# Patient Record
Sex: MALE | Race: WHITE | NOT HISPANIC OR LATINO | Employment: OTHER | URBAN - METROPOLITAN AREA
[De-identification: names, ages, dates, MRNs, and addresses within clinical notes are randomized per-mention and may not be internally consistent; named-entity substitution may affect disease eponyms.]

---

## 2020-07-13 DIAGNOSIS — M25.559 PAIN IN UNSPECIFIED HIP: Primary | ICD-10-CM

## 2021-01-06 DIAGNOSIS — G47.09 INITIAL INSOMNIA: Primary | ICD-10-CM

## 2021-10-09 ENCOUNTER — HOSPITAL ENCOUNTER (INPATIENT)
Facility: HOSPITAL | Age: 61
LOS: 12 days | Discharge: HOSPICE | DRG: 853 | End: 2021-10-21
Attending: EMERGENCY MEDICINE | Admitting: FAMILY MEDICINE
Payer: MEDICAID

## 2021-10-09 DIAGNOSIS — L03.032 CELLULITIS OF GREAT TOE OF LEFT FOOT: ICD-10-CM

## 2021-10-09 DIAGNOSIS — I48.91 ATRIAL FIBRILLATION WITH RVR: ICD-10-CM

## 2021-10-09 DIAGNOSIS — B99.9 INFECTION: Primary | ICD-10-CM

## 2021-10-09 DIAGNOSIS — A41.01 SEPSIS DUE TO STAPHYLOCOCCUS AUREUS: ICD-10-CM

## 2021-10-09 DIAGNOSIS — R07.9 CHEST PAIN: ICD-10-CM

## 2021-10-09 DIAGNOSIS — R78.81 GRAM-POSITIVE BACTEREMIA: ICD-10-CM

## 2021-10-09 DIAGNOSIS — E08.65 DIABETES MELLITUS DUE TO UNDERLYING CONDITION WITH HYPERGLYCEMIA, UNSPECIFIED WHETHER LONG TERM INSULIN USE: ICD-10-CM

## 2021-10-09 DIAGNOSIS — A41.9 SEPSIS: ICD-10-CM

## 2021-10-09 DIAGNOSIS — A41.9 SEPSIS, DUE TO UNSPECIFIED ORGANISM, UNSPECIFIED WHETHER ACUTE ORGAN DYSFUNCTION PRESENT: ICD-10-CM

## 2021-10-09 DIAGNOSIS — L97.509 ULCER OF TOE, UNSPECIFIED LATERALITY, UNSPECIFIED ULCER STAGE: ICD-10-CM

## 2021-10-09 DIAGNOSIS — D72.829 LEUKOCYTOSIS, UNSPECIFIED TYPE: ICD-10-CM

## 2021-10-09 PROBLEM — E66.9 CLASS 2 OBESITY IN ADULT: Status: ACTIVE | Noted: 2021-10-09

## 2021-10-09 PROBLEM — B37.2 CANDIDAL INTERTRIGO: Status: ACTIVE | Noted: 2021-10-09

## 2021-10-09 PROBLEM — E11.10 DIABETIC KETOACIDOSIS WITHOUT COMA ASSOCIATED WITH TYPE 2 DIABETES MELLITUS: Status: ACTIVE | Noted: 2021-10-09

## 2021-10-09 PROBLEM — Z91.199 NONCOMPLIANCE: Status: ACTIVE | Noted: 2021-10-09

## 2021-10-09 PROBLEM — L03.115 CELLULITIS OF RIGHT LOWER EXTREMITY: Status: ACTIVE | Noted: 2021-10-09

## 2021-10-09 LAB
ALBUMIN SERPL BCP-MCNC: 3.6 G/DL (ref 3.5–5.2)
ALLENS TEST: ABNORMAL
ALP SERPL-CCNC: 97 U/L (ref 55–135)
ALT SERPL W/O P-5'-P-CCNC: 15 U/L (ref 10–44)
ANION GAP SERPL CALC-SCNC: 14 MMOL/L (ref 8–16)
ANION GAP SERPL CALC-SCNC: 17 MMOL/L (ref 8–16)
ANION GAP SERPL CALC-SCNC: 19 MMOL/L (ref 8–16)
AST SERPL-CCNC: 16 U/L (ref 10–40)
B-OH-BUTYR BLD STRIP-SCNC: >5.1 MMOL/L (ref 0–0.5)
BACTERIA #/AREA URNS HPF: NEGATIVE /HPF
BASOPHILS # BLD AUTO: 0.06 K/UL (ref 0–0.2)
BASOPHILS NFR BLD: 0.3 % (ref 0–1.9)
BILIRUB SERPL-MCNC: 1.7 MG/DL (ref 0.1–1)
BILIRUB UR QL STRIP: NEGATIVE
BUN SERPL-MCNC: 20 MG/DL (ref 8–23)
BUN SERPL-MCNC: 20 MG/DL (ref 8–23)
BUN SERPL-MCNC: 23 MG/DL (ref 8–23)
CALCIUM SERPL-MCNC: 8.7 MG/DL (ref 8.7–10.5)
CALCIUM SERPL-MCNC: 9 MG/DL (ref 8.7–10.5)
CALCIUM SERPL-MCNC: 9.4 MG/DL (ref 8.7–10.5)
CHLORIDE SERPL-SCNC: 101 MMOL/L (ref 95–110)
CHLORIDE SERPL-SCNC: 93 MMOL/L (ref 95–110)
CHLORIDE SERPL-SCNC: 98 MMOL/L (ref 95–110)
CLARITY UR: CLEAR
CO2 SERPL-SCNC: 16 MMOL/L (ref 23–29)
CO2 SERPL-SCNC: 17 MMOL/L (ref 23–29)
CO2 SERPL-SCNC: 18 MMOL/L (ref 23–29)
COLOR UR: YELLOW
CREAT SERPL-MCNC: 1.1 MG/DL (ref 0.5–1.4)
CREAT SERPL-MCNC: 1.1 MG/DL (ref 0.5–1.4)
CREAT SERPL-MCNC: 1.3 MG/DL (ref 0.5–1.4)
DELSYS: ABNORMAL
DIFFERENTIAL METHOD: ABNORMAL
EOSINOPHIL # BLD AUTO: 0 K/UL (ref 0–0.5)
EOSINOPHIL NFR BLD: 0.1 % (ref 0–8)
ERYTHROCYTE [DISTWIDTH] IN BLOOD BY AUTOMATED COUNT: 12.9 % (ref 11.5–14.5)
EST. GFR  (AFRICAN AMERICAN): >60 ML/MIN/1.73 M^2
EST. GFR  (NON AFRICAN AMERICAN): 58.1 ML/MIN/1.73 M^2
EST. GFR  (NON AFRICAN AMERICAN): >60 ML/MIN/1.73 M^2
EST. GFR  (NON AFRICAN AMERICAN): >60 ML/MIN/1.73 M^2
FIO2: 21
GLUCOSE SERPL-MCNC: 230 MG/DL (ref 70–110)
GLUCOSE SERPL-MCNC: 249 MG/DL (ref 70–110)
GLUCOSE SERPL-MCNC: 257 MG/DL (ref 70–110)
GLUCOSE SERPL-MCNC: 262 MG/DL (ref 70–110)
GLUCOSE SERPL-MCNC: 268 MG/DL (ref 70–110)
GLUCOSE SERPL-MCNC: 270 MG/DL (ref 70–110)
GLUCOSE SERPL-MCNC: 288 MG/DL (ref 70–110)
GLUCOSE SERPL-MCNC: 288 MG/DL (ref 70–110)
GLUCOSE SERPL-MCNC: 324 MG/DL (ref 70–110)
GLUCOSE SERPL-MCNC: 381 MG/DL (ref 70–110)
GLUCOSE UR QL STRIP: ABNORMAL
HCO3 UR-SCNC: 19.2 MMOL/L (ref 24–28)
HCT VFR BLD AUTO: 45.2 % (ref 40–54)
HCT VFR BLD CALC: 45 %PCV (ref 36–54)
HGB BLD-MCNC: 14.6 G/DL (ref 14–18)
HGB UR QL STRIP: NEGATIVE
HYALINE CASTS #/AREA URNS LPF: 9 /LPF
IMM GRANULOCYTES # BLD AUTO: 0.21 K/UL (ref 0–0.04)
IMM GRANULOCYTES NFR BLD AUTO: 1.1 % (ref 0–0.5)
KETONES UR QL STRIP: ABNORMAL
LACTATE SERPL-SCNC: 1.8 MMOL/L (ref 0.5–1.9)
LACTATE SERPL-SCNC: 2 MMOL/L (ref 0.5–1.9)
LEUKOCYTE ESTERASE UR QL STRIP: NEGATIVE
LYMPHOCYTES # BLD AUTO: 0.9 K/UL (ref 1–4.8)
LYMPHOCYTES NFR BLD: 4.8 % (ref 18–48)
MAGNESIUM SERPL-MCNC: 1.9 MG/DL (ref 1.6–2.6)
MCH RBC QN AUTO: 28.7 PG (ref 27–31)
MCHC RBC AUTO-ENTMCNC: 32.3 G/DL (ref 32–36)
MCV RBC AUTO: 89 FL (ref 82–98)
MICROSCOPIC COMMENT: ABNORMAL
MODE: ABNORMAL
MONOCYTES # BLD AUTO: 1.4 K/UL (ref 0.3–1)
MONOCYTES NFR BLD: 7.6 % (ref 4–15)
NEUTROPHILS # BLD AUTO: 15.9 K/UL (ref 1.8–7.7)
NEUTROPHILS NFR BLD: 86.1 % (ref 38–73)
NITRITE UR QL STRIP: NEGATIVE
NRBC BLD-RTO: 0 /100 WBC
PCO2 BLDA: 37.6 MMHG (ref 35–45)
PH SMN: 7.32 [PH] (ref 7.35–7.45)
PH UR STRIP: 6 [PH] (ref 5–8)
PHOSPHATE SERPL-MCNC: 4.2 MG/DL (ref 2.7–4.5)
PLATELET # BLD AUTO: 384 K/UL (ref 150–450)
PMV BLD AUTO: 10.6 FL (ref 9.2–12.9)
PO2 BLDA: 24 MMHG (ref 40–60)
POC BE: -7 MMOL/L
POC IONIZED CALCIUM: 1.24 MMOL/L (ref 1.06–1.42)
POC SATURATED O2: 37 % (ref 95–100)
POC TCO2: 20 MMOL/L (ref 24–29)
POTASSIUM BLD-SCNC: 4.3 MMOL/L (ref 3.5–5.1)
POTASSIUM SERPL-SCNC: 4 MMOL/L (ref 3.5–5.1)
POTASSIUM SERPL-SCNC: 4.2 MMOL/L (ref 3.5–5.1)
POTASSIUM SERPL-SCNC: 4.3 MMOL/L (ref 3.5–5.1)
PROT SERPL-MCNC: 8.4 G/DL (ref 6–8.4)
PROT UR QL STRIP: ABNORMAL
RBC # BLD AUTO: 5.09 M/UL (ref 4.6–6.2)
RBC #/AREA URNS HPF: 1 /HPF (ref 0–4)
SAMPLE: ABNORMAL
SARS-COV-2 RDRP RESP QL NAA+PROBE: NEGATIVE
SITE: ABNORMAL
SODIUM BLD-SCNC: 135 MMOL/L (ref 136–145)
SODIUM SERPL-SCNC: 128 MMOL/L (ref 136–145)
SODIUM SERPL-SCNC: 132 MMOL/L (ref 136–145)
SODIUM SERPL-SCNC: 133 MMOL/L (ref 136–145)
SP GR UR STRIP: 1.03 (ref 1–1.03)
SQUAMOUS #/AREA URNS HPF: 1 /HPF
URN SPEC COLLECT METH UR: ABNORMAL
UROBILINOGEN UR STRIP-ACNC: NEGATIVE EU/DL
WBC # BLD AUTO: 18.48 K/UL (ref 3.9–12.7)
WBC #/AREA URNS HPF: 4 /HPF (ref 0–5)
YEAST URNS QL MICRO: ABNORMAL

## 2021-10-09 PROCEDURE — 87186 SC STD MICRODIL/AGAR DIL: CPT | Performed by: EMERGENCY MEDICINE

## 2021-10-09 PROCEDURE — 96375 TX/PRO/DX INJ NEW DRUG ADDON: CPT

## 2021-10-09 PROCEDURE — 83605 ASSAY OF LACTIC ACID: CPT | Performed by: EMERGENCY MEDICINE

## 2021-10-09 PROCEDURE — 36415 COLL VENOUS BLD VENIPUNCTURE: CPT | Performed by: EMERGENCY MEDICINE

## 2021-10-09 PROCEDURE — 85014 HEMATOCRIT: CPT

## 2021-10-09 PROCEDURE — 25000003 PHARM REV CODE 250: Performed by: EMERGENCY MEDICINE

## 2021-10-09 PROCEDURE — 87077 CULTURE AEROBIC IDENTIFY: CPT | Performed by: EMERGENCY MEDICINE

## 2021-10-09 PROCEDURE — S5010 5% DEXTROSE AND 0.45% SALINE: HCPCS | Performed by: NURSE PRACTITIONER

## 2021-10-09 PROCEDURE — 82330 ASSAY OF CALCIUM: CPT

## 2021-10-09 PROCEDURE — 80053 COMPREHEN METABOLIC PANEL: CPT | Performed by: EMERGENCY MEDICINE

## 2021-10-09 PROCEDURE — 82962 GLUCOSE BLOOD TEST: CPT

## 2021-10-09 PROCEDURE — 84132 ASSAY OF SERUM POTASSIUM: CPT

## 2021-10-09 PROCEDURE — 83036 HEMOGLOBIN GLYCOSYLATED A1C: CPT | Performed by: NURSE PRACTITIONER

## 2021-10-09 PROCEDURE — 83735 ASSAY OF MAGNESIUM: CPT | Performed by: EMERGENCY MEDICINE

## 2021-10-09 PROCEDURE — 90714 TD VACC NO PRESV 7 YRS+ IM: CPT | Performed by: EMERGENCY MEDICINE

## 2021-10-09 PROCEDURE — 84100 ASSAY OF PHOSPHORUS: CPT | Performed by: EMERGENCY MEDICINE

## 2021-10-09 PROCEDURE — 83605 ASSAY OF LACTIC ACID: CPT | Mod: 91 | Performed by: EMERGENCY MEDICINE

## 2021-10-09 PROCEDURE — 87147 CULTURE TYPE IMMUNOLOGIC: CPT | Performed by: EMERGENCY MEDICINE

## 2021-10-09 PROCEDURE — 63600175 PHARM REV CODE 636 W HCPCS: Performed by: NURSE PRACTITIONER

## 2021-10-09 PROCEDURE — 99285 EMERGENCY DEPT VISIT HI MDM: CPT | Mod: 25

## 2021-10-09 PROCEDURE — 84295 ASSAY OF SERUM SODIUM: CPT

## 2021-10-09 PROCEDURE — 94761 N-INVAS EAR/PLS OXIMETRY MLT: CPT

## 2021-10-09 PROCEDURE — 99900035 HC TECH TIME PER 15 MIN (STAT)

## 2021-10-09 PROCEDURE — 90471 IMMUNIZATION ADMIN: CPT | Performed by: EMERGENCY MEDICINE

## 2021-10-09 PROCEDURE — 36415 COLL VENOUS BLD VENIPUNCTURE: CPT | Performed by: NURSE PRACTITIONER

## 2021-10-09 PROCEDURE — 80048 BASIC METABOLIC PNL TOTAL CA: CPT | Performed by: NURSE PRACTITIONER

## 2021-10-09 PROCEDURE — 96374 THER/PROPH/DIAG INJ IV PUSH: CPT

## 2021-10-09 PROCEDURE — 82010 KETONE BODYS QUAN: CPT | Performed by: NURSE PRACTITIONER

## 2021-10-09 PROCEDURE — 63600175 PHARM REV CODE 636 W HCPCS: Performed by: EMERGENCY MEDICINE

## 2021-10-09 PROCEDURE — 85025 COMPLETE CBC W/AUTO DIFF WBC: CPT | Performed by: EMERGENCY MEDICINE

## 2021-10-09 PROCEDURE — 25000003 PHARM REV CODE 250: Performed by: NURSE PRACTITIONER

## 2021-10-09 PROCEDURE — 20000000 HC ICU ROOM

## 2021-10-09 PROCEDURE — 81001 URINALYSIS AUTO W/SCOPE: CPT | Performed by: NURSE PRACTITIONER

## 2021-10-09 PROCEDURE — 96361 HYDRATE IV INFUSION ADD-ON: CPT

## 2021-10-09 PROCEDURE — 82803 BLOOD GASES ANY COMBINATION: CPT

## 2021-10-09 PROCEDURE — U0002 COVID-19 LAB TEST NON-CDC: HCPCS | Performed by: EMERGENCY MEDICINE

## 2021-10-09 PROCEDURE — 87040 BLOOD CULTURE FOR BACTERIA: CPT | Performed by: EMERGENCY MEDICINE

## 2021-10-09 RX ORDER — MAGNESIUM SULFATE 1 G/100ML
1 INJECTION INTRAVENOUS
Status: DISCONTINUED | OUTPATIENT
Start: 2021-10-09 | End: 2021-10-21 | Stop reason: HOSPADM

## 2021-10-09 RX ORDER — IBUPROFEN 200 MG
16 TABLET ORAL
Status: DISCONTINUED | OUTPATIENT
Start: 2021-10-09 | End: 2021-10-21 | Stop reason: HOSPADM

## 2021-10-09 RX ORDER — ACETAMINOPHEN 325 MG/1
650 TABLET ORAL EVERY 4 HOURS PRN
Status: DISCONTINUED | OUTPATIENT
Start: 2021-10-09 | End: 2021-10-21 | Stop reason: HOSPADM

## 2021-10-09 RX ORDER — AMLODIPINE BESYLATE 10 MG/1
10 TABLET ORAL DAILY
Status: ON HOLD | COMMUNITY
End: 2021-12-07 | Stop reason: HOSPADM

## 2021-10-09 RX ORDER — NALOXONE HCL 0.4 MG/ML
0.02 VIAL (ML) INJECTION
Status: DISCONTINUED | OUTPATIENT
Start: 2021-10-09 | End: 2021-10-21 | Stop reason: HOSPADM

## 2021-10-09 RX ORDER — AMLODIPINE BESYLATE 5 MG/1
10 TABLET ORAL DAILY
Status: DISCONTINUED | OUTPATIENT
Start: 2021-10-09 | End: 2021-10-10

## 2021-10-09 RX ORDER — LANOLIN ALCOHOL/MO/W.PET/CERES
800 CREAM (GRAM) TOPICAL
Status: DISCONTINUED | OUTPATIENT
Start: 2021-10-09 | End: 2021-10-21 | Stop reason: HOSPADM

## 2021-10-09 RX ORDER — VANCOMYCIN HCL IN 5 % DEXTROSE 1G/250ML
1000 PLASTIC BAG, INJECTION (ML) INTRAVENOUS ONCE
Status: COMPLETED | OUTPATIENT
Start: 2021-10-09 | End: 2021-10-09

## 2021-10-09 RX ORDER — MORPHINE SULFATE 4 MG/ML
4 INJECTION, SOLUTION INTRAMUSCULAR; INTRAVENOUS
Status: COMPLETED | OUTPATIENT
Start: 2021-10-09 | End: 2021-10-09

## 2021-10-09 RX ORDER — FLUTICASONE PROPIONATE 50 MCG
SPRAY, SUSPENSION (ML) NASAL
COMMUNITY
End: 2021-10-09

## 2021-10-09 RX ORDER — POTASSIUM CHLORIDE 20 MEQ/1
40 TABLET, EXTENDED RELEASE ORAL
Status: DISCONTINUED | OUTPATIENT
Start: 2021-10-09 | End: 2021-10-21 | Stop reason: HOSPADM

## 2021-10-09 RX ORDER — KETOCONAZOLE 20 MG/ML
1 SHAMPOO, SUSPENSION TOPICAL EVERY OTHER DAY
Status: ON HOLD | COMMUNITY
End: 2021-12-07 | Stop reason: HOSPADM

## 2021-10-09 RX ORDER — INSULIN ASPART 100 [IU]/ML
0-5 INJECTION, SOLUTION INTRAVENOUS; SUBCUTANEOUS
Status: DISCONTINUED | OUTPATIENT
Start: 2021-10-09 | End: 2021-10-11

## 2021-10-09 RX ORDER — POTASSIUM CHLORIDE 20 MEQ/1
20 TABLET, EXTENDED RELEASE ORAL
Status: DISCONTINUED | OUTPATIENT
Start: 2021-10-09 | End: 2021-10-21 | Stop reason: HOSPADM

## 2021-10-09 RX ORDER — TRAZODONE HYDROCHLORIDE 150 MG/1
150 TABLET ORAL NIGHTLY
Status: ON HOLD | COMMUNITY
End: 2022-07-29 | Stop reason: CLARIF

## 2021-10-09 RX ORDER — TAMSULOSIN HYDROCHLORIDE 0.4 MG/1
0.4 CAPSULE ORAL DAILY
Status: DISCONTINUED | OUTPATIENT
Start: 2021-10-10 | End: 2021-10-21 | Stop reason: HOSPADM

## 2021-10-09 RX ORDER — ONDANSETRON 2 MG/ML
4 INJECTION INTRAMUSCULAR; INTRAVENOUS EVERY 8 HOURS PRN
Status: DISCONTINUED | OUTPATIENT
Start: 2021-10-09 | End: 2021-10-21 | Stop reason: HOSPADM

## 2021-10-09 RX ORDER — METFORMIN HYDROCHLORIDE 500 MG/1
500 TABLET ORAL 2 TIMES DAILY
COMMUNITY

## 2021-10-09 RX ORDER — MAGNESIUM SULFATE HEPTAHYDRATE 40 MG/ML
4 INJECTION, SOLUTION INTRAVENOUS
Status: DISCONTINUED | OUTPATIENT
Start: 2021-10-09 | End: 2021-10-21 | Stop reason: HOSPADM

## 2021-10-09 RX ORDER — IPRATROPIUM BROMIDE AND ALBUTEROL SULFATE 2.5; .5 MG/3ML; MG/3ML
3 SOLUTION RESPIRATORY (INHALATION) EVERY 6 HOURS PRN
Status: DISCONTINUED | OUTPATIENT
Start: 2021-10-09 | End: 2021-10-21 | Stop reason: HOSPADM

## 2021-10-09 RX ORDER — GLUCAGON 1 MG
1 KIT INJECTION
Status: DISCONTINUED | OUTPATIENT
Start: 2021-10-09 | End: 2021-10-21 | Stop reason: HOSPADM

## 2021-10-09 RX ORDER — TALC
6 POWDER (GRAM) TOPICAL NIGHTLY PRN
Status: DISCONTINUED | OUTPATIENT
Start: 2021-10-09 | End: 2021-10-19

## 2021-10-09 RX ORDER — ENOXAPARIN SODIUM 100 MG/ML
40 INJECTION SUBCUTANEOUS EVERY 24 HOURS
Status: DISCONTINUED | OUTPATIENT
Start: 2021-10-09 | End: 2021-10-11

## 2021-10-09 RX ORDER — POLYETHYLENE GLYCOL 3350 17 G/17G
17 POWDER, FOR SOLUTION ORAL 2 TIMES DAILY PRN
Status: DISCONTINUED | OUTPATIENT
Start: 2021-10-09 | End: 2021-10-21 | Stop reason: HOSPADM

## 2021-10-09 RX ORDER — SODIUM CHLORIDE 9 MG/ML
INJECTION, SOLUTION INTRAVENOUS
Status: COMPLETED | OUTPATIENT
Start: 2021-10-09 | End: 2021-10-09

## 2021-10-09 RX ORDER — DICLOFENAC SODIUM 10 MG/G
GEL TOPICAL
COMMUNITY
End: 2021-10-09

## 2021-10-09 RX ORDER — AMOXICILLIN 250 MG
1 CAPSULE ORAL 2 TIMES DAILY
Status: DISCONTINUED | OUTPATIENT
Start: 2021-10-09 | End: 2021-10-20

## 2021-10-09 RX ORDER — SODIUM CHLORIDE 9 MG/ML
INJECTION, SOLUTION INTRAVENOUS CONTINUOUS
Status: DISCONTINUED | OUTPATIENT
Start: 2021-10-09 | End: 2021-10-10

## 2021-10-09 RX ORDER — LISINOPRIL 10 MG/1
10 TABLET ORAL DAILY
Status: ON HOLD | COMMUNITY
End: 2021-12-07 | Stop reason: HOSPADM

## 2021-10-09 RX ORDER — ZOLPIDEM TARTRATE 10 MG/1
TABLET ORAL
COMMUNITY
End: 2021-10-09

## 2021-10-09 RX ORDER — MAGNESIUM SULFATE HEPTAHYDRATE 40 MG/ML
2 INJECTION, SOLUTION INTRAVENOUS
Status: DISCONTINUED | OUTPATIENT
Start: 2021-10-09 | End: 2021-10-21 | Stop reason: HOSPADM

## 2021-10-09 RX ORDER — MORPHINE SULFATE 4 MG/ML
4 INJECTION, SOLUTION INTRAMUSCULAR; INTRAVENOUS EVERY 4 HOURS PRN
Status: DISCONTINUED | OUTPATIENT
Start: 2021-10-09 | End: 2021-10-19

## 2021-10-09 RX ORDER — MICONAZOLE NITRATE 2 %
POWDER (GRAM) TOPICAL 2 TIMES DAILY
Status: DISCONTINUED | OUTPATIENT
Start: 2021-10-09 | End: 2021-10-21 | Stop reason: HOSPADM

## 2021-10-09 RX ORDER — GABAPENTIN 300 MG/1
300 CAPSULE ORAL 2 TIMES DAILY
Status: DISCONTINUED | OUTPATIENT
Start: 2021-10-09 | End: 2021-10-19

## 2021-10-09 RX ORDER — ATORVASTATIN CALCIUM 20 MG/1
20 TABLET, FILM COATED ORAL NIGHTLY
Status: DISCONTINUED | OUTPATIENT
Start: 2021-10-09 | End: 2021-10-21 | Stop reason: HOSPADM

## 2021-10-09 RX ORDER — POTASSIUM CHLORIDE 7.45 MG/ML
20 INJECTION INTRAVENOUS
Status: DISCONTINUED | OUTPATIENT
Start: 2021-10-09 | End: 2021-10-21 | Stop reason: HOSPADM

## 2021-10-09 RX ORDER — LISINOPRIL 10 MG/1
10 TABLET ORAL DAILY
Status: DISCONTINUED | OUTPATIENT
Start: 2021-10-10 | End: 2021-10-10

## 2021-10-09 RX ORDER — SODIUM CHLORIDE 0.9 % (FLUSH) 0.9 %
10 SYRINGE (ML) INJECTION EVERY 12 HOURS PRN
Status: DISCONTINUED | OUTPATIENT
Start: 2021-10-09 | End: 2021-10-10

## 2021-10-09 RX ORDER — MICONAZOLE NITRATE 2 %
POWDER (GRAM) TOPICAL 2 TIMES DAILY
Status: DISCONTINUED | OUTPATIENT
Start: 2021-10-09 | End: 2021-10-09

## 2021-10-09 RX ORDER — TRAZODONE HYDROCHLORIDE 50 MG/1
150 TABLET ORAL NIGHTLY
Status: DISCONTINUED | OUTPATIENT
Start: 2021-10-09 | End: 2021-10-19

## 2021-10-09 RX ORDER — ALBUTEROL SULFATE 90 UG/1
2 AEROSOL, METERED RESPIRATORY (INHALATION) EVERY 6 HOURS PRN
COMMUNITY

## 2021-10-09 RX ORDER — POTASSIUM CHLORIDE 7.45 MG/ML
40 INJECTION INTRAVENOUS
Status: DISCONTINUED | OUTPATIENT
Start: 2021-10-09 | End: 2021-10-21 | Stop reason: HOSPADM

## 2021-10-09 RX ORDER — TAMSULOSIN HYDROCHLORIDE 0.4 MG/1
0.4 CAPSULE ORAL DAILY
COMMUNITY
End: 2022-05-25

## 2021-10-09 RX ORDER — SODIUM CHLORIDE 9 MG/ML
INJECTION, SOLUTION INTRAVENOUS CONTINUOUS
Status: DISCONTINUED | OUTPATIENT
Start: 2021-10-09 | End: 2021-10-09

## 2021-10-09 RX ORDER — DEXTROSE MONOHYDRATE AND SODIUM CHLORIDE 5; .45 G/100ML; G/100ML
INJECTION, SOLUTION INTRAVENOUS CONTINUOUS
Status: DISCONTINUED | OUTPATIENT
Start: 2021-10-09 | End: 2021-10-10

## 2021-10-09 RX ORDER — DEXTROSE MONOHYDRATE 100 MG/ML
INJECTION, SOLUTION INTRAVENOUS
Status: DISCONTINUED | OUTPATIENT
Start: 2021-10-09 | End: 2021-10-21 | Stop reason: HOSPADM

## 2021-10-09 RX ORDER — SIMVASTATIN 40 MG/1
40 TABLET, FILM COATED ORAL DAILY
COMMUNITY

## 2021-10-09 RX ORDER — IPRATROPIUM BROMIDE 17 UG/1
1 AEROSOL, METERED RESPIRATORY (INHALATION) DAILY
COMMUNITY
End: 2022-07-21 | Stop reason: ALTCHOICE

## 2021-10-09 RX ORDER — GABAPENTIN 300 MG/1
300 CAPSULE ORAL 2 TIMES DAILY
Status: ON HOLD | COMMUNITY
End: 2021-12-07 | Stop reason: HOSPADM

## 2021-10-09 RX ORDER — ONDANSETRON 2 MG/ML
4 INJECTION INTRAMUSCULAR; INTRAVENOUS
Status: COMPLETED | OUTPATIENT
Start: 2021-10-09 | End: 2021-10-09

## 2021-10-09 RX ORDER — INSULIN GLARGINE 100 [IU]/ML
20 INJECTION, SOLUTION SUBCUTANEOUS NIGHTLY
Status: ON HOLD | COMMUNITY
End: 2021-12-07 | Stop reason: SDUPTHER

## 2021-10-09 RX ORDER — METHYLPHENIDATE HYDROCHLORIDE 20 MG/1
20 TABLET ORAL 3 TIMES DAILY
Status: ON HOLD | COMMUNITY
End: 2021-12-07 | Stop reason: HOSPADM

## 2021-10-09 RX ORDER — HYDROCODONE BITARTRATE AND ACETAMINOPHEN 5; 325 MG/1; MG/1
1 TABLET ORAL EVERY 6 HOURS PRN
Status: DISCONTINUED | OUTPATIENT
Start: 2021-10-09 | End: 2021-10-13

## 2021-10-09 RX ORDER — GLIPIZIDE 5 MG/1
5 TABLET ORAL
COMMUNITY

## 2021-10-09 RX ORDER — IBUPROFEN 200 MG
24 TABLET ORAL
Status: DISCONTINUED | OUTPATIENT
Start: 2021-10-09 | End: 2021-10-21 | Stop reason: HOSPADM

## 2021-10-09 RX ADMIN — ONDANSETRON 4 MG: 2 INJECTION INTRAMUSCULAR; INTRAVENOUS at 02:10

## 2021-10-09 RX ADMIN — PIPERACILLIN AND TAZOBACTAM 4.5 G: 4; .5 INJECTION, POWDER, LYOPHILIZED, FOR SOLUTION INTRAVENOUS; PARENTERAL at 02:10

## 2021-10-09 RX ADMIN — HYDROCODONE BITARTRATE AND ACETAMINOPHEN 1 TABLET: 5; 325 TABLET ORAL at 08:10

## 2021-10-09 RX ADMIN — VANCOMYCIN HYDROCHLORIDE 1000 MG: 1 INJECTION, POWDER, LYOPHILIZED, FOR SOLUTION INTRAVENOUS at 06:10

## 2021-10-09 RX ADMIN — TRAZODONE HYDROCHLORIDE 150 MG: 50 TABLET ORAL at 08:10

## 2021-10-09 RX ADMIN — AMLODIPINE BESYLATE 10 MG: 5 TABLET ORAL at 07:10

## 2021-10-09 RX ADMIN — GABAPENTIN 300 MG: 300 CAPSULE ORAL at 08:10

## 2021-10-09 RX ADMIN — SODIUM CHLORIDE: 0.9 INJECTION, SOLUTION INTRAVENOUS at 02:10

## 2021-10-09 RX ADMIN — PIPERACILLIN SODIUM AND TAZOBACTAM SODIUM 3.38 G: 3; .375 INJECTION, POWDER, LYOPHILIZED, FOR SOLUTION INTRAVENOUS at 11:10

## 2021-10-09 RX ADMIN — DEXTROSE AND SODIUM CHLORIDE: 5; .45 INJECTION, SOLUTION INTRAVENOUS at 11:10

## 2021-10-09 RX ADMIN — HUMAN INSULIN 6 UNITS: 100 INJECTION, SOLUTION SUBCUTANEOUS at 03:10

## 2021-10-09 RX ADMIN — SODIUM CHLORIDE: 0.9 INJECTION, SOLUTION INTRAVENOUS at 03:10

## 2021-10-09 RX ADMIN — DICYCLOMINE HYDROCHLORIDE 50 ML: 10 SOLUTION ORAL at 08:10

## 2021-10-09 RX ADMIN — ATORVASTATIN CALCIUM 20 MG: 20 TABLET, FILM COATED ORAL at 08:10

## 2021-10-09 RX ADMIN — MORPHINE SULFATE 4 MG: 4 INJECTION, SOLUTION INTRAMUSCULAR; INTRAVENOUS at 02:10

## 2021-10-09 RX ADMIN — TETANUS AND DIPHTHERIA TOXOIDS ADSORBED 0.5 ML: 2; 2 INJECTION INTRAMUSCULAR at 03:10

## 2021-10-09 RX ADMIN — ENOXAPARIN SODIUM 40 MG: 40 INJECTION SUBCUTANEOUS at 08:10

## 2021-10-09 RX ADMIN — SODIUM CHLORIDE 1000 ML: 0.9 INJECTION, SOLUTION INTRAVENOUS at 04:10

## 2021-10-09 RX ADMIN — INSULIN HUMAN 2 UNITS/HR: 1 INJECTION, SOLUTION INTRAVENOUS at 08:10

## 2021-10-10 PROBLEM — B99.9 INFECTION: Status: ACTIVE | Noted: 2021-10-10

## 2021-10-10 LAB
ANION GAP SERPL CALC-SCNC: 10 MMOL/L (ref 8–16)
ANION GAP SERPL CALC-SCNC: 11 MMOL/L (ref 8–16)
BASOPHILS # BLD AUTO: 0.06 K/UL (ref 0–0.2)
BASOPHILS NFR BLD: 0.4 % (ref 0–1.9)
BUN SERPL-MCNC: 15 MG/DL (ref 8–23)
BUN SERPL-MCNC: 16 MG/DL (ref 8–23)
BUN SERPL-MCNC: 21 MG/DL (ref 8–23)
CALCIUM SERPL-MCNC: 8.5 MG/DL (ref 8.7–10.5)
CALCIUM SERPL-MCNC: 8.6 MG/DL (ref 8.7–10.5)
CALCIUM SERPL-MCNC: 8.6 MG/DL (ref 8.7–10.5)
CALCIUM SERPL-MCNC: 8.7 MG/DL (ref 8.7–10.5)
CALCIUM SERPL-MCNC: 8.7 MG/DL (ref 8.7–10.5)
CHLORIDE SERPL-SCNC: 101 MMOL/L (ref 95–110)
CHLORIDE SERPL-SCNC: 101 MMOL/L (ref 95–110)
CHLORIDE SERPL-SCNC: 103 MMOL/L (ref 95–110)
CHLORIDE SERPL-SCNC: 103 MMOL/L (ref 95–110)
CHLORIDE SERPL-SCNC: 99 MMOL/L (ref 95–110)
CO2 SERPL-SCNC: 21 MMOL/L (ref 23–29)
CO2 SERPL-SCNC: 22 MMOL/L (ref 23–29)
CREAT SERPL-MCNC: 0.7 MG/DL (ref 0.5–1.4)
CREAT SERPL-MCNC: 0.8 MG/DL (ref 0.5–1.4)
CREAT SERPL-MCNC: 0.9 MG/DL (ref 0.5–1.4)
CREAT SERPL-MCNC: 1 MG/DL (ref 0.5–1.4)
CREAT SERPL-MCNC: 1 MG/DL (ref 0.5–1.4)
DIFFERENTIAL METHOD: ABNORMAL
EOSINOPHIL # BLD AUTO: 0 K/UL (ref 0–0.5)
EOSINOPHIL NFR BLD: 0.1 % (ref 0–8)
ERYTHROCYTE [DISTWIDTH] IN BLOOD BY AUTOMATED COUNT: 12.9 % (ref 11.5–14.5)
EST. GFR  (AFRICAN AMERICAN): >60 ML/MIN/1.73 M^2
EST. GFR  (NON AFRICAN AMERICAN): >60 ML/MIN/1.73 M^2
ESTIMATED AVG GLUCOSE: 341 MG/DL (ref 68–131)
GLUCOSE SERPL-MCNC: 204 MG/DL (ref 70–110)
GLUCOSE SERPL-MCNC: 205 MG/DL (ref 70–110)
GLUCOSE SERPL-MCNC: 208 MG/DL (ref 70–110)
GLUCOSE SERPL-MCNC: 208 MG/DL (ref 70–110)
GLUCOSE SERPL-MCNC: 211 MG/DL (ref 70–110)
GLUCOSE SERPL-MCNC: 224 MG/DL (ref 70–110)
GLUCOSE SERPL-MCNC: 231 MG/DL (ref 70–110)
GLUCOSE SERPL-MCNC: 231 MG/DL (ref 70–110)
GLUCOSE SERPL-MCNC: 275 MG/DL (ref 70–110)
GLUCOSE SERPL-MCNC: 279 MG/DL (ref 70–110)
GLUCOSE SERPL-MCNC: 280 MG/DL (ref 70–110)
GLUCOSE SERPL-MCNC: 303 MG/DL (ref 70–110)
HBA1C MFR BLD: 13.5 % (ref 4.5–6.2)
HCT VFR BLD AUTO: 36.6 % (ref 40–54)
HGB BLD-MCNC: 11.7 G/DL (ref 14–18)
IMM GRANULOCYTES # BLD AUTO: 0.12 K/UL (ref 0–0.04)
IMM GRANULOCYTES NFR BLD AUTO: 0.8 % (ref 0–0.5)
LYMPHOCYTES # BLD AUTO: 1.4 K/UL (ref 1–4.8)
LYMPHOCYTES NFR BLD: 9.4 % (ref 18–48)
MAGNESIUM SERPL-MCNC: 1.9 MG/DL (ref 1.6–2.6)
MCH RBC QN AUTO: 28.7 PG (ref 27–31)
MCHC RBC AUTO-ENTMCNC: 32 G/DL (ref 32–36)
MCV RBC AUTO: 90 FL (ref 82–98)
MONOCYTES # BLD AUTO: 1.6 K/UL (ref 0.3–1)
MONOCYTES NFR BLD: 10.7 % (ref 4–15)
NEUTROPHILS # BLD AUTO: 11.5 K/UL (ref 1.8–7.7)
NEUTROPHILS NFR BLD: 78.6 % (ref 38–73)
NRBC BLD-RTO: 0 /100 WBC
PLATELET # BLD AUTO: 317 K/UL (ref 150–450)
PMV BLD AUTO: 10 FL (ref 9.2–12.9)
POTASSIUM SERPL-SCNC: 3.7 MMOL/L (ref 3.5–5.1)
POTASSIUM SERPL-SCNC: 4 MMOL/L (ref 3.5–5.1)
POTASSIUM SERPL-SCNC: 4.1 MMOL/L (ref 3.5–5.1)
RBC # BLD AUTO: 4.08 M/UL (ref 4.6–6.2)
SODIUM SERPL-SCNC: 131 MMOL/L (ref 136–145)
SODIUM SERPL-SCNC: 132 MMOL/L (ref 136–145)
SODIUM SERPL-SCNC: 134 MMOL/L (ref 136–145)
SODIUM SERPL-SCNC: 135 MMOL/L (ref 136–145)
SODIUM SERPL-SCNC: 135 MMOL/L (ref 136–145)
WBC # BLD AUTO: 14.63 K/UL (ref 3.9–12.7)

## 2021-10-10 PROCEDURE — 85025 COMPLETE CBC W/AUTO DIFF WBC: CPT | Performed by: NURSE PRACTITIONER

## 2021-10-10 PROCEDURE — 12000002 HC ACUTE/MED SURGE SEMI-PRIVATE ROOM

## 2021-10-10 PROCEDURE — C9399 UNCLASSIFIED DRUGS OR BIOLOG: HCPCS | Performed by: INTERNAL MEDICINE

## 2021-10-10 PROCEDURE — 80048 BASIC METABOLIC PNL TOTAL CA: CPT | Mod: 91 | Performed by: NURSE PRACTITIONER

## 2021-10-10 PROCEDURE — 25000003 PHARM REV CODE 250: Performed by: NURSE PRACTITIONER

## 2021-10-10 PROCEDURE — 99222 PR INITIAL HOSPITAL CARE,LEVL II: ICD-10-PCS | Mod: ,,, | Performed by: PODIATRIST

## 2021-10-10 PROCEDURE — 63600175 PHARM REV CODE 636 W HCPCS: Performed by: FAMILY MEDICINE

## 2021-10-10 PROCEDURE — 25000003 PHARM REV CODE 250: Performed by: INTERNAL MEDICINE

## 2021-10-10 PROCEDURE — 82962 GLUCOSE BLOOD TEST: CPT

## 2021-10-10 PROCEDURE — 63600175 PHARM REV CODE 636 W HCPCS: Performed by: NURSE PRACTITIONER

## 2021-10-10 PROCEDURE — 25000003 PHARM REV CODE 250: Performed by: FAMILY MEDICINE

## 2021-10-10 PROCEDURE — 99222 1ST HOSP IP/OBS MODERATE 55: CPT | Mod: ,,, | Performed by: PODIATRIST

## 2021-10-10 PROCEDURE — 36415 COLL VENOUS BLD VENIPUNCTURE: CPT | Performed by: NURSE PRACTITIONER

## 2021-10-10 PROCEDURE — 83735 ASSAY OF MAGNESIUM: CPT | Performed by: NURSE PRACTITIONER

## 2021-10-10 RX ORDER — SODIUM CHLORIDE 9 MG/ML
INJECTION, SOLUTION INTRAVENOUS CONTINUOUS
Status: DISCONTINUED | OUTPATIENT
Start: 2021-10-10 | End: 2021-10-11

## 2021-10-10 RX ORDER — SODIUM CHLORIDE 0.9 % (FLUSH) 0.9 %
10 SYRINGE (ML) INJECTION
Status: DISCONTINUED | OUTPATIENT
Start: 2021-10-10 | End: 2021-10-21 | Stop reason: HOSPADM

## 2021-10-10 RX ADMIN — TAMSULOSIN HYDROCHLORIDE 0.4 MG: 0.4 CAPSULE ORAL at 08:10

## 2021-10-10 RX ADMIN — VANCOMYCIN HYDROCHLORIDE 1750 MG: 500 INJECTION, POWDER, LYOPHILIZED, FOR SOLUTION INTRAVENOUS at 05:10

## 2021-10-10 RX ADMIN — SENNOSIDES AND DOCUSATE SODIUM 1 TABLET: 8.6; 5 TABLET ORAL at 08:10

## 2021-10-10 RX ADMIN — PIPERACILLIN SODIUM AND TAZOBACTAM SODIUM 3.38 G: 3; .375 INJECTION, POWDER, LYOPHILIZED, FOR SOLUTION INTRAVENOUS at 05:10

## 2021-10-10 RX ADMIN — INSULIN DETEMIR 15 UNITS: 100 INJECTION, SOLUTION SUBCUTANEOUS at 03:10

## 2021-10-10 RX ADMIN — VANCOMYCIN HYDROCHLORIDE 1750 MG: 500 INJECTION, POWDER, LYOPHILIZED, FOR SOLUTION INTRAVENOUS at 06:10

## 2021-10-10 RX ADMIN — PIPERACILLIN SODIUM AND TAZOBACTAM SODIUM 3.38 G: 3; .375 INJECTION, POWDER, LYOPHILIZED, FOR SOLUTION INTRAVENOUS at 02:10

## 2021-10-10 RX ADMIN — ENOXAPARIN SODIUM 40 MG: 40 INJECTION SUBCUTANEOUS at 06:10

## 2021-10-10 RX ADMIN — TRAZODONE HYDROCHLORIDE 150 MG: 50 TABLET ORAL at 08:10

## 2021-10-10 RX ADMIN — ACETAMINOPHEN 650 MG: 325 TABLET, FILM COATED ORAL at 04:10

## 2021-10-10 RX ADMIN — GABAPENTIN 300 MG: 300 CAPSULE ORAL at 08:10

## 2021-10-10 RX ADMIN — HYDROCODONE BITARTRATE AND ACETAMINOPHEN 1 TABLET: 5; 325 TABLET ORAL at 02:10

## 2021-10-10 RX ADMIN — ACETAMINOPHEN 650 MG: 325 TABLET, FILM COATED ORAL at 09:10

## 2021-10-10 RX ADMIN — PIPERACILLIN SODIUM AND TAZOBACTAM SODIUM 3.38 G: 3; .375 INJECTION, POWDER, LYOPHILIZED, FOR SOLUTION INTRAVENOUS at 10:10

## 2021-10-10 RX ADMIN — ATORVASTATIN CALCIUM 20 MG: 20 TABLET, FILM COATED ORAL at 08:10

## 2021-10-10 RX ADMIN — HYDROCODONE BITARTRATE AND ACETAMINOPHEN 1 TABLET: 5; 325 TABLET ORAL at 08:10

## 2021-10-10 RX ADMIN — HYDROCODONE BITARTRATE AND ACETAMINOPHEN 1 TABLET: 5; 325 TABLET ORAL at 07:10

## 2021-10-10 RX ADMIN — MICONAZOLE NITRATE: 2 POWDER TOPICAL at 09:10

## 2021-10-11 ENCOUNTER — CLINICAL SUPPORT (OUTPATIENT)
Dept: CARDIOLOGY | Facility: HOSPITAL | Age: 61
DRG: 853 | End: 2021-10-11
Attending: INTERNAL MEDICINE
Payer: MEDICAID

## 2021-10-11 VITALS — WEIGHT: 233 LBS | BODY MASS INDEX: 35.31 KG/M2 | HEIGHT: 68 IN

## 2021-10-11 PROBLEM — E11.52 DIABETIC WET GANGRENE OF THE FOOT: Status: ACTIVE | Noted: 2021-10-11

## 2021-10-11 LAB
ALBUMIN SERPL BCP-MCNC: 2.7 G/DL (ref 3.5–5.2)
ALP SERPL-CCNC: 72 U/L (ref 55–135)
ALT SERPL W/O P-5'-P-CCNC: 17 U/L (ref 10–44)
ANION GAP SERPL CALC-SCNC: 12 MMOL/L (ref 8–16)
ANION GAP SERPL CALC-SCNC: 12 MMOL/L (ref 8–16)
AORTIC ROOT ANNULUS: 3.68 CM
AORTIC VALVE CUSP SEPERATION: 2 CM
APTT PPP: 37.1 SEC (ref 25.6–35.8)
AST SERPL-CCNC: 26 U/L (ref 10–40)
AV INDEX (PROSTH): 0.55
AV MEAN GRADIENT: 8 MMHG
AV PEAK GRADIENT: 12 MMHG
AV VALVE AREA: 2.55 CM2
AV VELOCITY RATIO: 73.33
BASOPHILS # BLD AUTO: 0.08 K/UL (ref 0–0.2)
BASOPHILS # BLD AUTO: 0.08 K/UL (ref 0–0.2)
BASOPHILS NFR BLD: 0.8 % (ref 0–1.9)
BASOPHILS NFR BLD: 0.8 % (ref 0–1.9)
BILIRUB SERPL-MCNC: 1 MG/DL (ref 0.1–1)
BSA FOR ECHO PROCEDURE: 2.25 M2
BUN SERPL-MCNC: 13 MG/DL (ref 8–23)
BUN SERPL-MCNC: 13 MG/DL (ref 8–23)
CALCIUM SERPL-MCNC: 8.5 MG/DL (ref 8.7–10.5)
CALCIUM SERPL-MCNC: 8.5 MG/DL (ref 8.7–10.5)
CHLORIDE SERPL-SCNC: 99 MMOL/L (ref 95–110)
CHLORIDE SERPL-SCNC: 99 MMOL/L (ref 95–110)
CO2 SERPL-SCNC: 23 MMOL/L (ref 23–29)
CO2 SERPL-SCNC: 23 MMOL/L (ref 23–29)
CREAT SERPL-MCNC: 0.8 MG/DL (ref 0.5–1.4)
CREAT SERPL-MCNC: 0.8 MG/DL (ref 0.5–1.4)
CV ECHO LV RWT: 0.54 CM
DIFFERENTIAL METHOD: ABNORMAL
DIFFERENTIAL METHOD: ABNORMAL
DOP CALC AO PEAK VEL: 1.74 M/S
DOP CALC AO VTI: 23.88 CM
DOP CALC LVOT AREA: 4.6 CM2
DOP CALC LVOT DIAMETER: 2.43 CM
DOP CALC LVOT PEAK VEL: 127.6 M/S
DOP CALC LVOT STROKE VOLUME: 60.95 CM3
DOP CALCLVOT PEAK VEL VTI: 13.15 CM
E WAVE DECELERATION TIME: 118.32 MSEC
E/E' RATIO: 6.19 M/S
ECHO LV POSTERIOR WALL: 1.32 CM (ref 0.6–1.1)
EJECTION FRACTION: 55 %
EOSINOPHIL # BLD AUTO: 0.2 K/UL (ref 0–0.5)
EOSINOPHIL # BLD AUTO: 0.2 K/UL (ref 0–0.5)
EOSINOPHIL NFR BLD: 1.8 % (ref 0–8)
EOSINOPHIL NFR BLD: 1.8 % (ref 0–8)
ERYTHROCYTE [DISTWIDTH] IN BLOOD BY AUTOMATED COUNT: 12.7 % (ref 11.5–14.5)
ERYTHROCYTE [DISTWIDTH] IN BLOOD BY AUTOMATED COUNT: 12.7 % (ref 11.5–14.5)
EST. GFR  (AFRICAN AMERICAN): >60 ML/MIN/1.73 M^2
EST. GFR  (AFRICAN AMERICAN): >60 ML/MIN/1.73 M^2
EST. GFR  (NON AFRICAN AMERICAN): >60 ML/MIN/1.73 M^2
EST. GFR  (NON AFRICAN AMERICAN): >60 ML/MIN/1.73 M^2
FRACTIONAL SHORTENING: 23 % (ref 28–44)
GLUCOSE SERPL-MCNC: 239 MG/DL (ref 70–110)
GLUCOSE SERPL-MCNC: 268 MG/DL (ref 70–110)
GLUCOSE SERPL-MCNC: 275 MG/DL (ref 70–110)
GLUCOSE SERPL-MCNC: 290 MG/DL (ref 70–110)
HCT VFR BLD AUTO: 38.3 % (ref 40–54)
HCT VFR BLD AUTO: 38.3 % (ref 40–54)
HGB BLD-MCNC: 12.5 G/DL (ref 14–18)
HGB BLD-MCNC: 12.5 G/DL (ref 14–18)
IMM GRANULOCYTES # BLD AUTO: 0.08 K/UL (ref 0–0.04)
IMM GRANULOCYTES # BLD AUTO: 0.08 K/UL (ref 0–0.04)
IMM GRANULOCYTES NFR BLD AUTO: 0.8 % (ref 0–0.5)
IMM GRANULOCYTES NFR BLD AUTO: 0.8 % (ref 0–0.5)
INR PPP: 1.2
INTERVENTRICULAR SEPTUM: 1.32 CM (ref 0.6–1.1)
IVRT: 55.25 MSEC
LEFT INTERNAL DIMENSION IN SYSTOLE: 3.78 CM (ref 2.1–4)
LEFT VENTRICLE MASS INDEX: 120 G/M2
LEFT VENTRICULAR INTERNAL DIMENSION IN DIASTOLE: 4.93 CM (ref 3.5–6)
LEFT VENTRICULAR MASS: 261.83 G
LV LATERAL E/E' RATIO: 6.19 M/S
LV SEPTAL E/E' RATIO: 6.19 M/S
LYMPHOCYTES # BLD AUTO: 1.3 K/UL (ref 1–4.8)
LYMPHOCYTES # BLD AUTO: 1.3 K/UL (ref 1–4.8)
LYMPHOCYTES NFR BLD: 12.5 % (ref 18–48)
LYMPHOCYTES NFR BLD: 12.5 % (ref 18–48)
MAGNESIUM SERPL-MCNC: 2 MG/DL (ref 1.6–2.6)
MCH RBC QN AUTO: 28.9 PG (ref 27–31)
MCH RBC QN AUTO: 28.9 PG (ref 27–31)
MCHC RBC AUTO-ENTMCNC: 32.6 G/DL (ref 32–36)
MCHC RBC AUTO-ENTMCNC: 32.6 G/DL (ref 32–36)
MCV RBC AUTO: 89 FL (ref 82–98)
MCV RBC AUTO: 89 FL (ref 82–98)
MONOCYTES # BLD AUTO: 1.4 K/UL (ref 0.3–1)
MONOCYTES # BLD AUTO: 1.4 K/UL (ref 0.3–1)
MONOCYTES NFR BLD: 13.2 % (ref 4–15)
MONOCYTES NFR BLD: 13.2 % (ref 4–15)
MV PEAK E VEL: 0.99 M/S
NEUTROPHILS # BLD AUTO: 7.2 K/UL (ref 1.8–7.7)
NEUTROPHILS # BLD AUTO: 7.2 K/UL (ref 1.8–7.7)
NEUTROPHILS NFR BLD: 70.9 % (ref 38–73)
NEUTROPHILS NFR BLD: 70.9 % (ref 38–73)
NRBC BLD-RTO: 0 /100 WBC
NRBC BLD-RTO: 0 /100 WBC
PISA TR MAX VEL: 2.65 M/S
PLATELET # BLD AUTO: 323 K/UL (ref 150–450)
PLATELET # BLD AUTO: 323 K/UL (ref 150–450)
PMV BLD AUTO: 10 FL (ref 9.2–12.9)
PMV BLD AUTO: 10 FL (ref 9.2–12.9)
POTASSIUM SERPL-SCNC: 3.9 MMOL/L (ref 3.5–5.1)
POTASSIUM SERPL-SCNC: 3.9 MMOL/L (ref 3.5–5.1)
PROT SERPL-MCNC: 6.9 G/DL (ref 6–8.4)
PROTHROMBIN TIME: 14.1 SEC (ref 11.8–14.3)
PV PEAK VELOCITY: 90.44 CM/S
RBC # BLD AUTO: 4.32 M/UL (ref 4.6–6.2)
RBC # BLD AUTO: 4.32 M/UL (ref 4.6–6.2)
RIGHT VENTRICULAR END-DIASTOLIC DIMENSION: 300 CM
SODIUM SERPL-SCNC: 134 MMOL/L (ref 136–145)
SODIUM SERPL-SCNC: 134 MMOL/L (ref 136–145)
TDI LATERAL: 0.16 M/S
TDI SEPTAL: 0.16 M/S
TDI: 0.16 M/S
TR MAX PG: 28 MMHG
VANCOMYCIN TROUGH SERPL-MCNC: 13.1 UG/ML
WBC # BLD AUTO: 10.19 K/UL (ref 3.9–12.7)
WBC # BLD AUTO: 10.19 K/UL (ref 3.9–12.7)

## 2021-10-11 PROCEDURE — 63600175 PHARM REV CODE 636 W HCPCS: Performed by: INTERNAL MEDICINE

## 2021-10-11 PROCEDURE — 36415 COLL VENOUS BLD VENIPUNCTURE: CPT | Performed by: INTERNAL MEDICINE

## 2021-10-11 PROCEDURE — 80053 COMPREHEN METABOLIC PANEL: CPT | Performed by: NURSE PRACTITIONER

## 2021-10-11 PROCEDURE — 36415 COLL VENOUS BLD VENIPUNCTURE: CPT | Performed by: FAMILY MEDICINE

## 2021-10-11 PROCEDURE — 25000003 PHARM REV CODE 250: Performed by: FAMILY MEDICINE

## 2021-10-11 PROCEDURE — 85025 COMPLETE CBC W/AUTO DIFF WBC: CPT | Performed by: NURSE PRACTITIONER

## 2021-10-11 PROCEDURE — 80202 ASSAY OF VANCOMYCIN: CPT | Performed by: FAMILY MEDICINE

## 2021-10-11 PROCEDURE — 63600175 PHARM REV CODE 636 W HCPCS

## 2021-10-11 PROCEDURE — 63600175 PHARM REV CODE 636 W HCPCS: Performed by: FAMILY MEDICINE

## 2021-10-11 PROCEDURE — 71000033 HC RECOVERY, INTIAL HOUR

## 2021-10-11 PROCEDURE — 25000003 PHARM REV CODE 250: Performed by: INTERNAL MEDICINE

## 2021-10-11 PROCEDURE — 93005 ELECTROCARDIOGRAM TRACING: CPT | Performed by: INTERNAL MEDICINE

## 2021-10-11 PROCEDURE — 25000003 PHARM REV CODE 250: Performed by: ANESTHESIOLOGY

## 2021-10-11 PROCEDURE — 63600175 PHARM REV CODE 636 W HCPCS: Performed by: NURSE PRACTITIONER

## 2021-10-11 PROCEDURE — 93010 EKG 12-LEAD: ICD-10-PCS | Mod: ,,, | Performed by: INTERNAL MEDICINE

## 2021-10-11 PROCEDURE — 85610 PROTHROMBIN TIME: CPT | Performed by: NURSE PRACTITIONER

## 2021-10-11 PROCEDURE — 99223 PR INITIAL HOSPITAL CARE,LEVL III: ICD-10-PCS | Mod: ,,, | Performed by: INTERNAL MEDICINE

## 2021-10-11 PROCEDURE — 87040 BLOOD CULTURE FOR BACTERIA: CPT | Performed by: INTERNAL MEDICINE

## 2021-10-11 PROCEDURE — 25000003 PHARM REV CODE 250: Performed by: NURSE PRACTITIONER

## 2021-10-11 PROCEDURE — 93306 ECHO (CUPID ONLY): ICD-10-PCS | Mod: 26,,, | Performed by: INTERNAL MEDICINE

## 2021-10-11 PROCEDURE — 93306 TTE W/DOPPLER COMPLETE: CPT | Mod: 26,,, | Performed by: INTERNAL MEDICINE

## 2021-10-11 PROCEDURE — 83735 ASSAY OF MAGNESIUM: CPT | Performed by: NURSE PRACTITIONER

## 2021-10-11 PROCEDURE — 85730 THROMBOPLASTIN TIME PARTIAL: CPT | Performed by: NURSE PRACTITIONER

## 2021-10-11 PROCEDURE — 99223 PR INITIAL HOSPITAL CARE,LEVL III: ICD-10-PCS | Mod: 25,,, | Performed by: INTERNAL MEDICINE

## 2021-10-11 PROCEDURE — 99223 1ST HOSP IP/OBS HIGH 75: CPT | Mod: ,,, | Performed by: INTERNAL MEDICINE

## 2021-10-11 PROCEDURE — 93306 TTE W/DOPPLER COMPLETE: CPT

## 2021-10-11 PROCEDURE — 21000000 HC CCU ICU ROOM CHARGE

## 2021-10-11 PROCEDURE — 99232 PR SUBSEQUENT HOSPITAL CARE,LEVL II: ICD-10-PCS | Mod: ,,, | Performed by: PODIATRIST

## 2021-10-11 PROCEDURE — 71000039 HC RECOVERY, EACH ADD'L HOUR

## 2021-10-11 PROCEDURE — 93010 ELECTROCARDIOGRAM REPORT: CPT | Mod: ,,, | Performed by: INTERNAL MEDICINE

## 2021-10-11 PROCEDURE — 99223 1ST HOSP IP/OBS HIGH 75: CPT | Mod: 25,,, | Performed by: INTERNAL MEDICINE

## 2021-10-11 PROCEDURE — 99232 SBSQ HOSP IP/OBS MODERATE 35: CPT | Mod: ,,, | Performed by: PODIATRIST

## 2021-10-11 RX ORDER — ENOXAPARIN SODIUM 100 MG/ML
1 INJECTION SUBCUTANEOUS
Status: DISCONTINUED | OUTPATIENT
Start: 2021-10-11 | End: 2021-10-21 | Stop reason: HOSPADM

## 2021-10-11 RX ORDER — FUROSEMIDE 10 MG/ML
20 INJECTION INTRAMUSCULAR; INTRAVENOUS ONCE
Status: COMPLETED | OUTPATIENT
Start: 2021-10-11 | End: 2021-10-11

## 2021-10-11 RX ORDER — FUROSEMIDE 10 MG/ML
INJECTION INTRAMUSCULAR; INTRAVENOUS
Status: COMPLETED
Start: 2021-10-11 | End: 2021-10-11

## 2021-10-11 RX ORDER — DIGOXIN 0.25 MG/ML
50 INJECTION INTRAMUSCULAR; INTRAVENOUS ONCE
Status: COMPLETED | OUTPATIENT
Start: 2021-10-11 | End: 2021-10-11

## 2021-10-11 RX ORDER — MIDAZOLAM HYDROCHLORIDE 1 MG/ML
INJECTION INTRAMUSCULAR; INTRAVENOUS
Status: COMPLETED
Start: 2021-10-11 | End: 2021-10-11

## 2021-10-11 RX ORDER — DIGOXIN 250 MCG
0.5 TABLET ORAL ONCE
Status: DISCONTINUED | OUTPATIENT
Start: 2021-10-11 | End: 2021-10-11

## 2021-10-11 RX ORDER — ALPRAZOLAM 0.5 MG/1
0.5 TABLET ORAL 2 TIMES DAILY PRN
Status: DISCONTINUED | OUTPATIENT
Start: 2021-10-11 | End: 2021-10-21 | Stop reason: HOSPADM

## 2021-10-11 RX ORDER — METOPROLOL TARTRATE 25 MG/1
25 TABLET, FILM COATED ORAL ONCE
Status: COMPLETED | OUTPATIENT
Start: 2021-10-11 | End: 2021-10-11

## 2021-10-11 RX ADMIN — ENOXAPARIN SODIUM 110 MG: 60 INJECTION SUBCUTANEOUS at 05:10

## 2021-10-11 RX ADMIN — DIGOXIN 50 MCG: 0.25 INJECTION INTRAMUSCULAR; INTRAVENOUS at 03:10

## 2021-10-11 RX ADMIN — FUROSEMIDE 20 MG: 10 INJECTION, SOLUTION INTRAVENOUS at 02:10

## 2021-10-11 RX ADMIN — FUROSEMIDE 20 MG: 10 INJECTION INTRAMUSCULAR; INTRAVENOUS at 02:10

## 2021-10-11 RX ADMIN — METOPROLOL TARTRATE 25 MG: 25 TABLET, FILM COATED ORAL at 05:10

## 2021-10-11 RX ADMIN — GABAPENTIN 300 MG: 300 CAPSULE ORAL at 08:10

## 2021-10-11 RX ADMIN — HYDROCODONE BITARTRATE AND ACETAMINOPHEN 1 TABLET: 5; 325 TABLET ORAL at 04:10

## 2021-10-11 RX ADMIN — AMIODARONE HYDROCHLORIDE 1 MG/MIN: 1.8 INJECTION, SOLUTION INTRAVENOUS at 02:10

## 2021-10-11 RX ADMIN — PIPERACILLIN SODIUM AND TAZOBACTAM SODIUM 3.38 G: 3; .375 INJECTION, POWDER, LYOPHILIZED, FOR SOLUTION INTRAVENOUS at 02:10

## 2021-10-11 RX ADMIN — MORPHINE SULFATE 4 MG: 4 INJECTION, SOLUTION INTRAMUSCULAR; INTRAVENOUS at 08:10

## 2021-10-11 RX ADMIN — HUMAN INSULIN 6 UNITS: 100 INJECTION, SOLUTION SUBCUTANEOUS at 05:10

## 2021-10-11 RX ADMIN — TRAZODONE HYDROCHLORIDE 150 MG: 50 TABLET ORAL at 08:10

## 2021-10-11 RX ADMIN — ATORVASTATIN CALCIUM 20 MG: 20 TABLET, FILM COATED ORAL at 08:10

## 2021-10-11 RX ADMIN — AMIODARONE HYDROCHLORIDE 150 MG: 1.5 INJECTION, SOLUTION INTRAVENOUS at 02:10

## 2021-10-11 RX ADMIN — VANCOMYCIN HYDROCHLORIDE 1750 MG: 500 INJECTION, POWDER, LYOPHILIZED, FOR SOLUTION INTRAVENOUS at 05:10

## 2021-10-11 RX ADMIN — MIDAZOLAM HYDROCHLORIDE: 1 INJECTION, SOLUTION INTRAMUSCULAR; INTRAVENOUS at 01:10

## 2021-10-11 RX ADMIN — HUMAN INSULIN 6 UNITS: 100 INJECTION, SOLUTION SUBCUTANEOUS at 08:10

## 2021-10-11 RX ADMIN — HYDROCODONE BITARTRATE AND ACETAMINOPHEN 1 TABLET: 5; 325 TABLET ORAL at 02:10

## 2021-10-11 RX ADMIN — DEXTROSE MONOHYDRATE 5 MG/HR: 50 INJECTION, SOLUTION INTRAVENOUS at 12:10

## 2021-10-11 RX ADMIN — AMIODARONE HYDROCHLORIDE 1 MG/MIN: 1.8 INJECTION, SOLUTION INTRAVENOUS at 09:10

## 2021-10-11 RX ADMIN — PIPERACILLIN SODIUM AND TAZOBACTAM SODIUM 3.38 G: 3; .375 INJECTION, POWDER, LYOPHILIZED, FOR SOLUTION INTRAVENOUS at 05:10

## 2021-10-11 RX ADMIN — SODIUM CHLORIDE 200 ML: 0.9 INJECTION, SOLUTION INTRAVENOUS at 03:10

## 2021-10-11 RX ADMIN — PIPERACILLIN SODIUM AND TAZOBACTAM SODIUM 3.38 G: 3; .375 INJECTION, POWDER, LYOPHILIZED, FOR SOLUTION INTRAVENOUS at 10:10

## 2021-10-11 RX ADMIN — AMIODARONE HYDROCHLORIDE 150 MG: 1.5 INJECTION, SOLUTION INTRAVENOUS at 04:10

## 2021-10-12 ENCOUNTER — ANESTHESIA EVENT (OUTPATIENT)
Dept: SURGERY | Facility: HOSPITAL | Age: 61
DRG: 853 | End: 2021-10-12
Payer: MEDICAID

## 2021-10-12 ENCOUNTER — ANESTHESIA (OUTPATIENT)
Dept: SURGERY | Facility: HOSPITAL | Age: 61
DRG: 853 | End: 2021-10-12
Payer: MEDICAID

## 2021-10-12 DIAGNOSIS — L03.115 CELLULITIS OF FOOT, RIGHT: ICD-10-CM

## 2021-10-12 DIAGNOSIS — L03.115 CELLULITIS OF RIGHT LOWER EXTREMITY: Primary | ICD-10-CM

## 2021-10-12 LAB
ANION GAP SERPL CALC-SCNC: 15 MMOL/L (ref 8–16)
BACTERIA BLD CULT: ABNORMAL
BASOPHILS # BLD AUTO: 0.08 K/UL (ref 0–0.2)
BASOPHILS NFR BLD: 0.7 % (ref 0–1.9)
BUN SERPL-MCNC: 12 MG/DL (ref 8–23)
CALCIUM SERPL-MCNC: 8.5 MG/DL (ref 8.7–10.5)
CHLORIDE SERPL-SCNC: 97 MMOL/L (ref 95–110)
CO2 SERPL-SCNC: 24 MMOL/L (ref 23–29)
CREAT SERPL-MCNC: 0.8 MG/DL (ref 0.5–1.4)
DIFFERENTIAL METHOD: ABNORMAL
EOSINOPHIL # BLD AUTO: 0.1 K/UL (ref 0–0.5)
EOSINOPHIL NFR BLD: 1.2 % (ref 0–8)
ERYTHROCYTE [DISTWIDTH] IN BLOOD BY AUTOMATED COUNT: 12.9 % (ref 11.5–14.5)
EST. GFR  (AFRICAN AMERICAN): >60 ML/MIN/1.73 M^2
EST. GFR  (NON AFRICAN AMERICAN): >60 ML/MIN/1.73 M^2
GLUCOSE SERPL-MCNC: 204 MG/DL (ref 70–110)
GLUCOSE SERPL-MCNC: 204 MG/DL (ref 70–110)
GLUCOSE SERPL-MCNC: 214 MG/DL (ref 70–110)
GLUCOSE SERPL-MCNC: 217 MG/DL (ref 70–110)
GLUCOSE SERPL-MCNC: 232 MG/DL (ref 70–110)
GLUCOSE SERPL-MCNC: 234 MG/DL (ref 70–110)
HCT VFR BLD AUTO: 35.8 % (ref 40–54)
HGB BLD-MCNC: 11.8 G/DL (ref 14–18)
IMM GRANULOCYTES # BLD AUTO: 0.14 K/UL (ref 0–0.04)
IMM GRANULOCYTES NFR BLD AUTO: 1.3 % (ref 0–0.5)
LYMPHOCYTES # BLD AUTO: 1.8 K/UL (ref 1–4.8)
LYMPHOCYTES NFR BLD: 15.8 % (ref 18–48)
MAGNESIUM SERPL-MCNC: 1.7 MG/DL (ref 1.6–2.6)
MCH RBC QN AUTO: 29.3 PG (ref 27–31)
MCHC RBC AUTO-ENTMCNC: 33 G/DL (ref 32–36)
MCV RBC AUTO: 89 FL (ref 82–98)
MONOCYTES # BLD AUTO: 1.1 K/UL (ref 0.3–1)
MONOCYTES NFR BLD: 9.6 % (ref 4–15)
NEUTROPHILS # BLD AUTO: 8 K/UL (ref 1.8–7.7)
NEUTROPHILS NFR BLD: 71.4 % (ref 38–73)
NRBC BLD-RTO: 0 /100 WBC
PLATELET # BLD AUTO: 333 K/UL (ref 150–450)
PMV BLD AUTO: 9.9 FL (ref 9.2–12.9)
POTASSIUM SERPL-SCNC: 3.5 MMOL/L (ref 3.5–5.1)
RBC # BLD AUTO: 4.03 M/UL (ref 4.6–6.2)
SODIUM SERPL-SCNC: 136 MMOL/L (ref 136–145)
TROPONIN I SERPL DL<=0.01 NG/ML-MCNC: <0.03 NG/ML
TROPONIN I SERPL DL<=0.01 NG/ML-MCNC: <0.03 NG/ML
WBC # BLD AUTO: 11.2 K/UL (ref 3.9–12.7)

## 2021-10-12 PROCEDURE — 25000003 PHARM REV CODE 250: Performed by: NURSE PRACTITIONER

## 2021-10-12 PROCEDURE — 99232 PR SUBSEQUENT HOSPITAL CARE,LEVL II: ICD-10-PCS | Mod: ,,, | Performed by: INTERNAL MEDICINE

## 2021-10-12 PROCEDURE — 27000673 HC TUBING BLOOD Y: Performed by: ANESTHESIOLOGY

## 2021-10-12 PROCEDURE — C9399 UNCLASSIFIED DRUGS OR BIOLOG: HCPCS | Performed by: INTERNAL MEDICINE

## 2021-10-12 PROCEDURE — 93005 ELECTROCARDIOGRAM TRACING: CPT | Performed by: INTERNAL MEDICINE

## 2021-10-12 PROCEDURE — 99900031 HC PATIENT EDUCATION (STAT)

## 2021-10-12 PROCEDURE — 87070 CULTURE OTHR SPECIMN AEROBIC: CPT | Performed by: INTERNAL MEDICINE

## 2021-10-12 PROCEDURE — 87147 CULTURE TYPE IMMUNOLOGIC: CPT | Performed by: INTERNAL MEDICINE

## 2021-10-12 PROCEDURE — 27000671 HC TUBING MICROBORE EXT: Performed by: ANESTHESIOLOGY

## 2021-10-12 PROCEDURE — 93010 EKG 12-LEAD: ICD-10-PCS | Mod: ,,, | Performed by: INTERNAL MEDICINE

## 2021-10-12 PROCEDURE — 99232 SBSQ HOSP IP/OBS MODERATE 35: CPT | Mod: 25,,, | Performed by: INTERNAL MEDICINE

## 2021-10-12 PROCEDURE — 36000707: Performed by: PODIATRIST

## 2021-10-12 PROCEDURE — 25000003 PHARM REV CODE 250: Performed by: INTERNAL MEDICINE

## 2021-10-12 PROCEDURE — 63600175 PHARM REV CODE 636 W HCPCS: Performed by: INTERNAL MEDICINE

## 2021-10-12 PROCEDURE — 25000003 PHARM REV CODE 250: Performed by: PODIATRIST

## 2021-10-12 PROCEDURE — 63600175 PHARM REV CODE 636 W HCPCS: Performed by: NURSE ANESTHETIST, CERTIFIED REGISTERED

## 2021-10-12 PROCEDURE — 28810 AMPUTATION TOE & METATARSAL: CPT | Mod: T5,,, | Performed by: PODIATRIST

## 2021-10-12 PROCEDURE — 37000008 HC ANESTHESIA 1ST 15 MINUTES: Performed by: PODIATRIST

## 2021-10-12 PROCEDURE — 36415 COLL VENOUS BLD VENIPUNCTURE: CPT | Performed by: INTERNAL MEDICINE

## 2021-10-12 PROCEDURE — 21000000 HC CCU ICU ROOM CHARGE

## 2021-10-12 PROCEDURE — 28810 PR AMPUTATION METATARSAL+TOE,SINGLE: ICD-10-PCS | Mod: T5,,, | Performed by: PODIATRIST

## 2021-10-12 PROCEDURE — 99232 SBSQ HOSP IP/OBS MODERATE 35: CPT | Mod: ,,, | Performed by: INTERNAL MEDICINE

## 2021-10-12 PROCEDURE — 94761 N-INVAS EAR/PLS OXIMETRY MLT: CPT

## 2021-10-12 PROCEDURE — 36415 COLL VENOUS BLD VENIPUNCTURE: CPT | Performed by: NURSE PRACTITIONER

## 2021-10-12 PROCEDURE — 27201423 OPTIME MED/SURG SUP & DEVICES STERILE SUPPLY: Performed by: PODIATRIST

## 2021-10-12 PROCEDURE — 87075 CULTR BACTERIA EXCEPT BLOOD: CPT | Performed by: INTERNAL MEDICINE

## 2021-10-12 PROCEDURE — 37000009 HC ANESTHESIA EA ADD 15 MINS: Performed by: PODIATRIST

## 2021-10-12 PROCEDURE — 27000284 HC CANNULA NASAL: Performed by: ANESTHESIOLOGY

## 2021-10-12 PROCEDURE — 80048 BASIC METABOLIC PNL TOTAL CA: CPT | Performed by: NURSE PRACTITIONER

## 2021-10-12 PROCEDURE — 87077 CULTURE AEROBIC IDENTIFY: CPT | Performed by: INTERNAL MEDICINE

## 2021-10-12 PROCEDURE — 36000706: Performed by: PODIATRIST

## 2021-10-12 PROCEDURE — 99232 PR SUBSEQUENT HOSPITAL CARE,LEVL II: ICD-10-PCS | Mod: 25,,, | Performed by: INTERNAL MEDICINE

## 2021-10-12 PROCEDURE — 27000221 HC OXYGEN, UP TO 24 HOURS

## 2021-10-12 PROCEDURE — 94799 UNLISTED PULMONARY SVC/PX: CPT

## 2021-10-12 PROCEDURE — 84484 ASSAY OF TROPONIN QUANT: CPT | Performed by: INTERNAL MEDICINE

## 2021-10-12 PROCEDURE — 99900035 HC TECH TIME PER 15 MIN (STAT)

## 2021-10-12 PROCEDURE — 93010 ELECTROCARDIOGRAM REPORT: CPT | Mod: ,,, | Performed by: INTERNAL MEDICINE

## 2021-10-12 PROCEDURE — 83735 ASSAY OF MAGNESIUM: CPT | Performed by: NURSE PRACTITIONER

## 2021-10-12 PROCEDURE — 87186 SC STD MICRODIL/AGAR DIL: CPT | Performed by: INTERNAL MEDICINE

## 2021-10-12 PROCEDURE — 63600175 PHARM REV CODE 636 W HCPCS: Performed by: NURSE PRACTITIONER

## 2021-10-12 PROCEDURE — 94760 N-INVAS EAR/PLS OXIMETRY 1: CPT

## 2021-10-12 PROCEDURE — 85025 COMPLETE CBC W/AUTO DIFF WBC: CPT | Performed by: NURSE PRACTITIONER

## 2021-10-12 RX ORDER — BUPIVACAINE HYDROCHLORIDE 5 MG/ML
INJECTION, SOLUTION EPIDURAL; INTRACAUDAL
Status: DISCONTINUED | OUTPATIENT
Start: 2021-10-12 | End: 2021-10-12 | Stop reason: HOSPADM

## 2021-10-12 RX ORDER — PROMETHAZINE HYDROCHLORIDE 25 MG/1
25 TABLET ORAL EVERY 6 HOURS PRN
Status: DISCONTINUED | OUTPATIENT
Start: 2021-10-12 | End: 2021-10-19

## 2021-10-12 RX ORDER — TRAMADOL HYDROCHLORIDE 50 MG/1
50 TABLET ORAL EVERY 4 HOURS PRN
Status: DISCONTINUED | OUTPATIENT
Start: 2021-10-12 | End: 2021-10-19

## 2021-10-12 RX ORDER — ONDANSETRON 4 MG/1
8 TABLET, ORALLY DISINTEGRATING ORAL EVERY 8 HOURS PRN
Status: DISCONTINUED | OUTPATIENT
Start: 2021-10-12 | End: 2021-10-21 | Stop reason: HOSPADM

## 2021-10-12 RX ORDER — HYDROCODONE BITARTRATE AND ACETAMINOPHEN 10; 325 MG/1; MG/1
1 TABLET ORAL EVERY 4 HOURS PRN
Status: DISCONTINUED | OUTPATIENT
Start: 2021-10-12 | End: 2021-10-19

## 2021-10-12 RX ORDER — SODIUM CHLORIDE 0.9 G/100ML
IRRIGANT IRRIGATION
Status: DISCONTINUED | OUTPATIENT
Start: 2021-10-12 | End: 2021-10-12 | Stop reason: HOSPADM

## 2021-10-12 RX ORDER — HYDROCODONE BITARTRATE AND ACETAMINOPHEN 5; 325 MG/1; MG/1
1 TABLET ORAL EVERY 4 HOURS PRN
Status: DISCONTINUED | OUTPATIENT
Start: 2021-10-12 | End: 2021-10-21 | Stop reason: HOSPADM

## 2021-10-12 RX ORDER — SODIUM CHLORIDE, SODIUM LACTATE, POTASSIUM CHLORIDE, CALCIUM CHLORIDE 600; 310; 30; 20 MG/100ML; MG/100ML; MG/100ML; MG/100ML
INJECTION, SOLUTION INTRAVENOUS CONTINUOUS PRN
Status: DISCONTINUED | OUTPATIENT
Start: 2021-10-12 | End: 2021-10-12

## 2021-10-12 RX ORDER — PROPOFOL 10 MG/ML
VIAL (ML) INTRAVENOUS
Status: DISCONTINUED | OUTPATIENT
Start: 2021-10-12 | End: 2021-10-12

## 2021-10-12 RX ADMIN — HUMAN INSULIN 4 UNITS: 100 INJECTION, SOLUTION SUBCUTANEOUS at 08:10

## 2021-10-12 RX ADMIN — TRAZODONE HYDROCHLORIDE 150 MG: 50 TABLET ORAL at 08:10

## 2021-10-12 RX ADMIN — ATORVASTATIN CALCIUM 20 MG: 20 TABLET, FILM COATED ORAL at 08:10

## 2021-10-12 RX ADMIN — SODIUM CHLORIDE, SODIUM LACTATE, POTASSIUM CHLORIDE, AND CALCIUM CHLORIDE: .6; .31; .03; .02 INJECTION, SOLUTION INTRAVENOUS at 06:10

## 2021-10-12 RX ADMIN — HYDROCODONE BITARTRATE AND ACETAMINOPHEN 1 TABLET: 5; 325 TABLET ORAL at 08:10

## 2021-10-12 RX ADMIN — GABAPENTIN 300 MG: 300 CAPSULE ORAL at 08:10

## 2021-10-12 RX ADMIN — MORPHINE SULFATE 4 MG: 4 INJECTION, SOLUTION INTRAMUSCULAR; INTRAVENOUS at 02:10

## 2021-10-12 RX ADMIN — HUMAN INSULIN 4 UNITS: 100 INJECTION, SOLUTION SUBCUTANEOUS at 10:10

## 2021-10-12 RX ADMIN — AMIODARONE HYDROCHLORIDE 0.5 MG/MIN: 1.8 INJECTION, SOLUTION INTRAVENOUS at 09:10

## 2021-10-12 RX ADMIN — PROPOFOL 10 MG: 10 INJECTION, EMULSION INTRAVENOUS at 06:10

## 2021-10-12 RX ADMIN — POTASSIUM BICARBONATE 50 MEQ: 977.5 TABLET, EFFERVESCENT ORAL at 08:10

## 2021-10-12 RX ADMIN — AMIODARONE HYDROCHLORIDE 0.5 MG/MIN: 1.8 INJECTION, SOLUTION INTRAVENOUS at 08:10

## 2021-10-12 RX ADMIN — MAGNESIUM OXIDE 800 MG: 400 TABLET ORAL at 08:10

## 2021-10-12 RX ADMIN — PIPERACILLIN SODIUM AND TAZOBACTAM SODIUM 3.38 G: 3; .375 INJECTION, POWDER, LYOPHILIZED, FOR SOLUTION INTRAVENOUS at 10:10

## 2021-10-12 RX ADMIN — INSULIN DETEMIR 20 UNITS: 100 INJECTION, SOLUTION SUBCUTANEOUS at 08:10

## 2021-10-12 RX ADMIN — PROPOFOL 20 MG: 10 INJECTION, EMULSION INTRAVENOUS at 06:10

## 2021-10-12 RX ADMIN — TAMSULOSIN HYDROCHLORIDE 0.4 MG: 0.4 CAPSULE ORAL at 08:10

## 2021-10-12 RX ADMIN — PIPERACILLIN SODIUM AND TAZOBACTAM SODIUM 3.38 G: 3; .375 INJECTION, POWDER, LYOPHILIZED, FOR SOLUTION INTRAVENOUS at 02:10

## 2021-10-12 RX ADMIN — MICONAZOLE NITRATE: 2 POWDER TOPICAL at 08:10

## 2021-10-12 RX ADMIN — ALPRAZOLAM 0.5 MG: 0.5 TABLET ORAL at 08:10

## 2021-10-12 RX ADMIN — PIPERACILLIN SODIUM AND TAZOBACTAM SODIUM 3.38 G: 3; .375 INJECTION, POWDER, LYOPHILIZED, FOR SOLUTION INTRAVENOUS at 05:10

## 2021-10-13 LAB
GLUCOSE SERPL-MCNC: 210 MG/DL (ref 70–110)
GLUCOSE SERPL-MCNC: 234 MG/DL (ref 70–110)
GLUCOSE SERPL-MCNC: 271 MG/DL (ref 70–110)
TROPONIN I SERPL DL<=0.01 NG/ML-MCNC: 0.04 NG/ML

## 2021-10-13 PROCEDURE — 94761 N-INVAS EAR/PLS OXIMETRY MLT: CPT

## 2021-10-13 PROCEDURE — 63600175 PHARM REV CODE 636 W HCPCS: Performed by: INTERNAL MEDICINE

## 2021-10-13 PROCEDURE — 25000003 PHARM REV CODE 250: Performed by: PODIATRIST

## 2021-10-13 PROCEDURE — 99232 SBSQ HOSP IP/OBS MODERATE 35: CPT | Mod: ,,, | Performed by: INTERNAL MEDICINE

## 2021-10-13 PROCEDURE — 36415 COLL VENOUS BLD VENIPUNCTURE: CPT | Performed by: INTERNAL MEDICINE

## 2021-10-13 PROCEDURE — 25000003 PHARM REV CODE 250: Performed by: INTERNAL MEDICINE

## 2021-10-13 PROCEDURE — 99232 PR SUBSEQUENT HOSPITAL CARE,LEVL II: ICD-10-PCS | Mod: ,,, | Performed by: INTERNAL MEDICINE

## 2021-10-13 PROCEDURE — 99231 PR SUBSEQUENT HOSPITAL CARE,LEVL I: ICD-10-PCS | Mod: ,,, | Performed by: INTERNAL MEDICINE

## 2021-10-13 PROCEDURE — 99900035 HC TECH TIME PER 15 MIN (STAT)

## 2021-10-13 PROCEDURE — 27000221 HC OXYGEN, UP TO 24 HOURS

## 2021-10-13 PROCEDURE — 84484 ASSAY OF TROPONIN QUANT: CPT | Performed by: INTERNAL MEDICINE

## 2021-10-13 PROCEDURE — 99231 SBSQ HOSP IP/OBS SF/LOW 25: CPT | Mod: ,,, | Performed by: INTERNAL MEDICINE

## 2021-10-13 PROCEDURE — 99900031 HC PATIENT EDUCATION (STAT)

## 2021-10-13 PROCEDURE — 80048 BASIC METABOLIC PNL TOTAL CA: CPT | Performed by: NURSE PRACTITIONER

## 2021-10-13 PROCEDURE — 25000003 PHARM REV CODE 250: Performed by: NURSE PRACTITIONER

## 2021-10-13 PROCEDURE — 21400001 HC TELEMETRY ROOM

## 2021-10-13 PROCEDURE — 63600175 PHARM REV CODE 636 W HCPCS: Performed by: NURSE PRACTITIONER

## 2021-10-13 PROCEDURE — 83735 ASSAY OF MAGNESIUM: CPT | Performed by: NURSE PRACTITIONER

## 2021-10-13 RX ORDER — CEFAZOLIN SODIUM 2 G/50ML
2 SOLUTION INTRAVENOUS
Status: DISCONTINUED | OUTPATIENT
Start: 2021-10-13 | End: 2021-10-20

## 2021-10-13 RX ORDER — AMIODARONE HYDROCHLORIDE 200 MG/1
200 TABLET ORAL 2 TIMES DAILY
Status: DISCONTINUED | OUTPATIENT
Start: 2021-10-13 | End: 2021-10-21 | Stop reason: HOSPADM

## 2021-10-13 RX ADMIN — HYDROCODONE BITARTRATE AND ACETAMINOPHEN 1 TABLET: 10; 325 TABLET ORAL at 05:10

## 2021-10-13 RX ADMIN — HUMAN INSULIN 4 UNITS: 100 INJECTION, SOLUTION SUBCUTANEOUS at 08:10

## 2021-10-13 RX ADMIN — HYDROCODONE BITARTRATE AND ACETAMINOPHEN 1 TABLET: 5; 325 TABLET ORAL at 01:10

## 2021-10-13 RX ADMIN — ATORVASTATIN CALCIUM 20 MG: 20 TABLET, FILM COATED ORAL at 08:10

## 2021-10-13 RX ADMIN — HUMAN INSULIN 6 UNITS: 100 INJECTION, SOLUTION SUBCUTANEOUS at 10:10

## 2021-10-13 RX ADMIN — AMIODARONE HYDROCHLORIDE 200 MG: 200 TABLET ORAL at 08:10

## 2021-10-13 RX ADMIN — PIPERACILLIN SODIUM AND TAZOBACTAM SODIUM 3.38 G: 3; .375 INJECTION, POWDER, LYOPHILIZED, FOR SOLUTION INTRAVENOUS at 05:10

## 2021-10-13 RX ADMIN — ENOXAPARIN SODIUM 110 MG: 60 INJECTION SUBCUTANEOUS at 06:10

## 2021-10-13 RX ADMIN — TAMSULOSIN HYDROCHLORIDE 0.4 MG: 0.4 CAPSULE ORAL at 08:10

## 2021-10-13 RX ADMIN — HUMAN INSULIN 4 UNITS: 100 INJECTION, SOLUTION SUBCUTANEOUS at 03:10

## 2021-10-13 RX ADMIN — MICONAZOLE NITRATE: 2 POWDER TOPICAL at 08:10

## 2021-10-13 RX ADMIN — TRAZODONE HYDROCHLORIDE 150 MG: 50 TABLET ORAL at 08:10

## 2021-10-13 RX ADMIN — GABAPENTIN 300 MG: 300 CAPSULE ORAL at 08:10

## 2021-10-13 RX ADMIN — CEFAZOLIN SODIUM 2 G: 2 SOLUTION INTRAVENOUS at 11:10

## 2021-10-13 RX ADMIN — CEFAZOLIN SODIUM 2 G: 2 SOLUTION INTRAVENOUS at 03:10

## 2021-10-13 RX ADMIN — HYDROCODONE BITARTRATE AND ACETAMINOPHEN 1 TABLET: 10; 325 TABLET ORAL at 08:10

## 2021-10-13 RX ADMIN — INSULIN DETEMIR 20 UNITS: 100 INJECTION, SOLUTION SUBCUTANEOUS at 08:10

## 2021-10-13 RX ADMIN — ENOXAPARIN SODIUM 110 MG: 60 INJECTION SUBCUTANEOUS at 05:10

## 2021-10-14 LAB
ANION GAP SERPL CALC-SCNC: 11 MMOL/L (ref 8–16)
ANION GAP SERPL CALC-SCNC: 9 MMOL/L (ref 8–16)
ANION GAP SERPL CALC-SCNC: 9 MMOL/L (ref 8–16)
BASOPHILS # BLD AUTO: 0.1 K/UL (ref 0–0.2)
BASOPHILS # BLD AUTO: 0.1 K/UL (ref 0–0.2)
BASOPHILS NFR BLD: 0.8 % (ref 0–1.9)
BASOPHILS NFR BLD: 0.8 % (ref 0–1.9)
BUN SERPL-MCNC: 13 MG/DL (ref 8–23)
BUN SERPL-MCNC: 13 MG/DL (ref 8–23)
BUN SERPL-MCNC: 14 MG/DL (ref 8–23)
CALCIUM SERPL-MCNC: 8.3 MG/DL (ref 8.7–10.5)
CALCIUM SERPL-MCNC: 8.3 MG/DL (ref 8.7–10.5)
CALCIUM SERPL-MCNC: 8.8 MG/DL (ref 8.7–10.5)
CHLORIDE SERPL-SCNC: 96 MMOL/L (ref 95–110)
CHLORIDE SERPL-SCNC: 96 MMOL/L (ref 95–110)
CHLORIDE SERPL-SCNC: 98 MMOL/L (ref 95–110)
CO2 SERPL-SCNC: 30 MMOL/L (ref 23–29)
CO2 SERPL-SCNC: 30 MMOL/L (ref 23–29)
CO2 SERPL-SCNC: 33 MMOL/L (ref 23–29)
CREAT SERPL-MCNC: 0.6 MG/DL (ref 0.5–1.4)
CREAT SERPL-MCNC: 0.8 MG/DL (ref 0.5–1.4)
CREAT SERPL-MCNC: 0.8 MG/DL (ref 0.5–1.4)
DIFFERENTIAL METHOD: ABNORMAL
DIFFERENTIAL METHOD: ABNORMAL
EOSINOPHIL # BLD AUTO: 0.2 K/UL (ref 0–0.5)
EOSINOPHIL # BLD AUTO: 0.2 K/UL (ref 0–0.5)
EOSINOPHIL NFR BLD: 1.4 % (ref 0–8)
EOSINOPHIL NFR BLD: 1.4 % (ref 0–8)
ERYTHROCYTE [DISTWIDTH] IN BLOOD BY AUTOMATED COUNT: 12.8 % (ref 11.5–14.5)
ERYTHROCYTE [DISTWIDTH] IN BLOOD BY AUTOMATED COUNT: 12.8 % (ref 11.5–14.5)
EST. GFR  (AFRICAN AMERICAN): >60 ML/MIN/1.73 M^2
EST. GFR  (NON AFRICAN AMERICAN): >60 ML/MIN/1.73 M^2
GLUCOSE SERPL-MCNC: 233 MG/DL (ref 70–110)
GLUCOSE SERPL-MCNC: 235 MG/DL (ref 70–110)
GLUCOSE SERPL-MCNC: 242 MG/DL (ref 70–110)
GLUCOSE SERPL-MCNC: 246 MG/DL (ref 70–110)
GLUCOSE SERPL-MCNC: 246 MG/DL (ref 70–110)
GLUCOSE SERPL-MCNC: 256 MG/DL (ref 70–110)
GLUCOSE SERPL-MCNC: 269 MG/DL (ref 70–110)
GLUCOSE SERPL-MCNC: 269 MG/DL (ref 70–110)
HCT VFR BLD AUTO: 35.5 % (ref 40–54)
HCT VFR BLD AUTO: 35.5 % (ref 40–54)
HGB BLD-MCNC: 11.4 G/DL (ref 14–18)
HGB BLD-MCNC: 11.4 G/DL (ref 14–18)
IMM GRANULOCYTES # BLD AUTO: 0.24 K/UL (ref 0–0.04)
IMM GRANULOCYTES # BLD AUTO: 0.24 K/UL (ref 0–0.04)
IMM GRANULOCYTES NFR BLD AUTO: 1.9 % (ref 0–0.5)
IMM GRANULOCYTES NFR BLD AUTO: 1.9 % (ref 0–0.5)
LYMPHOCYTES # BLD AUTO: 2.2 K/UL (ref 1–4.8)
LYMPHOCYTES # BLD AUTO: 2.2 K/UL (ref 1–4.8)
LYMPHOCYTES NFR BLD: 17.8 % (ref 18–48)
LYMPHOCYTES NFR BLD: 17.8 % (ref 18–48)
MAGNESIUM SERPL-MCNC: 1.9 MG/DL (ref 1.6–2.6)
MCH RBC QN AUTO: 29 PG (ref 27–31)
MCH RBC QN AUTO: 29 PG (ref 27–31)
MCHC RBC AUTO-ENTMCNC: 32.1 G/DL (ref 32–36)
MCHC RBC AUTO-ENTMCNC: 32.1 G/DL (ref 32–36)
MCV RBC AUTO: 90 FL (ref 82–98)
MCV RBC AUTO: 90 FL (ref 82–98)
MONOCYTES # BLD AUTO: 1.4 K/UL (ref 0.3–1)
MONOCYTES # BLD AUTO: 1.4 K/UL (ref 0.3–1)
MONOCYTES NFR BLD: 11 % (ref 4–15)
MONOCYTES NFR BLD: 11 % (ref 4–15)
NEUTROPHILS # BLD AUTO: 8.3 K/UL (ref 1.8–7.7)
NEUTROPHILS # BLD AUTO: 8.3 K/UL (ref 1.8–7.7)
NEUTROPHILS NFR BLD: 67.1 % (ref 38–73)
NEUTROPHILS NFR BLD: 67.1 % (ref 38–73)
NRBC BLD-RTO: 0 /100 WBC
NRBC BLD-RTO: 0 /100 WBC
PLATELET # BLD AUTO: 402 K/UL (ref 150–450)
PLATELET # BLD AUTO: 402 K/UL (ref 150–450)
PMV BLD AUTO: 9.8 FL (ref 9.2–12.9)
PMV BLD AUTO: 9.8 FL (ref 9.2–12.9)
POTASSIUM SERPL-SCNC: 3.2 MMOL/L (ref 3.5–5.1)
POTASSIUM SERPL-SCNC: 3.2 MMOL/L (ref 3.5–5.1)
POTASSIUM SERPL-SCNC: 4.1 MMOL/L (ref 3.5–5.1)
RBC # BLD AUTO: 3.93 M/UL (ref 4.6–6.2)
RBC # BLD AUTO: 3.93 M/UL (ref 4.6–6.2)
SODIUM SERPL-SCNC: 135 MMOL/L (ref 136–145)
SODIUM SERPL-SCNC: 135 MMOL/L (ref 136–145)
SODIUM SERPL-SCNC: 142 MMOL/L (ref 136–145)
WBC # BLD AUTO: 12.4 K/UL (ref 3.9–12.7)
WBC # BLD AUTO: 12.4 K/UL (ref 3.9–12.7)

## 2021-10-14 PROCEDURE — 99231 PR SUBSEQUENT HOSPITAL CARE,LEVL I: ICD-10-PCS | Mod: ,,, | Performed by: INTERNAL MEDICINE

## 2021-10-14 PROCEDURE — 99231 SBSQ HOSP IP/OBS SF/LOW 25: CPT | Mod: ,,, | Performed by: INTERNAL MEDICINE

## 2021-10-14 PROCEDURE — 94799 UNLISTED PULMONARY SVC/PX: CPT

## 2021-10-14 PROCEDURE — 21400001 HC TELEMETRY ROOM

## 2021-10-14 PROCEDURE — 99900035 HC TECH TIME PER 15 MIN (STAT)

## 2021-10-14 PROCEDURE — 63600175 PHARM REV CODE 636 W HCPCS: Performed by: INTERNAL MEDICINE

## 2021-10-14 PROCEDURE — 99232 SBSQ HOSP IP/OBS MODERATE 35: CPT | Mod: ,,, | Performed by: INTERNAL MEDICINE

## 2021-10-14 PROCEDURE — 25000003 PHARM REV CODE 250: Performed by: INTERNAL MEDICINE

## 2021-10-14 PROCEDURE — 85025 COMPLETE CBC W/AUTO DIFF WBC: CPT | Performed by: NURSE PRACTITIONER

## 2021-10-14 PROCEDURE — 27000221 HC OXYGEN, UP TO 24 HOURS

## 2021-10-14 PROCEDURE — 80048 BASIC METABOLIC PNL TOTAL CA: CPT | Mod: 91 | Performed by: INTERNAL MEDICINE

## 2021-10-14 PROCEDURE — 36415 COLL VENOUS BLD VENIPUNCTURE: CPT | Performed by: INTERNAL MEDICINE

## 2021-10-14 PROCEDURE — 36415 COLL VENOUS BLD VENIPUNCTURE: CPT | Performed by: NURSE PRACTITIONER

## 2021-10-14 PROCEDURE — 25000003 PHARM REV CODE 250: Performed by: PODIATRIST

## 2021-10-14 PROCEDURE — 83735 ASSAY OF MAGNESIUM: CPT | Performed by: NURSE PRACTITIONER

## 2021-10-14 PROCEDURE — 80048 BASIC METABOLIC PNL TOTAL CA: CPT | Performed by: NURSE PRACTITIONER

## 2021-10-14 PROCEDURE — 25000003 PHARM REV CODE 250: Performed by: NURSE PRACTITIONER

## 2021-10-14 PROCEDURE — 99232 PR SUBSEQUENT HOSPITAL CARE,LEVL II: ICD-10-PCS | Mod: ,,, | Performed by: INTERNAL MEDICINE

## 2021-10-14 RX ADMIN — HUMAN INSULIN 4 UNITS: 100 INJECTION, SOLUTION SUBCUTANEOUS at 07:10

## 2021-10-14 RX ADMIN — HUMAN INSULIN 4 UNITS: 100 INJECTION, SOLUTION SUBCUTANEOUS at 09:10

## 2021-10-14 RX ADMIN — HUMAN INSULIN 4 UNITS: 100 INJECTION, SOLUTION SUBCUTANEOUS at 05:10

## 2021-10-14 RX ADMIN — AMIODARONE HYDROCHLORIDE 200 MG: 200 TABLET ORAL at 09:10

## 2021-10-14 RX ADMIN — ENOXAPARIN SODIUM 110 MG: 60 INJECTION SUBCUTANEOUS at 04:10

## 2021-10-14 RX ADMIN — HUMAN INSULIN 4 UNITS: 100 INJECTION, SOLUTION SUBCUTANEOUS at 12:10

## 2021-10-14 RX ADMIN — SENNOSIDES AND DOCUSATE SODIUM 1 TABLET: 8.6; 5 TABLET ORAL at 09:10

## 2021-10-14 RX ADMIN — INSULIN DETEMIR 20 UNITS: 100 INJECTION, SOLUTION SUBCUTANEOUS at 09:10

## 2021-10-14 RX ADMIN — CEFAZOLIN SODIUM 2 G: 2 SOLUTION INTRAVENOUS at 11:10

## 2021-10-14 RX ADMIN — ALPRAZOLAM 0.5 MG: 0.5 TABLET ORAL at 09:10

## 2021-10-14 RX ADMIN — GABAPENTIN 300 MG: 300 CAPSULE ORAL at 09:10

## 2021-10-14 RX ADMIN — HYDROCODONE BITARTRATE AND ACETAMINOPHEN 1 TABLET: 10; 325 TABLET ORAL at 04:10

## 2021-10-14 RX ADMIN — HUMAN INSULIN 4 UNITS: 100 INJECTION, SOLUTION SUBCUTANEOUS at 06:10

## 2021-10-14 RX ADMIN — TRAZODONE HYDROCHLORIDE 150 MG: 50 TABLET ORAL at 09:10

## 2021-10-14 RX ADMIN — CEFAZOLIN SODIUM 2 G: 2 SOLUTION INTRAVENOUS at 07:10

## 2021-10-14 RX ADMIN — HYDROCODONE BITARTRATE AND ACETAMINOPHEN 1 TABLET: 10; 325 TABLET ORAL at 07:10

## 2021-10-14 RX ADMIN — MICONAZOLE NITRATE: 2 POWDER TOPICAL at 09:10

## 2021-10-14 RX ADMIN — CEFAZOLIN SODIUM 2 G: 2 SOLUTION INTRAVENOUS at 03:10

## 2021-10-14 RX ADMIN — ATORVASTATIN CALCIUM 20 MG: 20 TABLET, FILM COATED ORAL at 09:10

## 2021-10-14 RX ADMIN — ENOXAPARIN SODIUM 110 MG: 60 INJECTION SUBCUTANEOUS at 06:10

## 2021-10-14 RX ADMIN — TAMSULOSIN HYDROCHLORIDE 0.4 MG: 0.4 CAPSULE ORAL at 09:10

## 2021-10-14 RX ADMIN — HYDROCODONE BITARTRATE AND ACETAMINOPHEN 1 TABLET: 10; 325 TABLET ORAL at 09:10

## 2021-10-14 RX ADMIN — POTASSIUM BICARBONATE 35 MEQ: 391 TABLET, EFFERVESCENT ORAL at 07:10

## 2021-10-14 RX ADMIN — POTASSIUM BICARBONATE 35 MEQ: 391 TABLET, EFFERVESCENT ORAL at 11:10

## 2021-10-15 LAB
ANION GAP SERPL CALC-SCNC: 11 MMOL/L (ref 8–16)
ANION GAP SERPL CALC-SCNC: 11 MMOL/L (ref 8–16)
ANION GAP SERPL CALC-SCNC: 12 MMOL/L (ref 8–16)
BACTERIA SPEC AEROBE CULT: ABNORMAL
BACTERIA SPEC AEROBE CULT: ABNORMAL
BACTERIA SPEC ANAEROBE CULT: NORMAL
BASOPHILS # BLD AUTO: 0.1 K/UL (ref 0–0.2)
BASOPHILS # BLD AUTO: 0.1 K/UL (ref 0–0.2)
BASOPHILS NFR BLD: 0.8 % (ref 0–1.9)
BASOPHILS NFR BLD: 0.8 % (ref 0–1.9)
BUN SERPL-MCNC: 14 MG/DL (ref 8–23)
BUN SERPL-MCNC: 14 MG/DL (ref 8–23)
BUN SERPL-MCNC: 19 MG/DL (ref 8–23)
CALCIUM SERPL-MCNC: 8.8 MG/DL (ref 8.7–10.5)
CALCIUM SERPL-MCNC: 8.8 MG/DL (ref 8.7–10.5)
CALCIUM SERPL-MCNC: 9.3 MG/DL (ref 8.7–10.5)
CHLORIDE SERPL-SCNC: 96 MMOL/L (ref 95–110)
CHLORIDE SERPL-SCNC: 98 MMOL/L (ref 95–110)
CHLORIDE SERPL-SCNC: 98 MMOL/L (ref 95–110)
CO2 SERPL-SCNC: 31 MMOL/L (ref 23–29)
CO2 SERPL-SCNC: 31 MMOL/L (ref 23–29)
CO2 SERPL-SCNC: 32 MMOL/L (ref 23–29)
CREAT SERPL-MCNC: 0.6 MG/DL (ref 0.5–1.4)
CREAT SERPL-MCNC: 0.6 MG/DL (ref 0.5–1.4)
CREAT SERPL-MCNC: 0.7 MG/DL (ref 0.5–1.4)
DIFFERENTIAL METHOD: ABNORMAL
DIFFERENTIAL METHOD: ABNORMAL
EOSINOPHIL # BLD AUTO: 0.3 K/UL (ref 0–0.5)
EOSINOPHIL # BLD AUTO: 0.3 K/UL (ref 0–0.5)
EOSINOPHIL NFR BLD: 2.3 % (ref 0–8)
EOSINOPHIL NFR BLD: 2.3 % (ref 0–8)
ERYTHROCYTE [DISTWIDTH] IN BLOOD BY AUTOMATED COUNT: 13.1 % (ref 11.5–14.5)
ERYTHROCYTE [DISTWIDTH] IN BLOOD BY AUTOMATED COUNT: 13.1 % (ref 11.5–14.5)
EST. GFR  (AFRICAN AMERICAN): >60 ML/MIN/1.73 M^2
EST. GFR  (NON AFRICAN AMERICAN): >60 ML/MIN/1.73 M^2
GLUCOSE SERPL-MCNC: 257 MG/DL (ref 70–110)
GLUCOSE SERPL-MCNC: 257 MG/DL (ref 70–110)
GLUCOSE SERPL-MCNC: 303 MG/DL (ref 70–110)
HCT VFR BLD AUTO: 35.7 % (ref 40–54)
HCT VFR BLD AUTO: 35.7 % (ref 40–54)
HGB BLD-MCNC: 11.3 G/DL (ref 14–18)
HGB BLD-MCNC: 11.3 G/DL (ref 14–18)
IMM GRANULOCYTES # BLD AUTO: 0.28 K/UL (ref 0–0.04)
IMM GRANULOCYTES # BLD AUTO: 0.28 K/UL (ref 0–0.04)
IMM GRANULOCYTES NFR BLD AUTO: 2.3 % (ref 0–0.5)
IMM GRANULOCYTES NFR BLD AUTO: 2.3 % (ref 0–0.5)
LYMPHOCYTES # BLD AUTO: 2.2 K/UL (ref 1–4.8)
LYMPHOCYTES # BLD AUTO: 2.2 K/UL (ref 1–4.8)
LYMPHOCYTES NFR BLD: 18 % (ref 18–48)
LYMPHOCYTES NFR BLD: 18 % (ref 18–48)
MAGNESIUM SERPL-MCNC: 1.9 MG/DL (ref 1.6–2.6)
MCH RBC QN AUTO: 28.8 PG (ref 27–31)
MCH RBC QN AUTO: 28.8 PG (ref 27–31)
MCHC RBC AUTO-ENTMCNC: 31.7 G/DL (ref 32–36)
MCHC RBC AUTO-ENTMCNC: 31.7 G/DL (ref 32–36)
MCV RBC AUTO: 91 FL (ref 82–98)
MCV RBC AUTO: 91 FL (ref 82–98)
MONOCYTES # BLD AUTO: 1.1 K/UL (ref 0.3–1)
MONOCYTES # BLD AUTO: 1.1 K/UL (ref 0.3–1)
MONOCYTES NFR BLD: 9.4 % (ref 4–15)
MONOCYTES NFR BLD: 9.4 % (ref 4–15)
NEUTROPHILS # BLD AUTO: 8.2 K/UL (ref 1.8–7.7)
NEUTROPHILS # BLD AUTO: 8.2 K/UL (ref 1.8–7.7)
NEUTROPHILS NFR BLD: 67.2 % (ref 38–73)
NEUTROPHILS NFR BLD: 67.2 % (ref 38–73)
NRBC BLD-RTO: 0 /100 WBC
NRBC BLD-RTO: 0 /100 WBC
PLATELET # BLD AUTO: 457 K/UL (ref 150–450)
PLATELET # BLD AUTO: 457 K/UL (ref 150–450)
PMV BLD AUTO: 9.8 FL (ref 9.2–12.9)
PMV BLD AUTO: 9.8 FL (ref 9.2–12.9)
POTASSIUM SERPL-SCNC: 3.3 MMOL/L (ref 3.5–5.1)
POTASSIUM SERPL-SCNC: 3.3 MMOL/L (ref 3.5–5.1)
POTASSIUM SERPL-SCNC: 4.1 MMOL/L (ref 3.5–5.1)
RBC # BLD AUTO: 3.92 M/UL (ref 4.6–6.2)
RBC # BLD AUTO: 3.92 M/UL (ref 4.6–6.2)
SODIUM SERPL-SCNC: 140 MMOL/L (ref 136–145)
WBC # BLD AUTO: 12.19 K/UL (ref 3.9–12.7)
WBC # BLD AUTO: 12.19 K/UL (ref 3.9–12.7)

## 2021-10-15 PROCEDURE — 82962 GLUCOSE BLOOD TEST: CPT

## 2021-10-15 PROCEDURE — 99232 PR SUBSEQUENT HOSPITAL CARE,LEVL II: ICD-10-PCS | Mod: ,,, | Performed by: INTERNAL MEDICINE

## 2021-10-15 PROCEDURE — 99232 SBSQ HOSP IP/OBS MODERATE 35: CPT | Mod: ,,, | Performed by: INTERNAL MEDICINE

## 2021-10-15 PROCEDURE — 63600175 PHARM REV CODE 636 W HCPCS: Performed by: INTERNAL MEDICINE

## 2021-10-15 PROCEDURE — 94799 UNLISTED PULMONARY SVC/PX: CPT

## 2021-10-15 PROCEDURE — 99900035 HC TECH TIME PER 15 MIN (STAT)

## 2021-10-15 PROCEDURE — 36415 COLL VENOUS BLD VENIPUNCTURE: CPT | Performed by: INTERNAL MEDICINE

## 2021-10-15 PROCEDURE — 27000221 HC OXYGEN, UP TO 24 HOURS

## 2021-10-15 PROCEDURE — 80048 BASIC METABOLIC PNL TOTAL CA: CPT | Mod: 91 | Performed by: INTERNAL MEDICINE

## 2021-10-15 PROCEDURE — 25000003 PHARM REV CODE 250: Performed by: INTERNAL MEDICINE

## 2021-10-15 PROCEDURE — 94760 N-INVAS EAR/PLS OXIMETRY 1: CPT

## 2021-10-15 PROCEDURE — 25000003 PHARM REV CODE 250: Performed by: NURSE PRACTITIONER

## 2021-10-15 PROCEDURE — 36415 COLL VENOUS BLD VENIPUNCTURE: CPT | Performed by: NURSE PRACTITIONER

## 2021-10-15 PROCEDURE — 21400001 HC TELEMETRY ROOM

## 2021-10-15 PROCEDURE — 25000003 PHARM REV CODE 250: Performed by: PODIATRIST

## 2021-10-15 PROCEDURE — 83735 ASSAY OF MAGNESIUM: CPT | Performed by: NURSE PRACTITIONER

## 2021-10-15 PROCEDURE — 85025 COMPLETE CBC W/AUTO DIFF WBC: CPT | Performed by: NURSE PRACTITIONER

## 2021-10-15 PROCEDURE — C9399 UNCLASSIFIED DRUGS OR BIOLOG: HCPCS | Performed by: INTERNAL MEDICINE

## 2021-10-15 PROCEDURE — 80048 BASIC METABOLIC PNL TOTAL CA: CPT | Performed by: NURSE PRACTITIONER

## 2021-10-15 RX ADMIN — GABAPENTIN 300 MG: 300 CAPSULE ORAL at 09:10

## 2021-10-15 RX ADMIN — HUMAN INSULIN 8 UNITS: 100 INJECTION, SOLUTION SUBCUTANEOUS at 05:10

## 2021-10-15 RX ADMIN — GABAPENTIN 300 MG: 300 CAPSULE ORAL at 08:10

## 2021-10-15 RX ADMIN — HYDROCODONE BITARTRATE AND ACETAMINOPHEN 1 TABLET: 10; 325 TABLET ORAL at 07:10

## 2021-10-15 RX ADMIN — SENNOSIDES AND DOCUSATE SODIUM 1 TABLET: 8.6; 5 TABLET ORAL at 08:10

## 2021-10-15 RX ADMIN — ATORVASTATIN CALCIUM 20 MG: 20 TABLET, FILM COATED ORAL at 09:10

## 2021-10-15 RX ADMIN — CEFAZOLIN SODIUM 2 G: 2 SOLUTION INTRAVENOUS at 11:10

## 2021-10-15 RX ADMIN — HYDROCODONE BITARTRATE AND ACETAMINOPHEN 1 TABLET: 10; 325 TABLET ORAL at 01:10

## 2021-10-15 RX ADMIN — CEFAZOLIN SODIUM 2 G: 2 SOLUTION INTRAVENOUS at 03:10

## 2021-10-15 RX ADMIN — ALPRAZOLAM 0.5 MG: 0.5 TABLET ORAL at 09:10

## 2021-10-15 RX ADMIN — TRAZODONE HYDROCHLORIDE 150 MG: 50 TABLET ORAL at 09:10

## 2021-10-15 RX ADMIN — AMIODARONE HYDROCHLORIDE 200 MG: 200 TABLET ORAL at 08:10

## 2021-10-15 RX ADMIN — ALPRAZOLAM 0.5 MG: 0.5 TABLET ORAL at 05:10

## 2021-10-15 RX ADMIN — INSULIN DETEMIR 10 UNITS: 100 INJECTION, SOLUTION SUBCUTANEOUS at 11:10

## 2021-10-15 RX ADMIN — INSULIN DETEMIR 20 UNITS: 100 INJECTION, SOLUTION SUBCUTANEOUS at 08:10

## 2021-10-15 RX ADMIN — ENOXAPARIN SODIUM 110 MG: 60 INJECTION SUBCUTANEOUS at 05:10

## 2021-10-15 RX ADMIN — HUMAN INSULIN 6 UNITS: 100 INJECTION, SOLUTION SUBCUTANEOUS at 07:10

## 2021-10-15 RX ADMIN — TAMSULOSIN HYDROCHLORIDE 0.4 MG: 0.4 CAPSULE ORAL at 08:10

## 2021-10-15 RX ADMIN — MICONAZOLE NITRATE: 2 POWDER TOPICAL at 08:10

## 2021-10-15 RX ADMIN — ACETAMINOPHEN 650 MG: 325 TABLET, FILM COATED ORAL at 07:10

## 2021-10-15 RX ADMIN — HUMAN INSULIN 4 UNITS: 100 INJECTION, SOLUTION SUBCUTANEOUS at 12:10

## 2021-10-15 RX ADMIN — HUMAN INSULIN 4 UNITS: 100 INJECTION, SOLUTION SUBCUTANEOUS at 11:10

## 2021-10-15 RX ADMIN — AMIODARONE HYDROCHLORIDE 200 MG: 200 TABLET ORAL at 09:10

## 2021-10-15 RX ADMIN — POTASSIUM BICARBONATE 35 MEQ: 391 TABLET, EFFERVESCENT ORAL at 12:10

## 2021-10-15 RX ADMIN — POTASSIUM BICARBONATE 35 MEQ: 391 TABLET, EFFERVESCENT ORAL at 03:10

## 2021-10-15 RX ADMIN — CEFAZOLIN SODIUM 2 G: 2 SOLUTION INTRAVENOUS at 07:10

## 2021-10-15 RX ADMIN — MICONAZOLE NITRATE: 2 POWDER TOPICAL at 11:10

## 2021-10-16 LAB
ANION GAP SERPL CALC-SCNC: 12 MMOL/L (ref 8–16)
ANION GAP SERPL CALC-SCNC: 12 MMOL/L (ref 8–16)
BACTERIA BLD CULT: NORMAL
BACTERIA SPEC ANAEROBE CULT: NORMAL
BASOPHILS # BLD AUTO: 0.15 K/UL (ref 0–0.2)
BASOPHILS # BLD AUTO: 0.15 K/UL (ref 0–0.2)
BASOPHILS NFR BLD: 1.3 % (ref 0–1.9)
BASOPHILS NFR BLD: 1.3 % (ref 0–1.9)
BUN SERPL-MCNC: 15 MG/DL (ref 8–23)
BUN SERPL-MCNC: 15 MG/DL (ref 8–23)
CALCIUM SERPL-MCNC: 8.8 MG/DL (ref 8.7–10.5)
CALCIUM SERPL-MCNC: 8.8 MG/DL (ref 8.7–10.5)
CHLORIDE SERPL-SCNC: 97 MMOL/L (ref 95–110)
CHLORIDE SERPL-SCNC: 97 MMOL/L (ref 95–110)
CO2 SERPL-SCNC: 31 MMOL/L (ref 23–29)
CO2 SERPL-SCNC: 31 MMOL/L (ref 23–29)
CREAT SERPL-MCNC: 0.8 MG/DL (ref 0.5–1.4)
CREAT SERPL-MCNC: 0.8 MG/DL (ref 0.5–1.4)
DIFFERENTIAL METHOD: ABNORMAL
DIFFERENTIAL METHOD: ABNORMAL
EOSINOPHIL # BLD AUTO: 0.3 K/UL (ref 0–0.5)
EOSINOPHIL # BLD AUTO: 0.3 K/UL (ref 0–0.5)
EOSINOPHIL NFR BLD: 2.9 % (ref 0–8)
EOSINOPHIL NFR BLD: 2.9 % (ref 0–8)
ERYTHROCYTE [DISTWIDTH] IN BLOOD BY AUTOMATED COUNT: 13 % (ref 11.5–14.5)
ERYTHROCYTE [DISTWIDTH] IN BLOOD BY AUTOMATED COUNT: 13 % (ref 11.5–14.5)
EST. GFR  (AFRICAN AMERICAN): >60 ML/MIN/1.73 M^2
EST. GFR  (AFRICAN AMERICAN): >60 ML/MIN/1.73 M^2
EST. GFR  (NON AFRICAN AMERICAN): >60 ML/MIN/1.73 M^2
EST. GFR  (NON AFRICAN AMERICAN): >60 ML/MIN/1.73 M^2
GLUCOSE SERPL-MCNC: 292 MG/DL (ref 70–110)
GLUCOSE SERPL-MCNC: 292 MG/DL (ref 70–110)
HCT VFR BLD AUTO: 39.9 % (ref 40–54)
HCT VFR BLD AUTO: 39.9 % (ref 40–54)
HGB BLD-MCNC: 12.6 G/DL (ref 14–18)
HGB BLD-MCNC: 12.6 G/DL (ref 14–18)
IMM GRANULOCYTES # BLD AUTO: 0.35 K/UL (ref 0–0.04)
IMM GRANULOCYTES # BLD AUTO: 0.35 K/UL (ref 0–0.04)
IMM GRANULOCYTES NFR BLD AUTO: 3 % (ref 0–0.5)
IMM GRANULOCYTES NFR BLD AUTO: 3 % (ref 0–0.5)
LYMPHOCYTES # BLD AUTO: 2.1 K/UL (ref 1–4.8)
LYMPHOCYTES # BLD AUTO: 2.1 K/UL (ref 1–4.8)
LYMPHOCYTES NFR BLD: 17.8 % (ref 18–48)
LYMPHOCYTES NFR BLD: 17.8 % (ref 18–48)
MAGNESIUM SERPL-MCNC: 1.9 MG/DL (ref 1.6–2.6)
MCH RBC QN AUTO: 29.3 PG (ref 27–31)
MCH RBC QN AUTO: 29.3 PG (ref 27–31)
MCHC RBC AUTO-ENTMCNC: 31.6 G/DL (ref 32–36)
MCHC RBC AUTO-ENTMCNC: 31.6 G/DL (ref 32–36)
MCV RBC AUTO: 93 FL (ref 82–98)
MCV RBC AUTO: 93 FL (ref 82–98)
MONOCYTES # BLD AUTO: 1.1 K/UL (ref 0.3–1)
MONOCYTES # BLD AUTO: 1.1 K/UL (ref 0.3–1)
MONOCYTES NFR BLD: 9.8 % (ref 4–15)
MONOCYTES NFR BLD: 9.8 % (ref 4–15)
NEUTROPHILS # BLD AUTO: 7.5 K/UL (ref 1.8–7.7)
NEUTROPHILS # BLD AUTO: 7.5 K/UL (ref 1.8–7.7)
NEUTROPHILS NFR BLD: 65.2 % (ref 38–73)
NEUTROPHILS NFR BLD: 65.2 % (ref 38–73)
NRBC BLD-RTO: 0 /100 WBC
NRBC BLD-RTO: 0 /100 WBC
PLATELET # BLD AUTO: 497 K/UL (ref 150–450)
PLATELET # BLD AUTO: 497 K/UL (ref 150–450)
PMV BLD AUTO: 9.6 FL (ref 9.2–12.9)
PMV BLD AUTO: 9.6 FL (ref 9.2–12.9)
POTASSIUM SERPL-SCNC: 3.7 MMOL/L (ref 3.5–5.1)
POTASSIUM SERPL-SCNC: 3.7 MMOL/L (ref 3.5–5.1)
RBC # BLD AUTO: 4.3 M/UL (ref 4.6–6.2)
RBC # BLD AUTO: 4.3 M/UL (ref 4.6–6.2)
SODIUM SERPL-SCNC: 140 MMOL/L (ref 136–145)
SODIUM SERPL-SCNC: 140 MMOL/L (ref 136–145)
WBC # BLD AUTO: 11.56 K/UL (ref 3.9–12.7)
WBC # BLD AUTO: 11.56 K/UL (ref 3.9–12.7)

## 2021-10-16 PROCEDURE — 99900035 HC TECH TIME PER 15 MIN (STAT)

## 2021-10-16 PROCEDURE — 25000003 PHARM REV CODE 250: Performed by: PODIATRIST

## 2021-10-16 PROCEDURE — 25000003 PHARM REV CODE 250: Performed by: INTERNAL MEDICINE

## 2021-10-16 PROCEDURE — 94761 N-INVAS EAR/PLS OXIMETRY MLT: CPT

## 2021-10-16 PROCEDURE — 94799 UNLISTED PULMONARY SVC/PX: CPT

## 2021-10-16 PROCEDURE — 80048 BASIC METABOLIC PNL TOTAL CA: CPT | Performed by: NURSE PRACTITIONER

## 2021-10-16 PROCEDURE — 63600175 PHARM REV CODE 636 W HCPCS: Performed by: INTERNAL MEDICINE

## 2021-10-16 PROCEDURE — 27000221 HC OXYGEN, UP TO 24 HOURS

## 2021-10-16 PROCEDURE — 25000242 PHARM REV CODE 250 ALT 637 W/ HCPCS: Performed by: NURSE PRACTITIONER

## 2021-10-16 PROCEDURE — 83735 ASSAY OF MAGNESIUM: CPT | Performed by: NURSE PRACTITIONER

## 2021-10-16 PROCEDURE — 36415 COLL VENOUS BLD VENIPUNCTURE: CPT | Performed by: NURSE PRACTITIONER

## 2021-10-16 PROCEDURE — 25000003 PHARM REV CODE 250: Performed by: NURSE PRACTITIONER

## 2021-10-16 PROCEDURE — 99900031 HC PATIENT EDUCATION (STAT)

## 2021-10-16 PROCEDURE — 94640 AIRWAY INHALATION TREATMENT: CPT

## 2021-10-16 PROCEDURE — 21400001 HC TELEMETRY ROOM

## 2021-10-16 PROCEDURE — 85025 COMPLETE CBC W/AUTO DIFF WBC: CPT | Performed by: NURSE PRACTITIONER

## 2021-10-16 RX ADMIN — HYDROCODONE BITARTRATE AND ACETAMINOPHEN 1 TABLET: 10; 325 TABLET ORAL at 06:10

## 2021-10-16 RX ADMIN — HUMAN INSULIN 8 UNITS: 100 INJECTION, SOLUTION SUBCUTANEOUS at 05:10

## 2021-10-16 RX ADMIN — CEFAZOLIN SODIUM 2 G: 2 SOLUTION INTRAVENOUS at 04:10

## 2021-10-16 RX ADMIN — HYDROCODONE BITARTRATE AND ACETAMINOPHEN 1 TABLET: 10; 325 TABLET ORAL at 08:10

## 2021-10-16 RX ADMIN — TRAZODONE HYDROCHLORIDE 150 MG: 50 TABLET ORAL at 08:10

## 2021-10-16 RX ADMIN — ENOXAPARIN SODIUM 110 MG: 60 INJECTION SUBCUTANEOUS at 06:10

## 2021-10-16 RX ADMIN — POTASSIUM BICARBONATE 50 MEQ: 977.5 TABLET, EFFERVESCENT ORAL at 06:10

## 2021-10-16 RX ADMIN — IPRATROPIUM BROMIDE AND ALBUTEROL SULFATE 3 ML: .5; 3 SOLUTION RESPIRATORY (INHALATION) at 08:10

## 2021-10-16 RX ADMIN — MICONAZOLE NITRATE: 2 POWDER TOPICAL at 08:10

## 2021-10-16 RX ADMIN — INSULIN DETEMIR 20 UNITS: 100 INJECTION, SOLUTION SUBCUTANEOUS at 08:10

## 2021-10-16 RX ADMIN — AMIODARONE HYDROCHLORIDE 200 MG: 200 TABLET ORAL at 08:10

## 2021-10-16 RX ADMIN — HUMAN INSULIN 6 UNITS: 100 INJECTION, SOLUTION SUBCUTANEOUS at 12:10

## 2021-10-16 RX ADMIN — SENNOSIDES AND DOCUSATE SODIUM 1 TABLET: 8.6; 5 TABLET ORAL at 08:10

## 2021-10-16 RX ADMIN — GABAPENTIN 300 MG: 300 CAPSULE ORAL at 08:10

## 2021-10-16 RX ADMIN — ENOXAPARIN SODIUM 110 MG: 60 INJECTION SUBCUTANEOUS at 04:10

## 2021-10-16 RX ADMIN — HYDROCODONE BITARTRATE AND ACETAMINOPHEN 1 TABLET: 10; 325 TABLET ORAL at 12:10

## 2021-10-16 RX ADMIN — CEFAZOLIN SODIUM 2 G: 2 SOLUTION INTRAVENOUS at 08:10

## 2021-10-16 RX ADMIN — ALPRAZOLAM 0.5 MG: 0.5 TABLET ORAL at 08:10

## 2021-10-16 RX ADMIN — MICONAZOLE NITRATE: 2 POWDER TOPICAL at 09:10

## 2021-10-16 RX ADMIN — TAMSULOSIN HYDROCHLORIDE 0.4 MG: 0.4 CAPSULE ORAL at 08:10

## 2021-10-16 RX ADMIN — ATORVASTATIN CALCIUM 20 MG: 20 TABLET, FILM COATED ORAL at 08:10

## 2021-10-16 RX ADMIN — CEFAZOLIN SODIUM 2 G: 2 SOLUTION INTRAVENOUS at 10:10

## 2021-10-17 LAB
ANION GAP SERPL CALC-SCNC: 10 MMOL/L (ref 8–16)
ANION GAP SERPL CALC-SCNC: 10 MMOL/L (ref 8–16)
BASOPHILS # BLD AUTO: 0.09 K/UL (ref 0–0.2)
BASOPHILS # BLD AUTO: 0.09 K/UL (ref 0–0.2)
BASOPHILS NFR BLD: 0.7 % (ref 0–1.9)
BASOPHILS NFR BLD: 0.7 % (ref 0–1.9)
BUN SERPL-MCNC: 14 MG/DL (ref 8–23)
BUN SERPL-MCNC: 14 MG/DL (ref 8–23)
CALCIUM SERPL-MCNC: 8.6 MG/DL (ref 8.7–10.5)
CALCIUM SERPL-MCNC: 8.6 MG/DL (ref 8.7–10.5)
CHLORIDE SERPL-SCNC: 98 MMOL/L (ref 95–110)
CHLORIDE SERPL-SCNC: 98 MMOL/L (ref 95–110)
CO2 SERPL-SCNC: 32 MMOL/L (ref 23–29)
CO2 SERPL-SCNC: 32 MMOL/L (ref 23–29)
CREAT SERPL-MCNC: 0.7 MG/DL (ref 0.5–1.4)
CREAT SERPL-MCNC: 0.7 MG/DL (ref 0.5–1.4)
DIFFERENTIAL METHOD: ABNORMAL
DIFFERENTIAL METHOD: ABNORMAL
EOSINOPHIL # BLD AUTO: 0.4 K/UL (ref 0–0.5)
EOSINOPHIL # BLD AUTO: 0.4 K/UL (ref 0–0.5)
EOSINOPHIL NFR BLD: 2.8 % (ref 0–8)
EOSINOPHIL NFR BLD: 2.8 % (ref 0–8)
ERYTHROCYTE [DISTWIDTH] IN BLOOD BY AUTOMATED COUNT: 13.2 % (ref 11.5–14.5)
ERYTHROCYTE [DISTWIDTH] IN BLOOD BY AUTOMATED COUNT: 13.2 % (ref 11.5–14.5)
EST. GFR  (AFRICAN AMERICAN): >60 ML/MIN/1.73 M^2
EST. GFR  (AFRICAN AMERICAN): >60 ML/MIN/1.73 M^2
EST. GFR  (NON AFRICAN AMERICAN): >60 ML/MIN/1.73 M^2
EST. GFR  (NON AFRICAN AMERICAN): >60 ML/MIN/1.73 M^2
GLUCOSE SERPL-MCNC: 256 MG/DL (ref 70–110)
GLUCOSE SERPL-MCNC: 256 MG/DL (ref 70–110)
HCT VFR BLD AUTO: 34.1 % (ref 40–54)
HCT VFR BLD AUTO: 34.1 % (ref 40–54)
HGB BLD-MCNC: 10.8 G/DL (ref 14–18)
HGB BLD-MCNC: 10.8 G/DL (ref 14–18)
IMM GRANULOCYTES # BLD AUTO: 0.18 K/UL (ref 0–0.04)
IMM GRANULOCYTES # BLD AUTO: 0.18 K/UL (ref 0–0.04)
IMM GRANULOCYTES NFR BLD AUTO: 1.4 % (ref 0–0.5)
IMM GRANULOCYTES NFR BLD AUTO: 1.4 % (ref 0–0.5)
LYMPHOCYTES # BLD AUTO: 2.5 K/UL (ref 1–4.8)
LYMPHOCYTES # BLD AUTO: 2.5 K/UL (ref 1–4.8)
LYMPHOCYTES NFR BLD: 19.7 % (ref 18–48)
LYMPHOCYTES NFR BLD: 19.7 % (ref 18–48)
MAGNESIUM SERPL-MCNC: 1.8 MG/DL (ref 1.6–2.6)
MCH RBC QN AUTO: 29.1 PG (ref 27–31)
MCH RBC QN AUTO: 29.1 PG (ref 27–31)
MCHC RBC AUTO-ENTMCNC: 31.7 G/DL (ref 32–36)
MCHC RBC AUTO-ENTMCNC: 31.7 G/DL (ref 32–36)
MCV RBC AUTO: 92 FL (ref 82–98)
MCV RBC AUTO: 92 FL (ref 82–98)
MONOCYTES # BLD AUTO: 1.1 K/UL (ref 0.3–1)
MONOCYTES # BLD AUTO: 1.1 K/UL (ref 0.3–1)
MONOCYTES NFR BLD: 9.1 % (ref 4–15)
MONOCYTES NFR BLD: 9.1 % (ref 4–15)
NEUTROPHILS # BLD AUTO: 8.3 K/UL (ref 1.8–7.7)
NEUTROPHILS # BLD AUTO: 8.3 K/UL (ref 1.8–7.7)
NEUTROPHILS NFR BLD: 66.3 % (ref 38–73)
NEUTROPHILS NFR BLD: 66.3 % (ref 38–73)
NRBC BLD-RTO: 0 /100 WBC
NRBC BLD-RTO: 0 /100 WBC
PLATELET # BLD AUTO: 448 K/UL (ref 150–450)
PLATELET # BLD AUTO: 448 K/UL (ref 150–450)
PMV BLD AUTO: 9.1 FL (ref 9.2–12.9)
PMV BLD AUTO: 9.1 FL (ref 9.2–12.9)
POTASSIUM SERPL-SCNC: 3.8 MMOL/L (ref 3.5–5.1)
POTASSIUM SERPL-SCNC: 3.8 MMOL/L (ref 3.5–5.1)
RBC # BLD AUTO: 3.71 M/UL (ref 4.6–6.2)
RBC # BLD AUTO: 3.71 M/UL (ref 4.6–6.2)
SODIUM SERPL-SCNC: 140 MMOL/L (ref 136–145)
SODIUM SERPL-SCNC: 140 MMOL/L (ref 136–145)
WBC # BLD AUTO: 12.46 K/UL (ref 3.9–12.7)
WBC # BLD AUTO: 12.46 K/UL (ref 3.9–12.7)

## 2021-10-17 PROCEDURE — 94761 N-INVAS EAR/PLS OXIMETRY MLT: CPT

## 2021-10-17 PROCEDURE — 80048 BASIC METABOLIC PNL TOTAL CA: CPT | Performed by: NURSE PRACTITIONER

## 2021-10-17 PROCEDURE — 36415 COLL VENOUS BLD VENIPUNCTURE: CPT | Performed by: NURSE PRACTITIONER

## 2021-10-17 PROCEDURE — 25000003 PHARM REV CODE 250: Performed by: NURSE PRACTITIONER

## 2021-10-17 PROCEDURE — 85025 COMPLETE CBC W/AUTO DIFF WBC: CPT | Performed by: NURSE PRACTITIONER

## 2021-10-17 PROCEDURE — 99900031 HC PATIENT EDUCATION (STAT)

## 2021-10-17 PROCEDURE — 21400001 HC TELEMETRY ROOM

## 2021-10-17 PROCEDURE — 94799 UNLISTED PULMONARY SVC/PX: CPT

## 2021-10-17 PROCEDURE — 63600175 PHARM REV CODE 636 W HCPCS: Performed by: INTERNAL MEDICINE

## 2021-10-17 PROCEDURE — 25000003 PHARM REV CODE 250: Performed by: PODIATRIST

## 2021-10-17 PROCEDURE — 25000003 PHARM REV CODE 250: Performed by: INTERNAL MEDICINE

## 2021-10-17 PROCEDURE — 99900035 HC TECH TIME PER 15 MIN (STAT)

## 2021-10-17 PROCEDURE — 83735 ASSAY OF MAGNESIUM: CPT | Performed by: NURSE PRACTITIONER

## 2021-10-17 PROCEDURE — 27000221 HC OXYGEN, UP TO 24 HOURS

## 2021-10-17 RX ADMIN — HUMAN INSULIN 6 UNITS: 100 INJECTION, SOLUTION SUBCUTANEOUS at 01:10

## 2021-10-17 RX ADMIN — POTASSIUM BICARBONATE 35 MEQ: 391 TABLET, EFFERVESCENT ORAL at 08:10

## 2021-10-17 RX ADMIN — ALPRAZOLAM 0.5 MG: 0.5 TABLET ORAL at 10:10

## 2021-10-17 RX ADMIN — ATORVASTATIN CALCIUM 20 MG: 20 TABLET, FILM COATED ORAL at 08:10

## 2021-10-17 RX ADMIN — POTASSIUM CHLORIDE 20 MEQ: 1500 TABLET, EXTENDED RELEASE ORAL at 08:10

## 2021-10-17 RX ADMIN — GABAPENTIN 300 MG: 300 CAPSULE ORAL at 08:10

## 2021-10-17 RX ADMIN — HUMAN INSULIN 4 UNITS: 100 INJECTION, SOLUTION SUBCUTANEOUS at 05:10

## 2021-10-17 RX ADMIN — HUMAN INSULIN 4 UNITS: 100 INJECTION, SOLUTION SUBCUTANEOUS at 07:10

## 2021-10-17 RX ADMIN — AMIODARONE HYDROCHLORIDE 200 MG: 200 TABLET ORAL at 08:10

## 2021-10-17 RX ADMIN — ENOXAPARIN SODIUM 110 MG: 60 INJECTION SUBCUTANEOUS at 05:10

## 2021-10-17 RX ADMIN — MICONAZOLE NITRATE: 2 POWDER TOPICAL at 08:10

## 2021-10-17 RX ADMIN — TAMSULOSIN HYDROCHLORIDE 0.4 MG: 0.4 CAPSULE ORAL at 08:10

## 2021-10-17 RX ADMIN — ALPRAZOLAM 0.5 MG: 0.5 TABLET ORAL at 05:10

## 2021-10-17 RX ADMIN — HYDROCODONE BITARTRATE AND ACETAMINOPHEN 1 TABLET: 5; 325 TABLET ORAL at 07:10

## 2021-10-17 RX ADMIN — TRAZODONE HYDROCHLORIDE 150 MG: 50 TABLET ORAL at 08:10

## 2021-10-17 RX ADMIN — HYDROCODONE BITARTRATE AND ACETAMINOPHEN 1 TABLET: 10; 325 TABLET ORAL at 05:10

## 2021-10-17 RX ADMIN — ACETAMINOPHEN 650 MG: 325 TABLET, FILM COATED ORAL at 08:10

## 2021-10-17 RX ADMIN — HYDROCODONE BITARTRATE AND ACETAMINOPHEN 1 TABLET: 10; 325 TABLET ORAL at 01:10

## 2021-10-17 RX ADMIN — ALPRAZOLAM 0.5 MG: 0.5 TABLET ORAL at 08:10

## 2021-10-17 RX ADMIN — CEFAZOLIN SODIUM 2 G: 2 SOLUTION INTRAVENOUS at 05:10

## 2021-10-17 RX ADMIN — GABAPENTIN 300 MG: 300 CAPSULE ORAL at 09:10

## 2021-10-17 RX ADMIN — SENNOSIDES AND DOCUSATE SODIUM 1 TABLET: 8.6; 5 TABLET ORAL at 08:10

## 2021-10-17 RX ADMIN — INSULIN DETEMIR 30 UNITS: 100 INJECTION, SOLUTION SUBCUTANEOUS at 08:10

## 2021-10-17 RX ADMIN — CEFAZOLIN SODIUM 2 G: 2 SOLUTION INTRAVENOUS at 11:10

## 2021-10-17 RX ADMIN — HUMAN INSULIN 6 UNITS: 100 INJECTION, SOLUTION SUBCUTANEOUS at 08:10

## 2021-10-17 RX ADMIN — MAGNESIUM OXIDE 800 MG: 400 TABLET ORAL at 08:10

## 2021-10-17 RX ADMIN — CEFAZOLIN SODIUM 2 G: 2 SOLUTION INTRAVENOUS at 07:10

## 2021-10-18 LAB
ANION GAP SERPL CALC-SCNC: 11 MMOL/L (ref 8–16)
ANION GAP SERPL CALC-SCNC: 11 MMOL/L (ref 8–16)
BASOPHILS # BLD AUTO: 0.08 K/UL (ref 0–0.2)
BASOPHILS # BLD AUTO: 0.08 K/UL (ref 0–0.2)
BASOPHILS NFR BLD: 0.6 % (ref 0–1.9)
BASOPHILS NFR BLD: 0.6 % (ref 0–1.9)
BUN SERPL-MCNC: 11 MG/DL (ref 8–23)
BUN SERPL-MCNC: 11 MG/DL (ref 8–23)
CALCIUM SERPL-MCNC: 9.1 MG/DL (ref 8.7–10.5)
CALCIUM SERPL-MCNC: 9.1 MG/DL (ref 8.7–10.5)
CHLORIDE SERPL-SCNC: 99 MMOL/L (ref 95–110)
CHLORIDE SERPL-SCNC: 99 MMOL/L (ref 95–110)
CO2 SERPL-SCNC: 32 MMOL/L (ref 23–29)
CO2 SERPL-SCNC: 32 MMOL/L (ref 23–29)
CREAT SERPL-MCNC: 0.4 MG/DL (ref 0.5–1.4)
CREAT SERPL-MCNC: 0.4 MG/DL (ref 0.5–1.4)
DIFFERENTIAL METHOD: ABNORMAL
DIFFERENTIAL METHOD: ABNORMAL
EOSINOPHIL # BLD AUTO: 0.3 K/UL (ref 0–0.5)
EOSINOPHIL # BLD AUTO: 0.3 K/UL (ref 0–0.5)
EOSINOPHIL NFR BLD: 1.8 % (ref 0–8)
EOSINOPHIL NFR BLD: 1.8 % (ref 0–8)
ERYTHROCYTE [DISTWIDTH] IN BLOOD BY AUTOMATED COUNT: 13.3 % (ref 11.5–14.5)
ERYTHROCYTE [DISTWIDTH] IN BLOOD BY AUTOMATED COUNT: 13.3 % (ref 11.5–14.5)
EST. GFR  (AFRICAN AMERICAN): >60 ML/MIN/1.73 M^2
EST. GFR  (AFRICAN AMERICAN): >60 ML/MIN/1.73 M^2
EST. GFR  (NON AFRICAN AMERICAN): >60 ML/MIN/1.73 M^2
EST. GFR  (NON AFRICAN AMERICAN): >60 ML/MIN/1.73 M^2
GLUCOSE SERPL-MCNC: 180 MG/DL (ref 70–110)
GLUCOSE SERPL-MCNC: 180 MG/DL (ref 70–110)
GLUCOSE SERPL-MCNC: 209 MG/DL (ref 70–110)
GLUCOSE SERPL-MCNC: 223 MG/DL (ref 70–110)
GLUCOSE SERPL-MCNC: 231 MG/DL (ref 70–110)
GLUCOSE SERPL-MCNC: 234 MG/DL (ref 70–110)
GLUCOSE SERPL-MCNC: 246 MG/DL (ref 70–110)
GLUCOSE SERPL-MCNC: 248 MG/DL (ref 70–110)
GLUCOSE SERPL-MCNC: 250 MG/DL (ref 70–110)
GLUCOSE SERPL-MCNC: 261 MG/DL (ref 70–110)
GLUCOSE SERPL-MCNC: 262 MG/DL (ref 70–110)
GLUCOSE SERPL-MCNC: 273 MG/DL (ref 70–110)
GLUCOSE SERPL-MCNC: 276 MG/DL (ref 70–110)
GLUCOSE SERPL-MCNC: 293 MG/DL (ref 70–110)
GLUCOSE SERPL-MCNC: 297 MG/DL (ref 70–110)
GLUCOSE SERPL-MCNC: 307 MG/DL (ref 70–110)
GLUCOSE SERPL-MCNC: 316 MG/DL (ref 70–110)
GLUCOSE SERPL-MCNC: 330 MG/DL (ref 70–110)
GLUCOSE SERPL-MCNC: 331 MG/DL (ref 70–110)
HCT VFR BLD AUTO: 39.6 % (ref 40–54)
HCT VFR BLD AUTO: 39.6 % (ref 40–54)
HGB BLD-MCNC: 12 G/DL (ref 14–18)
HGB BLD-MCNC: 12 G/DL (ref 14–18)
IMM GRANULOCYTES # BLD AUTO: 0.22 K/UL (ref 0–0.04)
IMM GRANULOCYTES # BLD AUTO: 0.22 K/UL (ref 0–0.04)
IMM GRANULOCYTES NFR BLD AUTO: 1.6 % (ref 0–0.5)
IMM GRANULOCYTES NFR BLD AUTO: 1.6 % (ref 0–0.5)
LYMPHOCYTES # BLD AUTO: 2.4 K/UL (ref 1–4.8)
LYMPHOCYTES # BLD AUTO: 2.4 K/UL (ref 1–4.8)
LYMPHOCYTES NFR BLD: 17.2 % (ref 18–48)
LYMPHOCYTES NFR BLD: 17.2 % (ref 18–48)
MAGNESIUM SERPL-MCNC: 1.9 MG/DL (ref 1.6–2.6)
MCH RBC QN AUTO: 28 PG (ref 27–31)
MCH RBC QN AUTO: 28 PG (ref 27–31)
MCHC RBC AUTO-ENTMCNC: 30.3 G/DL (ref 32–36)
MCHC RBC AUTO-ENTMCNC: 30.3 G/DL (ref 32–36)
MCV RBC AUTO: 93 FL (ref 82–98)
MCV RBC AUTO: 93 FL (ref 82–98)
MONOCYTES # BLD AUTO: 1.3 K/UL (ref 0.3–1)
MONOCYTES # BLD AUTO: 1.3 K/UL (ref 0.3–1)
MONOCYTES NFR BLD: 9.3 % (ref 4–15)
MONOCYTES NFR BLD: 9.3 % (ref 4–15)
NEUTROPHILS # BLD AUTO: 9.6 K/UL (ref 1.8–7.7)
NEUTROPHILS # BLD AUTO: 9.6 K/UL (ref 1.8–7.7)
NEUTROPHILS NFR BLD: 69.5 % (ref 38–73)
NEUTROPHILS NFR BLD: 69.5 % (ref 38–73)
NRBC BLD-RTO: 0 /100 WBC
NRBC BLD-RTO: 0 /100 WBC
PLATELET # BLD AUTO: 464 K/UL (ref 150–450)
PLATELET # BLD AUTO: 464 K/UL (ref 150–450)
PMV BLD AUTO: 10 FL (ref 9.2–12.9)
PMV BLD AUTO: 10 FL (ref 9.2–12.9)
POTASSIUM SERPL-SCNC: 4.2 MMOL/L (ref 3.5–5.1)
POTASSIUM SERPL-SCNC: 4.2 MMOL/L (ref 3.5–5.1)
RBC # BLD AUTO: 4.28 M/UL (ref 4.6–6.2)
RBC # BLD AUTO: 4.28 M/UL (ref 4.6–6.2)
SODIUM SERPL-SCNC: 142 MMOL/L (ref 136–145)
SODIUM SERPL-SCNC: 142 MMOL/L (ref 136–145)
WBC # BLD AUTO: 13.8 K/UL (ref 3.9–12.7)
WBC # BLD AUTO: 13.8 K/UL (ref 3.9–12.7)

## 2021-10-18 PROCEDURE — 21400001 HC TELEMETRY ROOM

## 2021-10-18 PROCEDURE — 63600175 PHARM REV CODE 636 W HCPCS: Performed by: INTERNAL MEDICINE

## 2021-10-18 PROCEDURE — 25000003 PHARM REV CODE 250: Performed by: INTERNAL MEDICINE

## 2021-10-18 PROCEDURE — 36415 COLL VENOUS BLD VENIPUNCTURE: CPT | Performed by: NURSE PRACTITIONER

## 2021-10-18 PROCEDURE — 27000221 HC OXYGEN, UP TO 24 HOURS

## 2021-10-18 PROCEDURE — 94760 N-INVAS EAR/PLS OXIMETRY 1: CPT

## 2021-10-18 PROCEDURE — 94761 N-INVAS EAR/PLS OXIMETRY MLT: CPT

## 2021-10-18 PROCEDURE — 85025 COMPLETE CBC W/AUTO DIFF WBC: CPT | Performed by: NURSE PRACTITIONER

## 2021-10-18 PROCEDURE — 80048 BASIC METABOLIC PNL TOTAL CA: CPT | Performed by: NURSE PRACTITIONER

## 2021-10-18 PROCEDURE — 82962 GLUCOSE BLOOD TEST: CPT

## 2021-10-18 PROCEDURE — 83735 ASSAY OF MAGNESIUM: CPT | Performed by: NURSE PRACTITIONER

## 2021-10-18 PROCEDURE — 99900035 HC TECH TIME PER 15 MIN (STAT)

## 2021-10-18 PROCEDURE — 25000003 PHARM REV CODE 250: Performed by: PODIATRIST

## 2021-10-18 PROCEDURE — 94799 UNLISTED PULMONARY SVC/PX: CPT

## 2021-10-18 PROCEDURE — 25000003 PHARM REV CODE 250: Performed by: NURSE PRACTITIONER

## 2021-10-18 RX ADMIN — SENNOSIDES AND DOCUSATE SODIUM 1 TABLET: 8.6; 5 TABLET ORAL at 08:10

## 2021-10-18 RX ADMIN — HUMAN INSULIN 4 UNITS: 100 INJECTION, SOLUTION SUBCUTANEOUS at 08:10

## 2021-10-18 RX ADMIN — AMIODARONE HYDROCHLORIDE 200 MG: 200 TABLET ORAL at 09:10

## 2021-10-18 RX ADMIN — ALPRAZOLAM 0.5 MG: 0.5 TABLET ORAL at 11:10

## 2021-10-18 RX ADMIN — TAMSULOSIN HYDROCHLORIDE 0.4 MG: 0.4 CAPSULE ORAL at 08:10

## 2021-10-18 RX ADMIN — INSULIN DETEMIR 30 UNITS: 100 INJECTION, SOLUTION SUBCUTANEOUS at 08:10

## 2021-10-18 RX ADMIN — ENOXAPARIN SODIUM 110 MG: 60 INJECTION SUBCUTANEOUS at 04:10

## 2021-10-18 RX ADMIN — HYDROCODONE BITARTRATE AND ACETAMINOPHEN 1 TABLET: 5; 325 TABLET ORAL at 08:10

## 2021-10-18 RX ADMIN — MICONAZOLE NITRATE: 2 POWDER TOPICAL at 08:10

## 2021-10-18 RX ADMIN — HUMAN INSULIN 2 UNITS: 100 INJECTION, SOLUTION SUBCUTANEOUS at 07:10

## 2021-10-18 RX ADMIN — CEFAZOLIN SODIUM 2 G: 2 SOLUTION INTRAVENOUS at 03:10

## 2021-10-18 RX ADMIN — HUMAN INSULIN 4 UNITS: 100 INJECTION, SOLUTION SUBCUTANEOUS at 01:10

## 2021-10-18 RX ADMIN — MICONAZOLE NITRATE: 2 POWDER TOPICAL at 09:10

## 2021-10-18 RX ADMIN — CEFAZOLIN SODIUM 2 G: 2 SOLUTION INTRAVENOUS at 11:10

## 2021-10-18 RX ADMIN — GABAPENTIN 300 MG: 300 CAPSULE ORAL at 08:10

## 2021-10-18 RX ADMIN — ATORVASTATIN CALCIUM 20 MG: 20 TABLET, FILM COATED ORAL at 08:10

## 2021-10-18 RX ADMIN — CEFAZOLIN SODIUM 2 G: 2 SOLUTION INTRAVENOUS at 07:10

## 2021-10-18 RX ADMIN — HYDROCODONE BITARTRATE AND ACETAMINOPHEN 1 TABLET: 5; 325 TABLET ORAL at 04:10

## 2021-10-18 RX ADMIN — HYDROCODONE BITARTRATE AND ACETAMINOPHEN 1 TABLET: 10; 325 TABLET ORAL at 01:10

## 2021-10-18 RX ADMIN — TRAZODONE HYDROCHLORIDE 150 MG: 50 TABLET ORAL at 08:10

## 2021-10-18 RX ADMIN — AMIODARONE HYDROCHLORIDE 200 MG: 200 TABLET ORAL at 08:10

## 2021-10-18 RX ADMIN — HYDROCODONE BITARTRATE AND ACETAMINOPHEN 1 TABLET: 10; 325 TABLET ORAL at 09:10

## 2021-10-18 RX ADMIN — HUMAN INSULIN 6 UNITS: 100 INJECTION, SOLUTION SUBCUTANEOUS at 04:10

## 2021-10-19 PROBLEM — B95.61 MSSA BACTEREMIA: Status: ACTIVE | Noted: 2021-10-19

## 2021-10-19 PROBLEM — L03.115 CELLULITIS OF RIGHT LOWER EXTREMITY: Status: RESOLVED | Noted: 2021-10-09 | Resolved: 2021-10-19

## 2021-10-19 PROBLEM — R78.81 MSSA BACTEREMIA: Status: ACTIVE | Noted: 2021-10-19

## 2021-10-19 LAB
ANION GAP SERPL CALC-SCNC: 10 MMOL/L (ref 8–16)
ANION GAP SERPL CALC-SCNC: 10 MMOL/L (ref 8–16)
BACTERIA #/AREA URNS HPF: NEGATIVE /HPF
BASOPHILS # BLD AUTO: 0.09 K/UL (ref 0–0.2)
BASOPHILS # BLD AUTO: 0.09 K/UL (ref 0–0.2)
BASOPHILS NFR BLD: 0.7 % (ref 0–1.9)
BASOPHILS NFR BLD: 0.7 % (ref 0–1.9)
BILIRUB UR QL STRIP: ABNORMAL
BUN SERPL-MCNC: 15 MG/DL (ref 8–23)
BUN SERPL-MCNC: 15 MG/DL (ref 8–23)
CALCIUM SERPL-MCNC: 9.3 MG/DL (ref 8.7–10.5)
CALCIUM SERPL-MCNC: 9.3 MG/DL (ref 8.7–10.5)
CHLORIDE SERPL-SCNC: 101 MMOL/L (ref 95–110)
CHLORIDE SERPL-SCNC: 101 MMOL/L (ref 95–110)
CLARITY UR: CLEAR
CO2 SERPL-SCNC: 30 MMOL/L (ref 23–29)
CO2 SERPL-SCNC: 30 MMOL/L (ref 23–29)
COLOR UR: YELLOW
CREAT SERPL-MCNC: 0.7 MG/DL (ref 0.5–1.4)
CREAT SERPL-MCNC: 0.7 MG/DL (ref 0.5–1.4)
CRP SERPL-MCNC: 7.98 MG/DL
DIFFERENTIAL METHOD: ABNORMAL
DIFFERENTIAL METHOD: ABNORMAL
EOSINOPHIL # BLD AUTO: 0.3 K/UL (ref 0–0.5)
EOSINOPHIL # BLD AUTO: 0.3 K/UL (ref 0–0.5)
EOSINOPHIL NFR BLD: 2.3 % (ref 0–8)
EOSINOPHIL NFR BLD: 2.3 % (ref 0–8)
ERYTHROCYTE [DISTWIDTH] IN BLOOD BY AUTOMATED COUNT: 13.2 % (ref 11.5–14.5)
ERYTHROCYTE [DISTWIDTH] IN BLOOD BY AUTOMATED COUNT: 13.2 % (ref 11.5–14.5)
EST. GFR  (AFRICAN AMERICAN): >60 ML/MIN/1.73 M^2
EST. GFR  (AFRICAN AMERICAN): >60 ML/MIN/1.73 M^2
EST. GFR  (NON AFRICAN AMERICAN): >60 ML/MIN/1.73 M^2
EST. GFR  (NON AFRICAN AMERICAN): >60 ML/MIN/1.73 M^2
GLUCOSE SERPL-MCNC: 214 MG/DL (ref 70–110)
GLUCOSE SERPL-MCNC: 234 MG/DL (ref 70–110)
GLUCOSE SERPL-MCNC: 234 MG/DL (ref 70–110)
GLUCOSE SERPL-MCNC: 242 MG/DL (ref 70–110)
GLUCOSE SERPL-MCNC: 247 MG/DL (ref 70–110)
GLUCOSE SERPL-MCNC: 269 MG/DL (ref 70–110)
GLUCOSE SERPL-MCNC: 277 MG/DL (ref 70–110)
GLUCOSE UR QL STRIP: ABNORMAL
HCT VFR BLD AUTO: 36.7 % (ref 40–54)
HCT VFR BLD AUTO: 36.7 % (ref 40–54)
HGB BLD-MCNC: 11.3 G/DL (ref 14–18)
HGB BLD-MCNC: 11.3 G/DL (ref 14–18)
HGB UR QL STRIP: ABNORMAL
HYALINE CASTS #/AREA URNS LPF: 29 /LPF
IMM GRANULOCYTES # BLD AUTO: 0.19 K/UL (ref 0–0.04)
IMM GRANULOCYTES # BLD AUTO: 0.19 K/UL (ref 0–0.04)
IMM GRANULOCYTES NFR BLD AUTO: 1.4 % (ref 0–0.5)
IMM GRANULOCYTES NFR BLD AUTO: 1.4 % (ref 0–0.5)
KETONES UR QL STRIP: ABNORMAL
LEUKOCYTE ESTERASE UR QL STRIP: NEGATIVE
LYMPHOCYTES # BLD AUTO: 2.4 K/UL (ref 1–4.8)
LYMPHOCYTES # BLD AUTO: 2.4 K/UL (ref 1–4.8)
LYMPHOCYTES NFR BLD: 17.2 % (ref 18–48)
LYMPHOCYTES NFR BLD: 17.2 % (ref 18–48)
MAGNESIUM SERPL-MCNC: 1.8 MG/DL (ref 1.6–2.6)
MCH RBC QN AUTO: 28.6 PG (ref 27–31)
MCH RBC QN AUTO: 28.6 PG (ref 27–31)
MCHC RBC AUTO-ENTMCNC: 30.8 G/DL (ref 32–36)
MCHC RBC AUTO-ENTMCNC: 30.8 G/DL (ref 32–36)
MCV RBC AUTO: 93 FL (ref 82–98)
MCV RBC AUTO: 93 FL (ref 82–98)
MICROSCOPIC COMMENT: ABNORMAL
MONOCYTES # BLD AUTO: 1.3 K/UL (ref 0.3–1)
MONOCYTES # BLD AUTO: 1.3 K/UL (ref 0.3–1)
MONOCYTES NFR BLD: 9.1 % (ref 4–15)
MONOCYTES NFR BLD: 9.1 % (ref 4–15)
NEUTROPHILS # BLD AUTO: 9.6 K/UL (ref 1.8–7.7)
NEUTROPHILS # BLD AUTO: 9.6 K/UL (ref 1.8–7.7)
NEUTROPHILS NFR BLD: 69.3 % (ref 38–73)
NEUTROPHILS NFR BLD: 69.3 % (ref 38–73)
NITRITE UR QL STRIP: NEGATIVE
NRBC BLD-RTO: 0 /100 WBC
NRBC BLD-RTO: 0 /100 WBC
PH UR STRIP: 6 [PH] (ref 5–8)
PLATELET # BLD AUTO: 476 K/UL (ref 150–450)
PLATELET # BLD AUTO: 476 K/UL (ref 150–450)
PMV BLD AUTO: 9.1 FL (ref 9.2–12.9)
PMV BLD AUTO: 9.1 FL (ref 9.2–12.9)
POTASSIUM SERPL-SCNC: 3.8 MMOL/L (ref 3.5–5.1)
POTASSIUM SERPL-SCNC: 3.8 MMOL/L (ref 3.5–5.1)
PROCALCITONIN SERPL IA-MCNC: 0.33 NG/ML (ref 0–0.5)
PROT UR QL STRIP: ABNORMAL
RBC # BLD AUTO: 3.95 M/UL (ref 4.6–6.2)
RBC # BLD AUTO: 3.95 M/UL (ref 4.6–6.2)
RBC #/AREA URNS HPF: >100 /HPF (ref 0–4)
SODIUM SERPL-SCNC: 141 MMOL/L (ref 136–145)
SODIUM SERPL-SCNC: 141 MMOL/L (ref 136–145)
SP GR UR STRIP: 1.02 (ref 1–1.03)
SQUAMOUS #/AREA URNS HPF: 3 /HPF
URN SPEC COLLECT METH UR: ABNORMAL
UROBILINOGEN UR STRIP-ACNC: NEGATIVE EU/DL
WBC # BLD AUTO: 13.8 K/UL (ref 3.9–12.7)
WBC # BLD AUTO: 13.8 K/UL (ref 3.9–12.7)
WBC #/AREA URNS HPF: 8 /HPF (ref 0–5)

## 2021-10-19 PROCEDURE — 63600175 PHARM REV CODE 636 W HCPCS: Performed by: INTERNAL MEDICINE

## 2021-10-19 PROCEDURE — 87086 URINE CULTURE/COLONY COUNT: CPT | Performed by: INTERNAL MEDICINE

## 2021-10-19 PROCEDURE — 30200315 PPD INTRADERMAL TEST REV CODE 302: Performed by: INTERNAL MEDICINE

## 2021-10-19 PROCEDURE — 99900035 HC TECH TIME PER 15 MIN (STAT)

## 2021-10-19 PROCEDURE — 86140 C-REACTIVE PROTEIN: CPT | Performed by: INTERNAL MEDICINE

## 2021-10-19 PROCEDURE — 81001 URINALYSIS AUTO W/SCOPE: CPT | Performed by: INTERNAL MEDICINE

## 2021-10-19 PROCEDURE — 94799 UNLISTED PULMONARY SVC/PX: CPT

## 2021-10-19 PROCEDURE — 25000003 PHARM REV CODE 250: Performed by: INTERNAL MEDICINE

## 2021-10-19 PROCEDURE — 80048 BASIC METABOLIC PNL TOTAL CA: CPT | Performed by: NURSE PRACTITIONER

## 2021-10-19 PROCEDURE — 25000003 PHARM REV CODE 250: Performed by: NURSE PRACTITIONER

## 2021-10-19 PROCEDURE — C9399 UNCLASSIFIED DRUGS OR BIOLOG: HCPCS | Performed by: INTERNAL MEDICINE

## 2021-10-19 PROCEDURE — 21400001 HC TELEMETRY ROOM

## 2021-10-19 PROCEDURE — 85025 COMPLETE CBC W/AUTO DIFF WBC: CPT | Performed by: NURSE PRACTITIONER

## 2021-10-19 PROCEDURE — 84145 PROCALCITONIN (PCT): CPT | Performed by: INTERNAL MEDICINE

## 2021-10-19 PROCEDURE — 86580 TB INTRADERMAL TEST: CPT | Performed by: INTERNAL MEDICINE

## 2021-10-19 PROCEDURE — 83735 ASSAY OF MAGNESIUM: CPT | Performed by: NURSE PRACTITIONER

## 2021-10-19 PROCEDURE — 36415 COLL VENOUS BLD VENIPUNCTURE: CPT | Performed by: INTERNAL MEDICINE

## 2021-10-19 PROCEDURE — 25000003 PHARM REV CODE 250: Performed by: PODIATRIST

## 2021-10-19 RX ORDER — TRAZODONE HYDROCHLORIDE 50 MG/1
50 TABLET ORAL NIGHTLY
Status: DISCONTINUED | OUTPATIENT
Start: 2021-10-19 | End: 2021-10-21 | Stop reason: HOSPADM

## 2021-10-19 RX ORDER — LORAZEPAM 2 MG/ML
0.25 INJECTION INTRAMUSCULAR ONCE
Status: COMPLETED | OUTPATIENT
Start: 2021-10-19 | End: 2021-10-19

## 2021-10-19 RX ORDER — FUROSEMIDE 10 MG/ML
20 INJECTION INTRAMUSCULAR; INTRAVENOUS ONCE
Status: COMPLETED | OUTPATIENT
Start: 2021-10-19 | End: 2021-10-19

## 2021-10-19 RX ORDER — GABAPENTIN 100 MG/1
100 CAPSULE ORAL 2 TIMES DAILY
Status: DISCONTINUED | OUTPATIENT
Start: 2021-10-19 | End: 2021-10-21 | Stop reason: HOSPADM

## 2021-10-19 RX ADMIN — MICONAZOLE NITRATE: 2 POWDER TOPICAL at 08:10

## 2021-10-19 RX ADMIN — GABAPENTIN 300 MG: 300 CAPSULE ORAL at 08:10

## 2021-10-19 RX ADMIN — POLYETHYLENE GLYCOL 3350 17 G: 17 POWDER, FOR SOLUTION ORAL at 05:10

## 2021-10-19 RX ADMIN — TUBERCULIN PURIFIED PROTEIN DERIVATIVE 5 UNITS: 5 INJECTION, SOLUTION INTRADERMAL at 06:10

## 2021-10-19 RX ADMIN — HYDROCODONE BITARTRATE AND ACETAMINOPHEN 1 TABLET: 5; 325 TABLET ORAL at 05:10

## 2021-10-19 RX ADMIN — FUROSEMIDE 20 MG: 10 INJECTION, SOLUTION INTRAMUSCULAR; INTRAVENOUS at 08:10

## 2021-10-19 RX ADMIN — CEFAZOLIN SODIUM 2 G: 2 SOLUTION INTRAVENOUS at 04:10

## 2021-10-19 RX ADMIN — HYDROCODONE BITARTRATE AND ACETAMINOPHEN 1 TABLET: 5; 325 TABLET ORAL at 01:10

## 2021-10-19 RX ADMIN — CEFAZOLIN SODIUM 2 G: 2 SOLUTION INTRAVENOUS at 08:10

## 2021-10-19 RX ADMIN — AMIODARONE HYDROCHLORIDE 200 MG: 200 TABLET ORAL at 08:10

## 2021-10-19 RX ADMIN — HUMAN INSULIN 6 UNITS: 100 INJECTION, SOLUTION SUBCUTANEOUS at 05:10

## 2021-10-19 RX ADMIN — ENOXAPARIN SODIUM 110 MG: 60 INJECTION SUBCUTANEOUS at 04:10

## 2021-10-19 RX ADMIN — HUMAN INSULIN 4 UNITS: 100 INJECTION, SOLUTION SUBCUTANEOUS at 01:10

## 2021-10-19 RX ADMIN — HYDROCODONE BITARTRATE AND ACETAMINOPHEN 1 TABLET: 5; 325 TABLET ORAL at 08:10

## 2021-10-19 RX ADMIN — POTASSIUM BICARBONATE 50 MEQ: 977.5 TABLET, EFFERVESCENT ORAL at 05:10

## 2021-10-19 RX ADMIN — ALPRAZOLAM 0.5 MG: 0.5 TABLET ORAL at 12:10

## 2021-10-19 RX ADMIN — ACETAMINOPHEN 650 MG: 325 TABLET, FILM COATED ORAL at 07:10

## 2021-10-19 RX ADMIN — HUMAN INSULIN 4 UNITS: 100 INJECTION, SOLUTION SUBCUTANEOUS at 07:10

## 2021-10-19 RX ADMIN — ALPRAZOLAM 0.5 MG: 0.5 TABLET ORAL at 05:10

## 2021-10-19 RX ADMIN — GABAPENTIN 100 MG: 100 CAPSULE ORAL at 08:10

## 2021-10-19 RX ADMIN — INSULIN DETEMIR 30 UNITS: 100 INJECTION, SOLUTION SUBCUTANEOUS at 08:10

## 2021-10-19 RX ADMIN — ENOXAPARIN SODIUM 110 MG: 60 INJECTION SUBCUTANEOUS at 05:10

## 2021-10-19 RX ADMIN — TAMSULOSIN HYDROCHLORIDE 0.4 MG: 0.4 CAPSULE ORAL at 08:10

## 2021-10-19 RX ADMIN — TRAZODONE HYDROCHLORIDE 50 MG: 50 TABLET ORAL at 08:10

## 2021-10-19 RX ADMIN — ATORVASTATIN CALCIUM 20 MG: 20 TABLET, FILM COATED ORAL at 08:10

## 2021-10-19 RX ADMIN — LORAZEPAM 0.25 MG: 2 INJECTION INTRAMUSCULAR; INTRAVENOUS at 07:10

## 2021-10-19 RX ADMIN — CEFAZOLIN SODIUM 2 G: 2 SOLUTION INTRAVENOUS at 09:10

## 2021-10-20 LAB
ANION GAP SERPL CALC-SCNC: 12 MMOL/L (ref 8–16)
ANION GAP SERPL CALC-SCNC: 12 MMOL/L (ref 8–16)
ANISOCYTOSIS BLD QL SMEAR: SLIGHT
ANISOCYTOSIS BLD QL SMEAR: SLIGHT
BASOPHILS # BLD AUTO: 0.09 K/UL (ref 0–0.2)
BASOPHILS # BLD AUTO: 0.09 K/UL (ref 0–0.2)
BASOPHILS NFR BLD: 0.3 % (ref 0–1.9)
BASOPHILS NFR BLD: 0.3 % (ref 0–1.9)
BUN SERPL-MCNC: 21 MG/DL (ref 8–23)
BUN SERPL-MCNC: 21 MG/DL (ref 8–23)
CALCIUM SERPL-MCNC: 9.5 MG/DL (ref 8.7–10.5)
CALCIUM SERPL-MCNC: 9.5 MG/DL (ref 8.7–10.5)
CHLORIDE SERPL-SCNC: 99 MMOL/L (ref 95–110)
CHLORIDE SERPL-SCNC: 99 MMOL/L (ref 95–110)
CO2 SERPL-SCNC: 29 MMOL/L (ref 23–29)
CO2 SERPL-SCNC: 29 MMOL/L (ref 23–29)
CREAT SERPL-MCNC: 1 MG/DL (ref 0.5–1.4)
CREAT SERPL-MCNC: 1 MG/DL (ref 0.5–1.4)
DIFFERENTIAL METHOD: ABNORMAL
DIFFERENTIAL METHOD: ABNORMAL
EOSINOPHIL # BLD AUTO: 0 K/UL (ref 0–0.5)
EOSINOPHIL # BLD AUTO: 0 K/UL (ref 0–0.5)
EOSINOPHIL NFR BLD: 0.1 % (ref 0–8)
EOSINOPHIL NFR BLD: 0.1 % (ref 0–8)
ERYTHROCYTE [DISTWIDTH] IN BLOOD BY AUTOMATED COUNT: 13.3 % (ref 11.5–14.5)
ERYTHROCYTE [DISTWIDTH] IN BLOOD BY AUTOMATED COUNT: 13.3 % (ref 11.5–14.5)
EST. GFR  (AFRICAN AMERICAN): >60 ML/MIN/1.73 M^2
EST. GFR  (AFRICAN AMERICAN): >60 ML/MIN/1.73 M^2
EST. GFR  (NON AFRICAN AMERICAN): >60 ML/MIN/1.73 M^2
EST. GFR  (NON AFRICAN AMERICAN): >60 ML/MIN/1.73 M^2
GLUCOSE SERPL-MCNC: 230 MG/DL (ref 70–110)
GLUCOSE SERPL-MCNC: 244 MG/DL (ref 70–110)
GLUCOSE SERPL-MCNC: 265 MG/DL (ref 70–110)
GLUCOSE SERPL-MCNC: 265 MG/DL (ref 70–110)
GLUCOSE SERPL-MCNC: 280 MG/DL (ref 70–110)
GLUCOSE SERPL-MCNC: 301 MG/DL (ref 70–110)
HCT VFR BLD AUTO: 36.2 % (ref 40–54)
HCT VFR BLD AUTO: 36.2 % (ref 40–54)
HGB BLD-MCNC: 11.4 G/DL (ref 14–18)
HGB BLD-MCNC: 11.4 G/DL (ref 14–18)
IMM GRANULOCYTES # BLD AUTO: 0.51 K/UL (ref 0–0.04)
IMM GRANULOCYTES # BLD AUTO: 0.51 K/UL (ref 0–0.04)
IMM GRANULOCYTES NFR BLD AUTO: 1.6 % (ref 0–0.5)
IMM GRANULOCYTES NFR BLD AUTO: 1.6 % (ref 0–0.5)
LACTATE SERPL-SCNC: 1.6 MMOL/L (ref 0.5–1.9)
LYMPHOCYTES # BLD AUTO: 1.2 K/UL (ref 1–4.8)
LYMPHOCYTES # BLD AUTO: 1.2 K/UL (ref 1–4.8)
LYMPHOCYTES NFR BLD: 3.7 % (ref 18–48)
LYMPHOCYTES NFR BLD: 3.7 % (ref 18–48)
MAGNESIUM SERPL-MCNC: 1.7 MG/DL (ref 1.6–2.6)
MCH RBC QN AUTO: 28.6 PG (ref 27–31)
MCH RBC QN AUTO: 28.6 PG (ref 27–31)
MCHC RBC AUTO-ENTMCNC: 31.5 G/DL (ref 32–36)
MCHC RBC AUTO-ENTMCNC: 31.5 G/DL (ref 32–36)
MCV RBC AUTO: 91 FL (ref 82–98)
MCV RBC AUTO: 91 FL (ref 82–98)
MONOCYTES # BLD AUTO: 0.8 K/UL (ref 0.3–1)
MONOCYTES # BLD AUTO: 0.8 K/UL (ref 0.3–1)
MONOCYTES NFR BLD: 2.5 % (ref 4–15)
MONOCYTES NFR BLD: 2.5 % (ref 4–15)
NEUTROPHILS # BLD AUTO: 28.9 K/UL (ref 1.8–7.7)
NEUTROPHILS # BLD AUTO: 28.9 K/UL (ref 1.8–7.7)
NEUTROPHILS NFR BLD: 91.8 % (ref 38–73)
NEUTROPHILS NFR BLD: 91.8 % (ref 38–73)
NRBC BLD-RTO: 0 /100 WBC
NRBC BLD-RTO: 0 /100 WBC
PLATELET # BLD AUTO: 462 K/UL (ref 150–450)
PLATELET # BLD AUTO: 462 K/UL (ref 150–450)
PMV BLD AUTO: 9.1 FL (ref 9.2–12.9)
PMV BLD AUTO: 9.1 FL (ref 9.2–12.9)
POLYCHROMASIA BLD QL SMEAR: ABNORMAL
POLYCHROMASIA BLD QL SMEAR: ABNORMAL
POTASSIUM SERPL-SCNC: 4.2 MMOL/L (ref 3.5–5.1)
POTASSIUM SERPL-SCNC: 4.2 MMOL/L (ref 3.5–5.1)
PROCALCITONIN SERPL IA-MCNC: 45.52 NG/ML (ref 0–0.5)
RBC # BLD AUTO: 3.99 M/UL (ref 4.6–6.2)
RBC # BLD AUTO: 3.99 M/UL (ref 4.6–6.2)
SODIUM SERPL-SCNC: 140 MMOL/L (ref 136–145)
SODIUM SERPL-SCNC: 140 MMOL/L (ref 136–145)
WBC # BLD AUTO: 31.46 K/UL (ref 3.9–12.7)
WBC # BLD AUTO: 31.46 K/UL (ref 3.9–12.7)

## 2021-10-20 PROCEDURE — 99900035 HC TECH TIME PER 15 MIN (STAT)

## 2021-10-20 PROCEDURE — 94760 N-INVAS EAR/PLS OXIMETRY 1: CPT

## 2021-10-20 PROCEDURE — 83735 ASSAY OF MAGNESIUM: CPT | Performed by: NURSE PRACTITIONER

## 2021-10-20 PROCEDURE — 21400001 HC TELEMETRY ROOM

## 2021-10-20 PROCEDURE — 25000003 PHARM REV CODE 250: Performed by: INTERNAL MEDICINE

## 2021-10-20 PROCEDURE — 63600175 PHARM REV CODE 636 W HCPCS: Performed by: INTERNAL MEDICINE

## 2021-10-20 PROCEDURE — 83605 ASSAY OF LACTIC ACID: CPT | Performed by: INTERNAL MEDICINE

## 2021-10-20 PROCEDURE — 25000003 PHARM REV CODE 250: Performed by: NURSE PRACTITIONER

## 2021-10-20 PROCEDURE — 99232 PR SUBSEQUENT HOSPITAL CARE,LEVL II: ICD-10-PCS | Mod: ,,, | Performed by: INTERNAL MEDICINE

## 2021-10-20 PROCEDURE — 85025 COMPLETE CBC W/AUTO DIFF WBC: CPT | Performed by: NURSE PRACTITIONER

## 2021-10-20 PROCEDURE — 25000003 PHARM REV CODE 250: Performed by: PODIATRIST

## 2021-10-20 PROCEDURE — 99232 SBSQ HOSP IP/OBS MODERATE 35: CPT | Mod: ,,, | Performed by: INTERNAL MEDICINE

## 2021-10-20 PROCEDURE — 84145 PROCALCITONIN (PCT): CPT | Performed by: INTERNAL MEDICINE

## 2021-10-20 PROCEDURE — 36415 COLL VENOUS BLD VENIPUNCTURE: CPT | Performed by: INTERNAL MEDICINE

## 2021-10-20 PROCEDURE — 87040 BLOOD CULTURE FOR BACTERIA: CPT | Mod: 59 | Performed by: INTERNAL MEDICINE

## 2021-10-20 PROCEDURE — 27000221 HC OXYGEN, UP TO 24 HOURS

## 2021-10-20 PROCEDURE — 94799 UNLISTED PULMONARY SVC/PX: CPT

## 2021-10-20 PROCEDURE — 80048 BASIC METABOLIC PNL TOTAL CA: CPT | Performed by: NURSE PRACTITIONER

## 2021-10-20 RX ORDER — AMOXICILLIN 250 MG
1 CAPSULE ORAL DAILY
Status: DISCONTINUED | OUTPATIENT
Start: 2021-10-21 | End: 2021-10-21 | Stop reason: HOSPADM

## 2021-10-20 RX ADMIN — TRAZODONE HYDROCHLORIDE 50 MG: 50 TABLET ORAL at 08:10

## 2021-10-20 RX ADMIN — ACETAMINOPHEN 650 MG: 325 TABLET, FILM COATED ORAL at 02:10

## 2021-10-20 RX ADMIN — HUMAN INSULIN 8 UNITS: 100 INJECTION, SOLUTION SUBCUTANEOUS at 08:10

## 2021-10-20 RX ADMIN — PIPERACILLIN AND TAZOBACTAM 3.38 G: 3; .375 INJECTION, POWDER, LYOPHILIZED, FOR SOLUTION INTRAVENOUS; PARENTERAL at 10:10

## 2021-10-20 RX ADMIN — GABAPENTIN 100 MG: 100 CAPSULE ORAL at 08:10

## 2021-10-20 RX ADMIN — ACETAMINOPHEN 650 MG: 325 TABLET, FILM COATED ORAL at 06:10

## 2021-10-20 RX ADMIN — ENOXAPARIN SODIUM 110 MG: 60 INJECTION SUBCUTANEOUS at 06:10

## 2021-10-20 RX ADMIN — PIPERACILLIN AND TAZOBACTAM 3.38 G: 3; .375 INJECTION, POWDER, LYOPHILIZED, FOR SOLUTION INTRAVENOUS; PARENTERAL at 06:10

## 2021-10-20 RX ADMIN — SENNOSIDES AND DOCUSATE SODIUM 1 TABLET: 8.6; 5 TABLET ORAL at 10:10

## 2021-10-20 RX ADMIN — VANCOMYCIN HYDROCHLORIDE 2000 MG: 500 INJECTION, POWDER, LYOPHILIZED, FOR SOLUTION INTRAVENOUS at 02:10

## 2021-10-20 RX ADMIN — AMIODARONE HYDROCHLORIDE 200 MG: 200 TABLET ORAL at 08:10

## 2021-10-20 RX ADMIN — INSULIN DETEMIR 30 UNITS: 100 INJECTION, SOLUTION SUBCUTANEOUS at 10:10

## 2021-10-20 RX ADMIN — GABAPENTIN 100 MG: 100 CAPSULE ORAL at 10:10

## 2021-10-20 RX ADMIN — HYDROCODONE BITARTRATE AND ACETAMINOPHEN 1 TABLET: 5; 325 TABLET ORAL at 11:10

## 2021-10-20 RX ADMIN — ALPRAZOLAM 0.5 MG: 0.5 TABLET ORAL at 10:10

## 2021-10-20 RX ADMIN — CEFAZOLIN SODIUM 2 G: 2 SOLUTION INTRAVENOUS at 07:10

## 2021-10-20 RX ADMIN — HUMAN INSULIN 4 UNITS: 100 INJECTION, SOLUTION SUBCUTANEOUS at 10:10

## 2021-10-20 RX ADMIN — ENOXAPARIN SODIUM 110 MG: 60 INJECTION SUBCUTANEOUS at 05:10

## 2021-10-20 RX ADMIN — ATORVASTATIN CALCIUM 20 MG: 20 TABLET, FILM COATED ORAL at 08:10

## 2021-10-20 RX ADMIN — TAMSULOSIN HYDROCHLORIDE 0.4 MG: 0.4 CAPSULE ORAL at 10:10

## 2021-10-20 RX ADMIN — MICONAZOLE NITRATE: 2 POWDER TOPICAL at 10:10

## 2021-10-20 RX ADMIN — ACETAMINOPHEN 650 MG: 325 TABLET, FILM COATED ORAL at 07:10

## 2021-10-20 RX ADMIN — AMIODARONE HYDROCHLORIDE 200 MG: 200 TABLET ORAL at 10:10

## 2021-10-20 RX ADMIN — ALPRAZOLAM 0.5 MG: 0.5 TABLET ORAL at 06:10

## 2021-10-21 VITALS
SYSTOLIC BLOOD PRESSURE: 130 MMHG | WEIGHT: 234.81 LBS | RESPIRATION RATE: 18 BRPM | HEIGHT: 68 IN | TEMPERATURE: 98 F | HEART RATE: 84 BPM | OXYGEN SATURATION: 92 % | BODY MASS INDEX: 35.59 KG/M2 | DIASTOLIC BLOOD PRESSURE: 75 MMHG

## 2021-10-21 LAB
ANION GAP SERPL CALC-SCNC: 14 MMOL/L (ref 8–16)
ANION GAP SERPL CALC-SCNC: 14 MMOL/L (ref 8–16)
BASOPHILS # BLD AUTO: 0.05 K/UL (ref 0–0.2)
BASOPHILS # BLD AUTO: 0.05 K/UL (ref 0–0.2)
BASOPHILS NFR BLD: 0.3 % (ref 0–1.9)
BASOPHILS NFR BLD: 0.3 % (ref 0–1.9)
BUN SERPL-MCNC: 13 MG/DL (ref 8–23)
BUN SERPL-MCNC: 13 MG/DL (ref 8–23)
CALCIUM SERPL-MCNC: 9.4 MG/DL (ref 8.7–10.5)
CALCIUM SERPL-MCNC: 9.4 MG/DL (ref 8.7–10.5)
CHLORIDE SERPL-SCNC: 99 MMOL/L (ref 95–110)
CHLORIDE SERPL-SCNC: 99 MMOL/L (ref 95–110)
CO2 SERPL-SCNC: 31 MMOL/L (ref 23–29)
CO2 SERPL-SCNC: 31 MMOL/L (ref 23–29)
CREAT SERPL-MCNC: 0.6 MG/DL (ref 0.5–1.4)
CREAT SERPL-MCNC: 0.6 MG/DL (ref 0.5–1.4)
DIFFERENTIAL METHOD: ABNORMAL
DIFFERENTIAL METHOD: ABNORMAL
EOSINOPHIL # BLD AUTO: 0.3 K/UL (ref 0–0.5)
EOSINOPHIL # BLD AUTO: 0.3 K/UL (ref 0–0.5)
EOSINOPHIL NFR BLD: 1.5 % (ref 0–8)
EOSINOPHIL NFR BLD: 1.5 % (ref 0–8)
ERYTHROCYTE [DISTWIDTH] IN BLOOD BY AUTOMATED COUNT: 13.3 % (ref 11.5–14.5)
ERYTHROCYTE [DISTWIDTH] IN BLOOD BY AUTOMATED COUNT: 13.3 % (ref 11.5–14.5)
EST. GFR  (AFRICAN AMERICAN): >60 ML/MIN/1.73 M^2
EST. GFR  (AFRICAN AMERICAN): >60 ML/MIN/1.73 M^2
EST. GFR  (NON AFRICAN AMERICAN): >60 ML/MIN/1.73 M^2
EST. GFR  (NON AFRICAN AMERICAN): >60 ML/MIN/1.73 M^2
GLUCOSE SERPL-MCNC: 202 MG/DL (ref 70–110)
GLUCOSE SERPL-MCNC: 202 MG/DL (ref 70–110)
GLUCOSE SERPL-MCNC: 210 MG/DL (ref 70–110)
GLUCOSE SERPL-MCNC: 227 MG/DL (ref 70–110)
GLUCOSE SERPL-MCNC: 231 MG/DL (ref 70–110)
HCT VFR BLD AUTO: 35.6 % (ref 40–54)
HCT VFR BLD AUTO: 35.6 % (ref 40–54)
HGB BLD-MCNC: 11.2 G/DL (ref 14–18)
HGB BLD-MCNC: 11.2 G/DL (ref 14–18)
IMM GRANULOCYTES # BLD AUTO: 0.13 K/UL (ref 0–0.04)
IMM GRANULOCYTES # BLD AUTO: 0.13 K/UL (ref 0–0.04)
IMM GRANULOCYTES NFR BLD AUTO: 0.8 % (ref 0–0.5)
IMM GRANULOCYTES NFR BLD AUTO: 0.8 % (ref 0–0.5)
LYMPHOCYTES # BLD AUTO: 2.3 K/UL (ref 1–4.8)
LYMPHOCYTES # BLD AUTO: 2.3 K/UL (ref 1–4.8)
LYMPHOCYTES NFR BLD: 13.9 % (ref 18–48)
LYMPHOCYTES NFR BLD: 13.9 % (ref 18–48)
MAGNESIUM SERPL-MCNC: 1.9 MG/DL (ref 1.6–2.6)
MCH RBC QN AUTO: 28.6 PG (ref 27–31)
MCH RBC QN AUTO: 28.6 PG (ref 27–31)
MCHC RBC AUTO-ENTMCNC: 31.5 G/DL (ref 32–36)
MCHC RBC AUTO-ENTMCNC: 31.5 G/DL (ref 32–36)
MCV RBC AUTO: 91 FL (ref 82–98)
MCV RBC AUTO: 91 FL (ref 82–98)
MONOCYTES # BLD AUTO: 1 K/UL (ref 0.3–1)
MONOCYTES # BLD AUTO: 1 K/UL (ref 0.3–1)
MONOCYTES NFR BLD: 5.8 % (ref 4–15)
MONOCYTES NFR BLD: 5.8 % (ref 4–15)
NEUTROPHILS # BLD AUTO: 12.9 K/UL (ref 1.8–7.7)
NEUTROPHILS # BLD AUTO: 12.9 K/UL (ref 1.8–7.7)
NEUTROPHILS NFR BLD: 77.7 % (ref 38–73)
NEUTROPHILS NFR BLD: 77.7 % (ref 38–73)
NRBC BLD-RTO: 0 /100 WBC
NRBC BLD-RTO: 0 /100 WBC
PLATELET # BLD AUTO: 441 K/UL (ref 150–450)
PLATELET # BLD AUTO: 441 K/UL (ref 150–450)
PMV BLD AUTO: 9.3 FL (ref 9.2–12.9)
PMV BLD AUTO: 9.3 FL (ref 9.2–12.9)
POTASSIUM SERPL-SCNC: 3.6 MMOL/L (ref 3.5–5.1)
POTASSIUM SERPL-SCNC: 3.6 MMOL/L (ref 3.5–5.1)
PROCALCITONIN SERPL IA-MCNC: 25.96 NG/ML (ref 0–0.5)
RBC # BLD AUTO: 3.91 M/UL (ref 4.6–6.2)
RBC # BLD AUTO: 3.91 M/UL (ref 4.6–6.2)
SODIUM SERPL-SCNC: 144 MMOL/L (ref 136–145)
SODIUM SERPL-SCNC: 144 MMOL/L (ref 136–145)
WBC # BLD AUTO: 16.63 K/UL (ref 3.9–12.7)
WBC # BLD AUTO: 16.63 K/UL (ref 3.9–12.7)

## 2021-10-21 PROCEDURE — 85025 COMPLETE CBC W/AUTO DIFF WBC: CPT | Performed by: NURSE PRACTITIONER

## 2021-10-21 PROCEDURE — 80048 BASIC METABOLIC PNL TOTAL CA: CPT | Performed by: NURSE PRACTITIONER

## 2021-10-21 PROCEDURE — 25000003 PHARM REV CODE 250: Performed by: INTERNAL MEDICINE

## 2021-10-21 PROCEDURE — 84145 PROCALCITONIN (PCT): CPT | Performed by: NURSE PRACTITIONER

## 2021-10-21 PROCEDURE — 25000003 PHARM REV CODE 250: Performed by: NURSE PRACTITIONER

## 2021-10-21 PROCEDURE — 25000003 PHARM REV CODE 250: Performed by: PODIATRIST

## 2021-10-21 PROCEDURE — 63600175 PHARM REV CODE 636 W HCPCS: Performed by: INTERNAL MEDICINE

## 2021-10-21 PROCEDURE — 83735 ASSAY OF MAGNESIUM: CPT | Performed by: NURSE PRACTITIONER

## 2021-10-21 PROCEDURE — 36415 COLL VENOUS BLD VENIPUNCTURE: CPT | Performed by: NURSE PRACTITIONER

## 2021-10-21 RX ORDER — AMIODARONE HYDROCHLORIDE 200 MG/1
TABLET ORAL
Qty: 88 TABLET | Refills: 0 | Status: ON HOLD | OUTPATIENT
Start: 2021-10-21 | End: 2021-12-07 | Stop reason: HOSPADM

## 2021-10-21 RX ORDER — CEFUROXIME AXETIL 500 MG/1
500 TABLET ORAL EVERY 12 HOURS
Qty: 28 TABLET | Refills: 0 | Status: ON HOLD | OUTPATIENT
Start: 2021-10-21 | End: 2021-11-19 | Stop reason: HOSPADM

## 2021-10-21 RX ADMIN — HYDROCODONE BITARTRATE AND ACETAMINOPHEN 1 TABLET: 5; 325 TABLET ORAL at 04:10

## 2021-10-21 RX ADMIN — ENOXAPARIN SODIUM 110 MG: 60 INJECTION SUBCUTANEOUS at 04:10

## 2021-10-21 RX ADMIN — MICONAZOLE NITRATE: 2 POWDER TOPICAL at 08:10

## 2021-10-21 RX ADMIN — VANCOMYCIN HYDROCHLORIDE 1750 MG: 500 INJECTION, POWDER, LYOPHILIZED, FOR SOLUTION INTRAVENOUS at 02:10

## 2021-10-21 RX ADMIN — TAMSULOSIN HYDROCHLORIDE 0.4 MG: 0.4 CAPSULE ORAL at 08:10

## 2021-10-21 RX ADMIN — AMIODARONE HYDROCHLORIDE 200 MG: 200 TABLET ORAL at 08:10

## 2021-10-21 RX ADMIN — PIPERACILLIN AND TAZOBACTAM 3.38 G: 3; .375 INJECTION, POWDER, LYOPHILIZED, FOR SOLUTION INTRAVENOUS; PARENTERAL at 03:10

## 2021-10-21 RX ADMIN — ALPRAZOLAM 0.5 MG: 0.5 TABLET ORAL at 07:10

## 2021-10-21 RX ADMIN — GABAPENTIN 100 MG: 100 CAPSULE ORAL at 08:10

## 2021-10-21 RX ADMIN — PIPERACILLIN AND TAZOBACTAM 3.38 G: 3; .375 INJECTION, POWDER, LYOPHILIZED, FOR SOLUTION INTRAVENOUS; PARENTERAL at 08:10

## 2021-10-22 LAB — BACTERIA UR CULT: NO GROWTH

## 2021-10-25 LAB
BACTERIA BLD CULT: NORMAL
BACTERIA BLD CULT: NORMAL

## 2021-11-05 ENCOUNTER — HOSPITAL ENCOUNTER (INPATIENT)
Facility: HOSPITAL | Age: 61
LOS: 14 days | Discharge: HOME HEALTH | DRG: 480 | End: 2021-11-19
Attending: EMERGENCY MEDICINE | Admitting: STUDENT IN AN ORGANIZED HEALTH CARE EDUCATION/TRAINING PROGRAM
Payer: MEDICAID

## 2021-11-05 DIAGNOSIS — R09.02 HYPOXIA: ICD-10-CM

## 2021-11-05 DIAGNOSIS — S72.001D CLOSED FRACTURE OF RIGHT HIP WITH ROUTINE HEALING: ICD-10-CM

## 2021-11-05 DIAGNOSIS — M79.606 LEG PAIN: ICD-10-CM

## 2021-11-05 DIAGNOSIS — L97.509 FOOT ULCER: ICD-10-CM

## 2021-11-05 DIAGNOSIS — J81.0 ACUTE PULMONARY EDEMA: Primary | ICD-10-CM

## 2021-11-05 DIAGNOSIS — M25.559 HIP PAIN: ICD-10-CM

## 2021-11-05 DIAGNOSIS — R57.9 SHOCK: ICD-10-CM

## 2021-11-05 LAB
ALBUMIN SERPL BCP-MCNC: 3.4 G/DL (ref 3.5–5.2)
ALLENS TEST: ABNORMAL
ALP SERPL-CCNC: 101 U/L (ref 55–135)
ALT SERPL W/O P-5'-P-CCNC: 13 U/L (ref 10–44)
ANION GAP SERPL CALC-SCNC: 15 MMOL/L (ref 8–16)
APTT PPP: 27.2 SEC (ref 23.3–35.1)
AST SERPL-CCNC: 16 U/L (ref 10–40)
BACTERIA #/AREA URNS HPF: NEGATIVE /HPF
BASOPHILS # BLD AUTO: 0.11 K/UL (ref 0–0.2)
BASOPHILS NFR BLD: 0.7 % (ref 0–1.9)
BILIRUB SERPL-MCNC: 0.8 MG/DL (ref 0.1–1)
BILIRUB UR QL STRIP: NEGATIVE
BNP SERPL-MCNC: 121 PG/ML (ref 0–99)
BUN SERPL-MCNC: 9 MG/DL (ref 8–23)
CALCIUM SERPL-MCNC: 9.2 MG/DL (ref 8.7–10.5)
CHLORIDE SERPL-SCNC: 99 MMOL/L (ref 95–110)
CK MB SERPL-MCNC: 0.8 NG/ML (ref 0.1–6.5)
CK SERPL-CCNC: 26 U/L (ref 20–200)
CLARITY UR: CLEAR
CO2 SERPL-SCNC: 24 MMOL/L (ref 23–29)
COLOR UR: YELLOW
CREAT SERPL-MCNC: 0.7 MG/DL (ref 0.5–1.4)
DELSYS: ABNORMAL
DIFFERENTIAL METHOD: ABNORMAL
EOSINOPHIL # BLD AUTO: 0.3 K/UL (ref 0–0.5)
EOSINOPHIL NFR BLD: 1.9 % (ref 0–8)
ERYTHROCYTE [DISTWIDTH] IN BLOOD BY AUTOMATED COUNT: 14.3 % (ref 11.5–14.5)
EST. GFR  (AFRICAN AMERICAN): >60 ML/MIN/1.73 M^2
EST. GFR  (NON AFRICAN AMERICAN): >60 ML/MIN/1.73 M^2
FIO2: 34
FLOW: 3.5
GLUCOSE SERPL-MCNC: 275 MG/DL (ref 70–110)
GLUCOSE SERPL-MCNC: 281 MG/DL (ref 70–110)
GLUCOSE UR QL STRIP: ABNORMAL
HCO3 UR-SCNC: 26.8 MMOL/L (ref 24–28)
HCT VFR BLD AUTO: 43.1 % (ref 40–54)
HGB BLD-MCNC: 13.2 G/DL (ref 14–18)
HGB UR QL STRIP: NEGATIVE
HYALINE CASTS #/AREA URNS LPF: 2 /LPF
IMM GRANULOCYTES # BLD AUTO: 0.13 K/UL (ref 0–0.04)
IMM GRANULOCYTES NFR BLD AUTO: 0.8 % (ref 0–0.5)
INR PPP: 1.2
KETONES UR QL STRIP: ABNORMAL
LACTATE SERPL-SCNC: 1.6 MMOL/L (ref 0.5–1.9)
LACTATE SERPL-SCNC: 2 MMOL/L (ref 0.5–1.9)
LEUKOCYTE ESTERASE UR QL STRIP: NEGATIVE
LYMPHOCYTES # BLD AUTO: 1.7 K/UL (ref 1–4.8)
LYMPHOCYTES NFR BLD: 10.6 % (ref 18–48)
MAGNESIUM SERPL-MCNC: 1.8 MG/DL (ref 1.6–2.6)
MCH RBC QN AUTO: 27.6 PG (ref 27–31)
MCHC RBC AUTO-ENTMCNC: 30.6 G/DL (ref 32–36)
MCV RBC AUTO: 90 FL (ref 82–98)
MICROSCOPIC COMMENT: ABNORMAL
MODE: ABNORMAL
MONOCYTES # BLD AUTO: 1.2 K/UL (ref 0.3–1)
MONOCYTES NFR BLD: 8 % (ref 4–15)
NEUTROPHILS # BLD AUTO: 12.2 K/UL (ref 1.8–7.7)
NEUTROPHILS NFR BLD: 78 % (ref 38–73)
NITRITE UR QL STRIP: NEGATIVE
NRBC BLD-RTO: 0 /100 WBC
PCO2 BLDA: 40.4 MMHG (ref 35–45)
PH SMN: 7.43 [PH] (ref 7.35–7.45)
PH UR STRIP: 7 [PH] (ref 5–8)
PLATELET # BLD AUTO: 487 K/UL (ref 150–450)
PMV BLD AUTO: 9.5 FL (ref 9.2–12.9)
PO2 BLDA: 55 MMHG (ref 80–100)
POC BE: 2 MMOL/L
POC SATURATED O2: 89 % (ref 95–100)
POC TCO2: 28 MMOL/L (ref 23–27)
POTASSIUM SERPL-SCNC: 3.9 MMOL/L (ref 3.5–5.1)
PROCALCITONIN SERPL IA-MCNC: <0.05 NG/ML (ref 0–0.5)
PROT SERPL-MCNC: 8.3 G/DL (ref 6–8.4)
PROT UR QL STRIP: ABNORMAL
PROTHROMBIN TIME: 14.1 SEC (ref 11.4–13.7)
RBC # BLD AUTO: 4.78 M/UL (ref 4.6–6.2)
RBC #/AREA URNS HPF: 0 /HPF (ref 0–4)
SAMPLE: ABNORMAL
SARS-COV-2 RDRP RESP QL NAA+PROBE: NEGATIVE
SITE: ABNORMAL
SODIUM SERPL-SCNC: 138 MMOL/L (ref 136–145)
SP GR UR STRIP: >1.03 (ref 1–1.03)
SP02: 91
SQUAMOUS #/AREA URNS HPF: 0 /HPF
TROPONIN I SERPL DL<=0.01 NG/ML-MCNC: <0.03 NG/ML
URN SPEC COLLECT METH UR: ABNORMAL
UROBILINOGEN UR STRIP-ACNC: NEGATIVE EU/DL
WBC # BLD AUTO: 15.58 K/UL (ref 3.9–12.7)
WBC #/AREA URNS HPF: 1 /HPF (ref 0–5)
YEAST URNS QL MICRO: ABNORMAL

## 2021-11-05 PROCEDURE — 81001 URINALYSIS AUTO W/SCOPE: CPT | Performed by: EMERGENCY MEDICINE

## 2021-11-05 PROCEDURE — 82962 GLUCOSE BLOOD TEST: CPT

## 2021-11-05 PROCEDURE — 63600175 PHARM REV CODE 636 W HCPCS: Performed by: STUDENT IN AN ORGANIZED HEALTH CARE EDUCATION/TRAINING PROGRAM

## 2021-11-05 PROCEDURE — 94761 N-INVAS EAR/PLS OXIMETRY MLT: CPT

## 2021-11-05 PROCEDURE — 93010 ELECTROCARDIOGRAM REPORT: CPT | Mod: ,,, | Performed by: INTERNAL MEDICINE

## 2021-11-05 PROCEDURE — 82553 CREATINE MB FRACTION: CPT | Performed by: EMERGENCY MEDICINE

## 2021-11-05 PROCEDURE — 87040 BLOOD CULTURE FOR BACTERIA: CPT | Mod: 59 | Performed by: EMERGENCY MEDICINE

## 2021-11-05 PROCEDURE — 94640 AIRWAY INHALATION TREATMENT: CPT

## 2021-11-05 PROCEDURE — 85730 THROMBOPLASTIN TIME PARTIAL: CPT | Performed by: EMERGENCY MEDICINE

## 2021-11-05 PROCEDURE — 63600175 PHARM REV CODE 636 W HCPCS: Performed by: EMERGENCY MEDICINE

## 2021-11-05 PROCEDURE — 99900035 HC TECH TIME PER 15 MIN (STAT)

## 2021-11-05 PROCEDURE — 36415 COLL VENOUS BLD VENIPUNCTURE: CPT | Performed by: STUDENT IN AN ORGANIZED HEALTH CARE EDUCATION/TRAINING PROGRAM

## 2021-11-05 PROCEDURE — 85025 COMPLETE CBC W/AUTO DIFF WBC: CPT | Performed by: EMERGENCY MEDICINE

## 2021-11-05 PROCEDURE — 84484 ASSAY OF TROPONIN QUANT: CPT | Performed by: EMERGENCY MEDICINE

## 2021-11-05 PROCEDURE — 83605 ASSAY OF LACTIC ACID: CPT | Performed by: EMERGENCY MEDICINE

## 2021-11-05 PROCEDURE — 80053 COMPREHEN METABOLIC PANEL: CPT | Performed by: EMERGENCY MEDICINE

## 2021-11-05 PROCEDURE — 82550 ASSAY OF CK (CPK): CPT | Performed by: EMERGENCY MEDICINE

## 2021-11-05 PROCEDURE — 25000003 PHARM REV CODE 250: Performed by: EMERGENCY MEDICINE

## 2021-11-05 PROCEDURE — 99291 CRITICAL CARE FIRST HOUR: CPT

## 2021-11-05 PROCEDURE — 96375 TX/PRO/DX INJ NEW DRUG ADDON: CPT

## 2021-11-05 PROCEDURE — 12000002 HC ACUTE/MED SURGE SEMI-PRIVATE ROOM

## 2021-11-05 PROCEDURE — 25000003 PHARM REV CODE 250: Performed by: STUDENT IN AN ORGANIZED HEALTH CARE EDUCATION/TRAINING PROGRAM

## 2021-11-05 PROCEDURE — 36415 COLL VENOUS BLD VENIPUNCTURE: CPT | Performed by: EMERGENCY MEDICINE

## 2021-11-05 PROCEDURE — 25000242 PHARM REV CODE 250 ALT 637 W/ HCPCS: Performed by: STUDENT IN AN ORGANIZED HEALTH CARE EDUCATION/TRAINING PROGRAM

## 2021-11-05 PROCEDURE — 85610 PROTHROMBIN TIME: CPT | Performed by: EMERGENCY MEDICINE

## 2021-11-05 PROCEDURE — 96365 THER/PROPH/DIAG IV INF INIT: CPT

## 2021-11-05 PROCEDURE — U0002 COVID-19 LAB TEST NON-CDC: HCPCS | Performed by: EMERGENCY MEDICINE

## 2021-11-05 PROCEDURE — 83735 ASSAY OF MAGNESIUM: CPT | Performed by: EMERGENCY MEDICINE

## 2021-11-05 PROCEDURE — 27000221 HC OXYGEN, UP TO 24 HOURS

## 2021-11-05 PROCEDURE — 84145 PROCALCITONIN (PCT): CPT | Performed by: EMERGENCY MEDICINE

## 2021-11-05 PROCEDURE — 83880 ASSAY OF NATRIURETIC PEPTIDE: CPT | Performed by: EMERGENCY MEDICINE

## 2021-11-05 PROCEDURE — 99900031 HC PATIENT EDUCATION (STAT)

## 2021-11-05 PROCEDURE — 94760 N-INVAS EAR/PLS OXIMETRY 1: CPT

## 2021-11-05 PROCEDURE — 93005 ELECTROCARDIOGRAM TRACING: CPT | Performed by: INTERNAL MEDICINE

## 2021-11-05 PROCEDURE — 93010 EKG 12-LEAD: ICD-10-PCS | Mod: ,,, | Performed by: INTERNAL MEDICINE

## 2021-11-05 PROCEDURE — 25000242 PHARM REV CODE 250 ALT 637 W/ HCPCS: Performed by: EMERGENCY MEDICINE

## 2021-11-05 PROCEDURE — 83605 ASSAY OF LACTIC ACID: CPT | Mod: 91 | Performed by: STUDENT IN AN ORGANIZED HEALTH CARE EDUCATION/TRAINING PROGRAM

## 2021-11-05 RX ORDER — FUROSEMIDE 10 MG/ML
40 INJECTION INTRAMUSCULAR; INTRAVENOUS ONCE
Status: DISCONTINUED | OUTPATIENT
Start: 2021-11-05 | End: 2021-11-05

## 2021-11-05 RX ORDER — AMLODIPINE BESYLATE 5 MG/1
10 TABLET ORAL DAILY
Status: DISCONTINUED | OUTPATIENT
Start: 2021-11-05 | End: 2021-11-06

## 2021-11-05 RX ORDER — ALBUTEROL SULFATE 90 UG/1
2 AEROSOL, METERED RESPIRATORY (INHALATION) EVERY 6 HOURS PRN
Status: DISCONTINUED | OUTPATIENT
Start: 2021-11-05 | End: 2021-11-05

## 2021-11-05 RX ORDER — HYDROMORPHONE HYDROCHLORIDE 1 MG/ML
0.5 INJECTION, SOLUTION INTRAMUSCULAR; INTRAVENOUS; SUBCUTANEOUS EVERY 4 HOURS PRN
Status: DISCONTINUED | OUTPATIENT
Start: 2021-11-05 | End: 2021-11-07

## 2021-11-05 RX ORDER — LEVOFLOXACIN 5 MG/ML
750 INJECTION, SOLUTION INTRAVENOUS
Status: COMPLETED | OUTPATIENT
Start: 2021-11-05 | End: 2021-11-05

## 2021-11-05 RX ORDER — SODIUM CHLORIDE 0.9 % (FLUSH) 0.9 %
10 SYRINGE (ML) INJECTION
Status: DISCONTINUED | OUTPATIENT
Start: 2021-11-05 | End: 2021-11-19 | Stop reason: HOSPADM

## 2021-11-05 RX ORDER — FUROSEMIDE 10 MG/ML
40 INJECTION INTRAMUSCULAR; INTRAVENOUS
Status: DISCONTINUED | OUTPATIENT
Start: 2021-11-05 | End: 2021-11-06

## 2021-11-05 RX ORDER — ACETAMINOPHEN 325 MG/1
650 TABLET ORAL EVERY 8 HOURS PRN
Status: DISCONTINUED | OUTPATIENT
Start: 2021-11-05 | End: 2021-11-19 | Stop reason: HOSPADM

## 2021-11-05 RX ORDER — GABAPENTIN 300 MG/1
300 CAPSULE ORAL 2 TIMES DAILY
Status: DISCONTINUED | OUTPATIENT
Start: 2021-11-05 | End: 2021-11-19 | Stop reason: HOSPADM

## 2021-11-05 RX ORDER — TRAZODONE HYDROCHLORIDE 50 MG/1
150 TABLET ORAL NIGHTLY
Status: DISCONTINUED | OUTPATIENT
Start: 2021-11-05 | End: 2021-11-14

## 2021-11-05 RX ORDER — HYDRALAZINE HYDROCHLORIDE 20 MG/ML
10 INJECTION INTRAMUSCULAR; INTRAVENOUS
Status: DISCONTINUED | OUTPATIENT
Start: 2021-11-05 | End: 2021-11-05

## 2021-11-05 RX ORDER — CEFUROXIME AXETIL 250 MG/1
500 TABLET ORAL EVERY 12 HOURS
Status: DISCONTINUED | OUTPATIENT
Start: 2021-11-05 | End: 2021-11-11

## 2021-11-05 RX ORDER — IPRATROPIUM BROMIDE AND ALBUTEROL SULFATE 2.5; .5 MG/3ML; MG/3ML
3 SOLUTION RESPIRATORY (INHALATION) EVERY 4 HOURS
Status: DISCONTINUED | OUTPATIENT
Start: 2021-11-05 | End: 2021-11-05

## 2021-11-05 RX ORDER — POLYETHYLENE GLYCOL 3350 17 G/17G
17 POWDER, FOR SOLUTION ORAL DAILY
Status: DISCONTINUED | OUTPATIENT
Start: 2021-11-05 | End: 2021-11-19 | Stop reason: HOSPADM

## 2021-11-05 RX ORDER — AMOXICILLIN 250 MG
1 CAPSULE ORAL 2 TIMES DAILY
Status: DISCONTINUED | OUTPATIENT
Start: 2021-11-05 | End: 2021-11-19 | Stop reason: HOSPADM

## 2021-11-05 RX ORDER — IPRATROPIUM BROMIDE AND ALBUTEROL SULFATE 2.5; .5 MG/3ML; MG/3ML
3 SOLUTION RESPIRATORY (INHALATION) EVERY 6 HOURS
Status: DISCONTINUED | OUTPATIENT
Start: 2021-11-06 | End: 2021-11-06

## 2021-11-05 RX ORDER — TAMSULOSIN HYDROCHLORIDE 0.4 MG/1
0.4 CAPSULE ORAL DAILY
Status: DISCONTINUED | OUTPATIENT
Start: 2021-11-05 | End: 2021-11-19 | Stop reason: HOSPADM

## 2021-11-05 RX ORDER — HYDROMORPHONE HYDROCHLORIDE 1 MG/ML
0.5 INJECTION, SOLUTION INTRAMUSCULAR; INTRAVENOUS; SUBCUTANEOUS
Status: COMPLETED | OUTPATIENT
Start: 2021-11-05 | End: 2021-11-05

## 2021-11-05 RX ORDER — ALPRAZOLAM 0.5 MG/1
0.5 TABLET ORAL 2 TIMES DAILY PRN
Status: DISCONTINUED | OUTPATIENT
Start: 2021-11-05 | End: 2021-11-19 | Stop reason: HOSPADM

## 2021-11-05 RX ORDER — ONDANSETRON 2 MG/ML
4 INJECTION INTRAMUSCULAR; INTRAVENOUS EVERY 8 HOURS PRN
Status: DISCONTINUED | OUTPATIENT
Start: 2021-11-05 | End: 2021-11-19 | Stop reason: HOSPADM

## 2021-11-05 RX ORDER — LISINOPRIL 10 MG/1
10 TABLET ORAL DAILY
Status: DISCONTINUED | OUTPATIENT
Start: 2021-11-05 | End: 2021-11-11

## 2021-11-05 RX ORDER — ENOXAPARIN SODIUM 100 MG/ML
40 INJECTION SUBCUTANEOUS EVERY 24 HOURS
Status: DISCONTINUED | OUTPATIENT
Start: 2021-11-05 | End: 2021-11-11

## 2021-11-05 RX ORDER — ALPRAZOLAM 0.5 MG/1
0.5 TABLET ORAL 2 TIMES DAILY PRN
Status: ON HOLD | COMMUNITY
Start: 2021-10-27 | End: 2022-07-29 | Stop reason: CLARIF

## 2021-11-05 RX ORDER — TALC
6 POWDER (GRAM) TOPICAL NIGHTLY PRN
Status: DISCONTINUED | OUTPATIENT
Start: 2021-11-05 | End: 2021-11-19 | Stop reason: HOSPADM

## 2021-11-05 RX ORDER — LEVALBUTEROL 1.25 MG/.5ML
3.75 SOLUTION, CONCENTRATE RESPIRATORY (INHALATION)
Status: COMPLETED | OUTPATIENT
Start: 2021-11-05 | End: 2021-11-05

## 2021-11-05 RX ORDER — AMIODARONE HYDROCHLORIDE 200 MG/1
200 TABLET ORAL 2 TIMES DAILY
Status: DISCONTINUED | OUTPATIENT
Start: 2021-11-05 | End: 2021-11-19 | Stop reason: HOSPADM

## 2021-11-05 RX ORDER — MORPHINE SULFATE 2 MG/ML
2 INJECTION, SOLUTION INTRAMUSCULAR; INTRAVENOUS EVERY 4 HOURS PRN
Status: DISCONTINUED | OUTPATIENT
Start: 2021-11-05 | End: 2021-11-11

## 2021-11-05 RX ORDER — FUROSEMIDE 10 MG/ML
60 INJECTION INTRAMUSCULAR; INTRAVENOUS
Status: COMPLETED | OUTPATIENT
Start: 2021-11-05 | End: 2021-11-05

## 2021-11-05 RX ORDER — IPRATROPIUM BROMIDE 0.5 MG/2.5ML
1 SOLUTION RESPIRATORY (INHALATION)
Status: COMPLETED | OUTPATIENT
Start: 2021-11-05 | End: 2021-11-05

## 2021-11-05 RX ADMIN — HYDROMORPHONE HYDROCHLORIDE 0.5 MG: 1 INJECTION, SOLUTION INTRAMUSCULAR; INTRAVENOUS; SUBCUTANEOUS at 01:11

## 2021-11-05 RX ADMIN — LEVALBUTEROL HYDROCHLORIDE 3.75 MG: 1.25 SOLUTION, CONCENTRATE RESPIRATORY (INHALATION) at 09:11

## 2021-11-05 RX ADMIN — FUROSEMIDE 40 MG: 10 INJECTION, SOLUTION INTRAMUSCULAR; INTRAVENOUS at 08:11

## 2021-11-05 RX ADMIN — MORPHINE SULFATE 2 MG: 2 INJECTION, SOLUTION INTRAMUSCULAR; INTRAVENOUS at 10:11

## 2021-11-05 RX ADMIN — IPRATROPIUM BROMIDE 1 MG: 0.5 SOLUTION RESPIRATORY (INHALATION) at 09:11

## 2021-11-05 RX ADMIN — ALPRAZOLAM 0.5 MG: 0.5 TABLET ORAL at 03:11

## 2021-11-05 RX ADMIN — ALPRAZOLAM 0.5 MG: 0.5 TABLET ORAL at 08:11

## 2021-11-05 RX ADMIN — AMIODARONE HYDROCHLORIDE 200 MG: 200 TABLET ORAL at 08:11

## 2021-11-05 RX ADMIN — TAMSULOSIN HYDROCHLORIDE 0.4 MG: 0.4 CAPSULE ORAL at 03:11

## 2021-11-05 RX ADMIN — TRAZODONE HYDROCHLORIDE 150 MG: 50 TABLET ORAL at 08:11

## 2021-11-05 RX ADMIN — LEVOFLOXACIN 750 MG: 750 INJECTION, SOLUTION INTRAVENOUS at 10:11

## 2021-11-05 RX ADMIN — MORPHINE SULFATE 2 MG: 2 INJECTION, SOLUTION INTRAMUSCULAR; INTRAVENOUS at 05:11

## 2021-11-05 RX ADMIN — ENOXAPARIN SODIUM 40 MG: 40 INJECTION SUBCUTANEOUS at 05:11

## 2021-11-05 RX ADMIN — IPRATROPIUM BROMIDE AND ALBUTEROL SULFATE 3 ML: .5; 3 SOLUTION RESPIRATORY (INHALATION) at 03:11

## 2021-11-05 RX ADMIN — HYDROMORPHONE HYDROCHLORIDE 0.5 MG: 1 INJECTION, SOLUTION INTRAMUSCULAR; INTRAVENOUS; SUBCUTANEOUS at 10:11

## 2021-11-05 RX ADMIN — CEFTRIAXONE 2 G: 2 INJECTION, SOLUTION INTRAVENOUS at 12:11

## 2021-11-05 RX ADMIN — IPRATROPIUM BROMIDE AND ALBUTEROL SULFATE 3 ML: .5; 3 SOLUTION RESPIRATORY (INHALATION) at 08:11

## 2021-11-05 RX ADMIN — GABAPENTIN 300 MG: 300 CAPSULE ORAL at 08:11

## 2021-11-05 RX ADMIN — CEFUROXIME AXETIL 500 MG: 250 TABLET, FILM COATED ORAL at 08:11

## 2021-11-05 RX ADMIN — NITROGLYCERIN 1 INCH: 20 OINTMENT TOPICAL at 09:11

## 2021-11-05 RX ADMIN — FUROSEMIDE 60 MG: 10 INJECTION, SOLUTION INTRAVENOUS at 09:11

## 2021-11-06 PROBLEM — J96.11 CHRONIC RESPIRATORY FAILURE WITH HYPOXIA, ON HOME O2 THERAPY: Status: ACTIVE | Noted: 2021-11-06

## 2021-11-06 PROBLEM — J44.9 COPD (CHRONIC OBSTRUCTIVE PULMONARY DISEASE): Status: ACTIVE | Noted: 2021-11-06

## 2021-11-06 PROBLEM — Z99.81 CHRONIC RESPIRATORY FAILURE WITH HYPOXIA, ON HOME O2 THERAPY: Status: ACTIVE | Noted: 2021-11-06

## 2021-11-06 LAB
ANION GAP SERPL CALC-SCNC: 13 MMOL/L (ref 8–16)
BASOPHILS # BLD AUTO: 0.08 K/UL (ref 0–0.2)
BASOPHILS NFR BLD: 0.6 % (ref 0–1.9)
BUN SERPL-MCNC: 12 MG/DL (ref 8–23)
CALCIUM SERPL-MCNC: 8.6 MG/DL (ref 8.7–10.5)
CHLORIDE SERPL-SCNC: 96 MMOL/L (ref 95–110)
CO2 SERPL-SCNC: 26 MMOL/L (ref 23–29)
CREAT SERPL-MCNC: 0.9 MG/DL (ref 0.5–1.4)
DIFFERENTIAL METHOD: ABNORMAL
EOSINOPHIL # BLD AUTO: 0 K/UL (ref 0–0.5)
EOSINOPHIL NFR BLD: 0.3 % (ref 0–8)
ERYTHROCYTE [DISTWIDTH] IN BLOOD BY AUTOMATED COUNT: 14.5 % (ref 11.5–14.5)
EST. GFR  (AFRICAN AMERICAN): >60 ML/MIN/1.73 M^2
EST. GFR  (NON AFRICAN AMERICAN): >60 ML/MIN/1.73 M^2
GLUCOSE SERPL-MCNC: 279 MG/DL (ref 70–110)
HCT VFR BLD AUTO: 37.5 % (ref 40–54)
HGB BLD-MCNC: 11.7 G/DL (ref 14–18)
IMM GRANULOCYTES # BLD AUTO: 0.09 K/UL (ref 0–0.04)
IMM GRANULOCYTES NFR BLD AUTO: 0.6 % (ref 0–0.5)
LYMPHOCYTES # BLD AUTO: 1 K/UL (ref 1–4.8)
LYMPHOCYTES NFR BLD: 6.8 % (ref 18–48)
MCH RBC QN AUTO: 28.5 PG (ref 27–31)
MCHC RBC AUTO-ENTMCNC: 31.2 G/DL (ref 32–36)
MCV RBC AUTO: 91 FL (ref 82–98)
MONOCYTES # BLD AUTO: 1.1 K/UL (ref 0.3–1)
MONOCYTES NFR BLD: 7.8 % (ref 4–15)
NEUTROPHILS # BLD AUTO: 11.7 K/UL (ref 1.8–7.7)
NEUTROPHILS NFR BLD: 83.9 % (ref 38–73)
NRBC BLD-RTO: 0 /100 WBC
PLATELET # BLD AUTO: 361 K/UL (ref 150–450)
PMV BLD AUTO: 10.7 FL (ref 9.2–12.9)
POTASSIUM SERPL-SCNC: 3.8 MMOL/L (ref 3.5–5.1)
RBC # BLD AUTO: 4.11 M/UL (ref 4.6–6.2)
SODIUM SERPL-SCNC: 135 MMOL/L (ref 136–145)
WBC # BLD AUTO: 13.99 K/UL (ref 3.9–12.7)

## 2021-11-06 PROCEDURE — 27000221 HC OXYGEN, UP TO 24 HOURS

## 2021-11-06 PROCEDURE — 80048 BASIC METABOLIC PNL TOTAL CA: CPT | Performed by: STUDENT IN AN ORGANIZED HEALTH CARE EDUCATION/TRAINING PROGRAM

## 2021-11-06 PROCEDURE — 25000003 PHARM REV CODE 250: Performed by: STUDENT IN AN ORGANIZED HEALTH CARE EDUCATION/TRAINING PROGRAM

## 2021-11-06 PROCEDURE — 25000242 PHARM REV CODE 250 ALT 637 W/ HCPCS: Performed by: STUDENT IN AN ORGANIZED HEALTH CARE EDUCATION/TRAINING PROGRAM

## 2021-11-06 PROCEDURE — 12000002 HC ACUTE/MED SURGE SEMI-PRIVATE ROOM

## 2021-11-06 PROCEDURE — 94640 AIRWAY INHALATION TREATMENT: CPT

## 2021-11-06 PROCEDURE — 85025 COMPLETE CBC W/AUTO DIFF WBC: CPT | Performed by: STUDENT IN AN ORGANIZED HEALTH CARE EDUCATION/TRAINING PROGRAM

## 2021-11-06 PROCEDURE — 94761 N-INVAS EAR/PLS OXIMETRY MLT: CPT

## 2021-11-06 PROCEDURE — 63600175 PHARM REV CODE 636 W HCPCS: Performed by: HOSPITALIST

## 2021-11-06 PROCEDURE — 27100171 HC OXYGEN HIGH FLOW UP TO 24 HOURS

## 2021-11-06 PROCEDURE — 36415 COLL VENOUS BLD VENIPUNCTURE: CPT | Performed by: STUDENT IN AN ORGANIZED HEALTH CARE EDUCATION/TRAINING PROGRAM

## 2021-11-06 PROCEDURE — 94799 UNLISTED PULMONARY SVC/PX: CPT

## 2021-11-06 PROCEDURE — 99900035 HC TECH TIME PER 15 MIN (STAT)

## 2021-11-06 PROCEDURE — 63600175 PHARM REV CODE 636 W HCPCS: Performed by: STUDENT IN AN ORGANIZED HEALTH CARE EDUCATION/TRAINING PROGRAM

## 2021-11-06 RX ORDER — FUROSEMIDE 10 MG/ML
40 INJECTION INTRAMUSCULAR; INTRAVENOUS DAILY
Status: DISCONTINUED | OUTPATIENT
Start: 2021-11-07 | End: 2021-11-09

## 2021-11-06 RX ORDER — FUROSEMIDE 10 MG/ML
40 INJECTION INTRAMUSCULAR; INTRAVENOUS ONCE
Status: COMPLETED | OUTPATIENT
Start: 2021-11-06 | End: 2021-11-06

## 2021-11-06 RX ORDER — IPRATROPIUM BROMIDE AND ALBUTEROL SULFATE 2.5; .5 MG/3ML; MG/3ML
3 SOLUTION RESPIRATORY (INHALATION) EVERY 4 HOURS PRN
Status: DISCONTINUED | OUTPATIENT
Start: 2021-11-06 | End: 2021-11-19 | Stop reason: HOSPADM

## 2021-11-06 RX ADMIN — MORPHINE SULFATE 2 MG: 2 INJECTION, SOLUTION INTRAMUSCULAR; INTRAVENOUS at 11:11

## 2021-11-06 RX ADMIN — IPRATROPIUM BROMIDE AND ALBUTEROL SULFATE 3 ML: .5; 3 SOLUTION RESPIRATORY (INHALATION) at 07:11

## 2021-11-06 RX ADMIN — TRAZODONE HYDROCHLORIDE 150 MG: 50 TABLET ORAL at 08:11

## 2021-11-06 RX ADMIN — GABAPENTIN 300 MG: 300 CAPSULE ORAL at 08:11

## 2021-11-06 RX ADMIN — ACETAMINOPHEN 650 MG: 325 TABLET, FILM COATED ORAL at 04:11

## 2021-11-06 RX ADMIN — AMIODARONE HYDROCHLORIDE 200 MG: 200 TABLET ORAL at 08:11

## 2021-11-06 RX ADMIN — CEFUROXIME AXETIL 500 MG: 250 TABLET, FILM COATED ORAL at 08:11

## 2021-11-06 RX ADMIN — FUROSEMIDE 40 MG: 10 INJECTION, SOLUTION INTRAMUSCULAR; INTRAVENOUS at 04:11

## 2021-11-06 RX ADMIN — MORPHINE SULFATE 2 MG: 2 INJECTION, SOLUTION INTRAMUSCULAR; INTRAVENOUS at 05:11

## 2021-11-06 RX ADMIN — SENNOSIDES AND DOCUSATE SODIUM 1 TABLET: 8.6; 5 TABLET ORAL at 08:11

## 2021-11-06 RX ADMIN — ALPRAZOLAM 0.5 MG: 0.5 TABLET ORAL at 03:11

## 2021-11-06 RX ADMIN — ALPRAZOLAM 0.5 MG: 0.5 TABLET ORAL at 08:11

## 2021-11-06 RX ADMIN — FUROSEMIDE 40 MG: 10 INJECTION, SOLUTION INTRAMUSCULAR; INTRAVENOUS at 08:11

## 2021-11-06 RX ADMIN — ENOXAPARIN SODIUM 40 MG: 40 INJECTION SUBCUTANEOUS at 05:11

## 2021-11-06 RX ADMIN — IPRATROPIUM BROMIDE AND ALBUTEROL SULFATE 3 ML: .5; 3 SOLUTION RESPIRATORY (INHALATION) at 02:11

## 2021-11-07 ENCOUNTER — ANESTHESIA (OUTPATIENT)
Dept: SURGERY | Facility: HOSPITAL | Age: 61
DRG: 480 | End: 2021-11-07
Payer: MEDICAID

## 2021-11-07 ENCOUNTER — ANESTHESIA EVENT (OUTPATIENT)
Dept: SURGERY | Facility: HOSPITAL | Age: 61
DRG: 480 | End: 2021-11-07
Payer: MEDICAID

## 2021-11-07 PROBLEM — J96.21 ACUTE ON CHRONIC RESPIRATORY FAILURE WITH HYPOXIA: Status: ACTIVE | Noted: 2021-11-06

## 2021-11-07 LAB
ANION GAP SERPL CALC-SCNC: 13 MMOL/L (ref 8–16)
BASOPHILS # BLD AUTO: 0.15 K/UL (ref 0–0.2)
BASOPHILS NFR BLD: 1.1 % (ref 0–1.9)
BUN SERPL-MCNC: 18 MG/DL (ref 8–23)
CALCIUM SERPL-MCNC: 9 MG/DL (ref 8.7–10.5)
CHLORIDE SERPL-SCNC: 95 MMOL/L (ref 95–110)
CO2 SERPL-SCNC: 29 MMOL/L (ref 23–29)
CREAT SERPL-MCNC: 0.8 MG/DL (ref 0.5–1.4)
DIFFERENTIAL METHOD: ABNORMAL
EOSINOPHIL # BLD AUTO: 0.7 K/UL (ref 0–0.5)
EOSINOPHIL NFR BLD: 5.2 % (ref 0–8)
ERYTHROCYTE [DISTWIDTH] IN BLOOD BY AUTOMATED COUNT: 14.2 % (ref 11.5–14.5)
EST. GFR  (AFRICAN AMERICAN): >60 ML/MIN/1.73 M^2
EST. GFR  (NON AFRICAN AMERICAN): >60 ML/MIN/1.73 M^2
GLUCOSE SERPL-MCNC: 228 MG/DL (ref 70–110)
GLUCOSE SERPL-MCNC: 269 MG/DL (ref 70–110)
GLUCOSE SERPL-MCNC: 289 MG/DL (ref 70–110)
HCT VFR BLD AUTO: 40.7 % (ref 40–54)
HGB BLD-MCNC: 12.5 G/DL (ref 14–18)
IMM GRANULOCYTES # BLD AUTO: 0.13 K/UL (ref 0–0.04)
IMM GRANULOCYTES NFR BLD AUTO: 0.9 % (ref 0–0.5)
LYMPHOCYTES # BLD AUTO: 1.5 K/UL (ref 1–4.8)
LYMPHOCYTES NFR BLD: 10.6 % (ref 18–48)
MCH RBC QN AUTO: 28.4 PG (ref 27–31)
MCHC RBC AUTO-ENTMCNC: 30.7 G/DL (ref 32–36)
MCV RBC AUTO: 93 FL (ref 82–98)
MONOCYTES # BLD AUTO: 1.4 K/UL (ref 0.3–1)
MONOCYTES NFR BLD: 10.2 % (ref 4–15)
NEUTROPHILS # BLD AUTO: 9.9 K/UL (ref 1.8–7.7)
NEUTROPHILS NFR BLD: 72 % (ref 38–73)
NRBC BLD-RTO: 0 /100 WBC
PLATELET # BLD AUTO: 423 K/UL (ref 150–450)
PMV BLD AUTO: 9.8 FL (ref 9.2–12.9)
POTASSIUM SERPL-SCNC: 3.9 MMOL/L (ref 3.5–5.1)
RBC # BLD AUTO: 4.4 M/UL (ref 4.6–6.2)
SODIUM SERPL-SCNC: 137 MMOL/L (ref 136–145)
WBC # BLD AUTO: 13.78 K/UL (ref 3.9–12.7)

## 2021-11-07 PROCEDURE — 36415 COLL VENOUS BLD VENIPUNCTURE: CPT | Performed by: STUDENT IN AN ORGANIZED HEALTH CARE EDUCATION/TRAINING PROGRAM

## 2021-11-07 PROCEDURE — 63600175 PHARM REV CODE 636 W HCPCS: Performed by: NURSE ANESTHETIST, CERTIFIED REGISTERED

## 2021-11-07 PROCEDURE — 63600175 PHARM REV CODE 636 W HCPCS: Performed by: STUDENT IN AN ORGANIZED HEALTH CARE EDUCATION/TRAINING PROGRAM

## 2021-11-07 PROCEDURE — 27000221 HC OXYGEN, UP TO 24 HOURS

## 2021-11-07 PROCEDURE — 27201423 OPTIME MED/SURG SUP & DEVICES STERILE SUPPLY: Performed by: ORTHOPAEDIC SURGERY

## 2021-11-07 PROCEDURE — 80048 BASIC METABOLIC PNL TOTAL CA: CPT | Performed by: STUDENT IN AN ORGANIZED HEALTH CARE EDUCATION/TRAINING PROGRAM

## 2021-11-07 PROCEDURE — C1769 GUIDE WIRE: HCPCS | Performed by: ORTHOPAEDIC SURGERY

## 2021-11-07 PROCEDURE — 20000000 HC ICU ROOM

## 2021-11-07 PROCEDURE — 37000008 HC ANESTHESIA 1ST 15 MINUTES: Performed by: ORTHOPAEDIC SURGERY

## 2021-11-07 PROCEDURE — 25000003 PHARM REV CODE 250: Performed by: STUDENT IN AN ORGANIZED HEALTH CARE EDUCATION/TRAINING PROGRAM

## 2021-11-07 PROCEDURE — 85025 COMPLETE CBC W/AUTO DIFF WBC: CPT | Performed by: STUDENT IN AN ORGANIZED HEALTH CARE EDUCATION/TRAINING PROGRAM

## 2021-11-07 PROCEDURE — C1713 ANCHOR/SCREW BN/BN,TIS/BN: HCPCS | Performed by: ORTHOPAEDIC SURGERY

## 2021-11-07 PROCEDURE — 37000009 HC ANESTHESIA EA ADD 15 MINS: Performed by: ORTHOPAEDIC SURGERY

## 2021-11-07 PROCEDURE — 82962 GLUCOSE BLOOD TEST: CPT

## 2021-11-07 PROCEDURE — 63600175 PHARM REV CODE 636 W HCPCS: Performed by: INTERNAL MEDICINE

## 2021-11-07 PROCEDURE — 63600175 PHARM REV CODE 636 W HCPCS: Performed by: ORTHOPAEDIC SURGERY

## 2021-11-07 PROCEDURE — 94761 N-INVAS EAR/PLS OXIMETRY MLT: CPT

## 2021-11-07 PROCEDURE — 25000003 PHARM REV CODE 250: Performed by: ORTHOPAEDIC SURGERY

## 2021-11-07 PROCEDURE — 36000711: Performed by: ORTHOPAEDIC SURGERY

## 2021-11-07 PROCEDURE — 99900035 HC TECH TIME PER 15 MIN (STAT)

## 2021-11-07 PROCEDURE — 36000710: Performed by: ORTHOPAEDIC SURGERY

## 2021-11-07 PROCEDURE — 25000003 PHARM REV CODE 250: Performed by: NURSE ANESTHETIST, CERTIFIED REGISTERED

## 2021-11-07 PROCEDURE — 25000003 PHARM REV CODE 250: Performed by: INTERNAL MEDICINE

## 2021-11-07 DEVICE — IMPLANTABLE DEVICE: Type: IMPLANTABLE DEVICE | Site: HIP | Status: FUNCTIONAL

## 2021-11-07 RX ORDER — ACETAMINOPHEN 10 MG/ML
INJECTION, SOLUTION INTRAVENOUS
Status: DISCONTINUED | OUTPATIENT
Start: 2021-11-07 | End: 2021-11-07

## 2021-11-07 RX ORDER — PROPOFOL 10 MG/ML
VIAL (ML) INTRAVENOUS
Status: DISCONTINUED | OUTPATIENT
Start: 2021-11-07 | End: 2021-11-07

## 2021-11-07 RX ORDER — LIDOCAINE HYDROCHLORIDE 20 MG/ML
JELLY TOPICAL
Status: DISCONTINUED | OUTPATIENT
Start: 2021-11-07 | End: 2021-11-07

## 2021-11-07 RX ORDER — LIDOCAINE HYDROCHLORIDE 20 MG/ML
INJECTION, SOLUTION EPIDURAL; INFILTRATION; INTRACAUDAL; PERINEURAL
Status: DISCONTINUED | OUTPATIENT
Start: 2021-11-07 | End: 2021-11-07

## 2021-11-07 RX ORDER — FAMOTIDINE 10 MG/ML
INJECTION INTRAVENOUS
Status: DISCONTINUED | OUTPATIENT
Start: 2021-11-07 | End: 2021-11-07

## 2021-11-07 RX ORDER — FENTANYL CITRATE 50 UG/ML
INJECTION, SOLUTION INTRAMUSCULAR; INTRAVENOUS
Status: DISCONTINUED | OUTPATIENT
Start: 2021-11-07 | End: 2021-11-07

## 2021-11-07 RX ORDER — POTASSIUM CHLORIDE 20 MEQ/1
20 TABLET, EXTENDED RELEASE ORAL ONCE
Status: COMPLETED | OUTPATIENT
Start: 2021-11-07 | End: 2021-11-07

## 2021-11-07 RX ORDER — ROCURONIUM BROMIDE 10 MG/ML
INJECTION, SOLUTION INTRAVENOUS
Status: DISCONTINUED | OUTPATIENT
Start: 2021-11-07 | End: 2021-11-07

## 2021-11-07 RX ORDER — CEFAZOLIN SODIUM 1 G/3ML
INJECTION, POWDER, FOR SOLUTION INTRAMUSCULAR; INTRAVENOUS
Status: DISCONTINUED | OUTPATIENT
Start: 2021-11-07 | End: 2021-11-07

## 2021-11-07 RX ORDER — HYDROMORPHONE HYDROCHLORIDE 1 MG/ML
0.5 INJECTION, SOLUTION INTRAMUSCULAR; INTRAVENOUS; SUBCUTANEOUS EVERY 4 HOURS PRN
Status: DISCONTINUED | OUTPATIENT
Start: 2021-11-08 | End: 2021-11-08

## 2021-11-07 RX ORDER — PHENYLEPHRINE HYDROCHLORIDE 10 MG/ML
INJECTION INTRAVENOUS
Status: DISCONTINUED | OUTPATIENT
Start: 2021-11-07 | End: 2021-11-07

## 2021-11-07 RX ORDER — ONDANSETRON 2 MG/ML
INJECTION INTRAMUSCULAR; INTRAVENOUS
Status: DISCONTINUED | OUTPATIENT
Start: 2021-11-07 | End: 2021-11-07

## 2021-11-07 RX ORDER — SUCCINYLCHOLINE CHLORIDE 20 MG/ML
INJECTION INTRAMUSCULAR; INTRAVENOUS
Status: DISCONTINUED | OUTPATIENT
Start: 2021-11-07 | End: 2021-11-07

## 2021-11-07 RX ORDER — SODIUM CHLORIDE, SODIUM LACTATE, POTASSIUM CHLORIDE, CALCIUM CHLORIDE 600; 310; 30; 20 MG/100ML; MG/100ML; MG/100ML; MG/100ML
INJECTION, SOLUTION INTRAVENOUS CONTINUOUS PRN
Status: DISCONTINUED | OUTPATIENT
Start: 2021-11-07 | End: 2021-11-07

## 2021-11-07 RX ADMIN — PHENYLEPHRINE HYDROCHLORIDE 200 MCG: 10 INJECTION INTRAVENOUS at 08:11

## 2021-11-07 RX ADMIN — MORPHINE SULFATE 2 MG: 2 INJECTION, SOLUTION INTRAMUSCULAR; INTRAVENOUS at 02:11

## 2021-11-07 RX ADMIN — ACETAMINOPHEN 1000 MG: 10 INJECTION, SOLUTION INTRAVENOUS at 08:11

## 2021-11-07 RX ADMIN — POTASSIUM CHLORIDE 20 MEQ: 20 TABLET, EXTENDED RELEASE ORAL at 02:11

## 2021-11-07 RX ADMIN — LIDOCAINE HYDROCHLORIDE 4 ML: 20 JELLY TOPICAL at 08:11

## 2021-11-07 RX ADMIN — SENNOSIDES AND DOCUSATE SODIUM 1 TABLET: 8.6; 5 TABLET ORAL at 02:11

## 2021-11-07 RX ADMIN — ROCURONIUM BROMIDE 10 MG: 10 INJECTION, SOLUTION INTRAVENOUS at 08:11

## 2021-11-07 RX ADMIN — ONDANSETRON 4 MG: 2 INJECTION INTRAMUSCULAR; INTRAVENOUS at 08:11

## 2021-11-07 RX ADMIN — FAMOTIDINE 20 MG: 10 INJECTION, SOLUTION INTRAVENOUS at 08:11

## 2021-11-07 RX ADMIN — GABAPENTIN 300 MG: 300 CAPSULE ORAL at 08:11

## 2021-11-07 RX ADMIN — ALPRAZOLAM 0.5 MG: 0.5 TABLET ORAL at 02:11

## 2021-11-07 RX ADMIN — AMIODARONE HYDROCHLORIDE 200 MG: 200 TABLET ORAL at 02:11

## 2021-11-07 RX ADMIN — AMIODARONE HYDROCHLORIDE 200 MG: 200 TABLET ORAL at 08:11

## 2021-11-07 RX ADMIN — HYDROMORPHONE HYDROCHLORIDE 0.5 MG: 1 INJECTION, SOLUTION INTRAMUSCULAR; INTRAVENOUS; SUBCUTANEOUS at 11:11

## 2021-11-07 RX ADMIN — TRAZODONE HYDROCHLORIDE 150 MG: 50 TABLET ORAL at 08:11

## 2021-11-07 RX ADMIN — MORPHINE SULFATE 2 MG: 2 INJECTION, SOLUTION INTRAMUSCULAR; INTRAVENOUS at 05:11

## 2021-11-07 RX ADMIN — GABAPENTIN 300 MG: 300 CAPSULE ORAL at 02:11

## 2021-11-07 RX ADMIN — FUROSEMIDE 40 MG: 10 INJECTION, SOLUTION INTRAMUSCULAR; INTRAVENOUS at 02:11

## 2021-11-07 RX ADMIN — ROCURONIUM BROMIDE 30 MG: 10 INJECTION, SOLUTION INTRAVENOUS at 08:11

## 2021-11-07 RX ADMIN — FENTANYL CITRATE 50 MCG: 50 INJECTION INTRAMUSCULAR; INTRAVENOUS at 08:11

## 2021-11-07 RX ADMIN — CEFAZOLIN 2 G: 330 INJECTION, POWDER, FOR SOLUTION INTRAMUSCULAR; INTRAVENOUS at 08:11

## 2021-11-07 RX ADMIN — CEFUROXIME AXETIL 500 MG: 250 TABLET, FILM COATED ORAL at 02:11

## 2021-11-07 RX ADMIN — SODIUM CHLORIDE, SODIUM LACTATE, POTASSIUM CHLORIDE, AND CALCIUM CHLORIDE: .6; .31; .03; .02 INJECTION, SOLUTION INTRAVENOUS at 08:11

## 2021-11-07 RX ADMIN — HUMAN INSULIN 6 UNITS: 100 INJECTION, SOLUTION SUBCUTANEOUS at 04:11

## 2021-11-07 RX ADMIN — ENOXAPARIN SODIUM 40 MG: 40 INJECTION SUBCUTANEOUS at 04:11

## 2021-11-07 RX ADMIN — ALPRAZOLAM 0.5 MG: 0.5 TABLET ORAL at 05:11

## 2021-11-07 RX ADMIN — HUMAN INSULIN 6 UNITS: 100 INJECTION, SOLUTION SUBCUTANEOUS at 09:11

## 2021-11-07 RX ADMIN — SUCCINYLCHOLINE CHLORIDE 120 MG: 20 INJECTION, SOLUTION INTRAMUSCULAR; INTRAVENOUS at 08:11

## 2021-11-07 RX ADMIN — MORPHINE SULFATE 2 MG: 2 INJECTION, SOLUTION INTRAMUSCULAR; INTRAVENOUS at 10:11

## 2021-11-07 RX ADMIN — PROPOFOL 80 MG: 10 INJECTION, EMULSION INTRAVENOUS at 08:11

## 2021-11-07 RX ADMIN — MORPHINE SULFATE 2 MG: 2 INJECTION, SOLUTION INTRAMUSCULAR; INTRAVENOUS at 07:11

## 2021-11-07 RX ADMIN — CEFUROXIME AXETIL 500 MG: 250 TABLET, FILM COATED ORAL at 08:11

## 2021-11-07 RX ADMIN — LIDOCAINE HYDROCHLORIDE 100 MG: 20 INJECTION, SOLUTION EPIDURAL; INFILTRATION; INTRACAUDAL; PERINEURAL at 08:11

## 2021-11-07 RX ADMIN — SENNOSIDES AND DOCUSATE SODIUM 1 TABLET: 8.6; 5 TABLET ORAL at 08:11

## 2021-11-08 PROBLEM — I48.0 PAROXYSMAL ATRIAL FIBRILLATION: Chronic | Status: ACTIVE | Noted: 2021-11-08

## 2021-11-08 LAB
ANION GAP SERPL CALC-SCNC: 8 MMOL/L (ref 8–16)
BASOPHILS # BLD AUTO: 0.11 K/UL (ref 0–0.2)
BASOPHILS NFR BLD: 0.8 % (ref 0–1.9)
BUN SERPL-MCNC: 23 MG/DL (ref 8–23)
CALCIUM SERPL-MCNC: 8.4 MG/DL (ref 8.7–10.5)
CHLORIDE SERPL-SCNC: 92 MMOL/L (ref 95–110)
CO2 SERPL-SCNC: 30 MMOL/L (ref 23–29)
CREAT SERPL-MCNC: 0.8 MG/DL (ref 0.5–1.4)
DIFFERENTIAL METHOD: ABNORMAL
EOSINOPHIL # BLD AUTO: 0.7 K/UL (ref 0–0.5)
EOSINOPHIL NFR BLD: 5.3 % (ref 0–8)
ERYTHROCYTE [DISTWIDTH] IN BLOOD BY AUTOMATED COUNT: 14 % (ref 11.5–14.5)
EST. GFR  (AFRICAN AMERICAN): >60 ML/MIN/1.73 M^2
EST. GFR  (NON AFRICAN AMERICAN): >60 ML/MIN/1.73 M^2
GLUCOSE SERPL-MCNC: 215 MG/DL (ref 70–110)
GLUCOSE SERPL-MCNC: 250 MG/DL (ref 70–110)
GLUCOSE SERPL-MCNC: 261 MG/DL (ref 70–110)
GLUCOSE SERPL-MCNC: 267 MG/DL (ref 70–110)
HCT VFR BLD AUTO: 39.2 % (ref 40–54)
HGB BLD-MCNC: 11.9 G/DL (ref 14–18)
IMM GRANULOCYTES # BLD AUTO: 0.09 K/UL (ref 0–0.04)
IMM GRANULOCYTES NFR BLD AUTO: 0.7 % (ref 0–0.5)
LYMPHOCYTES # BLD AUTO: 1.7 K/UL (ref 1–4.8)
LYMPHOCYTES NFR BLD: 12.9 % (ref 18–48)
MCH RBC QN AUTO: 27.3 PG (ref 27–31)
MCHC RBC AUTO-ENTMCNC: 30.4 G/DL (ref 32–36)
MCV RBC AUTO: 90 FL (ref 82–98)
MONOCYTES # BLD AUTO: 1.6 K/UL (ref 0.3–1)
MONOCYTES NFR BLD: 11.6 % (ref 4–15)
NEUTROPHILS # BLD AUTO: 9.2 K/UL (ref 1.8–7.7)
NEUTROPHILS NFR BLD: 68.7 % (ref 38–73)
NRBC BLD-RTO: 0 /100 WBC
PLATELET # BLD AUTO: 402 K/UL (ref 150–450)
PMV BLD AUTO: 9.4 FL (ref 9.2–12.9)
POTASSIUM SERPL-SCNC: 4.8 MMOL/L (ref 3.5–5.1)
RBC # BLD AUTO: 4.36 M/UL (ref 4.6–6.2)
SODIUM SERPL-SCNC: 130 MMOL/L (ref 136–145)
WBC # BLD AUTO: 13.41 K/UL (ref 3.9–12.7)

## 2021-11-08 PROCEDURE — 25000003 PHARM REV CODE 250

## 2021-11-08 PROCEDURE — 94761 N-INVAS EAR/PLS OXIMETRY MLT: CPT

## 2021-11-08 PROCEDURE — 25000003 PHARM REV CODE 250: Performed by: INTERNAL MEDICINE

## 2021-11-08 PROCEDURE — 94760 N-INVAS EAR/PLS OXIMETRY 1: CPT

## 2021-11-08 PROCEDURE — 63600175 PHARM REV CODE 636 W HCPCS: Performed by: INTERNAL MEDICINE

## 2021-11-08 PROCEDURE — 85025 COMPLETE CBC W/AUTO DIFF WBC: CPT | Performed by: ORTHOPAEDIC SURGERY

## 2021-11-08 PROCEDURE — 25000003 PHARM REV CODE 250: Performed by: ORTHOPAEDIC SURGERY

## 2021-11-08 PROCEDURE — 63600175 PHARM REV CODE 636 W HCPCS: Performed by: ORTHOPAEDIC SURGERY

## 2021-11-08 PROCEDURE — 99900035 HC TECH TIME PER 15 MIN (STAT)

## 2021-11-08 PROCEDURE — 36415 COLL VENOUS BLD VENIPUNCTURE: CPT | Performed by: ORTHOPAEDIC SURGERY

## 2021-11-08 PROCEDURE — C9399 UNCLASSIFIED DRUGS OR BIOLOG: HCPCS | Performed by: INTERNAL MEDICINE

## 2021-11-08 PROCEDURE — 27000221 HC OXYGEN, UP TO 24 HOURS

## 2021-11-08 PROCEDURE — 94799 UNLISTED PULMONARY SVC/PX: CPT

## 2021-11-08 PROCEDURE — 80048 BASIC METABOLIC PNL TOTAL CA: CPT | Performed by: ORTHOPAEDIC SURGERY

## 2021-11-08 PROCEDURE — 20000000 HC ICU ROOM

## 2021-11-08 PROCEDURE — 99900031 HC PATIENT EDUCATION (STAT)

## 2021-11-08 RX ORDER — MUPIROCIN 20 MG/G
OINTMENT TOPICAL 2 TIMES DAILY
Status: COMPLETED | OUTPATIENT
Start: 2021-11-08 | End: 2021-11-13

## 2021-11-08 RX ORDER — CHLORHEXIDINE GLUCONATE ORAL RINSE 1.2 MG/ML
15 SOLUTION DENTAL 2 TIMES DAILY
Status: COMPLETED | OUTPATIENT
Start: 2021-11-08 | End: 2021-11-13

## 2021-11-08 RX ORDER — HYDROCODONE BITARTRATE AND ACETAMINOPHEN 10; 325 MG/1; MG/1
1 TABLET ORAL EVERY 6 HOURS PRN
Status: DISCONTINUED | OUTPATIENT
Start: 2021-11-08 | End: 2021-11-14

## 2021-11-08 RX ADMIN — TRAZODONE HYDROCHLORIDE 150 MG: 50 TABLET ORAL at 08:11

## 2021-11-08 RX ADMIN — HUMAN INSULIN 6 UNITS: 100 INJECTION, SOLUTION SUBCUTANEOUS at 01:11

## 2021-11-08 RX ADMIN — AMIODARONE HYDROCHLORIDE 200 MG: 200 TABLET ORAL at 08:11

## 2021-11-08 RX ADMIN — HUMAN INSULIN 6 UNITS: 100 INJECTION, SOLUTION SUBCUTANEOUS at 08:11

## 2021-11-08 RX ADMIN — SENNOSIDES AND DOCUSATE SODIUM 1 TABLET: 8.6; 5 TABLET ORAL at 08:11

## 2021-11-08 RX ADMIN — HYDROCODONE BITARTRATE AND ACETAMINOPHEN 1 TABLET: 10; 325 TABLET ORAL at 09:11

## 2021-11-08 RX ADMIN — ENOXAPARIN SODIUM 40 MG: 40 INJECTION SUBCUTANEOUS at 04:11

## 2021-11-08 RX ADMIN — FUROSEMIDE 40 MG: 10 INJECTION, SOLUTION INTRAMUSCULAR; INTRAVENOUS at 08:11

## 2021-11-08 RX ADMIN — MORPHINE SULFATE 2 MG: 2 INJECTION, SOLUTION INTRAMUSCULAR; INTRAVENOUS at 04:11

## 2021-11-08 RX ADMIN — GABAPENTIN 300 MG: 300 CAPSULE ORAL at 08:11

## 2021-11-08 RX ADMIN — INSULIN DETEMIR 10 UNITS: 100 INJECTION, SOLUTION SUBCUTANEOUS at 08:11

## 2021-11-08 RX ADMIN — INSULIN DETEMIR 10 UNITS: 100 INJECTION, SOLUTION SUBCUTANEOUS at 09:11

## 2021-11-08 RX ADMIN — CEFUROXIME AXETIL 500 MG: 250 TABLET, FILM COATED ORAL at 08:11

## 2021-11-08 RX ADMIN — MUPIROCIN: 20 OINTMENT TOPICAL at 08:11

## 2021-11-08 RX ADMIN — ALPRAZOLAM 0.5 MG: 0.5 TABLET ORAL at 05:11

## 2021-11-08 RX ADMIN — CHLORHEXIDINE GLUCONATE 15 ML: 1.2 RINSE ORAL at 08:11

## 2021-11-08 RX ADMIN — ACETAMINOPHEN 650 MG: 325 TABLET, FILM COATED ORAL at 08:11

## 2021-11-08 RX ADMIN — HYDROCODONE BITARTRATE AND ACETAMINOPHEN 1 TABLET: 10; 325 TABLET ORAL at 08:11

## 2021-11-08 RX ADMIN — MORPHINE SULFATE 2 MG: 2 INJECTION, SOLUTION INTRAMUSCULAR; INTRAVENOUS at 11:11

## 2021-11-08 RX ADMIN — TAMSULOSIN HYDROCHLORIDE 0.4 MG: 0.4 CAPSULE ORAL at 08:11

## 2021-11-08 RX ADMIN — HYDROCODONE BITARTRATE AND ACETAMINOPHEN 1 TABLET: 10; 325 TABLET ORAL at 02:11

## 2021-11-08 RX ADMIN — HYDROMORPHONE HYDROCHLORIDE 0.5 MG: 1 INJECTION, SOLUTION INTRAMUSCULAR; INTRAVENOUS; SUBCUTANEOUS at 07:11

## 2021-11-09 LAB
ANION GAP SERPL CALC-SCNC: 9 MMOL/L (ref 8–16)
BASOPHILS # BLD AUTO: 0.13 K/UL (ref 0–0.2)
BASOPHILS NFR BLD: 1 % (ref 0–1.9)
BUN SERPL-MCNC: 24 MG/DL (ref 8–23)
CALCIUM SERPL-MCNC: 8.2 MG/DL (ref 8.7–10.5)
CHLORIDE SERPL-SCNC: 89 MMOL/L (ref 95–110)
CO2 SERPL-SCNC: 30 MMOL/L (ref 23–29)
CREAT SERPL-MCNC: 0.7 MG/DL (ref 0.5–1.4)
DIFFERENTIAL METHOD: ABNORMAL
EOSINOPHIL # BLD AUTO: 1.2 K/UL (ref 0–0.5)
EOSINOPHIL NFR BLD: 9.1 % (ref 0–8)
ERYTHROCYTE [DISTWIDTH] IN BLOOD BY AUTOMATED COUNT: 13.7 % (ref 11.5–14.5)
EST. GFR  (AFRICAN AMERICAN): >60 ML/MIN/1.73 M^2
EST. GFR  (NON AFRICAN AMERICAN): >60 ML/MIN/1.73 M^2
GLUCOSE SERPL-MCNC: 200 MG/DL (ref 70–110)
GLUCOSE SERPL-MCNC: 264 MG/DL (ref 70–110)
GLUCOSE SERPL-MCNC: 270 MG/DL (ref 70–110)
HCT VFR BLD AUTO: 32.9 % (ref 40–54)
HGB BLD-MCNC: 10 G/DL (ref 14–18)
IMM GRANULOCYTES # BLD AUTO: 0.11 K/UL (ref 0–0.04)
IMM GRANULOCYTES NFR BLD AUTO: 0.9 % (ref 0–0.5)
LYMPHOCYTES # BLD AUTO: 1.4 K/UL (ref 1–4.8)
LYMPHOCYTES NFR BLD: 11 % (ref 18–48)
MCH RBC QN AUTO: 27.5 PG (ref 27–31)
MCHC RBC AUTO-ENTMCNC: 30.4 G/DL (ref 32–36)
MCV RBC AUTO: 91 FL (ref 82–98)
MONOCYTES # BLD AUTO: 1.2 K/UL (ref 0.3–1)
MONOCYTES NFR BLD: 9.3 % (ref 4–15)
NEUTROPHILS # BLD AUTO: 8.8 K/UL (ref 1.8–7.7)
NEUTROPHILS NFR BLD: 68.7 % (ref 38–73)
NRBC BLD-RTO: 0 /100 WBC
PLATELET # BLD AUTO: 371 K/UL (ref 150–450)
PMV BLD AUTO: 9.5 FL (ref 9.2–12.9)
POTASSIUM SERPL-SCNC: 4 MMOL/L (ref 3.5–5.1)
RBC # BLD AUTO: 3.63 M/UL (ref 4.6–6.2)
SODIUM SERPL-SCNC: 128 MMOL/L (ref 136–145)
WBC # BLD AUTO: 12.74 K/UL (ref 3.9–12.7)

## 2021-11-09 PROCEDURE — 85025 COMPLETE CBC W/AUTO DIFF WBC: CPT | Performed by: ORTHOPAEDIC SURGERY

## 2021-11-09 PROCEDURE — 97162 PT EVAL MOD COMPLEX 30 MIN: CPT

## 2021-11-09 PROCEDURE — 25000003 PHARM REV CODE 250

## 2021-11-09 PROCEDURE — 99223 1ST HOSP IP/OBS HIGH 75: CPT | Mod: ,,, | Performed by: INTERNAL MEDICINE

## 2021-11-09 PROCEDURE — 30200315 PPD INTRADERMAL TEST REV CODE 302: Performed by: INTERNAL MEDICINE

## 2021-11-09 PROCEDURE — 80048 BASIC METABOLIC PNL TOTAL CA: CPT | Performed by: ORTHOPAEDIC SURGERY

## 2021-11-09 PROCEDURE — 25000003 PHARM REV CODE 250: Performed by: ORTHOPAEDIC SURGERY

## 2021-11-09 PROCEDURE — 97530 THERAPEUTIC ACTIVITIES: CPT

## 2021-11-09 PROCEDURE — 63600175 PHARM REV CODE 636 W HCPCS: Performed by: ORTHOPAEDIC SURGERY

## 2021-11-09 PROCEDURE — 25000003 PHARM REV CODE 250: Performed by: INTERNAL MEDICINE

## 2021-11-09 PROCEDURE — 63600175 PHARM REV CODE 636 W HCPCS: Performed by: INTERNAL MEDICINE

## 2021-11-09 PROCEDURE — 25000242 PHARM REV CODE 250 ALT 637 W/ HCPCS: Performed by: INTERNAL MEDICINE

## 2021-11-09 PROCEDURE — 99900031 HC PATIENT EDUCATION (STAT)

## 2021-11-09 PROCEDURE — 94799 UNLISTED PULMONARY SVC/PX: CPT

## 2021-11-09 PROCEDURE — 99900035 HC TECH TIME PER 15 MIN (STAT)

## 2021-11-09 PROCEDURE — 94640 AIRWAY INHALATION TREATMENT: CPT

## 2021-11-09 PROCEDURE — 27100171 HC OXYGEN HIGH FLOW UP TO 24 HOURS

## 2021-11-09 PROCEDURE — 94761 N-INVAS EAR/PLS OXIMETRY MLT: CPT

## 2021-11-09 PROCEDURE — C9399 UNCLASSIFIED DRUGS OR BIOLOG: HCPCS | Performed by: INTERNAL MEDICINE

## 2021-11-09 PROCEDURE — 97167 OT EVAL HIGH COMPLEX 60 MIN: CPT

## 2021-11-09 PROCEDURE — 86580 TB INTRADERMAL TEST: CPT | Performed by: INTERNAL MEDICINE

## 2021-11-09 PROCEDURE — 99223 PR INITIAL HOSPITAL CARE,LEVL III: ICD-10-PCS | Mod: ,,, | Performed by: INTERNAL MEDICINE

## 2021-11-09 PROCEDURE — 20000000 HC ICU ROOM

## 2021-11-09 PROCEDURE — 36415 COLL VENOUS BLD VENIPUNCTURE: CPT | Performed by: ORTHOPAEDIC SURGERY

## 2021-11-09 RX ORDER — FLUTICASONE FUROATE AND VILANTEROL 100; 25 UG/1; UG/1
1 POWDER RESPIRATORY (INHALATION) DAILY
Status: DISCONTINUED | OUTPATIENT
Start: 2021-11-09 | End: 2021-11-19 | Stop reason: HOSPADM

## 2021-11-09 RX ORDER — FUROSEMIDE 40 MG/1
40 TABLET ORAL DAILY
Status: DISCONTINUED | OUTPATIENT
Start: 2021-11-10 | End: 2021-11-11

## 2021-11-09 RX ADMIN — TUBERCULIN PURIFIED PROTEIN DERIVATIVE 5 UNITS: 5 INJECTION, SOLUTION INTRADERMAL at 03:11

## 2021-11-09 RX ADMIN — HYDROCODONE BITARTRATE AND ACETAMINOPHEN 1 TABLET: 10; 325 TABLET ORAL at 02:11

## 2021-11-09 RX ADMIN — FUROSEMIDE 40 MG: 10 INJECTION, SOLUTION INTRAMUSCULAR; INTRAVENOUS at 08:11

## 2021-11-09 RX ADMIN — CEFUROXIME AXETIL 500 MG: 250 TABLET, FILM COATED ORAL at 09:11

## 2021-11-09 RX ADMIN — SENNOSIDES AND DOCUSATE SODIUM 1 TABLET: 8.6; 5 TABLET ORAL at 09:11

## 2021-11-09 RX ADMIN — AMIODARONE HYDROCHLORIDE 200 MG: 200 TABLET ORAL at 09:11

## 2021-11-09 RX ADMIN — MUPIROCIN: 20 OINTMENT TOPICAL at 09:11

## 2021-11-09 RX ADMIN — MORPHINE SULFATE 2 MG: 2 INJECTION, SOLUTION INTRAMUSCULAR; INTRAVENOUS at 02:11

## 2021-11-09 RX ADMIN — ALPRAZOLAM 0.5 MG: 0.5 TABLET ORAL at 07:11

## 2021-11-09 RX ADMIN — HYDROCODONE BITARTRATE AND ACETAMINOPHEN 1 TABLET: 10; 325 TABLET ORAL at 09:11

## 2021-11-09 RX ADMIN — MORPHINE SULFATE 2 MG: 2 INJECTION, SOLUTION INTRAMUSCULAR; INTRAVENOUS at 09:11

## 2021-11-09 RX ADMIN — TIOTROPIUM BROMIDE INHALATION SPRAY 2 PUFF: 3.12 SPRAY, METERED RESPIRATORY (INHALATION) at 09:11

## 2021-11-09 RX ADMIN — ALPRAZOLAM 0.5 MG: 0.5 TABLET ORAL at 08:11

## 2021-11-09 RX ADMIN — INSULIN DETEMIR 10 UNITS: 100 INJECTION, SOLUTION SUBCUTANEOUS at 08:11

## 2021-11-09 RX ADMIN — MORPHINE SULFATE 2 MG: 2 INJECTION, SOLUTION INTRAMUSCULAR; INTRAVENOUS at 06:11

## 2021-11-09 RX ADMIN — AMIODARONE HYDROCHLORIDE 200 MG: 200 TABLET ORAL at 08:11

## 2021-11-09 RX ADMIN — HYDROCODONE BITARTRATE AND ACETAMINOPHEN 1 TABLET: 10; 325 TABLET ORAL at 06:11

## 2021-11-09 RX ADMIN — ENOXAPARIN SODIUM 40 MG: 40 INJECTION SUBCUTANEOUS at 06:11

## 2021-11-09 RX ADMIN — TAMSULOSIN HYDROCHLORIDE 0.4 MG: 0.4 CAPSULE ORAL at 08:11

## 2021-11-09 RX ADMIN — GABAPENTIN 300 MG: 300 CAPSULE ORAL at 09:11

## 2021-11-09 RX ADMIN — HUMAN INSULIN 6 UNITS: 100 INJECTION, SOLUTION SUBCUTANEOUS at 09:11

## 2021-11-09 RX ADMIN — GABAPENTIN 300 MG: 300 CAPSULE ORAL at 08:11

## 2021-11-09 RX ADMIN — TRAZODONE HYDROCHLORIDE 150 MG: 50 TABLET ORAL at 09:11

## 2021-11-09 RX ADMIN — SENNOSIDES AND DOCUSATE SODIUM 1 TABLET: 8.6; 5 TABLET ORAL at 08:11

## 2021-11-09 RX ADMIN — CHLORHEXIDINE GLUCONATE 15 ML: 1.2 RINSE ORAL at 09:11

## 2021-11-09 RX ADMIN — HUMAN INSULIN 4 UNITS: 100 INJECTION, SOLUTION SUBCUTANEOUS at 08:11

## 2021-11-09 RX ADMIN — CHLORHEXIDINE GLUCONATE 15 ML: 1.2 RINSE ORAL at 08:11

## 2021-11-09 RX ADMIN — MORPHINE SULFATE 2 MG: 2 INJECTION, SOLUTION INTRAMUSCULAR; INTRAVENOUS at 10:11

## 2021-11-09 RX ADMIN — HUMAN INSULIN 2 UNITS: 100 INJECTION, SOLUTION SUBCUTANEOUS at 12:11

## 2021-11-09 RX ADMIN — CEFUROXIME AXETIL 500 MG: 250 TABLET, FILM COATED ORAL at 08:11

## 2021-11-09 RX ADMIN — MUPIROCIN: 20 OINTMENT TOPICAL at 08:11

## 2021-11-09 RX ADMIN — INSULIN DETEMIR 15 UNITS: 100 INJECTION, SOLUTION SUBCUTANEOUS at 09:11

## 2021-11-09 RX ADMIN — ACETAMINOPHEN 650 MG: 325 TABLET, FILM COATED ORAL at 03:11

## 2021-11-09 RX ADMIN — FLUTICASONE FUROATE AND VILANTEROL TRIFENATATE 1 PUFF: 100; 25 POWDER RESPIRATORY (INHALATION) at 09:11

## 2021-11-10 LAB
ANION GAP SERPL CALC-SCNC: 9 MMOL/L (ref 8–16)
BACTERIA BLD CULT: NORMAL
BACTERIA BLD CULT: NORMAL
BASOPHILS # BLD AUTO: 0.12 K/UL (ref 0–0.2)
BASOPHILS NFR BLD: 1.1 % (ref 0–1.9)
BUN SERPL-MCNC: 23 MG/DL (ref 8–23)
CALCIUM SERPL-MCNC: 8.7 MG/DL (ref 8.7–10.5)
CHLORIDE SERPL-SCNC: 92 MMOL/L (ref 95–110)
CO2 SERPL-SCNC: 32 MMOL/L (ref 23–29)
CREAT SERPL-MCNC: 0.7 MG/DL (ref 0.5–1.4)
DIFFERENTIAL METHOD: ABNORMAL
EOSINOPHIL # BLD AUTO: 1.1 K/UL (ref 0–0.5)
EOSINOPHIL NFR BLD: 9.6 % (ref 0–8)
ERYTHROCYTE [DISTWIDTH] IN BLOOD BY AUTOMATED COUNT: 13.9 % (ref 11.5–14.5)
EST. GFR  (AFRICAN AMERICAN): >60 ML/MIN/1.73 M^2
EST. GFR  (NON AFRICAN AMERICAN): >60 ML/MIN/1.73 M^2
GLUCOSE SERPL-MCNC: 215 MG/DL (ref 70–110)
GLUCOSE SERPL-MCNC: 226 MG/DL (ref 70–110)
GLUCOSE SERPL-MCNC: 245 MG/DL (ref 70–110)
GLUCOSE SERPL-MCNC: 273 MG/DL (ref 70–110)
HCT VFR BLD AUTO: 35.1 % (ref 40–54)
HGB BLD-MCNC: 10.9 G/DL (ref 14–18)
IMM GRANULOCYTES # BLD AUTO: 0.08 K/UL (ref 0–0.04)
IMM GRANULOCYTES NFR BLD AUTO: 0.7 % (ref 0–0.5)
LYMPHOCYTES # BLD AUTO: 1.6 K/UL (ref 1–4.8)
LYMPHOCYTES NFR BLD: 14.4 % (ref 18–48)
MCH RBC QN AUTO: 27.8 PG (ref 27–31)
MCHC RBC AUTO-ENTMCNC: 31.1 G/DL (ref 32–36)
MCV RBC AUTO: 90 FL (ref 82–98)
MONOCYTES # BLD AUTO: 1.1 K/UL (ref 0.3–1)
MONOCYTES NFR BLD: 9.7 % (ref 4–15)
NEUTROPHILS # BLD AUTO: 7.1 K/UL (ref 1.8–7.7)
NEUTROPHILS NFR BLD: 64.5 % (ref 38–73)
NRBC BLD-RTO: 0 /100 WBC
PLATELET # BLD AUTO: 392 K/UL (ref 150–450)
PMV BLD AUTO: 9.6 FL (ref 9.2–12.9)
POTASSIUM SERPL-SCNC: 3.8 MMOL/L (ref 3.5–5.1)
RBC # BLD AUTO: 3.92 M/UL (ref 4.6–6.2)
SODIUM SERPL-SCNC: 133 MMOL/L (ref 136–145)
WBC # BLD AUTO: 10.98 K/UL (ref 3.9–12.7)

## 2021-11-10 PROCEDURE — 99900035 HC TECH TIME PER 15 MIN (STAT)

## 2021-11-10 PROCEDURE — 94640 AIRWAY INHALATION TREATMENT: CPT

## 2021-11-10 PROCEDURE — 25000003 PHARM REV CODE 250: Performed by: ORTHOPAEDIC SURGERY

## 2021-11-10 PROCEDURE — 99233 PR SUBSEQUENT HOSPITAL CARE,LEVL III: ICD-10-PCS | Mod: ,,, | Performed by: INTERNAL MEDICINE

## 2021-11-10 PROCEDURE — 99233 SBSQ HOSP IP/OBS HIGH 50: CPT | Mod: ,,, | Performed by: INTERNAL MEDICINE

## 2021-11-10 PROCEDURE — 20000000 HC ICU ROOM

## 2021-11-10 PROCEDURE — 25000003 PHARM REV CODE 250: Performed by: INTERNAL MEDICINE

## 2021-11-10 PROCEDURE — 85025 COMPLETE CBC W/AUTO DIFF WBC: CPT | Performed by: ORTHOPAEDIC SURGERY

## 2021-11-10 PROCEDURE — 80048 BASIC METABOLIC PNL TOTAL CA: CPT | Performed by: ORTHOPAEDIC SURGERY

## 2021-11-10 PROCEDURE — 94799 UNLISTED PULMONARY SVC/PX: CPT

## 2021-11-10 PROCEDURE — 36415 COLL VENOUS BLD VENIPUNCTURE: CPT | Performed by: ORTHOPAEDIC SURGERY

## 2021-11-10 PROCEDURE — 94761 N-INVAS EAR/PLS OXIMETRY MLT: CPT

## 2021-11-10 PROCEDURE — 27000221 HC OXYGEN, UP TO 24 HOURS

## 2021-11-10 PROCEDURE — 27100171 HC OXYGEN HIGH FLOW UP TO 24 HOURS

## 2021-11-10 PROCEDURE — 63600175 PHARM REV CODE 636 W HCPCS: Performed by: ORTHOPAEDIC SURGERY

## 2021-11-10 PROCEDURE — 25000003 PHARM REV CODE 250

## 2021-11-10 PROCEDURE — 63600175 PHARM REV CODE 636 W HCPCS: Performed by: INTERNAL MEDICINE

## 2021-11-10 RX ADMIN — MORPHINE SULFATE 2 MG: 2 INJECTION, SOLUTION INTRAMUSCULAR; INTRAVENOUS at 07:11

## 2021-11-10 RX ADMIN — SENNOSIDES AND DOCUSATE SODIUM 1 TABLET: 8.6; 5 TABLET ORAL at 09:11

## 2021-11-10 RX ADMIN — INSULIN DETEMIR 15 UNITS: 100 INJECTION, SOLUTION SUBCUTANEOUS at 09:11

## 2021-11-10 RX ADMIN — CEFUROXIME AXETIL 500 MG: 250 TABLET, FILM COATED ORAL at 08:11

## 2021-11-10 RX ADMIN — FLUTICASONE FUROATE AND VILANTEROL TRIFENATATE 1 PUFF: 100; 25 POWDER RESPIRATORY (INHALATION) at 07:11

## 2021-11-10 RX ADMIN — ALPRAZOLAM 0.5 MG: 0.5 TABLET ORAL at 05:11

## 2021-11-10 RX ADMIN — HYDROCODONE BITARTRATE AND ACETAMINOPHEN 1 TABLET: 10; 325 TABLET ORAL at 05:11

## 2021-11-10 RX ADMIN — TRAZODONE HYDROCHLORIDE 150 MG: 50 TABLET ORAL at 08:11

## 2021-11-10 RX ADMIN — CHLORHEXIDINE GLUCONATE 15 ML: 1.2 RINSE ORAL at 09:11

## 2021-11-10 RX ADMIN — FUROSEMIDE 40 MG: 40 TABLET ORAL at 09:11

## 2021-11-10 RX ADMIN — GABAPENTIN 300 MG: 300 CAPSULE ORAL at 08:11

## 2021-11-10 RX ADMIN — SENNOSIDES AND DOCUSATE SODIUM 1 TABLET: 8.6; 5 TABLET ORAL at 08:11

## 2021-11-10 RX ADMIN — CEFUROXIME AXETIL 500 MG: 250 TABLET, FILM COATED ORAL at 09:11

## 2021-11-10 RX ADMIN — AMIODARONE HYDROCHLORIDE 200 MG: 200 TABLET ORAL at 08:11

## 2021-11-10 RX ADMIN — ENOXAPARIN SODIUM 40 MG: 40 INJECTION SUBCUTANEOUS at 05:11

## 2021-11-10 RX ADMIN — HUMAN INSULIN 4 UNITS: 100 INJECTION, SOLUTION SUBCUTANEOUS at 07:11

## 2021-11-10 RX ADMIN — MORPHINE SULFATE 2 MG: 2 INJECTION, SOLUTION INTRAMUSCULAR; INTRAVENOUS at 03:11

## 2021-11-10 RX ADMIN — CHLORHEXIDINE GLUCONATE 15 ML: 1.2 RINSE ORAL at 08:11

## 2021-11-10 RX ADMIN — HYDROCODONE BITARTRATE AND ACETAMINOPHEN 1 TABLET: 10; 325 TABLET ORAL at 03:11

## 2021-11-10 RX ADMIN — MUPIROCIN: 20 OINTMENT TOPICAL at 08:11

## 2021-11-10 RX ADMIN — GABAPENTIN 300 MG: 300 CAPSULE ORAL at 09:11

## 2021-11-10 RX ADMIN — HUMAN INSULIN 4 UNITS: 100 INJECTION, SOLUTION SUBCUTANEOUS at 11:11

## 2021-11-10 RX ADMIN — HUMAN INSULIN 6 UNITS: 100 INJECTION, SOLUTION SUBCUTANEOUS at 05:11

## 2021-11-10 RX ADMIN — TIOTROPIUM BROMIDE INHALATION SPRAY 2 PUFF: 3.12 SPRAY, METERED RESPIRATORY (INHALATION) at 07:11

## 2021-11-10 RX ADMIN — AMIODARONE HYDROCHLORIDE 200 MG: 200 TABLET ORAL at 09:11

## 2021-11-10 RX ADMIN — MUPIROCIN: 20 OINTMENT TOPICAL at 09:11

## 2021-11-10 RX ADMIN — TAMSULOSIN HYDROCHLORIDE 0.4 MG: 0.4 CAPSULE ORAL at 09:11

## 2021-11-10 RX ADMIN — HUMAN INSULIN 4 UNITS: 100 INJECTION, SOLUTION SUBCUTANEOUS at 09:11

## 2021-11-10 RX ADMIN — HYDROCODONE BITARTRATE AND ACETAMINOPHEN 1 TABLET: 10; 325 TABLET ORAL at 11:11

## 2021-11-11 LAB
ALLENS TEST: ABNORMAL
ANION GAP SERPL CALC-SCNC: 10 MMOL/L (ref 8–16)
BASOPHILS # BLD AUTO: 0.13 K/UL (ref 0–0.2)
BASOPHILS NFR BLD: 1 % (ref 0–1.9)
BUN SERPL-MCNC: 20 MG/DL (ref 8–23)
CALCIUM SERPL-MCNC: 8.7 MG/DL (ref 8.7–10.5)
CHLORIDE SERPL-SCNC: 93 MMOL/L (ref 95–110)
CO2 SERPL-SCNC: 32 MMOL/L (ref 23–29)
CREAT SERPL-MCNC: 0.6 MG/DL (ref 0.5–1.4)
DELSYS: ABNORMAL
DIFFERENTIAL METHOD: ABNORMAL
EOSINOPHIL # BLD AUTO: 1.1 K/UL (ref 0–0.5)
EOSINOPHIL NFR BLD: 8 % (ref 0–8)
ERYTHROCYTE [DISTWIDTH] IN BLOOD BY AUTOMATED COUNT: 13.9 % (ref 11.5–14.5)
ERYTHROCYTE [DISTWIDTH] IN BLOOD BY AUTOMATED COUNT: 13.9 % (ref 11.5–14.5)
EST. GFR  (AFRICAN AMERICAN): >60 ML/MIN/1.73 M^2
EST. GFR  (NON AFRICAN AMERICAN): >60 ML/MIN/1.73 M^2
FIO2: 85
FLOW: 25
GLUCOSE SERPL-MCNC: 177 MG/DL (ref 70–110)
GLUCOSE SERPL-MCNC: 198 MG/DL (ref 70–110)
GLUCOSE SERPL-MCNC: 247 MG/DL (ref 70–110)
GLUCOSE SERPL-MCNC: 267 MG/DL (ref 70–110)
HCO3 UR-SCNC: 33.1 MMOL/L (ref 24–28)
HCT VFR BLD AUTO: 32.7 % (ref 40–54)
HCT VFR BLD AUTO: 34.6 % (ref 40–54)
HCT VFR BLD CALC: 30 %PCV (ref 36–54)
HGB BLD-MCNC: 10 G/DL (ref 14–18)
HGB BLD-MCNC: 10.4 G/DL (ref 14–18)
IMM GRANULOCYTES # BLD AUTO: 0.15 K/UL (ref 0–0.04)
IMM GRANULOCYTES NFR BLD AUTO: 1.1 % (ref 0–0.5)
LYMPHOCYTES # BLD AUTO: 1.6 K/UL (ref 1–4.8)
LYMPHOCYTES NFR BLD: 11.8 % (ref 18–48)
MCH RBC QN AUTO: 27.4 PG (ref 27–31)
MCH RBC QN AUTO: 28 PG (ref 27–31)
MCHC RBC AUTO-ENTMCNC: 30.1 G/DL (ref 32–36)
MCHC RBC AUTO-ENTMCNC: 30.6 G/DL (ref 32–36)
MCV RBC AUTO: 90 FL (ref 82–98)
MCV RBC AUTO: 93 FL (ref 82–98)
MODE: ABNORMAL
MONOCYTES # BLD AUTO: 1.2 K/UL (ref 0.3–1)
MONOCYTES NFR BLD: 8.6 % (ref 4–15)
NEUTROPHILS # BLD AUTO: 9.4 K/UL (ref 1.8–7.7)
NEUTROPHILS NFR BLD: 69.5 % (ref 38–73)
NRBC BLD-RTO: 0 /100 WBC
PCO2 BLDA: 55.1 MMHG (ref 35–45)
PH SMN: 7.39 [PH] (ref 7.35–7.45)
PLATELET # BLD AUTO: 431 K/UL (ref 150–450)
PLATELET # BLD AUTO: 431 K/UL (ref 150–450)
PMV BLD AUTO: 9.6 FL (ref 9.2–12.9)
PMV BLD AUTO: 9.7 FL (ref 9.2–12.9)
PO2 BLDA: 50 MMHG (ref 80–100)
POC BE: 8 MMOL/L
POC IONIZED CALCIUM: 1.21 MMOL/L (ref 1.06–1.42)
POC SATURATED O2: 84 % (ref 95–100)
POC TCO2: 35 MMOL/L (ref 23–27)
POTASSIUM BLD-SCNC: 3.7 MMOL/L (ref 3.5–5.1)
POTASSIUM SERPL-SCNC: 3.7 MMOL/L (ref 3.5–5.1)
RBC # BLD AUTO: 3.65 M/UL (ref 4.6–6.2)
RBC # BLD AUTO: 3.72 M/UL (ref 4.6–6.2)
SAMPLE: ABNORMAL
SITE: ABNORMAL
SODIUM BLD-SCNC: 137 MMOL/L (ref 136–145)
SODIUM SERPL-SCNC: 135 MMOL/L (ref 136–145)
WBC # BLD AUTO: 13.57 K/UL (ref 3.9–12.7)
WBC # BLD AUTO: 15.05 K/UL (ref 3.9–12.7)

## 2021-11-11 PROCEDURE — 94799 UNLISTED PULMONARY SVC/PX: CPT

## 2021-11-11 PROCEDURE — 63600175 PHARM REV CODE 636 W HCPCS: Performed by: INTERNAL MEDICINE

## 2021-11-11 PROCEDURE — 85025 COMPLETE CBC W/AUTO DIFF WBC: CPT | Performed by: ORTHOPAEDIC SURGERY

## 2021-11-11 PROCEDURE — 84132 ASSAY OF SERUM POTASSIUM: CPT

## 2021-11-11 PROCEDURE — 94761 N-INVAS EAR/PLS OXIMETRY MLT: CPT

## 2021-11-11 PROCEDURE — 25000003 PHARM REV CODE 250

## 2021-11-11 PROCEDURE — 80048 BASIC METABOLIC PNL TOTAL CA: CPT | Performed by: ORTHOPAEDIC SURGERY

## 2021-11-11 PROCEDURE — 25000003 PHARM REV CODE 250: Performed by: ORTHOPAEDIC SURGERY

## 2021-11-11 PROCEDURE — 63600175 PHARM REV CODE 636 W HCPCS: Performed by: ORTHOPAEDIC SURGERY

## 2021-11-11 PROCEDURE — 63600175 PHARM REV CODE 636 W HCPCS

## 2021-11-11 PROCEDURE — 84295 ASSAY OF SERUM SODIUM: CPT

## 2021-11-11 PROCEDURE — 99900031 HC PATIENT EDUCATION (STAT)

## 2021-11-11 PROCEDURE — 99232 PR SUBSEQUENT HOSPITAL CARE,LEVL II: ICD-10-PCS | Mod: ,,, | Performed by: INTERNAL MEDICINE

## 2021-11-11 PROCEDURE — 25000003 PHARM REV CODE 250: Performed by: INTERNAL MEDICINE

## 2021-11-11 PROCEDURE — 36415 COLL VENOUS BLD VENIPUNCTURE: CPT | Performed by: ORTHOPAEDIC SURGERY

## 2021-11-11 PROCEDURE — 99900035 HC TECH TIME PER 15 MIN (STAT)

## 2021-11-11 PROCEDURE — 27100171 HC OXYGEN HIGH FLOW UP TO 24 HOURS

## 2021-11-11 PROCEDURE — 87040 BLOOD CULTURE FOR BACTERIA: CPT | Performed by: INTERNAL MEDICINE

## 2021-11-11 PROCEDURE — 27000221 HC OXYGEN, UP TO 24 HOURS

## 2021-11-11 PROCEDURE — 82803 BLOOD GASES ANY COMBINATION: CPT

## 2021-11-11 PROCEDURE — 82330 ASSAY OF CALCIUM: CPT

## 2021-11-11 PROCEDURE — 20000000 HC ICU ROOM

## 2021-11-11 PROCEDURE — 99232 SBSQ HOSP IP/OBS MODERATE 35: CPT | Mod: ,,, | Performed by: INTERNAL MEDICINE

## 2021-11-11 PROCEDURE — 36600 WITHDRAWAL OF ARTERIAL BLOOD: CPT

## 2021-11-11 PROCEDURE — 85014 HEMATOCRIT: CPT

## 2021-11-11 PROCEDURE — 94640 AIRWAY INHALATION TREATMENT: CPT

## 2021-11-11 PROCEDURE — 85027 COMPLETE CBC AUTOMATED: CPT | Performed by: INTERNAL MEDICINE

## 2021-11-11 PROCEDURE — 97110 THERAPEUTIC EXERCISES: CPT

## 2021-11-11 PROCEDURE — 36415 COLL VENOUS BLD VENIPUNCTURE: CPT | Performed by: INTERNAL MEDICINE

## 2021-11-11 RX ORDER — HYDROCODONE BITARTRATE AND ACETAMINOPHEN 5; 325 MG/1; MG/1
2 TABLET ORAL EVERY 6 HOURS PRN
Status: DISCONTINUED | OUTPATIENT
Start: 2021-11-11 | End: 2021-11-11

## 2021-11-11 RX ORDER — MORPHINE SULFATE 4 MG/ML
4 INJECTION, SOLUTION INTRAMUSCULAR; INTRAVENOUS EVERY 6 HOURS PRN
Status: DISCONTINUED | OUTPATIENT
Start: 2021-11-11 | End: 2021-11-12

## 2021-11-11 RX ORDER — SODIUM CHLORIDE 9 MG/ML
INJECTION, SOLUTION INTRAVENOUS CONTINUOUS
Status: DISCONTINUED | OUTPATIENT
Start: 2021-11-11 | End: 2021-11-15

## 2021-11-11 RX ORDER — HYDROCODONE BITARTRATE AND ACETAMINOPHEN 5; 325 MG/1; MG/1
1 TABLET ORAL ONCE
Status: DISCONTINUED | OUTPATIENT
Start: 2021-11-11 | End: 2021-11-11

## 2021-11-11 RX ORDER — ENOXAPARIN SODIUM 100 MG/ML
1 INJECTION SUBCUTANEOUS
Status: DISCONTINUED | OUTPATIENT
Start: 2021-11-11 | End: 2021-11-15

## 2021-11-11 RX ADMIN — POLYETHYLENE GLYCOL 3350 17 G: 17 POWDER, FOR SOLUTION ORAL at 08:11

## 2021-11-11 RX ADMIN — PIPERACILLIN AND TAZOBACTAM 3.38 G: 3; .375 INJECTION, POWDER, LYOPHILIZED, FOR SOLUTION INTRAVENOUS; PARENTERAL at 11:11

## 2021-11-11 RX ADMIN — MUPIROCIN: 20 OINTMENT TOPICAL at 08:11

## 2021-11-11 RX ADMIN — VANCOMYCIN HYDROCHLORIDE 2000 MG: 500 INJECTION, POWDER, LYOPHILIZED, FOR SOLUTION INTRAVENOUS at 03:11

## 2021-11-11 RX ADMIN — AMIODARONE HYDROCHLORIDE 200 MG: 200 TABLET ORAL at 08:11

## 2021-11-11 RX ADMIN — TRAZODONE HYDROCHLORIDE 150 MG: 50 TABLET ORAL at 08:11

## 2021-11-11 RX ADMIN — ACETAMINOPHEN 650 MG: 325 TABLET, FILM COATED ORAL at 05:11

## 2021-11-11 RX ADMIN — TIOTROPIUM BROMIDE INHALATION SPRAY 2 PUFF: 3.12 SPRAY, METERED RESPIRATORY (INHALATION) at 07:11

## 2021-11-11 RX ADMIN — PIPERACILLIN AND TAZOBACTAM 3.38 G: 3; .375 INJECTION, POWDER, LYOPHILIZED, FOR SOLUTION INTRAVENOUS; PARENTERAL at 03:11

## 2021-11-11 RX ADMIN — MORPHINE SULFATE 2 MG: 2 INJECTION, SOLUTION INTRAMUSCULAR; INTRAVENOUS at 03:11

## 2021-11-11 RX ADMIN — MORPHINE SULFATE 4 MG: 4 INJECTION, SOLUTION INTRAMUSCULAR; INTRAVENOUS at 08:11

## 2021-11-11 RX ADMIN — ALPRAZOLAM 0.5 MG: 0.5 TABLET ORAL at 07:11

## 2021-11-11 RX ADMIN — HUMAN INSULIN 4 UNITS: 100 INJECTION, SOLUTION SUBCUTANEOUS at 07:11

## 2021-11-11 RX ADMIN — GABAPENTIN 300 MG: 300 CAPSULE ORAL at 08:11

## 2021-11-11 RX ADMIN — SODIUM CHLORIDE 1000 ML: 0.9 INJECTION, SOLUTION INTRAVENOUS at 02:11

## 2021-11-11 RX ADMIN — MELATONIN 6 MG: at 10:11

## 2021-11-11 RX ADMIN — CEFUROXIME AXETIL 500 MG: 250 TABLET, FILM COATED ORAL at 08:11

## 2021-11-11 RX ADMIN — LISINOPRIL 10 MG: 10 TABLET ORAL at 08:11

## 2021-11-11 RX ADMIN — CHLORHEXIDINE GLUCONATE 15 ML: 1.2 RINSE ORAL at 08:11

## 2021-11-11 RX ADMIN — INSULIN DETEMIR 25 UNITS: 100 INJECTION, SOLUTION SUBCUTANEOUS at 08:11

## 2021-11-11 RX ADMIN — SODIUM CHLORIDE: 0.9 INJECTION, SOLUTION INTRAVENOUS at 05:11

## 2021-11-11 RX ADMIN — ENOXAPARIN SODIUM 110 MG: 60 INJECTION SUBCUTANEOUS at 02:11

## 2021-11-11 RX ADMIN — SENNOSIDES AND DOCUSATE SODIUM 1 TABLET: 8.6; 5 TABLET ORAL at 08:11

## 2021-11-11 RX ADMIN — MORPHINE SULFATE 4 MG: 4 INJECTION, SOLUTION INTRAMUSCULAR; INTRAVENOUS at 09:11

## 2021-11-11 RX ADMIN — ALPRAZOLAM 0.5 MG: 0.5 TABLET ORAL at 10:11

## 2021-11-11 RX ADMIN — FLUTICASONE FUROATE AND VILANTEROL TRIFENATATE 1 PUFF: 100; 25 POWDER RESPIRATORY (INHALATION) at 07:11

## 2021-11-11 RX ADMIN — HYDROCODONE BITARTRATE AND ACETAMINOPHEN 1 TABLET: 10; 325 TABLET ORAL at 07:11

## 2021-11-11 RX ADMIN — TAMSULOSIN HYDROCHLORIDE 0.4 MG: 0.4 CAPSULE ORAL at 08:11

## 2021-11-11 RX ADMIN — HYDROCODONE BITARTRATE AND ACETAMINOPHEN 1 TABLET: 10; 325 TABLET ORAL at 12:11

## 2021-11-11 RX ADMIN — FUROSEMIDE 40 MG: 40 TABLET ORAL at 08:11

## 2021-11-11 RX ADMIN — HYDROCODONE BITARTRATE AND ACETAMINOPHEN 1 TABLET: 10; 325 TABLET ORAL at 08:11

## 2021-11-11 RX ADMIN — HUMAN INSULIN 2 UNITS: 100 INJECTION, SOLUTION SUBCUTANEOUS at 11:11

## 2021-11-11 RX ADMIN — MORPHINE SULFATE 2 MG: 2 INJECTION, SOLUTION INTRAMUSCULAR; INTRAVENOUS at 07:11

## 2021-11-12 ENCOUNTER — CLINICAL SUPPORT (OUTPATIENT)
Dept: CARDIOLOGY | Facility: HOSPITAL | Age: 61
DRG: 480 | End: 2021-11-12
Attending: INTERNAL MEDICINE
Payer: MEDICAID

## 2021-11-12 PROBLEM — R57.9 SHOCK: Status: ACTIVE | Noted: 2021-11-12

## 2021-11-12 PROBLEM — I26.99 MULTIPLE PULMONARY EMBOLI: Status: ACTIVE | Noted: 2021-11-12

## 2021-11-12 LAB
ANION GAP SERPL CALC-SCNC: 8 MMOL/L (ref 8–16)
AORTIC ROOT ANNULUS: 3.91 CM
AORTIC VALVE CUSP SEPERATION: 1.73 CM
AV INDEX (PROSTH): 0.81
AV MEAN GRADIENT: 10 MMHG
AV PEAK GRADIENT: 17 MMHG
AV VALVE AREA: 2.54 CM2
AV VELOCITY RATIO: 80.06
BASOPHILS # BLD AUTO: 0.13 K/UL (ref 0–0.2)
BASOPHILS NFR BLD: 1 % (ref 0–1.9)
BSA FOR ECHO PROCEDURE: 2.3 M2
BUN SERPL-MCNC: 18 MG/DL (ref 8–23)
CALCIUM SERPL-MCNC: 8.3 MG/DL (ref 8.7–10.5)
CHLORIDE SERPL-SCNC: 97 MMOL/L (ref 95–110)
CO2 SERPL-SCNC: 30 MMOL/L (ref 23–29)
CREAT SERPL-MCNC: 0.8 MG/DL (ref 0.5–1.4)
CV ECHO LV RWT: 0.45 CM
DIFFERENTIAL METHOD: ABNORMAL
DOP CALC AO PEAK VEL: 2.07 M/S
DOP CALC AO VTI: 36.02 CM
DOP CALC LVOT AREA: 3.1 CM2
DOP CALC LVOT DIAMETER: 2 CM
DOP CALC LVOT PEAK VEL: 165.73 M/S
DOP CALC LVOT STROKE VOLUME: 91.41 CM3
DOP CALCLVOT PEAK VEL VTI: 29.11 CM
E WAVE DECELERATION TIME: 259.73 MSEC
E/A RATIO: 1.05
E/E' RATIO: 8.57 M/S
ECHO LV POSTERIOR WALL: 1.05 CM (ref 0.6–1.1)
EJECTION FRACTION: 80 %
EOSINOPHIL # BLD AUTO: 1.1 K/UL (ref 0–0.5)
EOSINOPHIL NFR BLD: 8.2 % (ref 0–8)
ERYTHROCYTE [DISTWIDTH] IN BLOOD BY AUTOMATED COUNT: 14.1 % (ref 11.5–14.5)
EST. GFR  (AFRICAN AMERICAN): >60 ML/MIN/1.73 M^2
EST. GFR  (NON AFRICAN AMERICAN): >60 ML/MIN/1.73 M^2
FRACTIONAL SHORTENING: 45 % (ref 28–44)
GLUCOSE SERPL-MCNC: 208 MG/DL (ref 70–110)
GLUCOSE SERPL-MCNC: 223 MG/DL (ref 70–110)
GLUCOSE SERPL-MCNC: 231 MG/DL (ref 70–110)
GLUCOSE SERPL-MCNC: 271 MG/DL (ref 70–110)
HCT VFR BLD AUTO: 31 % (ref 40–54)
HGB BLD-MCNC: 9.5 G/DL (ref 14–18)
IMM GRANULOCYTES # BLD AUTO: 0.27 K/UL (ref 0–0.04)
IMM GRANULOCYTES NFR BLD AUTO: 2 % (ref 0–0.5)
INTERVENTRICULAR SEPTUM: 1.33 CM (ref 0.6–1.1)
LEFT ATRIUM SIZE: 2.71 CM
LEFT INTERNAL DIMENSION IN SYSTOLE: 2.53 CM (ref 2.1–4)
LEFT VENTRICLE MASS INDEX: 91 G/M2
LEFT VENTRICULAR INTERNAL DIMENSION IN DIASTOLE: 4.62 CM (ref 3.5–6)
LEFT VENTRICULAR MASS: 203.93 G
LV LATERAL E/E' RATIO: 8.18 M/S
LV SEPTAL E/E' RATIO: 9 M/S
LYMPHOCYTES # BLD AUTO: 2 K/UL (ref 1–4.8)
LYMPHOCYTES NFR BLD: 14.7 % (ref 18–48)
MCH RBC QN AUTO: 28.2 PG (ref 27–31)
MCHC RBC AUTO-ENTMCNC: 30.6 G/DL (ref 32–36)
MCV RBC AUTO: 92 FL (ref 82–98)
MONOCYTES # BLD AUTO: 1.2 K/UL (ref 0.3–1)
MONOCYTES NFR BLD: 8.7 % (ref 4–15)
MV PEAK A VEL: 0.86 M/S
MV PEAK E VEL: 0.9 M/S
NEUTROPHILS # BLD AUTO: 8.8 K/UL (ref 1.8–7.7)
NEUTROPHILS NFR BLD: 65.4 % (ref 38–73)
NRBC BLD-RTO: 0 /100 WBC
PISA TR MAX VEL: 3.29 M/S
PLATELET # BLD AUTO: 402 K/UL (ref 150–450)
PMV BLD AUTO: 9.8 FL (ref 9.2–12.9)
POTASSIUM SERPL-SCNC: 3.9 MMOL/L (ref 3.5–5.1)
PROCALCITONIN SERPL IA-MCNC: <0.05 NG/ML (ref 0–0.5)
PV PEAK VELOCITY: 127.11 CM/S
RA PRESSURE: 3 MMHG
RBC # BLD AUTO: 3.37 M/UL (ref 4.6–6.2)
RIGHT VENTRICULAR END-DIASTOLIC DIMENSION: 359 CM
SODIUM SERPL-SCNC: 135 MMOL/L (ref 136–145)
TB INDURATION 48 - 72 HR READ: 0 MM
TDI LATERAL: 0.11 M/S
TDI SEPTAL: 0.1 M/S
TDI: 0.11 M/S
TR MAX PG: 43 MMHG
TV REST PULMONARY ARTERY PRESSURE: 46 MMHG
WBC # BLD AUTO: 13.37 K/UL (ref 3.9–12.7)

## 2021-11-12 PROCEDURE — 25000003 PHARM REV CODE 250: Performed by: ORTHOPAEDIC SURGERY

## 2021-11-12 PROCEDURE — 93306 TTE W/DOPPLER COMPLETE: CPT

## 2021-11-12 PROCEDURE — 99291 CRITICAL CARE FIRST HOUR: CPT | Mod: ,,, | Performed by: INTERNAL MEDICINE

## 2021-11-12 PROCEDURE — 93306 TTE W/DOPPLER COMPLETE: CPT | Mod: 26,,, | Performed by: GENERAL PRACTICE

## 2021-11-12 PROCEDURE — 94799 UNLISTED PULMONARY SVC/PX: CPT

## 2021-11-12 PROCEDURE — 20000000 HC ICU ROOM

## 2021-11-12 PROCEDURE — 63600175 PHARM REV CODE 636 W HCPCS: Performed by: INTERNAL MEDICINE

## 2021-11-12 PROCEDURE — 84145 PROCALCITONIN (PCT): CPT | Performed by: INTERNAL MEDICINE

## 2021-11-12 PROCEDURE — 25000003 PHARM REV CODE 250: Performed by: INTERNAL MEDICINE

## 2021-11-12 PROCEDURE — 93306 ECHO (CUPID ONLY): ICD-10-PCS | Mod: 26,,, | Performed by: GENERAL PRACTICE

## 2021-11-12 PROCEDURE — 99900035 HC TECH TIME PER 15 MIN (STAT)

## 2021-11-12 PROCEDURE — 99291 PR CRITICAL CARE, E/M 30-74 MINUTES: ICD-10-PCS | Mod: ,,, | Performed by: INTERNAL MEDICINE

## 2021-11-12 PROCEDURE — 27000221 HC OXYGEN, UP TO 24 HOURS

## 2021-11-12 PROCEDURE — 99900031 HC PATIENT EDUCATION (STAT)

## 2021-11-12 PROCEDURE — 94640 AIRWAY INHALATION TREATMENT: CPT

## 2021-11-12 PROCEDURE — 36415 COLL VENOUS BLD VENIPUNCTURE: CPT | Performed by: ORTHOPAEDIC SURGERY

## 2021-11-12 PROCEDURE — 25000003 PHARM REV CODE 250

## 2021-11-12 PROCEDURE — 25500020 PHARM REV CODE 255: Performed by: INTERNAL MEDICINE

## 2021-11-12 PROCEDURE — 94761 N-INVAS EAR/PLS OXIMETRY MLT: CPT

## 2021-11-12 PROCEDURE — 36569 INSJ PICC 5 YR+ W/O IMAGING: CPT

## 2021-11-12 PROCEDURE — 80048 BASIC METABOLIC PNL TOTAL CA: CPT | Performed by: ORTHOPAEDIC SURGERY

## 2021-11-12 PROCEDURE — 85025 COMPLETE CBC W/AUTO DIFF WBC: CPT | Performed by: ORTHOPAEDIC SURGERY

## 2021-11-12 PROCEDURE — 63600175 PHARM REV CODE 636 W HCPCS

## 2021-11-12 PROCEDURE — 36415 COLL VENOUS BLD VENIPUNCTURE: CPT | Performed by: INTERNAL MEDICINE

## 2021-11-12 RX ORDER — MORPHINE SULFATE 4 MG/ML
4 INJECTION, SOLUTION INTRAMUSCULAR; INTRAVENOUS EVERY 4 HOURS PRN
Status: DISCONTINUED | OUTPATIENT
Start: 2021-11-12 | End: 2021-11-14

## 2021-11-12 RX ORDER — NOREPINEPHRINE BITARTRATE/D5W 4MG/250ML
0-3 PLASTIC BAG, INJECTION (ML) INTRAVENOUS CONTINUOUS
Status: DISCONTINUED | OUTPATIENT
Start: 2021-11-12 | End: 2021-11-16

## 2021-11-12 RX ORDER — LORAZEPAM 2 MG/ML
2 INJECTION INTRAMUSCULAR EVERY 4 HOURS PRN
Status: DISCONTINUED | OUTPATIENT
Start: 2021-11-12 | End: 2021-11-14

## 2021-11-12 RX ADMIN — ENOXAPARIN SODIUM 110 MG: 60 INJECTION SUBCUTANEOUS at 09:11

## 2021-11-12 RX ADMIN — LORAZEPAM 2 MG: 2 INJECTION INTRAMUSCULAR; INTRAVENOUS at 12:11

## 2021-11-12 RX ADMIN — SENNOSIDES AND DOCUSATE SODIUM 1 TABLET: 8.6; 5 TABLET ORAL at 09:11

## 2021-11-12 RX ADMIN — MORPHINE SULFATE 4 MG: 4 INJECTION, SOLUTION INTRAMUSCULAR; INTRAVENOUS at 11:11

## 2021-11-12 RX ADMIN — INSULIN DETEMIR 25 UNITS: 100 INJECTION, SOLUTION SUBCUTANEOUS at 09:11

## 2021-11-12 RX ADMIN — VANCOMYCIN HYDROCHLORIDE 1750 MG: 500 INJECTION, POWDER, LYOPHILIZED, FOR SOLUTION INTRAVENOUS at 03:11

## 2021-11-12 RX ADMIN — ALPRAZOLAM 0.5 MG: 0.5 TABLET ORAL at 10:11

## 2021-11-12 RX ADMIN — TAMSULOSIN HYDROCHLORIDE 0.4 MG: 0.4 CAPSULE ORAL at 09:11

## 2021-11-12 RX ADMIN — AMIODARONE HYDROCHLORIDE 200 MG: 200 TABLET ORAL at 09:11

## 2021-11-12 RX ADMIN — HUMAN INSULIN 4 UNITS: 100 INJECTION, SOLUTION SUBCUTANEOUS at 03:11

## 2021-11-12 RX ADMIN — IOHEXOL 100 ML: 350 INJECTION, SOLUTION INTRAVENOUS at 08:11

## 2021-11-12 RX ADMIN — HUMAN INSULIN 4 UNITS: 100 INJECTION, SOLUTION SUBCUTANEOUS at 09:11

## 2021-11-12 RX ADMIN — TRAZODONE HYDROCHLORIDE 150 MG: 50 TABLET ORAL at 09:11

## 2021-11-12 RX ADMIN — MUPIROCIN: 20 OINTMENT TOPICAL at 09:11

## 2021-11-12 RX ADMIN — PIPERACILLIN AND TAZOBACTAM 3.38 G: 3; .375 INJECTION, POWDER, LYOPHILIZED, FOR SOLUTION INTRAVENOUS; PARENTERAL at 03:11

## 2021-11-12 RX ADMIN — MELATONIN 6 MG: at 09:11

## 2021-11-12 RX ADMIN — MORPHINE SULFATE 4 MG: 4 INJECTION, SOLUTION INTRAMUSCULAR; INTRAVENOUS at 07:11

## 2021-11-12 RX ADMIN — Medication 0.03 MCG/KG/MIN: at 11:11

## 2021-11-12 RX ADMIN — GABAPENTIN 300 MG: 300 CAPSULE ORAL at 09:11

## 2021-11-12 RX ADMIN — CHLORHEXIDINE GLUCONATE 15 ML: 1.2 RINSE ORAL at 09:11

## 2021-11-12 RX ADMIN — HUMAN INSULIN 4 UNITS: 100 INJECTION, SOLUTION SUBCUTANEOUS at 11:11

## 2021-11-12 RX ADMIN — PIPERACILLIN AND TAZOBACTAM 3.38 G: 3; .375 INJECTION, POWDER, LYOPHILIZED, FOR SOLUTION INTRAVENOUS; PARENTERAL at 09:11

## 2021-11-12 RX ADMIN — PIPERACILLIN AND TAZOBACTAM 3.38 G: 3; .375 INJECTION, POWDER, LYOPHILIZED, FOR SOLUTION INTRAVENOUS; PARENTERAL at 11:11

## 2021-11-12 RX ADMIN — MORPHINE SULFATE 4 MG: 4 INJECTION, SOLUTION INTRAMUSCULAR; INTRAVENOUS at 03:11

## 2021-11-12 RX ADMIN — ACETAMINOPHEN 650 MG: 325 TABLET, FILM COATED ORAL at 03:11

## 2021-11-12 RX ADMIN — FLUTICASONE FUROATE AND VILANTEROL TRIFENATATE 1 PUFF: 100; 25 POWDER RESPIRATORY (INHALATION) at 08:11

## 2021-11-12 RX ADMIN — TIOTROPIUM BROMIDE INHALATION SPRAY 2 PUFF: 3.12 SPRAY, METERED RESPIRATORY (INHALATION) at 08:11

## 2021-11-12 RX ADMIN — ACETAMINOPHEN 650 MG: 325 TABLET, FILM COATED ORAL at 10:11

## 2021-11-12 RX ADMIN — POLYETHYLENE GLYCOL 3350 17 G: 17 POWDER, FOR SOLUTION ORAL at 09:11

## 2021-11-13 LAB
ANION GAP SERPL CALC-SCNC: 5 MMOL/L (ref 8–16)
BASOPHILS # BLD AUTO: 0.13 K/UL (ref 0–0.2)
BASOPHILS NFR BLD: 1 % (ref 0–1.9)
BUN SERPL-MCNC: 7 MG/DL (ref 8–23)
CALCIUM SERPL-MCNC: 8 MG/DL (ref 8.7–10.5)
CHLORIDE SERPL-SCNC: 97 MMOL/L (ref 95–110)
CO2 SERPL-SCNC: 33 MMOL/L (ref 23–29)
CREAT SERPL-MCNC: 0.6 MG/DL (ref 0.5–1.4)
DIFFERENTIAL METHOD: ABNORMAL
EOSINOPHIL # BLD AUTO: 1.1 K/UL (ref 0–0.5)
EOSINOPHIL NFR BLD: 8.1 % (ref 0–8)
ERYTHROCYTE [DISTWIDTH] IN BLOOD BY AUTOMATED COUNT: 14.1 % (ref 11.5–14.5)
EST. GFR  (AFRICAN AMERICAN): >60 ML/MIN/1.73 M^2
EST. GFR  (NON AFRICAN AMERICAN): >60 ML/MIN/1.73 M^2
GLUCOSE SERPL-MCNC: 193 MG/DL (ref 70–110)
GLUCOSE SERPL-MCNC: 222 MG/DL (ref 70–110)
GLUCOSE SERPL-MCNC: 238 MG/DL (ref 70–110)
HCT VFR BLD AUTO: 31.4 % (ref 40–54)
HGB BLD-MCNC: 9.7 G/DL (ref 14–18)
IMM GRANULOCYTES # BLD AUTO: 0.25 K/UL (ref 0–0.04)
IMM GRANULOCYTES NFR BLD AUTO: 1.9 % (ref 0–0.5)
LYMPHOCYTES # BLD AUTO: 1.9 K/UL (ref 1–4.8)
LYMPHOCYTES NFR BLD: 14.5 % (ref 18–48)
MCH RBC QN AUTO: 27.9 PG (ref 27–31)
MCHC RBC AUTO-ENTMCNC: 30.9 G/DL (ref 32–36)
MCV RBC AUTO: 90 FL (ref 82–98)
MONOCYTES # BLD AUTO: 1 K/UL (ref 0.3–1)
MONOCYTES NFR BLD: 7.5 % (ref 4–15)
NEUTROPHILS # BLD AUTO: 9 K/UL (ref 1.8–7.7)
NEUTROPHILS NFR BLD: 67 % (ref 38–73)
NRBC BLD-RTO: 0 /100 WBC
PLATELET # BLD AUTO: 436 K/UL (ref 150–450)
PMV BLD AUTO: 9.5 FL (ref 9.2–12.9)
POTASSIUM SERPL-SCNC: 3.5 MMOL/L (ref 3.5–5.1)
RBC # BLD AUTO: 3.48 M/UL (ref 4.6–6.2)
SODIUM SERPL-SCNC: 135 MMOL/L (ref 136–145)
VANCOMYCIN TROUGH SERPL-MCNC: 17.9 UG/ML
WBC # BLD AUTO: 13.38 K/UL (ref 3.9–12.7)

## 2021-11-13 PROCEDURE — 27000221 HC OXYGEN, UP TO 24 HOURS

## 2021-11-13 PROCEDURE — 94799 UNLISTED PULMONARY SVC/PX: CPT

## 2021-11-13 PROCEDURE — 25000003 PHARM REV CODE 250: Performed by: ORTHOPAEDIC SURGERY

## 2021-11-13 PROCEDURE — 94761 N-INVAS EAR/PLS OXIMETRY MLT: CPT

## 2021-11-13 PROCEDURE — 80048 BASIC METABOLIC PNL TOTAL CA: CPT | Performed by: ORTHOPAEDIC SURGERY

## 2021-11-13 PROCEDURE — 20000000 HC ICU ROOM

## 2021-11-13 PROCEDURE — 25000003 PHARM REV CODE 250

## 2021-11-13 PROCEDURE — 85025 COMPLETE CBC W/AUTO DIFF WBC: CPT | Performed by: ORTHOPAEDIC SURGERY

## 2021-11-13 PROCEDURE — 36415 COLL VENOUS BLD VENIPUNCTURE: CPT | Performed by: INTERNAL MEDICINE

## 2021-11-13 PROCEDURE — 99233 SBSQ HOSP IP/OBS HIGH 50: CPT | Mod: ,,, | Performed by: INTERNAL MEDICINE

## 2021-11-13 PROCEDURE — 99233 PR SUBSEQUENT HOSPITAL CARE,LEVL III: ICD-10-PCS | Mod: ,,, | Performed by: INTERNAL MEDICINE

## 2021-11-13 PROCEDURE — 63600175 PHARM REV CODE 636 W HCPCS: Performed by: INTERNAL MEDICINE

## 2021-11-13 PROCEDURE — 63600175 PHARM REV CODE 636 W HCPCS

## 2021-11-13 PROCEDURE — 94640 AIRWAY INHALATION TREATMENT: CPT

## 2021-11-13 PROCEDURE — 25000003 PHARM REV CODE 250: Performed by: INTERNAL MEDICINE

## 2021-11-13 PROCEDURE — 99900031 HC PATIENT EDUCATION (STAT)

## 2021-11-13 PROCEDURE — 80202 ASSAY OF VANCOMYCIN: CPT | Performed by: INTERNAL MEDICINE

## 2021-11-13 PROCEDURE — 99900035 HC TECH TIME PER 15 MIN (STAT)

## 2021-11-13 RX ORDER — NOREPINEPHRINE BITARTRATE 1 MG/ML
INJECTION, SOLUTION INTRAVENOUS
Status: DISCONTINUED
Start: 2021-11-13 | End: 2021-11-13 | Stop reason: WASHOUT

## 2021-11-13 RX ADMIN — TAMSULOSIN HYDROCHLORIDE 0.4 MG: 0.4 CAPSULE ORAL at 09:11

## 2021-11-13 RX ADMIN — PIPERACILLIN AND TAZOBACTAM 3.38 G: 3; .375 INJECTION, POWDER, LYOPHILIZED, FOR SOLUTION INTRAVENOUS; PARENTERAL at 08:11

## 2021-11-13 RX ADMIN — ENOXAPARIN SODIUM 110 MG: 60 INJECTION SUBCUTANEOUS at 10:11

## 2021-11-13 RX ADMIN — MORPHINE SULFATE 4 MG: 4 INJECTION, SOLUTION INTRAMUSCULAR; INTRAVENOUS at 07:11

## 2021-11-13 RX ADMIN — SENNOSIDES AND DOCUSATE SODIUM 1 TABLET: 8.6; 5 TABLET ORAL at 09:11

## 2021-11-13 RX ADMIN — GABAPENTIN 300 MG: 300 CAPSULE ORAL at 10:11

## 2021-11-13 RX ADMIN — MORPHINE SULFATE 4 MG: 4 INJECTION, SOLUTION INTRAMUSCULAR; INTRAVENOUS at 01:11

## 2021-11-13 RX ADMIN — PIPERACILLIN AND TAZOBACTAM 3.38 G: 3; .375 INJECTION, POWDER, LYOPHILIZED, FOR SOLUTION INTRAVENOUS; PARENTERAL at 11:11

## 2021-11-13 RX ADMIN — POLYETHYLENE GLYCOL 3350 17 G: 17 POWDER, FOR SOLUTION ORAL at 09:11

## 2021-11-13 RX ADMIN — AMIODARONE HYDROCHLORIDE 200 MG: 200 TABLET ORAL at 09:11

## 2021-11-13 RX ADMIN — INSULIN DETEMIR 25 UNITS: 100 INJECTION, SOLUTION SUBCUTANEOUS at 10:11

## 2021-11-13 RX ADMIN — VANCOMYCIN HYDROCHLORIDE 1750 MG: 500 INJECTION, POWDER, LYOPHILIZED, FOR SOLUTION INTRAVENOUS at 03:11

## 2021-11-13 RX ADMIN — MORPHINE SULFATE 4 MG: 4 INJECTION, SOLUTION INTRAMUSCULAR; INTRAVENOUS at 11:11

## 2021-11-13 RX ADMIN — PIPERACILLIN AND TAZOBACTAM 3.38 G: 3; .375 INJECTION, POWDER, LYOPHILIZED, FOR SOLUTION INTRAVENOUS; PARENTERAL at 03:11

## 2021-11-13 RX ADMIN — ENOXAPARIN SODIUM 110 MG: 60 INJECTION SUBCUTANEOUS at 09:11

## 2021-11-13 RX ADMIN — TRAZODONE HYDROCHLORIDE 150 MG: 50 TABLET ORAL at 10:11

## 2021-11-13 RX ADMIN — CHLORHEXIDINE GLUCONATE 15 ML: 1.2 RINSE ORAL at 09:11

## 2021-11-13 RX ADMIN — SODIUM CHLORIDE 100 ML/HR: 0.9 INJECTION, SOLUTION INTRAVENOUS at 02:11

## 2021-11-13 RX ADMIN — MORPHINE SULFATE 4 MG: 4 INJECTION, SOLUTION INTRAMUSCULAR; INTRAVENOUS at 04:11

## 2021-11-13 RX ADMIN — GABAPENTIN 300 MG: 300 CAPSULE ORAL at 09:11

## 2021-11-13 RX ADMIN — MORPHINE SULFATE 4 MG: 4 INJECTION, SOLUTION INTRAMUSCULAR; INTRAVENOUS at 09:11

## 2021-11-13 RX ADMIN — MELATONIN 6 MG: at 10:11

## 2021-11-13 RX ADMIN — MUPIROCIN: 20 OINTMENT TOPICAL at 09:11

## 2021-11-13 RX ADMIN — INSULIN DETEMIR 25 UNITS: 100 INJECTION, SOLUTION SUBCUTANEOUS at 09:11

## 2021-11-13 RX ADMIN — LORAZEPAM 2 MG: 2 INJECTION INTRAMUSCULAR; INTRAVENOUS at 03:11

## 2021-11-13 RX ADMIN — TIOTROPIUM BROMIDE INHALATION SPRAY 2 PUFF: 3.12 SPRAY, METERED RESPIRATORY (INHALATION) at 07:11

## 2021-11-13 RX ADMIN — FLUTICASONE FUROATE AND VILANTEROL TRIFENATATE 1 PUFF: 100; 25 POWDER RESPIRATORY (INHALATION) at 07:11

## 2021-11-13 RX ADMIN — VANCOMYCIN HYDROCHLORIDE 1750 MG: 500 INJECTION, POWDER, LYOPHILIZED, FOR SOLUTION INTRAVENOUS at 04:11

## 2021-11-13 RX ADMIN — AMIODARONE HYDROCHLORIDE 200 MG: 200 TABLET ORAL at 10:11

## 2021-11-13 RX ADMIN — SENNOSIDES AND DOCUSATE SODIUM 1 TABLET: 8.6; 5 TABLET ORAL at 10:11

## 2021-11-13 RX ADMIN — Medication 0.01 MCG/KG/MIN: at 04:11

## 2021-11-14 LAB
ANION GAP SERPL CALC-SCNC: 8 MMOL/L (ref 8–16)
BASOPHILS # BLD AUTO: 0.11 K/UL (ref 0–0.2)
BASOPHILS NFR BLD: 0.9 % (ref 0–1.9)
BUN SERPL-MCNC: 5 MG/DL (ref 8–23)
CALCIUM SERPL-MCNC: 8.4 MG/DL (ref 8.7–10.5)
CHLORIDE SERPL-SCNC: 99 MMOL/L (ref 95–110)
CO2 SERPL-SCNC: 33 MMOL/L (ref 23–29)
CREAT SERPL-MCNC: 0.6 MG/DL (ref 0.5–1.4)
DIFFERENTIAL METHOD: ABNORMAL
EOSINOPHIL # BLD AUTO: 0.8 K/UL (ref 0–0.5)
EOSINOPHIL NFR BLD: 6.8 % (ref 0–8)
ERYTHROCYTE [DISTWIDTH] IN BLOOD BY AUTOMATED COUNT: 14.2 % (ref 11.5–14.5)
EST. GFR  (AFRICAN AMERICAN): >60 ML/MIN/1.73 M^2
EST. GFR  (NON AFRICAN AMERICAN): >60 ML/MIN/1.73 M^2
GLUCOSE SERPL-MCNC: 143 MG/DL (ref 70–110)
GLUCOSE SERPL-MCNC: 168 MG/DL (ref 70–110)
GLUCOSE SERPL-MCNC: 183 MG/DL (ref 70–110)
GLUCOSE SERPL-MCNC: 232 MG/DL (ref 70–110)
HCT VFR BLD AUTO: 31.2 % (ref 40–54)
HGB BLD-MCNC: 9.5 G/DL (ref 14–18)
IMM GRANULOCYTES # BLD AUTO: 0.21 K/UL (ref 0–0.04)
IMM GRANULOCYTES NFR BLD AUTO: 1.7 % (ref 0–0.5)
LYMPHOCYTES # BLD AUTO: 2 K/UL (ref 1–4.8)
LYMPHOCYTES NFR BLD: 16.3 % (ref 18–48)
MCH RBC QN AUTO: 27.2 PG (ref 27–31)
MCHC RBC AUTO-ENTMCNC: 30.4 G/DL (ref 32–36)
MCV RBC AUTO: 89 FL (ref 82–98)
MONOCYTES # BLD AUTO: 1.2 K/UL (ref 0.3–1)
MONOCYTES NFR BLD: 9.3 % (ref 4–15)
NEUTROPHILS # BLD AUTO: 8 K/UL (ref 1.8–7.7)
NEUTROPHILS NFR BLD: 65 % (ref 38–73)
NRBC BLD-RTO: 0 /100 WBC
PLATELET # BLD AUTO: 436 K/UL (ref 150–450)
PMV BLD AUTO: 9.3 FL (ref 9.2–12.9)
POTASSIUM SERPL-SCNC: 3.5 MMOL/L (ref 3.5–5.1)
RBC # BLD AUTO: 3.49 M/UL (ref 4.6–6.2)
SODIUM SERPL-SCNC: 140 MMOL/L (ref 136–145)
WBC # BLD AUTO: 12.36 K/UL (ref 3.9–12.7)

## 2021-11-14 PROCEDURE — 94799 UNLISTED PULMONARY SVC/PX: CPT

## 2021-11-14 PROCEDURE — 25000003 PHARM REV CODE 250: Performed by: INTERNAL MEDICINE

## 2021-11-14 PROCEDURE — 94761 N-INVAS EAR/PLS OXIMETRY MLT: CPT

## 2021-11-14 PROCEDURE — 99233 SBSQ HOSP IP/OBS HIGH 50: CPT | Mod: ,,, | Performed by: INTERNAL MEDICINE

## 2021-11-14 PROCEDURE — 94640 AIRWAY INHALATION TREATMENT: CPT

## 2021-11-14 PROCEDURE — 63600175 PHARM REV CODE 636 W HCPCS: Performed by: INTERNAL MEDICINE

## 2021-11-14 PROCEDURE — 63600175 PHARM REV CODE 636 W HCPCS

## 2021-11-14 PROCEDURE — 82962 GLUCOSE BLOOD TEST: CPT

## 2021-11-14 PROCEDURE — 36415 COLL VENOUS BLD VENIPUNCTURE: CPT | Performed by: ORTHOPAEDIC SURGERY

## 2021-11-14 PROCEDURE — 25000003 PHARM REV CODE 250

## 2021-11-14 PROCEDURE — 99233 PR SUBSEQUENT HOSPITAL CARE,LEVL III: ICD-10-PCS | Mod: ,,, | Performed by: INTERNAL MEDICINE

## 2021-11-14 PROCEDURE — 27000221 HC OXYGEN, UP TO 24 HOURS

## 2021-11-14 PROCEDURE — 25000003 PHARM REV CODE 250: Performed by: ORTHOPAEDIC SURGERY

## 2021-11-14 PROCEDURE — 99900035 HC TECH TIME PER 15 MIN (STAT)

## 2021-11-14 PROCEDURE — 99900031 HC PATIENT EDUCATION (STAT)

## 2021-11-14 PROCEDURE — 80048 BASIC METABOLIC PNL TOTAL CA: CPT | Performed by: ORTHOPAEDIC SURGERY

## 2021-11-14 PROCEDURE — 20000000 HC ICU ROOM

## 2021-11-14 PROCEDURE — 85025 COMPLETE CBC W/AUTO DIFF WBC: CPT | Performed by: ORTHOPAEDIC SURGERY

## 2021-11-14 RX ORDER — HYDROCODONE BITARTRATE AND ACETAMINOPHEN 5; 325 MG/1; MG/1
1 TABLET ORAL EVERY 6 HOURS PRN
Status: DISCONTINUED | OUTPATIENT
Start: 2021-11-14 | End: 2021-11-19 | Stop reason: HOSPADM

## 2021-11-14 RX ORDER — TRAZODONE HYDROCHLORIDE 50 MG/1
50 TABLET ORAL NIGHTLY
Status: DISCONTINUED | OUTPATIENT
Start: 2021-11-14 | End: 2021-11-19 | Stop reason: HOSPADM

## 2021-11-14 RX ORDER — MORPHINE SULFATE 2 MG/ML
2 INJECTION, SOLUTION INTRAMUSCULAR; INTRAVENOUS EVERY 4 HOURS PRN
Status: DISCONTINUED | OUTPATIENT
Start: 2021-11-14 | End: 2021-11-19 | Stop reason: HOSPADM

## 2021-11-14 RX ORDER — HYDROCODONE BITARTRATE AND ACETAMINOPHEN 10; 325 MG/1; MG/1
1 TABLET ORAL EVERY 8 HOURS PRN
Status: DISCONTINUED | OUTPATIENT
Start: 2021-11-14 | End: 2021-11-14

## 2021-11-14 RX ORDER — LORAZEPAM 2 MG/ML
0.5 INJECTION INTRAMUSCULAR EVERY 4 HOURS PRN
Status: DISCONTINUED | OUTPATIENT
Start: 2021-11-14 | End: 2021-11-19 | Stop reason: HOSPADM

## 2021-11-14 RX ORDER — MIDODRINE HYDROCHLORIDE 2.5 MG/1
2.5 TABLET ORAL
Status: DISCONTINUED | OUTPATIENT
Start: 2021-11-14 | End: 2021-11-18

## 2021-11-14 RX ADMIN — HYDROCODONE BITARTRATE AND ACETAMINOPHEN 1 TABLET: 10; 325 TABLET ORAL at 11:11

## 2021-11-14 RX ADMIN — TRAZODONE HYDROCHLORIDE 50 MG: 50 TABLET ORAL at 09:11

## 2021-11-14 RX ADMIN — INSULIN DETEMIR 25 UNITS: 100 INJECTION, SOLUTION SUBCUTANEOUS at 09:11

## 2021-11-14 RX ADMIN — TAMSULOSIN HYDROCHLORIDE 0.4 MG: 0.4 CAPSULE ORAL at 09:11

## 2021-11-14 RX ADMIN — SENNOSIDES AND DOCUSATE SODIUM 1 TABLET: 8.6; 5 TABLET ORAL at 09:11

## 2021-11-14 RX ADMIN — GABAPENTIN 300 MG: 300 CAPSULE ORAL at 09:11

## 2021-11-14 RX ADMIN — ENOXAPARIN SODIUM 110 MG: 60 INJECTION SUBCUTANEOUS at 09:11

## 2021-11-14 RX ADMIN — AMIODARONE HYDROCHLORIDE 200 MG: 200 TABLET ORAL at 09:11

## 2021-11-14 RX ADMIN — SODIUM CHLORIDE 100 ML/HR: 0.9 INJECTION, SOLUTION INTRAVENOUS at 02:11

## 2021-11-14 RX ADMIN — MELATONIN 6 MG: at 09:11

## 2021-11-14 RX ADMIN — MORPHINE SULFATE 2 MG: 2 INJECTION, SOLUTION INTRAMUSCULAR; INTRAVENOUS at 09:11

## 2021-11-14 RX ADMIN — VANCOMYCIN HYDROCHLORIDE 1750 MG: 500 INJECTION, POWDER, LYOPHILIZED, FOR SOLUTION INTRAVENOUS at 03:11

## 2021-11-14 RX ADMIN — PIPERACILLIN AND TAZOBACTAM 3.38 G: 3; .375 INJECTION, POWDER, LYOPHILIZED, FOR SOLUTION INTRAVENOUS; PARENTERAL at 03:11

## 2021-11-14 RX ADMIN — FLUTICASONE FUROATE AND VILANTEROL TRIFENATATE 1 PUFF: 100; 25 POWDER RESPIRATORY (INHALATION) at 07:11

## 2021-11-14 RX ADMIN — ALPRAZOLAM 0.5 MG: 0.5 TABLET ORAL at 12:11

## 2021-11-14 RX ADMIN — MORPHINE SULFATE 4 MG: 4 INJECTION, SOLUTION INTRAMUSCULAR; INTRAVENOUS at 06:11

## 2021-11-14 RX ADMIN — PIPERACILLIN AND TAZOBACTAM 3.38 G: 3; .375 INJECTION, POWDER, LYOPHILIZED, FOR SOLUTION INTRAVENOUS; PARENTERAL at 08:11

## 2021-11-14 RX ADMIN — MIDODRINE HYDROCHLORIDE 2.5 MG: 2.5 TABLET ORAL at 03:11

## 2021-11-14 RX ADMIN — MIDODRINE HYDROCHLORIDE 2.5 MG: 2.5 TABLET ORAL at 02:11

## 2021-11-14 RX ADMIN — PIPERACILLIN AND TAZOBACTAM 3.38 G: 3; .375 INJECTION, POWDER, LYOPHILIZED, FOR SOLUTION INTRAVENOUS; PARENTERAL at 11:11

## 2021-11-14 RX ADMIN — TIOTROPIUM BROMIDE INHALATION SPRAY 2 PUFF: 3.12 SPRAY, METERED RESPIRATORY (INHALATION) at 07:11

## 2021-11-14 RX ADMIN — POLYETHYLENE GLYCOL 3350 17 G: 17 POWDER, FOR SOLUTION ORAL at 09:11

## 2021-11-14 RX ADMIN — HYDROCODONE BITARTRATE AND ACETAMINOPHEN 1 TABLET: 10; 325 TABLET ORAL at 01:11

## 2021-11-14 RX ADMIN — MORPHINE SULFATE 4 MG: 4 INJECTION, SOLUTION INTRAMUSCULAR; INTRAVENOUS at 10:11

## 2021-11-14 RX ADMIN — HUMAN INSULIN 4 UNITS: 100 INJECTION, SOLUTION SUBCUTANEOUS at 04:11

## 2021-11-14 RX ADMIN — ALPRAZOLAM 0.5 MG: 0.5 TABLET ORAL at 02:11

## 2021-11-14 RX ADMIN — LORAZEPAM 2 MG: 2 INJECTION INTRAMUSCULAR; INTRAVENOUS at 08:11

## 2021-11-15 LAB
ANION GAP SERPL CALC-SCNC: 7 MMOL/L (ref 8–16)
BASOPHILS # BLD AUTO: 0.11 K/UL (ref 0–0.2)
BASOPHILS NFR BLD: 0.8 % (ref 0–1.9)
BUN SERPL-MCNC: 6 MG/DL (ref 8–23)
CALCIUM SERPL-MCNC: 8.4 MG/DL (ref 8.7–10.5)
CHLORIDE SERPL-SCNC: 99 MMOL/L (ref 95–110)
CO2 SERPL-SCNC: 33 MMOL/L (ref 23–29)
CREAT SERPL-MCNC: 0.4 MG/DL (ref 0.5–1.4)
DIFFERENTIAL METHOD: ABNORMAL
EOSINOPHIL # BLD AUTO: 1 K/UL (ref 0–0.5)
EOSINOPHIL NFR BLD: 7.7 % (ref 0–8)
ERYTHROCYTE [DISTWIDTH] IN BLOOD BY AUTOMATED COUNT: 14.1 % (ref 11.5–14.5)
EST. GFR  (AFRICAN AMERICAN): >60 ML/MIN/1.73 M^2
EST. GFR  (NON AFRICAN AMERICAN): >60 ML/MIN/1.73 M^2
GLUCOSE SERPL-MCNC: 108 MG/DL (ref 70–110)
GLUCOSE SERPL-MCNC: 87 MG/DL (ref 70–110)
HCT VFR BLD AUTO: 30.6 % (ref 40–54)
HGB BLD-MCNC: 9.3 G/DL (ref 14–18)
IMM GRANULOCYTES # BLD AUTO: 0.21 K/UL (ref 0–0.04)
IMM GRANULOCYTES NFR BLD AUTO: 1.6 % (ref 0–0.5)
LYMPHOCYTES # BLD AUTO: 1.8 K/UL (ref 1–4.8)
LYMPHOCYTES NFR BLD: 13.5 % (ref 18–48)
MCH RBC QN AUTO: 27.5 PG (ref 27–31)
MCHC RBC AUTO-ENTMCNC: 30.4 G/DL (ref 32–36)
MCV RBC AUTO: 91 FL (ref 82–98)
MONOCYTES # BLD AUTO: 1.3 K/UL (ref 0.3–1)
MONOCYTES NFR BLD: 9.4 % (ref 4–15)
NEUTROPHILS # BLD AUTO: 8.9 K/UL (ref 1.8–7.7)
NEUTROPHILS NFR BLD: 67 % (ref 38–73)
NRBC BLD-RTO: 0 /100 WBC
PLATELET # BLD AUTO: 461 K/UL (ref 150–450)
PMV BLD AUTO: 9.4 FL (ref 9.2–12.9)
POTASSIUM SERPL-SCNC: 3.2 MMOL/L (ref 3.5–5.1)
RBC # BLD AUTO: 3.38 M/UL (ref 4.6–6.2)
SODIUM SERPL-SCNC: 139 MMOL/L (ref 136–145)
WBC # BLD AUTO: 13.29 K/UL (ref 3.9–12.7)

## 2021-11-15 PROCEDURE — 25000003 PHARM REV CODE 250: Performed by: ORTHOPAEDIC SURGERY

## 2021-11-15 PROCEDURE — 25000003 PHARM REV CODE 250: Performed by: INTERNAL MEDICINE

## 2021-11-15 PROCEDURE — 99291 CRITICAL CARE FIRST HOUR: CPT | Mod: ,,, | Performed by: INTERNAL MEDICINE

## 2021-11-15 PROCEDURE — C9399 UNCLASSIFIED DRUGS OR BIOLOG: HCPCS | Performed by: INTERNAL MEDICINE

## 2021-11-15 PROCEDURE — 94640 AIRWAY INHALATION TREATMENT: CPT

## 2021-11-15 PROCEDURE — 99291 PR CRITICAL CARE, E/M 30-74 MINUTES: ICD-10-PCS | Mod: ,,, | Performed by: INTERNAL MEDICINE

## 2021-11-15 PROCEDURE — 99900035 HC TECH TIME PER 15 MIN (STAT)

## 2021-11-15 PROCEDURE — 99900031 HC PATIENT EDUCATION (STAT)

## 2021-11-15 PROCEDURE — 25000003 PHARM REV CODE 250: Performed by: STUDENT IN AN ORGANIZED HEALTH CARE EDUCATION/TRAINING PROGRAM

## 2021-11-15 PROCEDURE — 63600175 PHARM REV CODE 636 W HCPCS: Performed by: INTERNAL MEDICINE

## 2021-11-15 PROCEDURE — 97530 THERAPEUTIC ACTIVITIES: CPT

## 2021-11-15 PROCEDURE — 36415 COLL VENOUS BLD VENIPUNCTURE: CPT | Performed by: ORTHOPAEDIC SURGERY

## 2021-11-15 PROCEDURE — 12000002 HC ACUTE/MED SURGE SEMI-PRIVATE ROOM

## 2021-11-15 PROCEDURE — 27000221 HC OXYGEN, UP TO 24 HOURS

## 2021-11-15 PROCEDURE — 85025 COMPLETE CBC W/AUTO DIFF WBC: CPT | Performed by: ORTHOPAEDIC SURGERY

## 2021-11-15 PROCEDURE — 97110 THERAPEUTIC EXERCISES: CPT

## 2021-11-15 PROCEDURE — 80048 BASIC METABOLIC PNL TOTAL CA: CPT | Performed by: ORTHOPAEDIC SURGERY

## 2021-11-15 PROCEDURE — 94761 N-INVAS EAR/PLS OXIMETRY MLT: CPT

## 2021-11-15 RX ORDER — POTASSIUM CHLORIDE 20 MEQ/1
40 TABLET, EXTENDED RELEASE ORAL
Status: DISCONTINUED | OUTPATIENT
Start: 2021-11-15 | End: 2021-11-19 | Stop reason: HOSPADM

## 2021-11-15 RX ORDER — LANOLIN ALCOHOL/MO/W.PET/CERES
800 CREAM (GRAM) TOPICAL
Status: DISCONTINUED | OUTPATIENT
Start: 2021-11-15 | End: 2021-11-19 | Stop reason: HOSPADM

## 2021-11-15 RX ORDER — POTASSIUM CHLORIDE 20 MEQ/1
20 TABLET, EXTENDED RELEASE ORAL
Status: DISCONTINUED | OUTPATIENT
Start: 2021-11-15 | End: 2021-11-19 | Stop reason: HOSPADM

## 2021-11-15 RX ORDER — POTASSIUM CHLORIDE 7.45 MG/ML
20 INJECTION INTRAVENOUS
Status: DISCONTINUED | OUTPATIENT
Start: 2021-11-15 | End: 2021-11-19 | Stop reason: HOSPADM

## 2021-11-15 RX ORDER — POTASSIUM CHLORIDE 7.45 MG/ML
40 INJECTION INTRAVENOUS
Status: DISCONTINUED | OUTPATIENT
Start: 2021-11-15 | End: 2021-11-19 | Stop reason: HOSPADM

## 2021-11-15 RX ORDER — MAGNESIUM SULFATE 1 G/100ML
1 INJECTION INTRAVENOUS
Status: DISCONTINUED | OUTPATIENT
Start: 2021-11-15 | End: 2021-11-19 | Stop reason: HOSPADM

## 2021-11-15 RX ORDER — MAGNESIUM SULFATE HEPTAHYDRATE 40 MG/ML
2 INJECTION, SOLUTION INTRAVENOUS
Status: DISCONTINUED | OUTPATIENT
Start: 2021-11-15 | End: 2021-11-19 | Stop reason: HOSPADM

## 2021-11-15 RX ORDER — MAGNESIUM SULFATE HEPTAHYDRATE 40 MG/ML
4 INJECTION, SOLUTION INTRAVENOUS
Status: DISCONTINUED | OUTPATIENT
Start: 2021-11-15 | End: 2021-11-19 | Stop reason: HOSPADM

## 2021-11-15 RX ORDER — LEVOFLOXACIN 750 MG/1
750 TABLET ORAL DAILY
Status: DISCONTINUED | OUTPATIENT
Start: 2021-11-15 | End: 2021-11-18

## 2021-11-15 RX ADMIN — SENNOSIDES AND DOCUSATE SODIUM 1 TABLET: 8.6; 5 TABLET ORAL at 07:11

## 2021-11-15 RX ADMIN — POLYETHYLENE GLYCOL 3350 17 G: 17 POWDER, FOR SOLUTION ORAL at 07:11

## 2021-11-15 RX ADMIN — INSULIN DETEMIR 25 UNITS: 100 INJECTION, SOLUTION SUBCUTANEOUS at 07:11

## 2021-11-15 RX ADMIN — ENOXAPARIN SODIUM 110 MG: 60 INJECTION SUBCUTANEOUS at 07:11

## 2021-11-15 RX ADMIN — AMIODARONE HYDROCHLORIDE 200 MG: 200 TABLET ORAL at 08:11

## 2021-11-15 RX ADMIN — HYDROCODONE BITARTRATE AND ACETAMINOPHEN 1 TABLET: 5; 325 TABLET ORAL at 06:11

## 2021-11-15 RX ADMIN — MORPHINE SULFATE 2 MG: 2 INJECTION, SOLUTION INTRAMUSCULAR; INTRAVENOUS at 02:11

## 2021-11-15 RX ADMIN — TRAZODONE HYDROCHLORIDE 50 MG: 50 TABLET ORAL at 08:11

## 2021-11-15 RX ADMIN — MORPHINE SULFATE 2 MG: 2 INJECTION, SOLUTION INTRAMUSCULAR; INTRAVENOUS at 11:11

## 2021-11-15 RX ADMIN — SENNOSIDES AND DOCUSATE SODIUM 1 TABLET: 8.6; 5 TABLET ORAL at 08:11

## 2021-11-15 RX ADMIN — PIPERACILLIN AND TAZOBACTAM 3.38 G: 3; .375 INJECTION, POWDER, LYOPHILIZED, FOR SOLUTION INTRAVENOUS; PARENTERAL at 07:11

## 2021-11-15 RX ADMIN — POTASSIUM CHLORIDE 40 MEQ: 1500 TABLET, EXTENDED RELEASE ORAL at 07:11

## 2021-11-15 RX ADMIN — MELATONIN 6 MG: at 11:11

## 2021-11-15 RX ADMIN — MORPHINE SULFATE 2 MG: 2 INJECTION, SOLUTION INTRAMUSCULAR; INTRAVENOUS at 08:11

## 2021-11-15 RX ADMIN — GABAPENTIN 300 MG: 300 CAPSULE ORAL at 07:11

## 2021-11-15 RX ADMIN — FLUTICASONE FUROATE AND VILANTEROL TRIFENATATE 1 PUFF: 100; 25 POWDER RESPIRATORY (INHALATION) at 07:11

## 2021-11-15 RX ADMIN — INSULIN DETEMIR 25 UNITS: 100 INJECTION, SOLUTION SUBCUTANEOUS at 09:11

## 2021-11-15 RX ADMIN — LORAZEPAM 0.5 MG: 2 INJECTION INTRAMUSCULAR; INTRAVENOUS at 10:11

## 2021-11-15 RX ADMIN — HYDROCODONE BITARTRATE AND ACETAMINOPHEN 1 TABLET: 5; 325 TABLET ORAL at 07:11

## 2021-11-15 RX ADMIN — LORAZEPAM 0.5 MG: 2 INJECTION INTRAMUSCULAR; INTRAVENOUS at 04:11

## 2021-11-15 RX ADMIN — GABAPENTIN 300 MG: 300 CAPSULE ORAL at 08:11

## 2021-11-15 RX ADMIN — AMIODARONE HYDROCHLORIDE 200 MG: 200 TABLET ORAL at 07:11

## 2021-11-15 RX ADMIN — TIOTROPIUM BROMIDE INHALATION SPRAY 2 PUFF: 3.12 SPRAY, METERED RESPIRATORY (INHALATION) at 07:11

## 2021-11-15 RX ADMIN — MIDODRINE HYDROCHLORIDE 2.5 MG: 2.5 TABLET ORAL at 04:11

## 2021-11-15 RX ADMIN — MIDODRINE HYDROCHLORIDE 2.5 MG: 2.5 TABLET ORAL at 07:11

## 2021-11-15 RX ADMIN — TAMSULOSIN HYDROCHLORIDE 0.4 MG: 0.4 CAPSULE ORAL at 07:11

## 2021-11-15 RX ADMIN — MIDODRINE HYDROCHLORIDE 2.5 MG: 2.5 TABLET ORAL at 12:11

## 2021-11-15 RX ADMIN — ACETAMINOPHEN 650 MG: 325 TABLET, FILM COATED ORAL at 11:11

## 2021-11-15 RX ADMIN — ALPRAZOLAM 0.5 MG: 0.5 TABLET ORAL at 11:11

## 2021-11-16 LAB
ANION GAP SERPL CALC-SCNC: 12 MMOL/L (ref 8–16)
BACTERIA BLD CULT: NORMAL
BASOPHILS # BLD AUTO: 0.1 K/UL (ref 0–0.2)
BASOPHILS NFR BLD: 0.7 % (ref 0–1.9)
BUN SERPL-MCNC: 10 MG/DL (ref 8–23)
CALCIUM SERPL-MCNC: 8.7 MG/DL (ref 8.7–10.5)
CHLORIDE SERPL-SCNC: 97 MMOL/L (ref 95–110)
CO2 SERPL-SCNC: 31 MMOL/L (ref 23–29)
CREAT SERPL-MCNC: 0.5 MG/DL (ref 0.5–1.4)
DIFFERENTIAL METHOD: ABNORMAL
EOSINOPHIL # BLD AUTO: 0.9 K/UL (ref 0–0.5)
EOSINOPHIL NFR BLD: 6.9 % (ref 0–8)
ERYTHROCYTE [DISTWIDTH] IN BLOOD BY AUTOMATED COUNT: 14.3 % (ref 11.5–14.5)
EST. GFR  (AFRICAN AMERICAN): >60 ML/MIN/1.73 M^2
EST. GFR  (NON AFRICAN AMERICAN): >60 ML/MIN/1.73 M^2
GLUCOSE SERPL-MCNC: 120 MG/DL (ref 70–110)
GLUCOSE SERPL-MCNC: 122 MG/DL (ref 70–110)
GLUCOSE SERPL-MCNC: 133 MG/DL (ref 70–110)
GLUCOSE SERPL-MCNC: 144 MG/DL (ref 70–110)
GLUCOSE SERPL-MCNC: 168 MG/DL (ref 70–110)
HCT VFR BLD AUTO: 32.4 % (ref 40–54)
HGB BLD-MCNC: 9.8 G/DL (ref 14–18)
IMM GRANULOCYTES # BLD AUTO: 0.19 K/UL (ref 0–0.04)
IMM GRANULOCYTES NFR BLD AUTO: 1.4 % (ref 0–0.5)
LYMPHOCYTES # BLD AUTO: 2.2 K/UL (ref 1–4.8)
LYMPHOCYTES NFR BLD: 16.2 % (ref 18–48)
MCH RBC QN AUTO: 27.5 PG (ref 27–31)
MCHC RBC AUTO-ENTMCNC: 30.2 G/DL (ref 32–36)
MCV RBC AUTO: 91 FL (ref 82–98)
MONOCYTES # BLD AUTO: 1.2 K/UL (ref 0.3–1)
MONOCYTES NFR BLD: 9.2 % (ref 4–15)
NEUTROPHILS # BLD AUTO: 8.9 K/UL (ref 1.8–7.7)
NEUTROPHILS NFR BLD: 65.6 % (ref 38–73)
NRBC BLD-RTO: 0 /100 WBC
PLATELET # BLD AUTO: 471 K/UL (ref 150–450)
PMV BLD AUTO: 9.2 FL (ref 9.2–12.9)
POTASSIUM SERPL-SCNC: 3.6 MMOL/L (ref 3.5–5.1)
RBC # BLD AUTO: 3.57 M/UL (ref 4.6–6.2)
SODIUM SERPL-SCNC: 140 MMOL/L (ref 136–145)
WBC # BLD AUTO: 13.53 K/UL (ref 3.9–12.7)

## 2021-11-16 PROCEDURE — 94799 UNLISTED PULMONARY SVC/PX: CPT

## 2021-11-16 PROCEDURE — 25000003 PHARM REV CODE 250: Performed by: ORTHOPAEDIC SURGERY

## 2021-11-16 PROCEDURE — 80048 BASIC METABOLIC PNL TOTAL CA: CPT | Performed by: ORTHOPAEDIC SURGERY

## 2021-11-16 PROCEDURE — 63600175 PHARM REV CODE 636 W HCPCS: Performed by: INTERNAL MEDICINE

## 2021-11-16 PROCEDURE — 94640 AIRWAY INHALATION TREATMENT: CPT

## 2021-11-16 PROCEDURE — 25000003 PHARM REV CODE 250: Performed by: INTERNAL MEDICINE

## 2021-11-16 PROCEDURE — 94761 N-INVAS EAR/PLS OXIMETRY MLT: CPT

## 2021-11-16 PROCEDURE — 82962 GLUCOSE BLOOD TEST: CPT

## 2021-11-16 PROCEDURE — 12000002 HC ACUTE/MED SURGE SEMI-PRIVATE ROOM

## 2021-11-16 PROCEDURE — 97530 THERAPEUTIC ACTIVITIES: CPT | Mod: CQ

## 2021-11-16 PROCEDURE — 99900035 HC TECH TIME PER 15 MIN (STAT)

## 2021-11-16 PROCEDURE — 27000221 HC OXYGEN, UP TO 24 HOURS

## 2021-11-16 PROCEDURE — 85025 COMPLETE CBC W/AUTO DIFF WBC: CPT | Performed by: ORTHOPAEDIC SURGERY

## 2021-11-16 RX ADMIN — INSULIN DETEMIR 25 UNITS: 100 INJECTION, SOLUTION SUBCUTANEOUS at 09:11

## 2021-11-16 RX ADMIN — FLUTICASONE FUROATE AND VILANTEROL TRIFENATATE 1 PUFF: 100; 25 POWDER RESPIRATORY (INHALATION) at 08:11

## 2021-11-16 RX ADMIN — TAMSULOSIN HYDROCHLORIDE 0.4 MG: 0.4 CAPSULE ORAL at 08:11

## 2021-11-16 RX ADMIN — TRAZODONE HYDROCHLORIDE 50 MG: 50 TABLET ORAL at 09:11

## 2021-11-16 RX ADMIN — HYDROCODONE BITARTRATE AND ACETAMINOPHEN 1 TABLET: 5; 325 TABLET ORAL at 03:11

## 2021-11-16 RX ADMIN — SENNOSIDES AND DOCUSATE SODIUM 1 TABLET: 8.6; 5 TABLET ORAL at 08:11

## 2021-11-16 RX ADMIN — TIOTROPIUM BROMIDE INHALATION SPRAY 2 PUFF: 3.12 SPRAY, METERED RESPIRATORY (INHALATION) at 08:11

## 2021-11-16 RX ADMIN — MORPHINE SULFATE 2 MG: 2 INJECTION, SOLUTION INTRAMUSCULAR; INTRAVENOUS at 11:11

## 2021-11-16 RX ADMIN — GABAPENTIN 300 MG: 300 CAPSULE ORAL at 09:11

## 2021-11-16 RX ADMIN — MIDODRINE HYDROCHLORIDE 2.5 MG: 2.5 TABLET ORAL at 11:11

## 2021-11-16 RX ADMIN — RIVAROXABAN 15 MG: 15 TABLET, FILM COATED ORAL at 08:11

## 2021-11-16 RX ADMIN — POLYETHYLENE GLYCOL 3350 17 G: 17 POWDER, FOR SOLUTION ORAL at 08:11

## 2021-11-16 RX ADMIN — LORAZEPAM 0.5 MG: 2 INJECTION INTRAMUSCULAR; INTRAVENOUS at 09:11

## 2021-11-16 RX ADMIN — MELATONIN 6 MG: at 09:11

## 2021-11-16 RX ADMIN — LORAZEPAM 0.5 MG: 2 INJECTION INTRAMUSCULAR; INTRAVENOUS at 05:11

## 2021-11-16 RX ADMIN — MORPHINE SULFATE 2 MG: 2 INJECTION, SOLUTION INTRAMUSCULAR; INTRAVENOUS at 07:11

## 2021-11-16 RX ADMIN — ALPRAZOLAM 0.5 MG: 0.5 TABLET ORAL at 02:11

## 2021-11-16 RX ADMIN — HYDROCODONE BITARTRATE AND ACETAMINOPHEN 1 TABLET: 5; 325 TABLET ORAL at 08:11

## 2021-11-16 RX ADMIN — MIDODRINE HYDROCHLORIDE 2.5 MG: 2.5 TABLET ORAL at 04:11

## 2021-11-16 RX ADMIN — SENNOSIDES AND DOCUSATE SODIUM 1 TABLET: 8.6; 5 TABLET ORAL at 09:11

## 2021-11-16 RX ADMIN — HYDROCODONE BITARTRATE AND ACETAMINOPHEN 1 TABLET: 5; 325 TABLET ORAL at 12:11

## 2021-11-16 RX ADMIN — MIDODRINE HYDROCHLORIDE 2.5 MG: 2.5 TABLET ORAL at 08:11

## 2021-11-16 RX ADMIN — AMIODARONE HYDROCHLORIDE 200 MG: 200 TABLET ORAL at 09:11

## 2021-11-16 RX ADMIN — AMIODARONE HYDROCHLORIDE 200 MG: 200 TABLET ORAL at 08:11

## 2021-11-16 RX ADMIN — LEVOFLOXACIN 750 MG: 750 TABLET, FILM COATED ORAL at 08:11

## 2021-11-16 RX ADMIN — GABAPENTIN 300 MG: 300 CAPSULE ORAL at 08:11

## 2021-11-16 RX ADMIN — RIVAROXABAN 15 MG: 15 TABLET, FILM COATED ORAL at 04:11

## 2021-11-17 LAB
GLUCOSE SERPL-MCNC: 100 MG/DL (ref 70–110)
GLUCOSE SERPL-MCNC: 107 MG/DL (ref 70–110)
GLUCOSE SERPL-MCNC: 150 MG/DL (ref 70–110)
GLUCOSE SERPL-MCNC: 178 MG/DL (ref 70–110)

## 2021-11-17 PROCEDURE — 27000221 HC OXYGEN, UP TO 24 HOURS

## 2021-11-17 PROCEDURE — 97530 THERAPEUTIC ACTIVITIES: CPT

## 2021-11-17 PROCEDURE — 25000003 PHARM REV CODE 250: Performed by: INTERNAL MEDICINE

## 2021-11-17 PROCEDURE — 94761 N-INVAS EAR/PLS OXIMETRY MLT: CPT

## 2021-11-17 PROCEDURE — 94640 AIRWAY INHALATION TREATMENT: CPT

## 2021-11-17 PROCEDURE — 63600175 PHARM REV CODE 636 W HCPCS: Performed by: INTERNAL MEDICINE

## 2021-11-17 PROCEDURE — 12000002 HC ACUTE/MED SURGE SEMI-PRIVATE ROOM

## 2021-11-17 PROCEDURE — 25000003 PHARM REV CODE 250: Performed by: ORTHOPAEDIC SURGERY

## 2021-11-17 PROCEDURE — 99900035 HC TECH TIME PER 15 MIN (STAT)

## 2021-11-17 PROCEDURE — 99231 PR SUBSEQUENT HOSPITAL CARE,LEVL I: ICD-10-PCS | Mod: ,,, | Performed by: INTERNAL MEDICINE

## 2021-11-17 PROCEDURE — 99900031 HC PATIENT EDUCATION (STAT)

## 2021-11-17 PROCEDURE — 99231 SBSQ HOSP IP/OBS SF/LOW 25: CPT | Mod: ,,, | Performed by: INTERNAL MEDICINE

## 2021-11-17 RX ORDER — FUROSEMIDE 10 MG/ML
20 INJECTION INTRAMUSCULAR; INTRAVENOUS ONCE
Status: COMPLETED | OUTPATIENT
Start: 2021-11-17 | End: 2021-11-17

## 2021-11-17 RX ADMIN — SENNOSIDES AND DOCUSATE SODIUM 1 TABLET: 8.6; 5 TABLET ORAL at 09:11

## 2021-11-17 RX ADMIN — HYDROCODONE BITARTRATE AND ACETAMINOPHEN 1 TABLET: 5; 325 TABLET ORAL at 08:11

## 2021-11-17 RX ADMIN — MIDODRINE HYDROCHLORIDE 2.5 MG: 2.5 TABLET ORAL at 01:11

## 2021-11-17 RX ADMIN — INSULIN DETEMIR 25 UNITS: 100 INJECTION, SOLUTION SUBCUTANEOUS at 09:11

## 2021-11-17 RX ADMIN — ALPRAZOLAM 0.5 MG: 0.5 TABLET ORAL at 03:11

## 2021-11-17 RX ADMIN — MELATONIN 6 MG: at 09:11

## 2021-11-17 RX ADMIN — MORPHINE SULFATE 2 MG: 2 INJECTION, SOLUTION INTRAMUSCULAR; INTRAVENOUS at 09:11

## 2021-11-17 RX ADMIN — AMIODARONE HYDROCHLORIDE 200 MG: 200 TABLET ORAL at 08:11

## 2021-11-17 RX ADMIN — AMIODARONE HYDROCHLORIDE 200 MG: 200 TABLET ORAL at 09:11

## 2021-11-17 RX ADMIN — GABAPENTIN 300 MG: 300 CAPSULE ORAL at 09:11

## 2021-11-17 RX ADMIN — HYDROCODONE BITARTRATE AND ACETAMINOPHEN 1 TABLET: 5; 325 TABLET ORAL at 05:11

## 2021-11-17 RX ADMIN — MORPHINE SULFATE 2 MG: 2 INJECTION, SOLUTION INTRAMUSCULAR; INTRAVENOUS at 11:11

## 2021-11-17 RX ADMIN — RIVAROXABAN 15 MG: 15 TABLET, FILM COATED ORAL at 08:11

## 2021-11-17 RX ADMIN — MIDODRINE HYDROCHLORIDE 2.5 MG: 2.5 TABLET ORAL at 05:11

## 2021-11-17 RX ADMIN — FUROSEMIDE 20 MG: 10 INJECTION, SOLUTION INTRAVENOUS at 05:11

## 2021-11-17 RX ADMIN — RIVAROXABAN 15 MG: 15 TABLET, FILM COATED ORAL at 05:11

## 2021-11-17 RX ADMIN — TIOTROPIUM BROMIDE INHALATION SPRAY 2 PUFF: 3.12 SPRAY, METERED RESPIRATORY (INHALATION) at 08:11

## 2021-11-17 RX ADMIN — FLUTICASONE FUROATE AND VILANTEROL TRIFENATATE 1 PUFF: 100; 25 POWDER RESPIRATORY (INHALATION) at 08:11

## 2021-11-17 RX ADMIN — ALPRAZOLAM 0.5 MG: 0.5 TABLET ORAL at 01:11

## 2021-11-17 RX ADMIN — HYDROCODONE BITARTRATE AND ACETAMINOPHEN 1 TABLET: 5; 325 TABLET ORAL at 03:11

## 2021-11-17 RX ADMIN — SENNOSIDES AND DOCUSATE SODIUM 1 TABLET: 8.6; 5 TABLET ORAL at 08:11

## 2021-11-17 RX ADMIN — TAMSULOSIN HYDROCHLORIDE 0.4 MG: 0.4 CAPSULE ORAL at 08:11

## 2021-11-17 RX ADMIN — POLYETHYLENE GLYCOL 3350 17 G: 17 POWDER, FOR SOLUTION ORAL at 08:11

## 2021-11-17 RX ADMIN — LEVOFLOXACIN 750 MG: 750 TABLET, FILM COATED ORAL at 08:11

## 2021-11-17 RX ADMIN — INSULIN DETEMIR 25 UNITS: 100 INJECTION, SOLUTION SUBCUTANEOUS at 08:11

## 2021-11-17 RX ADMIN — GABAPENTIN 300 MG: 300 CAPSULE ORAL at 08:11

## 2021-11-17 RX ADMIN — MIDODRINE HYDROCHLORIDE 2.5 MG: 2.5 TABLET ORAL at 08:11

## 2021-11-17 RX ADMIN — TRAZODONE HYDROCHLORIDE 50 MG: 50 TABLET ORAL at 09:11

## 2021-11-18 LAB
ERYTHROCYTE [DISTWIDTH] IN BLOOD BY AUTOMATED COUNT: 14.4 % (ref 11.5–14.5)
GLUCOSE SERPL-MCNC: 150 MG/DL (ref 70–110)
GLUCOSE SERPL-MCNC: 167 MG/DL (ref 70–110)
GLUCOSE SERPL-MCNC: 172 MG/DL (ref 70–110)
GLUCOSE SERPL-MCNC: 86 MG/DL (ref 70–110)
HCT VFR BLD AUTO: 32.1 % (ref 40–54)
HGB BLD-MCNC: 9.8 G/DL (ref 14–18)
MCH RBC QN AUTO: 27.1 PG (ref 27–31)
MCHC RBC AUTO-ENTMCNC: 30.5 G/DL (ref 32–36)
MCV RBC AUTO: 89 FL (ref 82–98)
PLATELET # BLD AUTO: 456 K/UL (ref 150–450)
PMV BLD AUTO: 9.1 FL (ref 9.2–12.9)
RBC # BLD AUTO: 3.62 M/UL (ref 4.6–6.2)
WBC # BLD AUTO: 13.9 K/UL (ref 3.9–12.7)

## 2021-11-18 PROCEDURE — 27000221 HC OXYGEN, UP TO 24 HOURS

## 2021-11-18 PROCEDURE — 25000003 PHARM REV CODE 250: Performed by: INTERNAL MEDICINE

## 2021-11-18 PROCEDURE — 85027 COMPLETE CBC AUTOMATED: CPT | Performed by: INTERNAL MEDICINE

## 2021-11-18 PROCEDURE — 12000002 HC ACUTE/MED SURGE SEMI-PRIVATE ROOM

## 2021-11-18 PROCEDURE — 25000003 PHARM REV CODE 250: Performed by: ORTHOPAEDIC SURGERY

## 2021-11-18 PROCEDURE — 25000242 PHARM REV CODE 250 ALT 637 W/ HCPCS: Performed by: ORTHOPAEDIC SURGERY

## 2021-11-18 PROCEDURE — 94761 N-INVAS EAR/PLS OXIMETRY MLT: CPT

## 2021-11-18 PROCEDURE — 99232 SBSQ HOSP IP/OBS MODERATE 35: CPT | Mod: ,,, | Performed by: INTERNAL MEDICINE

## 2021-11-18 PROCEDURE — 94640 AIRWAY INHALATION TREATMENT: CPT

## 2021-11-18 PROCEDURE — 82962 GLUCOSE BLOOD TEST: CPT

## 2021-11-18 PROCEDURE — 94618 PULMONARY STRESS TESTING: CPT

## 2021-11-18 PROCEDURE — 97530 THERAPEUTIC ACTIVITIES: CPT | Mod: CQ

## 2021-11-18 PROCEDURE — 63600175 PHARM REV CODE 636 W HCPCS: Performed by: INTERNAL MEDICINE

## 2021-11-18 PROCEDURE — 99900031 HC PATIENT EDUCATION (STAT)

## 2021-11-18 PROCEDURE — 99900035 HC TECH TIME PER 15 MIN (STAT)

## 2021-11-18 PROCEDURE — 99232 PR SUBSEQUENT HOSPITAL CARE,LEVL II: ICD-10-PCS | Mod: ,,, | Performed by: INTERNAL MEDICINE

## 2021-11-18 RX ORDER — MIDODRINE HYDROCHLORIDE 2.5 MG/1
2.5 TABLET ORAL 2 TIMES DAILY WITH MEALS
Status: DISCONTINUED | OUTPATIENT
Start: 2021-11-18 | End: 2021-11-19 | Stop reason: HOSPADM

## 2021-11-18 RX ADMIN — RIVAROXABAN 15 MG: 15 TABLET, FILM COATED ORAL at 08:11

## 2021-11-18 RX ADMIN — IPRATROPIUM BROMIDE AND ALBUTEROL SULFATE 3 ML: .5; 3 SOLUTION RESPIRATORY (INHALATION) at 09:11

## 2021-11-18 RX ADMIN — FLUTICASONE FUROATE AND VILANTEROL TRIFENATATE 1 PUFF: 100; 25 POWDER RESPIRATORY (INHALATION) at 09:11

## 2021-11-18 RX ADMIN — INSULIN DETEMIR 25 UNITS: 100 INJECTION, SOLUTION SUBCUTANEOUS at 09:11

## 2021-11-18 RX ADMIN — MORPHINE SULFATE 2 MG: 2 INJECTION, SOLUTION INTRAMUSCULAR; INTRAVENOUS at 04:11

## 2021-11-18 RX ADMIN — POLYETHYLENE GLYCOL 3350 17 G: 17 POWDER, FOR SOLUTION ORAL at 08:11

## 2021-11-18 RX ADMIN — RIVAROXABAN 15 MG: 15 TABLET, FILM COATED ORAL at 04:11

## 2021-11-18 RX ADMIN — TAMSULOSIN HYDROCHLORIDE 0.4 MG: 0.4 CAPSULE ORAL at 08:11

## 2021-11-18 RX ADMIN — HYDROCODONE BITARTRATE AND ACETAMINOPHEN 1 TABLET: 5; 325 TABLET ORAL at 02:11

## 2021-11-18 RX ADMIN — ALPRAZOLAM 0.5 MG: 0.5 TABLET ORAL at 08:11

## 2021-11-18 RX ADMIN — GABAPENTIN 300 MG: 300 CAPSULE ORAL at 08:11

## 2021-11-18 RX ADMIN — HYDROCODONE BITARTRATE AND ACETAMINOPHEN 1 TABLET: 5; 325 TABLET ORAL at 09:11

## 2021-11-18 RX ADMIN — SENNOSIDES AND DOCUSATE SODIUM 1 TABLET: 8.6; 5 TABLET ORAL at 08:11

## 2021-11-18 RX ADMIN — MIDODRINE HYDROCHLORIDE 2.5 MG: 2.5 TABLET ORAL at 04:11

## 2021-11-18 RX ADMIN — ALPRAZOLAM 0.5 MG: 0.5 TABLET ORAL at 12:11

## 2021-11-18 RX ADMIN — TIOTROPIUM BROMIDE INHALATION SPRAY 2 PUFF: 3.12 SPRAY, METERED RESPIRATORY (INHALATION) at 09:11

## 2021-11-18 RX ADMIN — AMIODARONE HYDROCHLORIDE 200 MG: 200 TABLET ORAL at 08:11

## 2021-11-18 RX ADMIN — HYDROCODONE BITARTRATE AND ACETAMINOPHEN 1 TABLET: 5; 325 TABLET ORAL at 12:11

## 2021-11-18 RX ADMIN — TRAZODONE HYDROCHLORIDE 50 MG: 50 TABLET ORAL at 08:11

## 2021-11-18 RX ADMIN — MORPHINE SULFATE 2 MG: 2 INJECTION, SOLUTION INTRAMUSCULAR; INTRAVENOUS at 09:11

## 2021-11-19 VITALS
WEIGHT: 240.06 LBS | RESPIRATION RATE: 18 BRPM | TEMPERATURE: 98 F | OXYGEN SATURATION: 94 % | BODY MASS INDEX: 35.56 KG/M2 | HEART RATE: 84 BPM | DIASTOLIC BLOOD PRESSURE: 88 MMHG | SYSTOLIC BLOOD PRESSURE: 133 MMHG | HEIGHT: 69 IN

## 2021-11-19 LAB
GLUCOSE SERPL-MCNC: 164 MG/DL (ref 70–110)
GLUCOSE SERPL-MCNC: 171 MG/DL (ref 70–110)
GLUCOSE SERPL-MCNC: 179 MG/DL (ref 70–110)

## 2021-11-19 PROCEDURE — 25000003 PHARM REV CODE 250: Performed by: INTERNAL MEDICINE

## 2021-11-19 PROCEDURE — 25000003 PHARM REV CODE 250: Performed by: ORTHOPAEDIC SURGERY

## 2021-11-19 PROCEDURE — 99231 PR SUBSEQUENT HOSPITAL CARE,LEVL I: ICD-10-PCS | Mod: ,,, | Performed by: INTERNAL MEDICINE

## 2021-11-19 PROCEDURE — 27000221 HC OXYGEN, UP TO 24 HOURS

## 2021-11-19 PROCEDURE — 99231 SBSQ HOSP IP/OBS SF/LOW 25: CPT | Mod: ,,, | Performed by: INTERNAL MEDICINE

## 2021-11-19 PROCEDURE — 94761 N-INVAS EAR/PLS OXIMETRY MLT: CPT

## 2021-11-19 PROCEDURE — 94640 AIRWAY INHALATION TREATMENT: CPT

## 2021-11-19 PROCEDURE — 63600175 PHARM REV CODE 636 W HCPCS: Performed by: INTERNAL MEDICINE

## 2021-11-19 RX ORDER — HYDROCODONE BITARTRATE AND ACETAMINOPHEN 5; 325 MG/1; MG/1
1 TABLET ORAL EVERY 6 HOURS PRN
Qty: 14 TABLET | Refills: 0 | Status: ON HOLD | OUTPATIENT
Start: 2021-11-19 | End: 2021-12-07 | Stop reason: HOSPADM

## 2021-11-19 RX ADMIN — HUMAN INSULIN 2 UNITS: 100 INJECTION, SOLUTION SUBCUTANEOUS at 05:11

## 2021-11-19 RX ADMIN — AMIODARONE HYDROCHLORIDE 200 MG: 200 TABLET ORAL at 09:11

## 2021-11-19 RX ADMIN — RIVAROXABAN 15 MG: 15 TABLET, FILM COATED ORAL at 09:11

## 2021-11-19 RX ADMIN — TAMSULOSIN HYDROCHLORIDE 0.4 MG: 0.4 CAPSULE ORAL at 09:11

## 2021-11-19 RX ADMIN — ALPRAZOLAM 0.5 MG: 0.5 TABLET ORAL at 07:11

## 2021-11-19 RX ADMIN — RIVAROXABAN 15 MG: 15 TABLET, FILM COATED ORAL at 05:11

## 2021-11-19 RX ADMIN — HYDROCODONE BITARTRATE AND ACETAMINOPHEN 1 TABLET: 5; 325 TABLET ORAL at 09:11

## 2021-11-19 RX ADMIN — HUMAN INSULIN 2 UNITS: 100 INJECTION, SOLUTION SUBCUTANEOUS at 09:11

## 2021-11-19 RX ADMIN — MIDODRINE HYDROCHLORIDE 2.5 MG: 2.5 TABLET ORAL at 05:11

## 2021-11-19 RX ADMIN — HYDROCODONE BITARTRATE AND ACETAMINOPHEN 1 TABLET: 5; 325 TABLET ORAL at 05:11

## 2021-11-19 RX ADMIN — INSULIN DETEMIR 25 UNITS: 100 INJECTION, SOLUTION SUBCUTANEOUS at 09:11

## 2021-11-19 RX ADMIN — GABAPENTIN 300 MG: 300 CAPSULE ORAL at 09:11

## 2021-11-19 RX ADMIN — FLUTICASONE FUROATE AND VILANTEROL TRIFENATATE 1 PUFF: 100; 25 POWDER RESPIRATORY (INHALATION) at 08:11

## 2021-11-19 RX ADMIN — MIDODRINE HYDROCHLORIDE 2.5 MG: 2.5 TABLET ORAL at 09:11

## 2021-11-19 RX ADMIN — MORPHINE SULFATE 2 MG: 2 INJECTION, SOLUTION INTRAMUSCULAR; INTRAVENOUS at 01:11

## 2021-11-19 RX ADMIN — HYDROCODONE BITARTRATE AND ACETAMINOPHEN 1 TABLET: 5; 325 TABLET ORAL at 03:11

## 2021-11-25 ENCOUNTER — HOSPITAL ENCOUNTER (INPATIENT)
Facility: HOSPITAL | Age: 61
LOS: 12 days | Discharge: HOME HEALTH | DRG: 564 | End: 2021-12-07
Attending: EMERGENCY MEDICINE
Payer: MEDICAID

## 2021-11-25 DIAGNOSIS — J96.21 ACUTE AND CHRONIC RESPIRATORY FAILURE WITH HYPOXIA: ICD-10-CM

## 2021-11-25 DIAGNOSIS — I48.0 PAROXYSMAL ATRIAL FIBRILLATION: Chronic | ICD-10-CM

## 2021-11-25 DIAGNOSIS — E11.52 DIABETIC WET GANGRENE OF THE FOOT: ICD-10-CM

## 2021-11-25 DIAGNOSIS — R07.9 CHEST PAIN: ICD-10-CM

## 2021-11-25 DIAGNOSIS — S20.219A CHEST WALL CONTUSION: ICD-10-CM

## 2021-11-25 DIAGNOSIS — J96.21 ACUTE ON CHRONIC RESPIRATORY FAILURE WITH HYPOXIA: ICD-10-CM

## 2021-11-25 DIAGNOSIS — J96.11 CHRONIC RESPIRATORY FAILURE WITH HYPOXIA: ICD-10-CM

## 2021-11-25 DIAGNOSIS — R53.81 PHYSICAL DEBILITY: ICD-10-CM
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
## 2021-11-25 LAB
ALBUMIN SERPL BCP-MCNC: 2.8 G/DL (ref 3.5–5.2)
ALLENS TEST: ABNORMAL
ALP SERPL-CCNC: 117 U/L (ref 55–135)
ALT SERPL W/O P-5'-P-CCNC: 15 U/L (ref 10–44)
AMYLASE SERPL-CCNC: 18 U/L (ref 20–110)
ANION GAP SERPL CALC-SCNC: 15 MMOL/L (ref 8–16)
APTT PPP: 33.5 SEC (ref 23.3–35.1)
AST SERPL-CCNC: 26 U/L (ref 10–40)
BACTERIA #/AREA URNS HPF: NEGATIVE /HPF
BASOPHILS # BLD AUTO: 0.14 K/UL (ref 0–0.2)
BASOPHILS NFR BLD: 0.8 % (ref 0–1.9)
BILIRUB SERPL-MCNC: 1.3 MG/DL (ref 0.1–1)
BILIRUB UR QL STRIP: ABNORMAL
BUN SERPL-MCNC: 10 MG/DL (ref 8–23)
CALCIUM SERPL-MCNC: 9.1 MG/DL (ref 8.7–10.5)
CHLORIDE SERPL-SCNC: 97 MMOL/L (ref 95–110)
CK MB SERPL-MCNC: 2.8 NG/ML (ref 0.1–6.5)
CK SERPL-CCNC: 653 U/L (ref 20–200)
CLARITY UR: CLEAR
CO2 SERPL-SCNC: 26 MMOL/L (ref 23–29)
COLOR UR: YELLOW
CREAT SERPL-MCNC: 0.7 MG/DL (ref 0.5–1.4)
CREAT SERPL-MCNC: 0.8 MG/DL (ref 0.5–1.4)
DELSYS: ABNORMAL
DIFFERENTIAL METHOD: ABNORMAL
EOSINOPHIL # BLD AUTO: 0.5 K/UL (ref 0–0.5)
EOSINOPHIL NFR BLD: 2.9 % (ref 0–8)
ERYTHROCYTE [DISTWIDTH] IN BLOOD BY AUTOMATED COUNT: 15.7 % (ref 11.5–14.5)
EST. GFR  (AFRICAN AMERICAN): >60 ML/MIN/1.73 M^2
EST. GFR  (NON AFRICAN AMERICAN): >60 ML/MIN/1.73 M^2
FLOW: 10
GLUCOSE SERPL-MCNC: 141 MG/DL (ref 70–110)
GLUCOSE SERPL-MCNC: 152 MG/DL (ref 70–110)
GLUCOSE SERPL-MCNC: 156 MG/DL (ref 70–110)
GLUCOSE SERPL-MCNC: 157 MG/DL (ref 70–110)
GLUCOSE SERPL-MCNC: 162 MG/DL (ref 70–110)
GLUCOSE SERPL-MCNC: 173 MG/DL (ref 70–110)
GLUCOSE SERPL-MCNC: 197 MG/DL (ref 70–110)
GLUCOSE UR QL STRIP: NEGATIVE
HCO3 UR-SCNC: 28.4 MMOL/L (ref 24–28)
HCT VFR BLD AUTO: 36.6 % (ref 40–54)
HCT VFR BLD CALC: 36 %PCV (ref 36–54)
HGB BLD-MCNC: 11 G/DL (ref 14–18)
HGB UR QL STRIP: ABNORMAL
HYALINE CASTS #/AREA URNS LPF: 21 /LPF
IMM GRANULOCYTES # BLD AUTO: 0.13 K/UL (ref 0–0.04)
IMM GRANULOCYTES NFR BLD AUTO: 0.8 % (ref 0–0.5)
INR PPP: 1.3
KETONES UR QL STRIP: ABNORMAL
LACTATE SERPL-SCNC: 1.3 MMOL/L (ref 0.5–1.9)
LEUKOCYTE ESTERASE UR QL STRIP: ABNORMAL
LIPASE SERPL-CCNC: 14 U/L (ref 4–60)
LYMPHOCYTES # BLD AUTO: 1.5 K/UL (ref 1–4.8)
LYMPHOCYTES NFR BLD: 9.1 % (ref 18–48)
MAGNESIUM SERPL-MCNC: 1.8 MG/DL (ref 1.6–2.6)
MCH RBC QN AUTO: 27.4 PG (ref 27–31)
MCHC RBC AUTO-ENTMCNC: 30.1 G/DL (ref 32–36)
MCV RBC AUTO: 91 FL (ref 82–98)
MICROSCOPIC COMMENT: ABNORMAL
MODE: ABNORMAL
MONOCYTES # BLD AUTO: 1.2 K/UL (ref 0.3–1)
MONOCYTES NFR BLD: 7.1 % (ref 4–15)
NEUTROPHILS # BLD AUTO: 13.2 K/UL (ref 1.8–7.7)
NEUTROPHILS NFR BLD: 79.3 % (ref 38–73)
NITRITE UR QL STRIP: NEGATIVE
NRBC BLD-RTO: 0 /100 WBC
PCO2 BLDA: 44.4 MMHG (ref 35–45)
PH SMN: 7.42 [PH] (ref 7.35–7.45)
PH UR STRIP: 7 [PH] (ref 5–8)
PLATELET # BLD AUTO: 570 K/UL (ref 150–450)
PMV BLD AUTO: 9.5 FL (ref 9.2–12.9)
PO2 BLDA: 67 MMHG (ref 80–100)
POC BE: 4 MMOL/L
POC IONIZED CALCIUM: 1.23 MMOL/L (ref 1.06–1.42)
POC SATURATED O2: 93 % (ref 95–100)
POC TCO2: 30 MMOL/L (ref 23–27)
POTASSIUM BLD-SCNC: 3.9 MMOL/L (ref 3.5–5.1)
POTASSIUM SERPL-SCNC: 4 MMOL/L (ref 3.5–5.1)
PROCALCITONIN SERPL IA-MCNC: 0.08 NG/ML (ref 0–0.5)
PROT SERPL-MCNC: 7.9 G/DL (ref 6–8.4)
PROT UR QL STRIP: ABNORMAL
PROTHROMBIN TIME: 14.9 SEC (ref 11.4–13.7)
RBC # BLD AUTO: 4.01 M/UL (ref 4.6–6.2)
RBC #/AREA URNS HPF: 42 /HPF (ref 0–4)
SAMPLE: ABNORMAL
SAMPLE: NORMAL
SARS-COV-2 RDRP RESP QL NAA+PROBE: NEGATIVE
SITE: ABNORMAL
SODIUM BLD-SCNC: 139 MMOL/L (ref 136–145)
SODIUM SERPL-SCNC: 138 MMOL/L (ref 136–145)
SP GR UR STRIP: 1.03 (ref 1–1.03)
SQUAMOUS #/AREA URNS HPF: 4 /HPF
TROPONIN I SERPL DL<=0.01 NG/ML-MCNC: 0.04 NG/ML
TROPONIN I SERPL DL<=0.01 NG/ML-MCNC: 0.05 NG/ML
TROPONIN I SERPL DL<=0.01 NG/ML-MCNC: <0.03 NG/ML
URN SPEC COLLECT METH UR: ABNORMAL
UROBILINOGEN UR STRIP-ACNC: ABNORMAL EU/DL
WBC # BLD AUTO: 16.62 K/UL (ref 3.9–12.7)
WBC #/AREA URNS HPF: 9 /HPF (ref 0–5)

## 2021-11-25 PROCEDURE — 84484 ASSAY OF TROPONIN QUANT: CPT | Performed by: EMERGENCY MEDICINE

## 2021-11-25 PROCEDURE — 82550 ASSAY OF CK (CPK): CPT | Performed by: EMERGENCY MEDICINE

## 2021-11-25 PROCEDURE — 82150 ASSAY OF AMYLASE: CPT | Performed by: EMERGENCY MEDICINE

## 2021-11-25 PROCEDURE — 83735 ASSAY OF MAGNESIUM: CPT | Performed by: EMERGENCY MEDICINE

## 2021-11-25 PROCEDURE — 96375 TX/PRO/DX INJ NEW DRUG ADDON: CPT

## 2021-11-25 PROCEDURE — 25000003 PHARM REV CODE 250: Performed by: INTERNAL MEDICINE

## 2021-11-25 PROCEDURE — G0378 HOSPITAL OBSERVATION PER HR: HCPCS

## 2021-11-25 PROCEDURE — 82330 ASSAY OF CALCIUM: CPT

## 2021-11-25 PROCEDURE — 63600175 PHARM REV CODE 636 W HCPCS: Performed by: EMERGENCY MEDICINE

## 2021-11-25 PROCEDURE — 99900035 HC TECH TIME PER 15 MIN (STAT)

## 2021-11-25 PROCEDURE — 87147 CULTURE TYPE IMMUNOLOGIC: CPT | Performed by: EMERGENCY MEDICINE

## 2021-11-25 PROCEDURE — 85610 PROTHROMBIN TIME: CPT | Performed by: EMERGENCY MEDICINE

## 2021-11-25 PROCEDURE — U0002 COVID-19 LAB TEST NON-CDC: HCPCS | Performed by: EMERGENCY MEDICINE

## 2021-11-25 PROCEDURE — 87040 BLOOD CULTURE FOR BACTERIA: CPT | Performed by: EMERGENCY MEDICINE

## 2021-11-25 PROCEDURE — 82553 CREATINE MB FRACTION: CPT | Performed by: EMERGENCY MEDICINE

## 2021-11-25 PROCEDURE — 99285 EMERGENCY DEPT VISIT HI MDM: CPT | Mod: 25

## 2021-11-25 PROCEDURE — 94761 N-INVAS EAR/PLS OXIMETRY MLT: CPT

## 2021-11-25 PROCEDURE — 25500020 PHARM REV CODE 255: Performed by: EMERGENCY MEDICINE

## 2021-11-25 PROCEDURE — 21400001 HC TELEMETRY ROOM

## 2021-11-25 PROCEDURE — 84145 PROCALCITONIN (PCT): CPT | Performed by: EMERGENCY MEDICINE

## 2021-11-25 PROCEDURE — 25000003 PHARM REV CODE 250: Performed by: EMERGENCY MEDICINE

## 2021-11-25 PROCEDURE — 84295 ASSAY OF SERUM SODIUM: CPT

## 2021-11-25 PROCEDURE — 63600175 PHARM REV CODE 636 W HCPCS: Performed by: INTERNAL MEDICINE

## 2021-11-25 PROCEDURE — 81001 URINALYSIS AUTO W/SCOPE: CPT | Performed by: EMERGENCY MEDICINE

## 2021-11-25 PROCEDURE — 83605 ASSAY OF LACTIC ACID: CPT | Performed by: EMERGENCY MEDICINE

## 2021-11-25 PROCEDURE — 36415 COLL VENOUS BLD VENIPUNCTURE: CPT | Performed by: INTERNAL MEDICINE

## 2021-11-25 PROCEDURE — 27000221 HC OXYGEN, UP TO 24 HOURS

## 2021-11-25 PROCEDURE — 36600 WITHDRAWAL OF ARTERIAL BLOOD: CPT

## 2021-11-25 PROCEDURE — 93010 ELECTROCARDIOGRAM REPORT: CPT | Mod: ,,, | Performed by: SPECIALIST

## 2021-11-25 PROCEDURE — 96365 THER/PROPH/DIAG IV INF INIT: CPT

## 2021-11-25 PROCEDURE — 85014 HEMATOCRIT: CPT

## 2021-11-25 PROCEDURE — 82962 GLUCOSE BLOOD TEST: CPT

## 2021-11-25 PROCEDURE — 85025 COMPLETE CBC W/AUTO DIFF WBC: CPT | Performed by: EMERGENCY MEDICINE

## 2021-11-25 PROCEDURE — 83690 ASSAY OF LIPASE: CPT | Performed by: EMERGENCY MEDICINE

## 2021-11-25 PROCEDURE — 80053 COMPREHEN METABOLIC PANEL: CPT | Performed by: EMERGENCY MEDICINE

## 2021-11-25 PROCEDURE — 99900031 HC PATIENT EDUCATION (STAT)

## 2021-11-25 PROCEDURE — 82803 BLOOD GASES ANY COMBINATION: CPT

## 2021-11-25 PROCEDURE — 93005 ELECTROCARDIOGRAM TRACING: CPT | Performed by: SPECIALIST

## 2021-11-25 PROCEDURE — 93010 EKG 12-LEAD: ICD-10-PCS | Mod: ,,, | Performed by: SPECIALIST

## 2021-11-25 PROCEDURE — 84132 ASSAY OF SERUM POTASSIUM: CPT

## 2021-11-25 PROCEDURE — 85730 THROMBOPLASTIN TIME PARTIAL: CPT | Performed by: EMERGENCY MEDICINE

## 2021-11-25 PROCEDURE — 84484 ASSAY OF TROPONIN QUANT: CPT | Mod: 91 | Performed by: INTERNAL MEDICINE

## 2021-11-25 RX ORDER — IBUPROFEN 200 MG
24 TABLET ORAL
Status: DISCONTINUED | OUTPATIENT
Start: 2021-11-25 | End: 2021-11-27

## 2021-11-25 RX ORDER — IBUPROFEN 200 MG
16 TABLET ORAL
Status: DISCONTINUED | OUTPATIENT
Start: 2021-11-25 | End: 2021-11-27

## 2021-11-25 RX ORDER — MORPHINE SULFATE 4 MG/ML
4 INJECTION, SOLUTION INTRAMUSCULAR; INTRAVENOUS
Status: COMPLETED | OUTPATIENT
Start: 2021-11-25 | End: 2021-11-25

## 2021-11-25 RX ORDER — SODIUM CHLORIDE 0.9 % (FLUSH) 0.9 %
10 SYRINGE (ML) INJECTION EVERY 12 HOURS PRN
Status: DISCONTINUED | OUTPATIENT
Start: 2021-11-25 | End: 2021-12-07 | Stop reason: HOSPADM

## 2021-11-25 RX ORDER — SODIUM CHLORIDE 9 MG/ML
INJECTION, SOLUTION INTRAVENOUS
Status: ACTIVE | OUTPATIENT
Start: 2021-11-25 | End: 2021-11-25

## 2021-11-25 RX ORDER — AMIODARONE HYDROCHLORIDE 200 MG/1
200 TABLET ORAL DAILY
Status: DISCONTINUED | OUTPATIENT
Start: 2021-11-25 | End: 2021-12-06

## 2021-11-25 RX ORDER — AMLODIPINE BESYLATE 5 MG/1
10 TABLET ORAL DAILY
Status: DISCONTINUED | OUTPATIENT
Start: 2021-11-25 | End: 2021-12-02

## 2021-11-25 RX ORDER — GLUCAGON 1 MG
1 KIT INJECTION
Status: DISCONTINUED | OUTPATIENT
Start: 2021-11-25 | End: 2021-12-07 | Stop reason: HOSPADM

## 2021-11-25 RX ORDER — TAMSULOSIN HYDROCHLORIDE 0.4 MG/1
0.4 CAPSULE ORAL DAILY
Status: DISCONTINUED | OUTPATIENT
Start: 2021-11-25 | End: 2021-12-07 | Stop reason: HOSPADM

## 2021-11-25 RX ORDER — ONDANSETRON 2 MG/ML
4 INJECTION INTRAMUSCULAR; INTRAVENOUS
Status: COMPLETED | OUTPATIENT
Start: 2021-11-25 | End: 2021-11-25

## 2021-11-25 RX ORDER — PROCHLORPERAZINE EDISYLATE 5 MG/ML
5 INJECTION INTRAMUSCULAR; INTRAVENOUS EVERY 6 HOURS PRN
Status: DISCONTINUED | OUTPATIENT
Start: 2021-11-25 | End: 2021-11-30

## 2021-11-25 RX ORDER — ONDANSETRON 4 MG/1
8 TABLET, ORALLY DISINTEGRATING ORAL EVERY 8 HOURS PRN
Status: DISCONTINUED | OUTPATIENT
Start: 2021-11-25 | End: 2021-12-07 | Stop reason: HOSPADM

## 2021-11-25 RX ORDER — NALOXONE HCL 0.4 MG/ML
0.02 VIAL (ML) INJECTION
Status: DISCONTINUED | OUTPATIENT
Start: 2021-11-25 | End: 2021-12-07 | Stop reason: HOSPADM

## 2021-11-25 RX ORDER — LISINOPRIL 10 MG/1
10 TABLET ORAL DAILY
Status: DISCONTINUED | OUTPATIENT
Start: 2021-11-25 | End: 2021-12-02

## 2021-11-25 RX ORDER — MIDODRINE HYDROCHLORIDE 2.5 MG/1
2.5 TABLET ORAL
Status: DISCONTINUED | OUTPATIENT
Start: 2021-11-25 | End: 2021-12-06

## 2021-11-25 RX ORDER — ACETAMINOPHEN 325 MG/1
650 TABLET ORAL EVERY 4 HOURS PRN
Status: DISCONTINUED | OUTPATIENT
Start: 2021-11-25 | End: 2021-12-07 | Stop reason: HOSPADM

## 2021-11-25 RX ORDER — GABAPENTIN 300 MG/1
300 CAPSULE ORAL 2 TIMES DAILY
Status: DISCONTINUED | OUTPATIENT
Start: 2021-11-25 | End: 2021-11-30

## 2021-11-25 RX ORDER — TALC
6 POWDER (GRAM) TOPICAL NIGHTLY PRN
Status: DISCONTINUED | OUTPATIENT
Start: 2021-11-25 | End: 2021-12-07 | Stop reason: HOSPADM

## 2021-11-25 RX ORDER — ALBUTEROL SULFATE 90 UG/1
2 AEROSOL, METERED RESPIRATORY (INHALATION) EVERY 6 HOURS PRN
Status: DISCONTINUED | OUTPATIENT
Start: 2021-11-25 | End: 2021-11-26

## 2021-11-25 RX ADMIN — MIDODRINE HYDROCHLORIDE 2.5 MG: 2.5 TABLET ORAL at 12:11

## 2021-11-25 RX ADMIN — TAMSULOSIN HYDROCHLORIDE 0.4 MG: 0.4 CAPSULE ORAL at 12:11

## 2021-11-25 RX ADMIN — AMLODIPINE BESYLATE 10 MG: 5 TABLET ORAL at 12:11

## 2021-11-25 RX ADMIN — MIDODRINE HYDROCHLORIDE 2.5 MG: 2.5 TABLET ORAL at 06:11

## 2021-11-25 RX ADMIN — ACETAMINOPHEN 650 MG: 325 TABLET, FILM COATED ORAL at 12:11

## 2021-11-25 RX ADMIN — MORPHINE SULFATE 4 MG: 4 INJECTION, SOLUTION INTRAMUSCULAR; INTRAVENOUS at 02:11

## 2021-11-25 RX ADMIN — RIVAROXABAN 20 MG: 20 TABLET, FILM COATED ORAL at 06:11

## 2021-11-25 RX ADMIN — IOHEXOL 100 ML: 350 INJECTION, SOLUTION INTRAVENOUS at 03:11

## 2021-11-25 RX ADMIN — CEFTRIAXONE SODIUM 1 G: 1 INJECTION, POWDER, FOR SOLUTION INTRAMUSCULAR; INTRAVENOUS at 07:11

## 2021-11-25 RX ADMIN — MELATONIN 6 MG: at 08:11

## 2021-11-25 RX ADMIN — GABAPENTIN 300 MG: 300 CAPSULE ORAL at 08:11

## 2021-11-25 RX ADMIN — GABAPENTIN 300 MG: 300 CAPSULE ORAL at 12:11

## 2021-11-25 RX ADMIN — AMIODARONE HYDROCHLORIDE 200 MG: 200 TABLET ORAL at 12:11

## 2021-11-25 RX ADMIN — ONDANSETRON 4 MG: 2 INJECTION INTRAMUSCULAR; INTRAVENOUS at 02:11

## 2021-11-25 RX ADMIN — ACETAMINOPHEN 650 MG: 325 TABLET, FILM COATED ORAL at 06:11

## 2021-11-25 RX ADMIN — SODIUM CHLORIDE 500 ML: 0.9 INJECTION, SOLUTION INTRAVENOUS at 03:11

## 2021-11-26 PROBLEM — R53.81 PHYSICAL DEBILITY: Status: ACTIVE | Noted: 2021-11-26

## 2021-11-26 PROBLEM — T79.6XXA TRAUMATIC RHABDOMYOLYSIS: Status: ACTIVE | Noted: 2021-11-26

## 2021-11-26 LAB
ANION GAP SERPL CALC-SCNC: 11 MMOL/L (ref 8–16)
BASOPHILS # BLD AUTO: 0.12 K/UL (ref 0–0.2)
BASOPHILS NFR BLD: 1 % (ref 0–1.9)
BUN SERPL-MCNC: 9 MG/DL (ref 8–23)
CALCIUM SERPL-MCNC: 8.6 MG/DL (ref 8.7–10.5)
CHLORIDE SERPL-SCNC: 97 MMOL/L (ref 95–110)
CK SERPL-CCNC: 221 U/L (ref 20–200)
CO2 SERPL-SCNC: 29 MMOL/L (ref 23–29)
CREAT SERPL-MCNC: 0.6 MG/DL (ref 0.5–1.4)
DIFFERENTIAL METHOD: ABNORMAL
EOSINOPHIL # BLD AUTO: 0.8 K/UL (ref 0–0.5)
EOSINOPHIL NFR BLD: 7.1 % (ref 0–8)
ERYTHROCYTE [DISTWIDTH] IN BLOOD BY AUTOMATED COUNT: 15.1 % (ref 11.5–14.5)
EST. GFR  (AFRICAN AMERICAN): >60 ML/MIN/1.73 M^2
EST. GFR  (NON AFRICAN AMERICAN): >60 ML/MIN/1.73 M^2
GLUCOSE SERPL-MCNC: 125 MG/DL (ref 70–110)
GLUCOSE SERPL-MCNC: 146 MG/DL (ref 70–110)
GLUCOSE SERPL-MCNC: 225 MG/DL (ref 70–110)
GLUCOSE SERPL-MCNC: 253 MG/DL (ref 70–110)
HCT VFR BLD AUTO: 32.9 % (ref 40–54)
HGB BLD-MCNC: 9.7 G/DL (ref 14–18)
IMM GRANULOCYTES # BLD AUTO: 0.1 K/UL (ref 0–0.04)
IMM GRANULOCYTES NFR BLD AUTO: 0.9 % (ref 0–0.5)
LYMPHOCYTES # BLD AUTO: 1.9 K/UL (ref 1–4.8)
LYMPHOCYTES NFR BLD: 16 % (ref 18–48)
MCH RBC QN AUTO: 26.8 PG (ref 27–31)
MCHC RBC AUTO-ENTMCNC: 29.5 G/DL (ref 32–36)
MCV RBC AUTO: 91 FL (ref 82–98)
MONOCYTES # BLD AUTO: 1.1 K/UL (ref 0.3–1)
MONOCYTES NFR BLD: 8.9 % (ref 4–15)
NEUTROPHILS # BLD AUTO: 7.8 K/UL (ref 1.8–7.7)
NEUTROPHILS NFR BLD: 66.1 % (ref 38–73)
NRBC BLD-RTO: 0 /100 WBC
PLATELET # BLD AUTO: 445 K/UL (ref 150–450)
PMV BLD AUTO: 9.5 FL (ref 9.2–12.9)
POTASSIUM SERPL-SCNC: 3.9 MMOL/L (ref 3.5–5.1)
RBC # BLD AUTO: 3.62 M/UL (ref 4.6–6.2)
SODIUM SERPL-SCNC: 137 MMOL/L (ref 136–145)
WBC # BLD AUTO: 11.75 K/UL (ref 3.9–12.7)

## 2021-11-26 PROCEDURE — 97162 PT EVAL MOD COMPLEX 30 MIN: CPT

## 2021-11-26 PROCEDURE — 94761 N-INVAS EAR/PLS OXIMETRY MLT: CPT

## 2021-11-26 PROCEDURE — 25000003 PHARM REV CODE 250: Performed by: INTERNAL MEDICINE

## 2021-11-26 PROCEDURE — 96374 THER/PROPH/DIAG INJ IV PUSH: CPT | Mod: 59

## 2021-11-26 PROCEDURE — 82962 GLUCOSE BLOOD TEST: CPT

## 2021-11-26 PROCEDURE — 99900035 HC TECH TIME PER 15 MIN (STAT)

## 2021-11-26 PROCEDURE — 85025 COMPLETE CBC W/AUTO DIFF WBC: CPT | Performed by: INTERNAL MEDICINE

## 2021-11-26 PROCEDURE — 27000221 HC OXYGEN, UP TO 24 HOURS

## 2021-11-26 PROCEDURE — 25000242 PHARM REV CODE 250 ALT 637 W/ HCPCS: Performed by: INTERNAL MEDICINE

## 2021-11-26 PROCEDURE — 82550 ASSAY OF CK (CPK): CPT | Performed by: INTERNAL MEDICINE

## 2021-11-26 PROCEDURE — 21400001 HC TELEMETRY ROOM

## 2021-11-26 PROCEDURE — 97110 THERAPEUTIC EXERCISES: CPT

## 2021-11-26 PROCEDURE — 94799 UNLISTED PULMONARY SVC/PX: CPT

## 2021-11-26 PROCEDURE — 94640 AIRWAY INHALATION TREATMENT: CPT

## 2021-11-26 PROCEDURE — 63600175 PHARM REV CODE 636 W HCPCS: Performed by: INTERNAL MEDICINE

## 2021-11-26 PROCEDURE — 80048 BASIC METABOLIC PNL TOTAL CA: CPT | Performed by: INTERNAL MEDICINE

## 2021-11-26 PROCEDURE — 36415 COLL VENOUS BLD VENIPUNCTURE: CPT | Performed by: INTERNAL MEDICINE

## 2021-11-26 RX ORDER — IPRATROPIUM BROMIDE AND ALBUTEROL SULFATE 2.5; .5 MG/3ML; MG/3ML
3 SOLUTION RESPIRATORY (INHALATION) EVERY 6 HOURS PRN
Status: DISCONTINUED | OUTPATIENT
Start: 2021-11-26 | End: 2021-12-07 | Stop reason: HOSPADM

## 2021-11-26 RX ORDER — HYDROCODONE BITARTRATE AND ACETAMINOPHEN 7.5; 325 MG/1; MG/1
1 TABLET ORAL EVERY 4 HOURS PRN
Status: DISCONTINUED | OUTPATIENT
Start: 2021-11-26 | End: 2021-12-07 | Stop reason: HOSPADM

## 2021-11-26 RX ADMIN — PIPERACILLIN AND TAZOBACTAM 3.38 G: 3; .375 INJECTION, POWDER, LYOPHILIZED, FOR SOLUTION INTRAVENOUS; PARENTERAL at 10:11

## 2021-11-26 RX ADMIN — PIPERACILLIN AND TAZOBACTAM 3.38 G: 3; .375 INJECTION, POWDER, LYOPHILIZED, FOR SOLUTION INTRAVENOUS; PARENTERAL at 05:11

## 2021-11-26 RX ADMIN — MIDODRINE HYDROCHLORIDE 2.5 MG: 2.5 TABLET ORAL at 02:11

## 2021-11-26 RX ADMIN — RIVAROXABAN 20 MG: 20 TABLET, FILM COATED ORAL at 05:11

## 2021-11-26 RX ADMIN — MIDODRINE HYDROCHLORIDE 2.5 MG: 2.5 TABLET ORAL at 09:11

## 2021-11-26 RX ADMIN — CEFTRIAXONE SODIUM 1 G: 1 INJECTION, POWDER, FOR SOLUTION INTRAMUSCULAR; INTRAVENOUS at 05:11

## 2021-11-26 RX ADMIN — GABAPENTIN 300 MG: 300 CAPSULE ORAL at 10:11

## 2021-11-26 RX ADMIN — HUMAN INSULIN 3 UNITS: 100 INJECTION, SOLUTION SUBCUTANEOUS at 08:11

## 2021-11-26 RX ADMIN — VANCOMYCIN HYDROCHLORIDE 1500 MG: 1.5 INJECTION, POWDER, LYOPHILIZED, FOR SOLUTION INTRAVENOUS at 03:11

## 2021-11-26 RX ADMIN — HYDROCODONE BITARTRATE AND ACETAMINOPHEN 1 TABLET: 7.5; 325 TABLET ORAL at 10:11

## 2021-11-26 RX ADMIN — HYDROCODONE BITARTRATE AND ACETAMINOPHEN 1 TABLET: 7.5; 325 TABLET ORAL at 02:11

## 2021-11-26 RX ADMIN — IPRATROPIUM BROMIDE 1 PUFF: 17 AEROSOL, METERED RESPIRATORY (INHALATION) at 07:11

## 2021-11-26 RX ADMIN — GABAPENTIN 300 MG: 300 CAPSULE ORAL at 08:11

## 2021-11-26 RX ADMIN — ACETAMINOPHEN 650 MG: 325 TABLET, FILM COATED ORAL at 05:11

## 2021-11-26 RX ADMIN — MELATONIN 6 MG: at 08:11

## 2021-11-26 RX ADMIN — METHYLPREDNISOLONE SODIUM SUCCINATE 40 MG: 40 INJECTION, POWDER, FOR SOLUTION INTRAMUSCULAR; INTRAVENOUS at 05:11

## 2021-11-26 RX ADMIN — HUMAN INSULIN 2 UNITS: 100 INJECTION, SOLUTION SUBCUTANEOUS at 05:11

## 2021-11-26 RX ADMIN — METHYLPREDNISOLONE SODIUM SUCCINATE 40 MG: 40 INJECTION, POWDER, FOR SOLUTION INTRAMUSCULAR; INTRAVENOUS at 02:11

## 2021-11-26 RX ADMIN — TAMSULOSIN HYDROCHLORIDE 0.4 MG: 0.4 CAPSULE ORAL at 09:11

## 2021-11-26 RX ADMIN — HYDROCODONE BITARTRATE AND ACETAMINOPHEN 1 TABLET: 7.5; 325 TABLET ORAL at 08:11

## 2021-11-26 RX ADMIN — MIDODRINE HYDROCHLORIDE 2.5 MG: 2.5 TABLET ORAL at 05:11

## 2021-11-27 LAB
ANION GAP SERPL CALC-SCNC: 11 MMOL/L (ref 8–16)
BACTERIA BLD CULT: ABNORMAL
BUN SERPL-MCNC: 12 MG/DL (ref 8–23)
CALCIUM SERPL-MCNC: 8.6 MG/DL (ref 8.7–10.5)
CHLORIDE SERPL-SCNC: 92 MMOL/L (ref 95–110)
CK SERPL-CCNC: 83 U/L (ref 20–200)
CO2 SERPL-SCNC: 30 MMOL/L (ref 23–29)
CREAT SERPL-MCNC: 0.6 MG/DL (ref 0.5–1.4)
ERYTHROCYTE [DISTWIDTH] IN BLOOD BY AUTOMATED COUNT: 14.9 % (ref 11.5–14.5)
EST. GFR  (AFRICAN AMERICAN): >60 ML/MIN/1.73 M^2
EST. GFR  (NON AFRICAN AMERICAN): >60 ML/MIN/1.73 M^2
GLUCOSE SERPL-MCNC: 288 MG/DL (ref 70–110)
GLUCOSE SERPL-MCNC: 309 MG/DL (ref 70–110)
GLUCOSE SERPL-MCNC: 314 MG/DL (ref 70–110)
GLUCOSE SERPL-MCNC: 328 MG/DL (ref 70–110)
GLUCOSE SERPL-MCNC: 345 MG/DL (ref 70–110)
HCT VFR BLD AUTO: 32.7 % (ref 40–54)
HGB BLD-MCNC: 10 G/DL (ref 14–18)
MCH RBC QN AUTO: 27.5 PG (ref 27–31)
MCHC RBC AUTO-ENTMCNC: 30.6 G/DL (ref 32–36)
MCV RBC AUTO: 90 FL (ref 82–98)
PLATELET # BLD AUTO: 451 K/UL (ref 150–450)
PMV BLD AUTO: 9.8 FL (ref 9.2–12.9)
POTASSIUM SERPL-SCNC: 4.4 MMOL/L (ref 3.5–5.1)
RBC # BLD AUTO: 3.63 M/UL (ref 4.6–6.2)
SODIUM SERPL-SCNC: 133 MMOL/L (ref 136–145)
WBC # BLD AUTO: 9.7 K/UL (ref 3.9–12.7)

## 2021-11-27 PROCEDURE — 94799 UNLISTED PULMONARY SVC/PX: CPT

## 2021-11-27 PROCEDURE — 25000003 PHARM REV CODE 250: Performed by: INTERNAL MEDICINE

## 2021-11-27 PROCEDURE — 80048 BASIC METABOLIC PNL TOTAL CA: CPT | Performed by: INTERNAL MEDICINE

## 2021-11-27 PROCEDURE — 36415 COLL VENOUS BLD VENIPUNCTURE: CPT | Performed by: INTERNAL MEDICINE

## 2021-11-27 PROCEDURE — 63600175 PHARM REV CODE 636 W HCPCS: Performed by: INTERNAL MEDICINE

## 2021-11-27 PROCEDURE — 97110 THERAPEUTIC EXERCISES: CPT | Mod: CQ

## 2021-11-27 PROCEDURE — 27000221 HC OXYGEN, UP TO 24 HOURS

## 2021-11-27 PROCEDURE — 21400001 HC TELEMETRY ROOM

## 2021-11-27 PROCEDURE — 82550 ASSAY OF CK (CPK): CPT | Performed by: INTERNAL MEDICINE

## 2021-11-27 PROCEDURE — 85027 COMPLETE CBC AUTOMATED: CPT | Performed by: INTERNAL MEDICINE

## 2021-11-27 PROCEDURE — 94761 N-INVAS EAR/PLS OXIMETRY MLT: CPT

## 2021-11-27 PROCEDURE — 99900035 HC TECH TIME PER 15 MIN (STAT)

## 2021-11-27 RX ORDER — TRAZODONE HYDROCHLORIDE 50 MG/1
150 TABLET ORAL NIGHTLY
Status: DISCONTINUED | OUTPATIENT
Start: 2021-11-27 | End: 2021-12-07 | Stop reason: HOSPADM

## 2021-11-27 RX ADMIN — MIDODRINE HYDROCHLORIDE 2.5 MG: 2.5 TABLET ORAL at 07:11

## 2021-11-27 RX ADMIN — HUMAN INSULIN 8 UNITS: 100 INJECTION, SOLUTION SUBCUTANEOUS at 05:11

## 2021-11-27 RX ADMIN — HUMAN INSULIN 4 UNITS: 100 INJECTION, SOLUTION SUBCUTANEOUS at 07:11

## 2021-11-27 RX ADMIN — TAMSULOSIN HYDROCHLORIDE 0.4 MG: 0.4 CAPSULE ORAL at 08:11

## 2021-11-27 RX ADMIN — HUMAN INSULIN 3 UNITS: 100 INJECTION, SOLUTION SUBCUTANEOUS at 12:11

## 2021-11-27 RX ADMIN — METHYLPREDNISOLONE SODIUM SUCCINATE 40 MG: 40 INJECTION, POWDER, FOR SOLUTION INTRAMUSCULAR; INTRAVENOUS at 12:11

## 2021-11-27 RX ADMIN — HYDROCODONE BITARTRATE AND ACETAMINOPHEN 1 TABLET: 7.5; 325 TABLET ORAL at 10:11

## 2021-11-27 RX ADMIN — HYDROCODONE BITARTRATE AND ACETAMINOPHEN 1 TABLET: 7.5; 325 TABLET ORAL at 08:11

## 2021-11-27 RX ADMIN — PIPERACILLIN AND TAZOBACTAM 3.38 G: 3; .375 INJECTION, POWDER, LYOPHILIZED, FOR SOLUTION INTRAVENOUS; PARENTERAL at 10:11

## 2021-11-27 RX ADMIN — HYDROCODONE BITARTRATE AND ACETAMINOPHEN 1 TABLET: 7.5; 325 TABLET ORAL at 02:11

## 2021-11-27 RX ADMIN — PIPERACILLIN AND TAZOBACTAM 3.38 G: 3; .375 INJECTION, POWDER, LYOPHILIZED, FOR SOLUTION INTRAVENOUS; PARENTERAL at 05:11

## 2021-11-27 RX ADMIN — HYDROCODONE BITARTRATE AND ACETAMINOPHEN 1 TABLET: 7.5; 325 TABLET ORAL at 12:11

## 2021-11-27 RX ADMIN — METHYLPREDNISOLONE SODIUM SUCCINATE 40 MG: 40 INJECTION, POWDER, FOR SOLUTION INTRAMUSCULAR; INTRAVENOUS at 05:11

## 2021-11-27 RX ADMIN — VANCOMYCIN HYDROCHLORIDE 1500 MG: 1.5 INJECTION, POWDER, LYOPHILIZED, FOR SOLUTION INTRAVENOUS at 01:11

## 2021-11-27 RX ADMIN — MIDODRINE HYDROCHLORIDE 2.5 MG: 2.5 TABLET ORAL at 05:11

## 2021-11-27 RX ADMIN — HUMAN INSULIN 8 UNITS: 100 INJECTION, SOLUTION SUBCUTANEOUS at 08:11

## 2021-11-27 RX ADMIN — VANCOMYCIN HYDROCHLORIDE 1500 MG: 1.5 INJECTION, POWDER, LYOPHILIZED, FOR SOLUTION INTRAVENOUS at 02:11

## 2021-11-27 RX ADMIN — RIVAROXABAN 20 MG: 20 TABLET, FILM COATED ORAL at 05:11

## 2021-11-27 RX ADMIN — TRAZODONE HYDROCHLORIDE 150 MG: 50 TABLET ORAL at 08:11

## 2021-11-27 RX ADMIN — GABAPENTIN 300 MG: 300 CAPSULE ORAL at 08:11

## 2021-11-27 RX ADMIN — MIDODRINE HYDROCHLORIDE 2.5 MG: 2.5 TABLET ORAL at 12:11

## 2021-11-27 RX ADMIN — PIPERACILLIN AND TAZOBACTAM 3.38 G: 3; .375 INJECTION, POWDER, LYOPHILIZED, FOR SOLUTION INTRAVENOUS; PARENTERAL at 03:11

## 2021-11-27 RX ADMIN — ACETAMINOPHEN 650 MG: 325 TABLET, FILM COATED ORAL at 08:11

## 2021-11-27 RX ADMIN — ACETAMINOPHEN 650 MG: 325 TABLET, FILM COATED ORAL at 05:11

## 2021-11-28 PROBLEM — T79.6XXA TRAUMATIC RHABDOMYOLYSIS: Status: RESOLVED | Noted: 2021-11-26 | Resolved: 2021-11-28

## 2021-11-28 PROBLEM — J96.21 ACUTE ON CHRONIC RESPIRATORY FAILURE WITH HYPOXIA: Status: RESOLVED | Noted: 2021-11-06 | Resolved: 2021-11-28

## 2021-11-28 LAB
ANION GAP SERPL CALC-SCNC: 10 MMOL/L (ref 8–16)
BUN SERPL-MCNC: 15 MG/DL (ref 8–23)
CALCIUM SERPL-MCNC: 9 MG/DL (ref 8.7–10.5)
CHLORIDE SERPL-SCNC: 94 MMOL/L (ref 95–110)
CO2 SERPL-SCNC: 31 MMOL/L (ref 23–29)
CREAT SERPL-MCNC: 0.6 MG/DL (ref 0.5–1.4)
ERYTHROCYTE [DISTWIDTH] IN BLOOD BY AUTOMATED COUNT: 14.8 % (ref 11.5–14.5)
EST. GFR  (AFRICAN AMERICAN): >60 ML/MIN/1.73 M^2
EST. GFR  (NON AFRICAN AMERICAN): >60 ML/MIN/1.73 M^2
GLUCOSE SERPL-MCNC: 292 MG/DL (ref 70–110)
GLUCOSE SERPL-MCNC: 329 MG/DL (ref 70–110)
GLUCOSE SERPL-MCNC: 353 MG/DL (ref 70–110)
GLUCOSE SERPL-MCNC: 423 MG/DL (ref 70–110)
HCT VFR BLD AUTO: 35.3 % (ref 40–54)
HGB BLD-MCNC: 10.7 G/DL (ref 14–18)
MCH RBC QN AUTO: 27 PG (ref 27–31)
MCHC RBC AUTO-ENTMCNC: 30.3 G/DL (ref 32–36)
MCV RBC AUTO: 89 FL (ref 82–98)
PLATELET # BLD AUTO: 497 K/UL (ref 150–450)
PMV BLD AUTO: 9.7 FL (ref 9.2–12.9)
POTASSIUM SERPL-SCNC: 4.8 MMOL/L (ref 3.5–5.1)
RBC # BLD AUTO: 3.97 M/UL (ref 4.6–6.2)
SODIUM SERPL-SCNC: 135 MMOL/L (ref 136–145)
VANCOMYCIN TROUGH SERPL-MCNC: 12.3 UG/ML
WBC # BLD AUTO: 15.91 K/UL (ref 3.9–12.7)

## 2021-11-28 PROCEDURE — 80048 BASIC METABOLIC PNL TOTAL CA: CPT | Performed by: INTERNAL MEDICINE

## 2021-11-28 PROCEDURE — 27000221 HC OXYGEN, UP TO 24 HOURS

## 2021-11-28 PROCEDURE — 94799 UNLISTED PULMONARY SVC/PX: CPT

## 2021-11-28 PROCEDURE — 36415 COLL VENOUS BLD VENIPUNCTURE: CPT | Performed by: INTERNAL MEDICINE

## 2021-11-28 PROCEDURE — 94761 N-INVAS EAR/PLS OXIMETRY MLT: CPT

## 2021-11-28 PROCEDURE — 85027 COMPLETE CBC AUTOMATED: CPT | Performed by: INTERNAL MEDICINE

## 2021-11-28 PROCEDURE — 97530 THERAPEUTIC ACTIVITIES: CPT

## 2021-11-28 PROCEDURE — 63600175 PHARM REV CODE 636 W HCPCS: Performed by: INTERNAL MEDICINE

## 2021-11-28 PROCEDURE — 25000003 PHARM REV CODE 250: Performed by: INTERNAL MEDICINE

## 2021-11-28 PROCEDURE — 99900035 HC TECH TIME PER 15 MIN (STAT)

## 2021-11-28 PROCEDURE — 80202 ASSAY OF VANCOMYCIN: CPT | Performed by: INTERNAL MEDICINE

## 2021-11-28 PROCEDURE — 12000002 HC ACUTE/MED SURGE SEMI-PRIVATE ROOM

## 2021-11-28 RX ORDER — POLYETHYLENE GLYCOL 3350 17 G/17G
17 POWDER, FOR SOLUTION ORAL DAILY
Status: DISCONTINUED | OUTPATIENT
Start: 2021-11-28 | End: 2021-12-04

## 2021-11-28 RX ADMIN — GABAPENTIN 300 MG: 300 CAPSULE ORAL at 09:11

## 2021-11-28 RX ADMIN — LISINOPRIL 10 MG: 10 TABLET ORAL at 10:11

## 2021-11-28 RX ADMIN — HYDROCODONE BITARTRATE AND ACETAMINOPHEN 1 TABLET: 7.5; 325 TABLET ORAL at 07:11

## 2021-11-28 RX ADMIN — POLYETHYLENE GLYCOL 3350 17 G: 17 POWDER, FOR SOLUTION ORAL at 10:11

## 2021-11-28 RX ADMIN — HUMAN INSULIN 8 UNITS: 100 INJECTION, SOLUTION SUBCUTANEOUS at 05:11

## 2021-11-28 RX ADMIN — TAMSULOSIN HYDROCHLORIDE 0.4 MG: 0.4 CAPSULE ORAL at 09:11

## 2021-11-28 RX ADMIN — PIPERACILLIN AND TAZOBACTAM 3.38 G: 3; .375 INJECTION, POWDER, LYOPHILIZED, FOR SOLUTION INTRAVENOUS; PARENTERAL at 05:11

## 2021-11-28 RX ADMIN — RIVAROXABAN 20 MG: 20 TABLET, FILM COATED ORAL at 05:11

## 2021-11-28 RX ADMIN — VANCOMYCIN HYDROCHLORIDE 1500 MG: 1.5 INJECTION, POWDER, LYOPHILIZED, FOR SOLUTION INTRAVENOUS at 04:11

## 2021-11-28 RX ADMIN — MIDODRINE HYDROCHLORIDE 2.5 MG: 2.5 TABLET ORAL at 05:11

## 2021-11-28 RX ADMIN — HYDROCODONE BITARTRATE AND ACETAMINOPHEN 1 TABLET: 7.5; 325 TABLET ORAL at 09:11

## 2021-11-28 RX ADMIN — METHYLPREDNISOLONE SODIUM SUCCINATE 40 MG: 40 INJECTION, POWDER, FOR SOLUTION INTRAMUSCULAR; INTRAVENOUS at 12:11

## 2021-11-28 RX ADMIN — TRAZODONE HYDROCHLORIDE 150 MG: 50 TABLET ORAL at 09:11

## 2021-11-28 RX ADMIN — AMIODARONE HYDROCHLORIDE 200 MG: 200 TABLET ORAL at 10:11

## 2021-11-28 RX ADMIN — HYDROCODONE BITARTRATE AND ACETAMINOPHEN 1 TABLET: 7.5; 325 TABLET ORAL at 02:11

## 2021-11-28 RX ADMIN — ACETAMINOPHEN 650 MG: 325 TABLET, FILM COATED ORAL at 10:11

## 2021-11-28 RX ADMIN — MIDODRINE HYDROCHLORIDE 2.5 MG: 2.5 TABLET ORAL at 12:11

## 2021-11-28 RX ADMIN — VANCOMYCIN HYDROCHLORIDE 1500 MG: 1.5 INJECTION, POWDER, LYOPHILIZED, FOR SOLUTION INTRAVENOUS at 03:11

## 2021-11-28 RX ADMIN — AMLODIPINE BESYLATE 10 MG: 5 TABLET ORAL at 10:11

## 2021-11-28 RX ADMIN — PIPERACILLIN AND TAZOBACTAM 3.38 G: 3; .375 INJECTION, POWDER, LYOPHILIZED, FOR SOLUTION INTRAVENOUS; PARENTERAL at 02:11

## 2021-11-28 RX ADMIN — METHYLPREDNISOLONE SODIUM SUCCINATE 40 MG: 40 INJECTION, POWDER, FOR SOLUTION INTRAMUSCULAR; INTRAVENOUS at 05:11

## 2021-11-28 RX ADMIN — MELATONIN 6 MG: at 09:11

## 2021-11-28 RX ADMIN — HUMAN INSULIN 4 UNITS: 100 INJECTION, SOLUTION SUBCUTANEOUS at 12:11

## 2021-11-28 RX ADMIN — MIDODRINE HYDROCHLORIDE 2.5 MG: 2.5 TABLET ORAL at 07:11

## 2021-11-28 RX ADMIN — PIPERACILLIN AND TAZOBACTAM 3.38 G: 3; .375 INJECTION, POWDER, LYOPHILIZED, FOR SOLUTION INTRAVENOUS; PARENTERAL at 09:11

## 2021-11-28 RX ADMIN — HUMAN INSULIN 12 UNITS: 100 INJECTION, SOLUTION SUBCUTANEOUS at 09:11

## 2021-11-28 RX ADMIN — METHYLPREDNISOLONE SODIUM SUCCINATE 40 MG: 40 INJECTION, POWDER, FOR SOLUTION INTRAMUSCULAR; INTRAVENOUS at 06:11

## 2021-11-28 RX ADMIN — HYDROCODONE BITARTRATE AND ACETAMINOPHEN 1 TABLET: 7.5; 325 TABLET ORAL at 12:11

## 2021-11-28 RX ADMIN — HYDROCODONE BITARTRATE AND ACETAMINOPHEN 1 TABLET: 7.5; 325 TABLET ORAL at 05:11

## 2021-11-29 LAB
ANION GAP SERPL CALC-SCNC: 10 MMOL/L (ref 8–16)
BUN SERPL-MCNC: 19 MG/DL (ref 8–23)
CALCIUM SERPL-MCNC: 8.6 MG/DL (ref 8.7–10.5)
CHLORIDE SERPL-SCNC: 95 MMOL/L (ref 95–110)
CO2 SERPL-SCNC: 28 MMOL/L (ref 23–29)
CREAT SERPL-MCNC: 0.7 MG/DL (ref 0.5–1.4)
ERYTHROCYTE [DISTWIDTH] IN BLOOD BY AUTOMATED COUNT: 15 % (ref 11.5–14.5)
EST. GFR  (AFRICAN AMERICAN): >60 ML/MIN/1.73 M^2
EST. GFR  (NON AFRICAN AMERICAN): >60 ML/MIN/1.73 M^2
GLUCOSE SERPL-MCNC: 267 MG/DL (ref 70–110)
GLUCOSE SERPL-MCNC: 285 MG/DL (ref 70–110)
GLUCOSE SERPL-MCNC: 372 MG/DL (ref 70–110)
GLUCOSE SERPL-MCNC: 418 MG/DL (ref 70–110)
GLUCOSE SERPL-MCNC: 422 MG/DL (ref 70–110)
HCT VFR BLD AUTO: 33.5 % (ref 40–54)
HGB BLD-MCNC: 10.3 G/DL (ref 14–18)
MCH RBC QN AUTO: 27.7 PG (ref 27–31)
MCHC RBC AUTO-ENTMCNC: 30.7 G/DL (ref 32–36)
MCV RBC AUTO: 90 FL (ref 82–98)
PLATELET # BLD AUTO: 487 K/UL (ref 150–450)
PMV BLD AUTO: 9.8 FL (ref 9.2–12.9)
POTASSIUM SERPL-SCNC: 4 MMOL/L (ref 3.5–5.1)
RBC # BLD AUTO: 3.72 M/UL (ref 4.6–6.2)
SODIUM SERPL-SCNC: 133 MMOL/L (ref 136–145)
VANCOMYCIN TROUGH SERPL-MCNC: 23.6 UG/ML
WBC # BLD AUTO: 13.09 K/UL (ref 3.9–12.7)

## 2021-11-29 PROCEDURE — 25000003 PHARM REV CODE 250: Performed by: INTERNAL MEDICINE

## 2021-11-29 PROCEDURE — 97530 THERAPEUTIC ACTIVITIES: CPT

## 2021-11-29 PROCEDURE — 63600175 PHARM REV CODE 636 W HCPCS: Performed by: INTERNAL MEDICINE

## 2021-11-29 PROCEDURE — 85027 COMPLETE CBC AUTOMATED: CPT | Performed by: INTERNAL MEDICINE

## 2021-11-29 PROCEDURE — 80048 BASIC METABOLIC PNL TOTAL CA: CPT | Performed by: INTERNAL MEDICINE

## 2021-11-29 PROCEDURE — 99900031 HC PATIENT EDUCATION (STAT)

## 2021-11-29 PROCEDURE — 36415 COLL VENOUS BLD VENIPUNCTURE: CPT | Performed by: INTERNAL MEDICINE

## 2021-11-29 PROCEDURE — 80202 ASSAY OF VANCOMYCIN: CPT | Performed by: INTERNAL MEDICINE

## 2021-11-29 PROCEDURE — 27000221 HC OXYGEN, UP TO 24 HOURS

## 2021-11-29 PROCEDURE — 94761 N-INVAS EAR/PLS OXIMETRY MLT: CPT

## 2021-11-29 PROCEDURE — 99900035 HC TECH TIME PER 15 MIN (STAT)

## 2021-11-29 PROCEDURE — 12000002 HC ACUTE/MED SURGE SEMI-PRIVATE ROOM

## 2021-11-29 RX ADMIN — PIPERACILLIN AND TAZOBACTAM 3.38 G: 3; .375 INJECTION, POWDER, LYOPHILIZED, FOR SOLUTION INTRAVENOUS; PARENTERAL at 05:11

## 2021-11-29 RX ADMIN — HYDROCODONE BITARTRATE AND ACETAMINOPHEN 1 TABLET: 7.5; 325 TABLET ORAL at 12:11

## 2021-11-29 RX ADMIN — TAMSULOSIN HYDROCHLORIDE 0.4 MG: 0.4 CAPSULE ORAL at 08:11

## 2021-11-29 RX ADMIN — GABAPENTIN 300 MG: 300 CAPSULE ORAL at 08:11

## 2021-11-29 RX ADMIN — MIDODRINE HYDROCHLORIDE 2.5 MG: 2.5 TABLET ORAL at 12:11

## 2021-11-29 RX ADMIN — AMIODARONE HYDROCHLORIDE 200 MG: 200 TABLET ORAL at 08:11

## 2021-11-29 RX ADMIN — MIDODRINE HYDROCHLORIDE 2.5 MG: 2.5 TABLET ORAL at 05:11

## 2021-11-29 RX ADMIN — HYDROCODONE BITARTRATE AND ACETAMINOPHEN 1 TABLET: 7.5; 325 TABLET ORAL at 08:11

## 2021-11-29 RX ADMIN — HYDROCODONE BITARTRATE AND ACETAMINOPHEN 1 TABLET: 7.5; 325 TABLET ORAL at 01:11

## 2021-11-29 RX ADMIN — HUMAN INSULIN 12 UNITS: 100 INJECTION, SOLUTION SUBCUTANEOUS at 05:11

## 2021-11-29 RX ADMIN — METHYLPREDNISOLONE SODIUM SUCCINATE 40 MG: 40 INJECTION, POWDER, FOR SOLUTION INTRAMUSCULAR; INTRAVENOUS at 11:11

## 2021-11-29 RX ADMIN — POLYETHYLENE GLYCOL 3350 17 G: 17 POWDER, FOR SOLUTION ORAL at 08:11

## 2021-11-29 RX ADMIN — PIPERACILLIN AND TAZOBACTAM 3.38 G: 3; .375 INJECTION, POWDER, LYOPHILIZED, FOR SOLUTION INTRAVENOUS; PARENTERAL at 01:11

## 2021-11-29 RX ADMIN — VANCOMYCIN HYDROCHLORIDE 1500 MG: 1.5 INJECTION, POWDER, LYOPHILIZED, FOR SOLUTION INTRAVENOUS at 02:11

## 2021-11-29 RX ADMIN — LISINOPRIL 10 MG: 10 TABLET ORAL at 08:11

## 2021-11-29 RX ADMIN — PIPERACILLIN AND TAZOBACTAM 3.38 G: 3; .375 INJECTION, POWDER, LYOPHILIZED, FOR SOLUTION INTRAVENOUS; PARENTERAL at 10:11

## 2021-11-29 RX ADMIN — METHYLPREDNISOLONE SODIUM SUCCINATE 40 MG: 40 INJECTION, POWDER, FOR SOLUTION INTRAMUSCULAR; INTRAVENOUS at 01:11

## 2021-11-29 RX ADMIN — MIDODRINE HYDROCHLORIDE 2.5 MG: 2.5 TABLET ORAL at 08:11

## 2021-11-29 RX ADMIN — AMLODIPINE BESYLATE 10 MG: 5 TABLET ORAL at 08:11

## 2021-11-29 RX ADMIN — HUMAN INSULIN 10 UNITS: 100 INJECTION, SOLUTION SUBCUTANEOUS at 08:11

## 2021-11-29 RX ADMIN — RIVAROXABAN 20 MG: 20 TABLET, FILM COATED ORAL at 05:11

## 2021-11-30 PROBLEM — J18.9 MULTIFOCAL PNEUMONIA: Status: ACTIVE | Noted: 2021-11-30

## 2021-11-30 PROBLEM — Z78.9 FREQUENT HOSPITAL ADMISSIONS: Status: ACTIVE | Noted: 2021-11-30

## 2021-11-30 LAB
ANION GAP SERPL CALC-SCNC: 12 MMOL/L (ref 8–16)
BACTERIA BLD CULT: NORMAL
BUN SERPL-MCNC: 18 MG/DL (ref 8–23)
CALCIUM SERPL-MCNC: 8.6 MG/DL (ref 8.7–10.5)
CHLORIDE SERPL-SCNC: 98 MMOL/L (ref 95–110)
CO2 SERPL-SCNC: 25 MMOL/L (ref 23–29)
CREAT SERPL-MCNC: 0.6 MG/DL (ref 0.5–1.4)
ERYTHROCYTE [DISTWIDTH] IN BLOOD BY AUTOMATED COUNT: 15.2 % (ref 11.5–14.5)
EST. GFR  (AFRICAN AMERICAN): >60 ML/MIN/1.73 M^2
EST. GFR  (NON AFRICAN AMERICAN): >60 ML/MIN/1.73 M^2
GLUCOSE SERPL-MCNC: 289 MG/DL (ref 70–110)
GLUCOSE SERPL-MCNC: 307 MG/DL (ref 70–110)
GLUCOSE SERPL-MCNC: 315 MG/DL (ref 70–110)
GLUCOSE SERPL-MCNC: 402 MG/DL (ref 70–110)
GLUCOSE SERPL-MCNC: 430 MG/DL (ref 70–110)
HCT VFR BLD AUTO: 34.3 % (ref 40–54)
HGB BLD-MCNC: 10.5 G/DL (ref 14–18)
MCH RBC QN AUTO: 27.3 PG (ref 27–31)
MCHC RBC AUTO-ENTMCNC: 30.6 G/DL (ref 32–36)
MCV RBC AUTO: 89 FL (ref 82–98)
PLATELET # BLD AUTO: 521 K/UL (ref 150–450)
PMV BLD AUTO: 9.7 FL (ref 9.2–12.9)
POTASSIUM SERPL-SCNC: 3.4 MMOL/L (ref 3.5–5.1)
RBC # BLD AUTO: 3.85 M/UL (ref 4.6–6.2)
SODIUM SERPL-SCNC: 135 MMOL/L (ref 136–145)
VANCOMYCIN SERPL-MCNC: 6.9 UG/ML
WBC # BLD AUTO: 13.03 K/UL (ref 3.9–12.7)

## 2021-11-30 PROCEDURE — 97110 THERAPEUTIC EXERCISES: CPT | Mod: CQ

## 2021-11-30 PROCEDURE — 99900031 HC PATIENT EDUCATION (STAT)

## 2021-11-30 PROCEDURE — 80048 BASIC METABOLIC PNL TOTAL CA: CPT | Performed by: INTERNAL MEDICINE

## 2021-11-30 PROCEDURE — 25000003 PHARM REV CODE 250: Performed by: INTERNAL MEDICINE

## 2021-11-30 PROCEDURE — 97535 SELF CARE MNGMENT TRAINING: CPT

## 2021-11-30 PROCEDURE — 99900035 HC TECH TIME PER 15 MIN (STAT)

## 2021-11-30 PROCEDURE — 97166 OT EVAL MOD COMPLEX 45 MIN: CPT

## 2021-11-30 PROCEDURE — 25000242 PHARM REV CODE 250 ALT 637 W/ HCPCS: Performed by: INTERNAL MEDICINE

## 2021-11-30 PROCEDURE — 80202 ASSAY OF VANCOMYCIN: CPT | Performed by: INTERNAL MEDICINE

## 2021-11-30 PROCEDURE — 63600175 PHARM REV CODE 636 W HCPCS: Performed by: INTERNAL MEDICINE

## 2021-11-30 PROCEDURE — 85027 COMPLETE CBC AUTOMATED: CPT | Performed by: INTERNAL MEDICINE

## 2021-11-30 PROCEDURE — 27000221 HC OXYGEN, UP TO 24 HOURS

## 2021-11-30 PROCEDURE — 12000002 HC ACUTE/MED SURGE SEMI-PRIVATE ROOM

## 2021-11-30 PROCEDURE — 94799 UNLISTED PULMONARY SVC/PX: CPT

## 2021-11-30 PROCEDURE — 94761 N-INVAS EAR/PLS OXIMETRY MLT: CPT

## 2021-11-30 PROCEDURE — 94640 AIRWAY INHALATION TREATMENT: CPT

## 2021-11-30 RX ORDER — LEVALBUTEROL 1.25 MG/.5ML
1.25 SOLUTION, CONCENTRATE RESPIRATORY (INHALATION)
Status: DISCONTINUED | OUTPATIENT
Start: 2021-11-30 | End: 2021-12-07 | Stop reason: HOSPADM

## 2021-11-30 RX ORDER — FUROSEMIDE 10 MG/ML
40 INJECTION INTRAMUSCULAR; INTRAVENOUS DAILY
Status: DISCONTINUED | OUTPATIENT
Start: 2021-11-30 | End: 2021-12-04

## 2021-11-30 RX ORDER — PREDNISONE 20 MG/1
60 TABLET ORAL DAILY
Status: DISCONTINUED | OUTPATIENT
Start: 2021-11-30 | End: 2021-12-02

## 2021-11-30 RX ORDER — LEVOFLOXACIN 500 MG/1
500 TABLET, FILM COATED ORAL DAILY
Status: DISCONTINUED | OUTPATIENT
Start: 2021-11-30 | End: 2021-12-02

## 2021-11-30 RX ORDER — ALPRAZOLAM 0.5 MG/1
0.5 TABLET ORAL 3 TIMES DAILY PRN
Status: DISCONTINUED | OUTPATIENT
Start: 2021-11-30 | End: 2021-12-07 | Stop reason: HOSPADM

## 2021-11-30 RX ADMIN — MIDODRINE HYDROCHLORIDE 2.5 MG: 2.5 TABLET ORAL at 05:11

## 2021-11-30 RX ADMIN — COLLAGENASE SANTYL: 250 OINTMENT TOPICAL at 01:11

## 2021-11-30 RX ADMIN — LEVOFLOXACIN 500 MG: 500 TABLET, FILM COATED ORAL at 09:11

## 2021-11-30 RX ADMIN — FUROSEMIDE 40 MG: 10 INJECTION INTRAMUSCULAR; INTRAVENOUS at 09:11

## 2021-11-30 RX ADMIN — TAMSULOSIN HYDROCHLORIDE 0.4 MG: 0.4 CAPSULE ORAL at 09:11

## 2021-11-30 RX ADMIN — TRAZODONE HYDROCHLORIDE 150 MG: 50 TABLET ORAL at 09:11

## 2021-11-30 RX ADMIN — ALPRAZOLAM 0.5 MG: 0.5 TABLET ORAL at 09:11

## 2021-11-30 RX ADMIN — POLYETHYLENE GLYCOL 3350 17 G: 17 POWDER, FOR SOLUTION ORAL at 09:11

## 2021-11-30 RX ADMIN — LEVALBUTEROL HYDROCHLORIDE 1.25 MG: 1.25 SOLUTION, CONCENTRATE RESPIRATORY (INHALATION) at 01:11

## 2021-11-30 RX ADMIN — PREDNISONE 60 MG: 20 TABLET ORAL at 09:11

## 2021-11-30 RX ADMIN — HYDROCODONE BITARTRATE AND ACETAMINOPHEN 1 TABLET: 7.5; 325 TABLET ORAL at 07:11

## 2021-11-30 RX ADMIN — RIVAROXABAN 20 MG: 20 TABLET, FILM COATED ORAL at 05:11

## 2021-11-30 RX ADMIN — HUMAN INSULIN 10 UNITS: 100 INJECTION, SOLUTION SUBCUTANEOUS at 06:11

## 2021-11-30 RX ADMIN — LEVALBUTEROL HYDROCHLORIDE 1.25 MG: 1.25 SOLUTION, CONCENTRATE RESPIRATORY (INHALATION) at 07:11

## 2021-11-30 RX ADMIN — HYDROCODONE BITARTRATE AND ACETAMINOPHEN 1 TABLET: 7.5; 325 TABLET ORAL at 03:11

## 2021-11-30 RX ADMIN — LISINOPRIL 10 MG: 10 TABLET ORAL at 09:11

## 2021-11-30 RX ADMIN — AMIODARONE HYDROCHLORIDE 200 MG: 200 TABLET ORAL at 09:11

## 2021-11-30 RX ADMIN — GABAPENTIN 300 MG: 300 CAPSULE ORAL at 09:11

## 2021-11-30 RX ADMIN — PIPERACILLIN AND TAZOBACTAM 3.38 G: 3; .375 INJECTION, POWDER, LYOPHILIZED, FOR SOLUTION INTRAVENOUS; PARENTERAL at 09:11

## 2021-11-30 RX ADMIN — AMLODIPINE BESYLATE 10 MG: 5 TABLET ORAL at 09:11

## 2021-11-30 RX ADMIN — HYDROCODONE BITARTRATE AND ACETAMINOPHEN 1 TABLET: 7.5; 325 TABLET ORAL at 12:11

## 2021-11-30 RX ADMIN — ALPRAZOLAM 0.5 MG: 0.5 TABLET ORAL at 05:11

## 2021-11-30 RX ADMIN — HYDROCODONE BITARTRATE AND ACETAMINOPHEN 1 TABLET: 7.5; 325 TABLET ORAL at 09:11

## 2021-11-30 RX ADMIN — MIDODRINE HYDROCHLORIDE 2.5 MG: 2.5 TABLET ORAL at 07:11

## 2021-11-30 RX ADMIN — MIDODRINE HYDROCHLORIDE 2.5 MG: 2.5 TABLET ORAL at 12:11

## 2021-12-01 PROBLEM — J96.10 CHRONIC RESPIRATORY FAILURE: Status: ACTIVE | Noted: 2021-11-06

## 2021-12-01 LAB
ANION GAP SERPL CALC-SCNC: 6 MMOL/L (ref 8–16)
BNP SERPL-MCNC: 171 PG/ML (ref 0–99)
BUN SERPL-MCNC: 22 MG/DL (ref 8–23)
CALCIUM SERPL-MCNC: 8.7 MG/DL (ref 8.7–10.5)
CHLORIDE SERPL-SCNC: 96 MMOL/L (ref 95–110)
CO2 SERPL-SCNC: 35 MMOL/L (ref 23–29)
CREAT SERPL-MCNC: 0.7 MG/DL (ref 0.5–1.4)
ERYTHROCYTE [DISTWIDTH] IN BLOOD BY AUTOMATED COUNT: 15.3 % (ref 11.5–14.5)
EST. GFR  (AFRICAN AMERICAN): >60 ML/MIN/1.73 M^2
EST. GFR  (NON AFRICAN AMERICAN): >60 ML/MIN/1.73 M^2
GLUCOSE SERPL-MCNC: 317 MG/DL (ref 70–110)
GLUCOSE SERPL-MCNC: 322 MG/DL (ref 70–110)
GLUCOSE SERPL-MCNC: 333 MG/DL (ref 70–110)
GLUCOSE SERPL-MCNC: 394 MG/DL (ref 70–110)
GLUCOSE SERPL-MCNC: 593 MG/DL (ref 70–110)
HCT VFR BLD AUTO: 33.9 % (ref 40–54)
HGB BLD-MCNC: 10.5 G/DL (ref 14–18)
MCH RBC QN AUTO: 27.5 PG (ref 27–31)
MCHC RBC AUTO-ENTMCNC: 31 G/DL (ref 32–36)
MCV RBC AUTO: 89 FL (ref 82–98)
PLATELET # BLD AUTO: 462 K/UL (ref 150–450)
PMV BLD AUTO: 9.9 FL (ref 9.2–12.9)
POTASSIUM SERPL-SCNC: 3.9 MMOL/L (ref 3.5–5.1)
RBC # BLD AUTO: 3.82 M/UL (ref 4.6–6.2)
SODIUM SERPL-SCNC: 137 MMOL/L (ref 136–145)
WBC # BLD AUTO: 10.84 K/UL (ref 3.9–12.7)

## 2021-12-01 PROCEDURE — 94799 UNLISTED PULMONARY SVC/PX: CPT

## 2021-12-01 PROCEDURE — 25000003 PHARM REV CODE 250: Performed by: INTERNAL MEDICINE

## 2021-12-01 PROCEDURE — 83880 ASSAY OF NATRIURETIC PEPTIDE: CPT | Performed by: INTERNAL MEDICINE

## 2021-12-01 PROCEDURE — 85027 COMPLETE CBC AUTOMATED: CPT | Performed by: INTERNAL MEDICINE

## 2021-12-01 PROCEDURE — 99222 PR INITIAL HOSPITAL CARE,LEVL II: ICD-10-PCS | Mod: 24,,, | Performed by: PODIATRIST

## 2021-12-01 PROCEDURE — 99222 1ST HOSP IP/OBS MODERATE 55: CPT | Mod: 24,,, | Performed by: PODIATRIST

## 2021-12-01 PROCEDURE — 80048 BASIC METABOLIC PNL TOTAL CA: CPT | Performed by: INTERNAL MEDICINE

## 2021-12-01 PROCEDURE — 27000221 HC OXYGEN, UP TO 24 HOURS

## 2021-12-01 PROCEDURE — 63600175 PHARM REV CODE 636 W HCPCS: Performed by: INTERNAL MEDICINE

## 2021-12-01 PROCEDURE — 99900035 HC TECH TIME PER 15 MIN (STAT)

## 2021-12-01 PROCEDURE — 97530 THERAPEUTIC ACTIVITIES: CPT | Mod: CQ

## 2021-12-01 PROCEDURE — 36415 COLL VENOUS BLD VENIPUNCTURE: CPT | Performed by: INTERNAL MEDICINE

## 2021-12-01 PROCEDURE — 82962 GLUCOSE BLOOD TEST: CPT

## 2021-12-01 PROCEDURE — 94640 AIRWAY INHALATION TREATMENT: CPT

## 2021-12-01 PROCEDURE — 12000002 HC ACUTE/MED SURGE SEMI-PRIVATE ROOM

## 2021-12-01 PROCEDURE — 97530 THERAPEUTIC ACTIVITIES: CPT

## 2021-12-01 PROCEDURE — 99900031 HC PATIENT EDUCATION (STAT)

## 2021-12-01 PROCEDURE — 25000242 PHARM REV CODE 250 ALT 637 W/ HCPCS: Performed by: INTERNAL MEDICINE

## 2021-12-01 PROCEDURE — 94761 N-INVAS EAR/PLS OXIMETRY MLT: CPT

## 2021-12-01 RX ADMIN — HUMAN INSULIN 8 UNITS: 100 INJECTION, SOLUTION SUBCUTANEOUS at 01:12

## 2021-12-01 RX ADMIN — TRAZODONE HYDROCHLORIDE 150 MG: 50 TABLET ORAL at 09:12

## 2021-12-01 RX ADMIN — ALPRAZOLAM 0.5 MG: 0.5 TABLET ORAL at 06:12

## 2021-12-01 RX ADMIN — LEVALBUTEROL HYDROCHLORIDE 1.25 MG: 1.25 SOLUTION, CONCENTRATE RESPIRATORY (INHALATION) at 01:12

## 2021-12-01 RX ADMIN — LEVOFLOXACIN 500 MG: 500 TABLET, FILM COATED ORAL at 08:12

## 2021-12-01 RX ADMIN — TAMSULOSIN HYDROCHLORIDE 0.4 MG: 0.4 CAPSULE ORAL at 08:12

## 2021-12-01 RX ADMIN — HYDROCODONE BITARTRATE AND ACETAMINOPHEN 1 TABLET: 7.5; 325 TABLET ORAL at 09:12

## 2021-12-01 RX ADMIN — POLYETHYLENE GLYCOL 3350 17 G: 17 POWDER, FOR SOLUTION ORAL at 08:12

## 2021-12-01 RX ADMIN — FUROSEMIDE 40 MG: 10 INJECTION INTRAMUSCULAR; INTRAVENOUS at 08:12

## 2021-12-01 RX ADMIN — LISINOPRIL 10 MG: 10 TABLET ORAL at 08:12

## 2021-12-01 RX ADMIN — HUMAN INSULIN 10 UNITS: 100 INJECTION, SOLUTION SUBCUTANEOUS at 11:12

## 2021-12-01 RX ADMIN — PREDNISONE 60 MG: 20 TABLET ORAL at 08:12

## 2021-12-01 RX ADMIN — MIDODRINE HYDROCHLORIDE 2.5 MG: 2.5 TABLET ORAL at 04:12

## 2021-12-01 RX ADMIN — HUMAN INSULIN 8 UNITS: 100 INJECTION, SOLUTION SUBCUTANEOUS at 08:12

## 2021-12-01 RX ADMIN — HUMAN INSULIN 12 UNITS: 100 INJECTION, SOLUTION SUBCUTANEOUS at 09:12

## 2021-12-01 RX ADMIN — LEVALBUTEROL HYDROCHLORIDE 1.25 MG: 1.25 SOLUTION, CONCENTRATE RESPIRATORY (INHALATION) at 08:12

## 2021-12-01 RX ADMIN — AMIODARONE HYDROCHLORIDE 200 MG: 200 TABLET ORAL at 08:12

## 2021-12-01 RX ADMIN — RIVAROXABAN 20 MG: 20 TABLET, FILM COATED ORAL at 04:12

## 2021-12-01 RX ADMIN — HUMAN INSULIN 10 UNITS: 100 INJECTION, SOLUTION SUBCUTANEOUS at 04:12

## 2021-12-01 RX ADMIN — AMLODIPINE BESYLATE 10 MG: 5 TABLET ORAL at 08:12

## 2021-12-01 RX ADMIN — HYDROCODONE BITARTRATE AND ACETAMINOPHEN 1 TABLET: 7.5; 325 TABLET ORAL at 12:12

## 2021-12-01 RX ADMIN — MIDODRINE HYDROCHLORIDE 2.5 MG: 2.5 TABLET ORAL at 11:12

## 2021-12-01 RX ADMIN — ALPRAZOLAM 0.5 MG: 0.5 TABLET ORAL at 11:12

## 2021-12-01 RX ADMIN — HYDROCODONE BITARTRATE AND ACETAMINOPHEN 1 TABLET: 7.5; 325 TABLET ORAL at 06:12

## 2021-12-01 RX ADMIN — COLLAGENASE SANTYL: 250 OINTMENT TOPICAL at 08:12

## 2021-12-01 RX ADMIN — MIDODRINE HYDROCHLORIDE 2.5 MG: 2.5 TABLET ORAL at 08:12

## 2021-12-02 LAB
GLUCOSE SERPL-MCNC: 199 MG/DL (ref 70–110)
GLUCOSE SERPL-MCNC: 279 MG/DL (ref 70–110)
GLUCOSE SERPL-MCNC: 425 MG/DL (ref 70–110)
GLUCOSE SERPL-MCNC: 443 MG/DL (ref 70–110)
GLUCOSE SERPL-MCNC: 498 MG/DL (ref 70–110)
GLUCOSE SERPL-MCNC: 508 MG/DL (ref 70–110)

## 2021-12-02 PROCEDURE — 25000003 PHARM REV CODE 250: Performed by: INTERNAL MEDICINE

## 2021-12-02 PROCEDURE — 82962 GLUCOSE BLOOD TEST: CPT

## 2021-12-02 PROCEDURE — 25000242 PHARM REV CODE 250 ALT 637 W/ HCPCS: Performed by: INTERNAL MEDICINE

## 2021-12-02 PROCEDURE — 99900031 HC PATIENT EDUCATION (STAT)

## 2021-12-02 PROCEDURE — 97530 THERAPEUTIC ACTIVITIES: CPT

## 2021-12-02 PROCEDURE — 94761 N-INVAS EAR/PLS OXIMETRY MLT: CPT

## 2021-12-02 PROCEDURE — 94640 AIRWAY INHALATION TREATMENT: CPT

## 2021-12-02 PROCEDURE — 63600175 PHARM REV CODE 636 W HCPCS: Performed by: INTERNAL MEDICINE

## 2021-12-02 PROCEDURE — 94799 UNLISTED PULMONARY SVC/PX: CPT

## 2021-12-02 PROCEDURE — 97530 THERAPEUTIC ACTIVITIES: CPT | Mod: CQ

## 2021-12-02 PROCEDURE — 99900035 HC TECH TIME PER 15 MIN (STAT)

## 2021-12-02 PROCEDURE — 12000002 HC ACUTE/MED SURGE SEMI-PRIVATE ROOM

## 2021-12-02 PROCEDURE — C9399 UNCLASSIFIED DRUGS OR BIOLOG: HCPCS | Performed by: INTERNAL MEDICINE

## 2021-12-02 PROCEDURE — 97535 SELF CARE MNGMENT TRAINING: CPT

## 2021-12-02 PROCEDURE — 27000221 HC OXYGEN, UP TO 24 HOURS

## 2021-12-02 RX ORDER — LEVOFLOXACIN 500 MG/1
500 TABLET, FILM COATED ORAL DAILY
Status: DISCONTINUED | OUTPATIENT
Start: 2021-12-03 | End: 2021-12-04

## 2021-12-02 RX ORDER — PREDNISONE 20 MG/1
40 TABLET ORAL DAILY
Status: DISCONTINUED | OUTPATIENT
Start: 2021-12-03 | End: 2021-12-04

## 2021-12-02 RX ORDER — INSULIN ASPART 100 [IU]/ML
5 INJECTION, SOLUTION INTRAVENOUS; SUBCUTANEOUS
Status: DISCONTINUED | OUTPATIENT
Start: 2021-12-03 | End: 2021-12-07 | Stop reason: HOSPADM

## 2021-12-02 RX ADMIN — AMIODARONE HYDROCHLORIDE 200 MG: 200 TABLET ORAL at 10:12

## 2021-12-02 RX ADMIN — POLYETHYLENE GLYCOL 3350 17 G: 17 POWDER, FOR SOLUTION ORAL at 10:12

## 2021-12-02 RX ADMIN — TAMSULOSIN HYDROCHLORIDE 0.4 MG: 0.4 CAPSULE ORAL at 10:12

## 2021-12-02 RX ADMIN — INSULIN DETEMIR 50 UNITS: 100 INJECTION, SOLUTION SUBCUTANEOUS at 08:12

## 2021-12-02 RX ADMIN — HYDROCODONE BITARTRATE AND ACETAMINOPHEN 1 TABLET: 7.5; 325 TABLET ORAL at 04:12

## 2021-12-02 RX ADMIN — LEVOFLOXACIN 500 MG: 500 TABLET, FILM COATED ORAL at 10:12

## 2021-12-02 RX ADMIN — HYDROCODONE BITARTRATE AND ACETAMINOPHEN 1 TABLET: 7.5; 325 TABLET ORAL at 08:12

## 2021-12-02 RX ADMIN — COLLAGENASE SANTYL: 250 OINTMENT TOPICAL at 10:12

## 2021-12-02 RX ADMIN — TRAZODONE HYDROCHLORIDE 150 MG: 50 TABLET ORAL at 08:12

## 2021-12-02 RX ADMIN — MIDODRINE HYDROCHLORIDE 2.5 MG: 2.5 TABLET ORAL at 04:12

## 2021-12-02 RX ADMIN — MIDODRINE HYDROCHLORIDE 2.5 MG: 2.5 TABLET ORAL at 11:12

## 2021-12-02 RX ADMIN — LEVALBUTEROL HYDROCHLORIDE 1.25 MG: 1.25 SOLUTION, CONCENTRATE RESPIRATORY (INHALATION) at 02:12

## 2021-12-02 RX ADMIN — LEVALBUTEROL HYDROCHLORIDE 1.25 MG: 1.25 SOLUTION, CONCENTRATE RESPIRATORY (INHALATION) at 10:12

## 2021-12-02 RX ADMIN — HYDROCODONE BITARTRATE AND ACETAMINOPHEN 1 TABLET: 7.5; 325 TABLET ORAL at 03:12

## 2021-12-02 RX ADMIN — HYDROCODONE BITARTRATE AND ACETAMINOPHEN 1 TABLET: 7.5; 325 TABLET ORAL at 10:12

## 2021-12-02 RX ADMIN — PREDNISONE 60 MG: 20 TABLET ORAL at 10:12

## 2021-12-02 RX ADMIN — HUMAN INSULIN 12 UNITS: 100 INJECTION, SOLUTION SUBCUTANEOUS at 04:12

## 2021-12-02 RX ADMIN — RIVAROXABAN 20 MG: 20 TABLET, FILM COATED ORAL at 04:12

## 2021-12-02 RX ADMIN — ALPRAZOLAM 0.5 MG: 0.5 TABLET ORAL at 12:12

## 2021-12-02 RX ADMIN — FUROSEMIDE 40 MG: 10 INJECTION INTRAMUSCULAR; INTRAVENOUS at 10:12

## 2021-12-02 RX ADMIN — LEVALBUTEROL HYDROCHLORIDE 1.25 MG: 1.25 SOLUTION, CONCENTRATE RESPIRATORY (INHALATION) at 07:12

## 2021-12-02 RX ADMIN — MIDODRINE HYDROCHLORIDE 2.5 MG: 2.5 TABLET ORAL at 09:12

## 2021-12-02 RX ADMIN — HUMAN INSULIN 12 UNITS: 100 INJECTION, SOLUTION SUBCUTANEOUS at 08:12

## 2021-12-03 LAB
ALBUMIN SERPL BCP-MCNC: 3 G/DL (ref 3.5–5.2)
ALP SERPL-CCNC: 98 U/L (ref 55–135)
ALT SERPL W/O P-5'-P-CCNC: 15 U/L (ref 10–44)
ANION GAP SERPL CALC-SCNC: 8 MMOL/L (ref 8–16)
AST SERPL-CCNC: 11 U/L (ref 10–40)
BILIRUB SERPL-MCNC: 0.6 MG/DL (ref 0.1–1)
BUN SERPL-MCNC: 25 MG/DL (ref 8–23)
CALCIUM SERPL-MCNC: 8.9 MG/DL (ref 8.7–10.5)
CHLORIDE SERPL-SCNC: 94 MMOL/L (ref 95–110)
CO2 SERPL-SCNC: 32 MMOL/L (ref 23–29)
CREAT SERPL-MCNC: 0.7 MG/DL (ref 0.5–1.4)
EST. GFR  (AFRICAN AMERICAN): >60 ML/MIN/1.73 M^2
EST. GFR  (NON AFRICAN AMERICAN): >60 ML/MIN/1.73 M^2
GLUCOSE SERPL-MCNC: 152 MG/DL (ref 70–110)
GLUCOSE SERPL-MCNC: 176 MG/DL (ref 70–110)
GLUCOSE SERPL-MCNC: 188 MG/DL (ref 70–110)
GLUCOSE SERPL-MCNC: 325 MG/DL (ref 70–110)
GLUCOSE SERPL-MCNC: 342 MG/DL (ref 70–110)
POTASSIUM SERPL-SCNC: 4.2 MMOL/L (ref 3.5–5.1)
PROT SERPL-MCNC: 6.8 G/DL (ref 6–8.4)
SODIUM SERPL-SCNC: 134 MMOL/L (ref 136–145)

## 2021-12-03 PROCEDURE — 12000002 HC ACUTE/MED SURGE SEMI-PRIVATE ROOM

## 2021-12-03 PROCEDURE — 25000003 PHARM REV CODE 250: Performed by: INTERNAL MEDICINE

## 2021-12-03 PROCEDURE — 36415 COLL VENOUS BLD VENIPUNCTURE: CPT | Performed by: INTERNAL MEDICINE

## 2021-12-03 PROCEDURE — 97530 THERAPEUTIC ACTIVITIES: CPT

## 2021-12-03 PROCEDURE — 63600175 PHARM REV CODE 636 W HCPCS: Performed by: INTERNAL MEDICINE

## 2021-12-03 PROCEDURE — 94640 AIRWAY INHALATION TREATMENT: CPT

## 2021-12-03 PROCEDURE — 80053 COMPREHEN METABOLIC PANEL: CPT | Performed by: INTERNAL MEDICINE

## 2021-12-03 PROCEDURE — 27000221 HC OXYGEN, UP TO 24 HOURS

## 2021-12-03 PROCEDURE — 99900035 HC TECH TIME PER 15 MIN (STAT)

## 2021-12-03 PROCEDURE — 94761 N-INVAS EAR/PLS OXIMETRY MLT: CPT

## 2021-12-03 PROCEDURE — 94799 UNLISTED PULMONARY SVC/PX: CPT

## 2021-12-03 PROCEDURE — 99900031 HC PATIENT EDUCATION (STAT)

## 2021-12-03 PROCEDURE — 25000242 PHARM REV CODE 250 ALT 637 W/ HCPCS: Performed by: INTERNAL MEDICINE

## 2021-12-03 RX ADMIN — TAMSULOSIN HYDROCHLORIDE 0.4 MG: 0.4 CAPSULE ORAL at 08:12

## 2021-12-03 RX ADMIN — INSULIN ASPART 5 UNITS: 100 INJECTION, SOLUTION INTRAVENOUS; SUBCUTANEOUS at 08:12

## 2021-12-03 RX ADMIN — HYDROCODONE BITARTRATE AND ACETAMINOPHEN 1 TABLET: 7.5; 325 TABLET ORAL at 03:12

## 2021-12-03 RX ADMIN — MIDODRINE HYDROCHLORIDE 2.5 MG: 2.5 TABLET ORAL at 12:12

## 2021-12-03 RX ADMIN — MIDODRINE HYDROCHLORIDE 2.5 MG: 2.5 TABLET ORAL at 08:12

## 2021-12-03 RX ADMIN — FUROSEMIDE 40 MG: 10 INJECTION INTRAMUSCULAR; INTRAVENOUS at 08:12

## 2021-12-03 RX ADMIN — POLYETHYLENE GLYCOL 3350 17 G: 17 POWDER, FOR SOLUTION ORAL at 08:12

## 2021-12-03 RX ADMIN — LEVALBUTEROL HYDROCHLORIDE 1.25 MG: 1.25 SOLUTION, CONCENTRATE RESPIRATORY (INHALATION) at 01:12

## 2021-12-03 RX ADMIN — ONDANSETRON 8 MG: 4 TABLET, ORALLY DISINTEGRATING ORAL at 09:12

## 2021-12-03 RX ADMIN — MIDODRINE HYDROCHLORIDE 2.5 MG: 2.5 TABLET ORAL at 04:12

## 2021-12-03 RX ADMIN — LEVALBUTEROL HYDROCHLORIDE 1.25 MG: 1.25 SOLUTION, CONCENTRATE RESPIRATORY (INHALATION) at 08:12

## 2021-12-03 RX ADMIN — HYDROCODONE BITARTRATE AND ACETAMINOPHEN 1 TABLET: 7.5; 325 TABLET ORAL at 08:12

## 2021-12-03 RX ADMIN — ALPRAZOLAM 0.5 MG: 0.5 TABLET ORAL at 03:12

## 2021-12-03 RX ADMIN — INSULIN DETEMIR 50 UNITS: 100 INJECTION, SOLUTION SUBCUTANEOUS at 08:12

## 2021-12-03 RX ADMIN — MELATONIN 6 MG: at 09:12

## 2021-12-03 RX ADMIN — INSULIN ASPART 5 UNITS: 100 INJECTION, SOLUTION INTRAVENOUS; SUBCUTANEOUS at 12:12

## 2021-12-03 RX ADMIN — PREDNISONE 40 MG: 20 TABLET ORAL at 08:12

## 2021-12-03 RX ADMIN — TRAZODONE HYDROCHLORIDE 150 MG: 50 TABLET ORAL at 08:12

## 2021-12-03 RX ADMIN — ALPRAZOLAM 0.5 MG: 0.5 TABLET ORAL at 11:12

## 2021-12-03 RX ADMIN — COLLAGENASE SANTYL: 250 OINTMENT TOPICAL at 08:12

## 2021-12-03 RX ADMIN — HUMAN INSULIN 8 UNITS: 100 INJECTION, SOLUTION SUBCUTANEOUS at 08:12

## 2021-12-03 RX ADMIN — RIVAROXABAN 20 MG: 20 TABLET, FILM COATED ORAL at 04:12

## 2021-12-03 RX ADMIN — INSULIN ASPART 5 UNITS: 100 INJECTION, SOLUTION INTRAVENOUS; SUBCUTANEOUS at 03:12

## 2021-12-03 RX ADMIN — HYDROCODONE BITARTRATE AND ACETAMINOPHEN 1 TABLET: 7.5; 325 TABLET ORAL at 01:12

## 2021-12-03 RX ADMIN — AMIODARONE HYDROCHLORIDE 200 MG: 200 TABLET ORAL at 08:12

## 2021-12-03 RX ADMIN — LEVALBUTEROL HYDROCHLORIDE 1.25 MG: 1.25 SOLUTION, CONCENTRATE RESPIRATORY (INHALATION) at 07:12

## 2021-12-03 RX ADMIN — HUMAN INSULIN 8 UNITS: 100 INJECTION, SOLUTION SUBCUTANEOUS at 06:12

## 2021-12-03 RX ADMIN — LEVOFLOXACIN 500 MG: 500 TABLET, FILM COATED ORAL at 08:12

## 2021-12-04 LAB
GLUCOSE SERPL-MCNC: 136 MG/DL (ref 70–110)
GLUCOSE SERPL-MCNC: 140 MG/DL (ref 70–110)
GLUCOSE SERPL-MCNC: 294 MG/DL (ref 70–110)
GLUCOSE SERPL-MCNC: 404 MG/DL (ref 70–110)
GLUCOSE SERPL-MCNC: 420 MG/DL (ref 70–110)

## 2021-12-04 PROCEDURE — 25000003 PHARM REV CODE 250: Performed by: INTERNAL MEDICINE

## 2021-12-04 PROCEDURE — 94640 AIRWAY INHALATION TREATMENT: CPT

## 2021-12-04 PROCEDURE — 12000002 HC ACUTE/MED SURGE SEMI-PRIVATE ROOM

## 2021-12-04 PROCEDURE — 99900035 HC TECH TIME PER 15 MIN (STAT)

## 2021-12-04 PROCEDURE — 27000221 HC OXYGEN, UP TO 24 HOURS

## 2021-12-04 PROCEDURE — 63600175 PHARM REV CODE 636 W HCPCS: Performed by: INTERNAL MEDICINE

## 2021-12-04 PROCEDURE — 94761 N-INVAS EAR/PLS OXIMETRY MLT: CPT

## 2021-12-04 PROCEDURE — 25000242 PHARM REV CODE 250 ALT 637 W/ HCPCS: Performed by: INTERNAL MEDICINE

## 2021-12-04 PROCEDURE — 97530 THERAPEUTIC ACTIVITIES: CPT | Mod: CQ

## 2021-12-04 PROCEDURE — 99900031 HC PATIENT EDUCATION (STAT)

## 2021-12-04 RX ORDER — PREDNISONE 20 MG/1
20 TABLET ORAL DAILY
Status: DISCONTINUED | OUTPATIENT
Start: 2021-12-05 | End: 2021-12-07

## 2021-12-04 RX ORDER — FUROSEMIDE 20 MG/1
20 TABLET ORAL DAILY
Status: DISCONTINUED | OUTPATIENT
Start: 2021-12-04 | End: 2021-12-06

## 2021-12-04 RX ADMIN — LEVALBUTEROL HYDROCHLORIDE 1.25 MG: 1.25 SOLUTION, CONCENTRATE RESPIRATORY (INHALATION) at 01:12

## 2021-12-04 RX ADMIN — INSULIN ASPART 5 UNITS: 100 INJECTION, SOLUTION INTRAVENOUS; SUBCUTANEOUS at 11:12

## 2021-12-04 RX ADMIN — HUMAN INSULIN 12 UNITS: 100 INJECTION, SOLUTION SUBCUTANEOUS at 04:12

## 2021-12-04 RX ADMIN — PREDNISONE 40 MG: 20 TABLET ORAL at 08:12

## 2021-12-04 RX ADMIN — RIVAROXABAN 20 MG: 20 TABLET, FILM COATED ORAL at 04:12

## 2021-12-04 RX ADMIN — MIDODRINE HYDROCHLORIDE 2.5 MG: 2.5 TABLET ORAL at 11:12

## 2021-12-04 RX ADMIN — LEVALBUTEROL HYDROCHLORIDE 1.25 MG: 1.25 SOLUTION, CONCENTRATE RESPIRATORY (INHALATION) at 08:12

## 2021-12-04 RX ADMIN — HYDROCODONE BITARTRATE AND ACETAMINOPHEN 1 TABLET: 7.5; 325 TABLET ORAL at 01:12

## 2021-12-04 RX ADMIN — HYDROCODONE BITARTRATE AND ACETAMINOPHEN 1 TABLET: 7.5; 325 TABLET ORAL at 08:12

## 2021-12-04 RX ADMIN — HYDROCODONE BITARTRATE AND ACETAMINOPHEN 1 TABLET: 7.5; 325 TABLET ORAL at 03:12

## 2021-12-04 RX ADMIN — INSULIN ASPART 5 UNITS: 100 INJECTION, SOLUTION INTRAVENOUS; SUBCUTANEOUS at 04:12

## 2021-12-04 RX ADMIN — TAMSULOSIN HYDROCHLORIDE 0.4 MG: 0.4 CAPSULE ORAL at 08:12

## 2021-12-04 RX ADMIN — ALPRAZOLAM 0.5 MG: 0.5 TABLET ORAL at 03:12

## 2021-12-04 RX ADMIN — MIDODRINE HYDROCHLORIDE 2.5 MG: 2.5 TABLET ORAL at 04:12

## 2021-12-04 RX ADMIN — TRAZODONE HYDROCHLORIDE 150 MG: 50 TABLET ORAL at 09:12

## 2021-12-04 RX ADMIN — INSULIN DETEMIR 50 UNITS: 100 INJECTION, SOLUTION SUBCUTANEOUS at 09:12

## 2021-12-04 RX ADMIN — HUMAN INSULIN 10 UNITS: 100 INJECTION, SOLUTION SUBCUTANEOUS at 09:12

## 2021-12-04 RX ADMIN — FUROSEMIDE 20 MG: 20 TABLET ORAL at 08:12

## 2021-12-04 RX ADMIN — INSULIN ASPART 5 UNITS: 100 INJECTION, SOLUTION INTRAVENOUS; SUBCUTANEOUS at 08:12

## 2021-12-04 RX ADMIN — LEVALBUTEROL HYDROCHLORIDE 1.25 MG: 1.25 SOLUTION, CONCENTRATE RESPIRATORY (INHALATION) at 07:12

## 2021-12-04 RX ADMIN — LEVOFLOXACIN 500 MG: 500 TABLET, FILM COATED ORAL at 08:12

## 2021-12-04 RX ADMIN — AMIODARONE HYDROCHLORIDE 200 MG: 200 TABLET ORAL at 08:12

## 2021-12-04 RX ADMIN — COLLAGENASE SANTYL: 250 OINTMENT TOPICAL at 09:12

## 2021-12-04 RX ADMIN — HYDROCODONE BITARTRATE AND ACETAMINOPHEN 1 TABLET: 7.5; 325 TABLET ORAL at 06:12

## 2021-12-04 RX ADMIN — MIDODRINE HYDROCHLORIDE 2.5 MG: 2.5 TABLET ORAL at 08:12

## 2021-12-05 LAB
GLUCOSE SERPL-MCNC: 160 MG/DL (ref 70–110)
GLUCOSE SERPL-MCNC: 337 MG/DL (ref 70–110)
GLUCOSE SERPL-MCNC: 350 MG/DL (ref 70–110)
GLUCOSE SERPL-MCNC: 564 MG/DL (ref 70–110)
GLUCOSE SERPL-MCNC: 91 MG/DL (ref 70–110)

## 2021-12-05 PROCEDURE — 25000003 PHARM REV CODE 250: Performed by: INTERNAL MEDICINE

## 2021-12-05 PROCEDURE — 25000242 PHARM REV CODE 250 ALT 637 W/ HCPCS: Performed by: INTERNAL MEDICINE

## 2021-12-05 PROCEDURE — 94640 AIRWAY INHALATION TREATMENT: CPT

## 2021-12-05 PROCEDURE — 99900031 HC PATIENT EDUCATION (STAT)

## 2021-12-05 PROCEDURE — 99900035 HC TECH TIME PER 15 MIN (STAT)

## 2021-12-05 PROCEDURE — 94799 UNLISTED PULMONARY SVC/PX: CPT

## 2021-12-05 PROCEDURE — 36415 COLL VENOUS BLD VENIPUNCTURE: CPT | Performed by: INTERNAL MEDICINE

## 2021-12-05 PROCEDURE — 82947 ASSAY GLUCOSE BLOOD QUANT: CPT | Performed by: INTERNAL MEDICINE

## 2021-12-05 PROCEDURE — 94761 N-INVAS EAR/PLS OXIMETRY MLT: CPT

## 2021-12-05 PROCEDURE — 97110 THERAPEUTIC EXERCISES: CPT

## 2021-12-05 PROCEDURE — 12000002 HC ACUTE/MED SURGE SEMI-PRIVATE ROOM

## 2021-12-05 PROCEDURE — 97530 THERAPEUTIC ACTIVITIES: CPT

## 2021-12-05 PROCEDURE — 27000221 HC OXYGEN, UP TO 24 HOURS

## 2021-12-05 PROCEDURE — 63600175 PHARM REV CODE 636 W HCPCS: Performed by: INTERNAL MEDICINE

## 2021-12-05 RX ADMIN — HUMAN INSULIN 8 UNITS: 100 INJECTION, SOLUTION SUBCUTANEOUS at 05:12

## 2021-12-05 RX ADMIN — PREDNISONE 20 MG: 20 TABLET ORAL at 09:12

## 2021-12-05 RX ADMIN — HYDROCODONE BITARTRATE AND ACETAMINOPHEN 1 TABLET: 7.5; 325 TABLET ORAL at 01:12

## 2021-12-05 RX ADMIN — HYDROCODONE BITARTRATE AND ACETAMINOPHEN 1 TABLET: 7.5; 325 TABLET ORAL at 10:12

## 2021-12-05 RX ADMIN — LEVALBUTEROL HYDROCHLORIDE 1.25 MG: 1.25 SOLUTION, CONCENTRATE RESPIRATORY (INHALATION) at 07:12

## 2021-12-05 RX ADMIN — MIDODRINE HYDROCHLORIDE 2.5 MG: 2.5 TABLET ORAL at 07:12

## 2021-12-05 RX ADMIN — RIVAROXABAN 20 MG: 20 TABLET, FILM COATED ORAL at 05:12

## 2021-12-05 RX ADMIN — MIDODRINE HYDROCHLORIDE 2.5 MG: 2.5 TABLET ORAL at 12:12

## 2021-12-05 RX ADMIN — INSULIN ASPART 5 UNITS: 100 INJECTION, SOLUTION INTRAVENOUS; SUBCUTANEOUS at 10:12

## 2021-12-05 RX ADMIN — COLLAGENASE SANTYL: 250 OINTMENT TOPICAL at 09:12

## 2021-12-05 RX ADMIN — INSULIN ASPART 5 UNITS: 100 INJECTION, SOLUTION INTRAVENOUS; SUBCUTANEOUS at 05:12

## 2021-12-05 RX ADMIN — HYDROCODONE BITARTRATE AND ACETAMINOPHEN 1 TABLET: 7.5; 325 TABLET ORAL at 09:12

## 2021-12-05 RX ADMIN — ALPRAZOLAM 0.5 MG: 0.5 TABLET ORAL at 01:12

## 2021-12-05 RX ADMIN — TRAZODONE HYDROCHLORIDE 150 MG: 50 TABLET ORAL at 09:12

## 2021-12-05 RX ADMIN — INSULIN DETEMIR 50 UNITS: 100 INJECTION, SOLUTION SUBCUTANEOUS at 09:12

## 2021-12-05 RX ADMIN — HUMAN INSULIN 10 UNITS: 100 INJECTION, SOLUTION SUBCUTANEOUS at 09:12

## 2021-12-05 RX ADMIN — AMIODARONE HYDROCHLORIDE 200 MG: 200 TABLET ORAL at 09:12

## 2021-12-05 RX ADMIN — HYDROCODONE BITARTRATE AND ACETAMINOPHEN 1 TABLET: 7.5; 325 TABLET ORAL at 05:12

## 2021-12-05 RX ADMIN — LEVALBUTEROL HYDROCHLORIDE 1.25 MG: 1.25 SOLUTION, CONCENTRATE RESPIRATORY (INHALATION) at 01:12

## 2021-12-05 RX ADMIN — ALPRAZOLAM 0.5 MG: 0.5 TABLET ORAL at 09:12

## 2021-12-05 RX ADMIN — ALPRAZOLAM 0.5 MG: 0.5 TABLET ORAL at 10:12

## 2021-12-05 RX ADMIN — TAMSULOSIN HYDROCHLORIDE 0.4 MG: 0.4 CAPSULE ORAL at 09:12

## 2021-12-05 RX ADMIN — MIDODRINE HYDROCHLORIDE 2.5 MG: 2.5 TABLET ORAL at 05:12

## 2021-12-05 RX ADMIN — MELATONIN 6 MG: at 09:12

## 2021-12-06 LAB
GLUCOSE SERPL-MCNC: 198 MG/DL (ref 70–110)
GLUCOSE SERPL-MCNC: 340 MG/DL (ref 70–110)
GLUCOSE SERPL-MCNC: 363 MG/DL (ref 70–110)
GLUCOSE SERPL-MCNC: 74 MG/DL (ref 70–110)

## 2021-12-06 PROCEDURE — 99900035 HC TECH TIME PER 15 MIN (STAT)

## 2021-12-06 PROCEDURE — 94640 AIRWAY INHALATION TREATMENT: CPT

## 2021-12-06 PROCEDURE — 97530 THERAPEUTIC ACTIVITIES: CPT

## 2021-12-06 PROCEDURE — 97110 THERAPEUTIC EXERCISES: CPT

## 2021-12-06 PROCEDURE — 63600175 PHARM REV CODE 636 W HCPCS: Performed by: INTERNAL MEDICINE

## 2021-12-06 PROCEDURE — 25000003 PHARM REV CODE 250: Performed by: INTERNAL MEDICINE

## 2021-12-06 PROCEDURE — 27000221 HC OXYGEN, UP TO 24 HOURS

## 2021-12-06 PROCEDURE — 12000002 HC ACUTE/MED SURGE SEMI-PRIVATE ROOM

## 2021-12-06 PROCEDURE — 94761 N-INVAS EAR/PLS OXIMETRY MLT: CPT

## 2021-12-06 PROCEDURE — 25000242 PHARM REV CODE 250 ALT 637 W/ HCPCS: Performed by: INTERNAL MEDICINE

## 2021-12-06 PROCEDURE — 99900031 HC PATIENT EDUCATION (STAT)

## 2021-12-06 PROCEDURE — 94799 UNLISTED PULMONARY SVC/PX: CPT

## 2021-12-06 RX ORDER — MIDODRINE HYDROCHLORIDE 2.5 MG/1
5 TABLET ORAL
Status: DISCONTINUED | OUTPATIENT
Start: 2021-12-06 | End: 2021-12-07 | Stop reason: HOSPADM

## 2021-12-06 RX ADMIN — INSULIN ASPART 5 UNITS: 100 INJECTION, SOLUTION INTRAVENOUS; SUBCUTANEOUS at 11:12

## 2021-12-06 RX ADMIN — ALPRAZOLAM 0.5 MG: 0.5 TABLET ORAL at 08:12

## 2021-12-06 RX ADMIN — AMIODARONE HYDROCHLORIDE 200 MG: 200 TABLET ORAL at 08:12

## 2021-12-06 RX ADMIN — MIDODRINE HYDROCHLORIDE 5 MG: 2.5 TABLET ORAL at 04:12

## 2021-12-06 RX ADMIN — HYDROCODONE BITARTRATE AND ACETAMINOPHEN 1 TABLET: 7.5; 325 TABLET ORAL at 04:12

## 2021-12-06 RX ADMIN — MIDODRINE HYDROCHLORIDE 2.5 MG: 2.5 TABLET ORAL at 08:12

## 2021-12-06 RX ADMIN — PREDNISONE 20 MG: 20 TABLET ORAL at 08:12

## 2021-12-06 RX ADMIN — FUROSEMIDE 20 MG: 20 TABLET ORAL at 08:12

## 2021-12-06 RX ADMIN — TRAZODONE HYDROCHLORIDE 150 MG: 50 TABLET ORAL at 08:12

## 2021-12-06 RX ADMIN — ALPRAZOLAM 0.5 MG: 0.5 TABLET ORAL at 03:12

## 2021-12-06 RX ADMIN — HYDROCODONE BITARTRATE AND ACETAMINOPHEN 1 TABLET: 7.5; 325 TABLET ORAL at 08:12

## 2021-12-06 RX ADMIN — LEVALBUTEROL HYDROCHLORIDE 1.25 MG: 1.25 SOLUTION, CONCENTRATE RESPIRATORY (INHALATION) at 01:12

## 2021-12-06 RX ADMIN — LEVALBUTEROL HYDROCHLORIDE 1.25 MG: 1.25 SOLUTION, CONCENTRATE RESPIRATORY (INHALATION) at 07:12

## 2021-12-06 RX ADMIN — INSULIN DETEMIR 50 UNITS: 100 INJECTION, SOLUTION SUBCUTANEOUS at 08:12

## 2021-12-06 RX ADMIN — HUMAN INSULIN 10 UNITS: 100 INJECTION, SOLUTION SUBCUTANEOUS at 08:12

## 2021-12-06 RX ADMIN — TAMSULOSIN HYDROCHLORIDE 0.4 MG: 0.4 CAPSULE ORAL at 08:12

## 2021-12-06 RX ADMIN — INSULIN ASPART 5 UNITS: 100 INJECTION, SOLUTION INTRAVENOUS; SUBCUTANEOUS at 04:12

## 2021-12-06 RX ADMIN — RIVAROXABAN 20 MG: 20 TABLET, FILM COATED ORAL at 04:12

## 2021-12-06 RX ADMIN — HYDROCODONE BITARTRATE AND ACETAMINOPHEN 1 TABLET: 7.5; 325 TABLET ORAL at 11:12

## 2021-12-06 RX ADMIN — COLLAGENASE SANTYL: 250 OINTMENT TOPICAL at 08:12

## 2021-12-06 RX ADMIN — INSULIN ASPART 5 UNITS: 100 INJECTION, SOLUTION INTRAVENOUS; SUBCUTANEOUS at 07:12

## 2021-12-06 RX ADMIN — MIDODRINE HYDROCHLORIDE 5 MG: 2.5 TABLET ORAL at 11:12

## 2021-12-07 VITALS
TEMPERATURE: 98 F | OXYGEN SATURATION: 99 % | RESPIRATION RATE: 18 BRPM | DIASTOLIC BLOOD PRESSURE: 78 MMHG | HEART RATE: 85 BPM | SYSTOLIC BLOOD PRESSURE: 104 MMHG | WEIGHT: 219.81 LBS | BODY MASS INDEX: 33.31 KG/M2 | HEIGHT: 68 IN

## 2021-12-07 PROBLEM — Z78.9 FREQUENT HOSPITAL ADMISSIONS: Status: RESOLVED | Noted: 2021-11-30 | Resolved: 2021-12-07

## 2021-12-07 PROBLEM — J18.9 MULTIFOCAL PNEUMONIA: Status: RESOLVED | Noted: 2021-11-30 | Resolved: 2021-12-07

## 2021-12-07 PROBLEM — Z91.199 NONCOMPLIANCE: Status: RESOLVED | Noted: 2021-10-09 | Resolved: 2021-12-07

## 2021-12-07 LAB
GLUCOSE SERPL-MCNC: 253 MG/DL (ref 70–110)
GLUCOSE SERPL-MCNC: 76 MG/DL (ref 70–110)

## 2021-12-07 PROCEDURE — 25000003 PHARM REV CODE 250: Performed by: INTERNAL MEDICINE

## 2021-12-07 PROCEDURE — 94761 N-INVAS EAR/PLS OXIMETRY MLT: CPT

## 2021-12-07 PROCEDURE — 63600175 PHARM REV CODE 636 W HCPCS: Performed by: INTERNAL MEDICINE

## 2021-12-07 PROCEDURE — 99900035 HC TECH TIME PER 15 MIN (STAT)

## 2021-12-07 PROCEDURE — 94640 AIRWAY INHALATION TREATMENT: CPT

## 2021-12-07 PROCEDURE — 25000242 PHARM REV CODE 250 ALT 637 W/ HCPCS: Performed by: INTERNAL MEDICINE

## 2021-12-07 PROCEDURE — 97116 GAIT TRAINING THERAPY: CPT

## 2021-12-07 PROCEDURE — 27000221 HC OXYGEN, UP TO 24 HOURS

## 2021-12-07 RX ORDER — MOMETASONE FUROATE AND FORMOTEROL FUMARATE DIHYDRATE 200; 5 UG/1; UG/1
2 AEROSOL RESPIRATORY (INHALATION) 2 TIMES DAILY
Qty: 13 G | Refills: 0 | Status: SHIPPED | OUTPATIENT
Start: 2021-12-07 | End: 2022-07-21 | Stop reason: ALTCHOICE

## 2021-12-07 RX ORDER — FUROSEMIDE 20 MG/1
20 TABLET ORAL DAILY
Qty: 14 TABLET | Refills: 0 | Status: ON HOLD | OUTPATIENT
Start: 2021-12-07 | End: 2022-07-29 | Stop reason: CLARIF

## 2021-12-07 RX ORDER — MIDODRINE HYDROCHLORIDE 2.5 MG/1
5 TABLET ORAL EVERY 8 HOURS
Qty: 90 TABLET | Refills: 0
Start: 2021-12-07 | End: 2022-06-21

## 2021-12-07 RX ORDER — INSULIN ASPART 100 [IU]/ML
5 INJECTION, SOLUTION INTRAVENOUS; SUBCUTANEOUS 3 TIMES DAILY
Qty: 15 ML | Refills: 1 | Status: SHIPPED | OUTPATIENT
Start: 2021-12-07 | End: 2022-03-07

## 2021-12-07 RX ORDER — HYDROCODONE BITARTRATE AND ACETAMINOPHEN 5; 325 MG/1; MG/1
1 TABLET ORAL EVERY 12 HOURS PRN
Qty: 10 TABLET | Refills: 0 | Status: ON HOLD | OUTPATIENT
Start: 2021-12-07 | End: 2021-12-11 | Stop reason: SDUPTHER

## 2021-12-07 RX ORDER — INSULIN GLARGINE 100 [IU]/ML
50 INJECTION, SOLUTION SUBCUTANEOUS NIGHTLY
Refills: 0 | Status: ON HOLD
Start: 2021-12-07 | End: 2022-07-29 | Stop reason: CLARIF

## 2021-12-07 RX ADMIN — TAMSULOSIN HYDROCHLORIDE 0.4 MG: 0.4 CAPSULE ORAL at 08:12

## 2021-12-07 RX ADMIN — ALPRAZOLAM 0.5 MG: 0.5 TABLET ORAL at 08:12

## 2021-12-07 RX ADMIN — MIDODRINE HYDROCHLORIDE 5 MG: 2.5 TABLET ORAL at 07:12

## 2021-12-07 RX ADMIN — HYDROCODONE BITARTRATE AND ACETAMINOPHEN 1 TABLET: 7.5; 325 TABLET ORAL at 02:12

## 2021-12-07 RX ADMIN — MIDODRINE HYDROCHLORIDE 5 MG: 2.5 TABLET ORAL at 12:12

## 2021-12-07 RX ADMIN — LEVALBUTEROL HYDROCHLORIDE 1.25 MG: 1.25 SOLUTION, CONCENTRATE RESPIRATORY (INHALATION) at 01:12

## 2021-12-07 RX ADMIN — PREDNISONE 20 MG: 20 TABLET ORAL at 08:12

## 2021-12-07 RX ADMIN — LEVALBUTEROL HYDROCHLORIDE 1.25 MG: 1.25 SOLUTION, CONCENTRATE RESPIRATORY (INHALATION) at 07:12

## 2021-12-07 RX ADMIN — INSULIN ASPART 5 UNITS: 100 INJECTION, SOLUTION INTRAVENOUS; SUBCUTANEOUS at 11:12

## 2021-12-07 RX ADMIN — HYDROCODONE BITARTRATE AND ACETAMINOPHEN 1 TABLET: 7.5; 325 TABLET ORAL at 10:12

## 2021-12-07 RX ADMIN — COLLAGENASE SANTYL: 250 OINTMENT TOPICAL at 08:12

## 2021-12-09 ENCOUNTER — HOSPITAL ENCOUNTER (OUTPATIENT)
Facility: HOSPITAL | Age: 61
Discharge: HOME HEALTH | End: 2021-12-11
Attending: EMERGENCY MEDICINE | Admitting: STUDENT IN AN ORGANIZED HEALTH CARE EDUCATION/TRAINING PROGRAM
Payer: MEDICAID

## 2021-12-09 DIAGNOSIS — I26.99 BILATERAL PULMONARY EMBOLISM: Primary | ICD-10-CM

## 2021-12-09 DIAGNOSIS — J18.9 COMMUNITY ACQUIRED PNEUMONIA OF LEFT LOWER LOBE OF LUNG: ICD-10-CM

## 2021-12-09 DIAGNOSIS — R52 PAIN: ICD-10-CM

## 2021-12-09 DIAGNOSIS — R07.9 CHEST PAIN: ICD-10-CM

## 2021-12-09 DIAGNOSIS — R07.81 RIB PAIN: ICD-10-CM

## 2021-12-09 LAB
ALBUMIN SERPL BCP-MCNC: 3 G/DL (ref 3.5–5.2)
ALP SERPL-CCNC: 139 U/L (ref 55–135)
ALT SERPL W/O P-5'-P-CCNC: 16 U/L (ref 10–44)
ANION GAP SERPL CALC-SCNC: 13 MMOL/L (ref 8–16)
AST SERPL-CCNC: 12 U/L (ref 10–40)
BASOPHILS # BLD AUTO: 0.02 K/UL (ref 0–0.2)
BASOPHILS NFR BLD: 0.1 % (ref 0–1.9)
BILIRUB SERPL-MCNC: 0.6 MG/DL (ref 0.1–1)
BUN SERPL-MCNC: 12 MG/DL (ref 8–23)
CALCIUM SERPL-MCNC: 9.4 MG/DL (ref 8.7–10.5)
CHLORIDE SERPL-SCNC: 98 MMOL/L (ref 95–110)
CO2 SERPL-SCNC: 26 MMOL/L (ref 23–29)
CREAT SERPL-MCNC: 0.7 MG/DL (ref 0.5–1.4)
DIFFERENTIAL METHOD: ABNORMAL
EOSINOPHIL # BLD AUTO: 0.1 K/UL (ref 0–0.5)
EOSINOPHIL NFR BLD: 0.6 % (ref 0–8)
ERYTHROCYTE [DISTWIDTH] IN BLOOD BY AUTOMATED COUNT: 15.7 % (ref 11.5–14.5)
EST. GFR  (AFRICAN AMERICAN): >60 ML/MIN/1.73 M^2
EST. GFR  (NON AFRICAN AMERICAN): >60 ML/MIN/1.73 M^2
GLUCOSE SERPL-MCNC: 274 MG/DL (ref 70–110)
HCT VFR BLD AUTO: 37.4 % (ref 40–54)
HGB BLD-MCNC: 11.4 G/DL (ref 14–18)
IMM GRANULOCYTES # BLD AUTO: 0.2 K/UL (ref 0–0.04)
IMM GRANULOCYTES NFR BLD AUTO: 1 % (ref 0–0.5)
LACTATE SERPL-SCNC: 0.9 MMOL/L (ref 0.5–2.2)
LYMPHOCYTES # BLD AUTO: 1.1 K/UL (ref 1–4.8)
LYMPHOCYTES NFR BLD: 5.3 % (ref 18–48)
MCH RBC QN AUTO: 27.5 PG (ref 27–31)
MCHC RBC AUTO-ENTMCNC: 30.5 G/DL (ref 32–36)
MCV RBC AUTO: 90 FL (ref 82–98)
MONOCYTES # BLD AUTO: 1.4 K/UL (ref 0.3–1)
MONOCYTES NFR BLD: 7 % (ref 4–15)
NEUTROPHILS # BLD AUTO: 16.9 K/UL (ref 1.8–7.7)
NEUTROPHILS NFR BLD: 86 % (ref 38–73)
NRBC BLD-RTO: 0 /100 WBC
PLATELET # BLD AUTO: 349 K/UL (ref 150–450)
PMV BLD AUTO: 10 FL (ref 9.2–12.9)
POTASSIUM SERPL-SCNC: 4.5 MMOL/L (ref 3.5–5.1)
PROT SERPL-MCNC: 7.1 G/DL (ref 6–8.4)
RBC # BLD AUTO: 4.14 M/UL (ref 4.6–6.2)
SODIUM SERPL-SCNC: 137 MMOL/L (ref 136–145)
WBC # BLD AUTO: 19.72 K/UL (ref 3.9–12.7)

## 2021-12-09 PROCEDURE — G0378 HOSPITAL OBSERVATION PER HR: HCPCS

## 2021-12-09 PROCEDURE — 25000003 PHARM REV CODE 250: Performed by: EMERGENCY MEDICINE

## 2021-12-09 PROCEDURE — 96367 TX/PROPH/DG ADDL SEQ IV INF: CPT

## 2021-12-09 PROCEDURE — 87040 BLOOD CULTURE FOR BACTERIA: CPT | Mod: 59 | Performed by: NURSE PRACTITIONER

## 2021-12-09 PROCEDURE — 85025 COMPLETE CBC W/AUTO DIFF WBC: CPT | Performed by: NURSE PRACTITIONER

## 2021-12-09 PROCEDURE — 25500020 PHARM REV CODE 255

## 2021-12-09 PROCEDURE — 25000003 PHARM REV CODE 250: Performed by: NURSE PRACTITIONER

## 2021-12-09 PROCEDURE — 80053 COMPREHEN METABOLIC PANEL: CPT | Performed by: NURSE PRACTITIONER

## 2021-12-09 PROCEDURE — 96365 THER/PROPH/DIAG IV INF INIT: CPT

## 2021-12-09 PROCEDURE — 83605 ASSAY OF LACTIC ACID: CPT | Performed by: NURSE PRACTITIONER

## 2021-12-09 PROCEDURE — 99285 EMERGENCY DEPT VISIT HI MDM: CPT | Mod: 25

## 2021-12-09 PROCEDURE — 63600175 PHARM REV CODE 636 W HCPCS: Performed by: EMERGENCY MEDICINE

## 2021-12-09 PROCEDURE — 36415 COLL VENOUS BLD VENIPUNCTURE: CPT | Performed by: NURSE PRACTITIONER

## 2021-12-09 RX ORDER — ACETAMINOPHEN 500 MG
1000 TABLET ORAL
Status: COMPLETED | OUTPATIENT
Start: 2021-12-09 | End: 2021-12-09

## 2021-12-09 RX ORDER — ALPRAZOLAM 0.25 MG/1
0.5 TABLET ORAL
Status: COMPLETED | OUTPATIENT
Start: 2021-12-09 | End: 2021-12-09

## 2021-12-09 RX ADMIN — ACETAMINOPHEN 1000 MG: 500 TABLET ORAL at 08:12

## 2021-12-09 RX ADMIN — ALPRAZOLAM 0.5 MG: 0.25 TABLET ORAL at 09:12

## 2021-12-09 RX ADMIN — AZITHROMYCIN MONOHYDRATE 500 MG: 500 INJECTION, POWDER, LYOPHILIZED, FOR SOLUTION INTRAVENOUS at 11:12

## 2021-12-09 RX ADMIN — IOHEXOL 100 ML: 350 INJECTION, SOLUTION INTRAVENOUS at 10:12

## 2021-12-09 RX ADMIN — CEFTRIAXONE 1 G: 1 INJECTION, SOLUTION INTRAVENOUS at 11:12

## 2021-12-10 PROBLEM — S98.111A AMPUTATION OF RIGHT GREAT TOE: Status: ACTIVE | Noted: 2021-12-10

## 2021-12-10 LAB
ALBUMIN SERPL BCP-MCNC: 2.6 G/DL (ref 3.5–5.2)
ALP SERPL-CCNC: 122 U/L (ref 55–135)
ALT SERPL W/O P-5'-P-CCNC: 13 U/L (ref 10–44)
ANION GAP SERPL CALC-SCNC: 13 MMOL/L (ref 8–16)
AST SERPL-CCNC: 10 U/L (ref 10–40)
BASOPHILS # BLD AUTO: 0.04 K/UL (ref 0–0.2)
BASOPHILS NFR BLD: 0.3 % (ref 0–1.9)
BILIRUB SERPL-MCNC: 0.5 MG/DL (ref 0.1–1)
BUN SERPL-MCNC: 10 MG/DL (ref 8–23)
CALCIUM SERPL-MCNC: 9 MG/DL (ref 8.7–10.5)
CHLORIDE SERPL-SCNC: 99 MMOL/L (ref 95–110)
CO2 SERPL-SCNC: 25 MMOL/L (ref 23–29)
CREAT SERPL-MCNC: 0.6 MG/DL (ref 0.5–1.4)
DIFFERENTIAL METHOD: ABNORMAL
EOSINOPHIL # BLD AUTO: 0.2 K/UL (ref 0–0.5)
EOSINOPHIL NFR BLD: 1.3 % (ref 0–8)
ERYTHROCYTE [DISTWIDTH] IN BLOOD BY AUTOMATED COUNT: 15.7 % (ref 11.5–14.5)
EST. GFR  (AFRICAN AMERICAN): >60 ML/MIN/1.73 M^2
EST. GFR  (NON AFRICAN AMERICAN): >60 ML/MIN/1.73 M^2
GLUCOSE SERPL-MCNC: 237 MG/DL (ref 70–110)
HCT VFR BLD AUTO: 33.5 % (ref 40–54)
HGB BLD-MCNC: 10.4 G/DL (ref 14–18)
IMM GRANULOCYTES # BLD AUTO: 0.21 K/UL (ref 0–0.04)
IMM GRANULOCYTES NFR BLD AUTO: 1.4 % (ref 0–0.5)
LACTATE SERPL-SCNC: 0.8 MMOL/L (ref 0.5–2.2)
LYMPHOCYTES # BLD AUTO: 2.5 K/UL (ref 1–4.8)
LYMPHOCYTES NFR BLD: 16 % (ref 18–48)
MCH RBC QN AUTO: 27.2 PG (ref 27–31)
MCHC RBC AUTO-ENTMCNC: 31 G/DL (ref 32–36)
MCV RBC AUTO: 88 FL (ref 82–98)
MONOCYTES # BLD AUTO: 1.3 K/UL (ref 0.3–1)
MONOCYTES NFR BLD: 8.7 % (ref 4–15)
NEUTROPHILS # BLD AUTO: 11.1 K/UL (ref 1.8–7.7)
NEUTROPHILS NFR BLD: 72.3 % (ref 38–73)
NRBC BLD-RTO: 0 /100 WBC
PLATELET # BLD AUTO: 269 K/UL (ref 150–450)
PMV BLD AUTO: 11 FL (ref 9.2–12.9)
POCT GLUCOSE: 267 MG/DL (ref 70–110)
POCT GLUCOSE: 270 MG/DL (ref 70–110)
POCT GLUCOSE: 273 MG/DL (ref 70–110)
POCT GLUCOSE: 333 MG/DL (ref 70–110)
POTASSIUM SERPL-SCNC: 3.8 MMOL/L (ref 3.5–5.1)
PROT SERPL-MCNC: 6.4 G/DL (ref 6–8.4)
RBC # BLD AUTO: 3.83 M/UL (ref 4.6–6.2)
SARS-COV-2 RDRP RESP QL NAA+PROBE: NEGATIVE
SODIUM SERPL-SCNC: 137 MMOL/L (ref 136–145)
WBC # BLD AUTO: 15.32 K/UL (ref 3.9–12.7)

## 2021-12-10 PROCEDURE — G0378 HOSPITAL OBSERVATION PER HR: HCPCS

## 2021-12-10 PROCEDURE — 25000003 PHARM REV CODE 250: Performed by: INTERNAL MEDICINE

## 2021-12-10 PROCEDURE — 94640 AIRWAY INHALATION TREATMENT: CPT

## 2021-12-10 PROCEDURE — 36415 COLL VENOUS BLD VENIPUNCTURE: CPT | Performed by: NURSE PRACTITIONER

## 2021-12-10 PROCEDURE — 25000003 PHARM REV CODE 250: Performed by: NURSE PRACTITIONER

## 2021-12-10 PROCEDURE — 83605 ASSAY OF LACTIC ACID: CPT | Performed by: NURSE PRACTITIONER

## 2021-12-10 PROCEDURE — 96366 THER/PROPH/DIAG IV INF ADDON: CPT

## 2021-12-10 PROCEDURE — 99900035 HC TECH TIME PER 15 MIN (STAT)

## 2021-12-10 PROCEDURE — 85025 COMPLETE CBC W/AUTO DIFF WBC: CPT | Performed by: NURSE PRACTITIONER

## 2021-12-10 PROCEDURE — 25000242 PHARM REV CODE 250 ALT 637 W/ HCPCS: Performed by: NURSE PRACTITIONER

## 2021-12-10 PROCEDURE — 94761 N-INVAS EAR/PLS OXIMETRY MLT: CPT

## 2021-12-10 PROCEDURE — U0002 COVID-19 LAB TEST NON-CDC: HCPCS | Performed by: EMERGENCY MEDICINE

## 2021-12-10 PROCEDURE — 80053 COMPREHEN METABOLIC PANEL: CPT | Performed by: NURSE PRACTITIONER

## 2021-12-10 PROCEDURE — C9399 UNCLASSIFIED DRUGS OR BIOLOG: HCPCS | Performed by: INTERNAL MEDICINE

## 2021-12-10 PROCEDURE — 27000221 HC OXYGEN, UP TO 24 HOURS

## 2021-12-10 PROCEDURE — 96368 THER/DIAG CONCURRENT INF: CPT

## 2021-12-10 PROCEDURE — 96372 THER/PROPH/DIAG INJ SC/IM: CPT | Mod: 59

## 2021-12-10 PROCEDURE — 63600175 PHARM REV CODE 636 W HCPCS: Performed by: NURSE PRACTITIONER

## 2021-12-10 RX ORDER — GLUCAGON 1 MG
1 KIT INJECTION
Status: DISCONTINUED | OUTPATIENT
Start: 2021-12-10 | End: 2021-12-11 | Stop reason: HOSPADM

## 2021-12-10 RX ORDER — SODIUM,POTASSIUM PHOSPHATES 280-250MG
2 POWDER IN PACKET (EA) ORAL
Status: DISCONTINUED | OUTPATIENT
Start: 2021-12-10 | End: 2021-12-11 | Stop reason: HOSPADM

## 2021-12-10 RX ORDER — ACETAMINOPHEN 325 MG/1
650 TABLET ORAL EVERY 8 HOURS PRN
Status: DISCONTINUED | OUTPATIENT
Start: 2021-12-10 | End: 2021-12-11 | Stop reason: HOSPADM

## 2021-12-10 RX ORDER — LANOLIN ALCOHOL/MO/W.PET/CERES
800 CREAM (GRAM) TOPICAL
Status: DISCONTINUED | OUTPATIENT
Start: 2021-12-10 | End: 2021-12-11 | Stop reason: HOSPADM

## 2021-12-10 RX ORDER — TRAZODONE HYDROCHLORIDE 50 MG/1
150 TABLET ORAL NIGHTLY
Status: DISCONTINUED | OUTPATIENT
Start: 2021-12-10 | End: 2021-12-11 | Stop reason: HOSPADM

## 2021-12-10 RX ORDER — ALPRAZOLAM 0.25 MG/1
0.5 TABLET ORAL 2 TIMES DAILY PRN
Status: DISCONTINUED | OUTPATIENT
Start: 2021-12-10 | End: 2021-12-11 | Stop reason: HOSPADM

## 2021-12-10 RX ORDER — TAMSULOSIN HYDROCHLORIDE 0.4 MG/1
0.4 CAPSULE ORAL DAILY
Status: DISCONTINUED | OUTPATIENT
Start: 2021-12-10 | End: 2021-12-11 | Stop reason: HOSPADM

## 2021-12-10 RX ORDER — SODIUM CHLORIDE 0.9 % (FLUSH) 0.9 %
10 SYRINGE (ML) INJECTION EVERY 8 HOURS PRN
Status: DISCONTINUED | OUTPATIENT
Start: 2021-12-10 | End: 2021-12-11 | Stop reason: HOSPADM

## 2021-12-10 RX ORDER — IPRATROPIUM BROMIDE AND ALBUTEROL SULFATE 2.5; .5 MG/3ML; MG/3ML
3 SOLUTION RESPIRATORY (INHALATION) EVERY 6 HOURS PRN
Status: DISCONTINUED | OUTPATIENT
Start: 2021-12-10 | End: 2021-12-11 | Stop reason: HOSPADM

## 2021-12-10 RX ORDER — MAG HYDROX/ALUMINUM HYD/SIMETH 200-200-20
30 SUSPENSION, ORAL (FINAL DOSE FORM) ORAL 4 TIMES DAILY PRN
Status: DISCONTINUED | OUTPATIENT
Start: 2021-12-10 | End: 2021-12-11 | Stop reason: HOSPADM

## 2021-12-10 RX ORDER — MIDODRINE HYDROCHLORIDE 5 MG/1
5 TABLET ORAL EVERY 8 HOURS
Status: DISCONTINUED | OUTPATIENT
Start: 2021-12-10 | End: 2021-12-11 | Stop reason: HOSPADM

## 2021-12-10 RX ORDER — SIMETHICONE 80 MG
1 TABLET,CHEWABLE ORAL 4 TIMES DAILY PRN
Status: DISCONTINUED | OUTPATIENT
Start: 2021-12-10 | End: 2021-12-11 | Stop reason: HOSPADM

## 2021-12-10 RX ORDER — NALOXONE HCL 0.4 MG/ML
0.02 VIAL (ML) INJECTION
Status: DISCONTINUED | OUTPATIENT
Start: 2021-12-10 | End: 2021-12-11 | Stop reason: HOSPADM

## 2021-12-10 RX ORDER — AMOXICILLIN 250 MG
1 CAPSULE ORAL 2 TIMES DAILY
Status: DISCONTINUED | OUTPATIENT
Start: 2021-12-10 | End: 2021-12-11 | Stop reason: HOSPADM

## 2021-12-10 RX ORDER — IBUPROFEN 200 MG
24 TABLET ORAL
Status: DISCONTINUED | OUTPATIENT
Start: 2021-12-10 | End: 2021-12-11 | Stop reason: HOSPADM

## 2021-12-10 RX ORDER — ATORVASTATIN CALCIUM 20 MG/1
20 TABLET, FILM COATED ORAL DAILY
Status: DISCONTINUED | OUTPATIENT
Start: 2021-12-10 | End: 2021-12-11 | Stop reason: HOSPADM

## 2021-12-10 RX ORDER — TALC
6 POWDER (GRAM) TOPICAL NIGHTLY PRN
Status: DISCONTINUED | OUTPATIENT
Start: 2021-12-10 | End: 2021-12-11 | Stop reason: HOSPADM

## 2021-12-10 RX ORDER — FUROSEMIDE 20 MG/1
20 TABLET ORAL DAILY
Status: DISCONTINUED | OUTPATIENT
Start: 2021-12-10 | End: 2021-12-11 | Stop reason: HOSPADM

## 2021-12-10 RX ORDER — PROCHLORPERAZINE EDISYLATE 5 MG/ML
5 INJECTION INTRAMUSCULAR; INTRAVENOUS EVERY 6 HOURS PRN
Status: DISCONTINUED | OUTPATIENT
Start: 2021-12-10 | End: 2021-12-11 | Stop reason: HOSPADM

## 2021-12-10 RX ORDER — ONDANSETRON 2 MG/ML
4 INJECTION INTRAMUSCULAR; INTRAVENOUS EVERY 6 HOURS PRN
Status: DISCONTINUED | OUTPATIENT
Start: 2021-12-10 | End: 2021-12-11 | Stop reason: HOSPADM

## 2021-12-10 RX ORDER — HYDROCODONE BITARTRATE AND ACETAMINOPHEN 5; 325 MG/1; MG/1
1 TABLET ORAL EVERY 6 HOURS PRN
Status: DISCONTINUED | OUTPATIENT
Start: 2021-12-10 | End: 2021-12-11 | Stop reason: HOSPADM

## 2021-12-10 RX ORDER — INSULIN ASPART 100 [IU]/ML
1-10 INJECTION, SOLUTION INTRAVENOUS; SUBCUTANEOUS
Status: DISCONTINUED | OUTPATIENT
Start: 2021-12-10 | End: 2021-12-11 | Stop reason: HOSPADM

## 2021-12-10 RX ORDER — IBUPROFEN 200 MG
16 TABLET ORAL
Status: DISCONTINUED | OUTPATIENT
Start: 2021-12-10 | End: 2021-12-11 | Stop reason: HOSPADM

## 2021-12-10 RX ADMIN — ALPRAZOLAM 0.5 MG: 0.25 TABLET ORAL at 03:12

## 2021-12-10 RX ADMIN — HYDROCODONE BITARTRATE AND ACETAMINOPHEN 1 TABLET: 5; 325 TABLET ORAL at 03:12

## 2021-12-10 RX ADMIN — FUROSEMIDE 20 MG: 20 TABLET ORAL at 08:12

## 2021-12-10 RX ADMIN — INSULIN ASPART 6 UNITS: 100 INJECTION, SOLUTION INTRAVENOUS; SUBCUTANEOUS at 11:12

## 2021-12-10 RX ADMIN — RIVAROXABAN 20 MG: 20 TABLET, FILM COATED ORAL at 06:12

## 2021-12-10 RX ADMIN — TRAZODONE HYDROCHLORIDE 150 MG: 50 TABLET ORAL at 01:12

## 2021-12-10 RX ADMIN — ALPRAZOLAM 0.5 MG: 0.25 TABLET ORAL at 05:12

## 2021-12-10 RX ADMIN — TRAZODONE HYDROCHLORIDE 150 MG: 50 TABLET ORAL at 09:12

## 2021-12-10 RX ADMIN — ATORVASTATIN CALCIUM 20 MG: 20 TABLET, FILM COATED ORAL at 08:12

## 2021-12-10 RX ADMIN — DOCUSATE SODIUM AND SENNOSIDES 1 TABLET: 8.6; 5 TABLET, FILM COATED ORAL at 08:12

## 2021-12-10 RX ADMIN — CEFTRIAXONE 1 G: 1 INJECTION, SOLUTION INTRAVENOUS at 10:12

## 2021-12-10 RX ADMIN — IPRATROPIUM BROMIDE AND ALBUTEROL SULFATE 3 ML: .5; 3 SOLUTION RESPIRATORY (INHALATION) at 07:12

## 2021-12-10 RX ADMIN — ACETAMINOPHEN 650 MG: 325 TABLET ORAL at 08:12

## 2021-12-10 RX ADMIN — HYDROCODONE BITARTRATE AND ACETAMINOPHEN 1 TABLET: 5; 325 TABLET ORAL at 08:12

## 2021-12-10 RX ADMIN — MIDODRINE HYDROCHLORIDE 5 MG: 5 TABLET ORAL at 09:12

## 2021-12-10 RX ADMIN — INSULIN ASPART 8 UNITS: 100 INJECTION, SOLUTION INTRAVENOUS; SUBCUTANEOUS at 07:12

## 2021-12-10 RX ADMIN — AZITHROMYCIN MONOHYDRATE 500 MG: 500 INJECTION, POWDER, LYOPHILIZED, FOR SOLUTION INTRAVENOUS at 10:12

## 2021-12-10 RX ADMIN — TAMSULOSIN HYDROCHLORIDE 0.4 MG: 0.4 CAPSULE ORAL at 08:12

## 2021-12-10 RX ADMIN — INSULIN DETEMIR 20 UNITS: 100 INJECTION, SOLUTION SUBCUTANEOUS at 08:12

## 2021-12-10 RX ADMIN — INSULIN ASPART 6 UNITS: 100 INJECTION, SOLUTION INTRAVENOUS; SUBCUTANEOUS at 08:12

## 2021-12-11 VITALS
HEIGHT: 68 IN | RESPIRATION RATE: 18 BRPM | DIASTOLIC BLOOD PRESSURE: 66 MMHG | SYSTOLIC BLOOD PRESSURE: 111 MMHG | HEART RATE: 97 BPM | BODY MASS INDEX: 31.04 KG/M2 | OXYGEN SATURATION: 93 % | WEIGHT: 204.81 LBS | TEMPERATURE: 99 F

## 2021-12-11 LAB
ALBUMIN SERPL BCP-MCNC: 2.7 G/DL (ref 3.5–5.2)
ALP SERPL-CCNC: 124 U/L (ref 55–135)
ALT SERPL W/O P-5'-P-CCNC: 16 U/L (ref 10–44)
ANION GAP SERPL CALC-SCNC: 12 MMOL/L (ref 8–16)
AST SERPL-CCNC: 16 U/L (ref 10–40)
BASOPHILS # BLD AUTO: 0.04 K/UL (ref 0–0.2)
BASOPHILS NFR BLD: 0.2 % (ref 0–1.9)
BILIRUB SERPL-MCNC: 0.5 MG/DL (ref 0.1–1)
BUN SERPL-MCNC: 9 MG/DL (ref 8–23)
CALCIUM SERPL-MCNC: 9 MG/DL (ref 8.7–10.5)
CHLORIDE SERPL-SCNC: 97 MMOL/L (ref 95–110)
CO2 SERPL-SCNC: 28 MMOL/L (ref 23–29)
CREAT SERPL-MCNC: 0.7 MG/DL (ref 0.5–1.4)
DIFFERENTIAL METHOD: ABNORMAL
EOSINOPHIL # BLD AUTO: 0.2 K/UL (ref 0–0.5)
EOSINOPHIL NFR BLD: 1.3 % (ref 0–8)
ERYTHROCYTE [DISTWIDTH] IN BLOOD BY AUTOMATED COUNT: 15.9 % (ref 11.5–14.5)
EST. GFR  (AFRICAN AMERICAN): >60 ML/MIN/1.73 M^2
EST. GFR  (NON AFRICAN AMERICAN): >60 ML/MIN/1.73 M^2
GLUCOSE SERPL-MCNC: 238 MG/DL (ref 70–110)
HCT VFR BLD AUTO: 33.9 % (ref 40–54)
HGB BLD-MCNC: 10.4 G/DL (ref 14–18)
IMM GRANULOCYTES # BLD AUTO: 0.18 K/UL (ref 0–0.04)
IMM GRANULOCYTES NFR BLD AUTO: 1.1 % (ref 0–0.5)
LYMPHOCYTES # BLD AUTO: 1.7 K/UL (ref 1–4.8)
LYMPHOCYTES NFR BLD: 10.1 % (ref 18–48)
MCH RBC QN AUTO: 27.3 PG (ref 27–31)
MCHC RBC AUTO-ENTMCNC: 30.7 G/DL (ref 32–36)
MCV RBC AUTO: 89 FL (ref 82–98)
MONOCYTES # BLD AUTO: 1.4 K/UL (ref 0.3–1)
MONOCYTES NFR BLD: 8.5 % (ref 4–15)
NEUTROPHILS # BLD AUTO: 13.3 K/UL (ref 1.8–7.7)
NEUTROPHILS NFR BLD: 78.8 % (ref 38–73)
NRBC BLD-RTO: 0 /100 WBC
PLATELET # BLD AUTO: 318 K/UL (ref 150–450)
PMV BLD AUTO: 10.2 FL (ref 9.2–12.9)
POCT GLUCOSE: 229 MG/DL (ref 70–110)
POCT GLUCOSE: 272 MG/DL (ref 70–110)
POTASSIUM SERPL-SCNC: 3.8 MMOL/L (ref 3.5–5.1)
PROT SERPL-MCNC: 6.7 G/DL (ref 6–8.4)
RBC # BLD AUTO: 3.81 M/UL (ref 4.6–6.2)
SODIUM SERPL-SCNC: 137 MMOL/L (ref 136–145)
WBC # BLD AUTO: 16.87 K/UL (ref 3.9–12.7)

## 2021-12-11 PROCEDURE — 80053 COMPREHEN METABOLIC PANEL: CPT | Performed by: NURSE PRACTITIONER

## 2021-12-11 PROCEDURE — 63600175 PHARM REV CODE 636 W HCPCS: Performed by: NURSE PRACTITIONER

## 2021-12-11 PROCEDURE — 99900035 HC TECH TIME PER 15 MIN (STAT)

## 2021-12-11 PROCEDURE — 85025 COMPLETE CBC W/AUTO DIFF WBC: CPT | Performed by: NURSE PRACTITIONER

## 2021-12-11 PROCEDURE — 96372 THER/PROPH/DIAG INJ SC/IM: CPT | Mod: 59

## 2021-12-11 PROCEDURE — 94761 N-INVAS EAR/PLS OXIMETRY MLT: CPT

## 2021-12-11 PROCEDURE — 25000003 PHARM REV CODE 250: Performed by: NURSE PRACTITIONER

## 2021-12-11 PROCEDURE — 27000221 HC OXYGEN, UP TO 24 HOURS

## 2021-12-11 PROCEDURE — 36415 COLL VENOUS BLD VENIPUNCTURE: CPT | Performed by: NURSE PRACTITIONER

## 2021-12-11 PROCEDURE — 96366 THER/PROPH/DIAG IV INF ADDON: CPT

## 2021-12-11 PROCEDURE — G0378 HOSPITAL OBSERVATION PER HR: HCPCS

## 2021-12-11 RX ORDER — LEVOFLOXACIN 750 MG/1
750 TABLET ORAL DAILY
Qty: 3 TABLET | Refills: 0 | Status: SHIPPED | OUTPATIENT
Start: 2021-12-11 | End: 2021-12-14

## 2021-12-11 RX ORDER — HYDROCODONE BITARTRATE AND ACETAMINOPHEN 5; 325 MG/1; MG/1
1 TABLET ORAL EVERY 8 HOURS PRN
Qty: 18 TABLET | Refills: 0 | Status: SHIPPED | OUTPATIENT
Start: 2021-12-11 | End: 2021-12-29

## 2021-12-11 RX ORDER — ALBUTEROL SULFATE 90 UG/1
2 AEROSOL, METERED RESPIRATORY (INHALATION) EVERY 6 HOURS PRN
Qty: 18 G | Refills: 0 | Status: SHIPPED | OUTPATIENT
Start: 2021-12-11 | End: 2022-12-11

## 2021-12-11 RX ORDER — BISACODYL 5 MG
5 TABLET, DELAYED RELEASE (ENTERIC COATED) ORAL ONCE
Status: COMPLETED | OUTPATIENT
Start: 2021-12-11 | End: 2021-12-11

## 2021-12-11 RX ADMIN — FUROSEMIDE 20 MG: 20 TABLET ORAL at 09:12

## 2021-12-11 RX ADMIN — CEFTRIAXONE 1 G: 1 INJECTION, SOLUTION INTRAVENOUS at 11:12

## 2021-12-11 RX ADMIN — ATORVASTATIN CALCIUM 20 MG: 20 TABLET, FILM COATED ORAL at 09:12

## 2021-12-11 RX ADMIN — INSULIN ASPART 4 UNITS: 100 INJECTION, SOLUTION INTRAVENOUS; SUBCUTANEOUS at 11:12

## 2021-12-11 RX ADMIN — ALPRAZOLAM 0.5 MG: 0.25 TABLET ORAL at 02:12

## 2021-12-11 RX ADMIN — HYDROCODONE BITARTRATE AND ACETAMINOPHEN 1 TABLET: 5; 325 TABLET ORAL at 03:12

## 2021-12-11 RX ADMIN — TAMSULOSIN HYDROCHLORIDE 0.4 MG: 0.4 CAPSULE ORAL at 09:12

## 2021-12-11 RX ADMIN — ALPRAZOLAM 0.5 MG: 0.25 TABLET ORAL at 03:12

## 2021-12-11 RX ADMIN — MIDODRINE HYDROCHLORIDE 5 MG: 5 TABLET ORAL at 03:12

## 2021-12-11 RX ADMIN — MIDODRINE HYDROCHLORIDE 5 MG: 5 TABLET ORAL at 05:12

## 2021-12-11 RX ADMIN — HYDROCODONE BITARTRATE AND ACETAMINOPHEN 1 TABLET: 5; 325 TABLET ORAL at 02:12

## 2021-12-11 RX ADMIN — BISACODYL 5 MG: 5 TABLET, COATED ORAL at 02:12

## 2021-12-11 RX ADMIN — DOCUSATE SODIUM AND SENNOSIDES 1 TABLET: 8.6; 5 TABLET, FILM COATED ORAL at 09:12

## 2021-12-11 RX ADMIN — HYDROCODONE BITARTRATE AND ACETAMINOPHEN 1 TABLET: 5; 325 TABLET ORAL at 09:12

## 2021-12-11 RX ADMIN — INSULIN ASPART 6 UNITS: 100 INJECTION, SOLUTION INTRAVENOUS; SUBCUTANEOUS at 05:12

## 2021-12-15 ENCOUNTER — TELEPHONE (OUTPATIENT)
Dept: MEDSURG UNIT | Facility: HOSPITAL | Age: 61
End: 2021-12-15
Payer: MEDICAID

## 2021-12-15 LAB
BACTERIA BLD CULT: NORMAL
BACTERIA BLD CULT: NORMAL

## 2022-01-05 ENCOUNTER — HOSPITAL ENCOUNTER (EMERGENCY)
Facility: HOSPITAL | Age: 62
Discharge: HOME | End: 2022-01-05
Attending: EMERGENCY MEDICINE
Payer: MEDICAID

## 2022-01-05 VITALS
BODY MASS INDEX: 30.92 KG/M2 | OXYGEN SATURATION: 97 % | SYSTOLIC BLOOD PRESSURE: 127 MMHG | WEIGHT: 204 LBS | HEART RATE: 97 BPM | RESPIRATION RATE: 16 BRPM | DIASTOLIC BLOOD PRESSURE: 71 MMHG | TEMPERATURE: 99 F | HEIGHT: 68 IN

## 2022-01-05 DIAGNOSIS — R73.9 HYPERGLYCEMIA: ICD-10-CM

## 2022-01-05 DIAGNOSIS — N39.0 URINARY TRACT INFECTION ASSOCIATED WITH INDWELLING URETHRAL CATHETER, INITIAL ENCOUNTER: Primary | ICD-10-CM

## 2022-01-05 DIAGNOSIS — T83.511A URINARY TRACT INFECTION ASSOCIATED WITH INDWELLING URETHRAL CATHETER, INITIAL ENCOUNTER: Primary | ICD-10-CM

## 2022-01-05 LAB
ALBUMIN SERPL BCP-MCNC: 3.4 G/DL (ref 3.5–5.2)
ALP SERPL-CCNC: 119 U/L (ref 55–135)
ALT SERPL W/O P-5'-P-CCNC: 11 U/L (ref 10–44)
ANION GAP SERPL CALC-SCNC: 15 MMOL/L (ref 8–16)
AST SERPL-CCNC: 12 U/L (ref 10–40)
BACTERIA #/AREA URNS HPF: ABNORMAL /HPF
BASOPHILS # BLD AUTO: 0.15 K/UL (ref 0–0.2)
BASOPHILS NFR BLD: 0.8 % (ref 0–1.9)
BILIRUB SERPL-MCNC: 0.7 MG/DL (ref 0.1–1)
BILIRUB UR QL STRIP: NEGATIVE
BUN SERPL-MCNC: 11 MG/DL (ref 8–23)
CALCIUM SERPL-MCNC: 9.6 MG/DL (ref 8.7–10.5)
CHLORIDE SERPL-SCNC: 94 MMOL/L (ref 95–110)
CLARITY UR: ABNORMAL
CO2 SERPL-SCNC: 26 MMOL/L (ref 23–29)
COLOR UR: YELLOW
CREAT SERPL-MCNC: 0.5 MG/DL (ref 0.5–1.4)
CREAT SERPL-MCNC: 0.6 MG/DL (ref 0.5–1.4)
DIFFERENTIAL METHOD: ABNORMAL
EOSINOPHIL # BLD AUTO: 0.2 K/UL (ref 0–0.5)
EOSINOPHIL NFR BLD: 1 % (ref 0–8)
ERYTHROCYTE [DISTWIDTH] IN BLOOD BY AUTOMATED COUNT: 15.3 % (ref 11.5–14.5)
EST. GFR  (AFRICAN AMERICAN): >60 ML/MIN/1.73 M^2
EST. GFR  (NON AFRICAN AMERICAN): >60 ML/MIN/1.73 M^2
GLUCOSE SERPL-MCNC: 321 MG/DL (ref 70–110)
GLUCOSE UR QL STRIP: ABNORMAL
HCT VFR BLD AUTO: 38.5 % (ref 40–54)
HGB BLD-MCNC: 11.7 G/DL (ref 14–18)
HGB UR QL STRIP: ABNORMAL
HYALINE CASTS #/AREA URNS LPF: 1 /LPF
IMM GRANULOCYTES # BLD AUTO: 0.11 K/UL (ref 0–0.04)
IMM GRANULOCYTES NFR BLD AUTO: 0.6 % (ref 0–0.5)
KETONES UR QL STRIP: ABNORMAL
LEUKOCYTE ESTERASE UR QL STRIP: ABNORMAL
LIPASE SERPL-CCNC: 18 U/L (ref 4–60)
LYMPHOCYTES # BLD AUTO: 2 K/UL (ref 1–4.8)
LYMPHOCYTES NFR BLD: 11.2 % (ref 18–48)
MCH RBC QN AUTO: 26.4 PG (ref 27–31)
MCHC RBC AUTO-ENTMCNC: 30.4 G/DL (ref 32–36)
MCV RBC AUTO: 87 FL (ref 82–98)
MICROSCOPIC COMMENT: ABNORMAL
MONOCYTES # BLD AUTO: 1.6 K/UL (ref 0.3–1)
MONOCYTES NFR BLD: 8.6 % (ref 4–15)
NEUTROPHILS # BLD AUTO: 14.1 K/UL (ref 1.8–7.7)
NEUTROPHILS NFR BLD: 77.8 % (ref 38–73)
NITRITE UR QL STRIP: NEGATIVE
NRBC BLD-RTO: 0 /100 WBC
PH UR STRIP: 7 [PH] (ref 5–8)
PLATELET # BLD AUTO: 499 K/UL (ref 150–450)
PMV BLD AUTO: 10.3 FL (ref 9.2–12.9)
POTASSIUM SERPL-SCNC: 3.7 MMOL/L (ref 3.5–5.1)
PROT SERPL-MCNC: 7.9 G/DL (ref 6–8.4)
PROT UR QL STRIP: ABNORMAL
RBC # BLD AUTO: 4.44 M/UL (ref 4.6–6.2)
RBC #/AREA URNS HPF: 10 /HPF (ref 0–4)
SAMPLE: NORMAL
SODIUM SERPL-SCNC: 135 MMOL/L (ref 136–145)
SP GR UR STRIP: >1.03 (ref 1–1.03)
SQUAMOUS #/AREA URNS HPF: 0 /HPF
URN SPEC COLLECT METH UR: ABNORMAL
UROBILINOGEN UR STRIP-ACNC: NEGATIVE EU/DL
WBC # BLD AUTO: 18.07 K/UL (ref 3.9–12.7)
WBC #/AREA URNS HPF: >100 /HPF (ref 0–5)
YEAST URNS QL MICRO: ABNORMAL

## 2022-01-05 PROCEDURE — 25500020 PHARM REV CODE 255: Performed by: EMERGENCY MEDICINE

## 2022-01-05 PROCEDURE — 83690 ASSAY OF LIPASE: CPT | Performed by: EMERGENCY MEDICINE

## 2022-01-05 PROCEDURE — 81001 URINALYSIS AUTO W/SCOPE: CPT | Performed by: EMERGENCY MEDICINE

## 2022-01-05 PROCEDURE — 96365 THER/PROPH/DIAG IV INF INIT: CPT

## 2022-01-05 PROCEDURE — 87086 URINE CULTURE/COLONY COUNT: CPT | Performed by: EMERGENCY MEDICINE

## 2022-01-05 PROCEDURE — 80053 COMPREHEN METABOLIC PANEL: CPT | Performed by: EMERGENCY MEDICINE

## 2022-01-05 PROCEDURE — 85025 COMPLETE CBC W/AUTO DIFF WBC: CPT | Performed by: EMERGENCY MEDICINE

## 2022-01-05 PROCEDURE — 87147 CULTURE TYPE IMMUNOLOGIC: CPT | Performed by: EMERGENCY MEDICINE

## 2022-01-05 PROCEDURE — 99285 EMERGENCY DEPT VISIT HI MDM: CPT | Mod: 25

## 2022-01-05 PROCEDURE — 96375 TX/PRO/DX INJ NEW DRUG ADDON: CPT

## 2022-01-05 PROCEDURE — 63600175 PHARM REV CODE 636 W HCPCS: Performed by: EMERGENCY MEDICINE

## 2022-01-05 RX ORDER — CEFUROXIME AXETIL 500 MG/1
500 TABLET ORAL EVERY 12 HOURS
Qty: 20 TABLET | Refills: 0 | Status: ON HOLD | OUTPATIENT
Start: 2022-01-05 | End: 2022-02-11 | Stop reason: HOSPADM

## 2022-01-05 RX ORDER — FLUCONAZOLE 200 MG/1
200 TABLET ORAL DAILY
Qty: 7 TABLET | Refills: 0 | Status: SHIPPED | OUTPATIENT
Start: 2022-01-05 | End: 2022-01-12

## 2022-01-05 RX ORDER — LORAZEPAM 2 MG/ML
0.5 INJECTION INTRAMUSCULAR
Status: COMPLETED | OUTPATIENT
Start: 2022-01-05 | End: 2022-01-05

## 2022-01-05 RX ADMIN — LORAZEPAM 0.5 MG: 2 INJECTION INTRAMUSCULAR; INTRAVENOUS at 05:01

## 2022-01-05 RX ADMIN — IOHEXOL 100 ML: 350 INJECTION, SOLUTION INTRAVENOUS at 06:01

## 2022-01-05 RX ADMIN — CEFTRIAXONE 1 G: 1 INJECTION, SOLUTION INTRAVENOUS at 06:01

## 2022-01-05 NOTE — ED PROVIDER NOTES
Encounter Date: 1/5/2022       History     Chief Complaint   Patient presents with    Urinary Tract Infection     Pt presents to room 9 with EMS for reported uti per home health.  Pt has had catheter since October with most recent one being placed 3 weeks ago due to broken hip.       60 yo man with PMH of DM type 2, COPD on home O2, HTN,  Afib, HLD, bilateral pulmonary embolism, and pneumonia chronic indwelling Donis secondary to bed-bound status brought in by EMS with abdominal pain.  The patient states that he had acute onset of severe lower abdominal pain.  He then had a large, very loose bowel movement.  He states that the pain has improved somewhat.  He also notes that he is urine has been cloudy.  His catheter was changed the week before [place] by his home health nurse.  He has had no fevers or chills.  No vomiting.        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    Hypertension      Past Surgical History:   Procedure Laterality Date    denies pertinent surgical history      INTRAMEDULLARY RODDING OF FEMUR Right 11/7/2021    Procedure: INSERTION, INTRAMEDULLARY HENNA, FEMUR;  Surgeon: Matt Milian MD;  Location: [facility] OR;  Service: Orthopedics;  Laterality: Right;    TOE AMPUTATION Right 10/12/2021    Procedure: AMPUTATION, TOE;  Surgeon: Milton Lauren DPM;  Location: [facility] OR;  Service: Podiatry;  Laterality: Right;     Family History   Problem Relation Age of Onset    Hypertension Father      Social History     Tobacco Use    Smoking status: Former Smoker    Smokeless tobacco: Never Used   Substance Use Topics    Alcohol use: Not Currently    Drug use: Not Currently     Review of Systems   Constitutional: Negative for fever.   HENT: Negative for sore throat.     Respiratory: Negative for shortness of breath.    Cardiovascular: Negative for chest pain.   Gastrointestinal: Positive for abdominal pain and diarrhea. Negative for nausea and vomiting.   Genitourinary: Negative for dysuria.   Musculoskeletal: Negative for back pain.   Skin: Negative for rash.   Neurological: Negative for weakness.   Hematological: Does not bruise/bleed easily.   Psychiatric/Behavioral: Negative for confusion.   All other systems reviewed and are negative.      Physical Exam     Initial Vitals [01/05/22 0400]   BP Pulse Resp Temp SpO2   (!) 146/93 93 20 98.2 °F (36.8 °C) (!) 93 %      MAP       --         Physical Exam    Nursing note and vitals reviewed.  Constitutional:   Chronically ill-appearing, disheveled, appears older than stated age   HENT:   Head: Normocephalic and atraumatic.   Eyes: EOM are normal.   Neck:   Normal range of motion.  Cardiovascular: Normal rate, regular rhythm, normal heart sounds and intact distal pulses.   No murmur heard.  Pulmonary/Chest: Breath sounds normal. No respiratory distress. He has no wheezes. He has no rales.   Nasal cannula in place   Abdominal: Abdomen is soft. He exhibits no distension. There is abdominal tenderness (Diffuse that is most pronounced in right upper and right lower quadrant). There is guarding. There is no rebound.   Musculoskeletal:         General: Normal range of motion.      Cervical back: Normal range of motion.      Comments: Right hip surgical incision clean, dry and intact, right great toe amputated     Neurological: He is alert and oriented to person, place, and time. GCS score is 15. GCS eye subscore is 4. GCS verbal subscore is 5. GCS motor subscore is 6.   Generalized weakness   Skin: Skin is warm and dry. Capillary refill takes less than 2 seconds.   Psychiatric: He has a normal mood and affect.         ED Course   Procedures  Labs Reviewed   CBC W/ AUTO DIFFERENTIAL - Abnormal; Notable for the following components:        Result Value    WBC 18.07 (*)     RBC 4.44 (*)     Hemoglobin 11.7 (*)     Hematocrit 38.5 (*)     MCH 26.4 (*)     MCHC 30.4 (*)     RDW 15.3 (*)     Platelets 499 (*)     Immature Granulocytes 0.6 (*)     Gran # (ANC) 14.1 (*)     Immature Grans (Abs) 0.11 (*)     Mono # 1.6 (*)     Gran % 77.8 (*)     Lymph % 11.2 (*)     All other components within normal limits   COMPREHENSIVE METABOLIC PANEL - Abnormal; Notable for the following components:    Sodium 135 (*)     Chloride 94 (*)     Glucose 321 (*)     Albumin 3.4 (*)     All other components within normal limits   URINALYSIS, REFLEX TO URINE CULTURE - Abnormal; Notable for the following components:    Appearance, UA Hazy (*)     Specific Gravity, UA >1.030 (*)     Protein, UA 1+ (*)     Glucose, UA 4+ (*)     Ketones, UA Trace (*)     Occult Blood UA 1+ (*)     Leukocytes, UA 3+ (*)     All other components within normal limits    Narrative:     Specimen Source->Urine   URINALYSIS MICROSCOPIC - Abnormal; Notable for the following components:    RBC, UA 10 (*)     WBC, UA >100 (*)     Yeast, UA Moderate (*)     All other components within normal limits    Narrative:     Specimen Source->Urine   CULTURE, URINE   LIPASE   ISTAT CREATININE   POCT CREATININE          Imaging Results          CT Abdomen Pelvis With Contrast (Final result)  Result time 01/05/22 06:40:41    Final result by Zuhair Cisneros MD (01/05/22 06:40:41)                 Narrative:    CMS MANDATED QUALITY DATA - CT RADIATION - 436    All CT scans at this facility utilize dose modulation, iterative reconstruction, and/or weight based dosing when appropriate to reduce radiation dose to as low as reasonably achievable.        Reason: Abdominal abscess/infection suspected; RLQ abdominal pain (Age >= 14y)    TECHNIQUE: CT abdomen and pelvis with 100 mL Omnipaque 350.    COMPARISON: CT abdomen and pelvis November 25, 2021    FINDINGS:    Chronic interstitial thickening of the lung bases noted as well  as dependent subsegmental atelectasis. Trace left pleural effusion noted. Heart size is normal.    The liver, gallbladder, common bile duct are within normal limits. There is mild fatty atrophy of the pancreas. No pancreatic lesions observed. The spleen and adrenal glands are unremarkable. Heterogeneously enhancing left renal mass is again identified currently measuring 4.2 x 4.0 cm and is not significantly changed when compared with prior exam. Bilateral nonobstructing renal calculi are unchanged from previous exam. There is no hydronephrosis. The ureters are normal caliber. The bladder is decompressed with Donis catheter in place. Gas in the nondependent bladder noted consistent with Donis catheterization. Prostate gland seminal vesicles are unremarkable. Phleboliths noted.    Large and small bowel are normal caliber. The appendix is normal. Stomach is mostly decompressed. There is no bowel wall thickening or inflammatory changes. Left Spigelian hernia again noted with multiple loops of large bowel herniated through. No evidence of obstruction or strangulation. Hernia sac is unchanged in size.    Abdominal aorta is normal caliber with atherosclerosis. No intra-abdominal lymphadenopathy. No mesenteric fat stranding or free fluid.    Degenerative changes of the spine again noted. No acute osseous abnormality.    IMPRESSION:    1.  No significant change of left Spigelian Hernia.  2.  Chronic interstitial thickening of the lung bases.  3.  Unchanged heterogeneously enhancing left renal mass measuring 4.2 x 4.0 cm.    Electronically signed by:  Zuhair Cisneros DO  1/5/2022 6:40 AM CST Workstation: [id number]                               Medications   lorazepam injection 0.5 mg (0.5 mg Intravenous Given 1/5/22 8251)   iohexoL (OMNIPAQUE 350) injection 100 mL (100 mLs Intravenous Given 1/5/22 0610)   cefTRIAXone (ROCEPHIN) 1 g/50 mL D5W IVPB (0 g Intravenous Stopped 1/5/22 0702)     Medical Decision Making:   ED  Management:  61-year-old male with chronic indwelling Donis presents emergency department with abdominal pain.  Plans for labs and CT scan.  Donis catheter will be changed in the emergency department prior to urine sample collection.  Care will be transferred to Dr. Cerna at 0600.    Nyla Melendez MD  Emergency Medicine  01/05/2022 5:35 AM              Attending Attestation:             Attending ED Notes:   This 61-year-old male who is care assumed from Dr. Melendez is the morning who has a history of being on hospice and has a history of COPD and diabetes and who recently in November had right hip surgery, presented with complaint of lower abdominal pain and some discomfort radiating into the penis.  The evaluation consisted of a CT scan with IV contrast and it showed an unchanged left renal mass and some interstitial thickening of both lung bases.  Other labs showed elevated blood sugar 321. CBC had a white count of 18 and an H&H of 11.7 and 38.5.  During ED course the patient was given IV fluids and a dose of Rocephin.  He has not had any fever chills or vomiting.  The patient did have a bowel movement this morning and stated that his lower abdominal discomfort improved.  In light of his findings the patient will be placed on continued oral antibiotics as well as Diflucan.  With his history of being on hospice did not feel the patient would necessitate hospitalization he is in agreement with the plan.  He is advised to get a repeat urinalysis in 7-10 days.               Clinical Impression:   Final diagnoses:  [T83.511A, N39.0] Urinary tract infection associated with indwelling urethral catheter, initial encounter (Primary)  [R73.9] Hyperglycemia          ED Disposition Condition    Discharge Stable        ED Prescriptions     Medication Sig Dispense Start Date End Date Auth. Provider    cefUROXime (CEFTIN) 500 MG tablet Take 1 tablet (500 mg total) by mouth every 12 (twelve) hours. 20 tablet 1/5/2022  Abdelrahman SALDANA  Carlitos Stearns MD    fluconazole (DIFLUCAN) 200 MG Tab Take 1 tablet (200 mg total) by mouth once daily. for 7 days 7 tablet 1/5/2022 1/12/2022 Abdelrahman Urbina Jr., MD        Follow-up Information     Follow up With Specialties Details Why Contact Info    Santa Barnard MD  Schedule an appointment as soon as possible for a visit in 1 week  [address] [place] [id number]  [phone]             Abdelrahman Urbina Jr., MD  01/05/22 0861

## 2022-01-05 NOTE — DISCHARGE INSTRUCTIONS
Encourage oral fluids.  Watch for fever.  Watch for vomiting.  Watch for any worsening back or abdominal pain.  Watch for any blood in the urine.  Ensure that the catheter is continuing to drain without problems.  Return to the ER as needed.  Adhere to a diabetic diet and continue all other maintenance medications

## 2022-01-05 NOTE — ED NOTES
Per pt medical hx and recent hip sx, pt not ambulatory. Pt bed bound, requires transport home by ambulance. Case mgmt notified.

## 2022-01-05 NOTE — PLAN OF CARE
Faxed completed Certification of Ambulance transportation to [facility] fax number [phone].  Next called [phone] to [facility] and requested non-emergent ambulance transportation to home for this bedbound patient.

## 2022-01-06 LAB — BACTERIA UR CULT: ABNORMAL

## 2022-01-20 ENCOUNTER — HOSPITAL ENCOUNTER (EMERGENCY)
Facility: HOSPITAL | Age: 62
Discharge: HOME | End: 2022-01-20
Attending: EMERGENCY MEDICINE
Payer: MEDICAID

## 2022-01-20 VITALS
SYSTOLIC BLOOD PRESSURE: 113 MMHG | RESPIRATION RATE: 20 BRPM | WEIGHT: 204 LBS | BODY MASS INDEX: 31.02 KG/M2 | HEART RATE: 86 BPM | TEMPERATURE: 98 F | DIASTOLIC BLOOD PRESSURE: 73 MMHG | OXYGEN SATURATION: 98 %

## 2022-01-20 DIAGNOSIS — F41.9 ANXIETY: ICD-10-CM

## 2022-01-20 DIAGNOSIS — B37.41 YEAST CYSTITIS: Primary | ICD-10-CM

## 2022-01-20 LAB
ALBUMIN SERPL BCP-MCNC: 3.4 G/DL (ref 3.5–5.2)
ALP SERPL-CCNC: 107 U/L (ref 55–135)
ALT SERPL W/O P-5'-P-CCNC: 12 U/L (ref 10–44)
ANION GAP SERPL CALC-SCNC: 12 MMOL/L (ref 8–16)
AST SERPL-CCNC: 12 U/L (ref 10–40)
BACTERIA #/AREA URNS HPF: NEGATIVE /HPF
BASOPHILS # BLD AUTO: 0.1 K/UL (ref 0–0.2)
BASOPHILS NFR BLD: 1 % (ref 0–1.9)
BILIRUB SERPL-MCNC: 0.4 MG/DL (ref 0.1–1)
BILIRUB UR QL STRIP: NEGATIVE
BUN SERPL-MCNC: 11 MG/DL (ref 8–23)
CALCIUM SERPL-MCNC: 9.4 MG/DL (ref 8.7–10.5)
CHLORIDE SERPL-SCNC: 98 MMOL/L (ref 95–110)
CLARITY UR: ABNORMAL
CO2 SERPL-SCNC: 25 MMOL/L (ref 23–29)
COLOR UR: ABNORMAL
CREAT SERPL-MCNC: 0.6 MG/DL (ref 0.5–1.4)
DIFFERENTIAL METHOD: ABNORMAL
EOSINOPHIL # BLD AUTO: 0.4 K/UL (ref 0–0.5)
EOSINOPHIL NFR BLD: 4.1 % (ref 0–8)
ERYTHROCYTE [DISTWIDTH] IN BLOOD BY AUTOMATED COUNT: 15.4 % (ref 11.5–14.5)
EST. GFR  (AFRICAN AMERICAN): >60 ML/MIN/1.73 M^2
EST. GFR  (NON AFRICAN AMERICAN): >60 ML/MIN/1.73 M^2
GLUCOSE SERPL-MCNC: 349 MG/DL (ref 70–110)
GLUCOSE UR QL STRIP: ABNORMAL
HCT VFR BLD AUTO: 40.3 % (ref 40–54)
HGB BLD-MCNC: 12.2 G/DL (ref 14–18)
HGB UR QL STRIP: ABNORMAL
HYALINE CASTS #/AREA URNS LPF: 62 /LPF
IMM GRANULOCYTES # BLD AUTO: 0.03 K/UL (ref 0–0.04)
IMM GRANULOCYTES NFR BLD AUTO: 0.3 % (ref 0–0.5)
KETONES UR QL STRIP: NEGATIVE
LACTATE SERPL-SCNC: 1.3 MMOL/L (ref 0.5–1.9)
LEUKOCYTE ESTERASE UR QL STRIP: ABNORMAL
LYMPHOCYTES # BLD AUTO: 2.5 K/UL (ref 1–4.8)
LYMPHOCYTES NFR BLD: 23.9 % (ref 18–48)
MCH RBC QN AUTO: 25.9 PG (ref 27–31)
MCHC RBC AUTO-ENTMCNC: 30.3 G/DL (ref 32–36)
MCV RBC AUTO: 86 FL (ref 82–98)
MICROSCOPIC COMMENT: ABNORMAL
MONOCYTES # BLD AUTO: 1.1 K/UL (ref 0.3–1)
MONOCYTES NFR BLD: 10.7 % (ref 4–15)
NEUTROPHILS # BLD AUTO: 6.3 K/UL (ref 1.8–7.7)
NEUTROPHILS NFR BLD: 60 % (ref 38–73)
NITRITE UR QL STRIP: NEGATIVE
NRBC BLD-RTO: 0 /100 WBC
PH UR STRIP: 7 [PH] (ref 5–8)
PLATELET # BLD AUTO: 444 K/UL (ref 150–450)
PMV BLD AUTO: 9.6 FL (ref 9.2–12.9)
POTASSIUM SERPL-SCNC: 3.4 MMOL/L (ref 3.5–5.1)
PROT SERPL-MCNC: 7.7 G/DL (ref 6–8.4)
PROT UR QL STRIP: ABNORMAL
RBC # BLD AUTO: 4.71 M/UL (ref 4.6–6.2)
RBC #/AREA URNS HPF: >100 /HPF (ref 0–4)
SODIUM SERPL-SCNC: 135 MMOL/L (ref 136–145)
SP GR UR STRIP: 1.01 (ref 1–1.03)
SQUAMOUS #/AREA URNS HPF: 1 /HPF
URN SPEC COLLECT METH UR: ABNORMAL
UROBILINOGEN UR STRIP-ACNC: NEGATIVE EU/DL
WBC # BLD AUTO: 10.42 K/UL (ref 3.9–12.7)
WBC #/AREA URNS HPF: >100 /HPF (ref 0–5)
YEAST URNS QL MICRO: ABNORMAL

## 2022-01-20 PROCEDURE — 93010 EKG 12-LEAD: ICD-10-PCS | Mod: ,,, | Performed by: SPECIALIST

## 2022-01-20 PROCEDURE — 25000003 PHARM REV CODE 250: Performed by: EMERGENCY MEDICINE

## 2022-01-20 PROCEDURE — 85025 COMPLETE CBC W/AUTO DIFF WBC: CPT | Performed by: EMERGENCY MEDICINE

## 2022-01-20 PROCEDURE — 81001 URINALYSIS AUTO W/SCOPE: CPT | Performed by: EMERGENCY MEDICINE

## 2022-01-20 PROCEDURE — 63700000 PHARM REV CODE 250 ALT 637 W/O HCPCS: Performed by: EMERGENCY MEDICINE

## 2022-01-20 PROCEDURE — 99284 EMERGENCY DEPT VISIT MOD MDM: CPT

## 2022-01-20 PROCEDURE — 93010 ELECTROCARDIOGRAM REPORT: CPT | Mod: ,,, | Performed by: SPECIALIST

## 2022-01-20 PROCEDURE — 93005 ELECTROCARDIOGRAM TRACING: CPT | Performed by: SPECIALIST

## 2022-01-20 PROCEDURE — 83605 ASSAY OF LACTIC ACID: CPT | Performed by: EMERGENCY MEDICINE

## 2022-01-20 PROCEDURE — 87086 URINE CULTURE/COLONY COUNT: CPT | Performed by: EMERGENCY MEDICINE

## 2022-01-20 PROCEDURE — 80053 COMPREHEN METABOLIC PANEL: CPT | Performed by: EMERGENCY MEDICINE

## 2022-01-20 RX ORDER — LORAZEPAM 1 MG/1
1 TABLET ORAL
Status: COMPLETED | OUTPATIENT
Start: 2022-01-20 | End: 2022-01-20

## 2022-01-20 RX ORDER — FLUCONAZOLE 100 MG/1
200 TABLET ORAL
Status: COMPLETED | OUTPATIENT
Start: 2022-01-20 | End: 2022-01-20

## 2022-01-20 RX ORDER — FLUCONAZOLE 200 MG/1
200 TABLET ORAL DAILY
Qty: 7 TABLET | Refills: 0 | Status: SHIPPED | OUTPATIENT
Start: 2022-01-20 | End: 2022-01-27

## 2022-01-20 RX ADMIN — LORAZEPAM 1 MG: 1 TABLET ORAL at 07:01

## 2022-01-20 RX ADMIN — FLUCONAZOLE 200 MG: 100 TABLET ORAL at 07:01

## 2022-01-20 NOTE — PLAN OF CARE
Sent completed Certification of Ambulance Transportation to fax # [phone] and received verification of transmission.  Called [facility] at [phone] and spoke with Rafa to request non-emergent transport of this patient to home at [address] [place] [address].  Rafa informed me of ETA within the hour.

## 2022-01-20 NOTE — PLAN OF CARE
Received phone call from Mariza with [facility] who reports delay in pickup with approximately 1 hour ETA.

## 2022-01-20 NOTE — ED NOTES
When speaking to patient about discharge he states he needs [facility] to bring him home since he cannot walk.  Case Management notified.

## 2022-01-20 NOTE — ED PROVIDER NOTES
Encounter Date: 1/20/2022       History     Chief Complaint   Patient presents with    Dysuria     C/o burning with urination. With indwelling blanco post hip fracture repair.      This is a 61-year-old male with a complicated history including diabetes, hypertension, COPD on home O2,  Afib, HLD, bilateral pulmonary embolism, and pneumonia as well as sepsis post recent hip replacement comes in complaining of dysuria.  Patient has an indwelling Blanco catheter that was last changed 2 weeks ago.  He reports that over the past few days he has been having dysuria and burning with urination.  He reports that he has been having irritation from the Blanco catheter at the urethral meatus as well as suprapubic pain.  He has not noted any hematuria.  He denies any flank pain.  He reports that his symptoms have been moderate and constant.  Patient also reports that he has been increasingly anxious.  He reports that he is on Xanax for anxiety but he feels like the effect does not last very long.  He denies any worsening shortness of breath.  He denies any chest pain.  He denies any fevers or chills.  He reports that his symptoms have been moderate and constant since onset.        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    Hypertension      Past Surgical History:   Procedure Laterality Date    denies pertinent surgical history      INTRAMEDULLARY RODDING OF FEMUR Right 11/7/2021    Procedure: INSERTION, INTRAMEDULLARY HENNA, FEMUR;  Surgeon: Matt Milian MD;  Location: [facility] OR;  Service: Orthopedics;  Laterality: Right;    TOE AMPUTATION Right 10/12/2021    Procedure: AMPUTATION, TOE;  Surgeon: Milton Lauren DPM;  Location: [facility] OR;  Service: Podiatry;  Laterality: Right;     Family History   Problem  Relation Age of Onset    Hypertension Father      Social History     Tobacco Use    Smoking status: Former Smoker    Smokeless tobacco: Never Used   Substance Use Topics    Alcohol use: Not Currently    Drug use: Not Currently     Review of Systems   Constitutional: Negative for chills and fever.   HENT: Negative for congestion, sore throat and trouble swallowing.    Respiratory: Positive for shortness of breath. Negative for cough.    Cardiovascular: Negative for chest pain and palpitations.   Gastrointestinal: Negative for abdominal pain, diarrhea, nausea and vomiting.   Genitourinary: Positive for dysuria. Negative for flank pain.   Musculoskeletal: Negative for back pain and neck pain.   Neurological: Negative for weakness, numbness and headaches.   Psychiatric/Behavioral: Negative for agitation and confusion.   All other systems reviewed and are negative.      Physical Exam     Initial Vitals   BP Pulse Resp Temp SpO2   01/20/22 0520 01/20/22 0520 01/20/22 0520 01/20/22 0541 01/20/22 0520   132/79 88 (!) 30 98.1 °F (36.7 °C) 100 %      MAP       --                Physical Exam    Nursing note and vitals reviewed.  Constitutional: Vital signs are normal. He appears well-developed.  Non-toxic appearance. No distress.   Disheveled appearing   HENT:   Head: Normocephalic and atraumatic.   Dry mucous membranes   Eyes: Conjunctivae and EOM are normal. Pupils are equal, round, and reactive to light.   Neck: Neck supple.   Normal range of motion.  Cardiovascular: Normal rate, regular rhythm and intact distal pulses.   Pulmonary/Chest: Breath sounds normal. He has no wheezes.   Increased work of breathing   Abdominal: Abdomen is soft. Normal appearance and bowel sounds are normal. There is no abdominal tenderness.   Musculoskeletal:         General: No edema. Normal range of motion.      Cervical back: Normal range of motion and neck supple. No rigidity. No muscular tenderness.     Lymphadenopathy:     He has no  cervical adenopathy.     He has no axillary adenopathy.   Neurological: He is alert and oriented to person, place, and time. He has normal strength. No cranial nerve deficit or sensory deficit. Gait normal.   Skin: Skin is warm, dry and intact.   Psychiatric: He has a normal mood and affect. His behavior is normal.         ED Course   Procedures  Labs Reviewed   CBC W/ AUTO DIFFERENTIAL - Abnormal; Notable for the following components:       Result Value    Hemoglobin 12.2 (*)     MCH 25.9 (*)     MCHC 30.3 (*)     RDW 15.4 (*)     Mono # 1.1 (*)     All other components within normal limits   COMPREHENSIVE METABOLIC PANEL - Abnormal; Notable for the following components:    Sodium 135 (*)     Potassium 3.4 (*)     Glucose 349 (*)     Albumin 3.4 (*)     All other components within normal limits   URINALYSIS, REFLEX TO URINE CULTURE - Abnormal; Notable for the following components:    Appearance, UA Hazy (*)     Protein, UA 1+ (*)     Glucose, UA 4+ (*)     Occult Blood UA 3+ (*)     Leukocytes, UA Trace (*)     All other components within normal limits    Narrative:     Specimen Source->Urine   URINALYSIS MICROSCOPIC - Abnormal; Notable for the following components:    RBC, UA >100 (*)     WBC, UA >100 (*)     Yeast, UA Moderate (*)     Hyaline Casts, UA 62 (*)     All other components within normal limits    Narrative:     Specimen Source->Urine   CULTURE, URINE   LACTIC ACID, PLASMA     EKG Readings: (Independently Interpreted)   Time: 0537  Rate: 87 bpm  Normal sinus rhythm. RBBB  Non-specific T wave abnormality. Unchanged from prior.     ECG Results          EKG 12-lead (In process)  Result time 01/20/22 07:15:17    In process by Interface, Lab In [facility] (01/20/22 07:15:17)                 Narrative:    Test Reason : R06.02,    Vent. Rate : 087 BPM     Atrial Rate : 087 BPM     P-R Int : 164 ms          QRS Dur : 106 ms      QT Int : 396 ms       P-R-T Axes : 022 -19 015 degrees     QTc Int : 476 ms    Normal  sinus rhythm  Incomplete right bundle branch block  Nonspecific T wave abnormality  Abnormal ECG  When compared with ECG of 25-NOV-2021 02:26,  Incomplete right bundle branch block is now Present    Referred By: [email or web address]   SELF           Confirmed By:                             Imaging Results    None          Medications   fluconazole tablet 200 mg (has no administration in time range)   LORazepam tablet 1 mg (has no administration in time range)     Medical Decision Making:   Initial Assessment:   This is a 61-year-old male with a complicated history including diabetes, hypertension, COPD on home O2,  Afib, HLD, bilateral pulmonary embolism, and pneumonia as well as sepsis post recent hip replacement comes in complaining of dysuria.  On evaluation patient is tachypneic.  He is satting 100% on his home O2.  Abdomen is benign.  Donis catheter is in place.  He has no significant drainage or irritation at the urethral meatus.  His exam is normal otherwise.    Orders included CBC, CMP, lactic acid, UA.  Donis catheter was changed.  Screening EKG was ordered.  Differential Diagnosis:   Urosepsis, complicated UTI, cystitis, JORGE, dehydration, electrolyte abnormality, anxiety.  Independently Interpreted Test(s):   I have ordered and independently interpreted EKG Reading(s) - see prior notes  ED Management:  Patient here with complains of burning from indwelling Donis placed during his last hospitalization patient initially tachypneic at arrival however tachypnea has resolved at this time the patient also complains of anxiety but denies any other complaints states his Xanax that he takes anxiety just does not seem to last long enough advised patient he will need to have these medications managed by his primary care physician he does have evidence of yeast on urinalysis will provide patient with prescription for Diflucan 1st dose in the emergency department I have given him a 1 time dose of Ativan in the ER help with  things anxiety recommend that he follow up with primary care physician return to ER for any worsened symptoms or new symptoms outpatient follow-up                      Clinical Impression:   Final diagnoses:  [B37.41] Yeast cystitis (Primary)  [F41.9] Anxiety          ED Disposition Condition    Discharge Stable        ED Prescriptions     Medication Sig Dispense Start Date End Date Auth. Provider    fluconazole (DIFLUCAN) 200 MG Tab Take 1 tablet (200 mg total) by mouth once daily. for 7 days 7 tablet 1/20/2022 1/27/2022 Vazquez Schneider MD        Follow-up Information    None          Vazquez Schneider MD  01/20/22 0701

## 2022-01-22 LAB — BACTERIA UR CULT: NO GROWTH

## 2022-02-03 ENCOUNTER — HOSPITAL ENCOUNTER (INPATIENT)
Facility: HOSPITAL | Age: 62
LOS: 8 days | Discharge: NURSING, REHAB OR LONG-TERM CARE | DRG: 857 | End: 2022-02-11
Attending: EMERGENCY MEDICINE | Admitting: INTERNAL MEDICINE
Payer: MEDICAID

## 2022-02-03 DIAGNOSIS — T14.8XXA WOUND INFECTION: Primary | ICD-10-CM

## 2022-02-03 DIAGNOSIS — T14.8XXA INFECTED WOUND: ICD-10-CM

## 2022-02-03 DIAGNOSIS — L08.9 INFECTED WOUND: ICD-10-CM

## 2022-02-03 DIAGNOSIS — N28.89 LEFT RENAL MASS: ICD-10-CM

## 2022-02-03 DIAGNOSIS — I26.99 BILATERAL PULMONARY EMBOLISM: ICD-10-CM

## 2022-02-03 DIAGNOSIS — L08.9 WOUND INFECTION: Primary | ICD-10-CM

## 2022-02-03 LAB
ALBUMIN SERPL BCP-MCNC: 3 G/DL (ref 3.5–5.2)
ALP SERPL-CCNC: 112 U/L (ref 55–135)
ALT SERPL W/O P-5'-P-CCNC: 11 U/L (ref 10–44)
ANION GAP SERPL CALC-SCNC: 14 MMOL/L (ref 8–16)
AST SERPL-CCNC: 12 U/L (ref 10–40)
BACTERIA #/AREA URNS HPF: ABNORMAL /HPF
BASOPHILS # BLD AUTO: 0.11 K/UL (ref 0–0.2)
BASOPHILS NFR BLD: 0.9 % (ref 0–1.9)
BILIRUB SERPL-MCNC: 0.3 MG/DL (ref 0.1–1)
BILIRUB UR QL STRIP: NEGATIVE
BUN SERPL-MCNC: 8 MG/DL (ref 8–23)
CALCIUM SERPL-MCNC: 9.1 MG/DL (ref 8.7–10.5)
CHLORIDE SERPL-SCNC: 98 MMOL/L (ref 95–110)
CLARITY UR: ABNORMAL
CO2 SERPL-SCNC: 25 MMOL/L (ref 23–29)
COLOR UR: YELLOW
CREAT SERPL-MCNC: 0.5 MG/DL (ref 0.5–1.4)
DIFFERENTIAL METHOD: ABNORMAL
EOSINOPHIL # BLD AUTO: 0.3 K/UL (ref 0–0.5)
EOSINOPHIL NFR BLD: 2.6 % (ref 0–8)
ERYTHROCYTE [DISTWIDTH] IN BLOOD BY AUTOMATED COUNT: 15.3 % (ref 11.5–14.5)
EST. GFR  (AFRICAN AMERICAN): >60 ML/MIN/1.73 M^2
EST. GFR  (NON AFRICAN AMERICAN): >60 ML/MIN/1.73 M^2
GLUCOSE SERPL-MCNC: 259 MG/DL (ref 70–110)
GLUCOSE SERPL-MCNC: 283 MG/DL (ref 70–110)
GLUCOSE UR QL STRIP: ABNORMAL
HCT VFR BLD AUTO: 40.9 % (ref 40–54)
HGB BLD-MCNC: 12.3 G/DL (ref 14–18)
HGB UR QL STRIP: ABNORMAL
HYALINE CASTS #/AREA URNS LPF: 127 /LPF
IMM GRANULOCYTES # BLD AUTO: 0.05 K/UL (ref 0–0.04)
IMM GRANULOCYTES NFR BLD AUTO: 0.4 % (ref 0–0.5)
KETONES UR QL STRIP: NEGATIVE
LACTATE SERPL-SCNC: 1.2 MMOL/L (ref 0.5–1.9)
LEUKOCYTE ESTERASE UR QL STRIP: ABNORMAL
LYMPHOCYTES # BLD AUTO: 1.8 K/UL (ref 1–4.8)
LYMPHOCYTES NFR BLD: 14.9 % (ref 18–48)
MAGNESIUM SERPL-MCNC: 1.5 MG/DL (ref 1.6–2.6)
MCH RBC QN AUTO: 26.1 PG (ref 27–31)
MCHC RBC AUTO-ENTMCNC: 30.1 G/DL (ref 32–36)
MCV RBC AUTO: 87 FL (ref 82–98)
MICROSCOPIC COMMENT: ABNORMAL
MONOCYTES # BLD AUTO: 0.9 K/UL (ref 0.3–1)
MONOCYTES NFR BLD: 7.7 % (ref 4–15)
NEUTROPHILS # BLD AUTO: 8.7 K/UL (ref 1.8–7.7)
NEUTROPHILS NFR BLD: 73.5 % (ref 38–73)
NITRITE UR QL STRIP: POSITIVE
NRBC BLD-RTO: 0 /100 WBC
PH UR STRIP: 7 [PH] (ref 5–8)
PLATELET # BLD AUTO: 362 K/UL (ref 150–450)
PMV BLD AUTO: 10.5 FL (ref 9.2–12.9)
POTASSIUM SERPL-SCNC: 3.3 MMOL/L (ref 3.5–5.1)
PROT SERPL-MCNC: 7.2 G/DL (ref 6–8.4)
PROT UR QL STRIP: ABNORMAL
RBC # BLD AUTO: 4.71 M/UL (ref 4.6–6.2)
RBC #/AREA URNS HPF: 12 /HPF (ref 0–4)
SARS-COV-2 RDRP RESP QL NAA+PROBE: NEGATIVE
SODIUM SERPL-SCNC: 137 MMOL/L (ref 136–145)
SP GR UR STRIP: 1.02 (ref 1–1.03)
SQUAMOUS #/AREA URNS HPF: 1 /HPF
TROPONIN I SERPL DL<=0.01 NG/ML-MCNC: <0.03 NG/ML
URN SPEC COLLECT METH UR: ABNORMAL
UROBILINOGEN UR STRIP-ACNC: NEGATIVE EU/DL
WBC # BLD AUTO: 11.88 K/UL (ref 3.9–12.7)
WBC #/AREA URNS HPF: >100 /HPF (ref 0–5)
YEAST URNS QL MICRO: ABNORMAL

## 2022-02-03 PROCEDURE — 87186 SC STD MICRODIL/AGAR DIL: CPT | Performed by: EMERGENCY MEDICINE

## 2022-02-03 PROCEDURE — 87077 CULTURE AEROBIC IDENTIFY: CPT | Performed by: EMERGENCY MEDICINE

## 2022-02-03 PROCEDURE — 25000003 PHARM REV CODE 250: Performed by: EMERGENCY MEDICINE

## 2022-02-03 PROCEDURE — 25000003 PHARM REV CODE 250: Performed by: NURSE PRACTITIONER

## 2022-02-03 PROCEDURE — 87086 URINE CULTURE/COLONY COUNT: CPT | Performed by: EMERGENCY MEDICINE

## 2022-02-03 PROCEDURE — U0002 COVID-19 LAB TEST NON-CDC: HCPCS | Performed by: EMERGENCY MEDICINE

## 2022-02-03 PROCEDURE — 80053 COMPREHEN METABOLIC PANEL: CPT | Performed by: EMERGENCY MEDICINE

## 2022-02-03 PROCEDURE — 87077 CULTURE AEROBIC IDENTIFY: CPT | Mod: 59 | Performed by: NURSE PRACTITIONER

## 2022-02-03 PROCEDURE — 25500020 PHARM REV CODE 255: Performed by: EMERGENCY MEDICINE

## 2022-02-03 PROCEDURE — 83735 ASSAY OF MAGNESIUM: CPT | Performed by: EMERGENCY MEDICINE

## 2022-02-03 PROCEDURE — 87147 CULTURE TYPE IMMUNOLOGIC: CPT | Mod: 59 | Performed by: NURSE PRACTITIONER

## 2022-02-03 PROCEDURE — 87147 CULTURE TYPE IMMUNOLOGIC: CPT | Performed by: EMERGENCY MEDICINE

## 2022-02-03 PROCEDURE — 96365 THER/PROPH/DIAG IV INF INIT: CPT

## 2022-02-03 PROCEDURE — 84484 ASSAY OF TROPONIN QUANT: CPT | Performed by: EMERGENCY MEDICINE

## 2022-02-03 PROCEDURE — 81001 URINALYSIS AUTO W/SCOPE: CPT | Performed by: EMERGENCY MEDICINE

## 2022-02-03 PROCEDURE — 12000002 HC ACUTE/MED SURGE SEMI-PRIVATE ROOM

## 2022-02-03 PROCEDURE — 63600175 PHARM REV CODE 636 W HCPCS: Performed by: NURSE PRACTITIONER

## 2022-02-03 PROCEDURE — 96366 THER/PROPH/DIAG IV INF ADDON: CPT

## 2022-02-03 PROCEDURE — 87040 BLOOD CULTURE FOR BACTERIA: CPT | Mod: 59 | Performed by: EMERGENCY MEDICINE

## 2022-02-03 PROCEDURE — 82962 GLUCOSE BLOOD TEST: CPT

## 2022-02-03 PROCEDURE — 63600175 PHARM REV CODE 636 W HCPCS: Performed by: EMERGENCY MEDICINE

## 2022-02-03 PROCEDURE — 85025 COMPLETE CBC W/AUTO DIFF WBC: CPT | Performed by: EMERGENCY MEDICINE

## 2022-02-03 PROCEDURE — 99285 EMERGENCY DEPT VISIT HI MDM: CPT | Mod: 25

## 2022-02-03 PROCEDURE — 87070 CULTURE OTHR SPECIMN AEROBIC: CPT | Performed by: NURSE PRACTITIONER

## 2022-02-03 PROCEDURE — 83605 ASSAY OF LACTIC ACID: CPT | Performed by: EMERGENCY MEDICINE

## 2022-02-03 PROCEDURE — 96375 TX/PRO/DX INJ NEW DRUG ADDON: CPT

## 2022-02-03 PROCEDURE — 87186 SC STD MICRODIL/AGAR DIL: CPT | Mod: 59 | Performed by: NURSE PRACTITIONER

## 2022-02-03 RX ORDER — ACETAMINOPHEN 325 MG/1
650 TABLET ORAL EVERY 8 HOURS PRN
Status: DISCONTINUED | OUTPATIENT
Start: 2022-02-03 | End: 2022-02-11 | Stop reason: HOSPADM

## 2022-02-03 RX ORDER — SODIUM CHLORIDE 0.9 % (FLUSH) 0.9 %
10 SYRINGE (ML) INJECTION EVERY 12 HOURS PRN
Status: DISCONTINUED | OUTPATIENT
Start: 2022-02-03 | End: 2022-02-11 | Stop reason: HOSPADM

## 2022-02-03 RX ORDER — NAPROXEN SODIUM 220 MG/1
162 TABLET, FILM COATED ORAL DAILY
COMMUNITY
End: 2022-06-21

## 2022-02-03 RX ORDER — VANCOMYCIN HCL IN 5 % DEXTROSE 1G/250ML
1000 PLASTIC BAG, INJECTION (ML) INTRAVENOUS ONCE
Status: COMPLETED | OUTPATIENT
Start: 2022-02-03 | End: 2022-02-03

## 2022-02-03 RX ORDER — ALBUTEROL SULFATE 90 UG/1
2 AEROSOL, METERED RESPIRATORY (INHALATION) EVERY 6 HOURS PRN
Status: DISCONTINUED | OUTPATIENT
Start: 2022-02-03 | End: 2022-02-04

## 2022-02-03 RX ORDER — ATORVASTATIN CALCIUM 20 MG/1
20 TABLET, FILM COATED ORAL DAILY
Status: DISCONTINUED | OUTPATIENT
Start: 2022-02-04 | End: 2022-02-11 | Stop reason: HOSPADM

## 2022-02-03 RX ORDER — FUROSEMIDE 20 MG/1
20 TABLET ORAL DAILY
Status: DISCONTINUED | OUTPATIENT
Start: 2022-02-04 | End: 2022-02-11 | Stop reason: HOSPADM

## 2022-02-03 RX ORDER — HYDROMORPHONE HYDROCHLORIDE 1 MG/ML
1 INJECTION, SOLUTION INTRAMUSCULAR; INTRAVENOUS; SUBCUTANEOUS
Status: COMPLETED | OUTPATIENT
Start: 2022-02-03 | End: 2022-02-03

## 2022-02-03 RX ORDER — CLONAZEPAM 1 MG/1
1 TABLET ORAL NIGHTLY
Status: ON HOLD | COMMUNITY
End: 2022-02-11 | Stop reason: HOSPADM

## 2022-02-03 RX ORDER — INSULIN ASPART 100 [IU]/ML
0-5 INJECTION, SOLUTION INTRAVENOUS; SUBCUTANEOUS EVERY 6 HOURS PRN
Status: DISCONTINUED | OUTPATIENT
Start: 2022-02-03 | End: 2022-02-11 | Stop reason: HOSPADM

## 2022-02-03 RX ORDER — SODIUM CHLORIDE 9 MG/ML
INJECTION, SOLUTION INTRAVENOUS CONTINUOUS
Status: ACTIVE | OUTPATIENT
Start: 2022-02-03 | End: 2022-02-04

## 2022-02-03 RX ORDER — TALC
6 POWDER (GRAM) TOPICAL NIGHTLY PRN
Status: DISCONTINUED | OUTPATIENT
Start: 2022-02-03 | End: 2022-02-11 | Stop reason: HOSPADM

## 2022-02-03 RX ORDER — FLUOXETINE 10 MG/1
10 CAPSULE ORAL DAILY
COMMUNITY

## 2022-02-03 RX ORDER — ONDANSETRON 2 MG/ML
4 INJECTION INTRAMUSCULAR; INTRAVENOUS EVERY 8 HOURS PRN
Status: DISCONTINUED | OUTPATIENT
Start: 2022-02-03 | End: 2022-02-11 | Stop reason: HOSPADM

## 2022-02-03 RX ORDER — GLUCAGON 1 MG
1 KIT INJECTION
Status: DISCONTINUED | OUTPATIENT
Start: 2022-02-03 | End: 2022-02-11 | Stop reason: HOSPADM

## 2022-02-03 RX ORDER — NAPROXEN SODIUM 220 MG/1
162 TABLET, FILM COATED ORAL DAILY
Status: DISCONTINUED | OUTPATIENT
Start: 2022-02-04 | End: 2022-02-11 | Stop reason: HOSPADM

## 2022-02-03 RX ORDER — FLUOXETINE 10 MG/1
10 CAPSULE ORAL DAILY
Status: DISCONTINUED | OUTPATIENT
Start: 2022-02-04 | End: 2022-02-11 | Stop reason: HOSPADM

## 2022-02-03 RX ORDER — HYDROMORPHONE HYDROCHLORIDE 1 MG/ML
1 INJECTION, SOLUTION INTRAMUSCULAR; INTRAVENOUS; SUBCUTANEOUS EVERY 6 HOURS PRN
Status: DISCONTINUED | OUTPATIENT
Start: 2022-02-03 | End: 2022-02-05

## 2022-02-03 RX ORDER — TAMSULOSIN HYDROCHLORIDE 0.4 MG/1
0.4 CAPSULE ORAL DAILY
Status: DISCONTINUED | OUTPATIENT
Start: 2022-02-04 | End: 2022-02-11 | Stop reason: HOSPADM

## 2022-02-03 RX ORDER — MIDODRINE HYDROCHLORIDE 2.5 MG/1
5 TABLET ORAL EVERY 8 HOURS
Status: DISCONTINUED | OUTPATIENT
Start: 2022-02-03 | End: 2022-02-11 | Stop reason: HOSPADM

## 2022-02-03 RX ADMIN — VANCOMYCIN HYDROCHLORIDE 1000 MG: 1 INJECTION, POWDER, FOR SOLUTION INTRAVENOUS at 05:02

## 2022-02-03 RX ADMIN — IOHEXOL 100 ML: 350 INJECTION, SOLUTION INTRAVENOUS at 06:02

## 2022-02-03 RX ADMIN — HYDROMORPHONE HYDROCHLORIDE 1 MG: 1 INJECTION, SOLUTION INTRAMUSCULAR; INTRAVENOUS; SUBCUTANEOUS at 09:02

## 2022-02-03 RX ADMIN — SODIUM CHLORIDE: 0.9 INJECTION, SOLUTION INTRAVENOUS at 09:02

## 2022-02-03 RX ADMIN — VANCOMYCIN HYDROCHLORIDE 500 MG: 500 INJECTION, POWDER, LYOPHILIZED, FOR SOLUTION INTRAVENOUS at 07:02

## 2022-02-03 NOTE — ED TRIAGE NOTES
Admit per [facility], 60 yo male with c/o pain and drainage from surgical incision sites at right hip. Reports sutures are still in from November 2021. Has not followed up with orthopedic MD.

## 2022-02-03 NOTE — ED PROVIDER NOTES
Encounter Date: 2/3/2022       History     Chief Complaint   Patient presents with    Wound Infection     Right hip surgery incision site     Patient sent to the emergency department by home health concerns for wound infection patient is status post ORIF of his right hip with insertion of IM henna to his right hip femur patient failed to follow-up with orthopedics for removal of his sutures as recommended did wounds appeared they have become infected there has been   reported fever at home he had low-grade fever arrival emergency department patient reports pain to the incisions states that he has not been ambulatory since the fracture patient does have a history of diabetes        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    Hypertension      Past Surgical History:   Procedure Laterality Date    denies pertinent surgical history      INTRAMEDULLARY RODDING OF FEMUR Right 11/7/2021    Procedure: INSERTION, INTRAMEDULLARY HENNA, FEMUR;  Surgeon: Matt Milian MD;  Location: [facility] OR;  Service: Orthopedics;  Laterality: Right;    TOE AMPUTATION Right 10/12/2021    Procedure: AMPUTATION, TOE;  Surgeon: Milton Lauren DPM;  Location: [facility] OR;  Service: Podiatry;  Laterality: Right;     Family History   Problem Relation Age of Onset    Hypertension Father      Social History     Tobacco Use    Smoking status: Former Smoker    Smokeless tobacco: Never Used   Substance Use Topics    Alcohol use: Not Currently    Drug use: Not Currently     Review of Systems   Constitutional: Positive for fever. Negative for chills.   Skin: Positive for color change and wound.   All other systems reviewed and are negative.      Physical Exam     Initial Vitals [02/03/22 1516]   BP Pulse Resp Temp SpO2   132/84  88 18 99.2 °F (37.3 °C) 98 %      MAP       --         Physical Exam    Constitutional: He appears well-developed and well-nourished. No distress.   HENT:   Head: Normocephalic and atraumatic.   Right Ear: External ear normal.   Left Ear: External ear normal.   Mouth/Throat: Oropharynx is clear and moist.   Eyes: Pupils are equal, round, and reactive to light.   Neck: Neck supple.   Normal range of motion.  Cardiovascular: Normal rate, regular rhythm, S1 normal, S2 normal, normal heart sounds and intact distal pulses.   Pulmonary/Chest: Breath sounds normal.   Abdominal: Abdomen is soft. Normal appearance and bowel sounds are normal. There is no abdominal tenderness.   Musculoskeletal:         General: Tenderness present. Normal range of motion.      Cervical back: Normal range of motion and neck supple.      Comments: Three incisions with evidence of infection and necrosis     Neurological: He is alert and oriented to person, place, and time. GCS eye subscore is 4. GCS verbal subscore is 5. GCS motor subscore is 6.   Skin: Skin is warm and dry. Capillary refill takes less than 2 seconds. No rash noted. There is pallor.   Right lateral hip incisions with drainage tenderness palpation wound cultures obtained there is some necrosis noted there is a 3rd incision to the distal knee with drainage I have removed the sutures from all these wounds   Psychiatric: He has a normal mood and affect. His behavior is normal.         ED Course   Procedures  Labs Reviewed   CBC W/ AUTO DIFFERENTIAL - Abnormal; Notable for the following components:       Result Value    Hemoglobin 12.3 (*)     MCH 26.1 (*)     MCHC 30.1 (*)     RDW 15.3 (*)     Gran # (ANC) 8.7 (*)     Immature Grans (Abs) 0.05 (*)     Gran % 73.5 (*)     Lymph % 14.9 (*)     All other components within normal limits   COMPREHENSIVE METABOLIC PANEL - Abnormal; Notable for the following components:    Potassium 3.3 (*)     Glucose 283 (*)     Albumin 3.0 (*)     All  other components within normal limits   MAGNESIUM - Abnormal; Notable for the following components:    Magnesium 1.5 (*)     All other components within normal limits   CULTURE, BLOOD   CULTURE, BLOOD   LACTIC ACID, PLASMA   TROPONIN I   SARS-COV-2 RNA AMPLIFICATION, QUAL   URINALYSIS, REFLEX TO URINE CULTURE          Imaging Results          CT Pelvis With Contrast (In process)                  Medications   vancomycin - pharmacy to dose (has no administration in time range)   vancomycin in dextrose 5 % 1 gram/250 mL IVPB 1,000 mg (1,000 mg Intravenous New Bag 2/3/22 1717)     Followed by   vancomycin 500 mg in dextrose 5 % 100 mL IVPB (ready to mix system) (has no administration in time range)   iohexoL (OMNIPAQUE 350) injection 100 mL (100 mLs Intravenous Given 2/3/22 1802)     Medical Decision Making:   ED Management:  I have removed the sutures from these wounds and have submitted wound cultures have administered vancomycin in the emergency department [facility] wound care                      Clinical Impression:   Final diagnoses:  [T14.8XXA, L08.9] Wound infection (Primary)          ED Disposition Condition    Admit               Vazquez Schneider MD  02/03/22 1627

## 2022-02-03 NOTE — ED NOTES
61 YOM c/o right hip pain 4/10 secondary to hip repair surgery. Sutures noted to right hip and right upper leg with drainage. Per pt sutures where never removed after surgery. -CP -SOB. Donis catheter noted in place with clear yellow drainage. Denies any other complaints.     Adult Physical Assessment  LOC: Patient is awake, alert, oriented and speaking appropriately at this time.  APPEARANCE: Patient resting comfortably and appears to be in no acute distress at this time. Patient is clean and well groomed, patient's clothing is properly fastened.  SKIN:The skin is warm and dry, color consistent with ethnicity, patient has normal skin turgor and moist mucus membranes, skin intact, no breakdown or brusing noted.  MUSCULOSKELETAL: Patient moving all extremities well, no obvious swelling or deformities noted.  RESPIRATORY: Airway is open and patent, respirations are spontaneous, patient has a normal effort and rate, no accessory muscle use noted.  CARDIAC: Patient has a normal rate and rhythm, no periphreal edema noted in any extremity, capillary refill < 3 seconds in all extremities.  ABDOMEN: Soft and non tender to palpation, no abdominal distention noted.  NEUROLOGIC: Eyes open spontaneously, behavior appropriate to situation, follows commands, facial expression symmetrical, bilateral hand grasp equal and even, purposeful motor response noted, normal sensation in all extremities when touched with a finger.      VSS. ED workup in progress. Call light within pt reach. Safety measures in place: side rails up X2. Will continue to monitor.

## 2022-02-04 ENCOUNTER — ANESTHESIA EVENT (OUTPATIENT)
Dept: SURGERY | Facility: HOSPITAL | Age: 62
DRG: 857 | End: 2022-02-04
Payer: MEDICAID

## 2022-02-04 ENCOUNTER — ANESTHESIA (OUTPATIENT)
Dept: SURGERY | Facility: HOSPITAL | Age: 62
DRG: 857 | End: 2022-02-04
Payer: MEDICAID

## 2022-02-04 LAB
ANION GAP SERPL CALC-SCNC: 7 MMOL/L (ref 8–16)
BASOPHILS # BLD AUTO: 0.13 K/UL (ref 0–0.2)
BASOPHILS NFR BLD: 1 % (ref 0–1.9)
BUN SERPL-MCNC: 6 MG/DL (ref 8–23)
CALCIUM SERPL-MCNC: 8.7 MG/DL (ref 8.7–10.5)
CHLORIDE SERPL-SCNC: 102 MMOL/L (ref 95–110)
CO2 SERPL-SCNC: 28 MMOL/L (ref 23–29)
CREAT SERPL-MCNC: 0.5 MG/DL (ref 0.5–1.4)
CRP SERPL-MCNC: 2.98 MG/DL
DIFFERENTIAL METHOD: ABNORMAL
EOSINOPHIL # BLD AUTO: 0.4 K/UL (ref 0–0.5)
EOSINOPHIL NFR BLD: 3.2 % (ref 0–8)
ERYTHROCYTE [DISTWIDTH] IN BLOOD BY AUTOMATED COUNT: 14.7 % (ref 11.5–14.5)
ERYTHROCYTE [SEDIMENTATION RATE] IN BLOOD BY WESTERGREN METHOD: 78 MM/HR (ref 0–10)
EST. GFR  (AFRICAN AMERICAN): >60 ML/MIN/1.73 M^2
EST. GFR  (NON AFRICAN AMERICAN): >60 ML/MIN/1.73 M^2
GLUCOSE SERPL-MCNC: 177 MG/DL (ref 70–110)
GLUCOSE SERPL-MCNC: 183 MG/DL (ref 70–110)
GLUCOSE SERPL-MCNC: 216 MG/DL (ref 70–110)
GLUCOSE SERPL-MCNC: 227 MG/DL (ref 70–110)
GLUCOSE SERPL-MCNC: 291 MG/DL (ref 70–110)
HCT VFR BLD AUTO: 36.5 % (ref 40–54)
HGB BLD-MCNC: 11.2 G/DL (ref 14–18)
IMM GRANULOCYTES # BLD AUTO: 0.05 K/UL (ref 0–0.04)
IMM GRANULOCYTES NFR BLD AUTO: 0.4 % (ref 0–0.5)
LACTATE SERPL-SCNC: 1.6 MMOL/L (ref 0.5–1.9)
LYMPHOCYTES # BLD AUTO: 3 K/UL (ref 1–4.8)
LYMPHOCYTES NFR BLD: 23.8 % (ref 18–48)
MAGNESIUM SERPL-MCNC: 1.4 MG/DL (ref 1.6–2.6)
MCH RBC QN AUTO: 26.2 PG (ref 27–31)
MCHC RBC AUTO-ENTMCNC: 30.7 G/DL (ref 32–36)
MCV RBC AUTO: 85 FL (ref 82–98)
MONOCYTES # BLD AUTO: 1.2 K/UL (ref 0.3–1)
MONOCYTES NFR BLD: 9.9 % (ref 4–15)
NEUTROPHILS # BLD AUTO: 7.7 K/UL (ref 1.8–7.7)
NEUTROPHILS NFR BLD: 61.7 % (ref 38–73)
NRBC BLD-RTO: 0 /100 WBC
PLATELET # BLD AUTO: 374 K/UL (ref 150–450)
PMV BLD AUTO: 9.2 FL (ref 9.2–12.9)
POTASSIUM SERPL-SCNC: 3.4 MMOL/L (ref 3.5–5.1)
RBC # BLD AUTO: 4.28 M/UL (ref 4.6–6.2)
SODIUM SERPL-SCNC: 137 MMOL/L (ref 136–145)
WBC # BLD AUTO: 12.49 K/UL (ref 3.9–12.7)

## 2022-02-04 PROCEDURE — 94640 AIRWAY INHALATION TREATMENT: CPT

## 2022-02-04 PROCEDURE — 63600175 PHARM REV CODE 636 W HCPCS: Performed by: INTERNAL MEDICINE

## 2022-02-04 PROCEDURE — 99900035 HC TECH TIME PER 15 MIN (STAT)

## 2022-02-04 PROCEDURE — 83036 HEMOGLOBIN GLYCOSYLATED A1C: CPT | Performed by: NURSE PRACTITIONER

## 2022-02-04 PROCEDURE — 87147 CULTURE TYPE IMMUNOLOGIC: CPT | Mod: 59 | Performed by: ORTHOPAEDIC SURGERY

## 2022-02-04 PROCEDURE — 83735 ASSAY OF MAGNESIUM: CPT | Performed by: NURSE PRACTITIONER

## 2022-02-04 PROCEDURE — 27000673 HC TUBING BLOOD Y: Performed by: ANESTHESIOLOGY

## 2022-02-04 PROCEDURE — 25000003 PHARM REV CODE 250: Performed by: NURSE ANESTHETIST, CERTIFIED REGISTERED

## 2022-02-04 PROCEDURE — 99223 PR INITIAL HOSPITAL CARE,LEVL III: ICD-10-PCS | Mod: ,,, | Performed by: STUDENT IN AN ORGANIZED HEALTH CARE EDUCATION/TRAINING PROGRAM

## 2022-02-04 PROCEDURE — 85025 COMPLETE CBC W/AUTO DIFF WBC: CPT | Performed by: NURSE PRACTITIONER

## 2022-02-04 PROCEDURE — 87070 CULTURE OTHR SPECIMN AEROBIC: CPT | Mod: 59 | Performed by: ORTHOPAEDIC SURGERY

## 2022-02-04 PROCEDURE — 36000706: Performed by: ORTHOPAEDIC SURGERY

## 2022-02-04 PROCEDURE — 25000003 PHARM REV CODE 250: Performed by: NURSE PRACTITIONER

## 2022-02-04 PROCEDURE — 27000221 HC OXYGEN, UP TO 24 HOURS

## 2022-02-04 PROCEDURE — 36000707: Performed by: ORTHOPAEDIC SURGERY

## 2022-02-04 PROCEDURE — 37000008 HC ANESTHESIA 1ST 15 MINUTES: Performed by: ORTHOPAEDIC SURGERY

## 2022-02-04 PROCEDURE — 63600175 PHARM REV CODE 636 W HCPCS

## 2022-02-04 PROCEDURE — 87070 CULTURE OTHR SPECIMN AEROBIC: CPT | Performed by: FAMILY MEDICINE

## 2022-02-04 PROCEDURE — 87186 SC STD MICRODIL/AGAR DIL: CPT | Performed by: ORTHOPAEDIC SURGERY

## 2022-02-04 PROCEDURE — 63600175 PHARM REV CODE 636 W HCPCS: Performed by: NURSE ANESTHETIST, CERTIFIED REGISTERED

## 2022-02-04 PROCEDURE — 25000242 PHARM REV CODE 250 ALT 637 W/ HCPCS: Performed by: FAMILY MEDICINE

## 2022-02-04 PROCEDURE — 27000284 HC CANNULA NASAL: Performed by: ANESTHESIOLOGY

## 2022-02-04 PROCEDURE — 71000039 HC RECOVERY, EACH ADD'L HOUR: Performed by: ORTHOPAEDIC SURGERY

## 2022-02-04 PROCEDURE — 71000033 HC RECOVERY, INTIAL HOUR: Performed by: ORTHOPAEDIC SURGERY

## 2022-02-04 PROCEDURE — 87102 FUNGUS ISOLATION CULTURE: CPT | Performed by: ORTHOPAEDIC SURGERY

## 2022-02-04 PROCEDURE — 36415 COLL VENOUS BLD VENIPUNCTURE: CPT | Performed by: NURSE PRACTITIONER

## 2022-02-04 PROCEDURE — 63600175 PHARM REV CODE 636 W HCPCS: Performed by: FAMILY MEDICINE

## 2022-02-04 PROCEDURE — 25000003 PHARM REV CODE 250: Performed by: FAMILY MEDICINE

## 2022-02-04 PROCEDURE — 94761 N-INVAS EAR/PLS OXIMETRY MLT: CPT

## 2022-02-04 PROCEDURE — 99223 1ST HOSP IP/OBS HIGH 75: CPT | Mod: ,,, | Performed by: STUDENT IN AN ORGANIZED HEALTH CARE EDUCATION/TRAINING PROGRAM

## 2022-02-04 PROCEDURE — 87147 CULTURE TYPE IMMUNOLOGIC: CPT | Performed by: FAMILY MEDICINE

## 2022-02-04 PROCEDURE — 37000009 HC ANESTHESIA EA ADD 15 MINS: Performed by: ORTHOPAEDIC SURGERY

## 2022-02-04 PROCEDURE — 87077 CULTURE AEROBIC IDENTIFY: CPT | Performed by: ORTHOPAEDIC SURGERY

## 2022-02-04 PROCEDURE — 83605 ASSAY OF LACTIC ACID: CPT | Performed by: NURSE PRACTITIONER

## 2022-02-04 PROCEDURE — 85651 RBC SED RATE NONAUTOMATED: CPT | Performed by: NURSE PRACTITIONER

## 2022-02-04 PROCEDURE — 87075 CULTR BACTERIA EXCEPT BLOOD: CPT | Mod: 59 | Performed by: ORTHOPAEDIC SURGERY

## 2022-02-04 PROCEDURE — 27202103: Performed by: ANESTHESIOLOGY

## 2022-02-04 PROCEDURE — 94799 UNLISTED PULMONARY SVC/PX: CPT

## 2022-02-04 PROCEDURE — 27000671 HC TUBING MICROBORE EXT: Performed by: ANESTHESIOLOGY

## 2022-02-04 PROCEDURE — C9399 UNCLASSIFIED DRUGS OR BIOLOG: HCPCS | Performed by: NURSE PRACTITIONER

## 2022-02-04 PROCEDURE — 87205 SMEAR GRAM STAIN: CPT | Mod: 59 | Performed by: ORTHOPAEDIC SURGERY

## 2022-02-04 PROCEDURE — 86140 C-REACTIVE PROTEIN: CPT | Performed by: NURSE PRACTITIONER

## 2022-02-04 PROCEDURE — 12000002 HC ACUTE/MED SURGE SEMI-PRIVATE ROOM

## 2022-02-04 PROCEDURE — 63600175 PHARM REV CODE 636 W HCPCS: Performed by: NURSE PRACTITIONER

## 2022-02-04 PROCEDURE — 25000003 PHARM REV CODE 250: Performed by: INTERNAL MEDICINE

## 2022-02-04 PROCEDURE — 99900031 HC PATIENT EDUCATION (STAT)

## 2022-02-04 PROCEDURE — 80048 BASIC METABOLIC PNL TOTAL CA: CPT | Performed by: NURSE PRACTITIONER

## 2022-02-04 RX ORDER — ONDANSETRON 2 MG/ML
INJECTION INTRAMUSCULAR; INTRAVENOUS
Status: DISCONTINUED | OUTPATIENT
Start: 2022-02-04 | End: 2022-02-04

## 2022-02-04 RX ORDER — ONDANSETRON 2 MG/ML
4 INJECTION INTRAMUSCULAR; INTRAVENOUS DAILY PRN
Status: DISCONTINUED | OUTPATIENT
Start: 2022-02-04 | End: 2022-02-04 | Stop reason: HOSPADM

## 2022-02-04 RX ORDER — OXYCODONE HYDROCHLORIDE 5 MG/1
5 TABLET ORAL
Status: DISCONTINUED | OUTPATIENT
Start: 2022-02-04 | End: 2022-02-04 | Stop reason: HOSPADM

## 2022-02-04 RX ORDER — SODIUM CHLORIDE, SODIUM LACTATE, POTASSIUM CHLORIDE, CALCIUM CHLORIDE 600; 310; 30; 20 MG/100ML; MG/100ML; MG/100ML; MG/100ML
INJECTION, SOLUTION INTRAVENOUS CONTINUOUS PRN
Status: DISCONTINUED | OUTPATIENT
Start: 2022-02-04 | End: 2022-02-04

## 2022-02-04 RX ORDER — PHENYLEPHRINE HYDROCHLORIDE 10 MG/ML
INJECTION INTRAVENOUS
Status: DISCONTINUED | OUTPATIENT
Start: 2022-02-04 | End: 2022-02-04

## 2022-02-04 RX ORDER — FENTANYL CITRATE 50 UG/ML
INJECTION, SOLUTION INTRAMUSCULAR; INTRAVENOUS
Status: DISCONTINUED | OUTPATIENT
Start: 2022-02-04 | End: 2022-02-04

## 2022-02-04 RX ORDER — PROPOFOL 10 MG/ML
VIAL (ML) INTRAVENOUS
Status: DISCONTINUED | OUTPATIENT
Start: 2022-02-04 | End: 2022-02-04

## 2022-02-04 RX ORDER — CEFEPIME HYDROCHLORIDE 1 G/50ML
2 INJECTION, SOLUTION INTRAVENOUS
Status: DISCONTINUED | OUTPATIENT
Start: 2022-02-04 | End: 2022-02-06

## 2022-02-04 RX ORDER — ALPRAZOLAM 0.5 MG/1
0.5 TABLET ORAL 2 TIMES DAILY PRN
Status: DISCONTINUED | OUTPATIENT
Start: 2022-02-04 | End: 2022-02-11 | Stop reason: HOSPADM

## 2022-02-04 RX ORDER — FENTANYL CITRATE 50 UG/ML
25 INJECTION, SOLUTION INTRAMUSCULAR; INTRAVENOUS EVERY 5 MIN PRN
Status: DISCONTINUED | OUTPATIENT
Start: 2022-02-04 | End: 2022-02-04 | Stop reason: HOSPADM

## 2022-02-04 RX ORDER — SODIUM CHLORIDE 0.9 % (FLUSH) 0.9 %
10 SYRINGE (ML) INJECTION
Status: DISCONTINUED | OUTPATIENT
Start: 2022-02-04 | End: 2022-02-11 | Stop reason: HOSPADM

## 2022-02-04 RX ORDER — FAMOTIDINE 10 MG/ML
INJECTION INTRAVENOUS
Status: DISCONTINUED | OUTPATIENT
Start: 2022-02-04 | End: 2022-02-04

## 2022-02-04 RX ORDER — FLUTICASONE FUROATE AND VILANTEROL 200; 25 UG/1; UG/1
1 POWDER RESPIRATORY (INHALATION) DAILY
Refills: 0 | Status: DISCONTINUED | OUTPATIENT
Start: 2022-02-04 | End: 2022-02-11 | Stop reason: HOSPADM

## 2022-02-04 RX ORDER — ALBUTEROL SULFATE 0.83 MG/ML
2.5 SOLUTION RESPIRATORY (INHALATION) EVERY 4 HOURS PRN
Status: DISCONTINUED | OUTPATIENT
Start: 2022-02-04 | End: 2022-02-11 | Stop reason: HOSPADM

## 2022-02-04 RX ADMIN — INSULIN DETEMIR 50 UNITS: 100 INJECTION, SOLUTION SUBCUTANEOUS at 08:02

## 2022-02-04 RX ADMIN — HUMAN INSULIN 2 UNITS: 100 INJECTION, SOLUTION SUBCUTANEOUS at 04:02

## 2022-02-04 RX ADMIN — CEFEPIME HYDROCHLORIDE 2 G: 2 INJECTION, SOLUTION INTRAVENOUS at 09:02

## 2022-02-04 RX ADMIN — PROPOFOL 30 MG: 10 INJECTION, EMULSION INTRAVENOUS at 03:02

## 2022-02-04 RX ADMIN — FENTANYL CITRATE 50 MCG: 50 INJECTION INTRAMUSCULAR; INTRAVENOUS at 03:02

## 2022-02-04 RX ADMIN — SODIUM CHLORIDE, SODIUM LACTATE, POTASSIUM CHLORIDE, AND CALCIUM CHLORIDE: .6; .31; .03; .02 INJECTION, SOLUTION INTRAVENOUS at 03:02

## 2022-02-04 RX ADMIN — FAMOTIDINE 20 MG: 10 INJECTION, SOLUTION INTRAVENOUS at 03:02

## 2022-02-04 RX ADMIN — ATORVASTATIN CALCIUM 20 MG: 20 TABLET, FILM COATED ORAL at 08:02

## 2022-02-04 RX ADMIN — ONDANSETRON 4 MG: 2 INJECTION INTRAMUSCULAR; INTRAVENOUS at 03:02

## 2022-02-04 RX ADMIN — PROPOFOL 20 MG: 10 INJECTION, EMULSION INTRAVENOUS at 04:02

## 2022-02-04 RX ADMIN — FLUTICASONE FUROATE AND VILANTEROL TRIFENATATE 1 PUFF: 200; 25 POWDER RESPIRATORY (INHALATION) at 01:02

## 2022-02-04 RX ADMIN — FENTANYL CITRATE 50 MCG: 50 INJECTION INTRAMUSCULAR; INTRAVENOUS at 04:02

## 2022-02-04 RX ADMIN — MIDODRINE HYDROCHLORIDE 5 MG: 2.5 TABLET ORAL at 09:02

## 2022-02-04 RX ADMIN — CEFEPIME HYDROCHLORIDE 2 G: 2 INJECTION, SOLUTION INTRAVENOUS at 10:02

## 2022-02-04 RX ADMIN — PROPOFOL 40 MG: 10 INJECTION, EMULSION INTRAVENOUS at 04:02

## 2022-02-04 RX ADMIN — HYDROMORPHONE HYDROCHLORIDE 1 MG: 1 INJECTION, SOLUTION INTRAMUSCULAR; INTRAVENOUS; SUBCUTANEOUS at 03:02

## 2022-02-04 RX ADMIN — VANCOMYCIN HYDROCHLORIDE 1500 MG: 1.5 INJECTION, POWDER, LYOPHILIZED, FOR SOLUTION INTRAVENOUS at 08:02

## 2022-02-04 RX ADMIN — HYDROMORPHONE HYDROCHLORIDE 1 MG: 1 INJECTION, SOLUTION INTRAMUSCULAR; INTRAVENOUS; SUBCUTANEOUS at 07:02

## 2022-02-04 RX ADMIN — PHENYLEPHRINE HYDROCHLORIDE 200 MCG: 10 INJECTION INTRAVENOUS at 04:02

## 2022-02-04 RX ADMIN — ASPIRIN 81 MG 162 MG: 81 TABLET ORAL at 08:02

## 2022-02-04 RX ADMIN — ALPRAZOLAM 0.5 MG: 0.5 TABLET ORAL at 10:02

## 2022-02-04 RX ADMIN — FLUOXETINE 10 MG: 10 CAPSULE ORAL at 08:02

## 2022-02-04 RX ADMIN — SODIUM CHLORIDE: 0.9 INJECTION, SOLUTION INTRAVENOUS at 11:02

## 2022-02-04 RX ADMIN — PROPOFOL 30 MG: 10 INJECTION, EMULSION INTRAVENOUS at 04:02

## 2022-02-04 RX ADMIN — PROPOFOL 20 MG: 10 INJECTION, EMULSION INTRAVENOUS at 03:02

## 2022-02-04 RX ADMIN — INSULIN ASPART 1 UNITS: 100 INJECTION, SOLUTION INTRAVENOUS; SUBCUTANEOUS at 11:02

## 2022-02-04 RX ADMIN — FUROSEMIDE 20 MG: 20 TABLET ORAL at 08:02

## 2022-02-04 RX ADMIN — VANCOMYCIN HYDROCHLORIDE 1500 MG: 1.5 INJECTION, POWDER, LYOPHILIZED, FOR SOLUTION INTRAVENOUS at 10:02

## 2022-02-04 RX ADMIN — TAMSULOSIN HYDROCHLORIDE 0.4 MG: 0.4 CAPSULE ORAL at 08:02

## 2022-02-04 NOTE — PLAN OF CARE
Patient's nurse informed  that patient and his girlfriend were requesting information on filing for disability for patient.  Currently only the patient was in the room but information on filing for disability was provided to the patient.

## 2022-02-04 NOTE — TRANSFER OF CARE
"Anesthesia Transfer of Care Note    Patient: James Jiang    Procedure(s) Performed: Procedure(s) (LRB):  IRRIGATION AND DEBRIDEMENT (Right)    Patient location: PACU    Anesthesia Type: MAC    Transport from OR: Transported from OR on 2-3 L/min O2 by NC with adequate spontaneous ventilation    Post pain: adequate analgesia    Post assessment: no apparent anesthetic complications and tolerated procedure well    Post vital signs: stable    Level of consciousness: awake, alert and oriented    Nausea/Vomiting: no nausea/vomiting    Complications: none    Transfer of care protocol was followed      Last vitals:   Visit Vitals  /64 (BP Location: Left arm, Patient Position: Lying)   Pulse 82   Temp 36.9 °C (98.4 °F) (Temporal)   Resp 20   Ht 5' 8" (1.727 m)   Wt 91.9 kg (202 lb 9.6 oz)   SpO2 99%   BMI 30.81 kg/m²     "

## 2022-02-04 NOTE — PROGRESS NOTES
[facility] Medicine  Progress Note    Patient name: James Jiang  MRN: [id number]  Admit Date: 2/3/2022   LOS: 1 day     SUBJECTIVE:     Principal problem: Infected wound    Interval History:  Started on cefepime. Xarelto held. Wound culture ordered. Case discussed with Dr. Hernandez who has discussed with Dr. Milian, orthopedic surgeon. Feels ok. Continues to have drain from right hip. Reports he didn't schedule an appointment and didn't have transportation for follow-up after surgery with Dr. Milian. Girlfriend at bedside.     Hospital course  Patient admitted for nonseptic infected right hip surgical wound.  Patient was lost to follow-up.  Patient was started on broad-spectrum IV antibiotics.    Scheduled Meds:   aspirin  162 mg Oral Daily    atorvastatin  20 mg Oral Daily    ceFEPime (MAXIPIME) IVPB  2 g Intravenous Q12H    FLUoxetine  10 mg Oral Daily    fluticasone furoate-vilanteroL  1 puff Inhalation Daily    furosemide  20 mg Oral Daily    insulin detemir U-100  50 Units Subcutaneous Daily    ipratropium  1 puff Inhalation Daily    midodrine  5 mg Oral Q8H    tamsulosin  0.4 mg Oral Daily    vancomycin (VANCOCIN) IVPB  1,500 mg Intravenous Q12H     Continuous Infusions:   sodium chloride 0.9% 100 mL/hr at 02/04/22 1119     PRN Meds:acetaminophen, albuterol sulfate, ALPRAZolam, dextrose 50%, glucagon (human recombinant), HYDROmorphone, insulin aspart U-100, melatonin, ondansetron, sodium chloride 0.9%, Pharmacy to dose Vancomycin consult **AND** vancomycin - pharmacy to dose    Review of patient's allergies indicates:  No Known Allergies    Review of Systems  As per subjective    OBJECTIVE:     Vital Signs (Most Recent)  Temp: 98.2 °F (36.8 °C) (02/04/22 0746)  Pulse: 85 (02/04/22 0746)  Resp: 18 (02/04/22 0746)  BP: (!) 138/92 (02/04/22 0828)  SpO2: 96 % (02/04/22 0746)    Vital Signs Range (Last 24H):  Temp:  [97.9 °F (36.6 °C)-99.2 °F (37.3 °C)]   Pulse:  [82-90]    Resp:  [14-22]   BP: (132-174)/(74-98)   SpO2:  [94 %-100 %]     I & O (Last 24H):    Intake/Output Summary (Last 24 hours) at 2/4/2022 1218  Last data filed at 2/4/2022 0500  Gross per 24 hour   Intake 250 ml   Output 1000 ml   Net -750 ml       Physical Exam:  General: Patient resting comfortably in no acute distress. Appears as stated age. Calm. Obese. Donis  Eyes: EOM intact. No conjunctivae injection. No scleral icterus.  ENT: Hearing grossly intact. No discharge from ears. No nasal discharge.   CVS: RRR. No LE edema BL.  Lungs: CTA BL, no wheezing or crackles. Good breath sounds. No accessory muscle use. No acute respiratory distress  Neuro: Alert. Cranial nerves grossly intact. Moves all extremities equally. Follows commands. Responds appropriately   Psych: Mood, behavior, thought content and judgement normal            Laboratory:  All pertinent labs within the past 24 hours have been reviewed.  CBC:   Recent Labs   Lab 02/03/22  1700 02/04/22  0543   WBC 11.88 12.49   HGB 12.3* 11.2*   HCT 40.9 36.5*    374     CMP:   Recent Labs   Lab 02/03/22  1700 02/04/22  0543    137   K 3.3* 3.4*   CL 98 102   CO2 25 28   * 183*   BUN 8 6*   CREATININE 0.5 0.5   CALCIUM 9.1 8.7   PROT 7.2  --    ALBUMIN 3.0*  --    BILITOT 0.3  --    ALKPHOS 112  --    AST 12  --    ALT 11  --    ANIONGAP 14 7*   EGFRNONAA >60.0 >60.0       Microbiology Results (last 7 days)     Procedure Component Value Units Date/Time    Aerobic culture [[id number]] Collected: 02/04/22 0848    Order Status: Sent Specimen: Wound from Hip, Right Updated: 02/04/22 1011    Urine culture [[id number]]  (Abnormal) Collected: 02/03/22 1701    Order Status: Completed Specimen: Urine Updated: 02/04/22 0924     Urine Culture, Routine STAPHYLOCOCCUS AUREUS  >100,000cfu/ml  Susceptibility pending      Narrative:      Specimen Source->Urine    Culture, Body Fluid (Aerobic) w/ GS [[id number]] Collected: 02/04/22 0848    Order Status: Canceled  Specimen: Body Fluid from Hip, Right     Aerobic culture (Specify Source) **CANNOT BE ORDERED AS STAT** [[id number]] Collected: 02/03/22 1700    Order Status: Sent Specimen: Wound from Hip, Right Updated: 02/04/22 0031    Blood culture #1 [[id number]] Collected: 02/03/22 1645    Order Status: Completed Specimen: Blood from Peripheral, Hand, Left Updated: 02/03/22 2358     Blood Culture, Routine No Growth to date    Narrative:      Blood Culture #1    Blood culture #2 [[id number]] Collected: 02/03/22 1701    Order Status: Completed Specimen: Blood from Peripheral, Hand, Left Updated: 02/03/22 2358     Blood Culture, Routine No Growth to date    Narrative:      Blood Culture #2           Diagnostic Results:      ASSESSMENT/PLAN:     Active Hospital Problems    Diagnosis  POA    *Infected wound [T14.8XXA, L08.9]  Yes    Paroxysmal atrial fibrillation [I48.0]  Yes     Chronic    COPD (chronic obstructive pulmonary disease) [J44.9]  Yes    Type 2 diabetes mellitus with hyperglycemia [E11.65]  Yes      Resolved Hospital Problems   No resolved problems to display.           Plan:   Vancomycin, cefepime IV  Blood cultures pending  Wound culture pending  Urine culture pending  Pain control  Wound care  Continue home medication  Holding xarelto for possible surgical intervention  Plan to start lovenox 1 mg/kg once ok with orthopedic surgery with history of PE and AF  Patient moderate risk for complication in the setting of moderate risk procedure.  Medically stable for surgery. Risk of bleeding with surgical intervention due to patient be on Xarelto prior to hospitalization.    ID consult  Orthopedic consult      VTE Risk Mitigation (From admission, onward)         Ordered     Reason for No Pharmacological VTE Prophylaxis  Once        Question:  Reasons:  Answer:  Already adequately anticoagulated on oral Anticoagulants    02/03/22 2120     IP VTE HIGH RISK PATIENT  Once         02/03/22 2120                        Patient  care time was spent personally by me on the following activities: > 35 min  · Obtaining a history.  · Examination of patient.  · Providing medical care at the patients bedside.  · Developing a treatment plan with patient or surrogate and bedside caregivers.  · Ordering and reviewing laboratory studies, radiographic studies, pulse oximetry.  · Ordering and performing treatments and interventions.  · Evaluation of patient's response to treatment.  · Discussions with consultants while on the unit and immediately available to the patient.  · Re-evaluation of the patient's condition.  · Documentation in the medical record.       Department Hospital Medicine  [facility]  Elliot Singleton MD    Please note: This note was transcribed using voice recognition software. Because of this technology, there are often uinintended grammatical, spelling, and other transcription errors. Please disregard these errors.

## 2022-02-04 NOTE — PLAN OF CARE
Aware of patient status  Will assume orthopedic care  Awaiting medical optimization/clearance for surgery  Will proceed to the OR for RIGHT hip I&D  Provisionally scheduled for today  NPO  Pain control per primary team      [facility]

## 2022-02-04 NOTE — CONSULTS
Orthopaedic Surgery History and Physical     History of present illness:   James Jiang is a 61 y.o. male who presents with right hip postoperative wound infection.  Patient is status post cephalomedullary nailing of the right femur that was performed in November of 2021. Patient was lost to follow-up.  Patient presented emergency room with wound infection.  Patient was admitted for IV antibiotics and orthopedic intervention p.r.n.    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    Hypertension        Past surgical history:  Past Surgical History:   Procedure Laterality Date    denies pertinent surgical history      INTRAMEDULLARY RODDING OF FEMUR Right 11/7/2021    Procedure: INSERTION, INTRAMEDULLARY HENNA, FEMUR;  Surgeon: Matt Milian MD;  Location: [facility];  Service: Orthopedics;  Laterality: Right;    TOE AMPUTATION Right 10/12/2021    Procedure: AMPUTATION, TOE;  Surgeon: Milton Lauren DPM;  Location: [facility];  Service: Podiatry;  Laterality: Right;       Social history:   Reviewed per EPIC history for tobacco or alcohol use     Medications:    Current Facility-Administered Medications:     0.9%  NaCl infusion, , Intravenous, Continuous, Christel Gonzalez NP, Last Rate: 100 mL/hr at 02/04/22 1119, New Bag at 02/04/22 1119    acetaminophen tablet 650 mg, 650 mg, Oral, Q8H PRN, Christel Gonzalez NP    albuterol nebulizer solution 2.5 mg, 2.5 mg, Nebulization, Q4H PRN, Claritza Yepez MD    ALPRAZolam tablet 0.5 mg, 0.5 mg, Oral, BID PRN, Elliot Singleton MD, 0.5 mg at 02/04/22 1045    aspirin chewable tablet 162 mg, 162 mg, Oral, Daily, Christel Gonzalez NP, 162 mg at 02/04/22 0827    atorvastatin tablet 20 mg, 20 mg, Oral, Daily, Christel  YVONNE Gonzalez, 20 mg at 02/04/22 0828    cefepime in dextrose 5 % IVPB 2 g, 2 g, Intravenous, Q12H, Elliot Singleton MD, Stopped at 02/04/22 1115    dextrose 50% injection 12.5 g, 12.5 g, Intravenous, PRN, Christel Gonzalez NP    FLUoxetine capsule 10 mg, 10 mg, Oral, Daily, Christel Gonzalez NP, 10 mg at 02/04/22 0827    fluticasone furoate-vilanteroL 200-25 mcg/dose diskus inhaler 1 puff, 1 puff, Inhalation, Daily, Elliot Singleton MD, 1 puff at 02/04/22 1326    furosemide tablet 20 mg, 20 mg, Oral, Daily, Christel Gonzalez NP, 20 mg at 02/04/22 0828    glucagon (human recombinant) injection 1 mg, 1 mg, Intramuscular, PRN, Christel Gonzalez NP    HYDROmorphone injection 1 mg, 1 mg, Intravenous, Q6H PRN, Christel Gonzalez NP, 1 mg at 02/04/22 0312    insulin aspart U-100 pen 0-5 Units, 0-5 Units, Subcutaneous, Q6H PRN, Christel Gonzalez NP    insulin detemir U-100 pen 50 Units, 50 Units, Subcutaneous, Daily, Christel Gonzalez NP, 50 Units at 02/04/22 0831    ipratropium inhaler 1 puff, 1 puff, Inhalation, Daily, Elliot Singleton MD    melatonin tablet 6 mg, 6 mg, Oral, Nightly PRN, Christel Gonzalez NP    midodrine tablet 5 mg, 5 mg, Oral, Q8H, Christel Gonzalze NP    ondansetron injection 4 mg, 4 mg, Intravenous, Q8H PRN, Christel Gonzalez NP    sodium chloride 0.9% flush 10 mL, 10 mL, Intravenous, Q12H PRN, Christel Gonzalez NP    tamsulosin 24 hr capsule 0.4 mg, 0.4 mg, Oral, Daily, Christel Gonzalez NP, 0.4 mg at 02/04/22 0827    vancomycin (VANCOCIN) 1,500 mg in dextrose 5 % 500 mL IVPB, 1,500 mg, Intravenous, Q12H, Claritza Yepez MD, Stopped at 02/04/22 0957    Pharmacy to dose Vancomycin consult, , , Once **AND** vancomycin - pharmacy to dose, , Intravenous, pharmacy to manage frequency, Vazquze Schneider MD    Facility-Administered Medications Ordered in Other Encounters:     famotidine (PF) injection, , Intravenous, PRN, Iveth Bass CRNA, 20 mg at 02/04/22 4313    fentaNYL 50 mcg/mL injection, , Intravenous,  PRN, Iveth Bass CRNA, 50 mcg at 02/04/22 1606    lactated ringers infusion, , Intravenous, Continuous PRN, Iveth Bass CRNA, New Bag at 02/04/22 1543    ondansetron injection, , Intravenous, PRN, Iveth Bass CRNA, 4 mg at 02/04/22 1555    phenylephrine injection, , Intravenous, PRN, Iveth Bass CRNA, 200 mcg at 02/04/22 1618    propofol (DIPRIVAN) 10 mg/mL infusion, , Intravenous, PRN, Iveth Bass CRNA, 40 mg at 02/04/22 1606        Physical Exam:   Vitals:    02/04/22 0746 02/04/22 0828 02/04/22 1222 02/04/22 1326   BP:  (!) 138/92 (!) 145/80    BP Location:       Patient Position:       Pulse: 85  88 79   Resp: 18  18 14   Temp: 98.2 °F (36.8 °C)  98.2 °F (36.8 °C)    TempSrc:       SpO2: 96%  96% 98%   Weight:       Height:         Recent Labs   Lab 02/03/22  1700 02/03/22  1700 02/04/22  0543   CALCIUM 9.1   < > 8.7   PROT 7.2  --   --       < > 137   K 3.3*   < > 3.4*   CO2 25   < > 28   CL 98   < > 102   BUN 8   < > 6*   CREATININE 0.5   < > 0.5    < > = values in this interval not displayed.     Recent Labs   Lab 02/04/22  0543   WBC 12.49   RBC 4.28*   HGB 11.2*   HCT 36.5*        Awake/alert/oriented x3, No acute distress, Afebrile, Vital signs stable  Normocephalic, Atraumatic  Heart is beating at normal rate  Good inspiratory effort with unlaboured breathing  Abdomen soft/nondistended/nontender    Right lower extremity  Motor intact L2-S1  Sensation intact L2-S2  2+ popliteal/dorsalis pedis/posterior tibial pulses  <2s CR refill to digits  Surgical wounds demonstrate purulent drainage from the eroded areas where sutures were placed.    Assessment:   61 y.o. male with right hip superficial wound    Plan:   proceed to the OR for formal I and D  Consent from patient    Matt Milian MD  [facility]

## 2022-02-04 NOTE — CONSULTS
Consult Note  Infectious Disease    Reason for Consult:  Infected Right HIP    HPI: James Jiang is a 61 y.o. 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  Presented to the ER complaining of right wound surgical infection at the incision site.  He presents with severe right hip pain, with associated redness and swelling from his incision sites.  He status post ORIF of his right hip November 2021/Dr. Milian.  He did not follow as outpatient for suture removal.  Information given by significant other at bedside; patient with subjective fevers, chills and pus coming out of surgical wounds.  Patient complaining of dysuria and increased urinary frequency.  He denies headache, cough, shortness of breath, chest pain, nausea, vomiting, abdominal pain, or change in bowel movements.    In the ER, hemodynamically stable, afebrile.   Labs significant for WBC 11.8, PMN 73.5%  Glucose 283, potassium 3.3  UA grossly positive with pyuria greater than 100 many bacteria seen, urine culture with Staph aureus  Blood cultures on admission no growth to date pending final    CT pelvis with contrast showed localized right gluteal skin thickening and subcutaneous edema suggesting cellulitis, no soft tissue abscess.    ID consult for antibiotic recommendations.    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    Hypertension      Past Surgical History:   Procedure Laterality Date    denies pertinent surgical history      INTRAMEDULLARY RODDING OF FEMUR Right 11/7/2021    Procedure: INSERTION, INTRAMEDULLARY HENNA, FEMUR;  Surgeon: Matt Milian MD;  Location: [facility];  Service: Orthopedics;  Laterality: Right;    TOE AMPUTATION  Right 10/12/2021    Procedure: AMPUTATION, TOE;  Surgeon: Milton Lauren DPM;  Location: [facility];  Service: Podiatry;  Laterality: Right;     Social History     Tobacco Use    Smoking status: Former Smoker    Smokeless tobacco: Never Used   Substance Use Topics    Alcohol use: Not Currently        Family History   Problem Relation Age of Onset    Hypertension Father        Pertinent medications noted:     Review of Systems:   As mentioned on HPI    EXAM & DIAGNOSTICS REVIEWED:   Vitals:     Temp:  [97.9 °F (36.6 °C)-99.2 °F (37.3 °C)]   Temp: 98.1 °F (36.7 °C) (02/04/22 0316)  Pulse: 84 (02/04/22 0316)  Resp: 16 (02/04/22 0316)  BP: (!) 158/98 (02/04/22 0316)  SpO2: 97 % (02/04/22 0316)    Intake/Output Summary (Last 24 hours) at 2/4/2022 0723  Last data filed at 2/4/2022 0500  Gross per 24 hour   Intake 250 ml   Output 1000 ml   Net -750 ml       General:  In NAD. Alert and attentive, cooperative, comfortable on O2 by nasal cannula 2 L  Eyes:  Anicteric, PERRL  ENT:  No ulcers, exudates, thrush, nares patent, edentulous  Neck:  Supple, no adenopathy appreciated  Lungs: Clear to auscultation b/l  Heart:  S1/S2+, regular rhythm, no murmurs  Abd:  +BS, soft, non tender, non distended, no rebound  :  Donis, cloudy urine  Musc:  Right hip with pus coming out of the right hip, cultures taken     Other joints without effusion, swelling, erythema, synovitis  Skin:  Warm, no rash  Wound:   Neuro: Following commands, no acute focal deficit   Psych:  Calm, cooperative  Lymphatic:     No cervical, supraclavicular nodes  Extrem: No LE edema b/l    S/p right great toe amputation, redness noted on 3rd toe  VAD:         Isolation:     2/3:                  General Labs reviewed:  Recent Labs   Lab 02/03/22 1700 02/04/22  0543   WBC 11.88 12.49   HGB 12.3* 11.2*   HCT 40.9 36.5*    374       Recent Labs   Lab 02/03/22  1700 02/04/22  0543    137   K 3.3* 3.4*   CL 98 102   CO2 25 28   BUN 8 6*   CREATININE 0.5  0.5   CALCIUM 9.1 8.7   PROT 7.2  --    BILITOT 0.3  --    ALKPHOS 112  --    ALT 11  --    AST 12  --      No results for input(s): CRP in the last 168 hours.  No results for input(s): SEDRATE in the last 168 hours.    Estimated Creatinine Clearance: 166.4 mL/min (based on SCr of 0.5 mg/dL).     Micro:  Microbiology Results (last 7 days)     Procedure Component Value Units Date/Time    Aerobic culture (Specify Source) **CANNOT BE ORDERED AS STAT** [[id number]] Collected: 02/03/22 1700    Order Status: Sent Specimen: Wound from Hip, Right Updated: 02/04/22 0031    Blood culture #1 [[id number]] Collected: 02/03/22 1645    Order Status: Completed Specimen: Blood from Peripheral, Hand, Left Updated: 02/03/22 2358     Blood Culture, Routine No Growth to date    Narrative:      Blood Culture #1    Blood culture #2 [[id number]] Collected: 02/03/22 1701    Order Status: Completed Specimen: Blood from Peripheral, Hand, Left Updated: 02/03/22 2358     Blood Culture, Routine No Growth to date    Narrative:      Blood Culture #2    Urine culture [[id number]] Collected: 02/03/22 1701    Order Status: No result Specimen: Urine Updated: 02/03/22 1847          Imaging Reviewed:  CT pelvis    Cardiology:       IMPRESSION & PLAN     1. Severe cellulitis/abscess, right hip in the setting of right hip replacement ORIF November 2021/Dr. Milian   Cultures from right hip taken at bedside    2. Staph aureus UTI   Blood cultures x 2 no growth to date, pending final     3. Poorly controlled diabetes  4. AFib, HTN, COPD, depression/anxiety  5. Smoker      Recommendations:  Adequate source control  Discussed with Dr. Maicol Farmer; plan for OR for I&D and washout of right hip  Please send tissue for Gram stain and cultures  Follow cultures  ESR/CRP  Kidney ultrasound to rule out abscess  Lower extremity arterial ultrasound to rule out PVD  Will follow    Discussed with Dr. Milian, Dr. Singleton    Medical Decision Making during this encounter  was  [_] Low Complexity  [_] Moderate Complexity  [xx] High Complexity

## 2022-02-04 NOTE — CARE UPDATE
02/04/22 0002   Patient Assessment/Suction   Level of Consciousness (AVPU) alert   Respiratory Effort Unlabored   Expansion/Accessory Muscles/Retractions expansion symmetric;no retractions;no use of accessory muscles   All Lung Fields Breath Sounds clear   LLL Breath Sounds diminished   RLL Breath Sounds diminished   PRE-TX-O2   O2 Device (Oxygen Therapy) nasal cannula   $ Is the patient on Low Flow Oxygen? Yes   Flow (L/min) 2   SpO2 98 %   Pulse Oximetry Type Intermittent   $ Pulse Oximetry - Multiple Charge Pulse Oximetry - Multiple   Pulse 82   Resp 14   BP (!) 160/84   Inhaler   $ Inhaler Charges PRN treatment not required   Education   $ Education Bronchodilator;Other (see comment);15 min  (o2,pox,prn tx)   Respiratory Evaluation   $ Care Plan Tech Time 15 min   $ Eval/Re-eval Charges Evaluation   Evaluation For New Orders   Home Oxygen   Has Home Oxygen? Yes   Liter Flow 2   Home Aerosol, MDI, DPI, and Other Treatments/Therapies   Home Respiratory Therapy Per Patient/Review of Chart No

## 2022-02-04 NOTE — NURSING
61 yr old male    Chief Complaint: Wound Infection (Right hip surgery incision site)         Patient information was obtained from patient, past medical records and ER records.   HISTORY OF PRESENT ILLNESS:   James Jiang is a 61 y.o. 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 The patient presented to [facility] on 2/3/2022 with a primary complaint of Wound Infection (Right hip surgery incision site)  .      61 year old  male presents to emergency room with complaints of right hip pain and redness and swelling with drainage from his incision sites.  This patient is status post ORIF of his right hip from November 2021.      Pictures per nursing  Thank you                 Pt is for an I&D of the right hip today   Recommendation:  Right amputation site and 2nd toe  Clean with chlorhexidine/ns.  Pat dry.  Paint with betadine. Air dry     Consult Podiatry for the right foot

## 2022-02-04 NOTE — PROGRESS NOTES
Pharmacokinetic Initial Assessment: IV Vancomycin    Assessment/Plan:    Initiate intravenous vancomycin with loading dose of 1500 mg once followed by a maintenance dose of vancomycin 1500 mg IV every 12 hours  Desired empiric serum trough concentration is 10 to 15 mcg/mL  Draw vancomycin trough level 60 min prior to fourth dose on 02/05 at approximately 0800  Pharmacy will continue to follow and monitor vancomycin.      Please contact pharmacy at extension [phone] with any questions regarding this assessment.     Thank you for the consult,   Deion Hernandez       Patient brief summary:  James Jiang is a 61 y.o. male initiated on antimicrobial therapy with IV Vancomycin for treatment of suspected skin & soft tissue infection    Drug Allergies:   Review of patient's allergies indicates:  No Known Allergies    Actual Body Weight:   91.9 kg    Renal Function:   Estimated Creatinine Clearance: 166.4 mL/min (based on SCr of 0.5 mg/dL).,     CBC (last 72 hours):  Recent Labs   Lab Result Units 02/03/22  1700   WBC K/uL 11.88   Hemoglobin g/dL 12.3*   Hematocrit % 40.9   Platelets K/uL 362   Gran % % 73.5*   Lymph % % 14.9*   Mono % % 7.7   Eosinophil % % 2.6   Basophil % % 0.9   Differential Method  Automated       Metabolic Panel (last 72 hours):  Recent Labs   Lab Result Units 02/03/22  1700 02/03/22  1701   Sodium mmol/L 137  --    Potassium mmol/L 3.3*  --    Chloride mmol/L 98  --    CO2 mmol/L 25  --    Glucose mg/dL 283*  --    Glucose, UA   --  4+*   BUN mg/dL 8  --    Creatinine mg/dL 0.5  --    Albumin g/dL 3.0*  --    Total Bilirubin mg/dL 0.3  --    Alkaline Phosphatase U/L 112  --    AST U/L 12  --    ALT U/L 11  --    Magnesium mg/dL 1.5*  --        Drug levels (last 3 results):  No results for input(s): VANCOMYCINRA, VANCOMYCINPE, VANCOMYCINTR in the last 72 hours.    Microbiologic Results:  Microbiology Results (last 7 days)       Procedure Component Value Units Date/Time    Aerobic culture (Specify Source)  **CANNOT BE ORDERED AS STAT** [[id number]] Collected: 02/03/22 1700    Order Status: Sent Specimen: Wound from Hip, Right Updated: 02/04/22 0031    Blood culture #1 [[id number]] Collected: 02/03/22 1645    Order Status: Completed Specimen: Blood from Peripheral, Hand, Left Updated: 02/03/22 2358     Blood Culture, Routine No Growth to date    Narrative:      Blood Culture #1    Blood culture #2 [[id number]] Collected: 02/03/22 1701    Order Status: Completed Specimen: Blood from Peripheral, Hand, Left Updated: 02/03/22 2358     Blood Culture, Routine No Growth to date    Narrative:      Blood Culture #2    Urine culture [[id number]] Collected: 02/03/22 1701    Order Status: No result Specimen: Urine Updated: 02/03/22 4826

## 2022-02-04 NOTE — ANESTHESIA PREPROCEDURE EVALUATION
02/04/2022  James Jiang is a 61 y.o., male.      Patient Active Problem List   Diagnosis    Sepsis    Diabetic ketoacidosis without coma associated with type 2 diabetes mellitus    Leukocytosis    Class 2 obesity in adult    Candidal intertrigo    Infection    Diabetic wet gangrene of the toe s/p amputation     Sepsis due to Staphylococcus aureus    Atrial fibrillation with RVR    Cellulitis of great toe of left foot    Type 2 diabetes mellitus with hyperglycemia    MSSA bacteremia    Closed fracture of right hip with routine healing    COPD (chronic obstructive pulmonary disease)    Chronic respiratory failure    Paroxysmal atrial fibrillation    Bilateral pulmonary embolism    Shock    Physical debility    Community acquired pneumonia of left lower lobe of lung    Amputation of right great toe    Infected wound       Past Surgical History:   Procedure Laterality Date    denies pertinent surgical history      INTRAMEDULLARY RODDING OF FEMUR Right 11/7/2021    Procedure: INSERTION, INTRAMEDULLARY HENNA, FEMUR;  Surgeon: Matt Milian MD;  Location: [facility];  Service: Orthopedics;  Laterality: Right;    TOE AMPUTATION Right 10/12/2021    Procedure: AMPUTATION, TOE;  Surgeon: Milton Lauren DPM;  Location: [facility];  Service: Podiatry;  Laterality: Right;        Tobacco Use:  The patient  reports that he has quit smoking. He has never used smokeless tobacco.     Results for orders placed or performed during the hospital encounter of 01/20/22   EKG 12-lead    Collection Time: 01/20/22  5:37 AM    Narrative    Test Reason : R06.02,    Vent. Rate : 087 BPM     Atrial Rate : 087 BPM     P-R Int : 164 ms          QRS Dur : 106 ms      QT Int : 396 ms       P-R-T Axes : 022 -19 015 degrees     QTc Int : 476 ms    Normal sinus rhythm  Incomplete right bundle branch  block  Nonspecific T wave abnormality  Abnormal ECG  When compared with ECG of 25-NOV-2021 02:26,  Incomplete right bundle branch block is now Present  Confirmed by Hood TREVIÑO, Elliot PALMA ([id number]) on 1/23/2022 10:49:23 AM    Referred By: MIKO   SELF           Confirmed By:Elliot Morataya MD        Imaging Results          CT Pelvis With Contrast (Final result)  Result time 02/03/22 18:39:14    Final result by Xavier Lopez MD (02/03/22 18:39:14)                 Narrative:    CMS MANDATED QUALITY DATA-CT RADIATION DOSE-436  All CT scans at this facility use dose modulation, iterative reconstruction, and or weight-based dosing when appropriate to reduce radiation dose to as low as reasonably achievable.    HISTORY: Soft tissue infection suspected, pelvis, xray done    FINDINGS: Axial postcontrast imaging was performed with 100 mL Omnipaque 350 IV contrast. Comparison is made to multiple prior exams including CT of 01/05/2022.    There is a Donis catheter and air within collapsed urinary bladder. There are no distal ureteral or bladder calculi, with the visualized small bowel, colon and rectum enhancing normally. Left lower quadrant ventral abdominal hernia occurs lateral to the left rectus sheath through the Spigelian aponeurosis, containing multiple loops of sigmoid colon, fat and mesenteric vessels and lying deep to the external oblique musculature. No associated bowel obstruction.    There is no pelvic free fluid or mass, with no enlarged pelvic or inguinal lymph nodes. The pelvic vasculature enhances normally, with scattered aortoiliac and femoral arterial calcifications. There are dense calcified plaque of both common femoral and superficial femoral arteries.    There are no acute fractures or destructive osseous lesions, with remote healed basicervical and intertrochanteric right femoral fracture, post ORIF with intramedullary nail and proximal blade and locking screw. There is degenerative narrowing of both  hip joint spaces. The musculature and soft tissues enhance normally, with no rim-enhancing soft tissue fluid collection to suggest abscess. There is localized right gluteal skin thickening with subcutaneous fat stranding.    IMPRESSION:  1. Localized right gluteal skin thickening and subcutaneous edema suggesting cellulitis, with no soft tissue abscess.  2. Additional observations as described.    Electronically signed by:  Xavier Lopez MD  2/3/2022 6:39 PM CST Workstation: [id number]                               Lab Results   Component Value Date    WBC 12.49 02/04/2022    HGB 11.2 (L) 02/04/2022    HCT 36.5 (L) 02/04/2022    MCV 85 02/04/2022     02/04/2022     BMP  Lab Results   Component Value Date     02/04/2022    K 3.4 (L) 02/04/2022     02/04/2022    CO2 28 02/04/2022    BUN 6 (L) 02/04/2022    CREATININE 0.5 02/04/2022    CALCIUM 8.7 02/04/2022    ANIONGAP 7 (L) 02/04/2022     (H) 02/04/2022     (H) 02/03/2022     (H) 01/20/2022       Results for orders placed during the hospital encounter of 11/05/21    Echo    Interpretation Summary  · The estimated PA systolic pressure is 46 mmHg.  · Concentric hypertrophy and hyperdynamic systolic function.  · The estimated ejection fraction is 80%.  · Normal left ventricular diastolic function.  · Moderate tricuspid regurgitation.  · There is mild pulmonary hypertension.  · Moderate mitral regurgitation.            Anesthesia Evaluation    I have reviewed the Patient Summary Reports.    I have reviewed the Nursing Notes. I have reviewed the NPO Status.   I have reviewed the Medications.     Review of Systems  Anesthesia Hx:  No problems with previous Anesthesia  Denies Family Hx of Anesthesia complications.   Denies Personal Hx of Anesthesia complications.   Social:  Non-Smoker    Cardiovascular:   Hypertension Dysrhythmias atrial fibrillation ECG has been reviewed. Incomplete RBBB   Pulmonary:   Pneumonia COPD pulmonary emboli  anticoagulated with Xarelto  Home O2 2LNC   Renal/:  Renal/ Normal     Musculoskeletal:  Musculoskeletal Normal status post ORIF of his right hip from November 2021 with incision site breakdown     running subjective fevers complaining of chills and pain to his surgical wound sites   Neurological:  Neurology Normal    Endocrine:   Diabetes, poorly controlled, type 2    Dermatological:   Diabetic wet gangrene of the toe   Psych:   depression             Anesthesia Plan  Type of Anesthesia, risks & benefits discussed:  Anesthesia Type:  MAC, general    Patient's Preference:   Plan Factors:          Intra-op Monitoring Plan: standard ASA monitors  Intra-op Monitoring Plan Comments:   Post Op Pain Control Plan: multimodal analgesia, IV/PO Opioids PRN and per primary service following discharge from PACU  Post Op Pain Control Plan Comments:     Induction:   IV  Beta Blocker:  Patient is on a Beta-Blocker and has received one dose within the past 24 hours (No further documentation required).       Informed Consent: Patient understands risks and agrees with Anesthesia plan.  Questions answered. Anesthesia consent signed with patient.  ASA Score: 3     Day of Surgery Review of History & Physical:    H&P update referred to the surgeon.     Anesthesia Plan Notes:   MAC vs GETA        Ready For Surgery From Anesthesia Perspective.

## 2022-02-04 NOTE — H&P
[facility] Medicine History & Physical Examination   Patient Name: James Jiang  MRN: [id number]  Patient Class: Emergency   Admission Date: 2/3/2022  3:07 PM  Length of Stay: 0  Attending Physician:   Primary Care Provider: Santa Barnard MD  Face-to-Face encounter date: 02/03/2022  Code Status: Full Code  MPOA:  Chief Complaint: Wound Infection (Right hip surgery incision site)        Patient information was obtained from patient, past medical records and ER records.   HISTORY OF PRESENT ILLNESS:   James Jiang is a 61 y.o. 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 The patient presented to [facility] on 2/3/2022 with a primary complaint of Wound Infection (Right hip surgery incision site)  .     61 year old  male presents to emergency room with complaints of right hip pain and redness and swelling with drainage from his incision sites.  This patient is status post ORIF of his right hip from November 2021.      The patient was post have follow-up for suture removal several months ago but failed to keep this appointment.      According to the patient's significant other he has been running subjective fevers complaining of chills and pain to his surgical wound sites.  They denies chest pain shortness of breath nausea vomiting.  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  There was no foul odor the  drainage was serosanguineous type drainage but primarily bloody    In the emergency room blood cultures were collected and sent his lactic acid level was within normal limits and he was started on a broad-spectrum antibiotic  REVIEW OF SYSTEMS:   10 Point Review of System was performed and was found to be negative except for that mentioned already in the HPI and   Review of Systems (Negative unless checked off)  Review of Systems   Constitutional: Positive for chills, fever and malaise/fatigue.   HENT: Negative.    Eyes: Negative.    Respiratory: Negative.    Cardiovascular: Negative.    Gastrointestinal: Positive for nausea.   Genitourinary: Negative.    Musculoskeletal: Positive for joint pain.   Skin:        Right hip incisional wounds x3 all with serosanguineous drainage/bloody drainage the 3rd incision or wound has some possible necrotic tissue on arrival the patient had his sutures still in place any were removed by the ED doctor today in the ED   Neurological: Positive for weakness.   Endo/Heme/Allergies: Negative.    Psychiatric/Behavioral: Positive for depression. The patient is nervous/anxious.            PAST MEDICAL HISTORY:     Past Medical History:   Diagnosis Date    Diabetes mellitus     Diabetic ketoacidosis without coma associated with type 2 diabetes mellitus 10/9/2021    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    Hypertension        PAST SURGICAL HISTORY:     Past Surgical History:   Procedure Laterality Date    denies pertinent surgical history      INTRAMEDULLARY RODDING OF FEMUR Right 11/7/2021    Procedure: INSERTION, INTRAMEDULLARY HENNA, FEMUR;  Surgeon: Matt Milian MD;  Location: [facility] OR;  Service: Orthopedics;  Laterality: Right;    TOE AMPUTATION Right 10/12/2021    Procedure: AMPUTATION, TOE;  Surgeon: Milton Lauren DPM;  Location: [facility] OR;  Service: Podiatry;  Laterality: Right;       ALLERGIES:   Patient has no known allergies.    FAMILY HISTORY:     Family  History   Problem Relation Age of Onset    Hypertension Father        SOCIAL HISTORY:     Social History     Tobacco Use    Smoking status: Former Smoker    Smokeless tobacco: Never Used   Substance Use Topics    Alcohol use: Not Currently        Social History     Substance and Sexual Activity   Sexual Activity Not on file        HOME MEDICATIONS:     Prior to Admission medications    Medication Sig Start Date End Date Taking? Authorizing Provider   albuterol (PROVENTIL HFA) 90 mcg/actuation inhaler Inhale 2 puffs into the lungs every 6 (six) hours as needed for Wheezing. Rescue 12/11/21 12/11/22 Yes Shahida Bolivar MD   ALPRAZolam (XANAX) 0.5 MG tablet Take 0.5 mg by mouth 2 (two) times daily as needed. 10/27/21  Yes Historical Provider   aspirin 81 MG Chew Take 162 mg by mouth once daily.   Yes Historical Provider   clonazePAM (KLONOPIN) 1 MG tablet Take 1 mg by mouth every evening. HCS   Yes Historical Provider   collagenase (SANTYL) ointment Apply topically once daily.  Patient taking differently: Apply 1 g topically once daily. 12/8/21  Yes Amando Avalos MD   FLUoxetine 10 MG capsule Take 10 mg by mouth once daily. HCS   Yes Historical Provider   furosemide (LASIX) 20 MG tablet Take 1 tablet (20 mg total) by mouth once daily for 14 days 12/7/21 12/21/21 Yes Amando Avalos MD   glipiZIDE (GLUCOTROL) 5 MG tablet Take 5 mg by mouth daily with breakfast.    Yes Historical Provider   insulin (LANTUS SOLOSTAR U-100 INSULIN) glargine 100 units/mL (3mL) SubQ pen Inject 50 Units into the skin every evening. 12/7/21  Yes Amando Avalos MD   insulin aspart U-100 (NOVOLOG) 100 unit/mL (3 mL) InPn pen Inject 5 Units into the skin 3 (three) times daily. 12/7/21 3/7/22 Yes Amando Avalos MD   ipratropium (ATROVENT HFA) 17 mcg/actuation inhaler Inhale 1 puff into the lungs once daily.    Yes Historical Provider   metFORMIN (GLUCOPHAGE) 500 MG tablet Take 500 mg by mouth 2 (two) times a day.    Yes Historical Provider   midodrine  "(PROAMATINE) 2.5 MG Tab Take 2 tablets (5 mg total) by mouth every 8 (eight) hours. 12/7/21  Yes Amando Avalos MD   mometasone-formoterol (DULERA) 200-5 mcg/actuation inhaler Inhale 2 puffs into the lungs 2 (two) times daily. Controller 12/7/21 1/6/22 Yes Amando Avalos MD   rivaroxaban (XARELTO) 20 mg Tab Take 1 tablet (20 mg total) by mouth daily with dinner or evening meal. 12/6/21  Yes Henry Lynn MD   simvastatin (ZOCOR) 40 MG tablet Take 40 mg by mouth once daily.    Yes Historical Provider   tamsulosin (FLOMAX) 0.4 mg Cap Take 0.4 mg by mouth once daily.    Yes Historical Provider   albuterol (PROVENTIL/VENTOLIN HFA) 90 mcg/actuation inhaler Inhale 2 puffs into the lungs every 6 (six) hours as needed.     Historical Provider   cefUROXime (CEFTIN) 500 MG tablet Take 1 tablet (500 mg total) by mouth every 12 (twelve) hours. 1/5/22   Abdelrahman Urbina Jr., MD   pen needle, diabetic 31 gauge x 5/16" Ndle Use with insulin up to three times daily 12/7/21   Amando Avalos MD   traZODone (DESYREL) 150 MG tablet Take 150 mg by mouth nightly.     Historical Provider         PHYSICAL EXAM:   BP (!) 150/75   Pulse 82   Temp 97.9 °F (36.6 °C) (Oral)   Resp 18   Ht 5' 8" (1.727 m)   Wt 90.7 kg (200 lb)   SpO2 100%   BMI 30.41 kg/m²   Vitals Reviewed  General appearance:  Ill-appearing male in no apparent distress.  Skin: No Rash.  Right hip wounds by incision sites with bloody drainage this 3 separate incision a site all of which were draining the sutures were removed in the emergency room by the ER physician there was some necrotic appearing tissue around the 3rd incision site  Neuro: Motor and sensory exams grossly intact. Good tone. Power in all 4 extremities 5/5.   HENT: Atraumatic head. Moist mucous membranes of oral cavity.  Eyes: Normal extraocular movements.   Neck: Supple. No evidence of lymphadenopathy. No thyroidomegaly.  Lungs: Clear to auscultation bilaterally. No wheezing present.   Heart: Regular rate and " rhythm. S1 and S2 present with no murmurs/gallop/rub. No pedal edema. No JVD present.   Abdomen: Soft, non-distended, non-tender. No rebound tenderness/guarding. No masses or organomegaly. Bowel sounds are normal. Bladder is not palpable.   Extremities: No cyanosis, clubbing, or edema.  Psych/mental status:  Anxious Alert and oriented. Cooperative. Responds appropriately to questions.   EMERGENCY DEPARTMENT LABS AND IMAGING:   Following labs were Reviewed   Recent Labs   Lab 02/03/22  1700   WBC 11.88   HGB 12.3*   HCT 40.9      CALCIUM 9.1   ALBUMIN 3.0*   PROT 7.2      K 3.3*   CO2 25   CL 98   BUN 8   CREATININE 0.5   ALKPHOS 112   ALT 11   AST 12   BILITOT 0.3         BMP:   Recent Labs   Lab 02/03/22  1700   *      K 3.3*   CL 98   CO2 25   BUN 8   CREATININE 0.5   CALCIUM 9.1   MG 1.5*   , CMP   Recent Labs   Lab 02/03/22  1700      K 3.3*   CL 98   CO2 25   *   BUN 8   CREATININE 0.5   CALCIUM 9.1   PROT 7.2   ALBUMIN 3.0*   BILITOT 0.3   ALKPHOS 112   AST 12   ALT 11   ANIONGAP 14   ESTGFRAFRICA >60.0   EGFRNONAA >60.0   , CBC   Recent Labs   Lab 02/03/22  1700   WBC 11.88   HGB 12.3*   HCT 40.9      , INR   Lab Results   Component Value Date    INR 1.3 11/25/2021    INR 1.2 11/05/2021    INR 1.2 10/11/2021   , Lipid Panel No results found for: CHOL, HDL, LDLCALC, TRIG, CHOLHDL, Troponin   Recent Labs   Lab 02/03/22  1700   TROPONINI <0.030   , A1C:   Recent Labs   Lab 10/09/21  1800   HGBA1C 13.5*    and All labs within the past 24 hours have been reviewed  Microbiology Results (last 7 days)     Procedure Component Value Units Date/Time    Urine culture [[id number]] Collected: 02/03/22 1701    Order Status: No result Specimen: Urine Updated: 02/03/22 1847    Blood culture #1 [[id number]] Collected: 02/03/22 1645    Order Status: Sent Specimen: Blood from Peripheral, Hand, Left Updated: 02/03/22 1717    Blood culture #2 [[id number]] Collected: 02/03/22 1701    Order  Status: Sent Specimen: Blood from Peripheral, Hand, Left Updated: 02/03/22 1716        CT Pelvis With Contrast   Final Result        CT Pelvis With Contrast    Result Date: 2/3/2022  CMS MANDATED QUALITY DATA-CT RADIATION DOSE-436 All CT scans at this facility use dose modulation, iterative reconstruction, and or weight-based dosing when appropriate to reduce radiation dose to as low as reasonably achievable. HISTORY: Soft tissue infection suspected, pelvis, xray done FINDINGS: Axial postcontrast imaging was performed with 100 mL Omnipaque 350 IV contrast. Comparison is made to multiple prior exams including CT of 01/05/2022. There is a Donis catheter and air within collapsed urinary bladder. There are no distal ureteral or bladder calculi, with the visualized small bowel, colon and rectum enhancing normally. Left lower quadrant ventral abdominal hernia occurs lateral to the left rectus sheath through the Spigelian aponeurosis, containing multiple loops of sigmoid colon, fat and mesenteric vessels and lying deep to the external oblique musculature. No associated bowel obstruction. There is no pelvic free fluid or mass, with no enlarged pelvic or inguinal lymph nodes. The pelvic vasculature enhances normally, with scattered aortoiliac and femoral arterial calcifications. There are dense calcified plaque of both common femoral and superficial femoral arteries. There are no acute fractures or destructive osseous lesions, with remote healed basicervical and intertrochanteric right femoral fracture, post ORIF with intramedullary nail and proximal blade and locking screw. There is degenerative narrowing of both hip joint spaces. The musculature and soft tissues enhance normally, with no rim-enhancing soft tissue fluid collection to suggest abscess. There is localized right gluteal skin thickening with subcutaneous fat stranding. IMPRESSION: 1. Localized right gluteal skin thickening and subcutaneous edema suggesting  cellulitis, with no soft tissue abscess. 2. Additional observations as described. Electronically signed by:  Xavier Lopez MD  2/3/2022 6:39 PM CST Workstation: [id number]    CT Abdomen Pelvis With Contrast    Result Date: 1/5/2022  CMS MANDATED QUALITY DATA - CT RADIATION - 436 All CT scans at this facility utilize dose modulation, iterative reconstruction, and/or weight based dosing when appropriate to reduce radiation dose to as low as reasonably achievable. Reason: Abdominal abscess/infection suspected; RLQ abdominal pain (Age >= 14y) TECHNIQUE: CT abdomen and pelvis with 100 mL Omnipaque 350. COMPARISON: CT abdomen and pelvis November 25, 2021 FINDINGS: Chronic interstitial thickening of the lung bases noted as well as dependent subsegmental atelectasis. Trace left pleural effusion noted. Heart size is normal. The liver, gallbladder, common bile duct are within normal limits. There is mild fatty atrophy of the pancreas. No pancreatic lesions observed. The spleen and adrenal glands are unremarkable. Heterogeneously enhancing left renal mass is again identified currently measuring 4.2 x 4.0 cm and is not significantly changed when compared with prior exam. Bilateral nonobstructing renal calculi are unchanged from previous exam. There is no hydronephrosis. The ureters are normal caliber. The bladder is decompressed with Donis catheter in place. Gas in the nondependent bladder noted consistent with Donis catheterization. Prostate gland seminal vesicles are unremarkable. Phleboliths noted. Large and small bowel are normal caliber. The appendix is normal. Stomach is mostly decompressed. There is no bowel wall thickening or inflammatory changes. Left Spigelian hernia again noted with multiple loops of large bowel herniated through. No evidence of obstruction or strangulation. Hernia sac is unchanged in size. Abdominal aorta is normal caliber with atherosclerosis. No intra-abdominal lymphadenopathy. No mesenteric fat  stranding or free fluid. Degenerative changes of the spine again noted. No acute osseous abnormality. IMPRESSION: 1.  No significant change of left Spigelian Hernia. 2.  Chronic interstitial thickening of the lung bases. 3.  Unchanged heterogeneously enhancing left renal mass measuring 4.2 x 4.0 cm. Electronically signed by:  Zuhair Cisneros DO  1/5/2022 6:40 AM CST Workstation: [id number]    I personally reviewed and agree with the radiologist's findings      ASSESSMENT & PLAN:   Jamse Jiang is a 61 y.o. male admitted for    1.  Right hip wounds status post ORIF in November 2021  -IV antibiotics with vancomycin Zosyn  -wound cultures collected and sent  -consult ID  -pain management  -gentle IV hydration  -wound care consult    2.  Type 2 diabetes-suboptimal control  -continue long-acting insulin Levemir/on Lantus at home  -sliding scale protocol  -NovoLog insulin sliding scale  -diabetic cardiac diet    3.  Paroxysmal atrial fibrillation/history of pulmonary emboli  -anticoagulated with Xarelto  -rate controlled  -continue home medications to manage    4.  History of right hip ORIF in November 2021  -patient failed to keep his follow-up appointments with his orthopedist for suture removal  -old sutures were removed in the emergency room by the ER physician  -wound culture collected    5.  Essential hypertension  -continue home medications to manage    6.  COPD  -DuoNeb treatments as needed    7.  Depression anxiety disorder  -continue home medications to manage  -hold off on Xanax the patient is receiving Dilaudid for pain    DVT Prophylaxis: will be placed on Xarelto for DVT prophylaxis and will be advised to be as mobile as possible and sit in a chair as tolerated.   _____________________________________________________________  Face-to-Face encounter date: 02/03/2022  Encounter included review of the medical records, interviewing and examining the patient face-to-face, discussion with family and other  health care providers including emergency medicine physician, admission orders, interpreting lab/test results and formulating a plan of care.   Medical Decision Making during this encounter was  [_] Low Complexity  [_] Moderate Complexity  [x] High Complexity  _________________________________________________________________________________    INPATIENT LIST OF MEDICATIONS     Current Facility-Administered Medications:     Pharmacy to dose Vancomycin consult, , , Once **AND** vancomycin - pharmacy to dose, , Intravenous, pharmacy to manage frequency, Vazquez Schneider MD    Current Outpatient Medications:     albuterol (PROVENTIL HFA) 90 mcg/actuation inhaler, Inhale 2 puffs into the lungs every 6 (six) hours as needed for Wheezing. Rescue, Disp: 18 g, Rfl: 0    ALPRAZolam (XANAX) 0.5 MG tablet, Take 0.5 mg by mouth 2 (two) times daily as needed., Disp: , Rfl:     aspirin 81 MG Chew, Take 162 mg by mouth once daily., Disp: , Rfl:     clonazePAM (KLONOPIN) 1 MG tablet, Take 1 mg by mouth every evening. HCS, Disp: , Rfl:     collagenase (SANTYL) ointment, Apply topically once daily. (Patient taking differently: Apply 1 g topically once daily.), Disp: 30 g, Rfl: 0    FLUoxetine 10 MG capsule, Take 10 mg by mouth once daily. HCS, Disp: , Rfl:     furosemide (LASIX) 20 MG tablet, Take 1 tablet (20 mg total) by mouth once daily for 14 days, Disp: 14 tablet, Rfl: 0    glipiZIDE (GLUCOTROL) 5 MG tablet, Take 5 mg by mouth daily with breakfast. , Disp: , Rfl:     insulin (LANTUS SOLOSTAR U-100 INSULIN) glargine 100 units/mL (3mL) SubQ pen, Inject 50 Units into the skin every evening., Disp: , Rfl: 0    insulin aspart U-100 (NOVOLOG) 100 unit/mL (3 mL) InPn pen, Inject 5 Units into the skin 3 (three) times daily., Disp: 15 mL, Rfl: 1    ipratropium (ATROVENT HFA) 17 mcg/actuation inhaler, Inhale 1 puff into the lungs once daily. , Disp: , Rfl:     metFORMIN (GLUCOPHAGE) 500 MG tablet, Take 500 mg by mouth 2  "(two) times a day. , Disp: , Rfl:     midodrine (PROAMATINE) 2.5 MG Tab, Take 2 tablets (5 mg total) by mouth every 8 (eight) hours., Disp: 90 tablet, Rfl: 0    mometasone-formoterol (DULERA) 200-5 mcg/actuation inhaler, Inhale 2 puffs into the lungs 2 (two) times daily. Controller, Disp: 13 g, Rfl: 0    rivaroxaban (XARELTO) 20 mg Tab, Take 1 tablet (20 mg total) by mouth daily with dinner or evening meal., Disp: 60 tablet, Rfl: 2    simvastatin (ZOCOR) 40 MG tablet, Take 40 mg by mouth once daily. , Disp: , Rfl:     tamsulosin (FLOMAX) 0.4 mg Cap, Take 0.4 mg by mouth once daily. , Disp: , Rfl:     albuterol (PROVENTIL/VENTOLIN HFA) 90 mcg/actuation inhaler, Inhale 2 puffs into the lungs every 6 (six) hours as needed. , Disp: , Rfl:     cefUROXime (CEFTIN) 500 MG tablet, Take 1 tablet (500 mg total) by mouth every 12 (twelve) hours., Disp: 20 tablet, Rfl: 0    pen needle, diabetic 31 gauge x 5/16" Ndle, Use with insulin up to three times daily, Disp: 100 each, Rfl: 0    traZODone (DESYREL) 150 MG tablet, Take 150 mg by mouth nightly. , Disp: , Rfl:       Scheduled Meds:  Continuous Infusions:  PRN Meds:.Pharmacy to dose Vancomycin consult **AND** vancomycin - pharmacy to dose      Christel Gonzalez  [facility] Hospitalist NP  02/03/2022  "

## 2022-02-04 NOTE — CARE UPDATE
02/04/22 0735   Patient Assessment/Suction   Level of Consciousness (AVPU) alert   Respiratory Effort Unlabored   All Lung Fields Breath Sounds clear   PRE-TX-O2   O2 Device (Oxygen Therapy) room air   Pulse Oximetry Type Intermittent   $ Pulse Oximetry - Multiple Charge Pulse Oximetry - Multiple   Pulse 83   Resp 18   Respiratory Evaluation   $ Care Plan Tech Time 15 min

## 2022-02-04 NOTE — OP NOTE
Orthopaedic Surgery Operative Report      DATE OF PROCEDURE: 02/04/2022   PREOPERATIVE DIAGNOSIS: Wound infection [T14.8XXA, L08.9]  POSTOPERATIVE DIAGNOSIS:  Same  PROCEDURE PERFORMED: Deep irrigation and debridement of right hip  SURGEON: Matt Milian M.D. (RES).   ANESTHESIA: General endotracheal.   ESTIMATED BLOOD LOSS:  20 cc.     INDICATIONS FOR PROCEDURE: The patient is an 61 y.o. male who presented with right hip infection. It was decided that the best plan of action was to take the patient back to the operative theatre for a formal irrigation and debridement of the wound.  This procedure, as well as, alternatives to this procedure was discussed at length with the patient. Risks and benefits were also discussed. Risks include but are not limited to bleeding, infection, numbness, scarring, damage to major neurovascular structures, limb length/rotation discrepancy, failure of hardware, need for further surgery, loss of function, myocardial infarction, deep venous thrombosis, pulmonary embolism, nonunion, malunion and death. Patient understood these well and consented for the procedure as described.    PROCEDURE IN DETAIL: The patient was identified in the preoperative holding area and site was marked. The patient was wheeled into the Operating Room and general endotracheal anesthesia was induced. The patient was placed into a supine position with the affected limb in the prime position. The affected limb was then prepped and draped in the usual sterile fashion. A timeout was taken to confirm the patient, site, surgery, surgeon and administration of preoperative antibiotics. All agreed and we proceeded. Using the original incision, a scalpel was used to incise the tissue allowing for proper digital investigation of the wound. The wound had purulent drainage.  The remaining aspects of the wound were investigated for any loculations or abscesses.  The wound was then thoroughly irrigated with 9 liters of  normal saline via cysto tubing to gravity.  Throughout the irrigation, the wound was debrided of any non-viable tissue, including skin and subcutaneous fat.  The superficial edges of the wound were excised of any granulation tissue.  Proper hemostasis was achieved using bovie cauterization.  The wound was closed using 3-0 Nylon in a horizotal mattress fashion.  The wound was dressed with xeroform, 4x4 guaze, cast padding and an ace wrap.  All instrument and sponge counts were reported correct at the end of the case. There were no complications.  The patient was extubated, awakened and taken to the post anesthesia care unit in stable condition.  Cultures were taken and sent for analysis.     PLAN FOR THE PATIENT:  Admit to the floor for wound care and recovery      Matt Milian M.D.   [facility]

## 2022-02-05 LAB
ANION GAP SERPL CALC-SCNC: 9 MMOL/L (ref 8–16)
BACTERIA UR CULT: ABNORMAL
BASOPHILS # BLD AUTO: 0.12 K/UL (ref 0–0.2)
BASOPHILS NFR BLD: 1.3 % (ref 0–1.9)
BUN SERPL-MCNC: 9 MG/DL (ref 8–23)
CALCIUM SERPL-MCNC: 8.6 MG/DL (ref 8.7–10.5)
CHLORIDE SERPL-SCNC: 97 MMOL/L (ref 95–110)
CO2 SERPL-SCNC: 31 MMOL/L (ref 23–29)
CREAT SERPL-MCNC: 0.7 MG/DL (ref 0.5–1.4)
CRP SERPL-MCNC: 4.21 MG/DL
DIFFERENTIAL METHOD: ABNORMAL
EOSINOPHIL # BLD AUTO: 0.5 K/UL (ref 0–0.5)
EOSINOPHIL NFR BLD: 4.8 % (ref 0–8)
ERYTHROCYTE [DISTWIDTH] IN BLOOD BY AUTOMATED COUNT: 14.8 % (ref 11.5–14.5)
EST. GFR  (AFRICAN AMERICAN): >60 ML/MIN/1.73 M^2
EST. GFR  (NON AFRICAN AMERICAN): >60 ML/MIN/1.73 M^2
ESTIMATED AVG GLUCOSE: 272 MG/DL (ref 68–131)
ESTIMATED AVG GLUCOSE: 272 MG/DL (ref 68–131)
GLUCOSE SERPL-MCNC: 181 MG/DL (ref 70–110)
GLUCOSE SERPL-MCNC: 203 MG/DL (ref 70–110)
GLUCOSE SERPL-MCNC: 208 MG/DL (ref 70–110)
GLUCOSE SERPL-MCNC: 271 MG/DL (ref 70–110)
GLUCOSE SERPL-MCNC: 292 MG/DL (ref 70–110)
HBA1C MFR BLD: 11.1 % (ref 4.5–6.2)
HBA1C MFR BLD: 11.1 % (ref 4.5–6.2)
HCT VFR BLD AUTO: 37.7 % (ref 40–54)
HGB BLD-MCNC: 11 G/DL (ref 14–18)
IMM GRANULOCYTES # BLD AUTO: 0.05 K/UL (ref 0–0.04)
IMM GRANULOCYTES NFR BLD AUTO: 0.5 % (ref 0–0.5)
LYMPHOCYTES # BLD AUTO: 2.2 K/UL (ref 1–4.8)
LYMPHOCYTES NFR BLD: 23.3 % (ref 18–48)
MAGNESIUM SERPL-MCNC: 1.4 MG/DL (ref 1.6–2.6)
MCH RBC QN AUTO: 25.8 PG (ref 27–31)
MCHC RBC AUTO-ENTMCNC: 29.2 G/DL (ref 32–36)
MCV RBC AUTO: 88 FL (ref 82–98)
MONOCYTES # BLD AUTO: 0.9 K/UL (ref 0.3–1)
MONOCYTES NFR BLD: 8.9 % (ref 4–15)
NEUTROPHILS # BLD AUTO: 5.8 K/UL (ref 1.8–7.7)
NEUTROPHILS NFR BLD: 61.2 % (ref 38–73)
NRBC BLD-RTO: 0 /100 WBC
PLATELET # BLD AUTO: 413 K/UL (ref 150–450)
PMV BLD AUTO: 9.4 FL (ref 9.2–12.9)
POTASSIUM SERPL-SCNC: 3.5 MMOL/L (ref 3.5–5.1)
RBC # BLD AUTO: 4.27 M/UL (ref 4.6–6.2)
SODIUM SERPL-SCNC: 137 MMOL/L (ref 136–145)
VANCOMYCIN TROUGH SERPL-MCNC: 17.8 UG/ML
WBC # BLD AUTO: 9.52 K/UL (ref 3.9–12.7)

## 2022-02-05 PROCEDURE — 80048 BASIC METABOLIC PNL TOTAL CA: CPT | Performed by: FAMILY MEDICINE

## 2022-02-05 PROCEDURE — 86140 C-REACTIVE PROTEIN: CPT | Performed by: STUDENT IN AN ORGANIZED HEALTH CARE EDUCATION/TRAINING PROGRAM

## 2022-02-05 PROCEDURE — 85025 COMPLETE CBC W/AUTO DIFF WBC: CPT | Performed by: FAMILY MEDICINE

## 2022-02-05 PROCEDURE — 27000221 HC OXYGEN, UP TO 24 HOURS

## 2022-02-05 PROCEDURE — 25000003 PHARM REV CODE 250: Performed by: FAMILY MEDICINE

## 2022-02-05 PROCEDURE — 80202 ASSAY OF VANCOMYCIN: CPT | Performed by: INTERNAL MEDICINE

## 2022-02-05 PROCEDURE — 99900031 HC PATIENT EDUCATION (STAT)

## 2022-02-05 PROCEDURE — 94761 N-INVAS EAR/PLS OXIMETRY MLT: CPT

## 2022-02-05 PROCEDURE — 83735 ASSAY OF MAGNESIUM: CPT | Performed by: FAMILY MEDICINE

## 2022-02-05 PROCEDURE — 63600175 PHARM REV CODE 636 W HCPCS: Performed by: INTERNAL MEDICINE

## 2022-02-05 PROCEDURE — 99233 SBSQ HOSP IP/OBS HIGH 50: CPT | Mod: ,,, | Performed by: INTERNAL MEDICINE

## 2022-02-05 PROCEDURE — 36415 COLL VENOUS BLD VENIPUNCTURE: CPT | Performed by: FAMILY MEDICINE

## 2022-02-05 PROCEDURE — 25000003 PHARM REV CODE 250: Performed by: INTERNAL MEDICINE

## 2022-02-05 PROCEDURE — 99233 PR SUBSEQUENT HOSPITAL CARE,LEVL III: ICD-10-PCS | Mod: ,,, | Performed by: INTERNAL MEDICINE

## 2022-02-05 PROCEDURE — 25000242 PHARM REV CODE 250 ALT 637 W/ HCPCS: Performed by: FAMILY MEDICINE

## 2022-02-05 PROCEDURE — 94640 AIRWAY INHALATION TREATMENT: CPT

## 2022-02-05 PROCEDURE — 63600175 PHARM REV CODE 636 W HCPCS: Performed by: FAMILY MEDICINE

## 2022-02-05 PROCEDURE — 99900035 HC TECH TIME PER 15 MIN (STAT)

## 2022-02-05 PROCEDURE — 63600175 PHARM REV CODE 636 W HCPCS: Performed by: NURSE PRACTITIONER

## 2022-02-05 PROCEDURE — 94799 UNLISTED PULMONARY SVC/PX: CPT

## 2022-02-05 PROCEDURE — 25000003 PHARM REV CODE 250: Performed by: NURSE PRACTITIONER

## 2022-02-05 PROCEDURE — 36415 COLL VENOUS BLD VENIPUNCTURE: CPT | Performed by: INTERNAL MEDICINE

## 2022-02-05 PROCEDURE — 12000002 HC ACUTE/MED SURGE SEMI-PRIVATE ROOM

## 2022-02-05 RX ORDER — HYDROCODONE BITARTRATE AND ACETAMINOPHEN 5; 325 MG/1; MG/1
1 TABLET ORAL EVERY 6 HOURS PRN
Status: DISCONTINUED | OUTPATIENT
Start: 2022-02-05 | End: 2022-02-11 | Stop reason: HOSPADM

## 2022-02-05 RX ORDER — HYDROCODONE BITARTRATE AND ACETAMINOPHEN 5; 325 MG/1; MG/1
1 TABLET ORAL ONCE
Status: COMPLETED | OUTPATIENT
Start: 2022-02-05 | End: 2022-02-05

## 2022-02-05 RX ORDER — HYDROMORPHONE HYDROCHLORIDE 1 MG/ML
1 INJECTION, SOLUTION INTRAMUSCULAR; INTRAVENOUS; SUBCUTANEOUS EVERY 8 HOURS PRN
Status: DISCONTINUED | OUTPATIENT
Start: 2022-02-05 | End: 2022-02-05

## 2022-02-05 RX ORDER — MORPHINE SULFATE 2 MG/ML
2 INJECTION, SOLUTION INTRAMUSCULAR; INTRAVENOUS EVERY 4 HOURS PRN
Status: DISCONTINUED | OUTPATIENT
Start: 2022-02-05 | End: 2022-02-11 | Stop reason: HOSPADM

## 2022-02-05 RX ORDER — MAGNESIUM SULFATE HEPTAHYDRATE 40 MG/ML
4 INJECTION, SOLUTION INTRAVENOUS ONCE
Status: COMPLETED | OUTPATIENT
Start: 2022-02-05 | End: 2022-02-05

## 2022-02-05 RX ADMIN — ALPRAZOLAM 0.5 MG: 0.5 TABLET ORAL at 03:02

## 2022-02-05 RX ADMIN — CEFEPIME HYDROCHLORIDE 2 G: 2 INJECTION, SOLUTION INTRAVENOUS at 09:02

## 2022-02-05 RX ADMIN — FLUTICASONE FUROATE AND VILANTEROL TRIFENATATE 1 PUFF: 200; 25 POWDER RESPIRATORY (INHALATION) at 08:02

## 2022-02-05 RX ADMIN — MIDODRINE HYDROCHLORIDE 5 MG: 2.5 TABLET ORAL at 05:02

## 2022-02-05 RX ADMIN — IPRATROPIUM BROMIDE 1 PUFF: 17 AEROSOL, METERED RESPIRATORY (INHALATION) at 08:02

## 2022-02-05 RX ADMIN — INSULIN ASPART 2 UNITS: 100 INJECTION, SOLUTION INTRAVENOUS; SUBCUTANEOUS at 07:02

## 2022-02-05 RX ADMIN — HYDROCODONE BITARTRATE AND ACETAMINOPHEN 1 TABLET: 5; 325 TABLET ORAL at 08:02

## 2022-02-05 RX ADMIN — MELATONIN 6 MG: at 08:02

## 2022-02-05 RX ADMIN — ATORVASTATIN CALCIUM 20 MG: 20 TABLET, FILM COATED ORAL at 09:02

## 2022-02-05 RX ADMIN — MIDODRINE HYDROCHLORIDE 5 MG: 2.5 TABLET ORAL at 10:02

## 2022-02-05 RX ADMIN — TAMSULOSIN HYDROCHLORIDE 0.4 MG: 0.4 CAPSULE ORAL at 09:02

## 2022-02-05 RX ADMIN — FUROSEMIDE 20 MG: 20 TABLET ORAL at 09:02

## 2022-02-05 RX ADMIN — ASPIRIN 81 MG 162 MG: 81 TABLET ORAL at 09:02

## 2022-02-05 RX ADMIN — FLUOXETINE 10 MG: 10 CAPSULE ORAL at 09:02

## 2022-02-05 RX ADMIN — MAGNESIUM SULFATE 4 G: 2 INJECTION INTRAVENOUS at 08:02

## 2022-02-05 RX ADMIN — HYDROMORPHONE HYDROCHLORIDE 1 MG: 1 INJECTION, SOLUTION INTRAMUSCULAR; INTRAVENOUS; SUBCUTANEOUS at 05:02

## 2022-02-05 RX ADMIN — VANCOMYCIN HYDROCHLORIDE 1250 MG: 1.25 INJECTION, POWDER, LYOPHILIZED, FOR SOLUTION INTRAVENOUS at 06:02

## 2022-02-05 RX ADMIN — INSULIN DETEMIR 50 UNITS: 100 INJECTION, SOLUTION SUBCUTANEOUS at 09:02

## 2022-02-05 RX ADMIN — HYDROCODONE BITARTRATE AND ACETAMINOPHEN 1 TABLET: 5; 325 TABLET ORAL at 10:02

## 2022-02-05 NOTE — PROGRESS NOTES
"[facility]  Adult Nutrition   Progress Note (Initial Assessment)     SUMMARY     Recommendations/Interventions:    Recommendation/Intervention: 1. Continue current diet as tolerated, encourage intake. 2. Added Colton BID (180 kcal, 5g protein, 14g Arginine, 19 mg Zinc, 600 mg Vitamin C, 30 mg Vitamin E daily) to assist in meeting needs when meal intake is insufficient. 3.  to assist in meal choices daily.  Goals: 1. Patient to meet at least 75% of estimated needs through meal intake.  Nutrition Goal Status: new    Dietitian Rounds Brief:  · Identified at risk 2' wound. Patient admitted with wound infection. S/p Deep irrigation and debridement of right hip. Added colton to assist in wound healing. PO intake fair ~ 50% of meals. Enocurage intake of meals and supplements.Will continue to monitor intake, labs, and plan of care.  Reason for Assessment  Reason For Assessment: identified at risk by screening criteria  Relevant Medical History: A-Fib, T2DM, COPD  Interdisciplinary Rounds: did not attend    Nutrition Risk Screen  Nutrition Risk Screen: large or nonhealing wound, burn or pressure injury       Incision/Site 10/12/21 1928 Right Foot-Wound Image: Images linked       Wound 02/04/22 0028 Other (comment) Right anterior Greater trochanter #1-Wound Image: Images linked       Wound 02/04/22 0025 Other (comment) Right anterior;distal Thigh-Wound Image: Images linked  [REMOVED]      Incision/Site 11/07/21 0907 Right Hip-Wound Image: Images linked  MST Score: 2  Have you recently lost weight without trying?: Unsure  Weight loss score: 2  Have you been eating poorly because of a decreased appetite?: No  Appetite score: 0       Nutrition/Diet History  Food Allergies: NKFA  Factors Affecting Nutritional Intake: None identified at this time    Anthropometrics  Temp: 98.7 °F (37.1 °C)  Height Method: Stated  Height: 5' 8" (172.7 cm)  Height (inches): 68 in  Weight Method: Bed Scale  Weight: 91.9 kg (202 lb " 9.6 oz)  Weight (lb): 202.6 lb  Ideal Body Weight (IBW), Male: 154 lb  % Ideal Body Weight, Male (lb): 131.56 %  BMI (Calculated): 30.8  BMI Grade: 30 - 34.9- obesity - grade I     Weight History:  Wt Readings from Last 10 Encounters:   02/04/22 91.9 kg (202 lb 9.6 oz)   01/20/22 92.5 kg (204 lb)   01/05/22 92.5 kg (204 lb)   12/11/21 92.9 kg (204 lb 12.9 oz)   11/25/21 99.7 kg (219 lb 12.8 oz)   11/18/21 108.9 kg (240 lb 1.3 oz)   10/17/21 106.5 kg (234 lb 12.6 oz)   10/11/21 105.7 kg (233 lb)     Lab/Procedures/Meds: Pertinent Labs Reviewed  Clinical Chemistry:  Recent Labs   Lab 02/03/22  1700 02/05/22  0558    137   K 3.3* 3.5   CL 98 97   CO2 25 31*   * 208*   BUN 8 9   CREATININE 0.5 0.7   CALCIUM 9.1 8.6*   PROT 7.2  --    ALBUMIN 3.0*  --    BILITOT 0.3  --    ALKPHOS 112  --    AST 12  --    ALT 11  --    ANIONGAP 14 9   ESTGFRAFRICA >60.0 >60.0   EGFRNONAA >60.0 >60.0   MG 1.5* 1.4*    < > = values in this interval not displayed.     CBC:   Recent Labs   Lab 02/05/22  0558   WBC 9.52   RBC 4.27*   HGB 11.0*   HCT 37.7*      MCV 88   MCH 25.8*   MCHC 29.2*     Cardiac Profile:  Recent Labs   Lab 02/03/22  1700   TROPONINI <0.030     Inflammatory Labs:  Recent Labs   Lab 02/04/22  1256 02/05/22  0558   CRP 2.98* 4.21*     Diabetes:  Recent Labs   Lab 02/04/22  0039   HGBA1C 11.1*  11.1*     Medications: Pertinent Medications reviewed  Scheduled Meds:   aspirin  162 mg Oral Daily    atorvastatin  20 mg Oral Daily    ceFEPime (MAXIPIME) IVPB  2 g Intravenous Q12H    FLUoxetine  10 mg Oral Daily    fluticasone furoate-vilanteroL  1 puff Inhalation Daily    furosemide  20 mg Oral Daily    insulin detemir U-100  50 Units Subcutaneous Daily    ipratropium  1 puff Inhalation Daily    midodrine  5 mg Oral Q8H    tamsulosin  0.4 mg Oral Daily    vancomycin (VANCOCIN) IVPB  1,250 mg Intravenous Q12H     Continuous Infusions:  PRN Meds:.acetaminophen, albuterol sulfate, ALPRAZolam,  "dextrose 50%, glucagon (human recombinant), HYDROmorphone, insulin aspart U-100, melatonin, ondansetron, sodium chloride 0.9%, sodium chloride 0.9%, Pharmacy to dose Vancomycin consult **AND** vancomycin - pharmacy to dose    Antibiotics (From admission, onward)            Start     Stop Route Frequency Ordered    02/05/22 1800  vancomycin 1.25 g in dextrose 5% 250 mL IVPB (ready to mix)        Note to Pharmacy: Ht: 5' 8" (1.727 m)  Wt: 91.9 kg (202 lb 9.6 oz)  Estimated Creatinine Clearance: 166.4 mL/min (based on SCr of 0.5 mg/dL).  Body mass index is 30.81 kg/m².    -- IV Every 12 hours (non-standard times) 02/05/22 1114    02/04/22 0846  cefepime in dextrose 5 % IVPB 2 g         -- IV Every 12 hours (non-standard times) 02/04/22 0846    02/03/22 1644  vancomycin - pharmacy to dose  (vancomycin IVPB)        "And" Linked Group Details    -- IV pharmacy to manage frequency 02/03/22 1545        Estimated/Assessed Needs  Weight Used For Calorie Calculations: 91.9 kg (202 lb 9.6 oz)  Energy Calorie Requirements (kcal): 5498-7650 kcals/day (20-25 kcals/kg)  Energy Need Method: Kcal/kg  Protein Requirements: 105-140 g/day (1.5-2.0 g/kg IBW)  Weight Used For Protein Calculations: 69.9 kg (154 lb) (Ideal body weight)  Fluid Requirements (mL): 1 mL/kcal or per MD  RDA Method (mL): 1838    Nutrition Prescription Ordered    Current Diet Order: 1800 Kcal Diabetic Diet    Evaluation of Received Nutrient/Fluid Intake    Energy Calories Required: not meeting needs  Protein Required: not meeting needs  Fluid Required: meeting needs  Tolerance: tolerating  % Intake of Estimated Energy Needs: 25 - 50 % and 50 - 75 %  % Meal Intake: 25 - 50 %    Intake/Output Summary (Last 24 hours) at 2/5/2022 1130  Last data filed at 2/5/2022 0458  Gross per 24 hour   Intake 900 ml   Output 3200 ml   Net -2300 ml      Nutrition Risk    Level of Risk/Frequency of Follow-up: moderate - high   Monitor and Evaluation    Food and Nutrient Intake: " energy intake,food and beverage intake  Food and Nutrient Adminstration: diet order  Physical Activity and Function: nutrition-related ADLs and IADLs,factors affecting access to physical activity  Anthropometric Measurements: weight,weight change,body mass index  Biochemical Data, Medical Tests and Procedures: electrolyte and renal panel,lipid profile,gastrointestinal profile,glucose/endocrine profile,inflammatory profile  Nutrition-Focused Physical Findings: overall appearance     Nutrition Follow-Up    RD Follow-up?: Yes  Valarie Mendenhall RD 02/05/2022 11:32 AM

## 2022-02-05 NOTE — PLAN OF CARE
Problem: Impaired Wound Healing  Goal: Optimal Wound Healing  Outcome: Ongoing, Progressing  Intervention: Promote Wound Healing  Flowsheets (Taken 2/5/2022 1132)  Oral Nutrition Promotion: pierre BID

## 2022-02-05 NOTE — PLAN OF CARE
02/05/22 0800   Patient Assessment/Suction   Level of Consciousness (AVPU) alert   Respiratory Effort Unlabored;Normal   Expansion/Accessory Muscles/Retractions no use of accessory muscles   All Lung Fields Breath Sounds clear;diminished   Rhythm/Pattern, Respiratory unlabored;pattern regular   PRE-TX-O2   O2 Device (Oxygen Therapy) nasal cannula   $ Is the patient on Low Flow Oxygen? Yes   Flow (L/min) 1.5   SpO2 99 %   Pulse Oximetry Type Intermittent   $ Pulse Oximetry - Multiple Charge Pulse Oximetry - Multiple   Pulse 88   Resp 14   Inhaler   $ Inhaler Charges MDI (Metered Dose Inahler) Treatment;Given With Spacer   Daily Review of Necessity (Inhaler) completed   Respiratory Treatment Status (Inhaler) given   Treatment Route (Inhaler) mouthpiece   Patient Position (Inhaler) semi-Norris's   Post Treatment Assessment (Inhaler) breath sounds unchanged   Signs of Intolerance (Inhaler) none   Breath Sounds Post-Respiratory Treatment   Throughout All Fields Post-Treatment All Fields   Throughout All Fields Post-Treatment no change   Post-treatment Heart Rate (beats/min) 84   Post-treatment Resp Rate (breaths/min) 20   Education   $ Education Bronchodilator   Respiratory Evaluation   $ Care Plan Tech Time 15 min   $ Eval/Re-eval Charges Re-evaluation

## 2022-02-05 NOTE — PROGRESS NOTES
"VANCOMYCIN PHARMACOKINETIC NOTE:  Vancomycin Day 3    Objective:    61 y.o., male, Actual Body Weight = 91.9 kg (202 lb 9.6 oz)    Diagnosis/Indication for Vancomycin: SSTI  Desired Vancomycin Peak:  30-35 mcg/ml; Desired Trough: 10-15 mcg/ml    Current Vancomycin Regimen:  1500 mg IV every 12 hours    Antibiotics (From admission, onward)                Start     Stop Route Frequency Ordered    02/05/22 2100  vancomycin 1.25 g in dextrose 5% 250 mL IVPB (ready to mix)        Note to Pharmacy: Ht: 5' 8" (1.727 m)  Wt: 91.9 kg (202 lb 9.6 oz)  Estimated Creatinine Clearance: 166.4 mL/min (based on SCr of 0.5 mg/dL).  Body mass index is 30.81 kg/m².    -- IV Every 12 hours (non-standard times) 02/05/22 1114    02/04/22 0846  cefepime in dextrose 5 % IVPB 2 g         -- IV Every 12 hours (non-standard times) 02/04/22 0846    02/03/22 1644  vancomycin - pharmacy to dose  (vancomycin IVPB)        "And" Linked Group Details    -- IV pharmacy to manage frequency 02/03/22 1545             The patient has the following labs:     2/5/2022 Estimated Creatinine Clearance: 118.9 mL/min (based on SCr of 0.7 mg/dL). Lab Results   Component Value Date    BUN 9 02/05/2022     Lab Results   Component Value Date    WBC 9.52 02/05/2022          Microbiology Results (last 7 days)       Procedure Component Value Units Date/Time    Aerobic culture [[id number]]  (Abnormal) Collected: 02/04/22 1626    Order Status: Completed Specimen: Wound from Leg, Right Updated: 02/05/22 0909     Aerobic Bacterial Culture STAPHYLOCOCCUS AUREUS  Many  Susceptibility pending      Narrative:      Right thigh abscess 1    Aerobic culture [[id number]]  (Abnormal) Collected: 02/04/22 0848    Order Status: Completed Specimen: Wound from Hip, Right Updated: 02/05/22 0908     Aerobic Bacterial Culture STAPHYLOCOCCUS AUREUS  Many  For susceptibility see order # [id number]      Aerobic culture [[id number]] Collected: 02/04/22 1626    Order Status: Completed Specimen: " Wound from Leg, Right Updated: 02/05/22 0902     Aerobic Bacterial Culture Insufficient incubation, culture in progress    Narrative:      Right thigh abscess 2    Aerobic culture (Specify Source) **CANNOT BE ORDERED AS STAT** [[id number]]  (Abnormal) Collected: 02/03/22 1700    Order Status: Completed Specimen: Wound from Hip, Right Updated: 02/05/22 0854     Aerobic Bacterial Culture STAPHYLOCOCCUS AUREUS  Many  Susceptibility pending      Blood culture #2 [[id number]] Collected: 02/03/22 1701    Order Status: Completed Specimen: Blood from Peripheral, Hand, Left Updated: 02/04/22 1832     Blood Culture, Routine No Growth to date      No Growth to date    Narrative:      Blood Culture #2    Blood culture #1 [[id number]] Collected: 02/03/22 1645    Order Status: Completed Specimen: Blood from Peripheral, Hand, Left Updated: 02/04/22 1832     Blood Culture, Routine No Growth to date      No Growth to date    Narrative:      Blood Culture #1    Gram stain [[id number]] Collected: 02/04/22 1626    Order Status: Sent Specimen: Wound from Leg, Right Updated: 02/04/22 1704    Fungus culture [[id number]] Collected: 02/04/22 1626    Order Status: Sent Specimen: Wound from Leg, Right Updated: 02/04/22 1703    Culture, Anaerobic [[id number]] Collected: 02/04/22 1626    Order Status: Sent Specimen: Wound from Leg, Right Updated: 02/04/22 1703    Gram stain [[id number]] Collected: 02/04/22 1626    Order Status: Sent Specimen: Wound from Leg, Right Updated: 02/04/22 1703    Fungus culture [[id number]] Collected: 02/04/22 1626    Order Status: Sent Specimen: Wound from Leg, Right Updated: 02/04/22 1703    Culture, Anaerobic [[id number]] Collected: 02/04/22 1626    Order Status: Sent Specimen: Wound from Leg, Right Updated: 02/04/22 1702    Urine culture [[id number]]  (Abnormal) Collected: 02/03/22 1701    Order Status: Completed Specimen: Urine Updated: 02/04/22 1538     Urine Culture, Routine METHICILLIN RESISTANT STAPHYLOCOCCUS  AUREUS  >100,000cfu/ml  Presumptive ID based in PBP2A, confirmation and susceptiblity pending  Results called to and read back by Zahra Carranza LPN on 1E, re: MRSA   isolated 02/04/2022 15:36 vcb      Narrative:      Specimen Source->Urine    Culture, Body Fluid (Aerobic) w/ GS [[id number]] Collected: 02/04/22 0848    Order Status: Canceled Specimen: Body Fluid from Hip, Right              Assessment:    Vancomycin Trough: 17.8 mcg/mL collected ~12 hours after Dose #3.  Vancomycin trough is above goal range of 10-15.      Plan:  Will change vancomycin to 1250 mg IV every 12 hours.  Will schedule next dose for 2/5 at 1800, approximately 18 hours since last dose.  Will obtain vancomycin trough after 3 more dose(s), prior to dose due 2/7 at 0600.    Pharmacy will continue to manage vancomycin therapy and adjust regimen as necessary.    Thank you for allowing us to participate in this patient's care.     Vianey Meza 2/5/2022 11:15 AM  Department of Pharmacy  Ext [phone]

## 2022-02-05 NOTE — PLAN OF CARE
[facility]  Initial Discharge Assessment       Primary Care Provider: Santa Barnard MD    Admission Diagnosis: Wound infection [T14.8XXA, L08.9]    Admission Date: 2/3/2022  Expected Discharge Date: 2/5/2022    Discharge Barriers Identified: (P) Nursing Home rejection,Other (see comments) (No  income)    Assessment completed with patient and significant other Clau Watts at bedside. Patient is currently bed bound at home and is mostly dependent with all ADLs. Ms. Watts is patient's primary caregiver and is no longer able to care for him at home. Patient was reportedly rejected by 4 nursing homes in December when searching for [facility] nursing home placement due to lack of income. Both patient and Ms. Watts would like assistance with finding placement. If unable to discharge to a nursing home, patient will return home and will be in need of a wheelchair. CM/SW will continue to follow for any discharge needs that may arise and assist with finding nursing home placement.     Payor: MEDICAID / Plan: [facility] / Product Type: Managed Medicaid /     Extended Emergency Contact Information  Primary Emergency Contact: Clau Watts  Mobile Phone: [phone]  Relation: Significant other   needed? No    Discharge Plan A: (P) New Nursing Home placement - [facility] care facility  Discharge Plan B: (P) Home with family      [facility] [address]  [place] - [address]  [address] [facility] [address]  Phone: [phone] Fax: [phone]    [facility] [address]  [place] [address] [facility]  [address] [facility] [address]  Phone: [phone] Fax: [phone]      Initial Assessment (most recent)       Adult Discharge Assessment - 02/05/22 1100          Discharge Assessment    Assessment Type Discharge Planning Assessment     Confirmed/corrected address, phone number and insurance Yes (P)      Confirmed Demographics Correct  on Facesheet (P)      Source of Information patient;other (see comments) (P)    Significant other Clau Watts    When was your last doctors appointment? -- (P)    unknown date - 2021    Reason For Admission Infected wound (P)      Lives With significant other (P)      Do you expect to return to your current living situation? No (P)      Do you have help at home or someone to help you manage your care at home? Yes (P)      Who are your caregiver(s) and their phone number(s)? Clau Watts - [phone] (P)      Prior to hospitilization cognitive status: Alert/Oriented;No Deficits (P)      Current cognitive status: No Deficits;Alert/Oriented (P)      Walking or Climbing Stairs Difficulty ambulation difficulty, dependent;transferring difficulty, dependent (P)      Mobility Management Reports that he stays in bed. Significant other assists with all ADLs (P)      Dressing/Bathing Difficulty bathing difficulty, assistance 1 person;dressing difficulty, assistance 1 person (P)      Dressing/Bathing Management Sig. other assists (P)      Do you have any problems with: Needs other help;Errands/Grocery (P)      Specify other help Significant other runs errands and prepares meals. (P)      Home Accessibility wheelchair accessible (P)      Home Layout Able to live on 1st floor (P)      Equipment Currently Used at Home hospital bed;oxygen;walker, rolling (P)    O2 serviced by [employer]    Readmission within 30 days? No (P)    ED visits    Patient currently being followed by outpatient case management? No (P)      Do you currently have service(s) that help you manage your care at home? No (P)      Do you take prescription medications? Yes (P)      Do you have prescription coverage? Yes (P)      Coverage Medicaid (P)      Do you have any problems affording any of your prescribed medications? No (P)      Is the patient taking medications as prescribed? yes (P)      Who is going to help you get home at discharge? Needs ambulance  transport home (P)      How do you get to doctors appointments? other (see comments) (P)    Unable to go anywhere due to current functional status. Needs ambulance transportation.    Are you on dialysis? No (P)      Do you take coumadin? No (P)      Discharge Plan A New Nursing Home placement - care home care facility (P)      Discharge Plan B Home with family (P)      DME Needed Upon Discharge  wheelchair (P)      Discharge Plan discussed with: Spouse/sig other;Patient (P)      Name(s) and Number(s) Clau Mac - [phone] (P)      Discharge Barriers Identified Nursing Home rejection;Other (see comments) (P)    No  income       Relationship/Environment    Name(s) of Who Lives With Patient Claumac Watts (P)

## 2022-02-05 NOTE — PROGRESS NOTES
"Consult Note  Infectious Disease    Reason for Consult:  Infected Right HIP    HPI: James Jiang is a 61 y.o. 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  Presented to the ER complaining of right wound surgical infection at the incision site.  He presents with severe right hip pain, with associated redness and swelling from his incision sites.  He status post ORIF of his right hip November 2021/Dr. Milian.  He did not follow as outpatient for suture removal.  Information given by significant other at bedside; patient with subjective fevers, chills and pus coming out of surgical wounds.  Patient complaining of dysuria and increased urinary frequency.  He denies headache, cough, shortness of breath, chest pain, nausea, vomiting, abdominal pain, or change in bowel movements.    In the ER, hemodynamically stable, afebrile.   Labs significant for WBC 11.8, PMN 73.5%  Glucose 283, potassium 3.3  UA grossly positive with pyuria greater than 100 many bacteria seen, urine culture with Staph aureus  Blood cultures on admission no growth to date pending final  CT pelvis with contrast showed localized right gluteal skin thickening and subcutaneous edema suggesting cellulitis, no soft tissue abscess.  ID consult for antibiotic recommendations.        02/05/2022 no new complaints,  + some right hip pain,   78   Sed Rate     2.98 4.21  CRP   Sx note reviewed:  "  Using the original incision, a scalpel was used to incise the tissue allowing for proper digital investigation of the wound. The wound had purulent drainage.  The remaining aspects of the wound were investigated for any loculations or abscesses"      Antibiotics (From admission, onward)            Start     Stop Route Frequency Ordered    02/05/22 1800  vancomycin 1.25 g in dextrose 5% 250 mL IVPB (ready to mix)        Note to Pharmacy: Ht: 5' 8" (1.727 m)  Wt: 91.9 kg (202 lb 9.6 " "oz)  Estimated Creatinine Clearance: 166.4 mL/min (based on SCr of 0.5 mg/dL).  Body mass index is 30.81 kg/m².    -- IV Every 12 hours (non-standard times) 02/05/22 1114    02/04/22 0846  cefepime in dextrose 5 % IVPB 2 g         -- IV Every 12 hours (non-standard times) 02/04/22 0846    02/03/22 1644  vancomycin - pharmacy to dose  (vancomycin IVPB)        "And" Linked Group Details    -- IV pharmacy to manage frequency 02/03/22 1545        Antifungals (From admission, onward)            None        Antivirals (From admission, onward)    None          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    Hypertension      Past Surgical History:   Procedure Laterality Date    denies pertinent surgical history      INTRAMEDULLARY RODDING OF FEMUR Right 11/7/2021    Procedure: INSERTION, INTRAMEDULLARY HENNA, FEMUR;  Surgeon: Matt Milian MD;  Location: [facility] OR;  Service: Orthopedics;  Laterality: Right;    TOE AMPUTATION Right 10/12/2021    Procedure: AMPUTATION, TOE;  Surgeon: Milton Lauren DPM;  Location: [facility] OR;  Service: Podiatry;  Laterality: Right;       Pertinent medications noted:     Review of Systems:   As mentioned on HPI    EXAM & DIAGNOSTICS REVIEWED:   Vitals:     Temp:  [97.7 °F (36.5 °C)-98.7 °F (37.1 °C)]   Temp: 98.7 °F (37.1 °C) (02/05/22 0815)  Pulse: 82 (02/05/22 0815)  Resp: 18 (02/05/22 1059)  BP: 93/68 (02/05/22 0815)  SpO2: 97 % (02/05/22 0815)    Intake/Output Summary (Last 24 hours) at 2/5/2022 1120  Last data filed at 2/5/2022 0458  Gross per 24 hour   Intake 900 ml   Output 3200 ml   Net -2300 ml       General:  In NAD. Alert and attentive, cooperative, comfortable on O2 by nasal cannula 2 L  Eyes:  Anicteric, PERRL  ENT:  No ulcers, exudates, thrush, nares patent, " edentulous  Neck:  Supple, no adenopathy appreciated  Lungs: Clear to auscultation b/l  Heart:  S1/S2+, regular rhythm, no murmurs  Abd:  +BS, soft, non tender, non distended, no rebound  :  Donis, cloudy urine  Musc:  Right hip with pus coming out of the right hip, cultures taken     Other joints without effusion, swelling, erythema, synovitis  Skin:  Warm, no rash  Wound:   Neuro: Following commands, no acute focal deficit   Psych:  Calm, cooperative  Lymphatic:     No cervical, supraclavicular nodes  Extrem: No LE edema b/l    S/p right great toe amputation, redness noted on 3rd toe  Left dorsalis pedis is present' Right dorsalis pedis is missing, foot is warm  VAD:         Isolation:   02/05-- dressing is not disturbed, the area around is healthy  2/3:                  General Labs reviewed:  Recent Labs   Lab 02/03/22  1700 02/04/22  0543 02/05/22  0558   WBC 11.88 12.49 9.52   HGB 12.3* 11.2* 11.0*   HCT 40.9 36.5* 37.7*    374 413       Recent Labs   Lab 02/03/22  1700 02/04/22  0543 02/05/22  0558    137 137   K 3.3* 3.4* 3.5   CL 98 102 97   CO2 25 28 31*   BUN 8 6* 9   CREATININE 0.5 0.5 0.7   CALCIUM 9.1 8.7 8.6*   PROT 7.2  --   --    BILITOT 0.3  --   --    ALKPHOS 112  --   --    ALT 11  --   --    AST 12  --   --      Recent Labs   Lab 02/04/22  1256 02/05/22  0558   CRP 2.98* 4.21*     Recent Labs   Lab 02/04/22  1256   SEDRATE 78*   Urinalysis Microscopic     Status: Final result    Component Ref Range & Units 02/03/22 1701   RBC, UA 0 - 4 /hpf 12 High     WBC, UA 0 - 5 /hpf >100 High     Bacteria None-Occ /hpf Many Abnormal     Yeast, UA None None    Squam Epithel, UA /hpf 1    Hyaline Casts, UA 0-1/lpf /lpf 127 Abnormal     Microscopic Comment  SEE COMMENT             Estimated Creatinine Clearance: 118.9 mL/min (based on SCr of 0.7 mg/dL).     Micro:  Microbiology Results (last 7 days)     Procedure Component Value Units Date/Time    Aerobic culture [[id number]]  (Abnormal)  Collected: 02/04/22 1626    Order Status: Completed Specimen: Wound from Leg, Right Updated: 02/05/22 0909     Aerobic Bacterial Culture STAPHYLOCOCCUS AUREUS  Many  Susceptibility pending      Narrative:      Right thigh abscess 1    Aerobic culture [[id number]]  (Abnormal) Collected: 02/04/22 0848    Order Status: Completed Specimen: Wound from Hip, Right Updated: 02/05/22 0908     Aerobic Bacterial Culture STAPHYLOCOCCUS AUREUS  Many  For susceptibility see order # [id number]      Aerobic culture [[id number]] Collected: 02/04/22 1626    Order Status: Completed Specimen: Wound from Leg, Right Updated: 02/05/22 0902     Aerobic Bacterial Culture Insufficient incubation, culture in progress    Narrative:      Right thigh abscess 2    Aerobic culture (Specify Source) **CANNOT BE ORDERED AS STAT** [[id number]]  (Abnormal) Collected: 02/03/22 1700    Order Status: Completed Specimen: Wound from Hip, Right Updated: 02/05/22 0854     Aerobic Bacterial Culture STAPHYLOCOCCUS AUREUS  Many  Susceptibility pending      Blood culture #2 [[id number]] Collected: 02/03/22 1701    Order Status: Completed Specimen: Blood from Peripheral, Hand, Left Updated: 02/04/22 1832     Blood Culture, Routine No Growth to date      No Growth to date    Narrative:      Blood Culture #2    Blood culture #1 [[id number]] Collected: 02/03/22 1645    Order Status: Completed Specimen: Blood from Peripheral, Hand, Left Updated: 02/04/22 1832     Blood Culture, Routine No Growth to date      No Growth to date    Narrative:      Blood Culture #1    Gram stain [[id number]] Collected: 02/04/22 1626    Order Status: Sent Specimen: Wound from Leg, Right Updated: 02/04/22 1704    Fungus culture [[id number]] Collected: 02/04/22 1626    Order Status: Sent Specimen: Wound from Leg, Right Updated: 02/04/22 1703    Culture, Anaerobic [[id number]] Collected: 02/04/22 1626    Order Status: Sent Specimen: Wound from Leg, Right Updated: 02/04/22 1703    Gram stain  [[id number]] Collected: 02/04/22 1626    Order Status: Sent Specimen: Wound from Leg, Right Updated: 02/04/22 1703    Fungus culture [[id number]] Collected: 02/04/22 1626    Order Status: Sent Specimen: Wound from Leg, Right Updated: 02/04/22 1703    Culture, Anaerobic [[id number]] Collected: 02/04/22 1626    Order Status: Sent Specimen: Wound from Leg, Right Updated: 02/04/22 1702    Urine culture [[id number]]  (Abnormal) Collected: 02/03/22 1701    Order Status: Completed Specimen: Urine Updated: 02/04/22 1538     Urine Culture, Routine METHICILLIN RESISTANT STAPHYLOCOCCUS AUREUS  >100,000cfu/ml  Presumptive ID based in PBP2A, confirmation and susceptiblity pending  Results called to and read back by Zahra Carranza LPN on 1E, re: MRSA   isolated 02/04/2022 15:36 vcb      Narrative:      Specimen Source->Urine    Culture, Body Fluid (Aerobic) w/ GS [[id number]] Collected: 02/04/22 0848    Order Status: Canceled Specimen: Body Fluid from Hip, Right           Imaging Reviewed:  CT pelvis  ART US  Elevated velocities within the right common femoral artery compatible with high-grade luminal stenosis.  Elevated systolic velocities within the proximal left superficial femoral artery suggesting hemodynamically significant narrowing.  Scattered bilateral atheromatous changes.    US   4 cm solid left renal mass compatible with primary renal neoplasm.  Probable nonobstructing left upper pole renal calculus.  Additional observations as above.    Cardiology:       IMPRESSION & PLAN     1.  Severe cellulitis/abscess, right hip in the setting of right hip replacement ORIF November 2021/Dr. Milian   Cultures from right hip taken at bedside---SA. Ortho feels it is just wound infection    2.  MRSA UTI   Blood cultures x 2 no growth to date, pending final     3.  3rd toe antd right 1st MT  Wound ; not healing wel  4. PAD right worse than left  5. Poorly controlled diabetes  6.  AFib, HTN, COPD, depression/anxiety  7.  Smoker  8. 4 cm  solid left renal mass compatible with primary renal neoplasm.  High risk for deterioration    Recommendations:  Adequate source control: s/p I&D and washout of right hip surgical wound 02/04  On vanc and cefepime  Follow cultures; if only MRSA, then will dc cefepime  CTA of LE  Vascular surgery  PT ot  Need urology follow up as OP for left renal mass  Will follow    Medical Decision Making during this encounter was  [_] Low Complexity  [_] Moderate Complexity  [xx] High Complexity

## 2022-02-05 NOTE — PLAN OF CARE
Problem: Infection  Goal: Absence of Infection Signs and Symptoms  Outcome: Ongoing, Progressing     Problem: Adult Inpatient Plan of Care  Goal: Plan of Care Review  Outcome: Ongoing, Progressing     Problem: Nutrition Impaired (Sepsis/Septic Shock)  Goal: Optimal Nutrition Intake  Outcome: Ongoing, Progressing     Problem: Fall Injury Risk  Goal: Absence of Fall and Fall-Related Injury  Outcome: Ongoing, Progressing

## 2022-02-06 ENCOUNTER — TELEPHONE (OUTPATIENT)
Dept: HEMATOLOGY/ONCOLOGY | Facility: HOSPITAL | Age: 62
End: 2022-02-06
Payer: MEDICAID

## 2022-02-06 LAB
ANION GAP SERPL CALC-SCNC: 8 MMOL/L (ref 8–16)
BACTERIA SPEC AEROBE CULT: ABNORMAL
BASOPHILS # BLD AUTO: 0.08 K/UL (ref 0–0.2)
BASOPHILS NFR BLD: 0.8 % (ref 0–1.9)
BUN SERPL-MCNC: 10 MG/DL (ref 8–23)
CALCIUM SERPL-MCNC: 8.8 MG/DL (ref 8.7–10.5)
CHLORIDE SERPL-SCNC: 95 MMOL/L (ref 95–110)
CO2 SERPL-SCNC: 34 MMOL/L (ref 23–29)
CREAT SERPL-MCNC: 0.6 MG/DL (ref 0.5–1.4)
DIFFERENTIAL METHOD: ABNORMAL
EOSINOPHIL # BLD AUTO: 0.4 K/UL (ref 0–0.5)
EOSINOPHIL NFR BLD: 4.3 % (ref 0–8)
ERYTHROCYTE [DISTWIDTH] IN BLOOD BY AUTOMATED COUNT: 14.8 % (ref 11.5–14.5)
EST. GFR  (AFRICAN AMERICAN): >60 ML/MIN/1.73 M^2
EST. GFR  (NON AFRICAN AMERICAN): >60 ML/MIN/1.73 M^2
GLUCOSE SERPL-MCNC: 204 MG/DL (ref 70–110)
GLUCOSE SERPL-MCNC: 216 MG/DL (ref 70–110)
GLUCOSE SERPL-MCNC: 216 MG/DL (ref 70–110)
GLUCOSE SERPL-MCNC: 231 MG/DL (ref 70–110)
GLUCOSE SERPL-MCNC: 233 MG/DL (ref 70–110)
GLUCOSE SERPL-MCNC: 283 MG/DL (ref 70–110)
HCT VFR BLD AUTO: 36.3 % (ref 40–54)
HGB BLD-MCNC: 10.9 G/DL (ref 14–18)
IMM GRANULOCYTES # BLD AUTO: 0.06 K/UL (ref 0–0.04)
IMM GRANULOCYTES NFR BLD AUTO: 0.6 % (ref 0–0.5)
LYMPHOCYTES # BLD AUTO: 1.9 K/UL (ref 1–4.8)
LYMPHOCYTES NFR BLD: 20.1 % (ref 18–48)
MAGNESIUM SERPL-MCNC: 1.8 MG/DL (ref 1.6–2.6)
MCH RBC QN AUTO: 26.4 PG (ref 27–31)
MCHC RBC AUTO-ENTMCNC: 30 G/DL (ref 32–36)
MCV RBC AUTO: 88 FL (ref 82–98)
MONOCYTES # BLD AUTO: 0.8 K/UL (ref 0.3–1)
MONOCYTES NFR BLD: 8.6 % (ref 4–15)
NEUTROPHILS # BLD AUTO: 6.3 K/UL (ref 1.8–7.7)
NEUTROPHILS NFR BLD: 65.6 % (ref 38–73)
NRBC BLD-RTO: 0 /100 WBC
PLATELET # BLD AUTO: 401 K/UL (ref 150–450)
PMV BLD AUTO: 9.4 FL (ref 9.2–12.9)
POTASSIUM SERPL-SCNC: 3.6 MMOL/L (ref 3.5–5.1)
RBC # BLD AUTO: 4.13 M/UL (ref 4.6–6.2)
SODIUM SERPL-SCNC: 137 MMOL/L (ref 136–145)
WBC # BLD AUTO: 9.57 K/UL (ref 3.9–12.7)

## 2022-02-06 PROCEDURE — 99900035 HC TECH TIME PER 15 MIN (STAT)

## 2022-02-06 PROCEDURE — 99233 PR SUBSEQUENT HOSPITAL CARE,LEVL III: ICD-10-PCS | Mod: ,,, | Performed by: INTERNAL MEDICINE

## 2022-02-06 PROCEDURE — 94761 N-INVAS EAR/PLS OXIMETRY MLT: CPT

## 2022-02-06 PROCEDURE — 97530 THERAPEUTIC ACTIVITIES: CPT

## 2022-02-06 PROCEDURE — 36415 COLL VENOUS BLD VENIPUNCTURE: CPT | Performed by: FAMILY MEDICINE

## 2022-02-06 PROCEDURE — 94640 AIRWAY INHALATION TREATMENT: CPT

## 2022-02-06 PROCEDURE — 63600175 PHARM REV CODE 636 W HCPCS: Performed by: INTERNAL MEDICINE

## 2022-02-06 PROCEDURE — 25500020 PHARM REV CODE 255: Performed by: INTERNAL MEDICINE

## 2022-02-06 PROCEDURE — 99223 1ST HOSP IP/OBS HIGH 75: CPT | Mod: ,,, | Performed by: INTERNAL MEDICINE

## 2022-02-06 PROCEDURE — 99223 PR INITIAL HOSPITAL CARE,LEVL III: ICD-10-PCS | Mod: ,,, | Performed by: INTERNAL MEDICINE

## 2022-02-06 PROCEDURE — 25000003 PHARM REV CODE 250: Performed by: INTERNAL MEDICINE

## 2022-02-06 PROCEDURE — 25000003 PHARM REV CODE 250: Performed by: NURSE PRACTITIONER

## 2022-02-06 PROCEDURE — 99233 SBSQ HOSP IP/OBS HIGH 50: CPT | Mod: ,,, | Performed by: INTERNAL MEDICINE

## 2022-02-06 PROCEDURE — 94799 UNLISTED PULMONARY SVC/PX: CPT

## 2022-02-06 PROCEDURE — 97162 PT EVAL MOD COMPLEX 30 MIN: CPT

## 2022-02-06 PROCEDURE — 12000002 HC ACUTE/MED SURGE SEMI-PRIVATE ROOM

## 2022-02-06 PROCEDURE — 25000003 PHARM REV CODE 250: Performed by: FAMILY MEDICINE

## 2022-02-06 PROCEDURE — 83735 ASSAY OF MAGNESIUM: CPT | Performed by: FAMILY MEDICINE

## 2022-02-06 PROCEDURE — 85025 COMPLETE CBC W/AUTO DIFF WBC: CPT | Performed by: FAMILY MEDICINE

## 2022-02-06 PROCEDURE — 63600175 PHARM REV CODE 636 W HCPCS: Performed by: FAMILY MEDICINE

## 2022-02-06 PROCEDURE — 99900031 HC PATIENT EDUCATION (STAT)

## 2022-02-06 PROCEDURE — 80048 BASIC METABOLIC PNL TOTAL CA: CPT | Performed by: FAMILY MEDICINE

## 2022-02-06 PROCEDURE — 27000221 HC OXYGEN, UP TO 24 HOURS

## 2022-02-06 RX ORDER — INSULIN ASPART 100 [IU]/ML
16 INJECTION, SOLUTION INTRAVENOUS; SUBCUTANEOUS
Status: DISCONTINUED | OUTPATIENT
Start: 2022-02-06 | End: 2022-02-11 | Stop reason: HOSPADM

## 2022-02-06 RX ORDER — SODIUM CHLORIDE 9 MG/ML
INJECTION, SOLUTION INTRAVENOUS CONTINUOUS
Status: DISCONTINUED | OUTPATIENT
Start: 2022-02-07 | End: 2022-02-07

## 2022-02-06 RX ORDER — AMOXICILLIN 250 MG
2 CAPSULE ORAL 2 TIMES DAILY
Status: DISCONTINUED | OUTPATIENT
Start: 2022-02-06 | End: 2022-02-11 | Stop reason: HOSPADM

## 2022-02-06 RX ADMIN — ATORVASTATIN CALCIUM 20 MG: 20 TABLET, FILM COATED ORAL at 08:02

## 2022-02-06 RX ADMIN — IPRATROPIUM BROMIDE 1 PUFF: 17 AEROSOL, METERED RESPIRATORY (INHALATION) at 08:02

## 2022-02-06 RX ADMIN — INSULIN DETEMIR 50 UNITS: 100 INJECTION, SOLUTION SUBCUTANEOUS at 08:02

## 2022-02-06 RX ADMIN — FLUTICASONE FUROATE AND VILANTEROL TRIFENATATE 1 PUFF: 200; 25 POWDER RESPIRATORY (INHALATION) at 08:02

## 2022-02-06 RX ADMIN — SENNOSIDES AND DOCUSATE SODIUM 2 TABLET: 8.6; 5 TABLET ORAL at 08:02

## 2022-02-06 RX ADMIN — ALPRAZOLAM 0.5 MG: 0.5 TABLET ORAL at 11:02

## 2022-02-06 RX ADMIN — SODIUM CHLORIDE: 0.9 INJECTION, SOLUTION INTRAVENOUS at 11:02

## 2022-02-06 RX ADMIN — ASPIRIN 81 MG 162 MG: 81 TABLET ORAL at 08:02

## 2022-02-06 RX ADMIN — TAMSULOSIN HYDROCHLORIDE 0.4 MG: 0.4 CAPSULE ORAL at 08:02

## 2022-02-06 RX ADMIN — HYDROCODONE BITARTRATE AND ACETAMINOPHEN 1 TABLET: 5; 325 TABLET ORAL at 03:02

## 2022-02-06 RX ADMIN — APIXABAN 10 MG: 5 TABLET, FILM COATED ORAL at 08:02

## 2022-02-06 RX ADMIN — ALPRAZOLAM 0.5 MG: 0.5 TABLET ORAL at 08:02

## 2022-02-06 RX ADMIN — FLUOXETINE 10 MG: 10 CAPSULE ORAL at 08:02

## 2022-02-06 RX ADMIN — FUROSEMIDE 20 MG: 20 TABLET ORAL at 08:02

## 2022-02-06 RX ADMIN — HYDROCODONE BITARTRATE AND ACETAMINOPHEN 1 TABLET: 5; 325 TABLET ORAL at 09:02

## 2022-02-06 RX ADMIN — IOHEXOL 100 ML: 350 INJECTION, SOLUTION INTRAVENOUS at 09:02

## 2022-02-06 RX ADMIN — VANCOMYCIN HYDROCHLORIDE 1250 MG: 1.25 INJECTION, POWDER, LYOPHILIZED, FOR SOLUTION INTRAVENOUS at 05:02

## 2022-02-06 RX ADMIN — INSULIN ASPART 2 UNITS: 100 INJECTION, SOLUTION INTRAVENOUS; SUBCUTANEOUS at 11:02

## 2022-02-06 RX ADMIN — VANCOMYCIN HYDROCHLORIDE 1250 MG: 1.25 INJECTION, POWDER, LYOPHILIZED, FOR SOLUTION INTRAVENOUS at 06:02

## 2022-02-06 RX ADMIN — CEFEPIME HYDROCHLORIDE 2 G: 2 INJECTION, SOLUTION INTRAVENOUS at 08:02

## 2022-02-06 RX ADMIN — INSULIN ASPART 1 UNITS: 100 INJECTION, SOLUTION INTRAVENOUS; SUBCUTANEOUS at 11:02

## 2022-02-06 RX ADMIN — INSULIN ASPART 16 UNITS: 100 INJECTION, SOLUTION INTRAVENOUS; SUBCUTANEOUS at 04:02

## 2022-02-06 NOTE — TELEPHONE ENCOUNTER
Patient was seen in the hospital over the weekend.  He has right leg MRSA, multiple pulmonary emboli, left renal 4.2 cm mass.    He is on Eliquis b.i.d..    Return to clinic see me in 2 or 3 weeks in the office

## 2022-02-06 NOTE — PT/OT/SLP EVAL
Physical Therapy Evaluation    Patient Name:  James Jiang   MRN:  [id number]    Recommendations:     Discharge Recommendations:  nursing facility, skilled   Discharge Equipment Recommendations: other (see comments) (TBD at next level of care)   Barriers to discharge: Decreased caregiver support    Assessment:     James Jiang is a 61 y.o. male admitted with a medical diagnosis of Infected wound.  He presents with the following impairments/functional limitations:  weakness,impaired endurance,impaired sensation,impaired self care skills,impaired functional mobilty,gait instability,decreased lower extremity function,impaired cardiopulmonary response to activity Pt found supine in bed and has just returned from CT scan.  States heis drowsy from medication prior to procedure,but he is agreeable to working with PT. Pt A & O x  3 and has the following co-morbidities: cellulitis, PAD, DM, A-fib, HTN, COPD, depression/anxiety.  Pt tolerated session fair and required modA with bed mobiltiy for safe mobilization during session today. He declined transfers or OOB activity due to feeling drowsy. Pt would benefit from acute PT during hospitalization to increase strength, endurance and safety with mobility and would benefit from therex, therapeutic activity, progress with pre-gait/gait as able prior to discharge home.  .    Rehab Prognosis: Fair; patient would benefit from acute skilled PT services to address these deficits and reach maximum level of function.    Recent Surgery: Procedure(s) (LRB):  IRRIGATION AND DEBRIDEMENT (Right) 2 Days Post-Op    Plan:     During this hospitalization, patient to be seen 5 x/week to address the identified rehab impairments via gait training,therapeutic activities,therapeutic exercises and progress toward the following goals:    · Plan of Care Expires:  03/06/22    Subjective     Chief Complaint: weakness  Patient/Family Comments/goals: get stronger and go to rehab to get  better  Pain/Comfort:  ·      Patients cultural, spiritual, Yazidi conflicts given the current situation:      Living Environment:  Pt lives with girlfriend who is able to assist intermittantly, but she works outside the home and is not available throughout the day.   Prior to admission, patients level of function was needed assist with all mobitliy and ADLs.  Equipment used at home: walker, rolling,hospital bed,oxygen.  DME owned (not currently used): none.  Upon discharge, patient will have limited assistance from girlfriend.    Objective:     Communicated with RN prior to session.  Patient found supine with telemetry,blanco catheter,peripheral IV  upon PT entry to room.    General Precautions: Standard, fall,contact (h/o MRSA)   Orthopedic Precautions:    Braces:    Respiratory Status: Nasal cannula, flow 2 L/min    Exams:  · RLE Strength: grossly 3/5  · LLE Strength: grossly 4-/5    Functional Mobility:  · Bed Mobility:  Rolling Left:  moderate assistance  · Rolling Right: moderate assistance  · Supine to Sit: minimum assistance  · Sit to Supine: moderate assistance    Therapeutic Activities and Exercises:   Pt was educated on the following: call light use, importance of OOB activity and functional mobility to negate the negative effects of prolonged bed rest during this hospitalization, safe transfers/ambulation and discharge planning recommendations/options.      AM-PAC 6 CLICK MOBILITY  Total Score:8     Patient left supine with all lines intact, call button in reach, RN notified and RN present.    GOALS:   Multidisciplinary Problems     Physical Therapy Goals        Problem: Physical Therapy Goal    Goal Priority Disciplines Outcome Goal Variances Interventions   Physical Therapy Goal     PT, PT/OT      Description: All PT goals to be met by discharge:    1. Supine to sit with Stand-by Assistance  2. Sit to stand transfer with Stand-by Assistance  3.. Bed to chair transfer with Supervision using Rolling  Walker  4. Scooting left and right while sitting EOB supervision                      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    Hypertension        Past Surgical History:   Procedure Laterality Date    denies pertinent surgical history      INTRAMEDULLARY RODDING OF FEMUR Right 11/7/2021    Procedure: INSERTION, INTRAMEDULLARY HENNA, FEMUR;  Surgeon: Matt Milian MD;  Location: [facility];  Service: Orthopedics;  Laterality: Right;    TOE AMPUTATION Right 10/12/2021    Procedure: AMPUTATION, TOE;  Surgeon: Milton Lauren DPM;  Location: [facility];  Service: Podiatry;  Laterality: Right;       Time Tracking:     PT Received On: 02/06/22  PT Start Time: 0934     PT Stop Time: 1008  PT Total Time (min): 34 min     Billable Minutes: Evaluation 10 and Therapeutic Activity 24      02/06/2022

## 2022-02-06 NOTE — PLAN OF CARE
02/06/22 0836   Patient Assessment/Suction   Level of Consciousness (AVPU) alert   Respiratory Effort Unlabored;Normal   Expansion/Accessory Muscles/Retractions no use of accessory muscles   All Lung Fields Breath Sounds clear   Rhythm/Pattern, Respiratory unlabored;pattern regular;depth regular   Cough Frequency no cough   PRE-TX-O2   O2 Device (Oxygen Therapy) nasal cannula   Flow (L/min) 2   SpO2 97 %   Pulse Oximetry Type Intermittent   $ Pulse Oximetry - Multiple Charge Pulse Oximetry - Multiple   Pulse 77   Resp 16   Inhaler   $ Inhaler Charges MDI (Metered Dose Inahler) Treatment   Daily Review of Necessity (Inhaler) completed   Respiratory Treatment Status (Inhaler) given   Treatment Route (Inhaler) mouthpiece   Patient Position (Inhaler) semi-Norris's   Post Treatment Assessment (Inhaler) breath sounds improved   Signs of Intolerance (Inhaler) none   Breath Sounds Post-Respiratory Treatment   Throughout All Fields Post-Treatment All Fields   Throughout All Fields Post-Treatment aeration increased   Post-treatment Heart Rate (beats/min) 88   Post-treatment Resp Rate (breaths/min) 16   Education   $ Education Bronchodilator;15 min   Respiratory Evaluation   $ Care Plan Tech Time 15 min   $ Eval/Re-eval Charges Re-evaluation   Evaluation For Re-Eval 3 day

## 2022-02-06 NOTE — CONSULTS
Hematology/Oncology  Inpatient Consult Note  Patient Name: James Jiang  MRN: [id number]  Admission Date: 2/3/2022  Hospital Length of Stay: 3 days  Code Status: Full Code   Attending Provider: Walt Scales MD  Referring Provider:Dr. Scales  Consulting Provider: BRENT Mccarty MD  Primary Care Physician: Santa Barnard MD  Principal Problem:Infected wound    Consults  2/6/22 Ortiz Mccarty MD  Subjective:     Chief Complaint: Wound Infection (Right hip surgery incision site)        History Present Illness:  61 y.o. male with history of right hip surgery in November 2021. He is admitted with a cellulitis or soft tissue infection involving the right hip sutures site.  While in the hospital he had a CT scan which showed a 4.2 cm renal mass suspicious for renal cancer he has been seen per Dr. Rodriguez of Urology.    He has history of diabetes, ketoacidosis, gangrene of the toe, hypertension.  He has peripheral vascular disease.    Status post intramedullary henna placement at the right femur in November 7, 2021. Status post toe amputation at the right foot October 12, 2021.    He does not have allergies to medications.  He is a former smoker.  He does have an alcohol history but not presently.          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    Hypertension      Past Surgical History:   Procedure Laterality Date    denies pertinent surgical history      INTRAMEDULLARY RODDING OF FEMUR Right 11/7/2021    Procedure: INSERTION, INTRAMEDULLARY HENNA, FEMUR;  Surgeon: Matt Milian MD;  Location: [facility] OR;  Service: Orthopedics;  Laterality: Right;    TOE AMPUTATION Right 10/12/2021    Procedure: AMPUTATION, TOE;  Surgeon: Milton Lauren DPM;  Location: [facility] OR;  Service: Podiatry;  Laterality: Right;        Allergies:  Review of patient's allergies indicates:  No Known Allergies    Social/Family History:  Social History     Socioeconomic History    Marital status:    Tobacco Use    Smoking status: Former Smoker    Smokeless tobacco: Never Used   Substance and Sexual Activity    Alcohol use: Not Currently    Drug use: Not Currently     Family History   Problem Relation Age of Onset    Hypertension Father        ROS:    GEN: normal without any fever, night sweats or weight loss  HEENT: normal with no HA's, sore throat, stiff neck, changes in vision  CV: normal with no CP, SOB, PND, MIRANDA or orthopnea  PULM: normal with no SOB, cough, hemoptysis, sputum or pleuritic pain  GI: normal with no abdominal pain, nausea, vomiting, constipation, diarrhea, melanotic stools, BRBPR, or hematemesis  : normal with no hematuria, dysuria  BREAST: normal with no mass, discharge, pain  SKIN:  See history of present illness        Medications:  Continuous Infusions:   [START ON 2/7/2022] sodium chloride 0.9%       Scheduled Meds:   aspirin  162 mg Oral Daily    atorvastatin  20 mg Oral Daily    FLUoxetine  10 mg Oral Daily    fluticasone furoate-vilanteroL  1 puff Inhalation Daily    furosemide  20 mg Oral Daily    insulin aspart U-100  16 Units Subcutaneous TIDWM    insulin detemir U-100  50 Units Subcutaneous Daily    ipratropium  1 puff Inhalation Daily    midodrine  5 mg Oral Q8H    senna-docusate 8.6-50 mg  2 tablet Oral BID    tamsulosin  0.4 mg Oral Daily    vancomycin (VANCOCIN) IVPB  1,250 mg Intravenous Q12H     PRN Meds:acetaminophen, albuterol sulfate, ALPRAZolam, dextrose 50%, glucagon (human recombinant), HYDROcodone-acetaminophen, insulin aspart U-100, melatonin, morphine, ondansetron, sodium chloride 0.9%, sodium chloride 0.9%, Pharmacy to dose Vancomycin consult **AND** vancomycin - pharmacy to dose     Objective:     Vitals:  Blood pressure 116/76, pulse 80, temperature 97.9 °F (36.6 °C),  "temperature source Oral, resp. rate 17, height 5' 8" (1.727 m), weight 91.9 kg (202 lb 9.6 oz), SpO2 96 %.    Physical Exam:  GEN: no apparent distress, comfortable  HEAD: atraumatic and normocephalic  EYES: no pallor, no icterus  ENT:  no pharyngeal erythema, external ears WNL; no nasal discharge  NECK: no masses, thyroid normal, trachea midline, no LAD/LN's, supple  CV: RRR with no murmur; normal pulse; normal S1 and S2; no pedal edema  CHEST: Normal respiratory effort; CTAB; normal breath sounds; no wheeze or crackles  ABDOM: nontender and nondistended; soft  no rebound/guarding, liver and spleen are not palpable  MUSC/Skeletal: ROM normal; no crepitus; joints normal  EXTREM: no clubbing, cyanosis, inflammation or swelling  SKIN:  The dressing sites at the right hip was not removed as part of the examination  NEURO: grossly intact; motor/sensory WNL;  no tremors  PSYCH: normal mood, affect and behavior  LYMPH: normal cervical, supraclavicular, axillary LN's    Creatinine is 0.6, hemoglobin is 10.9,  platelet count is 209,000,   WBC 10,000  The right leg wound culture is growing MRSA    CT scan show multiple small pulmonary emboli, peripheral vascular disease is noted.  4.2 cm left renal mass is noted.      Assessment/Plan:     (1) 61 y.o. male with MRSA cellulitis at the left hip, on antibiotics per Dr. Reynolds.    (2) multiple small pulmonary emboli bilaterally on CTA.  Covered with Eliquis 10 mg p.o. b.i.d. x7 days and then 5 mg p.o. b.i.d. thereafter.    (3) left renal mass suspicious for renal cancer.  Has been seen per Dr. Rodriguez of .  After the hip skin soft tissue infection has been healed, he will probably have surgery for the management of the suspected renal carcinoma.  I anticipate that Eliquis will be interrupted around the time of the surgery and he will be covered with a Lovenox bridge.    (4) mild anemia secondary to chronic disease.    Return to clinic see me outpatient in 2 or 3 " weeks.      Active Diagnoses:    Diagnosis Date Noted POA    PRINCIPAL PROBLEM:  Infected wound [T14.8XXA, L08.9] 02/03/2022 Yes    Paroxysmal atrial fibrillation [I48.0] 11/08/2021 Yes     Chronic    COPD (chronic obstructive pulmonary disease) [J44.9] 11/06/2021 Yes    Type 2 diabetes mellitus with hyperglycemia [E11.65]  Yes      Problems Resolved During this Admission:       BRENT Mccarty MD  Hematology/Oncology  [facility]  2/6/22

## 2022-02-06 NOTE — PLAN OF CARE
Problem: Infection  Goal: Absence of Infection Signs and Symptoms  Outcome: Ongoing, Progressing     Problem: Adult Inpatient Plan of Care  Goal: Plan of Care Review  Outcome: Ongoing, Progressing  Goal: Patient-Specific Goal (Individualized)  Outcome: Ongoing, Progressing  Goal: Absence of Hospital-Acquired Illness or Injury  Outcome: Ongoing, Progressing  Goal: Optimal Comfort and Wellbeing  Outcome: Ongoing, Progressing  Goal: Readiness for Transition of Care  Outcome: Ongoing, Progressing     Problem: Diabetes Comorbidity  Goal: Blood Glucose Level Within Targeted Range  Outcome: Ongoing, Progressing     Problem: Adjustment to Illness (Sepsis/Septic Shock)  Goal: Optimal Coping  Outcome: Ongoing, Progressing     Problem: Bleeding (Sepsis/Septic Shock)  Goal: Absence of Bleeding  Outcome: Ongoing, Progressing     Problem: Glycemic Control Impaired (Sepsis/Septic Shock)  Goal: Blood Glucose Level Within Desired Range  Outcome: Ongoing, Progressing     Problem: Infection Progression (Sepsis/Septic Shock)  Goal: Absence of Infection Signs and Symptoms  Outcome: Ongoing, Progressing     Problem: Nutrition Impaired (Sepsis/Septic Shock)  Goal: Optimal Nutrition Intake  Outcome: Ongoing, Progressing     Problem: Fluid Imbalance (Pneumonia)  Goal: Fluid Balance  Outcome: Ongoing, Progressing     Problem: Infection (Pneumonia)  Goal: Resolution of Infection Signs and Symptoms  Outcome: Ongoing, Progressing     Problem: Respiratory Compromise (Pneumonia)  Goal: Effective Oxygenation and Ventilation  Outcome: Ongoing, Progressing     Problem: Impaired Wound Healing  Goal: Optimal Wound Healing  Outcome: Ongoing, Progressing     Problem: Skin Injury Risk Increased  Goal: Skin Health and Integrity  Outcome: Ongoing, Progressing     Problem: Fall Injury Risk  Goal: Absence of Fall and Fall-Related Injury  Outcome: Ongoing, Progressing

## 2022-02-06 NOTE — CARE UPDATE
Consult received for incidentally discovered 5 cm left renal mass.    Patient is currently admitted for infected wound following right hip surgery in November. He failed to follow up for suture removal. He has undergone irrigation and debridement of the wound.     Noted to have a UTI with culture growing MRSA. He underwent a renal US which noted a solid left renal mass.   He then underwent CTA which has confirmed a 4.7 cm renal mass concerning for malignancy. He is also noted to have severe PVD.     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 His hemoglobin A1c is 11.     Plan:  - No acute inpatient intervention needed. Needs to have other more acute issues addressed.   - Will schedule patient for outpatient follow up on 2/14/22 for discussion of his renal mass   - Patient is very high risk for surgery given severe comorbidities and will need to be medically optimized prior to any intervention       Raegan Rodriguez MD  Urology Department

## 2022-02-06 NOTE — PROGRESS NOTES
"Consult Note  Infectious Disease    Reason for Consult:  Infected Right HIP    HPI: James Jiang is a 61 y.o. 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  Presented to the ER complaining of right wound surgical infection at the incision site.  He presents with severe right hip pain, with associated redness and swelling from his incision sites.  He status post ORIF of his right hip November 2021/Dr. Milian.  He did not follow as outpatient for suture removal.  Information given by significant other at bedside; patient with subjective fevers, chills and pus coming out of surgical wounds.  Patient complaining of dysuria and increased urinary frequency.  He denies headache, cough, shortness of breath, chest pain, nausea, vomiting, abdominal pain, or change in bowel movements.    In the ER, hemodynamically stable, afebrile.   Labs significant for WBC 11.8, PMN 73.5%  Glucose 283, potassium 3.3  UA grossly positive with pyuria greater than 100 many bacteria seen, urine culture with Staph aureus  Blood cultures on admission no growth to date pending final  CT pelvis with contrast showed localized right gluteal skin thickening and subcutaneous edema suggesting cellulitis, no soft tissue abscess.  ID consult for antibiotic recommendations.  02/05/2022 Dr. CANDELARIO no new complaints,  + some right hip pain, ESR 78, CRP 2.98--4.21.  Sx note reviewed:  "  Using the original incision, a scalpel was used to incise the tissue allowing for proper digital investigation of the wound. The wound had purulent drainage.  The remaining aspects of the wound were investigated for any loculations or abscesses"  02/06/2022, Dr. Reynolds.  Patient did not sleep much last night so he is sleepier today otherwise he feels improved in general.  The surgical area is tender to the surface and to palpation.  He can move the hip without any issues which is reassuring that " "hopefully his joint is not involved.  No fever no chills.  He gets easily forgetful add caregiver at bedside is very helpful.  Patient feels deconditioned and feels like he needs more physical therapy.  He set up at the edge of the bed with PT    Antibiotics (From admission, onward)            Start     Stop Route Frequency Ordered    02/05/22 1800  vancomycin 1.25 g in dextrose 5% 250 mL IVPB (ready to mix)        Note to Pharmacy: Ht: 5' 8" (1.727 m)  Wt: 91.9 kg (202 lb 9.6 oz)  Estimated Creatinine Clearance: 166.4 mL/min (based on SCr of 0.5 mg/dL).  Body mass index is 30.81 kg/m².    -- IV Every 12 hours (non-standard times) 02/05/22 1114    02/04/22 0846  cefepime in dextrose 5 % IVPB 2 g         -- IV Every 12 hours (non-standard times) 02/04/22 0846    02/03/22 1644  vancomycin - pharmacy to dose  (vancomycin IVPB)        "And" Linked Group Details    -- IV pharmacy to manage frequency 02/03/22 1545        Antifungals (From admission, onward)            None        Antivirals (From admission, onward)    None          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    Hypertension      Past Surgical History:   Procedure Laterality Date    denies pertinent surgical history      INTRAMEDULLARY RODDING OF FEMUR Right 11/7/2021    Procedure: INSERTION, INTRAMEDULLARY HENNA, FEMUR;  Surgeon: Matt Milian MD;  Location: [facility] OR;  Service: Orthopedics;  Laterality: Right;    TOE AMPUTATION Right 10/12/2021    Procedure: AMPUTATION, TOE;  Surgeon: Milton Lauren DPM;  Location: [facility] OR;  Service: Podiatry;  Laterality: Right;       Pertinent medications noted:     Review of Systems:   As mentioned on HPI    EXAM & DIAGNOSTICS REVIEWED:   Vitals:     Temp:  [97.7 °F (36.5 °C)-98.4 °F (36.9 °C)] "   Temp: 97.9 °F (36.6 °C) (02/06/22 1152)  Pulse: 84 (02/06/22 1152)  Resp: 18 (02/06/22 1152)  BP: 96/69 (02/06/22 1152)  SpO2: 96 % (02/06/22 1152)    Intake/Output Summary (Last 24 hours) at 2/6/2022 1222  Last data filed at 2/6/2022 0800  Gross per 24 hour   Intake 680 ml   Output 1400 ml   Net -720 ml       General:  In NAD. Alert and attentive, cooperative, comfortable on O2 by nasal cannula 2 L  Eyes:  Anicteric,   ENT:  No ulcers, exudates, thrush, nares patent, edentulous  Neck:  Supple, no adenopathy appreciated  Lungs: Clear to auscultation b/l  Heart:  S1/S2+, regular rhythm, no murmurs  Abd:  +BS, soft, non tender, non distended, no rebound  :  Donis, clear urine  Musc:  Right hip surgical dressing is not removed because it is tightly placed so the surrounding looks great.  Minimal tenderness upon pressing, no pain on the hip itself.    Other joints without effusion, swelling, erythema, synovitis  Skin:  Warm, no rash  Wound:   Neuro: Following commands, no acute focal deficit   Psych:  Calm, cooperative  Lymphatic:     No cervical, supraclavicular nodes  Extrem: No LE edema b/l    S/p right great toe amputation, redness noted on 3rd toe  Left dorsalis pedis is present' Right dorsalis pedis is missing, foot is warm  VAD:         Isolation:   02/06/2022, no surrounding cellulitis.  02/05-- dressing is not disturbed, the area around is healthy  2/3:                  General Labs reviewed:  Recent Labs   Lab 02/04/22  0543 02/05/22  0558 02/06/22  0653   WBC 12.49 9.52 9.57   HGB 11.2* 11.0* 10.9*   HCT 36.5* 37.7* 36.3*    413 401       Recent Labs   Lab 02/03/22  1700 02/03/22  1700 02/04/22  0543 02/05/22  0558 02/06/22  0652      < > 137 137 137   K 3.3*   < > 3.4* 3.5 3.6   CL 98   < > 102 97 95   CO2 25   < > 28 31* 34*   BUN 8   < > 6* 9 10   CREATININE 0.5   < > 0.5 0.7 0.6   CALCIUM 9.1   < > 8.7 8.6* 8.8   PROT 7.2  --   --   --   --    BILITOT 0.3  --   --   --   --    ALKPHOS 112   --   --   --   --    ALT 11  --   --   --   --    AST 12  --   --   --   --     < > = values in this interval not displayed.     Recent Labs   Lab 02/04/22  1256 02/05/22  0558   CRP 2.98* 4.21*     Recent Labs   Lab 02/04/22  1256   SEDRATE 78*   Urinalysis Microscopic     Status: Final result    Component Ref Range & Units 02/03/22 1701   RBC, UA 0 - 4 /hpf 12 High     WBC, UA 0 - 5 /hpf >100 High     Bacteria None-Occ /hpf Many Abnormal     Yeast, UA None None    Squam Epithel, UA /hpf 1    Hyaline Casts, UA 0-1/lpf /lpf 127 Abnormal     Microscopic Comment  SEE COMMENT             Estimated Creatinine Clearance: 138.7 mL/min (based on SCr of 0.6 mg/dL).     Micro:  Microbiology Results (last 7 days)     Procedure Component Value Units Date/Time    Aerobic culture [[id number]]  (Abnormal) Collected: 02/04/22 0848    Order Status: Completed Specimen: Wound from Hip, Right Updated: 02/06/22 0843     Aerobic Bacterial Culture STAPHYLOCOCCUS AUREUS  Many  For susceptibility see order # [id number]      Aerobic culture [[id number]]  (Abnormal) Collected: 02/04/22 1626    Order Status: Completed Specimen: Wound from Leg, Right Updated: 02/06/22 0842     Aerobic Bacterial Culture METHICILLIN RESISTANT STAPHYLOCOCCUS AUREUS  For susceptibility see order # [id number]  Known MRSA patient      Narrative:      Right thigh abscess 2    Aerobic culture [[id number]]  (Abnormal)  (Susceptibility) Collected: 02/04/22 1626    Order Status: Completed Specimen: Wound from Leg, Right Updated: 02/06/22 0638     Aerobic Bacterial Culture METHICILLIN RESISTANT STAPHYLOCOCCUS AUREUS  Many  Known MRSA patient      Narrative:      Right thigh abscess 1    Blood culture #2 [[id number]] Collected: 02/03/22 1701    Order Status: Completed Specimen: Blood from Peripheral, Hand, Left Updated: 02/05/22 1832     Blood Culture, Routine No Growth to date      No Growth to date      No Growth to date    Narrative:      Blood Culture #2    Blood culture  #1 [[id number]] Collected: 02/03/22 1645    Order Status: Completed Specimen: Blood from Peripheral, Hand, Left Updated: 02/05/22 1832     Blood Culture, Routine No Growth to date      No Growth to date      No Growth to date    Narrative:      Blood Culture #1    Aerobic culture (Specify Source) **CANNOT BE ORDERED AS STAT** [[id number]]  (Abnormal) Collected: 02/03/22 1700    Order Status: Completed Specimen: Wound from Hip, Right Updated: 02/05/22 1413     Aerobic Bacterial Culture STAPHYLOCOCCUS AUREUS  Many  Susceptibility pending  Known MRSA patient      Urine culture [[id number]]  (Abnormal)  (Susceptibility) Collected: 02/03/22 1701    Order Status: Completed Specimen: Urine Updated: 02/05/22 1352     Urine Culture, Routine METHICILLIN RESISTANT STAPHYLOCOCCUS AUREUS  >100,000cfu/ml  Results called to and read back by Zahra Carranza LPN on 1E, re: MRSA   isolated 02/04/2022 15:36 vcb  isolated 02/04/2022 15:36 vcb      Narrative:      Specimen Source->Urine    Gram stain [[id number]] Collected: 02/04/22 1626    Order Status: Completed Specimen: Wound from Leg, Right Updated: 02/05/22 1337     Gram Stain Result Many WBC's      Many Gram positive cocci in clusters    Narrative:      Right thigh abscess 1    Gram stain [[id number]] Collected: 02/04/22 1626    Order Status: Completed Specimen: Wound from Leg, Right Updated: 02/05/22 1336     Gram Stain Result Many WBC's      Many Gram positive cocci in clusters    Narrative:      Right thigh abscess 2    Fungus culture [[id number]] Collected: 02/04/22 1626    Order Status: Sent Specimen: Wound from Leg, Right Updated: 02/04/22 1703    Culture, Anaerobic [[id number]] Collected: 02/04/22 1626    Order Status: Sent Specimen: Wound from Leg, Right Updated: 02/04/22 1703    Fungus culture [[id number]] Collected: 02/04/22 1626    Order Status: Sent Specimen: Wound from Leg, Right Updated: 02/04/22 1703    Culture, Anaerobic [[id number]] Collected: 02/04/22 1626    Order  Status: Sent Specimen: Wound from Leg, Right Updated: 02/04/22 1702    Culture, Body Fluid (Aerobic) w/ GS [[id number]] Collected: 02/04/22 0848    Order Status: Canceled Specimen: Body Fluid from Hip, Right           Imaging Reviewed:    CT pelvis  ART US  Elevated velocities within the right common femoral artery compatible with high-grade luminal stenosis.  Elevated systolic velocities within the proximal left superficial femoral artery suggesting hemodynamically significant narrowing.  Scattered bilateral atheromatous changes.    US   4 cm solid left renal mass compatible with primary renal neoplasm.  Probable nonobstructing left upper pole renal calculus.  Additional observations as above.    CTA  1. 50% stenosis at origin of right external iliac artery.   2. 70% right CFA stenosis, due to heavily calcified plaque.   3. 70-90% right SFA stenosis, also involving the right profunda femoral artery origin.   4. Three-vessel right calf runoff with atherosclerotic disease and suspected multifocal 50% stenosis involving proximal anterior tibial and origins of peroneal and posterior tibial arteries as well as right TP trunk.   5. 70% left CFA stenosis due to heavily calcified plaque.   6. 70-80% left SFA stenosis at origin, with additional 50-70% stenosis distally.   7. 50% left popliteal artery stenosis, with additional involvement left TP trunk and proximal anterior tibial and peroneal arteries. Three-vessel runoff does occur in left calf.   8. Abrupt occlusion of right foot plantar artery at level of midfoot, and no reliable opacification of left foot plantar arteries.   9. 50-70% stenosis at heavily calcified origin of celiac axis.   10. 50% SMA origin stenosis.   11. 50-70% main right renal artery stenosis.   12. Solid enhancing 4.7 cm left renal mass. This represents renal cell carcinoma until proven otherwise. Urologic consultation is recommended.       Cardiology:       IMPRESSION & PLAN     1.  Severe  cellulitis/abscess, due to MRSA, right hip in the setting of right hip replacement ORIF November 2021/Dr. Milian   Ortho feels it is just wound infection, not a joint infection    2.  MRSA UTI in setting of Donis catheter   Blood cultures x 2 no growth to date, pending final     3.  3rd toe antd right 1st MT  Wound ; not healing wel  4. PAD right worse than left  5. Poorly controlled diabetes  6.  AFib, HTN, COPD, depression/anxiety  7.  Smoker  8. 4 cm solid left renal mass compatible with primary renal neoplasm.  As per hospitalist  High risk for deterioration    Recommendations:  Adequate source control is done, s/p I&D  02/04  Continue vancomycin, given MRSA.  Eventually we can switch to doxycycline.  Discontinue cefepime    Replace Donis catheter, after patient's activity improves will have to discontinue Donis catheter    Patient has severe PAD, that explains why right foot findings are not healing fast enough  Please ask for vascular surgery opinion    Need urology follow up  for left renal mass in the near future, as per hospitalist    Needs PT OT     Will follow from periphery        Medical Decision Making during this encounter was  [_] Low Complexity  [_] Moderate Complexity  [xx] High Complexity

## 2022-02-06 NOTE — CARE UPDATE
02/06/22 0006   Patient Assessment/Suction   Level of Consciousness (AVPU)   (sleeping)   Respiratory Effort Unlabored   Expansion/Accessory Muscles/Retractions expansion symmetric;no retractions;no use of accessory muscles   All Lung Fields Breath Sounds clear   Rhythm/Pattern, Respiratory no shortness of breath reported;pattern regular;unlabored   PRE-TX-O2   O2 Device (Oxygen Therapy) nasal cannula   Flow (L/min) 2   SpO2 98 %   Pulse Oximetry Type Intermittent   $ Pulse Oximetry - Multiple Charge Pulse Oximetry - Multiple   Pulse 72   Resp 14   Aerosol Therapy   $ Aerosol Therapy Charges PRN treatment not required   Respiratory Evaluation   $ Care Plan Tech Time 15 min   $ Eval/Re-eval Charges Re-evaluation   Evaluation For   (care plan)

## 2022-02-06 NOTE — PROGRESS NOTES
[facility] Medicine  Progress Note    Patient name: James Jiagn  MRN: [id number]  Admit Date: 2/3/2022   LOS: 2 days     SUBJECTIVE:     Principal problem: Infected wound    Interval History:  Patient has no comp[ains at the time of my examination. Pain relieved by Norco earlier in the day.      Scheduled Meds:   aspirin  162 mg Oral Daily    atorvastatin  20 mg Oral Daily    ceFEPime (MAXIPIME) IVPB  2 g Intravenous Q12H    FLUoxetine  10 mg Oral Daily    fluticasone furoate-vilanteroL  1 puff Inhalation Daily    furosemide  20 mg Oral Daily    insulin detemir U-100  50 Units Subcutaneous Daily    ipratropium  1 puff Inhalation Daily    magnesium sulfate IVPB  4 g Intravenous Once    midodrine  5 mg Oral Q8H    tamsulosin  0.4 mg Oral Daily    vancomycin (VANCOCIN) IVPB  1,250 mg Intravenous Q12H     Continuous Infusions:  PRN Meds:acetaminophen, albuterol sulfate, ALPRAZolam, dextrose 50%, glucagon (human recombinant), HYDROcodone-acetaminophen, insulin aspart U-100, melatonin, morphine, ondansetron, sodium chloride 0.9%, sodium chloride 0.9%, Pharmacy to dose Vancomycin consult **AND** vancomycin - pharmacy to dose    Review of patient's allergies indicates:  No Known Allergies    Review of Systems: As per interval history    OBJECTIVE:     Vital Signs (Most Recent)  Temp: 98.4 °F (36.9 °C) (02/05/22 1943)  Pulse: 86 (02/05/22 1943)  Resp: 18 (02/05/22 1943)  BP: 98/66 (02/05/22 1943)  SpO2: 95 % (02/05/22 1943)    Vital Signs Range (Last 24H):  Temp:  [97.6 °F (36.4 °C)-98.7 °F (37.1 °C)]   Pulse:  [70-89]   Resp:  [14-18]   BP: ()/(61-82)   SpO2:  [95 %-99 %]     I & O (Last 24H):    Intake/Output Summary (Last 24 hours) at 2/5/2022 2012  Last data filed at 2/5/2022 1719  Gross per 24 hour   Intake 680 ml   Output 1400 ml   Net -720 ml       Physical Exam:    Physical Exam:  General: Patient resting comfortably in no acute distress. Appears as stated age. Calm.  Obese. Donis  Eyes: EOM intact. No conjunctivae injection. No scleral icterus.  ENT: Hearing grossly intact. No discharge from ears. No nasal discharge.   CVS: RRR. No LE edema BL.  Lungs: CTA BL, no wheezing or crackles. Good breath sounds. No accessory muscle use. No acute respiratory distress  Neuro: Alert. Cranial nerves grossly intact. Moves all extremities equally. Follows commands. Responds appropriately   Psych: Mood, behavior, thought content and judgement normal                     Laboratory:  I have reviewed all pertinent lab results within the past 24 hours.  CBC:   Recent Labs   Lab 02/05/22 0558   WBC 9.52   RBC 4.27*   HGB 11.0*   HCT 37.7*      MCV 88   MCH 25.8*   MCHC 29.2*     CMP:   Recent Labs   Lab 02/03/22 1700 02/04/22 0543 02/05/22 0558   *   < > 208*   CALCIUM 9.1   < > 8.6*   ALBUMIN 3.0*  --   --    PROT 7.2  --   --       < > 137   K 3.3*   < > 3.5   CO2 25   < > 31*   CL 98   < > 97   BUN 8   < > 9   CREATININE 0.5   < > 0.7   ALKPHOS 112  --   --    ALT 11  --   --    AST 12  --   --    BILITOT 0.3  --   --     < > = values in this interval not displayed.     Microbiology Results (last 7 days)     Procedure Component Value Units Date/Time    Blood culture #2 [[id number]] Collected: 02/03/22 1701    Order Status: Completed Specimen: Blood from Peripheral, Hand, Left Updated: 02/05/22 1832     Blood Culture, Routine No Growth to date      No Growth to date      No Growth to date    Narrative:      Blood Culture #2    Blood culture #1 [[id number]] Collected: 02/03/22 1645    Order Status: Completed Specimen: Blood from Peripheral, Hand, Left Updated: 02/05/22 1832     Blood Culture, Routine No Growth to date      No Growth to date      No Growth to date    Narrative:      Blood Culture #1    Aerobic culture (Specify Source) **CANNOT BE ORDERED AS STAT** [[id number]]  (Abnormal) Collected: 02/03/22 1700    Order Status: Completed Specimen: Wound from Hip, Right  Updated: 02/05/22 1413     Aerobic Bacterial Culture STAPHYLOCOCCUS AUREUS  Many  Susceptibility pending  Known MRSA patient      Urine culture [[id number]]  (Abnormal)  (Susceptibility) Collected: 02/03/22 1701    Order Status: Completed Specimen: Urine Updated: 02/05/22 1352     Urine Culture, Routine METHICILLIN RESISTANT STAPHYLOCOCCUS AUREUS  >100,000cfu/ml  Results called to and read back by Zahra Carranza LPN on 1E, re: MRSA   isolated 02/04/2022 15:36 vcb  isolated 02/04/2022 15:36 vcb      Narrative:      Specimen Source->Urine    Gram stain [[id number]] Collected: 02/04/22 1626    Order Status: Completed Specimen: Wound from Leg, Right Updated: 02/05/22 1337     Gram Stain Result Many WBC's      Many Gram positive cocci in clusters    Narrative:      Right thigh abscess 1    Gram stain [[id number]] Collected: 02/04/22 1626    Order Status: Completed Specimen: Wound from Leg, Right Updated: 02/05/22 1336     Gram Stain Result Many WBC's      Many Gram positive cocci in clusters    Narrative:      Right thigh abscess 2    Aerobic culture [[id number]]  (Abnormal) Collected: 02/04/22 1626    Order Status: Completed Specimen: Wound from Leg, Right Updated: 02/05/22 0909     Aerobic Bacterial Culture STAPHYLOCOCCUS AUREUS  Many  Susceptibility pending      Narrative:      Right thigh abscess 1    Aerobic culture [[id number]]  (Abnormal) Collected: 02/04/22 0848    Order Status: Completed Specimen: Wound from Hip, Right Updated: 02/05/22 0908     Aerobic Bacterial Culture STAPHYLOCOCCUS AUREUS  Many  For susceptibility see order # [id number]      Aerobic culture [[id number]] Collected: 02/04/22 1626    Order Status: Completed Specimen: Wound from Leg, Right Updated: 02/05/22 0902     Aerobic Bacterial Culture Insufficient incubation, culture in progress    Narrative:      Right thigh abscess 2    Fungus culture [[id number]] Collected: 02/04/22 1626    Order Status: Sent Specimen: Wound from Leg, Right Updated: 02/04/22  1703    Culture, Anaerobic [[id number]] Collected: 02/04/22 1626    Order Status: Sent Specimen: Wound from Leg, Right Updated: 02/04/22 1703    Fungus culture [[id number]] Collected: 02/04/22 1626    Order Status: Sent Specimen: Wound from Leg, Right Updated: 02/04/22 1703    Culture, Anaerobic [[id number]] Collected: 02/04/22 1626    Order Status: Sent Specimen: Wound from Leg, Right Updated: 02/04/22 1702    Culture, Body Fluid (Aerobic) w/ GS [[id number]] Collected: 02/04/22 0848    Order Status: Canceled Specimen: Body Fluid from Hip, Right         Recent Labs   Lab 02/03/22 1701   COLORU Yellow   SPECGRAV 1.020   PHUR 7.0   PROTEINUA 1+*   BACTERIA Many*   NITRITE Positive*   LEUKOCYTESUR 3+*   UROBILINOGEN Negative   HYALINECASTS 127*       Diagnostic Results:  Labs: Reviewed  X-Ray: Reviewed    ASSESSMENT/PLAN:       Post op Wound infection on right hip s/p Deep irrigation and debridement on 02/04/2022    status post ORIF of his right hip November 2021/Dr. Milian. Per recrods, patient did not show up for follow up for suture removal    4 cm solid left renal mass compatible with primary renal neoplasm.    MRSA UTI      Smoker  Active Hospital Problems     Diagnosis   POA    *Infected wound [T14.8XXA, L08.9]   Yes    Paroxysmal atrial fibrillation [I48.0]   Yes       Chronic    COPD (chronic obstructive pulmonary disease) [J44.9]   Yes    Type 2 diabetes mellitus with hyperglycemia [E11.65]   Yes       Resolved Hospital Problems   No resolved problems to display.               Plan:   Wg7imoa up ID recommendaiotns: Vancomycin, cefepime IV, CTA LE, PT/OT. Input apprecaited  Oncology consult for renal mass  Blood cultures pending  Wound culture pending  Pain control: D/c dilaudid. Norco 5 mg q 6 h prns, Morphine IV 2 mg q 4h prn with holding parameters.  Wound care  Continue home medication  Consult Orthopedics for appropiate time to re-start xarelto.  Patient moderate risk for complication in the setting of  moderate risk procedure.  Medically stable for surgery. Risk of bleeding with surgical intervention due to patient be on Xarelto prior to hospitalization.       VTE Risk Mitigation (From admission, onward)         Ordered     Reason for No Pharmacological VTE Prophylaxis  Once        Question:  Reasons:  Answer:  Already adequately anticoagulated on oral Anticoagulants    02/03/22 2120     IP VTE HIGH RISK PATIENT  Once         02/03/22 2120                  Department Hospital Medicine  [facility]  Walt Scales MD  Date of service: 02/05/2022

## 2022-02-07 ENCOUNTER — TELEPHONE (OUTPATIENT)
Dept: UROLOGY | Facility: CLINIC | Age: 62
End: 2022-02-07
Payer: MEDICAID

## 2022-02-07 LAB
ANION GAP SERPL CALC-SCNC: 11 MMOL/L (ref 8–16)
BACTERIA SPEC AEROBE CULT: ABNORMAL
BACTERIA SPEC AEROBE CULT: ABNORMAL
BACTERIA SPEC ANAEROBE CULT: NORMAL
BACTERIA SPEC ANAEROBE CULT: NORMAL
BASOPHILS # BLD AUTO: 0.09 K/UL (ref 0–0.2)
BASOPHILS NFR BLD: 1 % (ref 0–1.9)
BUN SERPL-MCNC: 7 MG/DL (ref 8–23)
CALCIUM SERPL-MCNC: 9 MG/DL (ref 8.7–10.5)
CHLORIDE SERPL-SCNC: 96 MMOL/L (ref 95–110)
CO2 SERPL-SCNC: 32 MMOL/L (ref 23–29)
CREAT SERPL-MCNC: 0.5 MG/DL (ref 0.5–1.4)
DIFFERENTIAL METHOD: ABNORMAL
EOSINOPHIL # BLD AUTO: 0.3 K/UL (ref 0–0.5)
EOSINOPHIL NFR BLD: 3.6 % (ref 0–8)
ERYTHROCYTE [DISTWIDTH] IN BLOOD BY AUTOMATED COUNT: 14.6 % (ref 11.5–14.5)
EST. GFR  (AFRICAN AMERICAN): >60 ML/MIN/1.73 M^2
EST. GFR  (NON AFRICAN AMERICAN): >60 ML/MIN/1.73 M^2
GLUCOSE SERPL-MCNC: 115 MG/DL (ref 70–110)
GLUCOSE SERPL-MCNC: 151 MG/DL (ref 70–110)
GLUCOSE SERPL-MCNC: 156 MG/DL (ref 70–110)
GLUCOSE SERPL-MCNC: 237 MG/DL (ref 70–110)
GLUCOSE SERPL-MCNC: 86 MG/DL (ref 70–110)
GRAM STN SPEC: NORMAL
HCT VFR BLD AUTO: 36.8 % (ref 40–54)
HGB BLD-MCNC: 10.9 G/DL (ref 14–18)
IMM GRANULOCYTES # BLD AUTO: 0.05 K/UL (ref 0–0.04)
IMM GRANULOCYTES NFR BLD AUTO: 0.5 % (ref 0–0.5)
LYMPHOCYTES # BLD AUTO: 2 K/UL (ref 1–4.8)
LYMPHOCYTES NFR BLD: 21.7 % (ref 18–48)
MAGNESIUM SERPL-MCNC: 1.7 MG/DL (ref 1.6–2.6)
MCH RBC QN AUTO: 25.6 PG (ref 27–31)
MCHC RBC AUTO-ENTMCNC: 29.6 G/DL (ref 32–36)
MCV RBC AUTO: 87 FL (ref 82–98)
MONOCYTES # BLD AUTO: 0.9 K/UL (ref 0.3–1)
MONOCYTES NFR BLD: 10.1 % (ref 4–15)
NEUTROPHILS # BLD AUTO: 5.8 K/UL (ref 1.8–7.7)
NEUTROPHILS NFR BLD: 63.1 % (ref 38–73)
NRBC BLD-RTO: 0 /100 WBC
PLATELET # BLD AUTO: 412 K/UL (ref 150–450)
PMV BLD AUTO: 9.1 FL (ref 9.2–12.9)
POTASSIUM SERPL-SCNC: 3.6 MMOL/L (ref 3.5–5.1)
RBC # BLD AUTO: 4.25 M/UL (ref 4.6–6.2)
SODIUM SERPL-SCNC: 139 MMOL/L (ref 136–145)
VANCOMYCIN TROUGH SERPL-MCNC: 18.3 UG/ML
VANCOMYCIN TROUGH SERPL-MCNC: 37.5 UG/ML
WBC # BLD AUTO: 9.22 K/UL (ref 3.9–12.7)

## 2022-02-07 PROCEDURE — 85025 COMPLETE CBC W/AUTO DIFF WBC: CPT | Performed by: FAMILY MEDICINE

## 2022-02-07 PROCEDURE — 12000002 HC ACUTE/MED SURGE SEMI-PRIVATE ROOM

## 2022-02-07 PROCEDURE — 80048 BASIC METABOLIC PNL TOTAL CA: CPT | Performed by: FAMILY MEDICINE

## 2022-02-07 PROCEDURE — 25000003 PHARM REV CODE 250: Performed by: INTERNAL MEDICINE

## 2022-02-07 PROCEDURE — 94640 AIRWAY INHALATION TREATMENT: CPT

## 2022-02-07 PROCEDURE — 97166 OT EVAL MOD COMPLEX 45 MIN: CPT

## 2022-02-07 PROCEDURE — 36415 COLL VENOUS BLD VENIPUNCTURE: CPT | Performed by: INTERNAL MEDICINE

## 2022-02-07 PROCEDURE — 94761 N-INVAS EAR/PLS OXIMETRY MLT: CPT

## 2022-02-07 PROCEDURE — 99900031 HC PATIENT EDUCATION (STAT)

## 2022-02-07 PROCEDURE — 99900035 HC TECH TIME PER 15 MIN (STAT)

## 2022-02-07 PROCEDURE — 80202 ASSAY OF VANCOMYCIN: CPT | Performed by: INTERNAL MEDICINE

## 2022-02-07 PROCEDURE — 99232 PR SUBSEQUENT HOSPITAL CARE,LEVL II: ICD-10-PCS | Mod: ,,, | Performed by: STUDENT IN AN ORGANIZED HEALTH CARE EDUCATION/TRAINING PROGRAM

## 2022-02-07 PROCEDURE — 83735 ASSAY OF MAGNESIUM: CPT | Performed by: FAMILY MEDICINE

## 2022-02-07 PROCEDURE — 94799 UNLISTED PULMONARY SVC/PX: CPT

## 2022-02-07 PROCEDURE — 25000003 PHARM REV CODE 250: Performed by: FAMILY MEDICINE

## 2022-02-07 PROCEDURE — 99232 SBSQ HOSP IP/OBS MODERATE 35: CPT | Mod: ,,, | Performed by: STUDENT IN AN ORGANIZED HEALTH CARE EDUCATION/TRAINING PROGRAM

## 2022-02-07 PROCEDURE — 63600175 PHARM REV CODE 636 W HCPCS: Performed by: INTERNAL MEDICINE

## 2022-02-07 PROCEDURE — 97530 THERAPEUTIC ACTIVITIES: CPT

## 2022-02-07 PROCEDURE — 25000003 PHARM REV CODE 250: Performed by: NURSE PRACTITIONER

## 2022-02-07 PROCEDURE — 97535 SELF CARE MNGMENT TRAINING: CPT

## 2022-02-07 PROCEDURE — 27000221 HC OXYGEN, UP TO 24 HOURS

## 2022-02-07 RX ADMIN — INSULIN DETEMIR 50 UNITS: 100 INJECTION, SOLUTION SUBCUTANEOUS at 08:02

## 2022-02-07 RX ADMIN — ATORVASTATIN CALCIUM 20 MG: 20 TABLET, FILM COATED ORAL at 08:02

## 2022-02-07 RX ADMIN — VANCOMYCIN HYDROCHLORIDE 1250 MG: 1.25 INJECTION, POWDER, LYOPHILIZED, FOR SOLUTION INTRAVENOUS at 06:02

## 2022-02-07 RX ADMIN — HYDROCODONE BITARTRATE AND ACETAMINOPHEN 1 TABLET: 5; 325 TABLET ORAL at 02:02

## 2022-02-07 RX ADMIN — HYDROCODONE BITARTRATE AND ACETAMINOPHEN 1 TABLET: 5; 325 TABLET ORAL at 03:02

## 2022-02-07 RX ADMIN — INSULIN ASPART 1 UNITS: 100 INJECTION, SOLUTION INTRAVENOUS; SUBCUTANEOUS at 11:02

## 2022-02-07 RX ADMIN — FUROSEMIDE 20 MG: 20 TABLET ORAL at 08:02

## 2022-02-07 RX ADMIN — SODIUM CHLORIDE: 0.9 INJECTION, SOLUTION INTRAVENOUS at 07:02

## 2022-02-07 RX ADMIN — MIDODRINE HYDROCHLORIDE 5 MG: 2.5 TABLET ORAL at 09:02

## 2022-02-07 RX ADMIN — INSULIN ASPART 16 UNITS: 100 INJECTION, SOLUTION INTRAVENOUS; SUBCUTANEOUS at 07:02

## 2022-02-07 RX ADMIN — FLUTICASONE FUROATE AND VILANTEROL TRIFENATATE 1 PUFF: 200; 25 POWDER RESPIRATORY (INHALATION) at 07:02

## 2022-02-07 RX ADMIN — APIXABAN 10 MG: 5 TABLET, FILM COATED ORAL at 08:02

## 2022-02-07 RX ADMIN — ALPRAZOLAM 0.5 MG: 0.5 TABLET ORAL at 07:02

## 2022-02-07 RX ADMIN — SENNOSIDES AND DOCUSATE SODIUM 2 TABLET: 8.6; 5 TABLET ORAL at 08:02

## 2022-02-07 RX ADMIN — SENNOSIDES AND DOCUSATE SODIUM 2 TABLET: 8.6; 5 TABLET ORAL at 09:02

## 2022-02-07 RX ADMIN — APIXABAN 10 MG: 5 TABLET, FILM COATED ORAL at 09:02

## 2022-02-07 RX ADMIN — ASPIRIN 81 MG 162 MG: 81 TABLET ORAL at 08:02

## 2022-02-07 RX ADMIN — TAMSULOSIN HYDROCHLORIDE 0.4 MG: 0.4 CAPSULE ORAL at 08:02

## 2022-02-07 RX ADMIN — FLUOXETINE 10 MG: 10 CAPSULE ORAL at 08:02

## 2022-02-07 RX ADMIN — ALPRAZOLAM 0.5 MG: 0.5 TABLET ORAL at 08:02

## 2022-02-07 RX ADMIN — IPRATROPIUM BROMIDE 1 PUFF: 17 AEROSOL, METERED RESPIRATORY (INHALATION) at 07:02

## 2022-02-07 NOTE — PHYSICIAN QUERY
"PT Name: James Jiang  MR #: [id number]     DOCUMENTATION CLARIFICATION      CDS/: Sarah West               Contact information: [phone]  This form is a permanent document in the medical record.     Query Date: February 7, 2022    By submitting this query, we are merely seeking further clarification of documentation to reflect the severity of illness of your patient. Please utilize your independent clinical judgment when addressing the question(s) below.    The Medical Record contains the following:    Indicators   Location in Medical Record   Risk factors; 60yo male sent by  nurse w/ incisional infx s/p ORIF to R hip, didn't f/u to have sutures removed, reported temp at home and low grade temp in ED.    ED   Clinical indicators;  "Patient complaining of dysuria and increased urinary frequency. blanco w/ cloudy urine"    "MRSA UTI in setting of Blanco catheter"    CRP 2.98 > 4.21  UA cloudy, nitrate +, LE 3+, WBC >100, many bacteria   UC MRSA >100K    Blanco placed 01/05 by Darling KWAN day #33     ID consult 02/04      ID 02/06    Labs        Documented in  MAR (Lines and tubes)   Interventions;  Vancomycin 1.5g IV x1 (ED) > 1.5g IV Q12 hrs (02/04-02/05) >1.25g IV Q12 hrs (02/05 to current)  Cefepime 2g IV Q12 hrs (02/04-02/06)     MAR      Dear doctor please clarify if there is any clinical correlation between UTI and the indwelling blanco catheter.    [   ] Due to or associated with each other (POA-Y)     [   ] Due to or associated with each other (POA-N)     [   ] Unrelated to each other     [   ] Other (please specify)          Please document in your progress notes daily for the duration of treatment until resolved, and include in your discharge summary.    Form No. 91007  "

## 2022-02-07 NOTE — CARE UPDATE
02/07/22 0708   Patient Assessment/Suction   Level of Consciousness (AVPU) alert   Respiratory Effort Normal;Unlabored   Expansion/Accessory Muscles/Retractions no use of accessory muscles;no retractions;expansion symmetric   All Lung Fields Breath Sounds clear;diminished   Rhythm/Pattern, Respiratory unlabored;pattern regular;depth regular;chest wiggle adequate;no shortness of breath reported   Cough Frequency no cough   PRE-TX-O2   O2 Device (Oxygen Therapy) nasal cannula   $ Is the patient on Low Flow Oxygen? Yes   Flow (L/min) 1.5   SpO2 97 %   Pulse Oximetry Type Intermittent   $ Pulse Oximetry - Multiple Charge Pulse Oximetry - Multiple   Pulse 78   Resp 18   Aerosol Therapy   $ Aerosol Therapy Charges PRN treatment not required   Inhaler   $ Inhaler Charges MDI (Metered Dose Inahler) Treatment   Daily Review of Necessity (Inhaler) completed   Respiratory Treatment Status (Inhaler) given   Treatment Route (Inhaler) mouthpiece   Patient Position (Inhaler) HOB elevated   Post Treatment Assessment (Inhaler) increased aeration   Signs of Intolerance (Inhaler) none   Breath Sounds Post-Respiratory Treatment   Throughout All Fields Post-Treatment All Fields   Throughout All Fields Post-Treatment aeration increased   Post-treatment Heart Rate (beats/min) 85   Post-treatment Resp Rate (breaths/min) 17   Education   $ Education Bronchodilator;15 min   Respiratory Evaluation   $ Care Plan Tech Time 15 min

## 2022-02-07 NOTE — PROGRESS NOTES
Pharmacokinetic Assessment Follow Up: IV Vancomycin    Vancomycin serum concentration assessment(s):    The trough level was drawn incorrectly and cannot be used to guide therapy at this time.    Vancomycin Regimen Plan:    Continue regimen to Vancomycin 1250 mg IV every 12 hours with next serum trough concentration measured at 1700 prior to 1800 dose on 2/7/2022    Drug levels (last 3 results):  Recent Labs   Lab Result Units 02/05/22  0958 02/07/22  0610   Vancomycin-Trough ug/mL 17.8 37.5*       Pharmacy will continue to follow and monitor vancomycin.    Please contact pharmacy at extension [phone] for questions regarding this assessment.    Thank you for the consult,   Henry Mckeon       Patient brief summary:  James Jiang is a 61 y.o. male initiated on antimicrobial therapy with IV Vancomycin for treatment of skin & soft tissue infection    The patient's current regimen Vancomycin 1250 mg IVPB every 12 hours    Drug Allergies:   Review of patient's allergies indicates:  No Known Allergies    Actual Body Weight:   91.9 kg    Renal Function:   Estimated Creatinine Clearance: 166.4 mL/min (based on SCr of 0.5 mg/dL).,     Dialysis Method (if applicable):  N/A    CBC (last 72 hours):  Recent Labs   Lab Result Units 02/05/22  0558 02/06/22  0653 02/07/22  0610   WBC K/uL 9.52 9.57 9.22   Hemoglobin g/dL 11.0* 10.9* 10.9*   Hematocrit % 37.7* 36.3* 36.8*   Platelets K/uL 413 401 412   Gran % % 61.2 65.6 63.1   Lymph % % 23.3 20.1 21.7   Mono % % 8.9 8.6 10.1   Eosinophil % % 4.8 4.3 3.6   Basophil % % 1.3 0.8 1.0   Differential Method  Automated Automated Automated       Metabolic Panel (last 72 hours):  Recent Labs   Lab Result Units 02/05/22  0558 02/06/22  0652 02/07/22  0610   Sodium mmol/L 137 137 139   Potassium mmol/L 3.5 3.6 3.6   Chloride mmol/L 97 95 96   CO2 mmol/L 31* 34* 32*   Glucose mg/dL 208* 216* 151*   BUN mg/dL 9 10 7*   Creatinine mg/dL 0.7 0.6 0.5   Magnesium mg/dL 1.4* 1.8 1.7        Vancomycin Administrations:  vancomycin given in the last 96 hours                     vancomycin 1.25 g in dextrose 5% 250 mL IVPB (ready to mix) (mg) 1,250 mg New Bag 02/07/22 0600     1,250 mg New Bag 02/06/22 1809     1,250 mg New Bag  0531     1,250 mg New Bag 02/05/22 1802    vancomycin (VANCOCIN) 1,500 mg in dextrose 5 % 500 mL IVPB (mg) 1,500 mg New Bag 02/04/22 2237     1,500 mg New Bag  0827    vancomycin 500 mg in dextrose 5 % 100 mL IVPB (ready to mix system) (mg) 500 mg New Bag 02/03/22 1919    vancomycin in dextrose 5 % 1 gram/250 mL IVPB 1,000 mg (mg) 1,000 mg New Bag 02/03/22 1717                    Microbiologic Results:  Microbiology Results (last 7 days)       Procedure Component Value Units Date/Time    Gram stain [[id number]] Collected: 02/04/22 1626    Order Status: Completed Specimen: Wound from Leg, Right Updated: 02/07/22 0813     Gram Stain Result Many WBC's      Many Gram positive cocci in clusters    Narrative:      Right thigh abscess 2    Gram stain [[id number]] Collected: 02/04/22 1626    Order Status: Completed Specimen: Wound from Leg, Right Updated: 02/07/22 0812     Gram Stain Result Many WBC's      Many Gram positive cocci in clusters    Narrative:      Right thigh abscess 1    Aerobic culture [[id number]]  (Abnormal) Collected: 02/04/22 0848    Order Status: Completed Specimen: Wound from Hip, Right Updated: 02/07/22 0809     Aerobic Bacterial Culture METHICILLIN RESISTANT STAPHYLOCOCCUS AUREUS  Many  For susceptibility see order # [id number]      Aerobic culture (Specify Source) **CANNOT BE ORDERED AS STAT** [[id number]]  (Abnormal)  (Susceptibility) Collected: 02/03/22 1700    Order Status: Completed Specimen: Wound from Hip, Right Updated: 02/07/22 0808     Aerobic Bacterial Culture METHICILLIN RESISTANT STAPHYLOCOCCUS AUREUS  Many  Known MRSA patient      Culture, Anaerobic [[id number]] Collected: 02/04/22 1626    Order Status: Completed Specimen: Wound from Leg, Right  Updated: 02/07/22 0759     Anaerobic Culture No anaerobes isolated    Narrative:      Right thigh abscess 1    Culture, Anaerobic [[id number]] Collected: 02/04/22 1626    Order Status: Completed Specimen: Wound from Leg, Right Updated: 02/07/22 0759     Anaerobic Culture No anaerobes isolated    Narrative:      Right thigh abscess 2    Blood culture #2 [[id number]] Collected: 02/03/22 1701    Order Status: Completed Specimen: Blood from Peripheral, Hand, Left Updated: 02/06/22 1832     Blood Culture, Routine No Growth to date      No Growth to date      No Growth to date      No Growth to date    Narrative:      Blood Culture #2    Blood culture #1 [[id number]] Collected: 02/03/22 1645    Order Status: Completed Specimen: Blood from Peripheral, Hand, Left Updated: 02/06/22 1832     Blood Culture, Routine No Growth to date      No Growth to date      No Growth to date      No Growth to date    Narrative:      Blood Culture #1    Aerobic culture [[id number]]  (Abnormal) Collected: 02/04/22 1626    Order Status: Completed Specimen: Wound from Leg, Right Updated: 02/06/22 0842     Aerobic Bacterial Culture METHICILLIN RESISTANT STAPHYLOCOCCUS AUREUS  For susceptibility see order # [id number]  Known MRSA patient      Narrative:      Right thigh abscess 2    Aerobic culture [[id number]]  (Abnormal)  (Susceptibility) Collected: 02/04/22 1626    Order Status: Completed Specimen: Wound from Leg, Right Updated: 02/06/22 0638     Aerobic Bacterial Culture METHICILLIN RESISTANT STAPHYLOCOCCUS AUREUS  Many  Known MRSA patient      Narrative:      Right thigh abscess 1    Urine culture [[id number]]  (Abnormal)  (Susceptibility) Collected: 02/03/22 1701    Order Status: Completed Specimen: Urine Updated: 02/05/22 1352     Urine Culture, Routine METHICILLIN RESISTANT STAPHYLOCOCCUS AUREUS  >100,000cfu/ml  Results called to and read back by Zahra Carranza LPN on 1E, re: MRSA   isolated 02/04/2022 15:36 vcb  isolated 02/04/2022 15:36 vcb       Narrative:      Specimen Source->Urine    Fungus culture [[id number]] Collected: 02/04/22 1626    Order Status: Sent Specimen: Wound from Leg, Right Updated: 02/04/22 1703    Fungus culture [[id number]] Collected: 02/04/22 1626    Order Status: Sent Specimen: Wound from Leg, Right Updated: 02/04/22 1703    Culture, Body Fluid (Aerobic) w/ GS [[id number]] Collected: 02/04/22 0848    Order Status: Canceled Specimen: Body Fluid from Hip, Right

## 2022-02-07 NOTE — PROGRESS NOTES
[facility] Medicine  Progress Note    Patient name: James Jiang  MRN: [id number]  Admit Date: 2/3/2022   LOS: 4 days     SUBJECTIVE:     Principal problem: Infected wound    Interval History:  No complains.    Scheduled Meds:   apixaban  10 mg Oral BID    [START ON 2/13/2022] apixaban  5 mg Oral BID    aspirin  162 mg Oral Daily    atorvastatin  20 mg Oral Daily    FLUoxetine  10 mg Oral Daily    fluticasone furoate-vilanteroL  1 puff Inhalation Daily    furosemide  20 mg Oral Daily    insulin aspart U-100  16 Units Subcutaneous TIDWM    insulin detemir U-100  50 Units Subcutaneous Daily    ipratropium  1 puff Inhalation Daily    midodrine  5 mg Oral Q8H    senna-docusate 8.6-50 mg  2 tablet Oral BID    tamsulosin  0.4 mg Oral Daily    vancomycin (VANCOCIN) IVPB  1,250 mg Intravenous Q12H     Continuous Infusions:  PRN Meds:acetaminophen, albuterol sulfate, ALPRAZolam, dextrose 50%, glucagon (human recombinant), HYDROcodone-acetaminophen, insulin aspart U-100, melatonin, morphine, ondansetron, sodium chloride 0.9%, sodium chloride 0.9%, Pharmacy to dose Vancomycin consult **AND** vancomycin - pharmacy to dose    Review of patient's allergies indicates:  No Known Allergies    Review of Systems: As per interval history    OBJECTIVE:     Vital Signs (Most Recent)  Temp: 98 °F (36.7 °C) (02/07/22 1145)  Pulse: 89 (02/07/22 1145)  Resp: 18 (02/07/22 1411)  BP: 105/70 (02/07/22 1400)  SpO2: 97 % (02/07/22 1145)    Vital Signs Range (Last 24H):  Temp:  [97.6 °F (36.4 °C)-98.3 °F (36.8 °C)]   Pulse:  [78-89]   Resp:  [16-20]   BP: ()/(58-83)   SpO2:  [95 %-98 %]     I & O (Last 24H):    Intake/Output Summary (Last 24 hours) at 2/7/2022 1508  Last data filed at 2/7/2022 0629  Gross per 24 hour   Intake --   Output 1800 ml   Net -1800 ml       Physical Exam:    General: Patient resting comfortably in no acute distress. Appears as stated age. Calm. Obese. Donis  Eyes: EOM  intact. No conjunctivae injection. No scleral icterus.  ENT: Hearing grossly intact. No discharge from ears. No nasal discharge.   CVS: RRR. No LE edema BL.  Lungs: CTA BL, no wheezing or crackles. Good breath sounds. No accessory muscle use. No acute respiratory distress  Neuro: Alert. Cranial nerves grossly intact. Moves all extremities equally. Follows commands. Responds appropriately   Psych: Mood, behavior, thought content and judgement normal   Ect: S/p right great toe amputation. + Erythema on 3 rd toe.                  Laboratory:  I have reviewed all pertinent lab results within the past 24 hours.  CBC:   Recent Labs   Lab 02/07/22  0610   WBC 9.22   RBC 4.25*   HGB 10.9*   HCT 36.8*      MCV 87   MCH 25.6*   MCHC 29.6*     BMP:   Recent Labs   Lab 02/07/22  0610   *      K 3.6   CL 96   CO2 32*   BUN 7*   CREATININE 0.5   CALCIUM 9.0   MG 1.7       Diagnostic Results:  Labs: Reviewed    ASSESSMENT/PLAN:       Post op Wound infection on right hip s/p Deep irrigation and debridement on 02/04/2022, growing MRSA     status post ORIF of his right hip November 2021/Dr. Milian. Per recrods, patient did not show up for follow up for suture removal     4 cm solid left renal mass compatible with primary renal neoplasm.     MRSA UTI     PAD     Smoker              Active Hospital Problems     Diagnosis   POA    *Infected wound [T14.8XXA, L08.9]   Yes    Paroxysmal atrial fibrillation [I48.0]   Yes       Chronic    COPD (chronic obstructive pulmonary disease) [J44.9]   Yes    Type 2 diabetes mellitus with hyperglycemia [E11.65]   Yes       Resolved Hospital Problems   No resolved problems to display.            Plan:   Case discussed today with ID: Initally recommended MRI to r.o. osteomyelits of right foot but since patient states he is not able to get MRI's, NM bone scan is recommended, will ordered,Vancomycin eventually start Doxycicline. Input apprecaited.  Case discussed today with  Vascular surgeon, Dr. Khoobehi, recommends right common femoral endarterectomy and profundoplasty as outpatient once cleared of infectious issues, as it is not urgent but will help him heal his foot ulcer. Input appreciated.      Urology recommendaionts: Follow up on 02/14/2022 for renal mass work up. Input appreciated.  Hem-onc recommendaitons: Follow up with Urology for work up of mass. Follow up as outpaeitn in 2 weeks, on Apixaban now. Input appreciated.  Follow up Blood cultures   Follow up  Wound culture.  Pain control:Norco 5 mg q 6 h prns, Morphine IV 2 mg q 4h prn with holding parameters.  Wound care  Continue home medication                    VTE Risk Mitigation (From admission, onward)         Ordered     apixaban tablet 5 mg  2 times daily         02/07/22 1435     apixaban tablet 10 mg  2 times daily         02/07/22 1436     Reason for No Pharmacological VTE Prophylaxis  Once        Question:  Reasons:  Answer:  Already adequately anticoagulated on oral Anticoagulants    02/03/22 2120     IP VTE HIGH RISK PATIENT  Once         02/03/22 2120                  Department Hospital Medicine  [facility]  Walt Scales MD  Date of service: 02/07/2022

## 2022-02-07 NOTE — TELEPHONE ENCOUNTER
----- Message from Raegan Rodriguez MD sent at 2/6/2022  1:59 PM CST -----  Please schedule appointment with me on 2/14

## 2022-02-07 NOTE — PT/OT/SLP PROGRESS
Physical Therapy Treatment    Patient Name:  James Jiang   MRN:  [id number]    Recommendations:     Discharge Recommendations:  rehabilitation facility   Discharge Equipment Recommendations: none   Barriers to discharge: increase assist with mobility    Assessment:     James Jiang is a 61 y.o. male admitted with a medical diagnosis of Infected wound.  He presents with the following impairments/functional limitations:  weakness,impaired endurance,impaired self care skills,impaired functional mobilty,gait instability,impaired balance,decreased lower extremity function,decreased safety awareness,pain,impaired cardiopulmonary response to activity.    Pt found in bed with HOB elevated and girlfriend at bedside. Pt agreeable to visit. Pt performed bed mobility with CGA and bedside rail with HOB slightly elevated. Good sitting balance at EOB. Sit to stand min A x 2 x 2 trials at RW. Standing tolerance limited due to report of weakness.     Rehab Prognosis: Fair; patient would benefit from acute skilled PT services to address these deficits and reach maximum level of function.    Recent Surgery: Procedure(s) (LRB):  IRRIGATION AND DEBRIDEMENT (Right) 3 Days Post-Op    Plan:     During this hospitalization, patient to be seen 6 x/week to address the identified rehab impairments via gait training,therapeutic activities,therapeutic exercises,neuromuscular re-education and progress toward the following goals:    · Plan of Care Expires:  03/07/22    Subjective     Chief Complaint: weakness  Patient/Family Comments/goals: get stronger  Pain/Comfort:  · Pain Rating 1: other (see comments) (did not quantify)  · Location - Side 1: Right  · Location 1: hip  · Pain Addressed 1: Reposition,Distraction,Cessation of Activity      Objective:     Communicated with RN prior to session.  Patient found HOB elevated with telemetry,blanco catheter,peripheral IV upon PT entry to room.     General Precautions: Standard, fall,contact    Orthopedic Precautions:    Braces: N/A  Respiratory Status: Nasal cannula, flow 2 L/min     Functional Mobility:  · Bed Mobility:     · Supine to Sit: contact guard assistance  · Sit to Supine: contact guard assistance  · Transfers:     · Sit to Stand:  minimum assistance and of 2 persons with rolling walker      AM-PAC 6 CLICK MOBILITY          Therapeutic Activities and Exercises:   Pt educated on POC, discharge recommendation, importance of time OOB, proper form with bed mobility and transfers, need for assist with mobility, use of call bell to seek assistance as needed and fall prevention      Patient left HOB elevated with all lines intact, call button in reach, bed alarm on and girlfriend present..    GOALS:   Multidisciplinary Problems     Physical Therapy Goals        Problem: Physical Therapy Goal    Goal Priority Disciplines Outcome Goal Variances Interventions   Physical Therapy Goal     PT, PT/OT      Description: All PT goals to be met by discharge:    1. Supine to sit with Stand-by Assistance  2. Sit to stand transfer with Stand-by Assistance  3.. Bed to chair transfer with Supervision using Rolling Walker  4. Scooting left and right while sitting EOB supervision                      Time Tracking:     PT Received On: 02/07/22  PT Start Time: 1116     PT Stop Time: 1134  PT Total Time (min): 18 min     Billable Minutes: Therapeutic Activity 18    Treatment Type: Treatment  PT/PTA: PT     PTA Visit Number: 0     02/07/2022

## 2022-02-07 NOTE — PT/OT/SLP EVAL
Occupational Therapy   Evaluation    Name: James Jiang  MRN: [id number]  Admitting Diagnosis:  Infected wound  Recent Surgery: Procedure(s) (LRB):  IRRIGATION AND DEBRIDEMENT (Right) 3 Days Post-Op    Recommendations:     Discharge Recommendations: nursing facility, skilled  Discharge Equipment Recommendations:  none  Barriers to discharge:  Decreased caregiver support    Assessment:     James Jiang is a 61 y.o. male with a medical diagnosis of Infected wound.  He presents with general weakness. Performance deficits affecting function: weakness,impaired endurance,impaired self care skills,impaired functional mobilty,gait instability,impaired balance,decreased safety awareness.      Rehab Prognosis: Fair; patient would benefit from acute skilled OT services to address these deficits and reach maximum level of function.       Plan:     Patient to be seen 5 x/week to address the above listed problems via self-care/home management,therapeutic activities,therapeutic exercises  · Plan of Care Expires: 03/07/22  · Plan of Care Reviewed with: patient,caregiver    Subjective     Chief Complaint: General Weakness  Patient/Family Comments/goals: improve ADLs and functional mobility    Occupational Profile:  Living Environment: lives with girlfriend in a 1 story home with 1 step to enter.  Previous level of function: Patient was modified independent with self cares and ambulation until a month ago. Has become progressively week to the point of mostly bed bound.  Roles and Routines: limited homemaker, disabled  Equipment Used at Home:  walker, rolling,hospital bed,oxygen,shower chair  Assistance upon Discharge: Girlfriend, uses a wheelchair and works during the day.     Pain/Comfort:  · Pain Rating 1: 0/10  · Pain Rating Post-Intervention 1: 0/10    Patients cultural, spiritual, L[employer]-day conflicts given the current situation: no    Objective:     Communicated with: nurse prior to session.  Patient found HOB  elevated with telemetry,blanco catheter,peripheral IV upon OT entry to room.    General Precautions: Standard, fall,contact   Orthopedic Precautions:N/A   Braces: N/A  Respiratory Status: Nasal cannula, flow 1 1/2 L/min    Occupational Performance:    Bed Mobility:    · Patient completed Scooting/Bridging with minimum assistance  · Patient completed Supine to Sit with minimum assistance  · Patient completed Sit to Supine with minimum assistance   · Performed unsupported sitting EOB with contact guard assistance.    Functional Mobility/Transfers:  · Patient completed Sit <> Stand x1 trial with minimum assistance  with  hand-held assist and rolling walker     Activities of Daily Living:  · Grooming: contact guard assistance to wash face sitting EOB.  · Lower Body Dressing: total assistance to don socks sitting EOB.    Cognitive/Visual Perceptual:  Cognitive/Psychosocial Skills:     -       Oriented to: Person and Place   -       Follows Commands/attention:Follows one-step commands  -       Communication: clear/fluent  -       Memory: Impaired STM  -       Safety awareness/insight to disability: impaired   -       Mood/Affect/Coping skills/emotional control: Flat affect  Visual/Perceptual:      -Intact Acuity    Physical Exam:  Balance:    -       Sitting: Contact Guard, Standing: Minimal Assist  Upper Extremity Range of Motion:     -       Right Upper Extremity: WFL  -       Left Upper Extremity: WFL  Upper Extremity Strength:    -       Right Upper Extremity: 3+/5  -       Left Upper Extremity: 3+/5   Strength:    -       Right Upper Extremity: WFL  -       Left Upper Extremity: WFL  Fine Motor Coordination:    -       Intact    AMPAC 6 Click ADL:  AMPAC Total Score: 15    Treatment & Education:  Patient educated on the purpose of Occupational Therapy and the importance of getting OOB.  Education:    Patient left HOB elevated with all lines intact, call button in reach, bed alarm on and girlfriend  present    GOALS:   Multidisciplinary Problems     Occupational Therapy Goals        Problem: Occupational Therapy Goal    Goal Priority Disciplines Outcome Interventions   Occupational Therapy Goal     OT, PT/OT     Description: Goals to be met by: discharge     Patient will increase functional independence with ADLs by performing:    UE Dressing with Supervision.  LE Dressing with Supervision.  Grooming while sitting/standing at sink with Supervision.  Toileting from toilet with Supervision for hygiene and clothing management.   Toilet transfer to toilet with Supervision.                     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    Hypertension        Past Surgical History:   Procedure Laterality Date    denies pertinent surgical history      INTRAMEDULLARY RODDING OF FEMUR Right 11/7/2021    Procedure: INSERTION, INTRAMEDULLARY HENNA, FEMUR;  Surgeon: Matt Milian MD;  Location: [facility];  Service: Orthopedics;  Laterality: Right;    TOE AMPUTATION Right 10/12/2021    Procedure: AMPUTATION, TOE;  Surgeon: Milton Lauren DPM;  Location: [facility];  Service: Podiatry;  Laterality: Right;       Time Tracking:     OT Date of Treatment: 02/07/22  OT Start Time: 0909  OT Stop Time: 0942  OT Total Time (min): 33 min    Billable Minutes:Evaluation 3  Self Care/Home Management 15  Therapeutic Activity 15    2/7/2022

## 2022-02-07 NOTE — PROGRESS NOTES
"Progress Note  Infectious Disease    Reason for Consult:  Infected Right HIP    HPI: James Jiang is a 61 y.o. 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  Presented to the ER complaining of right wound surgical infection at the incision site.  He presents with severe right hip pain, with associated redness and swelling from his incision sites.  He status post ORIF of his right hip November 2021/Dr. Milian.  He did not follow as outpatient for suture removal.  Information given by significant other at bedside; patient with subjective fevers, chills and pus coming out of surgical wounds.  Patient complaining of dysuria and increased urinary frequency.  He denies headache, cough, shortness of breath, chest pain, nausea, vomiting, abdominal pain, or change in bowel movements.    In the ER, hemodynamically stable, afebrile.   Labs significant for WBC 11.8, PMN 73.5%  Glucose 283, potassium 3.3  UA grossly positive with pyuria greater than 100 many bacteria seen, urine culture with Staph aureus  Blood cultures on admission no growth to date pending final  CT pelvis with contrast showed localized right gluteal skin thickening and subcutaneous edema suggesting cellulitis, no soft tissue abscess.  ID consult for antibiotic recommendations.  02/05/2022 Dr. CANDELARIO no new complaints,  + some right hip pain, ESR 78, CRP 2.98--4.21.  Sx note reviewed:  "  Using the original incision, a scalpel was used to incise the tissue allowing for proper digital investigation of the wound. The wound had purulent drainage.  The remaining aspects of the wound were investigated for any loculations or abscesses"  02/06/2022, Dr. Reynolds.  Patient did not sleep much last night so he is sleepier today otherwise he feels improved in general.  The surgical area is tender to the surface and to palpation.  He can move the hip without any issues which is reassuring that " "hopefully his joint is not involved.  No fever no chills.  He gets easily forgetful add caregiver at bedside is very helpful.  Patient feels deconditioned and feels like he needs more physical therapy.  He set up at the edge of the bed with PT    2/7/22 (David):  Interim reviewed, discussed with Dr. Smith.  Patient seen and examined at bedside.  Patient says he is claustrophobic, unable to perform MRI to right foot.  Discussed with vascular surgery, patient needs procedure outpatient; severe vascular disease noted.  Incidental renal mass on CT scan seen.  Urine culture MRSA, right hip wound MRSA, and 2 sets of blood cultures negative.    Antibiotics (From admission, onward)                Start     Stop Route Frequency Ordered    02/05/22 1800  vancomycin 1.25 g in dextrose 5% 250 mL IVPB (ready to mix)        Note to Pharmacy: Ht: 5' 8" (1.727 m)  Wt: 91.9 kg (202 lb 9.6 oz)  Estimated Creatinine Clearance: 166.4 mL/min (based on SCr of 0.5 mg/dL).  Body mass index is 30.81 kg/m².    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    Hypertension      Past Surgical History:   Procedure Laterality Date    denies pertinent surgical history      INTRAMEDULLARY RODDING OF FEMUR Right 11/7/2021    Procedure: INSERTION, INTRAMEDULLARY HENNA, FEMUR;  Surgeon: Matt Milian MD;  Location: [facility];  Service: Orthopedics;  Laterality: Right;    TOE AMPUTATION Right 10/12/2021    Procedure: AMPUTATION, TOE;  Surgeon: Milton Lauren DPM;  Location: [facility];  Service: Podiatry;  Laterality: " Right;       Pertinent medications noted:     Review of Systems:   As mentioned on HPI    EXAM & DIAGNOSTICS REVIEWED:   Vitals:     Temp:  [97.7 °F (36.5 °C)-98.3 °F (36.8 °C)]   Temp: 98.3 °F (36.8 °C) (02/07/22 0530)  Pulse: 78 (02/07/22 0708)  Resp: 18 (02/07/22 0708)  BP: 129/83 (02/07/22 0530)  SpO2: 97 % (02/07/22 0708)    Intake/Output Summary (Last 24 hours) at 2/7/2022 0826  Last data filed at 2/7/2022 0629  Gross per 24 hour   Intake 620 ml   Output 1800 ml   Net -1180 ml       General:  In NAD. Alert and attentive, cooperative, comfortable on O2 by nasal cannula 2 L  Eyes:  Anicteric,   ENT:  No ulcers, exudates, thrush, nares patent, edentulous  Neck:  Supple  Lungs: Clear to auscultation b/l  Heart:  S1/S2+, regular rhythm, no murmurs  Abd:  +BS, soft, non tender, non distended, no rebound  :  Donis, clear urine  Musc:  Right hip surgical dressing is not removed because it is tightly placed so the surrounding looks great.  Minimal tenderness upon pressing, no pain on the hip itself.    Other joints without effusion, swelling, erythema, synovitis  Skin:  Warm, no rash  Wound:   Neuro: Following commands, no acute focal deficit   Psych:  Calm, cooperative  Lymphatic:       Extrem: No LE edema b/l    S/p right great toe amputation, redness noted on 3rd toe  Left dorsalis pedis is present' Right dorsalis pedis is missing, foot is warm  VAD:         Isolation:   02/06/2022, no surrounding cellulitis.  02/05-- dressing is not disturbed, the area around is healthy  2/3:                  General Labs reviewed:  Recent Labs   Lab 02/05/22  0558 02/06/22  0653 02/07/22  0610   WBC 9.52 9.57 9.22   HGB 11.0* 10.9* 10.9*   HCT 37.7* 36.3* 36.8*    401 412       Recent Labs   Lab 02/03/22  1700 02/04/22  0543 02/05/22  0558 02/06/22  0652 02/07/22  0610      < > 137 137 139   K 3.3*   < > 3.5 3.6 3.6   CL 98   < > 97 95 96   CO2 25   < > 31* 34* 32*   BUN 8   < > 9 10 7*   CREATININE 0.5   < > 0.7  0.6 0.5   CALCIUM 9.1   < > 8.6* 8.8 9.0   PROT 7.2  --   --   --   --    BILITOT 0.3  --   --   --   --    ALKPHOS 112  --   --   --   --    ALT 11  --   --   --   --    AST 12  --   --   --   --     < > = values in this interval not displayed.     Recent Labs   Lab 02/04/22  1256 02/05/22  0558   CRP 2.98* 4.21*     Recent Labs   Lab 02/04/22  1256   SEDRATE 78*   Urinalysis Microscopic     Status: Final result    Component Ref Range & Units 02/03/22 1701   RBC, UA 0 - 4 /hpf 12 High     WBC, UA 0 - 5 /hpf >100 High     Bacteria None-Occ /hpf Many Abnormal     Yeast, UA None None    Squam Epithel, UA /hpf 1    Hyaline Casts, UA 0-1/lpf /lpf 127 Abnormal     Microscopic Comment  SEE COMMENT             Estimated Creatinine Clearance: 166.4 mL/min (based on SCr of 0.5 mg/dL).     Micro:  Microbiology Results (last 7 days)       Procedure Component Value Units Date/Time    Gram stain [[id number]] Collected: 02/04/22 1626    Order Status: Completed Specimen: Wound from Leg, Right Updated: 02/07/22 0813     Gram Stain Result Many WBC's      Many Gram positive cocci in clusters    Narrative:      Right thigh abscess 2    Gram stain [[id number]] Collected: 02/04/22 1626    Order Status: Completed Specimen: Wound from Leg, Right Updated: 02/07/22 0812     Gram Stain Result Many WBC's      Many Gram positive cocci in clusters    Narrative:      Right thigh abscess 1    Aerobic culture [[id number]]  (Abnormal) Collected: 02/04/22 0848    Order Status: Completed Specimen: Wound from Hip, Right Updated: 02/07/22 0809     Aerobic Bacterial Culture METHICILLIN RESISTANT STAPHYLOCOCCUS AUREUS  Many  For susceptibility see order # [id number]      Aerobic culture (Specify Source) **CANNOT BE ORDERED AS STAT** [[id number]]  (Abnormal)  (Susceptibility) Collected: 02/03/22 1700    Order Status: Completed Specimen: Wound from Hip, Right Updated: 02/07/22 0808     Aerobic Bacterial Culture METHICILLIN RESISTANT STAPHYLOCOCCUS  AUREUS  Many  Known MRSA patient      Culture, Anaerobic [[id number]] Collected: 02/04/22 1626    Order Status: Completed Specimen: Wound from Leg, Right Updated: 02/07/22 0759     Anaerobic Culture No anaerobes isolated    Narrative:      Right thigh abscess 1    Culture, Anaerobic [[id number]] Collected: 02/04/22 1626    Order Status: Completed Specimen: Wound from Leg, Right Updated: 02/07/22 0759     Anaerobic Culture No anaerobes isolated    Narrative:      Right thigh abscess 2    Blood culture #2 [[id number]] Collected: 02/03/22 1701    Order Status: Completed Specimen: Blood from Peripheral, Hand, Left Updated: 02/06/22 1832     Blood Culture, Routine No Growth to date      No Growth to date      No Growth to date      No Growth to date    Narrative:      Blood Culture #2    Blood culture #1 [[id number]] Collected: 02/03/22 1645    Order Status: Completed Specimen: Blood from Peripheral, Hand, Left Updated: 02/06/22 1832     Blood Culture, Routine No Growth to date      No Growth to date      No Growth to date      No Growth to date    Narrative:      Blood Culture #1    Aerobic culture [[id number]]  (Abnormal) Collected: 02/04/22 1626    Order Status: Completed Specimen: Wound from Leg, Right Updated: 02/06/22 0842     Aerobic Bacterial Culture METHICILLIN RESISTANT STAPHYLOCOCCUS AUREUS  For susceptibility see order # [id number]  Known MRSA patient      Narrative:      Right thigh abscess 2    Aerobic culture [[id number]]  (Abnormal)  (Susceptibility) Collected: 02/04/22 1626    Order Status: Completed Specimen: Wound from Leg, Right Updated: 02/06/22 0638     Aerobic Bacterial Culture METHICILLIN RESISTANT STAPHYLOCOCCUS AUREUS  Many  Known MRSA patient      Narrative:      Right thigh abscess 1    Urine culture [[id number]]  (Abnormal)  (Susceptibility) Collected: 02/03/22 1701    Order Status: Completed Specimen: Urine Updated: 02/05/22 1352     Urine Culture, Routine METHICILLIN RESISTANT STAPHYLOCOCCUS  AUREUS  >100,000cfu/ml  Results called to and read back by Zahra Carranza LPN on 1E, re: MRSA   isolated 02/04/2022 15:36 vcb  isolated 02/04/2022 15:36 vcb      Narrative:      Specimen Source->Urine    Fungus culture [[id number]] Collected: 02/04/22 1626    Order Status: Sent Specimen: Wound from Leg, Right Updated: 02/04/22 1703    Fungus culture [[id number]] Collected: 02/04/22 1626    Order Status: Sent Specimen: Wound from Leg, Right Updated: 02/04/22 1703    Culture, Body Fluid (Aerobic) w/ GS [[id number]] Collected: 02/04/22 0848    Order Status: Canceled Specimen: Body Fluid from Hip, Right             Imaging Reviewed:    CT pelvis  ART US  Elevated velocities within the right common femoral artery compatible with high-grade luminal stenosis.  Elevated systolic velocities within the proximal left superficial femoral artery suggesting hemodynamically significant narrowing.  Scattered bilateral atheromatous changes.    US   4 cm solid left renal mass compatible with primary renal neoplasm.  Probable nonobstructing left upper pole renal calculus.  Additional observations as above.    CTA  1. 50% stenosis at origin of right external iliac artery.   2. 70% right CFA stenosis, due to heavily calcified plaque.   3. 70-90% right SFA stenosis, also involving the right profunda femoral artery origin.   4. Three-vessel right calf runoff with atherosclerotic disease and suspected multifocal 50% stenosis involving proximal anterior tibial and origins of peroneal and posterior tibial arteries as well as right TP trunk.   5. 70% left CFA stenosis due to heavily calcified plaque.   6. 70-80% left SFA stenosis at origin, with additional 50-70% stenosis distally.   7. 50% left popliteal artery stenosis, with additional involvement left TP trunk and proximal anterior tibial and peroneal arteries. Three-vessel runoff does occur in left calf.   8. Abrupt occlusion of right foot plantar artery at level of midfoot, and no reliable  opacification of left foot plantar arteries.   9. 50-70% stenosis at heavily calcified origin of celiac axis.   10. 50% SMA origin stenosis.   11. 50-70% main right renal artery stenosis.   12. Solid enhancing 4.7 cm left renal mass. This represents renal cell carcinoma until proven otherwise. Urologic consultation is recommended.       Cardiology:       IMPRESSION & PLAN     1. Severe cellulitis/abscess, due to MRSA, right hip in the setting of right hip replacement ORIF November 2021/Dr. Milian   Ortho recommendations appreciated; cellulitis, no abscess    2. MRSA UTI in the setting of Donis catheter   Blood cultures x 2 no growth     3. 3rd right toe antd right 1st MT wounds; not healing wel  4. PAD right worse than left  5. Poorly controlled diabetes, hemoglobin A1c 11.1%  6. AFib, HTN, COPD, depression/anxiety  7. Smoker  8. 4 cm solid left renal mass compatible with primary renal neoplasm.  As per hospitalist    High risk for deterioration    Recommendations:  Continue vancomycin IV, keep level 15-20  Discussed with vascular, patient needs intervention outpatient  Regarding right 3rd toe chronic wound, patient says he is claustrophobic and would not tolerate MRI will order nuclear scan to rule out osteomyelitis  Patient will need likely IR guided kidney biopsy for incidental left renal mass found on CT scan  PT/OT precautions  Tight glucose control  Aspiration    Will follow from periphery    Discussed with Dr. Scales, Vascular surgery      Medical Decision Making during this encounter was  [_] Low Complexity  [_] Moderate Complexity  [xx] High Complexity

## 2022-02-07 NOTE — PLAN OF CARE
Problem: Infection  Goal: Absence of Infection Signs and Symptoms  Outcome: Ongoing, Progressing     Problem: Adult Inpatient Plan of Care  Goal: Patient-Specific Goal (Individualized)  Outcome: Ongoing, Progressing  Goal: Optimal Comfort and Wellbeing  Outcome: Ongoing, Progressing

## 2022-02-07 NOTE — PROGRESS NOTES
[facility] Medicine  Progress Note    Patient name: James Jiang  MRN: [id number]  Admit Date: 2/3/2022   LOS: 3 days     SUBJECTIVE:     Principal problem: Infected wound    Interval History:  Patient c/o constipation, although he had BM today, he states its hard for him to do it.    Scheduled Meds:   apixaban  10 mg Oral BID    aspirin  162 mg Oral Daily    atorvastatin  20 mg Oral Daily    FLUoxetine  10 mg Oral Daily    fluticasone furoate-vilanteroL  1 puff Inhalation Daily    furosemide  20 mg Oral Daily    insulin aspart U-100  16 Units Subcutaneous TIDWM    insulin detemir U-100  50 Units Subcutaneous Daily    ipratropium  1 puff Inhalation Daily    midodrine  5 mg Oral Q8H    senna-docusate 8.6-50 mg  2 tablet Oral BID    tamsulosin  0.4 mg Oral Daily    vancomycin (VANCOCIN) IVPB  1,250 mg Intravenous Q12H     Continuous Infusions:   [START ON 2/7/2022] sodium chloride 0.9%       PRN Meds:acetaminophen, albuterol sulfate, ALPRAZolam, dextrose 50%, glucagon (human recombinant), HYDROcodone-acetaminophen, insulin aspart U-100, melatonin, morphine, ondansetron, sodium chloride 0.9%, sodium chloride 0.9%, Pharmacy to dose Vancomycin consult **AND** vancomycin - pharmacy to dose    Review of patient's allergies indicates:  No Known Allergies    Review of Systems: As per interval history    OBJECTIVE:     Vital Signs (Most Recent)  Temp: 97.8 °F (36.6 °C) (02/06/22 1630)  Pulse: 84 (02/06/22 1630)  Resp: 18 (02/06/22 1630)  BP: 106/75 (02/06/22 1630)  SpO2: 95 % (02/06/22 1630)    Vital Signs Range (Last 24H):  Temp:  [97.7 °F (36.5 °C)-98.4 °F (36.9 °C)]   Pulse:  [72-86]   Resp:  [14-18]   BP: ()/(66-81)   SpO2:  [95 %-99 %]     I & O (Last 24H):    Intake/Output Summary (Last 24 hours) at 2/6/2022 1842  Last data filed at 2/6/2022 1708  Gross per 24 hour   Intake 620 ml   Output 1500 ml   Net -880 ml       Physical Exam:  General: Patient resting comfortably in  no acute distress. Appears as stated age. Calm. Obese. Donis  Eyes: EOM intact. No conjunctivae injection. No scleral icterus.  ENT: Hearing grossly intact. No discharge from ears. No nasal discharge.   CVS: RRR. No LE edema BL.  Lungs: CTA BL, no wheezing or crackles. Good breath sounds. No accessory muscle use. No acute respiratory distress  Neuro: Alert. Cranial nerves grossly intact. Moves all extremities equally. Follows commands. Responds appropriately   Psych: Mood, behavior, thought content and judgement normal                   Laboratory:  I have reviewed all pertinent lab results within the past 24 hours.  CBC:   Recent Labs   Lab 02/06/22  0653   WBC 9.57   RBC 4.13*   HGB 10.9*   HCT 36.3*      MCV 88   MCH 26.4*   MCHC 30.0*     CMP:   Recent Labs   Lab 02/03/22  1700 02/04/22  0543 02/06/22  0652   *   < > 216*   CALCIUM 9.1   < > 8.8   ALBUMIN 3.0*  --   --    PROT 7.2  --   --       < > 137   K 3.3*   < > 3.6   CO2 25   < > 34*   CL 98   < > 95   BUN 8   < > 10   CREATININE 0.5   < > 0.6   ALKPHOS 112  --   --    ALT 11  --   --    AST 12  --   --    BILITOT 0.3  --   --     < > = values in this interval not displayed.     Microbiology Results (last 7 days)     Procedure Component Value Units Date/Time    Blood culture #2 [[id number]] Collected: 02/03/22 1701    Order Status: Completed Specimen: Blood from Peripheral, Hand, Left Updated: 02/06/22 1832     Blood Culture, Routine No Growth to date      No Growth to date      No Growth to date      No Growth to date    Narrative:      Blood Culture #2    Blood culture #1 [[id number]] Collected: 02/03/22 1645    Order Status: Completed Specimen: Blood from Peripheral, Hand, Left Updated: 02/06/22 1832     Blood Culture, Routine No Growth to date      No Growth to date      No Growth to date      No Growth to date    Narrative:      Blood Culture #1    Aerobic culture [[id number]]  (Abnormal) Collected: 02/04/22 0848    Order Status:  Completed Specimen: Wound from Hip, Right Updated: 02/06/22 0843     Aerobic Bacterial Culture STAPHYLOCOCCUS AUREUS  Many  For susceptibility see order # [id number]      Aerobic culture [[id number]]  (Abnormal) Collected: 02/04/22 1626    Order Status: Completed Specimen: Wound from Leg, Right Updated: 02/06/22 0842     Aerobic Bacterial Culture METHICILLIN RESISTANT STAPHYLOCOCCUS AUREUS  For susceptibility see order # [id number]  Known MRSA patient      Narrative:      Right thigh abscess 2    Aerobic culture [[id number]]  (Abnormal)  (Susceptibility) Collected: 02/04/22 1626    Order Status: Completed Specimen: Wound from Leg, Right Updated: 02/06/22 0638     Aerobic Bacterial Culture METHICILLIN RESISTANT STAPHYLOCOCCUS AUREUS  Many  Known MRSA patient      Narrative:      Right thigh abscess 1    Aerobic culture (Specify Source) **CANNOT BE ORDERED AS STAT** [[id number]]  (Abnormal) Collected: 02/03/22 1700    Order Status: Completed Specimen: Wound from Hip, Right Updated: 02/05/22 1413     Aerobic Bacterial Culture STAPHYLOCOCCUS AUREUS  Many  Susceptibility pending  Known MRSA patient      Urine culture [[id number]]  (Abnormal)  (Susceptibility) Collected: 02/03/22 1701    Order Status: Completed Specimen: Urine Updated: 02/05/22 1352     Urine Culture, Routine METHICILLIN RESISTANT STAPHYLOCOCCUS AUREUS  >100,000cfu/ml  Results called to and read back by Zahra Carranza LPN on 1E, re: MRSA   isolated 02/04/2022 15:36 vcb  isolated 02/04/2022 15:36 vcb      Narrative:      Specimen Source->Urine    Gram stain [[id number]] Collected: 02/04/22 1626    Order Status: Completed Specimen: Wound from Leg, Right Updated: 02/05/22 1337     Gram Stain Result Many WBC's      Many Gram positive cocci in clusters    Narrative:      Right thigh abscess 1    Gram stain [[id number]] Collected: 02/04/22 1626    Order Status: Completed Specimen: Wound from Leg, Right Updated: 02/05/22 1336     Gram Stain Result Many WBC's      Many  Gram positive cocci in clusters    Narrative:      Right thigh abscess 2    Fungus culture [[id number]] Collected: 02/04/22 1626    Order Status: Sent Specimen: Wound from Leg, Right Updated: 02/04/22 1703    Culture, Anaerobic [[id number]] Collected: 02/04/22 1626    Order Status: Sent Specimen: Wound from Leg, Right Updated: 02/04/22 1703    Fungus culture [[id number]] Collected: 02/04/22 1626    Order Status: Sent Specimen: Wound from Leg, Right Updated: 02/04/22 1703    Culture, Anaerobic [[id number]] Collected: 02/04/22 1626    Order Status: Sent Specimen: Wound from Leg, Right Updated: 02/04/22 1702    Culture, Body Fluid (Aerobic) w/ GS [[id number]] Collected: 02/04/22 0848    Order Status: Canceled Specimen: Body Fluid from Hip, Right           Diagnostic Results:  Labs: Reviewed  ECG: Reviewed  CT: Reviewed    ASSESSMENT/PLAN:     Post op Wound infection on right hip s/p Deep irrigation and debridement on 02/04/2022, growing MRSA     status post ORIF of his right hip November 2021/Dr. Milian. Per recrods, patient did not show up for follow up for suture removal     4 cm solid left renal mass compatible with primary renal neoplasm.     MRSA UTI     PAD     Smoker         Active Hospital Problems     Diagnosis   POA    *Infected wound [T14.8XXA, L08.9]   Yes    Paroxysmal atrial fibrillation [I48.0]   Yes       Chronic    COPD (chronic obstructive pulmonary disease) [J44.9]   Yes    Type 2 diabetes mellitus with hyperglycemia [E11.65]   Yes       Resolved Hospital Problems   No resolved problems to display.               Plan:   Follow up ID recommendaiotns: Vancomycin, d/c cefepime IV, eventually start Doxycicline. Input apprecaited.  Urology recommendaionts: Follow up on 02/14/2022 for renal mass work up. Input appreciated.  Hem-onc recommendaitons: Follow up with Urology for work up of mass. Follow up as outpaeitn in 2 weeks. Input appreciated.  Vascualre surgery recommendaoitns: Will evaluate for possible  angiogram, NPO after midnihgt, input appreciated.  Follow up Blood cultures   Follow up  Wound culture.  Pain control:Norco 5 mg q 6 h prns, Morphine IV 2 mg q 4h prn with holding parameters.  Wound care  Continue home medication  Consult Orthopedics for appropiate time to re-start xarelto.  Patient moderate risk for complication in the setting of moderate risk procedure.  Medically stable for surgery. Risk of bleeding with surgical intervention due to patient be on Xarelto prior to hospitalization.        Department Hospital Medicine  [facility]  Walt Scales MD  Date of service: 02/06/2022

## 2022-02-07 NOTE — CARE UPDATE
02/06/22 2105   Patient Assessment/Suction   Level of Consciousness (AVPU) alert   Respiratory Effort Unlabored   Expansion/Accessory Muscles/Retractions expansion symmetric;no retractions;no use of accessory muscles   All Lung Fields Breath Sounds diminished   Rhythm/Pattern, Respiratory pattern regular;unlabored;no shortness of breath reported   PRE-TX-O2   O2 Device (Oxygen Therapy) nasal cannula w/ humidification   $ Is the patient on Low Flow Oxygen? Yes   Flow (L/min) 2   SpO2 97 %   Pulse Oximetry Type Intermittent   $ Pulse Oximetry - Multiple Charge Pulse Oximetry - Multiple   Pulse 87   Resp 16   Aerosol Therapy   $ Aerosol Therapy Charges PRN treatment not required   Respiratory Interventions   Cough And Deep Breathing done with encouragement   Breathing Techniques/Airway Clearance deep/controlled cough encouraged;diaphragmatic breathing promoted;pursed-lip breathing encouraged   Education   $ Education Bronchodilator;Other (see comment);15 min  (prn tx,deep diaphragmatic breathing exercises)   Respiratory Evaluation   $ Care Plan Tech Time 15 min   $ Eval/Re-eval Charges Re-evaluation   Evaluation For   (care plan)

## 2022-02-07 NOTE — CONSULTS
[facility] hospital  Vascular Surgery  Consult Note    Inpatient consult to Vascular Surgery  Consult performed by: Ali Khoobehi, MD  Consult ordered by: Walt Scales MD        Subjective:     Chief Complaint/Reason for Admission: infection    History of Present Illness:  Patient admitted with purulence drainage from right hip surgery site.  Patient has a history of right hallux amputation which is healed.  He has a right 3rd toe ulcer with redness.  CTA demonstrates severe PAD, and we are consulted for evaluation.  Patient has no other complaints.    Medications Prior to Admission   Medication Sig Dispense Refill Last Dose    albuterol (PROVENTIL HFA) 90 mcg/actuation inhaler Inhale 2 puffs into the lungs every 6 (six) hours as needed for Wheezing. Rescue 18 g 0 Past Month at Unknown time    ALPRAZolam (XANAX) 0.5 MG tablet Take 0.5 mg by mouth 2 (two) times daily as needed.   2/3/2022 at 08:00    aspirin 81 MG Chew Take 162 mg by mouth once daily.   2/2/2022 at 20:00    clonazePAM (KLONOPIN) 1 MG tablet Take 1 mg by mouth every evening. HCS   2/2/2022 at 20:00    collagenase (SANTYL) ointment Apply topically once daily. (Patient taking differently: Apply 1 g topically once daily.) 30 g 0 Past Week at Unknown time    FLUoxetine 10 MG capsule Take 10 mg by mouth once daily. HCS   2/3/2022 at 08:00    furosemide (LASIX) 20 MG tablet Take 1 tablet (20 mg total) by mouth once daily for 14 days 14 tablet 0 2/3/2022 at 08:00    glipiZIDE (GLUCOTROL) 5 MG tablet Take 5 mg by mouth daily with breakfast.    2/3/2022 at 08:00    insulin (LANTUS SOLOSTAR U-100 INSULIN) glargine 100 units/mL (3mL) SubQ pen Inject 50 Units into the skin every evening.  0 2/2/2022 at 20:00    insulin aspart U-100 (NOVOLOG) 100 unit/mL (3 mL) InPn pen Inject 5 Units into the skin 3 (three) times daily. 15 mL 1 2/3/2022 at 08:00    ipratropium (ATROVENT HFA) 17 mcg/actuation inhaler Inhale 1 puff into the lungs once daily.     "Past Month at Unknown time    metFORMIN (GLUCOPHAGE) 500 MG tablet Take 500 mg by mouth 2 (two) times a day.    2/3/2022 at 08:00    midodrine (PROAMATINE) 2.5 MG Tab Take 2 tablets (5 mg total) by mouth every 8 (eight) hours. 90 tablet 0 2/3/2022 at 08:00    mometasone-formoterol (DULERA) 200-5 mcg/actuation inhaler Inhale 2 puffs into the lungs 2 (two) times daily. Controller 13 g 0 Past Month at Unknown time    rivaroxaban (XARELTO) 20 mg Tab Take 1 tablet (20 mg total) by mouth daily with dinner or evening meal. 60 tablet 2 2/2/2022 at 20:00    simvastatin (ZOCOR) 40 MG tablet Take 40 mg by mouth once daily.    2/3/2022 at 08:00    tamsulosin (FLOMAX) 0.4 mg Cap Take 0.4 mg by mouth once daily.    2/3/2022 at 08:00    albuterol (PROVENTIL/VENTOLIN HFA) 90 mcg/actuation inhaler Inhale 2 puffs into the lungs every 6 (six) hours as needed.        cefUROXime (CEFTIN) 500 MG tablet Take 1 tablet (500 mg total) by mouth every 12 (twelve) hours. 20 tablet 0     pen needle, diabetic 31 gauge x 5/16" Ndle Use with insulin up to three times daily 100 each 0 Unknown at Unknown time    traZODone (DESYREL) 150 MG tablet Take 150 mg by mouth nightly.           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    Hypertension      Past Surgical History:   Procedure Laterality Date    denies pertinent surgical history      INTRAMEDULLARY RODDING OF FEMUR Right 11/7/2021    Procedure: INSERTION, INTRAMEDULLARY HENNA, FEMUR;  Surgeon: Matt Milian MD;  Location: [facility] OR;  Service: Orthopedics;  Laterality: Right;    TOE AMPUTATION Right 10/12/2021    Procedure: AMPUTATION, TOE;  Surgeon: Milton Lauren DPM;  Location: [facility] OR;  Service: Podiatry;  Laterality: Right;     Family History     Problem " Relation (Age of Onset)    Hypertension Father        Tobacco Use    Smoking status: Former Smoker    Smokeless tobacco: Never Used   Substance and Sexual Activity    Alcohol use: Not Currently    Drug use: Not Currently    Sexual activity: Not on file     Review of Systems   Constitutional: Negative.  Negative for chills and fever.   Respiratory: Negative for cough and shortness of breath.    Cardiovascular: Positive for leg swelling. Negative for chest pain.   All other systems reviewed and are negative.    Objective:     Vital Signs (Most Recent):  Temp: 98 °F (36.7 °C) (02/07/22 1145)  Pulse: 89 (02/07/22 1145)  Resp: 18 (02/07/22 1145)  BP: (!) 90/58 (02/07/22 1145)  SpO2: 97 % (02/07/22 1145) Vital Signs (24h Range):  Temp:  [97.6 °F (36.4 °C)-98.3 °F (36.8 °C)] 98 °F (36.7 °C)  Pulse:  [78-89] 89  Resp:  [16-20] 18  SpO2:  [95 %-98 %] 97 %  BP: ()/(58-83) 90/58     Weight: 91.9 kg (202 lb 9.6 oz)  Body mass index is 30.81 kg/m².        Physical Exam  Vitals and nursing note reviewed.   Constitutional:       Appearance: Normal appearance.   HENT:      Head: Normocephalic.   Cardiovascular:      Rate and Rhythm: Normal rate and regular rhythm.      Pulses:           Femoral pulses are 2+ on the right side and 2+ on the left side.       Dorsalis pedis pulses are detected w/ Doppler on the right side and detected w/ Doppler on the left side.        Posterior tibial pulses are detected w/ Doppler on the right side and detected w/ Doppler on the left side.      Heart sounds: Normal heart sounds.   Pulmonary:      Effort: Pulmonary effort is normal.      Breath sounds: Normal breath sounds.   Abdominal:      Palpations: Abdomen is soft.      Tenderness: There is no abdominal tenderness.   Skin:     Capillary Refill: Capillary refill takes 2 to 3 seconds.   Neurological:      General: No focal deficit present.      Mental Status: He is alert.         Significant Labs:  CBC:   Recent Labs   Lab  02/07/22  0610   WBC 9.22   RBC 4.25*   HGB 10.9*   HCT 36.8*      MCV 87   MCH 25.6*   MCHC 29.6*     CMP:   Recent Labs   Lab 02/07/22  0610   *   CALCIUM 9.0      K 3.6   CO2 32*   CL 96   BUN 7*   CREATININE 0.5     Coagulation: No results for input(s): LABPROT, INR, APTT in the last 48 hours.    Significant Diagnostics:  I have reviewed all pertinent imaging results/findings within the past 24 hours.    Assessment/Plan:   Patient has severe PID with calcific right common femoral artery disease extending into the proximal profundus.  Patient would benefit from right common femoral endarterectomy and profundoplasty.  This surgery should wait till he is cleared of infectious issues, as it is not urgent but will help him heal his foot ulcer.  Patient is also seeing Oncology for this finding of the renal mass.    I recommend that he come to my office as an outpatient to be scheduled for right common femoral endarterectomy once his acute infectious issues are resolved.  Patient understands and is agreeable.    Active Diagnoses:    Diagnosis Date Noted POA    PRINCIPAL PROBLEM:  Infected wound [T14.8XXA, L08.9] 02/03/2022 Yes    Left renal mass [N28.89]  Unknown    Paroxysmal atrial fibrillation [I48.0] 11/08/2021 Yes     Chronic    COPD (chronic obstructive pulmonary disease) [J44.9] 11/06/2021 Yes    Type 2 diabetes mellitus with hyperglycemia [E11.65]  Yes      Problems Resolved During this Admission:       Thank you for your consult. I will sign off. Please contact us if you have any additional questions.    Ali Khoobehi, MD  Vascular Surgery  [facility]

## 2022-02-08 LAB
ANION GAP SERPL CALC-SCNC: 12 MMOL/L (ref 8–16)
BACTERIA BLD CULT: NORMAL
BACTERIA BLD CULT: NORMAL
BACTERIA SPEC AEROBE CULT: ABNORMAL
BASOPHILS # BLD AUTO: 0.07 K/UL (ref 0–0.2)
BASOPHILS NFR BLD: 0.7 % (ref 0–1.9)
BUN SERPL-MCNC: 9 MG/DL (ref 8–23)
CALCIUM SERPL-MCNC: 9 MG/DL (ref 8.7–10.5)
CHLORIDE SERPL-SCNC: 92 MMOL/L (ref 95–110)
CO2 SERPL-SCNC: 33 MMOL/L (ref 23–29)
CREAT SERPL-MCNC: 0.6 MG/DL (ref 0.5–1.4)
DIFFERENTIAL METHOD: ABNORMAL
EOSINOPHIL # BLD AUTO: 0.3 K/UL (ref 0–0.5)
EOSINOPHIL NFR BLD: 2.9 % (ref 0–8)
ERYTHROCYTE [DISTWIDTH] IN BLOOD BY AUTOMATED COUNT: 14.9 % (ref 11.5–14.5)
EST. GFR  (AFRICAN AMERICAN): >60 ML/MIN/1.73 M^2
EST. GFR  (NON AFRICAN AMERICAN): >60 ML/MIN/1.73 M^2
GLUCOSE SERPL-MCNC: 238 MG/DL (ref 70–110)
GLUCOSE SERPL-MCNC: 247 MG/DL (ref 70–110)
GLUCOSE SERPL-MCNC: 261 MG/DL (ref 70–110)
GLUCOSE SERPL-MCNC: 305 MG/DL (ref 70–110)
GLUCOSE SERPL-MCNC: 56 MG/DL (ref 70–110)
GLUCOSE SERPL-MCNC: 87 MG/DL (ref 70–110)
HCT VFR BLD AUTO: 37.4 % (ref 40–54)
HGB BLD-MCNC: 11.1 G/DL (ref 14–18)
IMM GRANULOCYTES # BLD AUTO: 0.04 K/UL (ref 0–0.04)
IMM GRANULOCYTES NFR BLD AUTO: 0.4 % (ref 0–0.5)
LYMPHOCYTES # BLD AUTO: 1.9 K/UL (ref 1–4.8)
LYMPHOCYTES NFR BLD: 20.1 % (ref 18–48)
MAGNESIUM SERPL-MCNC: 1.6 MG/DL (ref 1.6–2.6)
MCH RBC QN AUTO: 26.2 PG (ref 27–31)
MCHC RBC AUTO-ENTMCNC: 29.7 G/DL (ref 32–36)
MCV RBC AUTO: 88 FL (ref 82–98)
MONOCYTES # BLD AUTO: 0.8 K/UL (ref 0.3–1)
MONOCYTES NFR BLD: 8.4 % (ref 4–15)
NEUTROPHILS # BLD AUTO: 6.5 K/UL (ref 1.8–7.7)
NEUTROPHILS NFR BLD: 67.5 % (ref 38–73)
NRBC BLD-RTO: 0 /100 WBC
PLATELET # BLD AUTO: 456 K/UL (ref 150–450)
PMV BLD AUTO: 9.7 FL (ref 9.2–12.9)
POTASSIUM SERPL-SCNC: 4 MMOL/L (ref 3.5–5.1)
RBC # BLD AUTO: 4.23 M/UL (ref 4.6–6.2)
SODIUM SERPL-SCNC: 137 MMOL/L (ref 136–145)
WBC # BLD AUTO: 9.58 K/UL (ref 3.9–12.7)

## 2022-02-08 PROCEDURE — 85025 COMPLETE CBC W/AUTO DIFF WBC: CPT | Performed by: FAMILY MEDICINE

## 2022-02-08 PROCEDURE — 99232 SBSQ HOSP IP/OBS MODERATE 35: CPT | Mod: ,,, | Performed by: STUDENT IN AN ORGANIZED HEALTH CARE EDUCATION/TRAINING PROGRAM

## 2022-02-08 PROCEDURE — 25000003 PHARM REV CODE 250: Performed by: FAMILY MEDICINE

## 2022-02-08 PROCEDURE — 94761 N-INVAS EAR/PLS OXIMETRY MLT: CPT

## 2022-02-08 PROCEDURE — 25000003 PHARM REV CODE 250: Performed by: NURSE PRACTITIONER

## 2022-02-08 PROCEDURE — 63600175 PHARM REV CODE 636 W HCPCS: Performed by: INTERNAL MEDICINE

## 2022-02-08 PROCEDURE — 97530 THERAPEUTIC ACTIVITIES: CPT

## 2022-02-08 PROCEDURE — 97535 SELF CARE MNGMENT TRAINING: CPT

## 2022-02-08 PROCEDURE — 12000002 HC ACUTE/MED SURGE SEMI-PRIVATE ROOM

## 2022-02-08 PROCEDURE — 83735 ASSAY OF MAGNESIUM: CPT | Performed by: FAMILY MEDICINE

## 2022-02-08 PROCEDURE — 27000221 HC OXYGEN, UP TO 24 HOURS

## 2022-02-08 PROCEDURE — 99232 PR SUBSEQUENT HOSPITAL CARE,LEVL II: ICD-10-PCS | Mod: ,,, | Performed by: STUDENT IN AN ORGANIZED HEALTH CARE EDUCATION/TRAINING PROGRAM

## 2022-02-08 PROCEDURE — 80048 BASIC METABOLIC PNL TOTAL CA: CPT | Performed by: FAMILY MEDICINE

## 2022-02-08 PROCEDURE — 99900035 HC TECH TIME PER 15 MIN (STAT)

## 2022-02-08 PROCEDURE — 25000003 PHARM REV CODE 250: Performed by: INTERNAL MEDICINE

## 2022-02-08 PROCEDURE — 94799 UNLISTED PULMONARY SVC/PX: CPT

## 2022-02-08 PROCEDURE — 82962 GLUCOSE BLOOD TEST: CPT

## 2022-02-08 PROCEDURE — 36415 COLL VENOUS BLD VENIPUNCTURE: CPT | Performed by: FAMILY MEDICINE

## 2022-02-08 RX ADMIN — MIDODRINE HYDROCHLORIDE 5 MG: 2.5 TABLET ORAL at 05:02

## 2022-02-08 RX ADMIN — HYDROCODONE BITARTRATE AND ACETAMINOPHEN 1 TABLET: 5; 325 TABLET ORAL at 12:02

## 2022-02-08 RX ADMIN — ASPIRIN 81 MG 162 MG: 81 TABLET ORAL at 09:02

## 2022-02-08 RX ADMIN — HYDROCODONE BITARTRATE AND ACETAMINOPHEN 1 TABLET: 5; 325 TABLET ORAL at 10:02

## 2022-02-08 RX ADMIN — ATORVASTATIN CALCIUM 20 MG: 20 TABLET, FILM COATED ORAL at 09:02

## 2022-02-08 RX ADMIN — INSULIN ASPART 4 UNITS: 100 INJECTION, SOLUTION INTRAVENOUS; SUBCUTANEOUS at 05:02

## 2022-02-08 RX ADMIN — INSULIN ASPART 16 UNITS: 100 INJECTION, SOLUTION INTRAVENOUS; SUBCUTANEOUS at 05:02

## 2022-02-08 RX ADMIN — MIDODRINE HYDROCHLORIDE 5 MG: 2.5 TABLET ORAL at 09:02

## 2022-02-08 RX ADMIN — HYDROCODONE BITARTRATE AND ACETAMINOPHEN 1 TABLET: 5; 325 TABLET ORAL at 05:02

## 2022-02-08 RX ADMIN — ALPRAZOLAM 0.5 MG: 0.5 TABLET ORAL at 09:02

## 2022-02-08 RX ADMIN — FLUOXETINE 10 MG: 10 CAPSULE ORAL at 09:02

## 2022-02-08 RX ADMIN — ALPRAZOLAM 0.5 MG: 0.5 TABLET ORAL at 07:02

## 2022-02-08 RX ADMIN — TAMSULOSIN HYDROCHLORIDE 0.4 MG: 0.4 CAPSULE ORAL at 09:02

## 2022-02-08 RX ADMIN — SENNOSIDES AND DOCUSATE SODIUM 2 TABLET: 8.6; 5 TABLET ORAL at 09:02

## 2022-02-08 RX ADMIN — VANCOMYCIN HYDROCHLORIDE 1250 MG: 1.25 INJECTION, POWDER, LYOPHILIZED, FOR SOLUTION INTRAVENOUS at 10:02

## 2022-02-08 RX ADMIN — FUROSEMIDE 20 MG: 20 TABLET ORAL at 09:02

## 2022-02-08 RX ADMIN — APIXABAN 10 MG: 5 TABLET, FILM COATED ORAL at 09:02

## 2022-02-08 RX ADMIN — INSULIN DETEMIR 50 UNITS: 100 INJECTION, SOLUTION SUBCUTANEOUS at 09:02

## 2022-02-08 RX ADMIN — INSULIN ASPART 2 UNITS: 100 INJECTION, SOLUTION INTRAVENOUS; SUBCUTANEOUS at 05:02

## 2022-02-08 NOTE — CARE UPDATE
02/07/22 1368   Patient Assessment/Suction   Level of Consciousness (AVPU)   (sleeping)   Respiratory Effort Unlabored   Expansion/Accessory Muscles/Retractions expansion symmetric;no retractions;no use of accessory muscles   All Lung Fields Breath Sounds diminished;clear   Rhythm/Pattern, Respiratory no shortness of breath reported;pattern regular;unlabored   PRE-TX-O2   O2 Device (Oxygen Therapy) nasal cannula   Flow (L/min) 2   SpO2 98 %   Pulse Oximetry Type Intermittent   $ Pulse Oximetry - Multiple Charge Pulse Oximetry - Multiple   Pulse 88   Resp 18   Temp 98.2 °F (36.8 °C)   /77   Aerosol Therapy   $ Aerosol Therapy Charges PRN treatment not required   Respiratory Evaluation   $ Care Plan Tech Time 15 min   $ Eval/Re-eval Charges Re-evaluation   Evaluation For   (care plan)

## 2022-02-08 NOTE — PROGRESS NOTES
[facility] Medicine  Progress Note    Patient name: James Jiang  MRN: [id number]  Admit Date: 2/3/2022   LOS: 5 days     SUBJECTIVE:     Principal problem: Infected wound    Interval History:   Sitting up in chair.  Feels well.  Plan for bone scan today.  Unable tolerate MRI.     Scheduled Meds:   apixaban  10 mg Oral BID    [START ON 2/13/2022] apixaban  5 mg Oral BID    aspirin  162 mg Oral Daily    atorvastatin  20 mg Oral Daily    FLUoxetine  10 mg Oral Daily    fluticasone furoate-vilanteroL  1 puff Inhalation Daily    furosemide  20 mg Oral Daily    insulin aspart U-100  16 Units Subcutaneous TIDWM    insulin detemir U-100  50 Units Subcutaneous Daily    ipratropium  1 puff Inhalation Daily    midodrine  5 mg Oral Q8H    senna-docusate 8.6-50 mg  2 tablet Oral BID    tamsulosin  0.4 mg Oral Daily    vancomycin (VANCOCIN) IVPB  1,250 mg Intravenous Q16H     Continuous Infusions:  PRN Meds:acetaminophen, albuterol sulfate, ALPRAZolam, dextrose 50%, glucagon (human recombinant), HYDROcodone-acetaminophen, insulin aspart U-100, melatonin, morphine, ondansetron, sodium chloride 0.9%, sodium chloride 0.9%, Pharmacy to dose Vancomycin consult **AND** vancomycin - pharmacy to dose    Review of patient's allergies indicates:  No Known Allergies    Review of Systems  As per subjective    OBJECTIVE:     Vital Signs (Most Recent)  Temp: 97.9 °F (36.6 °C) (02/08/22 1100)  Pulse: 95 (02/08/22 1100)  Resp: 18 (02/08/22 1100)  BP: (!) 143/91 (02/08/22 1320)  SpO2: 97 % (02/08/22 1100)    Vital Signs Range (Last 24H):  Temp:  [97.4 °F (36.3 °C)-98.2 °F (36.8 °C)]   Pulse:  [79-95]   Resp:  [18]   BP: ()/(58-91)   SpO2:  [95 %-99 %]     I & O (Last 24H):    Intake/Output Summary (Last 24 hours) at 2/8/2022 1549  Last data filed at 2/8/2022 0800  Gross per 24 hour   Intake 0 ml   Output 200 ml   Net -200 ml       Physical Exam:  General: Patient resting comfortably in no acute  distress. Appears as stated age. Calm. Obese  Eyes: EOM intact. No conjunctivae injection. No scleral icterus.  ENT: Hearing grossly intact. No discharge from ears. No nasal discharge.   CVS: RRR. No LE edema BL.  Lungs: CTA BL, no wheezing or crackles. Good breath sounds. No accessory muscle use. No acute respiratory distress  Neuro: Alert. Cranial nerves grossly intact. Moves all extremities equally. Follows commands. Responds appropriately   Psych: Mood, behavior, thought content and judgement normal    Laboratory:  All pertinent labs within the past 24 hours have been reviewed.  CBC:   Recent Labs   Lab 02/07/22  0610 02/08/22  0438   WBC 9.22 9.58   HGB 10.9* 11.1*   HCT 36.8* 37.4*    456*     CMP:   Recent Labs   Lab 02/07/22 0610 02/08/22 0438    137   K 3.6 4.0   CL 96 92*   CO2 32* 33*   * 238*   BUN 7* 9   CREATININE 0.5 0.6   CALCIUM 9.0 9.0   ANIONGAP 11 12   EGFRNONAA >60.0 >60.0       Microbiology Results (last 7 days)     Procedure Component Value Units Date/Time    Aerobic culture [[id number]]  (Abnormal)  (Susceptibility) Collected: 02/04/22 1626    Order Status: Completed Specimen: Wound from Leg, Right Updated: 02/08/22 0756     Aerobic Bacterial Culture METHICILLIN RESISTANT STAPHYLOCOCCUS AUREUS  Many  Known MRSA patient      Narrative:      Right thigh abscess 1    Blood culture #1 [[id number]] Collected: 02/03/22 1645    Order Status: Completed Specimen: Blood from Peripheral, Hand, Left Updated: 02/07/22 1832     Blood Culture, Routine No Growth to date      No Growth to date      No Growth to date      No Growth to date      No Growth to date    Narrative:      Blood Culture #1    Blood culture #2 [[id number]] Collected: 02/03/22 1701    Order Status: Completed Specimen: Blood from Peripheral, Hand, Left Updated: 02/07/22 1832     Blood Culture, Routine No Growth to date      No Growth to date      No Growth to date      No Growth to date      No Growth to date     Narrative:      Blood Culture #2    Gram stain [[id number]] Collected: 02/04/22 1626    Order Status: Completed Specimen: Wound from Leg, Right Updated: 02/07/22 0813     Gram Stain Result Many WBC's      Many Gram positive cocci in clusters    Narrative:      Right thigh abscess 2    Gram stain [[id number]] Collected: 02/04/22 1626    Order Status: Completed Specimen: Wound from Leg, Right Updated: 02/07/22 0812     Gram Stain Result Many WBC's      Many Gram positive cocci in clusters    Narrative:      Right thigh abscess 1    Aerobic culture [[id number]]  (Abnormal) Collected: 02/04/22 0848    Order Status: Completed Specimen: Wound from Hip, Right Updated: 02/07/22 0809     Aerobic Bacterial Culture METHICILLIN RESISTANT STAPHYLOCOCCUS AUREUS  Many  For susceptibility see order # [id number]      Aerobic culture (Specify Source) **CANNOT BE ORDERED AS STAT** [[id number]]  (Abnormal)  (Susceptibility) Collected: 02/03/22 1700    Order Status: Completed Specimen: Wound from Hip, Right Updated: 02/07/22 0808     Aerobic Bacterial Culture METHICILLIN RESISTANT STAPHYLOCOCCUS AUREUS  Many  Known MRSA patient      Culture, Anaerobic [[id number]] Collected: 02/04/22 1626    Order Status: Completed Specimen: Wound from Leg, Right Updated: 02/07/22 0759     Anaerobic Culture No anaerobes isolated    Narrative:      Right thigh abscess 1    Culture, Anaerobic [[id number]] Collected: 02/04/22 1626    Order Status: Completed Specimen: Wound from Leg, Right Updated: 02/07/22 0759     Anaerobic Culture No anaerobes isolated    Narrative:      Right thigh abscess 2    Aerobic culture [[id number]]  (Abnormal) Collected: 02/04/22 1626    Order Status: Completed Specimen: Wound from Leg, Right Updated: 02/06/22 0842     Aerobic Bacterial Culture METHICILLIN RESISTANT STAPHYLOCOCCUS AUREUS  For susceptibility see order # [id number]  Known MRSA patient      Narrative:      Right thigh abscess 2    Urine culture [[id number]]  (Abnormal)   (Susceptibility) Collected: 02/03/22 1701    Order Status: Completed Specimen: Urine Updated: 02/05/22 1352     Urine Culture, Routine METHICILLIN RESISTANT STAPHYLOCOCCUS AUREUS  >100,000cfu/ml  Results called to and read back by Zahra Carranza LPN on 1E, re: MRSA   isolated 02/04/2022 15:36 vcb  isolated 02/04/2022 15:36 vcb      Narrative:      Specimen Source->Urine    Fungus culture [[id number]] Collected: 02/04/22 1626    Order Status: Sent Specimen: Wound from Leg, Right Updated: 02/04/22 1703    Fungus culture [[id number]] Collected: 02/04/22 1626    Order Status: Sent Specimen: Wound from Leg, Right Updated: 02/04/22 1703    Culture, Body Fluid (Aerobic) w/ GS [[id number]] Collected: 02/04/22 0848    Order Status: Canceled Specimen: Body Fluid from Hip, Right            Diagnostic Results:      ASSESSMENT/PLAN:     Active Hospital Problems    Diagnosis  POA    *Infected wound [T14.8XXA, L08.9]  Yes    Left renal mass [N28.89]  Yes    Paroxysmal atrial fibrillation [I48.0]  Yes     Chronic    COPD (chronic obstructive pulmonary disease) [J44.9]  Yes    Type 2 diabetes mellitus with hyperglycemia [E11.65]  Yes      Resolved Hospital Problems   No resolved problems to display.           Plan:   S/p I&D on 2/4 after patient was lost to follow-up after ORIF in Nov 2021  Bone scan pending.  Unable tolerate MRI for right foot  Vancomycin IV  Aerobic culture MRSA  Pain control  Sw working on rehab placement  Continue home medications    Urology recommendaionts: Follow up on 02/14/2022 for renal mass work up.  Hem-onc recommendaitons: Follow up with Urology for work up of mass. Follow up as outpaeitn in 2 weeks, on Apixaban   Vascular recommendations:  Right common femoral endarterectomy and profundoplasty outpatient      VTE Risk Mitigation (From admission, onward)         Ordered     apixaban tablet 5 mg  2 times daily         02/07/22 1435     apixaban tablet 10 mg  2 times daily         02/07/22 1436      Reason for No Pharmacological VTE Prophylaxis  Once        Question:  Reasons:  Answer:  Already adequately anticoagulated on oral Anticoagulants    02/03/22 2120     IP VTE HIGH RISK PATIENT  Once         02/03/22 2120                        Patient care time was spent personally by me on the following activities: > 35 min  · Obtaining a history.  · Examination of patient.  · Providing medical care at the patients bedside.  · Developing a treatment plan with patient or surrogate and bedside caregivers.  · Ordering and reviewing laboratory studies, radiographic studies, pulse oximetry.  · Ordering and performing treatments and interventions.  · Evaluation of patient's response to treatment.  · Discussions with consultants while on the unit and immediately available to the patient.  · Re-evaluation of the patient's condition.  · Documentation in the medical record.       Department Hospital Medicine  [facility]  Elliot Singleton MD    Please note: This note was transcribed using voice recognition software. Because of this technology, there are often uinintended grammatical, spelling, and other transcription errors. Please disregard these errors.

## 2022-02-08 NOTE — PLAN OF CARE
Problem: Infection  Goal: Absence of Infection Signs and Symptoms  Outcome: Ongoing, Progressing     Problem: Adult Inpatient Plan of Care  Goal: Plan of Care Review  Outcome: Ongoing, Progressing  Goal: Patient-Specific Goal (Individualized)  Outcome: Ongoing, Progressing  Goal: Absence of Hospital-Acquired Illness or Injury  Outcome: Ongoing, Progressing     Problem: Glycemic Control Impaired (Sepsis/Septic Shock)  Goal: Blood Glucose Level Within Desired Range  Outcome: Ongoing, Progressing     Problem: Fluid Imbalance (Pneumonia)  Goal: Fluid Balance  Outcome: Ongoing, Progressing

## 2022-02-08 NOTE — PLAN OF CARE
Problem: Physical Therapy Goal  Goal: Physical Therapy Goal  Description: All PT goals to be met by discharge:    1. Supine to sit with Stand-by Assistance  2. Sit to stand transfer with Stand-by Assistance  3.. Bed to chair transfer with Supervision using Rolling Walker  4. Scooting left and right while sitting EOB supervision     Outcome: Ongoing, Progressing

## 2022-02-08 NOTE — PT/OT/SLP PROGRESS
Physical Therapy Treatment    Patient Name:  James Jiang   MRN:  [id number]    Recommendations:     Discharge Recommendations:  rehabilitation facility   Discharge Equipment Recommendations: none   Barriers to discharge: increase assist with mobiliyt    Assessment:     James Jiang is a 61 y.o. male admitted with a medical diagnosis of Infected wound.  He presents with the following impairments/functional limitations:  weakness,impaired endurance,impaired self care skills,impaired functional mobilty,gait instability,impaired balance,decreased lower extremity function,decreased safety awareness,pain,impaired cardiopulmonary response to activity.    Pt found up in chair. Pt eager to return to bed. Sit to stand and SPT with min A x 2 and VI for proper LE sequencing and foot placement. Sit to supine min A  Greatest for LE assist.     Rehab Prognosis: Fair; patient would benefit from acute skilled PT services to address these deficits and reach maximum level of function.    Recent Surgery: Procedure(s) (LRB):  IRRIGATION AND DEBRIDEMENT (Right) 4 Days Post-Op    Plan:     During this hospitalization, patient to be seen 6 x/week to address the identified rehab impairments via gait training,therapeutic activities,therapeutic exercises,neuromuscular re-education and progress toward the following goals:    · Plan of Care Expires:  03/08/22    Subjective     Chief Complaint: weakness/fatigue  Patient/Family Comments/goals: get better  Pain/Comfort:  · Pain Rating 1: 0/10      Objective:     Communicated with RN prior to session.  Patient found up in chair with telemetry,peripheral IV,oxygen upon PT entry to room.     General Precautions: Standard, fall,contact   Orthopedic Precautions:N/A   Braces: N/A  Respiratory Status: Nasal cannula, flow 2 L/min     Functional Mobility:  · Bed Mobility:     · Sit to Supine: minimum assistance  · Transfers:     · Sit to Stand:  minimum assistance and of 2 persons with rolling  walker  · Bed to Chair: minimum assistance and of 2 persons with  rolling walker  using  Stand Pivot      AM-PAC 6 CLICK MOBILITY  Turning over in bed (including adjusting bedclothes, sheets and blankets)?: 2  Sitting down on and standing up from a chair with arms (e.g., wheelchair, bedside commode, etc.): 1  Moving from lying on back to sitting on the side of the bed?: 2  Moving to and from a bed to a chair (including a wheelchair)?: 2  Need to walk in hospital room?: 1  Climbing 3-5 steps with a railing?: 1  Basic Mobility Total Score: 9       Therapeutic Activities and Exercises:   Pt educated on POC, discharge recommendation, proper form with bed mobility and transfers, importance of time OOB, need for assist with mobility, use of call bell to seek assistance as needed and fall prevention      Patient left HOB elevated with all lines intact, call button in reach, bed alarm on and RN notified..    GOALS:   Multidisciplinary Problems     Physical Therapy Goals        Problem: Physical Therapy Goal    Goal Priority Disciplines Outcome Goal Variances Interventions   Physical Therapy Goal     PT, PT/OT Ongoing, Progressing     Description: All PT goals to be met by discharge:    1. Supine to sit with Stand-by Assistance  2. Sit to stand transfer with Stand-by Assistance  3.. Bed to chair transfer with Supervision using Rolling Walker  4. Scooting left and right while sitting EOB supervision                      Time Tracking:     PT Received On: 02/08/22  PT Start Time: 1125     PT Stop Time: 1138  PT Total Time (min): 13 min     Billable Minutes: Therapeutic Activity 13    Treatment Type: Treatment  PT/PTA: PT     PTA Visit Number: 0     02/08/2022

## 2022-02-08 NOTE — PT/OT/SLP PROGRESS
Occupational Therapy   Treatment    Name: James Jiang  MRN: [id number]  Admitting Diagnosis:  Infected wound  4 Days Post-Op    Recommendations:     Discharge Recommendations: nursing facility, skilled  Discharge Equipment Recommendations:  none  Barriers to discharge:  Decreased caregiver support    Assessment:     James Jiang is a 61 y.o. male with a medical diagnosis of Infected wound.  He presents with improving medical acuity and functional mobility. Patient participated in bed mobility, unsupported sitting EOB, bed/chair transfer and grooming sitting in chair. Performance deficits affecting function are weakness,impaired endurance,impaired self care skills,impaired functional mobilty,gait instability,impaired balance,decreased lower extremity function,decreased safety awareness,impaired cardiopulmonary response to activity.     Rehab Prognosis:  Fair; patient would benefit from acute skilled OT services to address these deficits and reach maximum level of function.       Plan:     Patient to be seen 5 x/week to address the above listed problems via self-care/home management,therapeutic activities,therapeutic exercises  · Plan of Care Expires: 03/07/22  · Plan of Care Reviewed with: patient    Subjective     Pain/Comfort:  · Pain Rating 1: 0/10  · Pain Rating Post-Intervention 1: 0/10    Objective:     Communicated with: nurse prior to session.  Patient found HOB elevated with telemetry,peripheral IV,oxygen upon OT entry to room.    General Precautions: Standard, fall   Orthopedic Precautions:N/A   Braces: N/A  Respiratory Status: Nasal cannula, flow 2 L/min     Occupational Performance:     Bed Mobility:    · Patient completed Scooting/Bridging with contact guard assistance  · Patient completed Supine to Sit with contact guard assistance   · Performed unsupported sitting EOB with contact guard assistance.    Functional Mobility/Transfers:  · Patient completed Sit <> Stand Transfer with minimum  assistance  with  hand-held assist and rolling walker     Activities of Daily Living:  · Grooming: contact guard assistance to wash face sitting in chair.      [facility] 6 Click ADL: 16    Treatment & Education:  Patient encouraged to progress mobility every day. Today, patient is sitting in chair. Will improve to ADLs standing at sink.     Patient left up in chair with all lines intact, call button in reach and chair alarm onEducation:      GOALS:   Multidisciplinary Problems     Occupational Therapy Goals        Problem: Occupational Therapy Goal    Goal Priority Disciplines Outcome Interventions   Occupational Therapy Goal     OT, PT/OT Ongoing, Progressing    Description: Goals to be met by: discharge     Patient will increase functional independence with ADLs by performing:    UE Dressing with Supervision.  LE Dressing with Supervision.  Grooming while sitting/standing at sink with Supervision.  Toileting from toilet with Supervision for hygiene and clothing management.   Toilet transfer to toilet with Supervision.                     Time Tracking:     OT Date of Treatment: 02/08/22  OT Start Time: 1009  OT Stop Time: 1043  OT Total Time (min): 34 min    Billable Minutes:Self Care/Home Management 12  Therapeutic Activity 12    OT/BIJU: OT          2/8/2022

## 2022-02-08 NOTE — PLAN OF CARE
Problem: Occupational Therapy Goal  Goal: Occupational Therapy Goal  Description: Goals to be met by: discharge     Patient will increase functional independence with ADLs by performing:    UE Dressing with Supervision.  LE Dressing with Supervision.  Grooming while sitting/standing at sink with Supervision.  Toileting from toilet with Supervision for hygiene and clothing management.   Toilet transfer to toilet with Supervision.    Outcome: Ongoing, Progressing

## 2022-02-08 NOTE — PROGRESS NOTES
Pharmacokinetic Assessment Follow Up: IV Vancomycin    Vancomycin serum concentration assessment(s):    The trough level was drawn correctly and can be used to guide therapy at this time. The measurement is above the desired definitive target range of 10 to 15 mcg/mL.    Vancomycin Regimen Plan:    Change regimen to Vancomycin 1250 mg IV every 16 hours with next serum trough concentration measured at 1700 prior to the dose on 2/9/22    Drug levels (last 3 results):  Recent Labs   Lab Result Units 02/05/22  0958 02/07/22  0610 02/07/22  1704   Vancomycin-Trough ug/mL 17.8 37.5* 18.3       Pharmacy will continue to follow and monitor vancomycin.    Please contact pharmacy at extension [phone] for questions regarding this assessment.    Thank you for the consult,   Jenny Sharma       Patient brief summary:  James Jiang is a 61 y.o. male initiated on antimicrobial therapy with IV Vancomycin for treatment of skin & soft tissue infection      Drug Allergies:   Review of patient's allergies indicates:  No Known Allergies    Actual Body Weight:   91.9 kg    Renal Function:   Estimated Creatinine Clearance: 166.4 mL/min (based on SCr of 0.5 mg/dL).,     Dialysis Method (if applicable):  N/A    CBC (last 72 hours):  Recent Labs   Lab Result Units 02/05/22  0558 02/06/22  0653 02/07/22  0610   WBC K/uL 9.52 9.57 9.22   Hemoglobin g/dL 11.0* 10.9* 10.9*   Hematocrit % 37.7* 36.3* 36.8*   Platelets K/uL 413 401 412   Gran % % 61.2 65.6 63.1   Lymph % % 23.3 20.1 21.7   Mono % % 8.9 8.6 10.1   Eosinophil % % 4.8 4.3 3.6   Basophil % % 1.3 0.8 1.0   Differential Method  Automated Automated Automated       Metabolic Panel (last 72 hours):  Recent Labs   Lab Result Units 02/05/22  0558 02/06/22  0652 02/07/22  0610   Sodium mmol/L 137 137 139   Potassium mmol/L 3.5 3.6 3.6   Chloride mmol/L 97 95 96   CO2 mmol/L 31* 34* 32*   Glucose mg/dL 208* 216* 151*   BUN mg/dL 9 10 7*   Creatinine mg/dL 0.7 0.6 0.5   Magnesium mg/dL  1.4* 1.8 1.7       Vancomycin Administrations:  vancomycin given in the last 96 hours                   vancomycin 1.25 g in dextrose 5% 250 mL IVPB (ready to mix) (mg) 1,250 mg New Bag 02/07/22 1808     1,250 mg New Bag  0600     1,250 mg New Bag 02/06/22 1809     1,250 mg New Bag  0531     1,250 mg New Bag 02/05/22 1802    vancomycin (VANCOCIN) 1,500 mg in dextrose 5 % 500 mL IVPB (mg) 1,500 mg New Bag 02/04/22 2237     1,500 mg New Bag  0827                Microbiologic Results:  Microbiology Results (last 7 days)     Procedure Component Value Units Date/Time    Blood culture #1 [[id number]] Collected: 02/03/22 1645    Order Status: Completed Specimen: Blood from Peripheral, Hand, Left Updated: 02/07/22 1832     Blood Culture, Routine No Growth to date      No Growth to date      No Growth to date      No Growth to date      No Growth to date    Narrative:      Blood Culture #1    Blood culture #2 [[id number]] Collected: 02/03/22 1701    Order Status: Completed Specimen: Blood from Peripheral, Hand, Left Updated: 02/07/22 1832     Blood Culture, Routine No Growth to date      No Growth to date      No Growth to date      No Growth to date      No Growth to date    Narrative:      Blood Culture #2    Gram stain [[id number]] Collected: 02/04/22 1626    Order Status: Completed Specimen: Wound from Leg, Right Updated: 02/07/22 0813     Gram Stain Result Many WBC's      Many Gram positive cocci in clusters    Narrative:      Right thigh abscess 2    Gram stain [[id number]] Collected: 02/04/22 1626    Order Status: Completed Specimen: Wound from Leg, Right Updated: 02/07/22 0812     Gram Stain Result Many WBC's      Many Gram positive cocci in clusters    Narrative:      Right thigh abscess 1    Aerobic culture [[id number]]  (Abnormal) Collected: 02/04/22 0848    Order Status: Completed Specimen: Wound from Hip, Right Updated: 02/07/22 0809     Aerobic Bacterial Culture METHICILLIN RESISTANT STAPHYLOCOCCUS AUREUS  Many  For  susceptibility see order # [id number]      Aerobic culture (Specify Source) **CANNOT BE ORDERED AS STAT** [[id number]]  (Abnormal)  (Susceptibility) Collected: 02/03/22 1700    Order Status: Completed Specimen: Wound from Hip, Right Updated: 02/07/22 0808     Aerobic Bacterial Culture METHICILLIN RESISTANT STAPHYLOCOCCUS AUREUS  Many  Known MRSA patient      Culture, Anaerobic [[id number]] Collected: 02/04/22 1626    Order Status: Completed Specimen: Wound from Leg, Right Updated: 02/07/22 0759     Anaerobic Culture No anaerobes isolated    Narrative:      Right thigh abscess 1    Culture, Anaerobic [[id number]] Collected: 02/04/22 1626    Order Status: Completed Specimen: Wound from Leg, Right Updated: 02/07/22 0759     Anaerobic Culture No anaerobes isolated    Narrative:      Right thigh abscess 2    Aerobic culture [[id number]]  (Abnormal) Collected: 02/04/22 1626    Order Status: Completed Specimen: Wound from Leg, Right Updated: 02/06/22 0842     Aerobic Bacterial Culture METHICILLIN RESISTANT STAPHYLOCOCCUS AUREUS  For susceptibility see order # [id number]  Known MRSA patient      Narrative:      Right thigh abscess 2    Aerobic culture [[id number]]  (Abnormal)  (Susceptibility) Collected: 02/04/22 1626    Order Status: Completed Specimen: Wound from Leg, Right Updated: 02/06/22 0638     Aerobic Bacterial Culture METHICILLIN RESISTANT STAPHYLOCOCCUS AUREUS  Many  Known MRSA patient      Narrative:      Right thigh abscess 1    Urine culture [[id number]]  (Abnormal)  (Susceptibility) Collected: 02/03/22 1701    Order Status: Completed Specimen: Urine Updated: 02/05/22 1352     Urine Culture, Routine METHICILLIN RESISTANT STAPHYLOCOCCUS AUREUS  >100,000cfu/ml  Results called to and read back by Zahra Carranza LPN on 1E, re: MRSA   isolated 02/04/2022 15:36 vcb  isolated 02/04/2022 15:36 vcb      Narrative:      Specimen Source->Urine    Fungus culture [[id number]] Collected: 02/04/22 1626    Order Status: Sent  Specimen: Wound from Leg, Right Updated: 02/04/22 1703    Fungus culture [[id number]] Collected: 02/04/22 1626    Order Status: Sent Specimen: Wound from Leg, Right Updated: 02/04/22 1703    Culture, Body Fluid (Aerobic) w/ GS [[id number]] Collected: 02/04/22 0848    Order Status: Canceled Specimen: Body Fluid from Hip, Right

## 2022-02-08 NOTE — PROGRESS NOTES
"Progress Note  Infectious Disease    Reason for Consult:  Infected Right HIP    HPI: James Jiang is a 61 y.o. 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  Presented to the ER complaining of right wound surgical infection at the incision site.  He presents with severe right hip pain, with associated redness and swelling from his incision sites.  He status post ORIF of his right hip November 2021/Dr. Milian.  He did not follow as outpatient for suture removal.  Information given by significant other at bedside; patient with subjective fevers, chills and pus coming out of surgical wounds.  Patient complaining of dysuria and increased urinary frequency.  He denies headache, cough, shortness of breath, chest pain, nausea, vomiting, abdominal pain, or change in bowel movements.    In the ER, hemodynamically stable, afebrile.   Labs significant for WBC 11.8, PMN 73.5%  Glucose 283, potassium 3.3  UA grossly positive with pyuria greater than 100 many bacteria seen, urine culture with Staph aureus  Blood cultures on admission no growth to date pending final  CT pelvis with contrast showed localized right gluteal skin thickening and subcutaneous edema suggesting cellulitis, no soft tissue abscess.  ID consult for antibiotic recommendations.  02/05/2022 Dr. CANDELARIO no new complaints,  + some right hip pain, ESR 78, CRP 2.98--4.21.  Sx note reviewed:  "  Using the original incision, a scalpel was used to incise the tissue allowing for proper digital investigation of the wound. The wound had purulent drainage.  The remaining aspects of the wound were investigated for any loculations or abscesses"  02/06/2022, Dr. Reynolds.  Patient did not sleep much last night so he is sleepier today otherwise he feels improved in general.  The surgical area is tender to the surface and to palpation.  He can move the hip without any issues which is reassuring that " "hopefully his joint is not involved.  No fever no chills.  He gets easily forgetful add caregiver at bedside is very helpful.  Patient feels deconditioned and feels like he needs more physical therapy.  He set up at the edge of the bed with PT    2/7/22 (David):  Interim reviewed, discussed with Dr. Smith.  Patient seen and examined at bedside.  Patient says he is claustrophobic, unable to perform MRI to right foot.  Discussed with vascular surgery, patient needs procedure outpatient; severe vascular disease noted.  Incidental renal mass on CT scan seen.  Urine culture MRSA, right hip wound MRSA, and 2 sets of blood cultures negative.    2/8:  Patient seen and examined at bedside, hemodynamically stable, afebrile.  Feeling significantly better.    Antibiotics (From admission, onward)                Start     Stop Route Frequency Ordered    02/08/22 1000  vancomycin 1.25 g in dextrose 5% 250 mL IVPB (ready to mix)        Note to Pharmacy: Ht: 5' 8" (1.727 m)  Wt: 91.9 kg (202 lb 9.6 oz)  Estimated Creatinine Clearance: 166.4 mL/min (based on SCr of 0.5 mg/dL).  Body mass index is 30.81 kg/m².    -- IV Every 16 hours 02/07/22 1929    02/03/22 1644  vancomycin - pharmacy to dose  (vancomycin IVPB)        "And" Linked Group Details    -- IV pharmacy to manage frequency 02/03/22 1545          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    Hypertension      Past Surgical History:   Procedure Laterality Date    denies pertinent surgical history      INTRAMEDULLARY RODDING OF FEMUR Right 11/7/2021    Procedure: INSERTION, INTRAMEDULLARY HENNA, FEMUR;  Surgeon: Matt Milian MD;  Location: [facility];  Service: Orthopedics;  Laterality: Right;    TOE AMPUTATION Right 10/12/2021    Procedure: " AMPUTATION, TOE;  Surgeon: Milton Lauren DPM;  Location: [facility];  Service: Podiatry;  Laterality: Right;       Pertinent medications noted:     Review of Systems:   As mentioned on HPI    EXAM & DIAGNOSTICS REVIEWED:   Vitals:     Temp:  [97.4 °F (36.3 °C)-98.2 °F (36.8 °C)]   Temp: 97.8 °F (36.6 °C) (02/08/22 0534)  Pulse: 91 (02/08/22 0741)  Resp: 18 (02/08/22 1043)  BP: (!) 164/90 (02/08/22 0741)  SpO2: 95 % (02/08/22 0741)    Intake/Output Summary (Last 24 hours) at 2/8/2022 1145  Last data filed at 2/7/2022 1910  Gross per 24 hour   Intake --   Output 200 ml   Net -200 ml       General:  In NAD. Alert and attentive, cooperative, comfortable on O2 by nasal cannula 2 L  Eyes:  Anicteric,   ENT:  No ulcers, exudates, thrush, nares patent, edentulous  Neck:  Supple  Lungs: Clear to auscultation b/l  Heart:  S1/S2+, regular rhythm, no murmurs  Abd:  +BS, soft, non tender, non distended, no rebound  :  Donis, clear urine  Musc:  Right hip surgical dressing is not removed because it is tightly placed so the surrounding looks great.  Minimal tenderness upon pressing, no pain on the hip itself.    Other joints without effusion, swelling, erythema, synovitis  Skin:  Warm, no rash  Wound:   Neuro: Following commands, no acute focal deficit   Psych:  Calm, cooperative  Lymphatic:       Extrem: No LE edema b/l    S/p right great toe amputation, redness noted on 3rd toe  Left dorsalis pedis is present' Right dorsalis pedis is missing, foot is warm  VAD:         Isolation:   02/06/2022, no surrounding cellulitis.  02/05-- dressing is not disturbed, the area around is healthy    2/8:                  2/3:                  General Labs reviewed:  Recent Labs   Lab 02/06/22  0653 02/07/22  0610 02/08/22  0438   WBC 9.57 9.22 9.58   HGB 10.9* 10.9* 11.1*   HCT 36.3* 36.8* 37.4*    412 456*       Recent Labs   Lab 02/03/22  1700 02/04/22  0543 02/06/22  0652 02/07/22  0610 02/08/22  0438      < > 137 139 137   K  3.3*   < > 3.6 3.6 4.0   CL 98   < > 95 96 92*   CO2 25   < > 34* 32* 33*   BUN 8   < > 10 7* 9   CREATININE 0.5   < > 0.6 0.5 0.6   CALCIUM 9.1   < > 8.8 9.0 9.0   PROT 7.2  --   --   --   --    BILITOT 0.3  --   --   --   --    ALKPHOS 112  --   --   --   --    ALT 11  --   --   --   --    AST 12  --   --   --   --     < > = values in this interval not displayed.     Recent Labs   Lab 02/04/22  1256 02/05/22  0558   CRP 2.98* 4.21*     Recent Labs   Lab 02/04/22  1256   SEDRATE 78*   Urinalysis Microscopic     Status: Final result    Component Ref Range & Units 02/03/22 1701   RBC, UA 0 - 4 /hpf 12 High     WBC, UA 0 - 5 /hpf >100 High     Bacteria None-Occ /hpf Many Abnormal     Yeast, UA None None    Squam Epithel, UA /hpf 1    Hyaline Casts, UA 0-1/lpf /lpf 127 Abnormal     Microscopic Comment  SEE COMMENT             Estimated Creatinine Clearance: 138.7 mL/min (based on SCr of 0.6 mg/dL).     Micro:  Microbiology Results (last 7 days)       Procedure Component Value Units Date/Time    Aerobic culture [[id number]]  (Abnormal)  (Susceptibility) Collected: 02/04/22 1626    Order Status: Completed Specimen: Wound from Leg, Right Updated: 02/08/22 0756     Aerobic Bacterial Culture METHICILLIN RESISTANT STAPHYLOCOCCUS AUREUS  Many  Known MRSA patient      Narrative:      Right thigh abscess 1    Blood culture #1 [[id number]] Collected: 02/03/22 1645    Order Status: Completed Specimen: Blood from Peripheral, Hand, Left Updated: 02/07/22 1832     Blood Culture, Routine No Growth to date      No Growth to date      No Growth to date      No Growth to date      No Growth to date    Narrative:      Blood Culture #1    Blood culture #2 [[id number]] Collected: 02/03/22 1701    Order Status: Completed Specimen: Blood from Peripheral, Hand, Left Updated: 02/07/22 1832     Blood Culture, Routine No Growth to date      No Growth to date      No Growth to date      No Growth to date      No Growth to date    Narrative:       Blood Culture #2    Gram stain [[id number]] Collected: 02/04/22 1626    Order Status: Completed Specimen: Wound from Leg, Right Updated: 02/07/22 0813     Gram Stain Result Many WBC's      Many Gram positive cocci in clusters    Narrative:      Right thigh abscess 2    Gram stain [[id number]] Collected: 02/04/22 1626    Order Status: Completed Specimen: Wound from Leg, Right Updated: 02/07/22 0812     Gram Stain Result Many WBC's      Many Gram positive cocci in clusters    Narrative:      Right thigh abscess 1    Aerobic culture [[id number]]  (Abnormal) Collected: 02/04/22 0848    Order Status: Completed Specimen: Wound from Hip, Right Updated: 02/07/22 0809     Aerobic Bacterial Culture METHICILLIN RESISTANT STAPHYLOCOCCUS AUREUS  Many  For susceptibility see order # [id number]      Aerobic culture (Specify Source) **CANNOT BE ORDERED AS STAT** [[id number]]  (Abnormal)  (Susceptibility) Collected: 02/03/22 1700    Order Status: Completed Specimen: Wound from Hip, Right Updated: 02/07/22 0808     Aerobic Bacterial Culture METHICILLIN RESISTANT STAPHYLOCOCCUS AUREUS  Many  Known MRSA patient      Culture, Anaerobic [[id number]] Collected: 02/04/22 1626    Order Status: Completed Specimen: Wound from Leg, Right Updated: 02/07/22 0759     Anaerobic Culture No anaerobes isolated    Narrative:      Right thigh abscess 1    Culture, Anaerobic [[id number]] Collected: 02/04/22 1626    Order Status: Completed Specimen: Wound from Leg, Right Updated: 02/07/22 0759     Anaerobic Culture No anaerobes isolated    Narrative:      Right thigh abscess 2    Aerobic culture [[id number]]  (Abnormal) Collected: 02/04/22 1626    Order Status: Completed Specimen: Wound from Leg, Right Updated: 02/06/22 0842     Aerobic Bacterial Culture METHICILLIN RESISTANT STAPHYLOCOCCUS AUREUS  For susceptibility see order # [id number]  Known MRSA patient      Narrative:      Right thigh abscess 2    Urine culture [[id number]]  (Abnormal)  (Susceptibility)  Collected: 02/03/22 1701    Order Status: Completed Specimen: Urine Updated: 02/05/22 1352     Urine Culture, Routine METHICILLIN RESISTANT STAPHYLOCOCCUS AUREUS  >100,000cfu/ml  Results called to and read back by Zahra Carranza LPN on 1E, re: MRSA   isolated 02/04/2022 15:36 vcb  isolated 02/04/2022 15:36 vcb      Narrative:      Specimen Source->Urine    Fungus culture [[id number]] Collected: 02/04/22 1626    Order Status: Sent Specimen: Wound from Leg, Right Updated: 02/04/22 1703    Fungus culture [[id number]] Collected: 02/04/22 1626    Order Status: Sent Specimen: Wound from Leg, Right Updated: 02/04/22 1703    Culture, Body Fluid (Aerobic) w/ GS [[id number]] Collected: 02/04/22 0848    Order Status: Canceled Specimen: Body Fluid from Hip, Right             Imaging Reviewed:    CT pelvis  ART US  Elevated velocities within the right common femoral artery compatible with high-grade luminal stenosis.  Elevated systolic velocities within the proximal left superficial femoral artery suggesting hemodynamically significant narrowing.  Scattered bilateral atheromatous changes.    US   4 cm solid left renal mass compatible with primary renal neoplasm.  Probable nonobstructing left upper pole renal calculus.  Additional observations as above.    CTA  1. 50% stenosis at origin of right external iliac artery.   2. 70% right CFA stenosis, due to heavily calcified plaque.   3. 70-90% right SFA stenosis, also involving the right profunda femoral artery origin.   4. Three-vessel right calf runoff with atherosclerotic disease and suspected multifocal 50% stenosis involving proximal anterior tibial and origins of peroneal and posterior tibial arteries as well as right TP trunk.   5. 70% left CFA stenosis due to heavily calcified plaque.   6. 70-80% left SFA stenosis at origin, with additional 50-70% stenosis distally.   7. 50% left popliteal artery stenosis, with additional involvement left TP trunk and proximal anterior tibial  and peroneal arteries. Three-vessel runoff does occur in left calf.   8. Abrupt occlusion of right foot plantar artery at level of midfoot, and no reliable opacification of left foot plantar arteries.   9. 50-70% stenosis at heavily calcified origin of celiac axis.   10. 50% SMA origin stenosis.   11. 50-70% main right renal artery stenosis.   12. Solid enhancing 4.7 cm left renal mass. This represents renal cell carcinoma until proven otherwise. Urologic consultation is recommended.     NUCLEAR SCAN 2/8/22:  1. Abnormal three-phase uptake at the right ankle is likely degenerative/arthritic in nature. Osteomyelitis/septic arthritis is felt less likely.  2. Degenerative/arthritic uptake at the left ankle and bilateral mid feet.  3. Previous amputation of the right first toe. No definite evidence of osteomyelitis at the amputation site.           IMPRESSION & PLAN     1. Severe cellulitis/abscess, due to MRSA, right hip in the setting of right hip replacement ORIF November 2021/Dr. Milian   Ortho recommendations appreciated; cellulitis, no abscess    2. MRSA UTI in the setting of Donis catheter   Blood cultures x 2 no growth     3. 3rd right toe antd right 1st MT wounds; not healing well     4. PAD right worse than left  5. Poorly controlled diabetes, hemoglobin A1c 11.1%  6. AFib, HTN, COPD, depression/anxiety  7. Smoker  8. 4 cm solid left renal mass compatible with primary renal neoplasm.  As per hospitalist    High risk for deterioration    Recommendations:  Continue vancomycin IV, keep level 15-20, inpatient   Upon discharge, doxycycline 100 mg p.o. twice a day complete 7-10 days  Discussed with vascular, patient needs  intervention outpatient  Patient will need likely IR guided kidney biopsy for incidental left renal mass found on CT scan  PT/OT precautions  Tight glucose control  Aspiration precautions    Will sign-off, call us back with any questions    Discussed with Dr. Singleton    Medical Decision Making  during this encounter was  [_] Low Complexity  [_] Moderate Complexity  [xx] High Complexity

## 2022-02-09 LAB
ANION GAP SERPL CALC-SCNC: 11 MMOL/L (ref 8–16)
BASOPHILS # BLD AUTO: 0.09 K/UL (ref 0–0.2)
BASOPHILS NFR BLD: 1 % (ref 0–1.9)
BUN SERPL-MCNC: 13 MG/DL (ref 8–23)
CALCIUM SERPL-MCNC: 9.1 MG/DL (ref 8.7–10.5)
CHLORIDE SERPL-SCNC: 95 MMOL/L (ref 95–110)
CO2 SERPL-SCNC: 31 MMOL/L (ref 23–29)
CREAT SERPL-MCNC: 0.7 MG/DL (ref 0.5–1.4)
DIFFERENTIAL METHOD: ABNORMAL
EOSINOPHIL # BLD AUTO: 0.3 K/UL (ref 0–0.5)
EOSINOPHIL NFR BLD: 3.6 % (ref 0–8)
ERYTHROCYTE [DISTWIDTH] IN BLOOD BY AUTOMATED COUNT: 14.9 % (ref 11.5–14.5)
EST. GFR  (AFRICAN AMERICAN): >60 ML/MIN/1.73 M^2
EST. GFR  (NON AFRICAN AMERICAN): >60 ML/MIN/1.73 M^2
GLUCOSE SERPL-MCNC: 154 MG/DL (ref 70–110)
GLUCOSE SERPL-MCNC: 154 MG/DL (ref 70–110)
GLUCOSE SERPL-MCNC: 181 MG/DL (ref 70–110)
GLUCOSE SERPL-MCNC: 205 MG/DL (ref 70–110)
GLUCOSE SERPL-MCNC: 242 MG/DL (ref 70–110)
GLUCOSE SERPL-MCNC: 264 MG/DL (ref 70–110)
HCT VFR BLD AUTO: 36.7 % (ref 40–54)
HGB BLD-MCNC: 11.1 G/DL (ref 14–18)
IMM GRANULOCYTES # BLD AUTO: 0.05 K/UL (ref 0–0.04)
IMM GRANULOCYTES NFR BLD AUTO: 0.5 % (ref 0–0.5)
LYMPHOCYTES # BLD AUTO: 2.4 K/UL (ref 1–4.8)
LYMPHOCYTES NFR BLD: 25.7 % (ref 18–48)
MAGNESIUM SERPL-MCNC: 1.6 MG/DL (ref 1.6–2.6)
MCH RBC QN AUTO: 26.3 PG (ref 27–31)
MCHC RBC AUTO-ENTMCNC: 30.2 G/DL (ref 32–36)
MCV RBC AUTO: 87 FL (ref 82–98)
MONOCYTES # BLD AUTO: 0.9 K/UL (ref 0.3–1)
MONOCYTES NFR BLD: 9.3 % (ref 4–15)
NEUTROPHILS # BLD AUTO: 5.5 K/UL (ref 1.8–7.7)
NEUTROPHILS NFR BLD: 59.9 % (ref 38–73)
NRBC BLD-RTO: 0 /100 WBC
PLATELET # BLD AUTO: 408 K/UL (ref 150–450)
PMV BLD AUTO: 9.3 FL (ref 9.2–12.9)
POTASSIUM SERPL-SCNC: 4.2 MMOL/L (ref 3.5–5.1)
RBC # BLD AUTO: 4.22 M/UL (ref 4.6–6.2)
SODIUM SERPL-SCNC: 137 MMOL/L (ref 136–145)
VANCOMYCIN TROUGH SERPL-MCNC: 13.6 UG/ML
VANCOMYCIN TROUGH SERPL-MCNC: 33.8 UG/ML
WBC # BLD AUTO: 9.25 K/UL (ref 3.9–12.7)

## 2022-02-09 PROCEDURE — 12000002 HC ACUTE/MED SURGE SEMI-PRIVATE ROOM

## 2022-02-09 PROCEDURE — 80202 ASSAY OF VANCOMYCIN: CPT | Mod: 91 | Performed by: INTERNAL MEDICINE

## 2022-02-09 PROCEDURE — 25000003 PHARM REV CODE 250: Performed by: INTERNAL MEDICINE

## 2022-02-09 PROCEDURE — 25000003 PHARM REV CODE 250: Performed by: NURSE PRACTITIONER

## 2022-02-09 PROCEDURE — 97535 SELF CARE MNGMENT TRAINING: CPT

## 2022-02-09 PROCEDURE — 25000003 PHARM REV CODE 250: Performed by: FAMILY MEDICINE

## 2022-02-09 PROCEDURE — 99900035 HC TECH TIME PER 15 MIN (STAT)

## 2022-02-09 PROCEDURE — 94640 AIRWAY INHALATION TREATMENT: CPT

## 2022-02-09 PROCEDURE — 82962 GLUCOSE BLOOD TEST: CPT

## 2022-02-09 PROCEDURE — 83735 ASSAY OF MAGNESIUM: CPT | Performed by: FAMILY MEDICINE

## 2022-02-09 PROCEDURE — 94799 UNLISTED PULMONARY SVC/PX: CPT

## 2022-02-09 PROCEDURE — 94761 N-INVAS EAR/PLS OXIMETRY MLT: CPT

## 2022-02-09 PROCEDURE — 97530 THERAPEUTIC ACTIVITIES: CPT

## 2022-02-09 PROCEDURE — 99900031 HC PATIENT EDUCATION (STAT)

## 2022-02-09 PROCEDURE — 63600175 PHARM REV CODE 636 W HCPCS: Performed by: INTERNAL MEDICINE

## 2022-02-09 PROCEDURE — 80048 BASIC METABOLIC PNL TOTAL CA: CPT | Performed by: FAMILY MEDICINE

## 2022-02-09 PROCEDURE — 36415 COLL VENOUS BLD VENIPUNCTURE: CPT | Performed by: INTERNAL MEDICINE

## 2022-02-09 PROCEDURE — 85025 COMPLETE CBC W/AUTO DIFF WBC: CPT | Performed by: FAMILY MEDICINE

## 2022-02-09 RX ADMIN — TAMSULOSIN HYDROCHLORIDE 0.4 MG: 0.4 CAPSULE ORAL at 09:02

## 2022-02-09 RX ADMIN — INSULIN DETEMIR 50 UNITS: 100 INJECTION, SOLUTION SUBCUTANEOUS at 09:02

## 2022-02-09 RX ADMIN — FLUTICASONE FUROATE AND VILANTEROL TRIFENATATE 1 PUFF: 200; 25 POWDER RESPIRATORY (INHALATION) at 07:02

## 2022-02-09 RX ADMIN — APIXABAN 10 MG: 5 TABLET, FILM COATED ORAL at 09:02

## 2022-02-09 RX ADMIN — MIDODRINE HYDROCHLORIDE 5 MG: 2.5 TABLET ORAL at 06:02

## 2022-02-09 RX ADMIN — ASPIRIN 81 MG 162 MG: 81 TABLET ORAL at 09:02

## 2022-02-09 RX ADMIN — IPRATROPIUM BROMIDE 1 PUFF: 17 AEROSOL, METERED RESPIRATORY (INHALATION) at 07:02

## 2022-02-09 RX ADMIN — INSULIN ASPART 3 UNITS: 100 INJECTION, SOLUTION INTRAVENOUS; SUBCUTANEOUS at 06:02

## 2022-02-09 RX ADMIN — ALPRAZOLAM 0.5 MG: 0.5 TABLET ORAL at 08:02

## 2022-02-09 RX ADMIN — FLUOXETINE 10 MG: 10 CAPSULE ORAL at 09:02

## 2022-02-09 RX ADMIN — HYDROCODONE BITARTRATE AND ACETAMINOPHEN 1 TABLET: 5; 325 TABLET ORAL at 12:02

## 2022-02-09 RX ADMIN — HYDROCODONE BITARTRATE AND ACETAMINOPHEN 1 TABLET: 5; 325 TABLET ORAL at 09:02

## 2022-02-09 RX ADMIN — INSULIN ASPART 16 UNITS: 100 INJECTION, SOLUTION INTRAVENOUS; SUBCUTANEOUS at 09:02

## 2022-02-09 RX ADMIN — VANCOMYCIN HYDROCHLORIDE 1250 MG: 1.25 INJECTION, POWDER, LYOPHILIZED, FOR SOLUTION INTRAVENOUS at 05:02

## 2022-02-09 RX ADMIN — FUROSEMIDE 20 MG: 20 TABLET ORAL at 09:02

## 2022-02-09 RX ADMIN — MIDODRINE HYDROCHLORIDE 5 MG: 2.5 TABLET ORAL at 09:02

## 2022-02-09 RX ADMIN — HYDROCODONE BITARTRATE AND ACETAMINOPHEN 1 TABLET: 5; 325 TABLET ORAL at 06:02

## 2022-02-09 RX ADMIN — ALPRAZOLAM 0.5 MG: 0.5 TABLET ORAL at 09:02

## 2022-02-09 RX ADMIN — VANCOMYCIN HYDROCHLORIDE 1250 MG: 1.25 INJECTION, POWDER, LYOPHILIZED, FOR SOLUTION INTRAVENOUS at 02:02

## 2022-02-09 RX ADMIN — SENNOSIDES AND DOCUSATE SODIUM 2 TABLET: 8.6; 5 TABLET ORAL at 09:02

## 2022-02-09 RX ADMIN — INSULIN ASPART 16 UNITS: 100 INJECTION, SOLUTION INTRAVENOUS; SUBCUTANEOUS at 05:02

## 2022-02-09 RX ADMIN — MIDODRINE HYDROCHLORIDE 5 MG: 2.5 TABLET ORAL at 02:02

## 2022-02-09 RX ADMIN — ATORVASTATIN CALCIUM 20 MG: 20 TABLET, FILM COATED ORAL at 09:02

## 2022-02-09 NOTE — PT/OT/SLP PROGRESS
Occupational Therapy   Treatment    Name: James Jiang  MRN: [id number]  Admitting Diagnosis:  Infected wound  5 Days Post-Op    Recommendations:     Discharge Recommendations: nursing facility, skilled  Discharge Equipment Recommendations:  none  Barriers to discharge:  Decreased caregiver support    Assessment:     James Jiang is a 61 y.o. male with a medical diagnosis of Infected wound.  He presents with improving medical acuity and slowly progressing functional mobility. Patient participated in bed mobility, unsupported sitting EOB, bed/chairt transfer, LB dressing bed level and grooming sitting in chair. Performance deficits affecting function are weakness,impaired endurance,impaired self care skills,impaired functional mobilty,gait instability,impaired balance.     Rehab Prognosis:  Fair; patient would benefit from acute skilled OT services to address these deficits and reach maximum level of function.       Plan:     Patient to be seen 5 x/week to address the above listed problems via self-care/home management,therapeutic activities,therapeutic exercises  · Plan of Care Expires: 03/07/22  · Plan of Care Reviewed with: patient    Subjective     Pain/Comfort:  · Pain Rating 1: 0/10  · Pain Rating Post-Intervention 1: 0/10    Objective:     Communicated with: nurse prior to session.  Patient found HOB elevated with telemetry,peripheral IV,blanco catheter upon OT entry to room.    General Precautions: Standard, fall   Orthopedic Precautions:N/A   Braces: N/A  Respiratory Status: Room air     Occupational Performance:     Bed Mobility:    · Patient completed Scooting/Bridging with contact guard assistance  · Patient completed Supine to Sit with contact guard assistance   · Performed unsupported sitting EOB with contact guard assistance.    Functional Mobility/Transfers:  · Patient completed Bed <> Chair Transfer using Step Transfer technique with minimum assistance with hand-held assist and rolling  walker    Activities of Daily Living:  · Grooming: contact guard assistance to wash face sitting in chair.  · Lower Body Dressing: total assistance to don socks bed level.      [facility] 6 Click ADL:      Treatment & Education:  Patient very anxious, but engaged in therapy this session.     Patient left up in chair with all lines intact, call button in reach and chair alarm onEducation:      GOALS:   Multidisciplinary Problems     Occupational Therapy Goals        Problem: Occupational Therapy Goal    Goal Priority Disciplines Outcome Interventions   Occupational Therapy Goal     OT, PT/OT Ongoing, Progressing    Description: Goals to be met by: discharge     Patient will increase functional independence with ADLs by performing:    UE Dressing with Supervision.  LE Dressing with Supervision.  Grooming while sitting/standing at sink with Supervision.  Toileting from toilet with Supervision for hygiene and clothing management.   Toilet transfer to toilet with Supervision.                     Time Tracking:     OT Date of Treatment: 02/09/22  OT Start Time: 0918  OT Stop Time: 0941  OT Total Time (min): 23 min    Billable Minutes:Self Care/Home Management 12  Therapeutic Activity 11    OT/BIJU: OT          2/9/2022

## 2022-02-09 NOTE — CARE UPDATE
02/08/22 1534   Patient Assessment/Suction   Level of Consciousness (AVPU)   (sleeping)   Respiratory Effort Unlabored   Expansion/Accessory Muscles/Retractions expansion symmetric;no retractions;no use of accessory muscles   All Lung Fields Breath Sounds clear   LLL Breath Sounds diminished   RLL Breath Sounds diminished   Rhythm/Pattern, Respiratory no shortness of breath reported;pattern regular;unlabored   PRE-TX-O2   O2 Device (Oxygen Therapy) nasal cannula   $ Is the patient on Low Flow Oxygen? Yes   Flow (L/min) 2   SpO2 98 %   Pulse Oximetry Type Intermittent   $ Pulse Oximetry - Multiple Charge Pulse Oximetry - Multiple   Pulse 98   Resp 16   Aerosol Therapy   $ Aerosol Therapy Charges PRN treatment not required   Respiratory Evaluation   $ Care Plan Tech Time 15 min   $ Eval/Re-eval Charges Re-evaluation   Evaluation For   (care plan)

## 2022-02-09 NOTE — CARE UPDATE
CONTINUE MDIS   02/09/22 0755   Patient Assessment/Suction   Level of Consciousness (AVPU) alert   Respiratory Effort Normal;Unlabored   Expansion/Accessory Muscles/Retractions no use of accessory muscles;expansion symmetric   All Lung Fields Breath Sounds clear;diminished   ANTHONY Breath Sounds clear   LLL Breath Sounds diminished   RUL Breath Sounds clear   RML Breath Sounds clear   RLL Breath Sounds diminished   Rhythm/Pattern, Respiratory no shortness of breath reported;unlabored;depth regular   Cough Frequency no cough   PRE-TX-O2   O2 Device (Oxygen Therapy) room air   SpO2 (!) 93 %   Pulse Oximetry Type Intermittent   $ Pulse Oximetry - Multiple Charge Pulse Oximetry - Multiple   Pulse 74   Resp 18   Inhaler   $ Inhaler Charges MDI (Metered Dose Inahler) Treatment;Given With Spacer   Daily Review of Necessity (Inhaler) completed   Respiratory Treatment Status (Inhaler) given   Treatment Route (Inhaler) mouthpiece   Patient Position (Inhaler) HOB elevated   Post Treatment Assessment (Inhaler) increased aeration   Signs of Intolerance (Inhaler) none   Breath Sounds Post-Respiratory Treatment   Throughout All Fields Post-Treatment All Fields   Throughout All Fields Post-Treatment aeration increased   Post-treatment Heart Rate (beats/min) 84   Post-treatment Resp Rate (breaths/min) 18   Education   $ Education Bronchodilator;15 min   Respiratory Evaluation   $ Care Plan Tech Time 15 min   $ Eval/Re-eval Charges Re-evaluation

## 2022-02-09 NOTE — PHYSICIAN QUERY
"PT Name: James Jiang  MR #: [id number]    DOCUMENTATION CLARIFICATION     CDS/: Sarah West               Contact information: [phone]    This form is a permanent document in the medical record.     Query Date: February 9, 2022    By submitting this query, we are merely seeking further clarification of documentation. Please utilize your independent clinical judgment when addressing the question(s) below.    Supporting Clinical Findings Location in Medical Record   Risk factors; 62yo male sent by  nurse w/ incisional infx s/p ORIF to R hip, didn't f/u to have sutures removed, reported temp at home and low grade temp in ED.    ED   Clinical indicators;  "Patient complaining of dysuria and increased urinary frequency. blanco w/ cloudy urine"     "MRSA UTI in setting of Blanco catheter"     CRP 2.98 > 4.21  UA cloudy, nitrate +, LE 3+, WBC >100, many bacteria   UC MRSA >100K     Blanco placed 01/05 by Darling RN day #33     ID consult 02/04      ID 02/06    Labs        Documented in MAR (lines and tubes)   Interventions;  Vancomycin 1.5g IV x1 (ED) > 1.5g IV Q12 hrs (02/04-02/05) >1.25g IV Q12 hrs (02/05 to current)  Cefepime 2g IV Q12 hrs (02/04-02/06)     MAR     Dear doctor please clarify if there is any clinical correlation between UTI and the indwelling blanco catheter.           [ x ] Due to or associated with each other (POA-Y)     [  ] Due to or associated with each other (POA-N)     [  ] Unrelated to each other     [  ] Other (Please Specify)                                                                                 Please document in your progress notes daily for the duration of treatment until resolved and include in your discharge summary.                                                                                                             "

## 2022-02-09 NOTE — PLAN OF CARE
02/09/22 1418   Discharge Reassessment   Assessment Type Discharge Planning Reassessment   Discharge Plan discussed with: Patient;Spouse/sig other   Name(s) and Number(s) Clau Watts [phone]   Discharge Plan A Rehab   Discharge Plan B Skilled Nursing Facility   Discharge Barriers Identified Nursing Home rejection   Post-Acute Status   Post-Acute Authorization Placement   Post-Acute Placement Status Referrals Sent   Patient choice form signed by patient/caregiver List from [employer] Compare    met with pt and significant other Clau at bedside to discuss rehab referral. Clau reports nursing home rejections in December due to pt having no income.  explained that insurance coverage of rehab is different, and that referrals can be sent to a number of facilities due to past placement challenges. Pt and significant other provided the following preferences at this time: 1) [facility], 2) [facility] or [facility] in [place] 3) [facility]. Referrals sent via Epic.Await response.

## 2022-02-09 NOTE — ANESTHESIA POSTPROCEDURE EVALUATION
Anesthesia Post Evaluation    Patient: James Jiang    Procedure(s) Performed: Procedure(s) (LRB):  IRRIGATION AND DEBRIDEMENT (Right)    Final Anesthesia Type: MAC      Patient location during evaluation: PACU  Patient participation: Yes- Able to Participate  Level of consciousness: awake  Post-procedure vital signs: reviewed and stable  Pain management: adequate  Airway patency: patent    PONV status at discharge: No PONV  Anesthetic complications: no      Cardiovascular status: blood pressure returned to baseline, hemodynamically stable and stable  Respiratory status: unassisted, spontaneous ventilation and nasal cannula  Hydration status: euvolemic  Follow-up not needed.          Vitals Value Taken Time   BP 99/53 02/09/22 1500   Temp 36.6 °C (97.8 °F) 02/09/22 1500   Pulse 87 02/09/22 1500   Resp 18 02/09/22 1500   SpO2 100 % 02/09/22 1500         Event Time   Out of Recovery 02/04/2022 17:20:00         Pain/Ayaz Score: Pain Rating Prior to Med Admin: 3 (2/9/2022  9:41 AM)  Pain Rating Post Med Admin: 0 (2/9/2022  1:40 AM)

## 2022-02-09 NOTE — PROGRESS NOTES
[facility]  Adult Nutrition  Follow-Up    SUMMARY     Nutrition Follow-Up Note      Recommendations:   1. Continue Diabetic, 2000 kcal diet as tolerated.  2. Change po supplement to Glucerna per patient's choice.  3.  to assist with meal preferences daily.     Goals:   Goals: 1. Patient to meet at least 75% of estimated needs through meal intake.    Dietitian Rounds Brief  Patient with good intake per RN. 50-75% of meals. Patient does wants Glucerna instead of [place]    Diet order: Diabetic,2000 kcal    % Intake of Estimated Energy Needs: 50 - 75 %  % Meal Intake: 50 - 75 %    Medications:Pertinent Medications Reviewed  Scheduled Meds:   apixaban  10 mg Oral BID    [START ON 2/13/2022] apixaban  5 mg Oral BID    aspirin  162 mg Oral Daily    atorvastatin  20 mg Oral Daily    FLUoxetine  10 mg Oral Daily    fluticasone furoate-vilanteroL  1 puff Inhalation Daily    furosemide  20 mg Oral Daily    insulin aspart U-100  16 Units Subcutaneous TIDWM    insulin detemir U-100  50 Units Subcutaneous Daily    ipratropium  1 puff Inhalation Daily    midodrine  5 mg Oral Q8H    senna-docusate 8.6-50 mg  2 tablet Oral BID    tamsulosin  0.4 mg Oral Daily    vancomycin (VANCOCIN) IVPB  1,250 mg Intravenous Q16H     Continuous Infusions:  PRN Meds:.acetaminophen, albuterol sulfate, ALPRAZolam, dextrose 50%, glucagon (human recombinant), HYDROcodone-acetaminophen, insulin aspart U-100, melatonin, morphine, ondansetron, sodium chloride 0.9%, sodium chloride 0.9%, Pharmacy to dose Vancomycin consult **AND** vancomycin - pharmacy to dose    Labs: Pertinent Labs Reviewed  Clinical Chemistry:  Recent Labs   Lab 02/03/22  1700 02/04/22  0543 02/09/22  0515      < > 137   K 3.3*   < > 4.2   CL 98   < > 95   CO2 25   < > 31*   *   < > 242*   BUN 8   < > 13   CREATININE 0.5   < > 0.7   CALCIUM 9.1   < > 9.1   PROT 7.2  --   --    ALBUMIN 3.0*  --   --    BILITOT 0.3  --   --    ALKPHOS 112   --   --    AST 12  --   --    ALT 11  --   --    ANIONGAP 14   < > 11   ESTGFRAFRICA >60.0   < > >60.0   EGFRNONAA >60.0   < > >60.0   MG 1.5*   < > 1.6    < > = values in this interval not displayed.     CBC:   Recent Labs   Lab 02/09/22  0515   WBC 9.25   RBC 4.22*   HGB 11.1*   HCT 36.7*      MCV 87   MCH 26.3*   MCHC 30.2*     Lipid Panel:  No results for input(s): CHOL, HDL, LDLCALC, TRIG, CHOLHDL in the last 168 hours.  Cardiac Profile:  Recent Labs   Lab 02/03/22  1700   TROPONINI <0.030     Inflammatory Labs:  Recent Labs   Lab 02/04/22  1256 02/05/22  0558   CRP 2.98* 4.21*     Diabetes:  Recent Labs   Lab 02/04/22  0039   HGBA1C 11.1*  11.1*     Thyroid & Parathyroid:  No results for input(s): TSH, FREET4, E5CRTQH, N4YCPNG, THYROIDAB in the last 168 hours.      Weight History:  Wt Readings from Last 5 Encounters:   02/04/22 91.9 kg (202 lb 9.6 oz)   01/20/22 92.5 kg (204 lb)   01/05/22 92.5 kg (204 lb)   12/11/21 92.9 kg (204 lb 12.9 oz)   11/25/21 99.7 kg (219 lb 12.8 oz)        Estimated/Assessed Needs  Weight Used For Calorie Calculations: 91.9 kg (202 lb 9.6 oz)  Energy Calorie Requirements (kcal): 5395-3658 kcals/day (20-25 kcals/kg)  Energy Need Method: Kcal/kg  Protein Requirements: 105-140 g/day (1.5-2.0 g/kg IBW)  Weight Used For Protein Calculations: 69.9 kg (154 lb) (Ideal body weight)  Fluid Requirements (mL): 1 mL/kcal or per MD     RDA Method (mL): 1838        Nutrition Risk  Level of Risk/Frequency of Follow-up: High Bessie Buchanan RD 02/09/2022 2:59 PM

## 2022-02-09 NOTE — PROGRESS NOTES
[facility]  Progress Note    Patient name: James Jiang  MRN: [id number]  Admit Date: 2/3/2022   LOS: 6 days     SUBJECTIVE:     Principal problem: Infected wound    Interval History:   Sitting up in chair.  Feels okay.  Discontinue Donis.  Sw working on placement.    Scheduled Meds:   apixaban  10 mg Oral BID    [START ON 2/13/2022] apixaban  5 mg Oral BID    aspirin  162 mg Oral Daily    atorvastatin  20 mg Oral Daily    FLUoxetine  10 mg Oral Daily    fluticasone furoate-vilanteroL  1 puff Inhalation Daily    furosemide  20 mg Oral Daily    insulin aspart U-100  16 Units Subcutaneous TIDWM    insulin detemir U-100  50 Units Subcutaneous Daily    ipratropium  1 puff Inhalation Daily    midodrine  5 mg Oral Q8H    senna-docusate 8.6-50 mg  2 tablet Oral BID    tamsulosin  0.4 mg Oral Daily    vancomycin (VANCOCIN) IVPB  1,250 mg Intravenous Q16H     Continuous Infusions:  PRN Meds:acetaminophen, albuterol sulfate, ALPRAZolam, dextrose 50%, glucagon (human recombinant), HYDROcodone-acetaminophen, insulin aspart U-100, melatonin, morphine, ondansetron, sodium chloride 0.9%, sodium chloride 0.9%, Pharmacy to dose Vancomycin consult **AND** vancomycin - pharmacy to dose    Review of patient's allergies indicates:  No Known Allergies    Review of Systems  As per subjective    OBJECTIVE:     Vital Signs (Most Recent)  Temp: 97.8 °F (36.6 °C) (02/09/22 1100)  Pulse: 85 (02/09/22 1100)  Resp: 18 (02/09/22 1100)  BP: 125/76 (02/09/22 1100)  SpO2: 97 % (02/09/22 1100)    Vital Signs Range (Last 24H):  Temp:  [97.7 °F (36.5 °C)-98.3 °F (36.8 °C)]   Pulse:  [74-98]   Resp:  [16-18]   BP: (125-139)/(76-81)   SpO2:  [93 %-99 %]     I & O (Last 24H):    Intake/Output Summary (Last 24 hours) at 2/9/2022 0942  Last data filed at 2/9/2022 0626  Gross per 24 hour   Intake 250 ml   Output 800 ml   Net -550 ml       Physical Exam:  General: Patient resting comfortably in no acute  distress. Appears as stated age. Calm. Obese  Eyes: EOM intact. No conjunctivae injection. No scleral icterus.  ENT: Hearing grossly intact. No discharge from ears. No nasal discharge.   CVS: RRR. No LE edema BL.  Lungs: CTA BL, no wheezing or crackles. Good breath sounds. No accessory muscle use. No acute respiratory distress  Neuro: Alert. Cranial nerves grossly intact. Moves all extremities equally. Follows commands. Responds appropriately   Psych: Mood, behavior, thought content and judgement normal    Laboratory:  All pertinent labs within the past 24 hours have been reviewed.  CBC:   Recent Labs   Lab 02/08/22 0438 02/09/22  0515   WBC 9.58 9.25   HGB 11.1* 11.1*   HCT 37.4* 36.7*   * 408     CMP:   Recent Labs   Lab 02/08/22 0438 02/09/22  0515    137   K 4.0 4.2   CL 92* 95   CO2 33* 31*   * 242*   BUN 9 13   CREATININE 0.6 0.7   CALCIUM 9.0 9.1   ANIONGAP 12 11   EGFRNONAA >60.0 >60.0       Microbiology Results (last 7 days)     Procedure Component Value Units Date/Time    Blood culture #2 [[id number]] Collected: 02/03/22 1701    Order Status: Completed Specimen: Blood from Peripheral, Hand, Left Updated: 02/08/22 1832     Blood Culture, Routine No growth after 5 days.    Narrative:      Blood Culture #2    Blood culture #1 [[id number]] Collected: 02/03/22 1645    Order Status: Completed Specimen: Blood from Peripheral, Hand, Left Updated: 02/08/22 1832     Blood Culture, Routine No growth after 5 days.    Narrative:      Blood Culture #1    Aerobic culture [[id number]]  (Abnormal)  (Susceptibility) Collected: 02/04/22 1626    Order Status: Completed Specimen: Wound from Leg, Right Updated: 02/08/22 0756     Aerobic Bacterial Culture METHICILLIN RESISTANT STAPHYLOCOCCUS AUREUS  Many  Known MRSA patient      Narrative:      Right thigh abscess 1    Gram stain [[id number]] Collected: 02/04/22 1626    Order Status: Completed Specimen: Wound from Leg, Right Updated: 02/07/22 0813     Gram  Stain Result Many WBC's      Many Gram positive cocci in clusters    Narrative:      Right thigh abscess 2    Gram stain [[id number]] Collected: 02/04/22 1626    Order Status: Completed Specimen: Wound from Leg, Right Updated: 02/07/22 0812     Gram Stain Result Many WBC's      Many Gram positive cocci in clusters    Narrative:      Right thigh abscess 1    Aerobic culture [[id number]]  (Abnormal) Collected: 02/04/22 0848    Order Status: Completed Specimen: Wound from Hip, Right Updated: 02/07/22 0809     Aerobic Bacterial Culture METHICILLIN RESISTANT STAPHYLOCOCCUS AUREUS  Many  For susceptibility see order # [id number]      Aerobic culture (Specify Source) **CANNOT BE ORDERED AS STAT** [[id number]]  (Abnormal)  (Susceptibility) Collected: 02/03/22 1700    Order Status: Completed Specimen: Wound from Hip, Right Updated: 02/07/22 0808     Aerobic Bacterial Culture METHICILLIN RESISTANT STAPHYLOCOCCUS AUREUS  Many  Known MRSA patient      Culture, Anaerobic [[id number]] Collected: 02/04/22 1626    Order Status: Completed Specimen: Wound from Leg, Right Updated: 02/07/22 0759     Anaerobic Culture No anaerobes isolated    Narrative:      Right thigh abscess 1    Culture, Anaerobic [[id number]] Collected: 02/04/22 1626    Order Status: Completed Specimen: Wound from Leg, Right Updated: 02/07/22 0759     Anaerobic Culture No anaerobes isolated    Narrative:      Right thigh abscess 2    Aerobic culture [[id number]]  (Abnormal) Collected: 02/04/22 1626    Order Status: Completed Specimen: Wound from Leg, Right Updated: 02/06/22 0842     Aerobic Bacterial Culture METHICILLIN RESISTANT STAPHYLOCOCCUS AUREUS  For susceptibility see order # [id number]  Known MRSA patient      Narrative:      Right thigh abscess 2    Urine culture [[id number]]  (Abnormal)  (Susceptibility) Collected: 02/03/22 1701    Order Status: Completed Specimen: Urine Updated: 02/05/22 1352     Urine Culture, Routine METHICILLIN RESISTANT STAPHYLOCOCCUS  AUREUS  >100,000cfu/ml  Results called to and read back by Zahra Carranza LPN on 1E, re: MRSA   isolated 02/04/2022 15:36 vcb  isolated 02/04/2022 15:36 vcb      Narrative:      Specimen Source->Urine    Fungus culture [[id number]] Collected: 02/04/22 1626    Order Status: Sent Specimen: Wound from Leg, Right Updated: 02/04/22 1703    Fungus culture [[id number]] Collected: 02/04/22 1626    Order Status: Sent Specimen: Wound from Leg, Right Updated: 02/04/22 1703    Culture, Body Fluid (Aerobic) w/ GS [[id number]] Collected: 02/04/22 0848    Order Status: Canceled Specimen: Body Fluid from Hip, Right            Diagnostic Results:  NM Bone Scan 3 Phase Foot [[id number]] Collected: 02/08/22 0806   Order Status: Completed Updated: 02/08/22 1325   Narrative:     Three-phase bone scan     CLINICAL DATA: Foot swelling, pain     Radiopharmaceutical: 20.9 mCi technetium 99m MDP IV     CMS MANDATED QUALITY DATA- BONE SCAN - 147     Carlos is made to radiographs from November 5, 2021.         FINDINGS: Following intravenous administration of radiopharmaceutical, three-phase imaging of the bilateral feet and ankles was performed.     Flow images demonstrate mild hyperemia to the right foot and ankle. Blood pool images, there is persistent abnormal soft tissue tracer accumulation at the level of the right ankle, as well as in the region of the right first metatarsal (there has been previous transmetatarsal amputation at this level). There is mild abnormal uptake at the left ankle and midfoot.     Delayed static images demonstrate abnormal tracer accumulation at both ankles, right greater than left. The pattern is likely on the basis of degenerative/arthritic change. Osteomyelitis/septic arthritis at the right ankle is conceivable, but felt unlikely. There is evidence of degenerative uptake at the mid feet bilaterally. There is slightly increased tracer accumulation at the amputation site at the right great toe, however this is  likely postoperative in nature with no evidence to indicate osteomyelitis at this level.     IMPRESSION:   1. Abnormal three-phase uptake at the right ankle is likely degenerative/arthritic in nature. Osteomyelitis/septic arthritis is felt less likely.   2. Degenerative/arthritic uptake at the left ankle and bilateral mid feet.   3. Previous amputation of the right first toe. No definite evidence of osteomyelitis at the amputation site.          ASSESSMENT/PLAN:     Active Hospital Problems    Diagnosis  POA    *Infected wound [T14.8XXA, L08.9]  Yes    Left renal mass [N28.89]  Yes    Paroxysmal atrial fibrillation [I48.0]  Yes     Chronic    COPD (chronic obstructive pulmonary disease) [J44.9]  Yes    Type 2 diabetes mellitus with hyperglycemia [E11.65]  Yes      Resolved Hospital Problems   No resolved problems to display.           Plan:   S/p I&D on 2/4 after patient was lost to follow-up after ORIF in Nov 2021  Bone scan low concerns for osteomyelitis.  Unable tolerate MRI for right foot  Vancomycin IV  Plan to shin to p.o. doxycycline per ID recommendations at discharge  Aerobic culture MRSA  Pain control  Sw working on rehab placement  Continue home medications    Urology recommendaionts: Follow up on 02/14/2022 for renal mass work up.  Hem-onc recommendaitons: Follow up with Urology for work up of mass. Follow up as outpaeitn in 2 weeks, on Apixaban   Vascular recommendations:  Right common femoral endarterectomy and profundoplasty outpatient      VTE Risk Mitigation (From admission, onward)         Ordered     apixaban tablet 5 mg  2 times daily         02/07/22 1435     apixaban tablet 10 mg  2 times daily         02/07/22 1436     Reason for No Pharmacological VTE Prophylaxis  Once        Question:  Reasons:  Answer:  Already adequately anticoagulated on oral Anticoagulants    02/03/22 2120     IP VTE HIGH RISK PATIENT  Once         02/03/22 2120                        Patient care time was spent  personally by me on the following activities: > 35 min  · Obtaining a history.  · Examination of patient.  · Providing medical care at the patients bedside.  · Developing a treatment plan with patient or surrogate and bedside caregivers.  · Ordering and reviewing laboratory studies, radiographic studies, pulse oximetry.  · Ordering and performing treatments and interventions.  · Evaluation of patient's response to treatment.  · Discussions with consultants while on the unit and immediately available to the patient.  · Re-evaluation of the patient's condition.  · Documentation in the medical record.       Department Hospital Medicine  [facility]  Elliot Singleton MD    Please note: This note was transcribed using voice recognition software. Because of this technology, there are often uinintended grammatical, spelling, and other transcription errors. Please disregard these errors.

## 2022-02-09 NOTE — PT/OT/SLP PROGRESS
Physical Therapy Treatment    Patient Name:  James Jiang   MRN:  [id number]    Recommendations:     Discharge Recommendations:  rehabilitation facility   Discharge Equipment Recommendations: none   Barriers to discharge: increase assist with mobility    Assessment:     James Jiang is a 61 y.o. male admitted with a medical diagnosis of Infected wound.  He presents with the following impairments/functional limitations:  weakness,impaired endurance,impaired self care skills,impaired functional mobilty,gait instability,impaired balance,decreased lower extremity function,decreased safety awareness,pain,impaired cardiopulmonary response to activity .    Pt found up in chair. Pt request assist back to bed after apprx 45 mins up in chair. Sit to stand and SPT min A x 2 with no AD limited due to fatigue. Session completed on RA 97%.    Rehab Prognosis: Fair; patient would benefit from acute skilled PT services to address these deficits and reach maximum level of function.    Recent Surgery: Procedure(s) (LRB):  IRRIGATION AND DEBRIDEMENT (Right) 5 Days Post-Op    Plan:     During this hospitalization, patient to be seen 6 x/week to address the identified rehab impairments via gait training,therapeutic activities,therapeutic exercises,neuromuscular re-education and progress toward the following goals:    · Plan of Care Expires:  03/09/22    Subjective     Chief Complaint: fatigue  Patient/Family Comments/goals: return to walking  Pain/Comfort:  · Pain Rating 1: 0/10      Objective:     Communicated with RN prior to session.  Patient found up in chair with telemetry,peripheral IV,oxygen upon PT entry to room.     General Precautions: Standard, fall,contact   Orthopedic Precautions:N/A   Braces: N/A  Respiratory Status: Room air     Functional Mobility:  · Bed Mobility:     · Sit to Supine: minimum assistance  · Transfers:     · Sit to Stand:  moderate assistance and of 2 persons with no AD  · Bed to Chair:  moderate assistance and of 2 persons with  no AD  using  Stand Pivot      AM-PAC 6 CLICK MOBILITY          Therapeutic Activities and Exercises:   Pt educated on POC, discharge recommendation, importance of time OOB, proper form with bed mobility and transfers, need for assist with mobility, use of call bell to seek assistance as needed and fall prevention    Pt performed supine TE including ankle DF, heel slides, hip abd and straigh leg raise 10 reps each with occ AAROM for optimal ROM and pacing.       Patient left HOB elevated with all lines intact, call button in reach and bed alarm on.    GOALS:   Multidisciplinary Problems     Physical Therapy Goals        Problem: Physical Therapy Goal    Goal Priority Disciplines Outcome Goal Variances Interventions   Physical Therapy Goal     PT, PT/OT Ongoing, Progressing     Description: All PT goals to be met by discharge:    1. Supine to sit with Stand-by Assistance  2. Sit to stand transfer with Stand-by Assistance  3.. Bed to chair transfer with Supervision using Rolling Walker  4. Scooting left and right while sitting EOB supervision                      Time Tracking:     PT Received On: 02/09/22  PT Start Time: 1024     PT Stop Time: 1034  PT Total Time (min): 10 min     Billable Minutes: Therapeutic Activity 10    Treatment Type: Treatment  PT/PTA: PT     PTA Visit Number: 0     02/09/2022

## 2022-02-10 LAB
ANION GAP SERPL CALC-SCNC: 10 MMOL/L (ref 8–16)
BASOPHILS # BLD AUTO: 0.09 K/UL (ref 0–0.2)
BASOPHILS NFR BLD: 0.9 % (ref 0–1.9)
BUN SERPL-MCNC: 11 MG/DL (ref 8–23)
CALCIUM SERPL-MCNC: 9.5 MG/DL (ref 8.7–10.5)
CHLORIDE SERPL-SCNC: 96 MMOL/L (ref 95–110)
CO2 SERPL-SCNC: 32 MMOL/L (ref 23–29)
CREAT SERPL-MCNC: 0.7 MG/DL (ref 0.5–1.4)
DIFFERENTIAL METHOD: ABNORMAL
EOSINOPHIL # BLD AUTO: 0.3 K/UL (ref 0–0.5)
EOSINOPHIL NFR BLD: 3.1 % (ref 0–8)
ERYTHROCYTE [DISTWIDTH] IN BLOOD BY AUTOMATED COUNT: 14.9 % (ref 11.5–14.5)
EST. GFR  (AFRICAN AMERICAN): >60 ML/MIN/1.73 M^2
EST. GFR  (NON AFRICAN AMERICAN): >60 ML/MIN/1.73 M^2
GLUCOSE SERPL-MCNC: 152 MG/DL (ref 70–110)
GLUCOSE SERPL-MCNC: 153 MG/DL (ref 70–110)
GLUCOSE SERPL-MCNC: 159 MG/DL (ref 70–110)
GLUCOSE SERPL-MCNC: 165 MG/DL (ref 70–110)
GLUCOSE SERPL-MCNC: 178 MG/DL (ref 70–110)
GLUCOSE SERPL-MCNC: 204 MG/DL (ref 70–110)
HCT VFR BLD AUTO: 38.6 % (ref 40–54)
HGB BLD-MCNC: 11.3 G/DL (ref 14–18)
IMM GRANULOCYTES # BLD AUTO: 0.05 K/UL (ref 0–0.04)
IMM GRANULOCYTES NFR BLD AUTO: 0.5 % (ref 0–0.5)
LYMPHOCYTES # BLD AUTO: 2.3 K/UL (ref 1–4.8)
LYMPHOCYTES NFR BLD: 24.6 % (ref 18–48)
MAGNESIUM SERPL-MCNC: 1.7 MG/DL (ref 1.6–2.6)
MCH RBC QN AUTO: 25.7 PG (ref 27–31)
MCHC RBC AUTO-ENTMCNC: 29.3 G/DL (ref 32–36)
MCV RBC AUTO: 88 FL (ref 82–98)
MONOCYTES # BLD AUTO: 0.8 K/UL (ref 0.3–1)
MONOCYTES NFR BLD: 8.3 % (ref 4–15)
NEUTROPHILS # BLD AUTO: 5.9 K/UL (ref 1.8–7.7)
NEUTROPHILS NFR BLD: 62.6 % (ref 38–73)
NRBC BLD-RTO: 0 /100 WBC
PLATELET # BLD AUTO: 433 K/UL (ref 150–450)
PMV BLD AUTO: 9.4 FL (ref 9.2–12.9)
POTASSIUM SERPL-SCNC: 4.2 MMOL/L (ref 3.5–5.1)
RBC # BLD AUTO: 4.39 M/UL (ref 4.6–6.2)
SODIUM SERPL-SCNC: 138 MMOL/L (ref 136–145)
WBC # BLD AUTO: 9.49 K/UL (ref 3.9–12.7)

## 2022-02-10 PROCEDURE — 12000002 HC ACUTE/MED SURGE SEMI-PRIVATE ROOM

## 2022-02-10 PROCEDURE — 99900035 HC TECH TIME PER 15 MIN (STAT)

## 2022-02-10 PROCEDURE — 27000221 HC OXYGEN, UP TO 24 HOURS

## 2022-02-10 PROCEDURE — 36415 COLL VENOUS BLD VENIPUNCTURE: CPT | Performed by: FAMILY MEDICINE

## 2022-02-10 PROCEDURE — 85025 COMPLETE CBC W/AUTO DIFF WBC: CPT | Performed by: FAMILY MEDICINE

## 2022-02-10 PROCEDURE — 80048 BASIC METABOLIC PNL TOTAL CA: CPT | Performed by: FAMILY MEDICINE

## 2022-02-10 PROCEDURE — 97110 THERAPEUTIC EXERCISES: CPT

## 2022-02-10 PROCEDURE — 83735 ASSAY OF MAGNESIUM: CPT | Performed by: FAMILY MEDICINE

## 2022-02-10 PROCEDURE — C9399 UNCLASSIFIED DRUGS OR BIOLOG: HCPCS | Performed by: NURSE PRACTITIONER

## 2022-02-10 PROCEDURE — 25000003 PHARM REV CODE 250: Performed by: INTERNAL MEDICINE

## 2022-02-10 PROCEDURE — 94799 UNLISTED PULMONARY SVC/PX: CPT

## 2022-02-10 PROCEDURE — 25000003 PHARM REV CODE 250: Performed by: NURSE PRACTITIONER

## 2022-02-10 PROCEDURE — 82962 GLUCOSE BLOOD TEST: CPT

## 2022-02-10 PROCEDURE — 63600175 PHARM REV CODE 636 W HCPCS: Performed by: FAMILY MEDICINE

## 2022-02-10 PROCEDURE — 99900031 HC PATIENT EDUCATION (STAT)

## 2022-02-10 PROCEDURE — 94640 AIRWAY INHALATION TREATMENT: CPT

## 2022-02-10 PROCEDURE — 94761 N-INVAS EAR/PLS OXIMETRY MLT: CPT

## 2022-02-10 PROCEDURE — 25000003 PHARM REV CODE 250: Performed by: FAMILY MEDICINE

## 2022-02-10 RX ORDER — OXYBUTYNIN CHLORIDE 5 MG/1
5 TABLET, EXTENDED RELEASE ORAL DAILY
Status: DISCONTINUED | OUTPATIENT
Start: 2022-02-10 | End: 2022-02-11 | Stop reason: HOSPADM

## 2022-02-10 RX ADMIN — INSULIN ASPART 16 UNITS: 100 INJECTION, SOLUTION INTRAVENOUS; SUBCUTANEOUS at 05:02

## 2022-02-10 RX ADMIN — HYDROCODONE BITARTRATE AND ACETAMINOPHEN 1 TABLET: 5; 325 TABLET ORAL at 08:02

## 2022-02-10 RX ADMIN — INSULIN DETEMIR 50 UNITS: 100 INJECTION, SOLUTION SUBCUTANEOUS at 09:02

## 2022-02-10 RX ADMIN — OXYBUTYNIN CHLORIDE 5 MG: 5 TABLET, EXTENDED RELEASE ORAL at 11:02

## 2022-02-10 RX ADMIN — MIDODRINE HYDROCHLORIDE 5 MG: 2.5 TABLET ORAL at 06:02

## 2022-02-10 RX ADMIN — IPRATROPIUM BROMIDE 1 PUFF: 17 AEROSOL, METERED RESPIRATORY (INHALATION) at 08:02

## 2022-02-10 RX ADMIN — FLUTICASONE FUROATE AND VILANTEROL TRIFENATATE 1 PUFF: 200; 25 POWDER RESPIRATORY (INHALATION) at 08:02

## 2022-02-10 RX ADMIN — SENNOSIDES AND DOCUSATE SODIUM 2 TABLET: 8.6; 5 TABLET ORAL at 08:02

## 2022-02-10 RX ADMIN — FLUOXETINE 10 MG: 10 CAPSULE ORAL at 08:02

## 2022-02-10 RX ADMIN — APIXABAN 10 MG: 5 TABLET, FILM COATED ORAL at 08:02

## 2022-02-10 RX ADMIN — TAMSULOSIN HYDROCHLORIDE 0.4 MG: 0.4 CAPSULE ORAL at 08:02

## 2022-02-10 RX ADMIN — ATORVASTATIN CALCIUM 20 MG: 20 TABLET, FILM COATED ORAL at 08:02

## 2022-02-10 RX ADMIN — ALPRAZOLAM 0.5 MG: 0.5 TABLET ORAL at 08:02

## 2022-02-10 RX ADMIN — ASPIRIN 81 MG 162 MG: 81 TABLET ORAL at 08:02

## 2022-02-10 RX ADMIN — HYDROCODONE BITARTRATE AND ACETAMINOPHEN 1 TABLET: 5; 325 TABLET ORAL at 02:02

## 2022-02-10 RX ADMIN — VANCOMYCIN HYDROCHLORIDE 1500 MG: 1.5 INJECTION, POWDER, LYOPHILIZED, FOR SOLUTION INTRAVENOUS at 11:02

## 2022-02-10 RX ADMIN — ALPRAZOLAM 0.5 MG: 0.5 TABLET ORAL at 10:02

## 2022-02-10 RX ADMIN — INSULIN ASPART 16 UNITS: 100 INJECTION, SOLUTION INTRAVENOUS; SUBCUTANEOUS at 12:02

## 2022-02-10 RX ADMIN — FUROSEMIDE 20 MG: 20 TABLET ORAL at 08:02

## 2022-02-10 NOTE — PLAN OF CARE
02/09/22 1933   Patient Assessment/Suction   Level of Consciousness (AVPU) alert   Respiratory Effort Normal;Unlabored   Expansion/Accessory Muscles/Retractions no use of accessory muscles   All Lung Fields Breath Sounds Anterior:;diminished;clear   Rhythm/Pattern, Respiratory no shortness of breath reported;unlabored   PRE-TX-O2   O2 Device (Oxygen Therapy) room air   SpO2 98 %   Pulse Oximetry Type Intermittent   $ Pulse Oximetry - Multiple Charge Pulse Oximetry - Multiple   Pulse 82   Resp 17   Aerosol Therapy   $ Aerosol Therapy Charges PRN treatment not required   Respiratory Treatment Status (SVN) PRN treatment not required   Respiratory Evaluation   $ Care Plan Tech Time 15 min

## 2022-02-10 NOTE — PHYSICIAN QUERY
"PT Name: James Jiang  MR #: [id number]    DOCUMENTATION CLARIFICATION     CDS/: Sarah West               Contact information: [phone]  This form is a permanent document in the medical record.    Query Date: February 10, 2022  By submitting this query, we are merely seeking further clarification of documentation. Please utilize your independent clinical judgment when addressing the question(s) below.    The Medical Record contains the following:  Indicator Location in Medical Record   Using the original incision, a scalpel was used to incise the tissue allowing for proper digital investigation of the wound. The wound had purulent drainage.  The remaining aspects of the wound were investigated for any loculations or abscesses.  The wound was then thoroughly irrigated with 9 liters of normal saline via cysto tubing to gravity.  Throughout the irrigation, the wound was debrided of any non-viable tissue, including skin and subcutaneous fat.   Operative note 02/04     Excisional debridement is the surgical removal or cutting away of such tissue, necrosis, or slough and is classified to the root operation "Excision." Use of a sharp instrument does not always indicate that an excisional debridement was performed. Minor removal of loose fragments with scissors or using a sharp instrument to scrape away tissue is not an excisional debridement. Excisional debridement involves the use of a scalpel to remove devitalized tissue.    Nonexcisional debridement is the nonoperative brushing, irrigating, scrubbing, or washing of devitalized tissue, necrosis, slough, or foreign material. Most nonexcisional debridement procedures are classified to the root operation "Extraction" (pulling or stripping out or off all or a portion of a body part by the use of force).     Dear doctor please specify the type of debridement performed.     [   ] Excisional Debridement of subcutaneous tissue/fascia of R hip          [  x ] " Nonexcisional Debridement of subcutaneous tissue/fascia of R hip       [   ] Incision and Drainage to depth of subcutaneous and fascia of R hip       [   ] Other Procedure (please specify)

## 2022-02-10 NOTE — PROGRESS NOTES
"VANCOMYCIN PHARMACOKINETIC NOTE:  Vancomycin Day #10    Objective:    61 y.o., male, Actual Body Weight = 91.9 kg (202 lb 9.6 oz)    Diagnosis/Indication for Vancomycin:  Severe Cellulitis   Desired Vancomycin Peak:  35-40 mcg/ml; Desired Trough: 15-20 mcg/ml    Current Vancomycin Regimen:  1250 mg IV every 16 hours    Antibiotics (From admission, onward)                Start     Stop Route Frequency Ordered    02/09/22 1800  vancomycin (VANCOCIN) 1,500 mg in dextrose 5 % 500 mL IVPB        Note to Pharmacy: Ht: 5' 8" (1.727 m)  Wt: 91.9 kg (202 lb 9.6 oz)  Estimated Creatinine Clearance: 166.4 mL/min (based on SCr of 0.5 mg/dL).  Body mass index is 30.81 kg/m².    -- IV Every 16 hours 02/09/22 1756    02/03/22 1644  vancomycin - pharmacy to dose  (vancomycin IVPB)        "And" Linked Group Details    -- IV pharmacy to manage frequency 02/03/22 1545             The patient has the following labs:     2/9/2022 Estimated Creatinine Clearance: 118.9 mL/min (based on SCr of 0.7 mg/dL). Lab Results   Component Value Date    BUN 13 02/09/2022     Lab Results   Component Value Date    WBC 9.25 02/09/2022        Vancomycin Trough:  13.6 mcg/mL collected after Dose #10 (drawn correctly).    Microbiology Results (last 7 days)       Procedure Component Value Units Date/Time    Blood culture #2 [[id number]] Collected: 02/03/22 1701    Order Status: Completed Specimen: Blood from Peripheral, Hand, Left Updated: 02/08/22 1832     Blood Culture, Routine No growth after 5 days.    Narrative:      Blood Culture #2    Blood culture #1 [[id number]] Collected: 02/03/22 1645    Order Status: Completed Specimen: Blood from Peripheral, Hand, Left Updated: 02/08/22 1832     Blood Culture, Routine No growth after 5 days.    Narrative:      Blood Culture #1    Aerobic culture [[id number]]  (Abnormal)  (Susceptibility) Collected: 02/04/22 1626    Order Status: Completed Specimen: Wound from Leg, Right Updated: 02/08/22 0756     Aerobic Bacterial " Culture METHICILLIN RESISTANT STAPHYLOCOCCUS AUREUS  Many  Known MRSA patient      Narrative:      Right thigh abscess 1    Gram stain [[id number]] Collected: 02/04/22 1626    Order Status: Completed Specimen: Wound from Leg, Right Updated: 02/07/22 0813     Gram Stain Result Many WBC's      Many Gram positive cocci in clusters    Narrative:      Right thigh abscess 2    Gram stain [[id number]] Collected: 02/04/22 1626    Order Status: Completed Specimen: Wound from Leg, Right Updated: 02/07/22 0812     Gram Stain Result Many WBC's      Many Gram positive cocci in clusters    Narrative:      Right thigh abscess 1    Aerobic culture [[id number]]  (Abnormal) Collected: 02/04/22 0848    Order Status: Completed Specimen: Wound from Hip, Right Updated: 02/07/22 0809     Aerobic Bacterial Culture METHICILLIN RESISTANT STAPHYLOCOCCUS AUREUS  Many  For susceptibility see order # [id number]      Aerobic culture (Specify Source) **CANNOT BE ORDERED AS STAT** [[id number]]  (Abnormal)  (Susceptibility) Collected: 02/03/22 1700    Order Status: Completed Specimen: Wound from Hip, Right Updated: 02/07/22 0808     Aerobic Bacterial Culture METHICILLIN RESISTANT STAPHYLOCOCCUS AUREUS  Many  Known MRSA patient      Culture, Anaerobic [[id number]] Collected: 02/04/22 1626    Order Status: Completed Specimen: Wound from Leg, Right Updated: 02/07/22 0759     Anaerobic Culture No anaerobes isolated    Narrative:      Right thigh abscess 1    Culture, Anaerobic [[id number]] Collected: 02/04/22 1626    Order Status: Completed Specimen: Wound from Leg, Right Updated: 02/07/22 0759     Anaerobic Culture No anaerobes isolated    Narrative:      Right thigh abscess 2    Aerobic culture [[id number]]  (Abnormal) Collected: 02/04/22 1626    Order Status: Completed Specimen: Wound from Leg, Right Updated: 02/06/22 0842     Aerobic Bacterial Culture METHICILLIN RESISTANT STAPHYLOCOCCUS AUREUS  For susceptibility see order # [id number]  Known MRSA  patient      Narrative:      Right thigh abscess 2    Urine culture [[id number]]  (Abnormal)  (Susceptibility) Collected: 02/03/22 1701    Order Status: Completed Specimen: Urine Updated: 02/05/22 1352     Urine Culture, Routine METHICILLIN RESISTANT STAPHYLOCOCCUS AUREUS  >100,000cfu/ml  Results called to and read back by Zahra Carranza LPN on 1E, re: MRSA   isolated 02/04/2022 15:36 vcb  isolated 02/04/2022 15:36 vcb      Narrative:      Specimen Source->Urine    Fungus culture [[id number]] Collected: 02/04/22 1626    Order Status: Sent Specimen: Wound from Leg, Right Updated: 02/04/22 1703    Fungus culture [[id number]] Collected: 02/04/22 1626    Order Status: Sent Specimen: Wound from Leg, Right Updated: 02/04/22 1703    Culture, Body Fluid (Aerobic) w/ GS [[id number]] Collected: 02/04/22 0848    Order Status: Canceled Specimen: Body Fluid from Hip, Right              Assessment:  Renal function is: stable/improving.  Vancomycin trough is below goal range.    Vancomycin Pharmacokinetic Parameters  Elimination rate constant (ke) - 0.06 hr-1  Elimination half-life (t½) = 11.55 hours    Plan:  Will change vancomycin to 1500 mg IV every 16 hours.    Will obtain vancomycin trough after 3 more dose(s), prior to dose due 2/11/2022 at 17:00    Pharmacy will continue to manage vancomycin therapy and adjust regimen as necessary.    Thank you for allowing us to participate in this patient's care.     Mike Johns 2/9/2022 6:12 PM  Department of Pharmacy  Ext [phone]

## 2022-02-10 NOTE — PROGRESS NOTES
[facility]  Progress Note    Patient name: James Jiang  MRN: [id number]  Admit Date: 2/3/2022   LOS: 7 days     SUBJECTIVE:     Principal problem: Infected wound    Interval History:  Sitting up in bed.  Feels anxious.  States that he is embarrassed that he urinates and himself.  Started trial of oxybutynin.  Girlfriend at bedside. Sw working on placement.    Hospital course  Patient admitted for nonseptic infected right hip surgical wound and cellulitis.  Patient was lost to follow-up.  Patient was started on broad-spectrum IV antibiotics.  Patient had an I and D of the right hip that found purulent discharge.  Refer to op note for details.  Cultures grew MRSA sensitive to vancomycin, Bactrim, tetracycline.  Blood cultures remain no growth during hospitalization.  Patient also found to have urine culture that grew MRSA.  IV antibiotics were adjusted by Infectious Disease.  There was concern of possible infection of the right lower foot.  Patient did not tolerate MRI and nuclear bone scan was ordered which was unremarkable.  Incidentally found renal mass.  Urology and oncology recommended follow-up with urology outpatient.  Patient was evaluated by vascular surgery recommended outpatient right common femoral endarterectomy and profundoplasty.    Scheduled Meds:   apixaban  10 mg Oral BID    [START ON 2/13/2022] apixaban  5 mg Oral BID    aspirin  162 mg Oral Daily    atorvastatin  20 mg Oral Daily    FLUoxetine  10 mg Oral Daily    fluticasone furoate-vilanteroL  1 puff Inhalation Daily    furosemide  20 mg Oral Daily    insulin aspart U-100  16 Units Subcutaneous TIDWM    insulin detemir U-100  50 Units Subcutaneous Daily    ipratropium  1 puff Inhalation Daily    midodrine  5 mg Oral Q8H    oxybutynin  5 mg Oral Daily    senna-docusate 8.6-50 mg  2 tablet Oral BID    tamsulosin  0.4 mg Oral Daily    vancomycin (VANCOCIN) IVPB  1,500 mg Intravenous Q16H      Continuous Infusions:  PRN Meds:acetaminophen, albuterol sulfate, ALPRAZolam, dextrose 50%, glucagon (human recombinant), HYDROcodone-acetaminophen, insulin aspart U-100, melatonin, morphine, ondansetron, sodium chloride 0.9%, sodium chloride 0.9%, Pharmacy to dose Vancomycin consult **AND** vancomycin - pharmacy to dose    Review of patient's allergies indicates:  No Known Allergies    Review of Systems  As per subjective    OBJECTIVE:     Vital Signs (Most Recent)  Temp: 97.8 °F (36.6 °C) (02/10/22 1100)  Pulse: 86 (02/10/22 1100)  Resp: 18 (02/10/22 1100)  BP: 124/64 (02/10/22 1100)  SpO2: 100 % (02/10/22 1100)    Vital Signs Range (Last 24H):  Temp:  [97.5 °F (36.4 °C)-97.8 °F (36.6 °C)]   Pulse:  [82-87]   Resp:  [16-18]   BP: (123-144)/()   SpO2:  [98 %-100 %]     I & O (Last 24H):    Intake/Output Summary (Last 24 hours) at 2/10/2022 1515  Last data filed at 2/10/2022 0508  Gross per 24 hour   Intake 250.05 ml   Output --   Net 250.05 ml       Physical Exam:  General: Patient resting comfortably in no acute distress. Appears as stated age. Calm. Obese  Eyes: EOM intact. No conjunctivae injection. No scleral icterus.  ENT: Hearing grossly intact. No discharge from ears. No nasal discharge.   CVS: RRR. No LE edema BL.  Lungs: CTA BL, no wheezing or crackles. Good breath sounds. No accessory muscle use. No acute respiratory distress  Neuro: Alert. Cranial nerves grossly intact. Moves all extremities equally. Follows commands. Responds appropriately   Psych: Mood, behavior, thought content and judgement normal    Laboratory:  All pertinent labs within the past 24 hours have been reviewed.  CBC:   Recent Labs   Lab 02/09/22  0515 02/10/22  0620   WBC 9.25 9.49   HGB 11.1* 11.3*   HCT 36.7* 38.6*    433     CMP:   Recent Labs   Lab 02/09/22  0515 02/10/22  0620    138   K 4.2 4.2   CL 95 96   CO2 31* 32*   * 178*   BUN 13 11   CREATININE 0.7 0.7   CALCIUM 9.1 9.5   ANIONGAP 11 10    EGFRNONAA >60.0 >60.0       Microbiology Results (last 7 days)     Procedure Component Value Units Date/Time    Blood culture #2 [[id number]] Collected: 02/03/22 1701    Order Status: Completed Specimen: Blood from Peripheral, Hand, Left Updated: 02/08/22 1832     Blood Culture, Routine No growth after 5 days.    Narrative:      Blood Culture #2    Blood culture #1 [[id number]] Collected: 02/03/22 1645    Order Status: Completed Specimen: Blood from Peripheral, Hand, Left Updated: 02/08/22 1832     Blood Culture, Routine No growth after 5 days.    Narrative:      Blood Culture #1    Aerobic culture [[id number]]  (Abnormal)  (Susceptibility) Collected: 02/04/22 1626    Order Status: Completed Specimen: Wound from Leg, Right Updated: 02/08/22 0756     Aerobic Bacterial Culture METHICILLIN RESISTANT STAPHYLOCOCCUS AUREUS  Many  Known MRSA patient      Narrative:      Right thigh abscess 1    Gram stain [[id number]] Collected: 02/04/22 1626    Order Status: Completed Specimen: Wound from Leg, Right Updated: 02/07/22 0813     Gram Stain Result Many WBC's      Many Gram positive cocci in clusters    Narrative:      Right thigh abscess 2    Gram stain [[id number]] Collected: 02/04/22 1626    Order Status: Completed Specimen: Wound from Leg, Right Updated: 02/07/22 0812     Gram Stain Result Many WBC's      Many Gram positive cocci in clusters    Narrative:      Right thigh abscess 1    Aerobic culture [[id number]]  (Abnormal) Collected: 02/04/22 0848    Order Status: Completed Specimen: Wound from Hip, Right Updated: 02/07/22 0809     Aerobic Bacterial Culture METHICILLIN RESISTANT STAPHYLOCOCCUS AUREUS  Many  For susceptibility see order # [id number]      Aerobic culture (Specify Source) **CANNOT BE ORDERED AS STAT** [[id number]]  (Abnormal)  (Susceptibility) Collected: 02/03/22 1700    Order Status: Completed Specimen: Wound from Hip, Right Updated: 02/07/22 0808     Aerobic Bacterial Culture METHICILLIN RESISTANT  STAPHYLOCOCCUS AUREUS  Many  Known MRSA patient      Culture, Anaerobic [[id number]] Collected: 02/04/22 1626    Order Status: Completed Specimen: Wound from Leg, Right Updated: 02/07/22 0759     Anaerobic Culture No anaerobes isolated    Narrative:      Right thigh abscess 1    Culture, Anaerobic [[id number]] Collected: 02/04/22 1626    Order Status: Completed Specimen: Wound from Leg, Right Updated: 02/07/22 0759     Anaerobic Culture No anaerobes isolated    Narrative:      Right thigh abscess 2    Aerobic culture [[id number]]  (Abnormal) Collected: 02/04/22 1626    Order Status: Completed Specimen: Wound from Leg, Right Updated: 02/06/22 0842     Aerobic Bacterial Culture METHICILLIN RESISTANT STAPHYLOCOCCUS AUREUS  For susceptibility see order # [id number]  Known MRSA patient      Narrative:      Right thigh abscess 2    Urine culture [[id number]]  (Abnormal)  (Susceptibility) Collected: 02/03/22 1701    Order Status: Completed Specimen: Urine Updated: 02/05/22 1352     Urine Culture, Routine METHICILLIN RESISTANT STAPHYLOCOCCUS AUREUS  >100,000cfu/ml  Results called to and read back by Zahra Carranza LPN on 1E, re: MRSA   isolated 02/04/2022 15:36 vcb  isolated 02/04/2022 15:36 vcb      Narrative:      Specimen Source->Urine    Fungus culture [[id number]] Collected: 02/04/22 1626    Order Status: Sent Specimen: Wound from Leg, Right Updated: 02/04/22 1703    Fungus culture [[id number]] Collected: 02/04/22 1626    Order Status: Sent Specimen: Wound from Leg, Right Updated: 02/04/22 1703    Culture, Body Fluid (Aerobic) w/ GS [[id number]] Collected: 02/04/22 0848    Order Status: Canceled Specimen: Body Fluid from Hip, Right            Diagnostic Results:      ASSESSMENT/PLAN:     Active Hospital Problems    Diagnosis  POA    *Infected wound [T14.8XXA, L08.9]  Yes    Left renal mass [N28.89]  Yes    Paroxysmal atrial fibrillation [I48.0]  Yes     Chronic    COPD (chronic obstructive pulmonary disease) [J44.9]  Yes     Type 2 diabetes mellitus with hyperglycemia [E11.65]  Yes      Resolved Hospital Problems   No resolved problems to display.           Plan:   S/p I&D on 2/4 after patient was lost to follow-up after ORIF in Nov 2021  Bone scan low concerns for osteomyelitis.  Unable tolerate MRI for right foot  Vancomycin IV  Plan to switch to p.o. doxycycline for 10 days per ID recommendations at discharge  Aerobic culture MRSA  Pain control  Sw working on rehab placement  Continue home medications    Urology recommendaionts: Follow up on 02/14/2022 for renal mass work up.  Hem-onc recommendaitons: Follow up with Urology for work up of mass. Follow up as outpatient in 2 weeks, on Apixaban   Vascular recommendations:  Right common femoral endarterectomy and profundoplasty outpatient      VTE Risk Mitigation (From admission, onward)         Ordered     apixaban tablet 5 mg  2 times daily         02/07/22 1435     apixaban tablet 10 mg  2 times daily         02/07/22 1436     Reason for No Pharmacological VTE Prophylaxis  Once        Question:  Reasons:  Answer:  Already adequately anticoagulated on oral Anticoagulants    02/03/22 2120     IP VTE HIGH RISK PATIENT  Once         02/03/22 2120                        Patient care time was spent personally by me on the following activities: > 35 min  · Obtaining a history.  · Examination of patient.  · Providing medical care at the patients bedside.  · Developing a treatment plan with patient or surrogate and bedside caregivers.  · Ordering and reviewing laboratory studies, radiographic studies, pulse oximetry.  · Ordering and performing treatments and interventions.  · Evaluation of patient's response to treatment.  · Discussions with consultants while on the unit and immediately available to the patient.  · Re-evaluation of the patient's condition.  · Documentation in the medical record.       Department Hospital Medicine  [facility]  Elliot Singleton MD    Please  note: This note was transcribed using voice recognition software. Because of this technology, there are often uinintended grammatical, spelling, and other transcription errors. Please disregard these errors.

## 2022-02-10 NOTE — PT/OT/SLP PROGRESS
Physical Therapy Treatment    Patient Name:  James Jinag   MRN:  [id number]    Recommendations:     Discharge Recommendations:  rehabilitation facility   Discharge Equipment Recommendations: none   Barriers to discharge: increase assist with mobility    Assessment:     James Jiang is a 61 y.o. male admitted with a medical diagnosis of Infected wound.  He presents with the following impairments/functional limitations:  weakness,impaired endurance,impaired self care skills,impaired functional mobilty,gait instability,impaired balance,decreased lower extremity function,decreased safety awareness,impaired cardiopulmonary response to activity .    Pt declined EOB or OOB tasks but agreeable to supine TE    Rehab Prognosis: Fair; patient would benefit from acute skilled PT services to address these deficits and reach maximum level of function.    Recent Surgery: Procedure(s) (LRB):  IRRIGATION AND DEBRIDEMENT (Right) 6 Days Post-Op    Plan:     During this hospitalization, patient to be seen 6 x/week to address the identified rehab impairments via gait training,therapeutic activities,therapeutic exercises,neuromuscular re-education and progress toward the following goals:    · Plan of Care Expires:  03/10/22    Subjective     Chief Complaint: anxious  Patient/Family Comments/goals: go to rehab  Pain/Comfort:  · Pain Rating 1: 0/10      Objective:     Communicated with RN prior to session.  Patient found HOB elevated with telemetry,peripheral IV,oxygen upon PT entry to room.     General Precautions: Standard, fall,contact   Orthopedic Precautions:N/A   Braces: N/A  Respiratory Status: Nasal cannula, flow 2 L/min     Functional Mobility:  · Bed Mobility:     · Scooting: minimum assistance      AM-PAC 6 CLICK MOBILITY          Therapeutic Activities and Exercises:   Pt educated on POC, discharge recommendation, importance of time OOB, proper HP to increase independence with scooting to HOB, optimal positioning in  bed, need for assist with mobility, use of call bell to seek assistance as needed and fall prevention    Pt performed supine TE including ankle DF, heel slides, hip abd and straigh leg raise 15 reps each with occ AAROM for optimal ROM and pacing.       Patient left HOB elevated with all lines intact, call button in reach, bed alarm on and girlfriend present..    GOALS:   Multidisciplinary Problems     Physical Therapy Goals        Problem: Physical Therapy Goal    Goal Priority Disciplines Outcome Goal Variances Interventions   Physical Therapy Goal     PT, PT/OT Ongoing, Progressing     Description: All PT goals to be met by discharge:    1. Supine to sit with Stand-by Assistance  2. Sit to stand transfer with Stand-by Assistance  3.. Bed to chair transfer with Supervision using Rolling Walker  4. Scooting left and right while sitting EOB supervision                      Time Tracking:     PT Received On: 02/10/22  PT Start Time: 0915     PT Stop Time: 0930  PT Total Time (min): 15 min     Billable Minutes: Therapeutic Exercise 15    Treatment Type: Treatment  PT/PTA: PT     PTA Visit Number: 0     02/10/2022

## 2022-02-10 NOTE — CARE UPDATE
Prn aerosol available, mdis given   02/10/22 0820   Patient Assessment/Suction   Level of Consciousness (AVPU) alert   Respiratory Effort Normal;Unlabored   Expansion/Accessory Muscles/Retractions no use of accessory muscles;expansion symmetric   All Lung Fields Breath Sounds clear;diminished   ANTHONY Breath Sounds clear   LLL Breath Sounds diminished   RUL Breath Sounds clear   RML Breath Sounds clear   RLL Breath Sounds diminished   Rhythm/Pattern, Respiratory unlabored;depth regular;no shortness of breath reported   Cough Frequency no cough   PRE-TX-O2   O2 Device (Oxygen Therapy) nasal cannula   $ Is the patient on Low Flow Oxygen? Yes   Flow (L/min) 1   SpO2 99 %   Pulse Oximetry Type Intermittent   $ Pulse Oximetry - Multiple Charge Pulse Oximetry - Multiple   Pulse 87   Aerosol Therapy   $ Aerosol Therapy Charges PRN treatment not required   Inhaler   $ Inhaler Charges MDI (Metered Dose Inahler) Treatment   Daily Review of Necessity (Inhaler) completed   Respiratory Treatment Status (Inhaler) given   Treatment Route (Inhaler) mouthpiece   Patient Position (Inhaler) HOB elevated   Post Treatment Assessment (Inhaler) breath sounds unchanged   Signs of Intolerance (Inhaler) none   Education   $ Education Bronchodilator;15 min   Respiratory Evaluation   $ Care Plan Tech Time 15 min   $ Eval/Re-eval Charges Re-evaluation

## 2022-02-10 NOTE — PT/OT/SLP PROGRESS
Occupational Therapy   Treatment    Name: James Jiang  MRN: [id number]  Admitting Diagnosis:  Infected wound  6 Days Post-Op    Recommendations:     Discharge Recommendations: rehabilitation facility  Discharge Equipment Recommendations:  none  Barriers to discharge:       Assessment:     James Jiang is a 61 y.o. male with a medical diagnosis of Infected wound.  Pt agreeable to limited OT therapy session this AM. Performance deficits affecting function are weakness,impaired endurance,impaired self care skills,impaired functional mobilty,gait instability,impaired balance,decreased upper extremity function,decreased lower extremity function,decreased safety awareness,impaired cardiopulmonary response to activity,impaired skin. Pt reports feeling too anxious today and declined EOB/OOB activity.     Rehab Prognosis:  Fair; patient would benefit from acute skilled OT services to address these deficits and reach maximum level of function.       Plan:     Patient to be seen 5 x/week to address the above listed problems via self-care/home management,therapeutic exercises,therapeutic activities  · Plan of Care Expires: 03/07/22  · Plan of Care Reviewed with: patient    Subjective     Pain/Comfort:  · Pain Rating 1: 0/10    Objective:     Communicated with: nursing prior to session.  Patient found HOB elevated with telemetry,peripheral IV,bed alarm,oxygen upon OT entry to room.    General Precautions: Standard, contact,fall   Orthopedic Precautions:N/A   Braces: N/A  Respiratory Status: Nasal cannula, flow 1 L/min     Occupational Performance:     Bed Mobility:    · Patient completed Rolling/Turning to Right with moderate assistance and with side rail    Activities of Daily Living:  · Grooming: setup assistance bed level to wash face       Therapeutic Exercise:  BUE exercises x 10 reps in all major planes with SBA and verbal cues for correct technique; pt required increased rest breaks between exercises; pt  tolerated well    Treatment & Education:  Pt educated on role of OT/POC, importance of OOB/EOB activity, use of call bell, and safety during ADLs, transfers, and functional mobility.    Patient left HOB elevated with all lines intact, call button in reach, bed alarm on and significant other presentEducation:      GOALS:   Multidisciplinary Problems     Occupational Therapy Goals        Problem: Occupational Therapy Goal    Goal Priority Disciplines Outcome Interventions   Occupational Therapy Goal     OT, PT/OT Ongoing, Progressing    Description: Goals to be met by: discharge     Patient will increase functional independence with ADLs by performing:    UE Dressing with Supervision.  LE Dressing with Supervision.  Grooming while sitting/standing at sink with Supervision.  Toileting from toilet with Supervision for hygiene and clothing management.   Toilet transfer to toilet with Supervision.                     Time Tracking:     OT Date of Treatment: 02/10/22  OT Start Time: 1026  OT Stop Time: 1040  OT Total Time (min): 14 min    Billable Minutes:Therapeutic Exercise 14     OT/BIJU: OT          2/10/2022

## 2022-02-11 VITALS
HEART RATE: 92 BPM | WEIGHT: 202.63 LBS | TEMPERATURE: 99 F | BODY MASS INDEX: 30.71 KG/M2 | RESPIRATION RATE: 16 BRPM | SYSTOLIC BLOOD PRESSURE: 109 MMHG | OXYGEN SATURATION: 92 % | DIASTOLIC BLOOD PRESSURE: 72 MMHG | HEIGHT: 68 IN

## 2022-02-11 LAB
ANION GAP SERPL CALC-SCNC: 11 MMOL/L (ref 8–16)
BASOPHILS # BLD AUTO: 0.1 K/UL (ref 0–0.2)
BASOPHILS NFR BLD: 1 % (ref 0–1.9)
BUN SERPL-MCNC: 9 MG/DL (ref 8–23)
CALCIUM SERPL-MCNC: 9.3 MG/DL (ref 8.7–10.5)
CHLORIDE SERPL-SCNC: 94 MMOL/L (ref 95–110)
CO2 SERPL-SCNC: 31 MMOL/L (ref 23–29)
CREAT SERPL-MCNC: 0.7 MG/DL (ref 0.5–1.4)
DIFFERENTIAL METHOD: ABNORMAL
EOSINOPHIL # BLD AUTO: 0.2 K/UL (ref 0–0.5)
EOSINOPHIL NFR BLD: 2.2 % (ref 0–8)
ERYTHROCYTE [DISTWIDTH] IN BLOOD BY AUTOMATED COUNT: 15.3 % (ref 11.5–14.5)
EST. GFR  (AFRICAN AMERICAN): >60 ML/MIN/1.73 M^2
EST. GFR  (NON AFRICAN AMERICAN): >60 ML/MIN/1.73 M^2
GLUCOSE SERPL-MCNC: 149 MG/DL (ref 70–110)
GLUCOSE SERPL-MCNC: 177 MG/DL (ref 70–110)
GLUCOSE SERPL-MCNC: 240 MG/DL (ref 70–110)
HCT VFR BLD AUTO: 37.6 % (ref 40–54)
HGB BLD-MCNC: 11.5 G/DL (ref 14–18)
IMM GRANULOCYTES # BLD AUTO: 0.05 K/UL (ref 0–0.04)
IMM GRANULOCYTES NFR BLD AUTO: 0.5 % (ref 0–0.5)
LYMPHOCYTES # BLD AUTO: 2.2 K/UL (ref 1–4.8)
LYMPHOCYTES NFR BLD: 22.3 % (ref 18–48)
MAGNESIUM SERPL-MCNC: 1.6 MG/DL (ref 1.6–2.6)
MCH RBC QN AUTO: 26.1 PG (ref 27–31)
MCHC RBC AUTO-ENTMCNC: 30.6 G/DL (ref 32–36)
MCV RBC AUTO: 86 FL (ref 82–98)
MONOCYTES # BLD AUTO: 0.8 K/UL (ref 0.3–1)
MONOCYTES NFR BLD: 7.9 % (ref 4–15)
NEUTROPHILS # BLD AUTO: 6.5 K/UL (ref 1.8–7.7)
NEUTROPHILS NFR BLD: 66.1 % (ref 38–73)
NRBC BLD-RTO: 0 /100 WBC
PLATELET # BLD AUTO: 443 K/UL (ref 150–450)
PMV BLD AUTO: 9.2 FL (ref 9.2–12.9)
POTASSIUM SERPL-SCNC: 3.5 MMOL/L (ref 3.5–5.1)
RBC # BLD AUTO: 4.4 M/UL (ref 4.6–6.2)
SARS-COV-2 RDRP RESP QL NAA+PROBE: NEGATIVE
SODIUM SERPL-SCNC: 136 MMOL/L (ref 136–145)
WBC # BLD AUTO: 9.86 K/UL (ref 3.9–12.7)

## 2022-02-11 PROCEDURE — 36415 COLL VENOUS BLD VENIPUNCTURE: CPT | Performed by: FAMILY MEDICINE

## 2022-02-11 PROCEDURE — 99900031 HC PATIENT EDUCATION (STAT)

## 2022-02-11 PROCEDURE — 94761 N-INVAS EAR/PLS OXIMETRY MLT: CPT

## 2022-02-11 PROCEDURE — 25000003 PHARM REV CODE 250: Performed by: NURSE PRACTITIONER

## 2022-02-11 PROCEDURE — 97110 THERAPEUTIC EXERCISES: CPT

## 2022-02-11 PROCEDURE — 83735 ASSAY OF MAGNESIUM: CPT | Performed by: FAMILY MEDICINE

## 2022-02-11 PROCEDURE — U0002 COVID-19 LAB TEST NON-CDC: HCPCS | Performed by: FAMILY MEDICINE

## 2022-02-11 PROCEDURE — 99900035 HC TECH TIME PER 15 MIN (STAT)

## 2022-02-11 PROCEDURE — 25000003 PHARM REV CODE 250: Performed by: INTERNAL MEDICINE

## 2022-02-11 PROCEDURE — 97530 THERAPEUTIC ACTIVITIES: CPT

## 2022-02-11 PROCEDURE — 85025 COMPLETE CBC W/AUTO DIFF WBC: CPT | Performed by: FAMILY MEDICINE

## 2022-02-11 PROCEDURE — 25000003 PHARM REV CODE 250: Performed by: FAMILY MEDICINE

## 2022-02-11 PROCEDURE — 80048 BASIC METABOLIC PNL TOTAL CA: CPT | Performed by: FAMILY MEDICINE

## 2022-02-11 PROCEDURE — 94640 AIRWAY INHALATION TREATMENT: CPT

## 2022-02-11 PROCEDURE — 63600175 PHARM REV CODE 636 W HCPCS: Performed by: FAMILY MEDICINE

## 2022-02-11 RX ORDER — DOXYCYCLINE 100 MG/1
100 CAPSULE ORAL 2 TIMES DAILY
Qty: 20 CAPSULE | Refills: 0 | Status: SHIPPED | OUTPATIENT
Start: 2022-02-11 | End: 2022-02-21

## 2022-02-11 RX ORDER — OXYBUTYNIN CHLORIDE 5 MG/1
5 TABLET, EXTENDED RELEASE ORAL DAILY
Qty: 30 TABLET | Refills: 0 | Status: SHIPPED | OUTPATIENT
Start: 2022-02-12 | End: 2022-06-28 | Stop reason: ALTCHOICE

## 2022-02-11 RX ORDER — HYDROCODONE BITARTRATE AND ACETAMINOPHEN 5; 325 MG/1; MG/1
1 TABLET ORAL EVERY 6 HOURS PRN
Qty: 20 TABLET | Refills: 0
Start: 2022-02-11 | End: 2022-03-24

## 2022-02-11 RX ADMIN — HYDROCODONE BITARTRATE AND ACETAMINOPHEN 1 TABLET: 5; 325 TABLET ORAL at 04:02

## 2022-02-11 RX ADMIN — FLUTICASONE FUROATE AND VILANTEROL TRIFENATATE 1 PUFF: 200; 25 POWDER RESPIRATORY (INHALATION) at 09:02

## 2022-02-11 RX ADMIN — INSULIN DETEMIR 50 UNITS: 100 INJECTION, SOLUTION SUBCUTANEOUS at 08:02

## 2022-02-11 RX ADMIN — ATORVASTATIN CALCIUM 20 MG: 20 TABLET, FILM COATED ORAL at 08:02

## 2022-02-11 RX ADMIN — MIDODRINE HYDROCHLORIDE 5 MG: 2.5 TABLET ORAL at 02:02

## 2022-02-11 RX ADMIN — VANCOMYCIN HYDROCHLORIDE 1500 MG: 1.5 INJECTION, POWDER, LYOPHILIZED, FOR SOLUTION INTRAVENOUS at 03:02

## 2022-02-11 RX ADMIN — TAMSULOSIN HYDROCHLORIDE 0.4 MG: 0.4 CAPSULE ORAL at 08:02

## 2022-02-11 RX ADMIN — HYDROCODONE BITARTRATE AND ACETAMINOPHEN 1 TABLET: 5; 325 TABLET ORAL at 08:02

## 2022-02-11 RX ADMIN — OXYBUTYNIN CHLORIDE 5 MG: 5 TABLET, EXTENDED RELEASE ORAL at 08:02

## 2022-02-11 RX ADMIN — ASPIRIN 81 MG 162 MG: 81 TABLET ORAL at 08:02

## 2022-02-11 RX ADMIN — INSULIN ASPART 16 UNITS: 100 INJECTION, SOLUTION INTRAVENOUS; SUBCUTANEOUS at 08:02

## 2022-02-11 RX ADMIN — IPRATROPIUM BROMIDE 1 PUFF: 17 AEROSOL, METERED RESPIRATORY (INHALATION) at 09:02

## 2022-02-11 RX ADMIN — SENNOSIDES AND DOCUSATE SODIUM 2 TABLET: 8.6; 5 TABLET ORAL at 08:02

## 2022-02-11 RX ADMIN — ALPRAZOLAM 0.5 MG: 0.5 TABLET ORAL at 11:02

## 2022-02-11 RX ADMIN — FLUOXETINE 10 MG: 10 CAPSULE ORAL at 08:02

## 2022-02-11 RX ADMIN — INSULIN ASPART 16 UNITS: 100 INJECTION, SOLUTION INTRAVENOUS; SUBCUTANEOUS at 12:02

## 2022-02-11 RX ADMIN — APIXABAN 10 MG: 5 TABLET, FILM COATED ORAL at 08:02

## 2022-02-11 RX ADMIN — FUROSEMIDE 20 MG: 20 TABLET ORAL at 08:02

## 2022-02-11 NOTE — PT/OT/SLP PROGRESS
Physical Therapy Treatment    Patient Name:  James Jiang   MRN:  [id number]    Recommendations:     Discharge Recommendations:  rehabilitation facility   Discharge Equipment Recommendations: none   Barriers to discharge: increase assist with mobility    Assessment:     James Jiang is a 61 y.o. male admitted with a medical diagnosis of Infected wound.  He presents with the following impairments/functional limitations:  weakness,impaired endurance,impaired self care skills,impaired functional mobilty,gait instability,impaired balance,impaired cardiopulmonary response to activity.     Pt found in bed with HOB elevated. Pt agreeable to bed level visit stating he is discharging to SNF soon.    Rehab Prognosis: Fair; patient would benefit from acute skilled PT services to address these deficits and reach maximum level of function.    Recent Surgery: Procedure(s) (LRB):  IRRIGATION AND DEBRIDEMENT (Right) 7 Days Post-Op    Plan:     During this hospitalization, patient to be seen 6 x/week to address the identified rehab impairments via gait training,therapeutic activities,therapeutic exercises,neuromuscular re-education and progress toward the following goals:    · Plan of Care Expires:  03/11/22    Subjective     Chief Complaint: none, eager to go to rehab  Patient/Family Comments/goals: return to walking  Pain/Comfort:  · Pain Rating 1: 0/10      Objective:     Communicated with RN prior to session.  Patient found HOB elevated with peripheral IV,telemetry upon PT entry to room.     General Precautions: Standard, fall   Orthopedic Precautions:N/A   Braces: N/A  Respiratory Status: Nasal cannula, flow 2 L/min     Functional Mobility:  · declined      AM-PAC 6 CLICK MOBILITY          Therapeutic Activities and Exercises:   Pt educated on POC, discharge recommendation, need for assist with mobility, use of call bell to seek assistance as needed and fall prevention    Pt performed supine TE including ankle DF,  heel slides, hip abd and straigh leg raise 15 reps each with occ AAROM for optimal ROM and pacing.     Patient left HOB elevated with all lines intact, call button in reach, bed alarm on and RN notified..    GOALS:   Multidisciplinary Problems     Physical Therapy Goals        Problem: Physical Therapy Goal    Goal Priority Disciplines Outcome Goal Variances Interventions   Physical Therapy Goal     PT, PT/OT Ongoing, Progressing     Description: All PT goals to be met by discharge:    1. Supine to sit with Stand-by Assistance  2. Sit to stand transfer with Stand-by Assistance  3.. Bed to chair transfer with Supervision using Rolling Walker  4. Scooting left and right while sitting EOB supervision                      Time Tracking:     PT Received On: 02/11/22  PT Start Time: 1325     PT Stop Time: 1333  PT Total Time (min): 8 min     Billable Minutes: Therapeutic Exercise 8    Treatment Type: Treatment  PT/PTA: PT     PTA Visit Number: 0     02/11/2022

## 2022-02-11 NOTE — CARE UPDATE
02/11/22 0958   Patient Assessment/Suction   Level of Consciousness (AVPU) alert   Respiratory Effort Normal;Unlabored   Expansion/Accessory Muscles/Retractions expansion symmetric;no use of accessory muscles   All Lung Fields Breath Sounds clear;diminished   Rhythm/Pattern, Respiratory depth regular;pattern regular;no shortness of breath reported   PRE-TX-O2   O2 Device (Oxygen Therapy) room air  (NC standby)   SpO2 (!) 94 %   Pulse Oximetry Type Intermittent   $ Pulse Oximetry - Multiple Charge Pulse Oximetry - Multiple   Pulse 90   Resp 18   Positioning HOB elevated 90 degrees   Inhaler   $ Inhaler Charges MDI (Metered Dose Inahler) Treatment   Daily Review of Necessity (Inhaler) completed   Respiratory Treatment Status (Inhaler) given;mouth rinsed post treatment   Treatment Route (Inhaler) mouthpiece;breath hold   Patient Position (Inhaler) HOB elevated   Post Treatment Assessment (Inhaler) breath sounds unchanged   Signs of Intolerance (Inhaler) none   Education   $ Education Bronchodilator;15 min   Respiratory Evaluation   $ Care Plan Tech Time 15 min

## 2022-02-11 NOTE — CARE UPDATE
02/11/22 0244   Aerosol Therapy   $ Aerosol Therapy Charges PRN treatment not required   Respiratory Treatment Status (SVN) PRN treatment not required  (no calls for tx.)   Continue tx. As ordered

## 2022-02-11 NOTE — PROGRESS NOTES
[facility]  Adult Nutrition   Progress Note (Follow-Up)    SUMMARY     Nutrition Follow-Up Note      Recommendations:   1. Continue Diabetic, 2000 kcal diet as tolerated.  2. Continue Glucerna BID.  3.  to assist with meal prefs daily.     Goals:   Goals: 1. Patient to meet at least 75% of estimated needs through meal intake.    Dietitian Rounds Brief  Patient with MD and girlfriend at visit. Patient eating 50-75% of meals and drinks Glucerna. No new weight documented. Patient last BM was 2/10/22. Possible d/c today per MD notes.    Diet order: Diabetic, 2000 kcal diet    % Intake of Estimated Energy Needs: 50 - 75 %  % Meal Intake: 50 - 75 %    Medications:Pertinent Medications Reviewed  Scheduled Meds:   apixaban  10 mg Oral BID    [START ON 2/13/2022] apixaban  5 mg Oral BID    aspirin  162 mg Oral Daily    atorvastatin  20 mg Oral Daily    FLUoxetine  10 mg Oral Daily    fluticasone furoate-vilanteroL  1 puff Inhalation Daily    furosemide  20 mg Oral Daily    insulin aspart U-100  16 Units Subcutaneous TIDWM    insulin detemir U-100  50 Units Subcutaneous Daily    ipratropium  1 puff Inhalation Daily    midodrine  5 mg Oral Q8H    oxybutynin  5 mg Oral Daily    senna-docusate 8.6-50 mg  2 tablet Oral BID    tamsulosin  0.4 mg Oral Daily    vancomycin (VANCOCIN) IVPB  1,500 mg Intravenous Q16H     Continuous Infusions:  PRN Meds:.acetaminophen, albuterol sulfate, ALPRAZolam, dextrose 50%, glucagon (human recombinant), HYDROcodone-acetaminophen, insulin aspart U-100, melatonin, morphine, ondansetron, sodium chloride 0.9%, sodium chloride 0.9%, Pharmacy to dose Vancomycin consult **AND** vancomycin - pharmacy to dose    Labs: Pertinent Labs Reviewed  Clinical Chemistry:  Recent Labs   Lab 02/11/22  0607      K 3.5   CL 94*   CO2 31*   *   BUN 9   CREATININE 0.7   CALCIUM 9.3   ANIONGAP 11   ESTGFRAFRICA >60.0   EGFRNONAA >60.0   MG 1.6     CBC:   Recent Labs   Lab  02/11/22  0607   WBC 9.86   RBC 4.40*   HGB 11.5*   HCT 37.6*      MCV 86   MCH 26.1*   MCHC 30.6*     Lipid Panel:  No results for input(s): CHOL, HDL, LDLCALC, TRIG, CHOLHDL in the last 168 hours.  Cardiac Profile:  No results for input(s): BNP, CPK, CPKMB, TROPONINI, CKTOTAL in the last 168 hours.  Inflammatory Labs:  Recent Labs   Lab 02/04/22  1256 02/05/22  0558   CRP 2.98* 4.21*     Diabetes:  No results for input(s): HGBA1C, POCTGLUCOSE in the last 168 hours.  Thyroid & Parathyroid:  No results for input(s): TSH, FREET4, P2BHWCP, C9XBTDN, THYROIDAB in the last 168 hours.      Weight History:  Wt Readings from Last 5 Encounters:   02/04/22 91.9 kg (202 lb 9.6 oz)   01/20/22 92.5 kg (204 lb)   01/05/22 92.5 kg (204 lb)   12/11/21 92.9 kg (204 lb 12.9 oz)   11/25/21 99.7 kg (219 lb 12.8 oz)        Estimated/Assessed Needs  Weight Used For Calorie Calculations: 91.9 kg (202 lb 9.6 oz)  Energy Calorie Requirements (kcal): 6989-5763 kcals/day (20-25 kcals/kg)  Energy Need Method: Kcal/kg  Protein Requirements: 105-140 g/day (1.5-2.0 g/kg IBW)  Weight Used For Protein Calculations: 69.9 kg (154 lb) (Ideal body weight)  Fluid Requirements (mL): 1 mL/kcal or per MD     RDA Method (mL): 1838        Nutrition Risk  Level of Risk/Frequency of Follow-up: Ashley Buchanan RD 02/11/2022 11:58 AM

## 2022-02-11 NOTE — HPI
James Jiang is a 61 y.o. 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 The patient presented to [facility] on 2/3/2022 with a primary complaint of Wound Infection (Right hip surgery incision site)  .      61 year old  male presents to emergency room with complaints of right hip pain and redness and swelling with drainage from his incision sites.  This patient is status post ORIF of his right hip from November 2021.       The patient was post have follow-up for suture removal several months ago but failed to keep this appointment.       According to the patient's significant other he has been running subjective fevers complaining of chills and pain to his surgical wound sites.  They denies chest pain shortness of breath nausea vomiting.  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  There was no foul odor the drainage was serosanguineous type drainage but primarily bloody     In the emergency room blood cultures were collected and sent his lactic acid level was within normal limits and he was started on a broad-spectrum antibiotic

## 2022-02-11 NOTE — HOSPITAL COURSE
Patient admitted for nonseptic infected right hip surgical wound and cellulitis.  Patient was lost to follow-up.  Patient was started on broad-spectrum IV antibiotics.  Patient had an I and D of the right hip that found purulent discharge.  Refer to op note for details.  Cultures grew MRSA sensitive to vancomycin, Bactrim, tetracycline.  Blood cultures remain no growth during hospitalization.  Patient also found to have urine culture that grew MRSA.  IV antibiotics were adjusted by Infectious Disease.  There was concern of possible infection of the right lower foot.  Patient did not tolerate MRI and nuclear bone scan was ordered which was unremarkable.  Incidentally found renal mass.  Urology and oncology recommended follow-up with urology outpatient for renal mass.  Patient was evaluated by vascular surgery recommended outpatient right common femoral endarterectomy and profundoplasty.  Patient improved during hospitalization.  Infectious Disease recommended p.o. doxycycline for 10 days at discharge.  Patient was stable discharge to rehab.    General: Patient resting comfortably in no acute distress. Appears as stated age. Calm. Obese  Eyes: EOM intact. No conjunctivae injection. No scleral icterus.  ENT: Hearing grossly intact. No discharge from ears. No nasal discharge.   CVS: RRR. No LE edema BL.  Lungs: CTA BL, no wheezing or crackles. Good breath sounds. No accessory muscle use. No acute respiratory distress  Neuro: Alert. Cranial nerves grossly intact. Moves all extremities equally. Follows commands. Responds appropriately   Psych: Mood, behavior, thought content and judgement normal  Musc: s/p right great toe amputation

## 2022-02-11 NOTE — CARE UPDATE
02/11/22 0959   Inhaler   $ Inhaler Charges MDI (Metered Dose Inahler) Treatment   Daily Review of Necessity (Inhaler) completed   Respiratory Treatment Status (Inhaler) given   Treatment Route (Inhaler) mouthpiece;breath hold   Patient Position (Inhaler) HOB elevated   Post Treatment Assessment (Inhaler) breath sounds unchanged   Signs of Intolerance (Inhaler) none

## 2022-02-11 NOTE — DISCHARGE SUMMARY
[facility] Medicine  Discharge Summary      Patient Name: James Jiang  MRN: [id number]  Patient Class: IP- Inpatient  Admission Date: 2/3/2022  Hospital Length of Stay: 8 days  Discharge Date and Time:  02/11/2022 3:03 PM  Attending Physician: Elliot Singleton MD   Discharging Provider: Elliot Singleton MD  Primary Care Provider: Santa Barnard MD      HPI:   James Jiang is a 61 y.o. 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 The patient presented to [facility] on 2/3/2022 with a primary complaint of Wound Infection (Right hip surgery incision site)  .      61 year old  male presents to emergency room with complaints of right hip pain and redness and swelling with drainage from his incision sites.  This patient is status post ORIF of his right hip from November 2021.       The patient was post have follow-up for suture removal several months ago but failed to keep this appointment.       According to the patient's significant other he has been running subjective fevers complaining of chills and pain to his surgical wound sites.  They denies chest pain shortness of breath nausea vomiting.  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  There was no foul odor the drainage was serosanguineous type drainage but primarily bloody     In the emergency room blood cultures were collected and sent his  lactic acid level was within normal limits and he was started on a broad-spectrum antibiotic      Procedure(s) (LRB):  IRRIGATION AND DEBRIDEMENT (Right)      Hospital Course:   Patient admitted for nonseptic infected right hip surgical wound and cellulitis.  Patient was lost to follow-up.  Patient was started on broad-spectrum IV antibiotics.  Patient had an I and D of the right hip that found purulent discharge.  Refer to op note for details.  Cultures grew MRSA sensitive to vancomycin, Bactrim, tetracycline.  Blood cultures remain no growth during hospitalization.  Patient also found to have urine culture that grew MRSA.  IV antibiotics were adjusted by Infectious Disease.  There was concern of possible infection of the right lower foot.  Patient did not tolerate MRI and nuclear bone scan was ordered which was unremarkable.  Incidentally found renal mass.  Urology and oncology recommended follow-up with urology outpatient for renal mass.  Patient was evaluated by vascular surgery recommended outpatient right common femoral endarterectomy and profundoplasty.  Patient improved during hospitalization.  Infectious Disease recommended p.o. doxycycline for 10 days at discharge.  Patient was stable discharge to rehab.    General: Patient resting comfortably in no acute distress. Appears as stated age. Calm. Obese  Eyes: EOM intact. No conjunctivae injection. No scleral icterus.  ENT: Hearing grossly intact. No discharge from ears. No nasal discharge.   CVS: RRR. No LE edema BL.  Lungs: CTA BL, no wheezing or crackles. Good breath sounds. No accessory muscle use. No acute respiratory distress  Neuro: Alert. Cranial nerves grossly intact. Moves all extremities equally. Follows commands. Responds appropriately   Psych: Mood, behavior, thought content and judgement normal  Musc: s/p right great toe amputation       Goals of Care Treatment Preferences:  Code Status: Full Code      Consults:   Consults (From admission,  "onward)        Status Ordering Provider     Inpatient consult to Telemedicine - Psych  Once        Provider:  Lizzette Harley MD    Acknowledged SOLITARIO LAMAS     Inpatient consult to   Once        Provider:  (Not yet assigned)    Acknowledged SOLITARIO LAMAS     Inpatient consult to Vascular Surgery  Once        Provider:  Farshad Villafuerte MD    Completed ARTAVIA, LALO     Inpatient consult to Urology  Once        Provider:  Raegan Rodriguez MD    Completed ARTAVIA, LALO     Inpatient consult to Vascular Surgery  Once        Provider:  Farshad Villafuerte MD    Acknowledged RICHARD MALHOTRAA     Inpatient consult to Hematology Oncology  Once        Provider:  BRENT Mccarty MD    Completed ARTAVIA, LALO     Inpatient consult to Orthopedic Surgery  Once        Provider:  Matt Milian MD    Completed SOLITARIO LAMAS     Infectious Disease  Once        Provider:  Aziza Amezcua MD    Completed CHRISTEL JACINTO     Inpatient consult to Hospitalist  Once        Provider:  Christel Jacinto NP    Acknowledged BETTY DEGROOT     Pharmacy to dose Vancomycin consult  Once        Provider:  (Not yet assigned)   "And" Linked Group Details    Acknowledged BETTY DEGROOT          No new Assessment & Plan notes have been filed under this hospital service since the last note was generated.  Service: Hospital Medicine    Final Active Diagnoses:    Diagnosis Date Noted POA    PRINCIPAL PROBLEM:  Infected wound [T14.8XXA, L08.9] 02/03/2022 Yes    Left renal mass [N28.89]  Yes    Paroxysmal atrial fibrillation [I48.0] 11/08/2021 Yes     Chronic    COPD (chronic obstructive pulmonary disease) [J44.9] 11/06/2021 Yes    Type 2 diabetes mellitus with hyperglycemia [E11.65]  Yes      Problems Resolved During this Admission:       Discharged Condition: stable    Disposition: Rehab Facility    Follow Up:   Follow-up Information     Matt Milian MD In 1 week.  "   Specialty: Orthopedic Surgery  Why: Call to schedule appointment, orthopedic surgery follow-up  Contact information:  [address] [facility] [facility]  [address]  [facility]  [place] [address]  [phone]             Ali Khoobehi, MD In 2 weeks.    Specialty: Vascular Surgery  Why: Call to schedule appointment, vascular surgery follow-up for right lower extremity  Contact information:  [address]  [facility]  [address]  [facility] [address]  [phone]             Raegan Rodriguez MD In 2 weeks.    Specialty: Urology  Why: Call to schedule appointment, urology follow-up for renal mass  Contact information:  [address]  [address]  [facility] [address]  [phone]             Santa Barnard MD.    Why: As needed  Contact information:  [address] [place] [address]  [phone]             [facility] - Emergency Dept.    Specialty: Emergency Medicine  Why: As needed  Contact information:  [address] [facility] [id number] [phone]  Additional information:  1st floor                     Patient Instructions:      Ambulatory referral/consult to Home Health   Standing Status: Future   Referral Priority: Routine Referral Type: Home Health   Referral Reason: Specialty Services Required   Requested Specialty: Home Health Services   Number of Visits Requested: 1     Diet Cardiac     Notify your health care provider if you experience any of the following:  temperature >100.4     Notify your health care provider if you experience any of the following:  persistent nausea and vomiting or diarrhea     Notify your health care provider if you experience any of the following:  severe uncontrolled pain     Notify your health care provider if you experience any of the following:  redness, tenderness, or signs of infection (pain, swelling, redness, odor or green/yellow discharge around incision site)     Notify your health care provider if you experience any of the  following:  difficulty breathing or increased cough     Notify your health care provider if you experience any of the following:  severe persistent headache     Notify your health care provider if you experience any of the following:  worsening rash     Notify your health care provider if you experience any of the following:  persistent dizziness, light-headedness, or visual disturbances     Notify your health care provider if you experience any of the following:  increased confusion or weakness     Activity as tolerated       Significant Diagnostic Studies: Labs:   CMP   Recent Labs   Lab 02/10/22  0620 02/11/22  0607    136   K 4.2 3.5   CL 96 94*   CO2 32* 31*   * 240*   BUN 11 9   CREATININE 0.7 0.7   CALCIUM 9.5 9.3   ANIONGAP 10 11   ESTGFRAFRICA >60.0 >60.0   EGFRNONAA >60.0 >60.0    and CBC   Recent Labs   Lab 02/10/22  0620 02/10/22  0620 02/11/22  0607   WBC 9.49  --  9.86   HGB 11.3*  --  11.5*   HCT 38.6*   < > 37.6*     --  443    < > = values in this interval not displayed.       Pending Diagnostic Studies:     Procedure Component Value Units Date/Time    COVID-19 Rapid Screening [[id number]] Collected: 02/11/22 1448    Order Status: Sent Lab Status: In process Updated: 02/11/22 1503    Specimen: Nasal Swab          Medications:  Reconciled Home Medications:      Medication List      START taking these medications    apixaban 5 mg Tab  Commonly known as: ELIQUIS  Take 1 tablet (5 mg total) by mouth 2 (two) times daily.  Start taking on: February 13, 2022     doxycycline 100 MG Cap  Commonly known as: VIBRAMYCIN  Take 1 capsule (100 mg total) by mouth 2 (two) times daily. for 10 days     HYDROcodone-acetaminophen 5-325 mg per tablet  Commonly known as: NORCO  Take 1 tablet by mouth every 6 (six) hours as needed for Pain.     oxybutynin 5 MG Tr24  Commonly known as: DITROPAN-XL  Take 1 tablet (5 mg total) by mouth once daily.  Start taking on: February 12, 2022        CHANGE how you  "take these medications    collagenase ointment  Commonly known as: SANTYL  Apply topically once daily.  What changed: how much to take        CONTINUE taking these medications    * albuterol 90 mcg/actuation inhaler  Commonly known as: PROVENTIL HFA  Inhale 2 puffs into the lungs every 6 (six) hours as needed for Wheezing. Rescue     * albuterol 90 mcg/actuation inhaler  Commonly known as: PROVENTIL/VENTOLIN HFA  Inhale 2 puffs into the lungs every 6 (six) hours as needed.     ALPRAZolam 0.5 MG tablet  Commonly known as: XANAX  Take 0.5 mg by mouth 2 (two) times daily as needed.     aspirin 81 MG Chew  Take 162 mg by mouth once daily.     ATROVENT HFA 17 mcg/actuation inhaler  Generic drug: ipratropium  Inhale 1 puff into the lungs once daily.     BD ULTRA-FINE SHORT PEN NEEDLE 31 gauge x 5/16" Ndle  Generic drug: pen needle, diabetic  Use with insulin up to three times daily     DULERA 200-5 mcg/actuation inhaler  Generic drug: mometasone-formoterol  Inhale 2 puffs into the lungs 2 (two) times daily. Controller     FLUoxetine 10 MG capsule  Take 10 mg by mouth once daily. HCS     furosemide 20 MG tablet  Commonly known as: LASIX  Take 1 tablet (20 mg total) by mouth once daily for 14 days     glipiZIDE 5 MG tablet  Commonly known as: GLUCOTROL  Take 5 mg by mouth daily with breakfast.     LANTUS SOLOSTAR U-100 INSULIN glargine 100 units/mL (3mL) SubQ pen  Generic drug: insulin  Inject 50 Units into the skin every evening.     metFORMIN 500 MG tablet  Commonly known as: GLUCOPHAGE  Take 500 mg by mouth 2 (two) times a day.     midodrine 2.5 MG Tab  Commonly known as: PROAMATINE  Take 2 tablets (5 mg total) by mouth every 8 (eight) hours.     NovoLOG Flexpen U-100 Insulin 100 unit/mL (3 mL) Inpn pen  Generic drug: insulin aspart U-100  Inject 5 Units into the skin 3 (three) times daily.     simvastatin 40 MG tablet  Commonly known as: ZOCOR  Take 40 mg by mouth once daily.     tamsulosin 0.4 mg Cap  Commonly known " as: FLOMAX  Take 0.4 mg by mouth once daily.     traZODone 150 MG tablet  Commonly known as: DESYREL  Take 150 mg by mouth nightly.         * This list has 2 medication(s) that are the same as other medications prescribed for you. Read the directions carefully, and ask your doctor or other care provider to review them with you.            STOP taking these medications    cefUROXime 500 MG tablet  Commonly known as: CEFTIN     clonazePAM 1 MG tablet  Commonly known as: KlonoPIN     XARELTO 20 mg Tab  Generic drug: rivaroxaban            Indwelling Lines/Drains at time of discharge:   Lines/Drains/Airways     None                 Time spent on the discharge of patient: 40 minutes         Elliot Singleton MD  Department of Hospital Medicine  [facility]   bed rails

## 2022-02-11 NOTE — PT/OT/SLP PROGRESS
Occupational Therapy   Treatment    Name: James Jiang  MRN: [id number]  Admitting Diagnosis:  Infected wound  7 Days Post-Op    Recommendations:     Discharge Recommendations: rehabilitation facility  Discharge Equipment Recommendations:  none  Barriers to discharge:  Decreased caregiver support    Assessment:     James Jiang is a 61 y.o. male with a medical diagnosis of Infected wound.  He presents with improving medical acuity, but slowly progressing functional mobility. Anxiety and motivation are getting in the way of patient's progress with therapy. Patient declined sitting in chair today, but participated in bed mobility, unsupported sitting EOB, functional transfer and ambulating 3 feet using rolling walker. Performance deficits affecting function are weakness,impaired endurance,impaired self care skills,impaired functional mobilty,gait instability,impaired balance.     Rehab Prognosis:  Fair; patient would benefit from acute skilled OT services to address these deficits and reach maximum level of function.       Plan:     Patient to be seen 5 x/week to address the above listed problems via self-care/home management,therapeutic activities,therapeutic exercises  · Plan of Care Expires: 03/07/22  · Plan of Care Reviewed with: patient    Subjective     Pain/Comfort:  · Pain Rating 1: 0/10  · Pain Rating Post-Intervention 1: 0/10    Objective:     Communicated with: nurse prior to session.  Patient found HOB elevated with peripheral IV,blanco catheter upon OT entry to room.    General Precautions: Standard, fall   Orthopedic Precautions:N/A   Braces: N/A  Respiratory Status: Room air     Occupational Performance:     Bed Mobility:    · Patient completed Scooting/Bridging with contact guard assistance  · Patient completed Supine to Sit with contact guard assistance  · Patient completed Sit to Supine with contact guard assistance     Functional Mobility/Transfers:  · Patient completed Sit <> Stand  Transfer with moderate assistance  with  hand-held assist and rolling walker   · Functional Mobility: ambulated 3 feet forwards/backwards with minimal assist using rolling walker.    [facility] 6 Click ADL:      Treatment & Education:  Patient declined sitting in chair today, but did get OOB and ambulate short distance. Patient's anxiety is getting in the way of progression with OT goals.    Patient left HOB elevated with all lines intact and call button in reachEducation:      GOALS:   Multidisciplinary Problems     Occupational Therapy Goals        Problem: Occupational Therapy Goal    Goal Priority Disciplines Outcome Interventions   Occupational Therapy Goal     OT, PT/OT Ongoing, Progressing    Description: Goals to be met by: discharge     Patient will increase functional independence with ADLs by performing:    UE Dressing with Supervision.  LE Dressing with Supervision.  Grooming while sitting/standing at sink with Supervision.  Toileting from toilet with Supervision for hygiene and clothing management.   Toilet transfer to toilet with Supervision.                     Time Tracking:     OT Date of Treatment: 02/11/22  OT Start Time: 1115  OT Stop Time: 1131  OT Total Time (min): 16 min    Billable Minutes:Therapeutic Activity 16    OT/BIJU: OT          2/11/2022

## 2022-02-11 NOTE — PROGRESS NOTES
Critical access hospital Medicine  Progress Note    Patient name: James Jiang  MRN: [id number]  Admit Date: 2/3/2022   LOS: 8 days     SUBJECTIVE:     Principal problem: Infected wound    Interval History:  Sitting up in bed. States that he doesn't want to go to rehab and only wants to go home and have home health PT. Counseled to patient my concern of patient going home with patient's poor mobility and how he may benefit more from inpatient rehab. Patient states that he has nothing to live for and nothing to look forward to. Denies homicidal or suicidal ideation. Agreeable to see psychiatry. Psych consult ordered. Girlfriend at bedside. Sw working on placement.    Hospital course  Patient admitted for nonseptic infected right hip surgical wound and cellulitis.  Patient was lost to follow-up.  Patient was started on broad-spectrum IV antibiotics.  Patient had an I and D of the right hip that found purulent discharge.  Refer to op note for details.  Cultures grew MRSA sensitive to vancomycin, Bactrim, tetracycline.  Blood cultures remain no growth during hospitalization.  Patient also found to have urine culture that grew MRSA.  IV antibiotics were adjusted by Infectious Disease.  There was concern of possible infection of the right lower foot.  Patient did not tolerate MRI and nuclear bone scan was ordered which was unremarkable.  Incidentally found renal mass.  Urology and oncology recommended follow-up with urology outpatient.  Patient was evaluated by vascular surgery recommended outpatient right common femoral endarterectomy and profundoplasty.    Scheduled Meds:   apixaban  10 mg Oral BID    [START ON 2/13/2022] apixaban  5 mg Oral BID    aspirin  162 mg Oral Daily    atorvastatin  20 mg Oral Daily    FLUoxetine  10 mg Oral Daily    fluticasone furoate-vilanteroL  1 puff Inhalation Daily    furosemide  20 mg Oral Daily    insulin aspart U-100  16 Units Subcutaneous TIDWM    insulin  detemir U-100  50 Units Subcutaneous Daily    ipratropium  1 puff Inhalation Daily    midodrine  5 mg Oral Q8H    oxybutynin  5 mg Oral Daily    senna-docusate 8.6-50 mg  2 tablet Oral BID    tamsulosin  0.4 mg Oral Daily    vancomycin (VANCOCIN) IVPB  1,500 mg Intravenous Q16H     Continuous Infusions:  PRN Meds:acetaminophen, albuterol sulfate, ALPRAZolam, dextrose 50%, glucagon (human recombinant), HYDROcodone-acetaminophen, insulin aspart U-100, melatonin, morphine, ondansetron, sodium chloride 0.9%, sodium chloride 0.9%, Pharmacy to dose Vancomycin consult **AND** vancomycin - pharmacy to dose    Review of patient's allergies indicates:  No Known Allergies    Review of Systems  As per subjective    OBJECTIVE:     Vital Signs (Most Recent)  Temp: 98.7 °F (37.1 °C) (02/11/22 1158)  Pulse: 92 (02/11/22 1158)  Resp: 17 (02/11/22 1158)  BP: 109/72 (02/11/22 1158)  SpO2: (!) 92 % (02/11/22 1158)    Vital Signs Range (Last 24H):  Temp:  [97.6 °F (36.4 °C)-98.7 °F (37.1 °C)]   Pulse:  [75-95]   Resp:  []   BP: (109-139)/(65-78)   SpO2:  [92 %-99 %]     I & O (Last 24H):  No intake or output data in the 24 hours ending 02/11/22 1230    Physical Exam:  General: Patient resting comfortably in no acute distress. Appears as stated age. Calm. Obese  Eyes: EOM intact. No conjunctivae injection. No scleral icterus.  ENT: Hearing grossly intact. No discharge from ears. No nasal discharge.   CVS: RRR. No LE edema BL.  Lungs: CTA BL, no wheezing or crackles. Good breath sounds. No accessory muscle use. No acute respiratory distress  Neuro: Alert. Cranial nerves grossly intact. Moves all extremities equally. Follows commands. Responds appropriately   Psych: Mood, behavior, thought content and judgement normal  Musc: s/p right great toe amputation    Laboratory:  All pertinent labs within the past 24 hours have been reviewed.  CBC:   Recent Labs   Lab 02/10/22  0620 02/11/22  0607   WBC 9.49 9.86   HGB 11.3* 11.5*   HCT  38.6* 37.6*    443     CMP:   Recent Labs   Lab 02/10/22  0620 02/11/22  0607    136   K 4.2 3.5   CL 96 94*   CO2 32* 31*   * 240*   BUN 11 9   CREATININE 0.7 0.7   CALCIUM 9.5 9.3   ANIONGAP 10 11   EGFRNONAA >60.0 >60.0       Microbiology Results (last 7 days)     Procedure Component Value Units Date/Time    Blood culture #2 [[id number]] Collected: 02/03/22 1701    Order Status: Completed Specimen: Blood from Peripheral, Hand, Left Updated: 02/08/22 1832     Blood Culture, Routine No growth after 5 days.    Narrative:      Blood Culture #2    Blood culture #1 [[id number]] Collected: 02/03/22 1645    Order Status: Completed Specimen: Blood from Peripheral, Hand, Left Updated: 02/08/22 1832     Blood Culture, Routine No growth after 5 days.    Narrative:      Blood Culture #1    Aerobic culture [[id number]]  (Abnormal)  (Susceptibility) Collected: 02/04/22 1626    Order Status: Completed Specimen: Wound from Leg, Right Updated: 02/08/22 0756     Aerobic Bacterial Culture METHICILLIN RESISTANT STAPHYLOCOCCUS AUREUS  Many  Known MRSA patient      Narrative:      Right thigh abscess 1    Gram stain [[id number]] Collected: 02/04/22 1626    Order Status: Completed Specimen: Wound from Leg, Right Updated: 02/07/22 0813     Gram Stain Result Many WBC's      Many Gram positive cocci in clusters    Narrative:      Right thigh abscess 2    Gram stain [[id number]] Collected: 02/04/22 1626    Order Status: Completed Specimen: Wound from Leg, Right Updated: 02/07/22 0812     Gram Stain Result Many WBC's      Many Gram positive cocci in clusters    Narrative:      Right thigh abscess 1    Aerobic culture [[id number]]  (Abnormal) Collected: 02/04/22 0848    Order Status: Completed Specimen: Wound from Hip, Right Updated: 02/07/22 0809     Aerobic Bacterial Culture METHICILLIN RESISTANT STAPHYLOCOCCUS AUREUS  Many  For susceptibility see order # [id number]      Aerobic culture (Specify Source) **CANNOT BE ORDERED AS  STAT** [[id number]]  (Abnormal)  (Susceptibility) Collected: 02/03/22 1700    Order Status: Completed Specimen: Wound from Hip, Right Updated: 02/07/22 0808     Aerobic Bacterial Culture METHICILLIN RESISTANT STAPHYLOCOCCUS AUREUS  Many  Known MRSA patient      Culture, Anaerobic [[id number]] Collected: 02/04/22 1626    Order Status: Completed Specimen: Wound from Leg, Right Updated: 02/07/22 0759     Anaerobic Culture No anaerobes isolated    Narrative:      Right thigh abscess 1    Culture, Anaerobic [[id number]] Collected: 02/04/22 1626    Order Status: Completed Specimen: Wound from Leg, Right Updated: 02/07/22 0759     Anaerobic Culture No anaerobes isolated    Narrative:      Right thigh abscess 2    Aerobic culture [[id number]]  (Abnormal) Collected: 02/04/22 1626    Order Status: Completed Specimen: Wound from Leg, Right Updated: 02/06/22 0842     Aerobic Bacterial Culture METHICILLIN RESISTANT STAPHYLOCOCCUS AUREUS  For susceptibility see order # [id number]  Known MRSA patient      Narrative:      Right thigh abscess 2    Urine culture [[id number]]  (Abnormal)  (Susceptibility) Collected: 02/03/22 1701    Order Status: Completed Specimen: Urine Updated: 02/05/22 1352     Urine Culture, Routine METHICILLIN RESISTANT STAPHYLOCOCCUS AUREUS  >100,000cfu/ml  Results called to and read back by Zahra Carranza LPN on 1E, re: MRSA   isolated 02/04/2022 15:36 vcb  isolated 02/04/2022 15:36 vcb      Narrative:      Specimen Source->Urine    Fungus culture [[id number]] Collected: 02/04/22 1626    Order Status: Sent Specimen: Wound from Leg, Right Updated: 02/04/22 1703    Fungus culture [[id number]] Collected: 02/04/22 1626    Order Status: Sent Specimen: Wound from Leg, Right Updated: 02/04/22 1703           Diagnostic Results:      ASSESSMENT/PLAN:     Active Hospital Problems    Diagnosis  POA    *Infected wound [T14.8XXA, L08.9]  Yes    Left renal mass [N28.89]  Yes    Paroxysmal atrial fibrillation [I48.0]  Yes      Chronic    COPD (chronic obstructive pulmonary disease) [J44.9]  Yes    Type 2 diabetes mellitus with hyperglycemia [E11.65]  Yes      Resolved Hospital Problems   No resolved problems to display.           Plan:   S/p I&D of right hip on 2/4 after patient was lost to follow-up after ORIF in Nov 2021  Bone scan low concerns for osteomyelitis.  Unable tolerate MRI for right foot  Vancomycin IV  Plan to switch to p.o. doxycycline for 10 days per ID recommendations at discharge  Aerobic culture MRSA  Pain control  Sw working on rehab placement  Continue home medications    Psych consult    Urology recommendaionts: Follow up on 02/14/2022 for renal mass work up.  Hem-onc recommendaitons: Follow up with Urology for work up of mass. Follow up as outpatient in 2 weeks, on Apixaban   Vascular recommendations:  Right common femoral endarterectomy and profundoplasty outpatient      VTE Risk Mitigation (From admission, onward)         Ordered     apixaban tablet 5 mg  2 times daily         02/07/22 1435     apixaban tablet 10 mg  2 times daily         02/07/22 1436     Reason for No Pharmacological VTE Prophylaxis  Once        Question:  Reasons:  Answer:  Already adequately anticoagulated on oral Anticoagulants    02/03/22 2120     IP VTE HIGH RISK PATIENT  Once         02/03/22 2120                        Patient care time was spent personally by me on the following activities: > 35 min  · Obtaining a history.  · Examination of patient.  · Providing medical care at the patients bedside.  · Developing a treatment plan with patient or surrogate and bedside caregivers.  · Ordering and reviewing laboratory studies, radiographic studies, pulse oximetry.  · Ordering and performing treatments and interventions.  · Evaluation of patient's response to treatment.  · Discussions with consultants while on the unit and immediately available to the patient.  · Re-evaluation of the patient's condition.  · Documentation in the medical  record.       Department Hospital Medicine  [facility]  Elliot Singleton MD    Please note: This note was transcribed using voice recognition software. Because of this technology, there are often uinintended grammatical, spelling, and other transcription errors. Please disregard these errors.

## 2022-02-11 NOTE — PLAN OF CARE
02/11/22 1603   Final Note   Assessment Type Final Discharge Note   Anticipated Discharge Disposition Rehab   Post-Acute Status   Post-Acute Authorization Placement   Post-Acute Placement Status Set-up Complete/Auth obtained   Discharge Delays None known at this time   CM notified of final acceptance to [facility].  Discharge orders faxed upon receipt.  After order clarifications were received, Pt was cleared for acceptance, per Mandy.       Nurse provided with number to call report - [phone].       Facility to provide transportation after report is called.     Pt was contacted and updated on the above, and he verbalized understanding and agreement with this plan.       Discharge orders reviewed.  No further needs addressed at this time.

## 2022-02-14 NOTE — PT/OT/SLP DISCHARGE
Occupational Therapy Discharge Summary    James Jiang  MRN: [id number]   Principal Problem: Infected wound      Patient Discharged from acute Occupational Therapy on 2/11/2022.  Please refer to prior OT note dated 2/11/2022 for functional status.    Assessment:      Patient appropriate for care in another setting.    Objective:     GOALS:   Multidisciplinary Problems     Occupational Therapy Goals     Not on file          Multidisciplinary Problems (Resolved)        Problem: Occupational Therapy Goal    Goal Priority Disciplines Outcome Interventions   Occupational Therapy Goal   (Resolved)     OT, PT/OT Met    Description: Goals to be met by: discharge     Patient will increase functional independence with ADLs by performing:    UE Dressing with Supervision.  LE Dressing with Supervision.  Grooming while sitting/standing at sink with Supervision.  Toileting from toilet with Supervision for hygiene and clothing management.   Toilet transfer to toilet with Supervision.                     Reasons for Discontinuation of Therapy Services  Transfer to alternate level of care.      Plan:     Patient Discharged to: Inpatient Rehab    2/14/2022

## 2022-02-16 ENCOUNTER — HOSPITAL ENCOUNTER (OUTPATIENT)
Dept: RADIOLOGY | Facility: HOSPITAL | Age: 62
Discharge: HOME | End: 2022-02-16
Attending: STUDENT IN AN ORGANIZED HEALTH CARE EDUCATION/TRAINING PROGRAM
Payer: MEDICAID

## 2022-02-16 PROCEDURE — 71045 XR CHEST 1 VIEW: ICD-10-PCS | Mod: 26,,, | Performed by: RADIOLOGY

## 2022-02-16 PROCEDURE — 71045 X-RAY EXAM CHEST 1 VIEW: CPT | Mod: 26,,, | Performed by: RADIOLOGY

## 2022-03-15 LAB
FUNGUS SPEC CULT: NORMAL
FUNGUS SPEC CULT: NORMAL

## 2022-03-24 ENCOUNTER — HOSPITAL ENCOUNTER (EMERGENCY)
Facility: HOSPITAL | Age: 62
Discharge: HOME | End: 2022-03-24
Attending: EMERGENCY MEDICINE
Payer: MEDICAID

## 2022-03-24 VITALS
HEIGHT: 67 IN | RESPIRATION RATE: 17 BRPM | BODY MASS INDEX: 31.39 KG/M2 | TEMPERATURE: 98 F | WEIGHT: 200 LBS | DIASTOLIC BLOOD PRESSURE: 81 MMHG | HEART RATE: 87 BPM | OXYGEN SATURATION: 96 % | SYSTOLIC BLOOD PRESSURE: 135 MMHG

## 2022-03-24 DIAGNOSIS — N39.0 URINARY TRACT INFECTION WITHOUT HEMATURIA, SITE UNSPECIFIED: ICD-10-CM

## 2022-03-24 DIAGNOSIS — N20.0 KIDNEY STONE: ICD-10-CM

## 2022-03-24 DIAGNOSIS — R10.9 ABDOMINAL PAIN: ICD-10-CM

## 2022-03-24 DIAGNOSIS — N28.89 RENAL MASS, LEFT: ICD-10-CM

## 2022-03-24 DIAGNOSIS — B02.29 POST HERPETIC NEURALGIA: Primary | ICD-10-CM

## 2022-03-24 LAB
ALBUMIN SERPL BCP-MCNC: 3.6 G/DL (ref 3.5–5.2)
ALP SERPL-CCNC: 87 U/L (ref 55–135)
ALT SERPL W/O P-5'-P-CCNC: 7 U/L (ref 10–44)
ANION GAP SERPL CALC-SCNC: 10 MMOL/L (ref 8–16)
AST SERPL-CCNC: 9 U/L (ref 10–40)
BACTERIA #/AREA URNS HPF: ABNORMAL /HPF
BASOPHILS # BLD AUTO: 0.1 K/UL (ref 0–0.2)
BASOPHILS NFR BLD: 1 % (ref 0–1.9)
BILIRUB SERPL-MCNC: 0.6 MG/DL (ref 0.1–1)
BILIRUB UR QL STRIP: NEGATIVE
BUN SERPL-MCNC: 15 MG/DL (ref 8–23)
CALCIUM SERPL-MCNC: 9.7 MG/DL (ref 8.7–10.5)
CHLORIDE SERPL-SCNC: 100 MMOL/L (ref 95–110)
CLARITY UR: ABNORMAL
CO2 SERPL-SCNC: 28 MMOL/L (ref 23–29)
COLOR UR: YELLOW
CREAT SERPL-MCNC: 0.6 MG/DL (ref 0.5–1.4)
DIFFERENTIAL METHOD: ABNORMAL
EOSINOPHIL # BLD AUTO: 0.3 K/UL (ref 0–0.5)
EOSINOPHIL NFR BLD: 3.5 % (ref 0–8)
ERYTHROCYTE [DISTWIDTH] IN BLOOD BY AUTOMATED COUNT: 15.8 % (ref 11.5–14.5)
EST. GFR  (AFRICAN AMERICAN): >60 ML/MIN/1.73 M^2
EST. GFR  (NON AFRICAN AMERICAN): >60 ML/MIN/1.73 M^2
GLUCOSE SERPL-MCNC: 130 MG/DL (ref 70–110)
GLUCOSE UR QL STRIP: NEGATIVE
HCT VFR BLD AUTO: 44 % (ref 40–54)
HGB BLD-MCNC: 13.1 G/DL (ref 14–18)
HGB UR QL STRIP: ABNORMAL
HYALINE CASTS #/AREA URNS LPF: 44 /LPF
IMM GRANULOCYTES # BLD AUTO: 0.03 K/UL (ref 0–0.04)
IMM GRANULOCYTES NFR BLD AUTO: 0.3 % (ref 0–0.5)
KETONES UR QL STRIP: ABNORMAL
LEUKOCYTE ESTERASE UR QL STRIP: ABNORMAL
LIPASE SERPL-CCNC: 27 U/L (ref 4–60)
LYMPHOCYTES # BLD AUTO: 1.3 K/UL (ref 1–4.8)
LYMPHOCYTES NFR BLD: 13.4 % (ref 18–48)
MCH RBC QN AUTO: 26.2 PG (ref 27–31)
MCHC RBC AUTO-ENTMCNC: 29.8 G/DL (ref 32–36)
MCV RBC AUTO: 88 FL (ref 82–98)
MICROSCOPIC COMMENT: ABNORMAL
MONOCYTES # BLD AUTO: 1 K/UL (ref 0.3–1)
MONOCYTES NFR BLD: 9.9 % (ref 4–15)
NEUTROPHILS # BLD AUTO: 7.1 K/UL (ref 1.8–7.7)
NEUTROPHILS NFR BLD: 71.9 % (ref 38–73)
NITRITE UR QL STRIP: POSITIVE
NRBC BLD-RTO: 0 /100 WBC
PH UR STRIP: 6 [PH] (ref 5–8)
PLATELET # BLD AUTO: 528 K/UL (ref 150–450)
PMV BLD AUTO: 9.2 FL (ref 9.2–12.9)
POTASSIUM SERPL-SCNC: 3.1 MMOL/L (ref 3.5–5.1)
PROT SERPL-MCNC: 7.9 G/DL (ref 6–8.4)
PROT UR QL STRIP: ABNORMAL
RBC # BLD AUTO: 5 M/UL (ref 4.6–6.2)
RBC #/AREA URNS HPF: >100 /HPF (ref 0–4)
SODIUM SERPL-SCNC: 138 MMOL/L (ref 136–145)
SP GR UR STRIP: >1.03 (ref 1–1.03)
SQUAMOUS #/AREA URNS HPF: 0 /HPF
TROPONIN I SERPL DL<=0.01 NG/ML-MCNC: <0.03 NG/ML
URN SPEC COLLECT METH UR: ABNORMAL
UROBILINOGEN UR STRIP-ACNC: NEGATIVE EU/DL
WBC # BLD AUTO: 9.82 K/UL (ref 3.9–12.7)
WBC #/AREA URNS HPF: >100 /HPF (ref 0–5)

## 2022-03-24 PROCEDURE — 63600175 PHARM REV CODE 636 W HCPCS: Performed by: EMERGENCY MEDICINE

## 2022-03-24 PROCEDURE — 85025 COMPLETE CBC W/AUTO DIFF WBC: CPT | Performed by: EMERGENCY MEDICINE

## 2022-03-24 PROCEDURE — 81001 URINALYSIS AUTO W/SCOPE: CPT | Performed by: EMERGENCY MEDICINE

## 2022-03-24 PROCEDURE — 25000003 PHARM REV CODE 250: Performed by: EMERGENCY MEDICINE

## 2022-03-24 PROCEDURE — 87186 SC STD MICRODIL/AGAR DIL: CPT | Performed by: EMERGENCY MEDICINE

## 2022-03-24 PROCEDURE — 96375 TX/PRO/DX INJ NEW DRUG ADDON: CPT

## 2022-03-24 PROCEDURE — 84484 ASSAY OF TROPONIN QUANT: CPT | Performed by: EMERGENCY MEDICINE

## 2022-03-24 PROCEDURE — 87086 URINE CULTURE/COLONY COUNT: CPT | Performed by: EMERGENCY MEDICINE

## 2022-03-24 PROCEDURE — 96374 THER/PROPH/DIAG INJ IV PUSH: CPT

## 2022-03-24 PROCEDURE — 87077 CULTURE AEROBIC IDENTIFY: CPT | Performed by: EMERGENCY MEDICINE

## 2022-03-24 PROCEDURE — 83690 ASSAY OF LIPASE: CPT | Performed by: EMERGENCY MEDICINE

## 2022-03-24 PROCEDURE — 99284 EMERGENCY DEPT VISIT MOD MDM: CPT | Mod: 25

## 2022-03-24 PROCEDURE — 80053 COMPREHEN METABOLIC PANEL: CPT | Performed by: EMERGENCY MEDICINE

## 2022-03-24 RX ORDER — MORPHINE SULFATE 4 MG/ML
4 INJECTION, SOLUTION INTRAMUSCULAR; INTRAVENOUS
Status: COMPLETED | OUTPATIENT
Start: 2022-03-24 | End: 2022-03-24

## 2022-03-24 RX ORDER — HYDROCODONE BITARTRATE AND ACETAMINOPHEN 5; 325 MG/1; MG/1
1 TABLET ORAL EVERY 4 HOURS PRN
Qty: 12 TABLET | Refills: 0 | OUTPATIENT
Start: 2022-03-24 | End: 2022-03-28

## 2022-03-24 RX ORDER — SULFAMETHOXAZOLE AND TRIMETHOPRIM 800; 160 MG/1; MG/1
1 TABLET ORAL 2 TIMES DAILY
Qty: 14 TABLET | Refills: 0 | Status: SHIPPED | OUTPATIENT
Start: 2022-03-24 | End: 2022-03-24 | Stop reason: SDUPTHER

## 2022-03-24 RX ORDER — ONDANSETRON 2 MG/ML
4 INJECTION INTRAMUSCULAR; INTRAVENOUS
Status: COMPLETED | OUTPATIENT
Start: 2022-03-24 | End: 2022-03-24

## 2022-03-24 RX ORDER — SULFAMETHOXAZOLE AND TRIMETHOPRIM 800; 160 MG/1; MG/1
1 TABLET ORAL 2 TIMES DAILY
Qty: 14 TABLET | Refills: 0 | Status: SHIPPED | OUTPATIENT
Start: 2022-03-24 | End: 2022-03-31

## 2022-03-24 RX ORDER — SULFAMETHOXAZOLE AND TRIMETHOPRIM 800; 160 MG/1; MG/1
1 TABLET ORAL
Status: COMPLETED | OUTPATIENT
Start: 2022-03-24 | End: 2022-03-24

## 2022-03-24 RX ORDER — POTASSIUM CHLORIDE 20 MEQ/1
40 TABLET, EXTENDED RELEASE ORAL ONCE
Status: COMPLETED | OUTPATIENT
Start: 2022-03-24 | End: 2022-03-24

## 2022-03-24 RX ADMIN — SULFAMETHOXAZOLE AND TRIMETHOPRIM 1 TABLET: 800; 160 TABLET ORAL at 05:03

## 2022-03-24 RX ADMIN — ONDANSETRON 4 MG: 2 INJECTION INTRAMUSCULAR; INTRAVENOUS at 01:03

## 2022-03-24 RX ADMIN — MORPHINE SULFATE 4 MG: 4 INJECTION, SOLUTION INTRAMUSCULAR; INTRAVENOUS at 01:03

## 2022-03-24 RX ADMIN — POTASSIUM CHLORIDE 40 MEQ: 20 TABLET, EXTENDED RELEASE ORAL at 05:03

## 2022-03-24 NOTE — CARE UPDATE
Discussed presentation with Dr. Cerna in the emergency department at [facility].  See prior Urology care update note from 2/6/22 by Dr. Rodriguez who booked the patient for outpatient follow-up to discuss management of his renal mass on 02/14.  Does not appear that he had this visit.  Has shingles and pain from shingles at this time, and not pain classic of stone, though on the left side there is note of migration of his 6 mm stone to the renal pelvis from an upper pole calyx.  On review of CT scan there was no obstruction and no hydronephrosis.  Question was if he needed intervention and has had recurrent Staph UTIs.  If this was an obstructing stone with infection may consider intervention, but he has has chronic UTI, and had previous p.o. sensitivities to Bactrim advise treating with 10 day course for complicated UTI and following up outpatient for discussion of management of both his renal mass and his kidney stone.  Confirmed appointment and booked in directly wall discussing with the ER for 4/6/22 at 1:00 p.m. with Dr Rodriguez in line with her her previous plan. Appt provided to pt at ER dc

## 2022-03-24 NOTE — ED NOTES
"61 YOM BIB EMS c/o LLQ pain 8/10 X 1 day. Rash noted to LLQ. Also c/o constipation X "few days". stated usually BM every 2 days. -n/v/d -cp. Donis catheter noted in place with clear yellow drainage. Denies any other complaints.     Adult Physical Assessment  LOC: Patient is awake, alert, oriented and speaking appropriately at this time.  APPEARANCE: Patient resting comfortably and appears to be in no acute distress at this time. Patient is clean and well groomed, patient's clothing is properly fastened.  SKIN:The skin is warm and dry, color consistent with ethnicity, patient has normal skin turgor and moist mucus membranes, skin intact, no breakdown or brusing noted.  MUSCULOSKELETAL: Patient moving all extremities well, no obvious swelling or deformities noted.  RESPIRATORY: Airway is open and patent, respirations are spontaneous, patient has a normal effort and rate, no accessory muscle use noted.  CARDIAC: Patient has a normal rate and rhythm, no periphreal edema noted in any extremity, capillary refill < 3 seconds in all extremities.  ABDOMEN: Soft and non tender to palpation, no abdominal distention noted.  NEUROLOGIC: Eyes open spontaneously, behavior appropriate to situation, follows commands, facial expression symmetrical, bilateral hand grasp equal and even, purposeful motor response noted, normal sensation in all extremities when touched with a finger.      VSS. ED workup in progress. Call light within pt reach. Safety measures in place: side rails up X2. Will continue to monitor.   "

## 2022-03-24 NOTE — ED PROVIDER NOTES
Encounter Date: 3/24/2022       History     Chief Complaint   Patient presents with    Abdominal Pain     LLQ pain 8/10 X 1 day. -n/v/d      Patient presents complaining of left flank pain and left lower quadrant pain that started abruptly today with associated nausea.  Patient's recent history of shingles on the left abdominal area in the T10 dermatome.  Patient has completed his Valtrex therapy for this.  He has chronic indwelling Donis catheter.  Nothing really makes it better or worse.        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    Hypertension      Past Surgical History:   Procedure Laterality Date    denies pertinent surgical history      INTRAMEDULLARY RODDING OF FEMUR Right 11/7/2021    Procedure: INSERTION, INTRAMEDULLARY HENNA, FEMUR;  Surgeon: Matt Milian MD;  Location: [facility] OR;  Service: Orthopedics;  Laterality: Right;    TOE AMPUTATION Right 10/12/2021    Procedure: AMPUTATION, TOE;  Surgeon: Mitlon Lauren DPM;  Location: [facility] OR;  Service: Podiatry;  Laterality: Right;     Family History   Problem Relation Age of Onset    Hypertension Father      Social History     Tobacco Use    Smoking status: Former Smoker    Smokeless tobacco: Never Used   Substance Use Topics    Alcohol use: Not Currently    Drug use: Not Currently     Review of Systems   All other systems reviewed and are negative.      Physical Exam     Initial Vitals [03/24/22 1252]   BP Pulse Resp Temp SpO2   (!) 144/76 84 16 97.7 °F (36.5 °C) 98 %      MAP       --         Physical Exam    Nursing note and vitals reviewed.  Constitutional: He is not diaphoretic. No distress.   Pleasant, polite.   HENT:   Head: Normocephalic and atraumatic.   Eyes: EOM are normal.   Neck: Neck supple.   Normal range of  motion.  Cardiovascular: Normal rate, regular rhythm, normal heart sounds and intact distal pulses.   Pulmonary/Chest: Breath sounds normal. No respiratory distress.   Abdominal: Abdomen is soft.   Musculoskeletal:         General: Normal range of motion.      Cervical back: Normal range of motion and neck supple.     Neurological: He is alert and oriented to person, place, and time.   Skin: Skin is warm and dry.   There is a shingles rash that is scabbing and resolving that wraps around the T10 dermatome on the left   Psychiatric: He has a normal mood and affect. His behavior is normal. Judgment and thought content normal.         ED Course   Procedures  Labs Reviewed   CBC W/ AUTO DIFFERENTIAL - Abnormal; Notable for the following components:       Result Value    Hemoglobin 13.1 (*)     MCH 26.2 (*)     MCHC 29.8 (*)     RDW 15.8 (*)     Platelets 528 (*)     Lymph % 13.4 (*)     All other components within normal limits   COMPREHENSIVE METABOLIC PANEL - Abnormal; Notable for the following components:    Potassium 3.1 (*)     Glucose 130 (*)     AST 9 (*)     ALT 7 (*)     All other components within normal limits   URINALYSIS, REFLEX TO URINE CULTURE - Abnormal; Notable for the following components:    Appearance, UA Hazy (*)     Specific Gravity, UA >1.030 (*)     Protein, UA 2+ (*)     Ketones, UA 1+ (*)     Occult Blood UA 3+ (*)     Nitrite, UA Positive (*)     Leukocytes, UA 3+ (*)     All other components within normal limits    Narrative:     Specimen Source->Urine   URINALYSIS MICROSCOPIC - Abnormal; Notable for the following components:    RBC, UA >100 (*)     WBC, UA >100 (*)     Bacteria Many (*)     Hyaline Casts, UA 44 (*)     All other components within normal limits    Narrative:     Specimen Source->Urine   CULTURE, URINE   LIPASE   TROPONIN I          Imaging Results          CT Renal Stone Study ABD Pelvis WO (Final result)  Result time 03/24/22 15:25:57    Final result by Eduin Ojeda MD  (03/24/22 15:25:57)                 Narrative:    CMS MANDATED QUALITY DATA - CT RADIATION - 436    All CT scans at this facility utilize dose modulation, iterative reconstruction, and/or weight based dosing when appropriate to reduce radiation dose to as low as reasonably achievable.        Reason: Flank pain, kidney stone suspected    TECHNIQUE: CT abdomen and pelvis without IV or oral contrast. Study is tailored for detection of urinary tract calculi and evaluation of solid organs, hollow viscera, and vascular structures is limited.    COMPARISON: 2/6/2022    CT ABDOMEN:  Heart is enlarged with coronary artery calcifications being diffuse, unchanged. Visualized lung bases show subpleural reticular opacities bilaterally. Nodular right lower lobe opacity measuring 6 mm occurs on series 3 image 11, unchanged.    Noncontrast liver, gallbladder, pancreas, spleen, and adrenals are normal.    Mid pole partially exophytic left renal mass measuring at least 4 cm in size is unchanged. Bilateral nonobstructing renal calculi are present. 6 mm calcification in left renal pelvis has migrated from the superior calyx on the prior exam, without associated hydronephrosis. Negative for ureteral calculi, and negative for right hydronephrosis.    Aortoiliac calcifications are mild and diffuse.    Diverticula arise from the colon. Diverticulum arising from third portion of duodenum also evident. Large and small intestines are otherwise unremarkable. A normal appendix is present.    Degenerative changes affect the spine along with changes of diffuse idiopathic skeletal hyperostosis involving the lower thoracic and superior lumbar spine.    CT PELVIS:  No acute osseous abnormality. Intramedullary nail and proximal helical blade transfix right proximal femur. Osseous remodeling of intertrochanteric right proximal femur suggest old healed fracture. No acute osseous abnormality.    Donis catheter lies in decompressed bladder. Left  Spigelian hernia containing segment of large intestines remains unchanged. No free pelvic fluid.    Extensive atherosclerotic calcifications affect bilateral common femoral and superficial femoral arteries.    IMPRESSION:    1. 6 mm calcification in left renal pelvis has migrated from the superior calyx on the prior exam, without associated hydronephrosis.  2. Bilateral nonobstructing renal calculi.  3. 6 mm right lower lobe nodule, unchanged. Continued CT thorax without IV contrast follow-up will be needed to differentiate benign and malignant etiologies. Initial CT thorax without IV contrast follow-up suggested in three months.  4. Known enhancing left renal mass, characteristic of renal cell carcinoma until probe and otherwise. Tissue diagnosis is recommended. Urologic consultation recommended.  5. Diverticulosis.  6. Left Spigelian hernia.    Electronically signed by:  Eduin Ojeda MD  3/24/2022 3:25 PM CDT Workstation: [id number]                             X-Ray Chest AP Portable (Final result)  Result time 03/24/22 14:34:00    Final result by Eduin Ojeda MD (03/24/22 14:34:00)                 Narrative:    Reason: Abdominal Pain (LLQ pain 8/10 X 1 day. -n/v/d )    FINDINGS:  Portable chest at 1408 compared to 16 2022 shows normal cardiomediastinal silhouette.    Lung show minimal prominence of interstitial markings, without new confluent alveolar consolidation, pleural effusion, or pneumothorax. Pulmonary vasculature is normal. Glenohumeral joint osteoarthrosis again evident along with left AC joint osteoarthrosis. No acute osseous abnormality.    IMPRESSION:  No acute cardiopulmonary abnormality.    Electronically signed by:  Eduin Ojeda MD  3/24/2022 2:34 PM CDT Workstation: [id number]                               Medications   morphine injection 4 mg (4 mg Intravenous Given 3/24/22 1338)   ondansetron injection 4 mg (4 mg Intravenous Given 3/24/22 1338)   potassium chloride SA CR tablet 40 mEq (40  mEq Oral Given 3/24/22 1703)   sulfamethoxazole-trimethoprim 800-160mg per tablet 1 tablet (1 tablet Oral Given 3/24/22 1706)     Medical Decision Making:   Initial Assessment:   No apparent distress  Differential Diagnosis:   Post herpetic neuralgia, kidney stone, urinary tract infection, pyelonephritis, electrolyte abnormalities, dehydration  Clinical Tests:   Lab Tests: Reviewed and Ordered  Radiological Study: Ordered and Reviewed  Medical Tests: Reviewed and Ordered  ED Management:  Patient's CT scan does indeed show some migration of a gray-the renal pelvis stone.  There is no obstruction.  Or patient distally has a renal mass which patient was previously made aware of.  His Donis catheter was changed in the emergency department and urinalysis was sent.  He indeed does have nitrite positive urine.  I discussed this with Dr. Sampson carbajal urology on-call who agrees assessment and plan recommends Bactrim therapy as patient recently had MRSA.  He does not recommend any admission to the hospitalist patient is nontoxic-appearing is not having obstructing stone.  Patient ditch the Bactrim in the emergency department.  He will be given prescription for analgesia and Bactrim has a follow-up clinic appointment on April 6 with Dr. Rodriguez.                      Clinical Impression:   Final diagnoses:  [B02.29] Post herpetic neuralgia (Primary)  [R10.9] Abdominal pain  [N39.0] Urinary tract infection without hematuria, site unspecified  [N28.89] Renal mass, left  [N20.0] Kidney stone          ED Disposition Condition    Discharge Stable        ED Prescriptions     Medication Sig Dispense Start Date End Date Auth. Provider    sulfamethoxazole-trimethoprim 800-160mg (BACTRIM DS) 800-160 mg Tab  (Status: Discontinued) Take 1 tablet by mouth 2 (two) times daily. for 7 days 14 tablet 3/24/2022 3/24/2022 Alfredo Cerna MD    HYDROcodone-acetaminophen (NORCO) 5-325 mg per tablet Take 1 tablet by mouth every 4 (four) hours as needed.  12 tablet 3/24/2022  Alfredo Cerna MD    sulfamethoxazole-trimethoprim 800-160mg (BACTRIM DS) 800-160 mg Tab Take 1 tablet by mouth 2 (two) times daily. for 7 days 14 tablet 3/24/2022 3/31/2022 Alfredo Cerna MD        Follow-up Information     Follow up With Specialties Details Why Contact Info    Santa Barnard MD  In 1 week  [address] [place] [id number]  [phone]             Alfredo Cerna MD  03/24/22 2978

## 2022-03-26 LAB — BACTERIA UR CULT: ABNORMAL

## 2022-03-28 ENCOUNTER — HOSPITAL ENCOUNTER (EMERGENCY)
Facility: HOSPITAL | Age: 62
Discharge: HOME | End: 2022-03-28
Attending: EMERGENCY MEDICINE
Payer: MEDICAID

## 2022-03-28 VITALS
BODY MASS INDEX: 31.39 KG/M2 | RESPIRATION RATE: 17 BRPM | OXYGEN SATURATION: 95 % | SYSTOLIC BLOOD PRESSURE: 111 MMHG | HEIGHT: 67 IN | WEIGHT: 200 LBS | DIASTOLIC BLOOD PRESSURE: 71 MMHG | HEART RATE: 81 BPM | TEMPERATURE: 99 F

## 2022-03-28 DIAGNOSIS — R33.9 URINARY RETENTION: Primary | ICD-10-CM

## 2022-03-28 DIAGNOSIS — B02.8 HERPES ZOSTER WITH COMPLICATION: ICD-10-CM

## 2022-03-28 LAB
BACTERIA #/AREA URNS HPF: NEGATIVE /HPF
BILIRUB UR QL STRIP: NEGATIVE
CLARITY UR: CLEAR
COLOR UR: YELLOW
GLUCOSE UR QL STRIP: NEGATIVE
HGB UR QL STRIP: ABNORMAL
HYALINE CASTS #/AREA URNS LPF: 2 /LPF
KETONES UR QL STRIP: NEGATIVE
LEUKOCYTE ESTERASE UR QL STRIP: NEGATIVE
MICROSCOPIC COMMENT: ABNORMAL
NITRITE UR QL STRIP: NEGATIVE
PH UR STRIP: 7 [PH] (ref 5–8)
PROT UR QL STRIP: ABNORMAL
RBC #/AREA URNS HPF: 4 /HPF (ref 0–4)
SP GR UR STRIP: >1.03 (ref 1–1.03)
SQUAMOUS #/AREA URNS HPF: 0 /HPF
URN SPEC COLLECT METH UR: ABNORMAL
UROBILINOGEN UR STRIP-ACNC: NEGATIVE EU/DL
WBC #/AREA URNS HPF: 4 /HPF (ref 0–5)

## 2022-03-28 PROCEDURE — 25000003 PHARM REV CODE 250: Performed by: NURSE PRACTITIONER

## 2022-03-28 PROCEDURE — 99283 EMERGENCY DEPT VISIT LOW MDM: CPT

## 2022-03-28 PROCEDURE — 81001 URINALYSIS AUTO W/SCOPE: CPT | Performed by: NURSE PRACTITIONER

## 2022-03-28 RX ORDER — HYDROCODONE BITARTRATE AND ACETAMINOPHEN 5; 325 MG/1; MG/1
1 TABLET ORAL EVERY 4 HOURS PRN
Qty: 12 TABLET | Refills: 0 | OUTPATIENT
Start: 2022-03-28 | End: 2022-04-26

## 2022-03-28 RX ORDER — HYDROCODONE BITARTRATE AND ACETAMINOPHEN 5; 325 MG/1; MG/1
1 TABLET ORAL
Status: COMPLETED | OUTPATIENT
Start: 2022-03-28 | End: 2022-03-28

## 2022-03-28 RX ADMIN — HYDROCODONE BITARTRATE AND ACETAMINOPHEN 1 TABLET: 5; 325 TABLET ORAL at 09:03

## 2022-03-28 NOTE — ED NOTES
Donis catheter irrigated with 60 cc normal saline, tolerated well  , reports relief and urine noted in catheter

## 2022-03-28 NOTE — ED PROVIDER NOTES
Encounter Date: 3/28/2022       History     Chief Complaint   Patient presents with    Urinary Retention     Patient recently dx with shingles on L abdominal area, patient has Blanco and states he has not had output in over 24 hours      Presents with urinary retention  Mr Jiang has a blanco due to his incontinence He reports urinary retention since yesterday.  Denies fever pt also reports pain from shingles.         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    Hypertension      Past Surgical History:   Procedure Laterality Date    denies pertinent surgical history      INTRAMEDULLARY RODDING OF FEMUR Right 11/7/2021    Procedure: INSERTION, INTRAMEDULLARY HENNA, FEMUR;  Surgeon: Matt Milian MD;  Location: [facility];  Service: Orthopedics;  Laterality: Right;    TOE AMPUTATION Right 10/12/2021    Procedure: AMPUTATION, TOE;  Surgeon: Milton Lauren DPM;  Location: [facility];  Service: Podiatry;  Laterality: Right;     Family History   Problem Relation Age of Onset    Hypertension Father      Social History     Tobacco Use    Smoking status: Former Smoker    Smokeless tobacco: Never Used   Substance Use Topics    Alcohol use: Not Currently    Drug use: Not Currently     Review of Systems   Constitutional: Negative for fever.   Respiratory: Negative for cough, shortness of breath and wheezing.    Cardiovascular: Negative for chest pain, palpitations and leg swelling.   Gastrointestinal: Negative for abdominal distention, abdominal pain, diarrhea, nausea and vomiting.   Genitourinary: Positive for difficulty urinating. Negative for penile pain, penile swelling, scrotal swelling and testicular pain.   Musculoskeletal: Negative for back pain.   Skin: Negative for rash.   Neurological:  Negative for weakness.       Physical Exam     Initial Vitals [03/28/22 0637]   BP Pulse Resp Temp SpO2   (!) 101/58 87 18 99.4 °F (37.4 °C) 95 %      MAP       --         Physical Exam    Constitutional: He appears well-developed and well-nourished.   HENT:   Mouth/Throat: Oropharynx is clear and moist.   Eyes: Conjunctivae are normal.   Neck: Neck supple.   Normal range of motion.  Cardiovascular: Normal rate, regular rhythm and normal heart sounds.   Pulmonary/Chest: Breath sounds normal. No respiratory distress.   Abdominal: Abdomen is soft. Bowel sounds are normal. He exhibits no distension. There is no abdominal tenderness.   Genitourinary:    Genitourinary Comments: Catheter was irrigated  There was 800 CC of urine output to catheter after irrigation  There is also urine in the bag at present         Musculoskeletal:         General: Normal range of motion.      Cervical back: Normal range of motion and neck supple.     Neurological: He is alert and oriented to person, place, and time. No sensory deficit. GCS score is 15. GCS eye subscore is 4. GCS verbal subscore is 5. GCS motor subscore is 6.   Skin: Skin is warm. Capillary refill takes less than 2 seconds.   There are some dried shingle lesion to the anterior and posterior chest left side.     Psychiatric: He has a normal mood and affect. Thought content normal.         ED Course   Procedures  Labs Reviewed   URINALYSIS, REFLEX TO URINE CULTURE - Abnormal; Notable for the following components:       Result Value    Specific Gravity, UA >1.030 (*)     Protein, UA Trace (*)     Occult Blood UA 1+ (*)     All other components within normal limits    Narrative:     Specimen Source->Urine   URINALYSIS MICROSCOPIC - Abnormal; Notable for the following components:    Hyaline Casts, UA 2 (*)     All other components within normal limits    Narrative:     Specimen Source->Urine          Imaging Results    None          Medications   HYDROcodone-acetaminophen 5-325  mg per tablet 1 tablet (1 tablet Oral Given 3/28/22 6898)     Medical Decision Making:   Initial Assessment:   Reports urinary retention since last PM  Has indwelling catheter   Clinical Tests:   Lab Tests: Reviewed  The following lab test(s) were unremarkable: Urinalysis  ED Management:  Pt was treated for a UTI by Dr. Cerna on 3/24 He  Has been taking Bactrim DS  His urine is clean.  At discharge this pt has urine in his catheter bag.  His care giver was at the bedside  She was instructed to return to the ED if the symptoms reoccur They were also instructed to continue taking the Bactrim until all to the pills are gone. I have given him a few Norco for the shingles pain and instructed him to follow up with his PCP   At no time while in the ED or at discharge did this pt appear in any distress   Have discussed this pt with Dr. Larson                       Clinical Impression:   Final diagnoses:  [R33.9] Urinary retention (Primary)  [B02.8] Herpes zoster with complication          ED Disposition Condition    Discharge Stable        ED Prescriptions     Medication Sig Dispense Start Date End Date Auth. Provider    HYDROcodone-acetaminophen (NORCO) 5-325 mg per tablet Take 1 tablet by mouth every 4 (four) hours as needed. 12 tablet 3/28/2022  Vivienne Goznalez NP        Follow-up Information     Follow up With Specialties Details Why Contact Info    Santa Barnard MD  In 3 days  [address] [facility] [place] [address]  [phone]             Vivienne Gonzalez NP  03/28/22 9533

## 2022-03-29 ENCOUNTER — HOSPITAL ENCOUNTER (EMERGENCY)
Facility: HOSPITAL | Age: 62
Discharge: HOME | End: 2022-03-29
Attending: EMERGENCY MEDICINE
Payer: MEDICAID

## 2022-03-29 VITALS
HEART RATE: 86 BPM | DIASTOLIC BLOOD PRESSURE: 60 MMHG | OXYGEN SATURATION: 95 % | SYSTOLIC BLOOD PRESSURE: 98 MMHG | TEMPERATURE: 98 F | RESPIRATION RATE: 17 BRPM

## 2022-03-29 DIAGNOSIS — T83.011A MALFUNCTION OF INDWELLING URINARY CATHETER, INITIAL ENCOUNTER: Primary | ICD-10-CM

## 2022-03-29 PROCEDURE — 99283 EMERGENCY DEPT VISIT LOW MDM: CPT

## 2022-03-29 NOTE — ED PROVIDER NOTES
Encounter Date: 3/29/2022       History   No chief complaint on file.    61-year-old male presented emergency department with urinary catheter not draining.  Patient denies any other complaints.  Denies fever chills or nausea vomiting or chest pain or shortness of breath.  Patient had previous hip injury and has urinary incontinence so has indwelling Donis catheter which she had for almost the past year.  Recently had the catheter changed.  Patient denies any other complaints.        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    Hypertension      Past Surgical History:   Procedure Laterality Date    denies pertinent surgical history      INTRAMEDULLARY RODDING OF FEMUR Right 11/7/2021    Procedure: INSERTION, INTRAMEDULLARY HENNA, FEMUR;  Surgeon: Matt Milian MD;  Location: [facility];  Service: Orthopedics;  Laterality: Right;    TOE AMPUTATION Right 10/12/2021    Procedure: AMPUTATION, TOE;  Surgeon: Milton Lauren DPM;  Location: [facility] OR;  Service: Podiatry;  Laterality: Right;     Family History   Problem Relation Age of Onset    Hypertension Father      Social History     Tobacco Use    Smoking status: Former Smoker    Smokeless tobacco: Never Used   Substance Use Topics    Alcohol use: Not Currently    Drug use: Not Currently     Review of Systems   Constitutional: Negative.    HENT: Negative.    Eyes: Negative.    Respiratory: Negative.    Cardiovascular: Negative.    Gastrointestinal: Negative.    Endocrine: Negative.    Genitourinary: Positive for difficulty urinating.   Musculoskeletal: Negative.    Skin: Negative.    Allergic/Immunologic: Negative.    Neurological: Negative.    Hematological: Negative.    Psychiatric/Behavioral: Negative.    All other systems reviewed and are  negative.      Physical Exam     Initial Vitals [03/29/22 1635]   BP Pulse Resp Temp SpO2   (!) 98/59 86 17 98 °F (36.7 °C) 95 %      MAP       --         Physical Exam    Nursing note and vitals reviewed.  Constitutional: He appears well-developed and well-nourished.   HENT:   Head: Normocephalic and atraumatic.   Nose: Nose normal.   Eyes: Conjunctivae and EOM are normal.   Neck: Neck supple. No tracheal deviation present.   Normal range of motion.  Cardiovascular: Normal rate, regular rhythm, normal heart sounds and intact distal pulses. Exam reveals no friction rub.    No murmur heard.  Pulmonary/Chest: Breath sounds normal. No respiratory distress. He has no wheezes. He has no rales.   Abdominal: Abdomen is soft. He exhibits no distension. There is no abdominal tenderness.   Genitourinary:    Genitourinary Comments: Indwelling Donis catheter in place.  Catheter irrigated and started draining after that.  Initially catheter clogged.     Musculoskeletal:         General: Normal range of motion.      Cervical back: Normal range of motion and neck supple.     Neurological: He is alert and oriented to person, place, and time. He has normal strength. No sensory deficit. GCS score is 15. GCS eye subscore is 4. GCS verbal subscore is 5. GCS motor subscore is 6.   Skin: Skin is warm and dry. Capillary refill takes less than 2 seconds.   Psychiatric: He has a normal mood and affect. Thought content normal.         ED Course   Procedures  Labs Reviewed - No data to display       Imaging Results    None          Medications - No data to display  Medical Decision Making:   Differential Diagnosis:   Patient with the urinary catheter obstruction.  Catheter irrigated and then it started draining.  Patient otherwise asymptomatic and has no other complaints.  Discharged with instructions and follow up with primary care and Neurology and return precautions given.                      Clinical Impression:   Final  diagnoses:  [T83.011A] Malfunction of indwelling urinary catheter, initial encounter (Primary)          ED Disposition Condition    Discharge Stable        ED Prescriptions     None        Follow-up Information     Follow up With Specialties Details Why Contact Info    Santa Barnard MD  In 2 days  [address] [facility] [address]  [phone]             Antonia Larson MD  03/29/22 9171

## 2022-03-29 NOTE — ED NOTES
"Pt reports his "catheter has steam in it" and "is not flowing right". Clear yellow urine noted to be in bag at this time. No blood clots noted in tubing or bag. Pt denies any pain to the insertion site or pelvic region. No distress noted, chest rising and falling, resp E/U.  "

## 2022-03-29 NOTE — DISCHARGE INSTRUCTIONS
Follow-up with your urologist.  Return to emergency department for worsening symptoms or any problems your urinary catheter was obstructed and now is flowing without difficulty after irrigation.

## 2022-03-30 ENCOUNTER — HOSPITAL ENCOUNTER (EMERGENCY)
Facility: HOSPITAL | Age: 62
Discharge: HOME | End: 2022-03-30
Attending: EMERGENCY MEDICINE
Payer: MEDICAID

## 2022-03-30 VITALS
SYSTOLIC BLOOD PRESSURE: 104 MMHG | TEMPERATURE: 99 F | RESPIRATION RATE: 18 BRPM | DIASTOLIC BLOOD PRESSURE: 63 MMHG | HEART RATE: 88 BPM | OXYGEN SATURATION: 99 %

## 2022-03-30 DIAGNOSIS — R33.9 URINARY RETENTION: Primary | ICD-10-CM

## 2022-03-30 PROCEDURE — 99283 EMERGENCY DEPT VISIT LOW MDM: CPT

## 2022-03-30 PROCEDURE — 51702 INSERT TEMP BLADDER CATH: CPT

## 2022-03-30 NOTE — ED PROVIDER NOTES
Encounter Date: 3/30/2022       History     Chief Complaint   Patient presents with    Urinary Retention     EMS reports patient is coming from home with a blocked blanco. Patient explains he senses an urge to urinate which he shouldn't and feels he needs to, but nothing comes out.      61-year-old male presented emergency department with concern that his catheter may not be flowing.  Patient lives alone and worried that his catheter might occlude as it was occluded yesterday.  Patient does have urine flowing to the catheter however wanted a bigger catheter as worried.  Denies any other complaints.  Denies fever chills or nausea vomiting or chest pain or shortness of breath or abdominal pain or any new focal weakness or numbness.        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    Hypertension      Past Surgical History:   Procedure Laterality Date    denies pertinent surgical history      INTRAMEDULLARY RODDING OF FEMUR Right 11/7/2021    Procedure: INSERTION, INTRAMEDULLARY HENNA, FEMUR;  Surgeon: Matt Milian MD;  Location: [facility] OR;  Service: Orthopedics;  Laterality: Right;    TOE AMPUTATION Right 10/12/2021    Procedure: AMPUTATION, TOE;  Surgeon: Milton Lauren DPM;  Location: [facility] OR;  Service: Podiatry;  Laterality: Right;     Family History   Problem Relation Age of Onset    Hypertension Father      Social History     Tobacco Use    Smoking status: Former Smoker    Smokeless tobacco: Never Used   Substance Use Topics    Alcohol use: Not Currently    Drug use: Not Currently     Review of Systems   Constitutional: Negative.    HENT: Negative.    Eyes: Negative.    Respiratory: Negative.    Cardiovascular: Negative.    Gastrointestinal: Negative.    Endocrine: Negative.     Genitourinary: Negative.    Musculoskeletal: Negative.    Skin: Negative.    Allergic/Immunologic: Negative.    Neurological: Negative.    Hematological: Negative.    Psychiatric/Behavioral: Negative.    All other systems reviewed and are negative.      Physical Exam     Initial Vitals [03/30/22 0941]   BP Pulse Resp Temp SpO2   104/63 88 18 98.6 °F (37 °C) 99 %      MAP       --         Physical Exam    Nursing note and vitals reviewed.  Constitutional: He appears well-developed and well-nourished.   HENT:   Head: Normocephalic and atraumatic.   Nose: Nose normal.   Eyes: Conjunctivae and EOM are normal.   Neck: No tracheal deviation present.   Normal range of motion.  Cardiovascular: Normal rate, regular rhythm, normal heart sounds and intact distal pulses. Exam reveals no friction rub.    No murmur heard.  Pulmonary/Chest: Breath sounds normal. No respiratory distress. He has no wheezes. He has no rales.   Abdominal: Abdomen is soft. He exhibits no distension. There is no abdominal tenderness.   Genitourinary:    Genitourinary Comments: Indwelling catheter in place     Musculoskeletal:         General: Normal range of motion.      Cervical back: Normal range of motion.     Neurological: He is alert and oriented to person, place, and time. He has normal strength. GCS score is 15. GCS eye subscore is 4. GCS verbal subscore is 5. GCS motor subscore is 6.   Move all extremities spontaneously   Skin: Skin is warm and dry. Capillary refill takes less than 2 seconds.   Psychiatric: He has a normal mood and affect. Thought content normal.         ED Course   Procedures  Labs Reviewed - No data to display       Imaging Results    None          Medications - No data to display  Medical Decision Making:   Differential Diagnosis:   Donis catheter replaced with larger catheter.  Urine flowing well.  Discharged with instructions and follow-up                      Clinical Impression:   Final diagnoses:  [R33.9] Urinary  retention (Primary)          ED Disposition Condition    Discharge Stable        ED Prescriptions     None        Follow-up Information     Follow up With Specialties Details Why Contact Info    Santa Barnard MD  In 2 days  [address] [place] [address]  [phone]      Elliot Kellogg MD Urology In 2 days  [address] [place] [address] [address]  [address]  [place] [address]  [phone]             Antonia Larson MD  03/30/22 1002

## 2022-04-01 ENCOUNTER — HOSPITAL ENCOUNTER (EMERGENCY)
Facility: HOSPITAL | Age: 62
Discharge: HOME | End: 2022-04-01
Attending: EMERGENCY MEDICINE
Payer: MEDICAID

## 2022-04-01 ENCOUNTER — PATIENT OUTREACH (OUTPATIENT)
Dept: EMERGENCY MEDICINE | Facility: HOSPITAL | Age: 62
End: 2022-04-01
Payer: MEDICAID

## 2022-04-01 VITALS
HEART RATE: 86 BPM | DIASTOLIC BLOOD PRESSURE: 58 MMHG | BODY MASS INDEX: 31.32 KG/M2 | OXYGEN SATURATION: 89 % | WEIGHT: 200 LBS | RESPIRATION RATE: 18 BRPM | TEMPERATURE: 98 F | SYSTOLIC BLOOD PRESSURE: 101 MMHG

## 2022-04-01 DIAGNOSIS — N39.0 URINARY TRACT INFECTION WITH HEMATURIA, SITE UNSPECIFIED: ICD-10-CM

## 2022-04-01 DIAGNOSIS — N28.89 RENAL MASS: ICD-10-CM

## 2022-04-01 DIAGNOSIS — R31.9 URINARY TRACT INFECTION WITH HEMATURIA, SITE UNSPECIFIED: ICD-10-CM

## 2022-04-01 DIAGNOSIS — R10.9 FLANK PAIN: ICD-10-CM

## 2022-04-01 DIAGNOSIS — B02.29 POST HERPETIC NEURALGIA: ICD-10-CM

## 2022-04-01 DIAGNOSIS — R31.9 HEMATURIA, UNSPECIFIED TYPE: Primary | ICD-10-CM

## 2022-04-01 LAB
ALBUMIN SERPL BCP-MCNC: 4.1 G/DL (ref 3.5–5.2)
ALP SERPL-CCNC: 83 U/L (ref 55–135)
ALT SERPL W/O P-5'-P-CCNC: 7 U/L (ref 10–44)
ANION GAP SERPL CALC-SCNC: 13 MMOL/L (ref 8–16)
APTT PPP: 32.7 SEC (ref 23.3–35.1)
AST SERPL-CCNC: 13 U/L (ref 10–40)
BACTERIA #/AREA URNS HPF: NEGATIVE /HPF
BASOPHILS # BLD AUTO: 0.13 K/UL (ref 0–0.2)
BASOPHILS NFR BLD: 1.3 % (ref 0–1.9)
BILIRUB SERPL-MCNC: 0.4 MG/DL (ref 0.1–1)
BILIRUB UR QL STRIP: ABNORMAL
BUN SERPL-MCNC: 10 MG/DL (ref 8–23)
CALCIUM SERPL-MCNC: 9.9 MG/DL (ref 8.7–10.5)
CHLORIDE SERPL-SCNC: 98 MMOL/L (ref 95–110)
CLARITY UR: ABNORMAL
CO2 SERPL-SCNC: 27 MMOL/L (ref 23–29)
COLOR UR: ABNORMAL
CREAT SERPL-MCNC: 0.7 MG/DL (ref 0.5–1.4)
DIFFERENTIAL METHOD: ABNORMAL
EOSINOPHIL # BLD AUTO: 0.3 K/UL (ref 0–0.5)
EOSINOPHIL NFR BLD: 2.8 % (ref 0–8)
ERYTHROCYTE [DISTWIDTH] IN BLOOD BY AUTOMATED COUNT: 16.6 % (ref 11.5–14.5)
EST. GFR  (AFRICAN AMERICAN): >60 ML/MIN/1.73 M^2
EST. GFR  (NON AFRICAN AMERICAN): >60 ML/MIN/1.73 M^2
GLUCOSE SERPL-MCNC: 111 MG/DL (ref 70–110)
GLUCOSE UR QL STRIP: ABNORMAL
HCT VFR BLD AUTO: 44.6 % (ref 40–54)
HGB BLD-MCNC: 13.6 G/DL (ref 14–18)
HGB UR QL STRIP: ABNORMAL
HYALINE CASTS #/AREA URNS LPF: 193 /LPF
IMM GRANULOCYTES # BLD AUTO: 0.03 K/UL (ref 0–0.04)
IMM GRANULOCYTES NFR BLD AUTO: 0.3 % (ref 0–0.5)
INR PPP: 1.2
KETONES UR QL STRIP: ABNORMAL
LEUKOCYTE ESTERASE UR QL STRIP: ABNORMAL
LYMPHOCYTES # BLD AUTO: 2.2 K/UL (ref 1–4.8)
LYMPHOCYTES NFR BLD: 21.8 % (ref 18–48)
MAGNESIUM SERPL-MCNC: 1.9 MG/DL (ref 1.6–2.6)
MCH RBC QN AUTO: 26.5 PG (ref 27–31)
MCHC RBC AUTO-ENTMCNC: 30.5 G/DL (ref 32–36)
MCV RBC AUTO: 87 FL (ref 82–98)
MICROSCOPIC COMMENT: ABNORMAL
MONOCYTES # BLD AUTO: 0.7 K/UL (ref 0.3–1)
MONOCYTES NFR BLD: 7.2 % (ref 4–15)
NEUTROPHILS # BLD AUTO: 6.7 K/UL (ref 1.8–7.7)
NEUTROPHILS NFR BLD: 66.6 % (ref 38–73)
NITRITE UR QL STRIP: ABNORMAL
NRBC BLD-RTO: 0 /100 WBC
PH UR STRIP: 7.5 [PH] (ref 5–8)
PLATELET # BLD AUTO: 405 K/UL (ref 150–450)
PMV BLD AUTO: 9.8 FL (ref 9.2–12.9)
POTASSIUM SERPL-SCNC: 4.5 MMOL/L (ref 3.5–5.1)
PROT SERPL-MCNC: 8.4 G/DL (ref 6–8.4)
PROT UR QL STRIP: ABNORMAL
PROTHROMBIN TIME: 14.6 SEC (ref 11.4–13.7)
RBC # BLD AUTO: 5.14 M/UL (ref 4.6–6.2)
RBC #/AREA URNS HPF: >100 /HPF (ref 0–4)
SODIUM SERPL-SCNC: 138 MMOL/L (ref 136–145)
SP GR UR STRIP: 1.02 (ref 1–1.03)
SQUAMOUS #/AREA URNS HPF: 12 /HPF
URN SPEC COLLECT METH UR: ABNORMAL
UROBILINOGEN UR STRIP-ACNC: ABNORMAL EU/DL
WBC # BLD AUTO: 10.1 K/UL (ref 3.9–12.7)
WBC #/AREA URNS HPF: 48 /HPF (ref 0–5)

## 2022-04-01 PROCEDURE — 63600175 PHARM REV CODE 636 W HCPCS: Performed by: EMERGENCY MEDICINE

## 2022-04-01 PROCEDURE — 80053 COMPREHEN METABOLIC PANEL: CPT | Performed by: EMERGENCY MEDICINE

## 2022-04-01 PROCEDURE — 81001 URINALYSIS AUTO W/SCOPE: CPT | Performed by: EMERGENCY MEDICINE

## 2022-04-01 PROCEDURE — 96376 TX/PRO/DX INJ SAME DRUG ADON: CPT

## 2022-04-01 PROCEDURE — 85730 THROMBOPLASTIN TIME PARTIAL: CPT | Performed by: EMERGENCY MEDICINE

## 2022-04-01 PROCEDURE — 85025 COMPLETE CBC W/AUTO DIFF WBC: CPT | Performed by: EMERGENCY MEDICINE

## 2022-04-01 PROCEDURE — 87086 URINE CULTURE/COLONY COUNT: CPT | Performed by: EMERGENCY MEDICINE

## 2022-04-01 PROCEDURE — 93010 ELECTROCARDIOGRAM REPORT: CPT | Mod: ,,, | Performed by: GENERAL PRACTICE

## 2022-04-01 PROCEDURE — 25500020 PHARM REV CODE 255: Performed by: EMERGENCY MEDICINE

## 2022-04-01 PROCEDURE — 96365 THER/PROPH/DIAG IV INF INIT: CPT | Mod: 59

## 2022-04-01 PROCEDURE — 93010 EKG 12-LEAD: ICD-10-PCS | Mod: ,,, | Performed by: GENERAL PRACTICE

## 2022-04-01 PROCEDURE — 96375 TX/PRO/DX INJ NEW DRUG ADDON: CPT

## 2022-04-01 PROCEDURE — 85610 PROTHROMBIN TIME: CPT | Performed by: EMERGENCY MEDICINE

## 2022-04-01 PROCEDURE — 83735 ASSAY OF MAGNESIUM: CPT | Performed by: EMERGENCY MEDICINE

## 2022-04-01 PROCEDURE — 99285 EMERGENCY DEPT VISIT HI MDM: CPT | Mod: 25

## 2022-04-01 PROCEDURE — 93005 ELECTROCARDIOGRAM TRACING: CPT | Performed by: GENERAL PRACTICE

## 2022-04-01 RX ORDER — MORPHINE SULFATE 4 MG/ML
4 INJECTION, SOLUTION INTRAMUSCULAR; INTRAVENOUS
Status: COMPLETED | OUTPATIENT
Start: 2022-04-01 | End: 2022-04-01

## 2022-04-01 RX ORDER — ONDANSETRON 2 MG/ML
4 INJECTION INTRAMUSCULAR; INTRAVENOUS
Status: COMPLETED | OUTPATIENT
Start: 2022-04-01 | End: 2022-04-01

## 2022-04-01 RX ORDER — CEFUROXIME AXETIL 500 MG/1
500 TABLET ORAL EVERY 12 HOURS
Qty: 20 TABLET | Refills: 0 | OUTPATIENT
Start: 2022-04-01 | End: 2022-05-25

## 2022-04-01 RX ORDER — HYDROCODONE BITARTRATE AND ACETAMINOPHEN 5; 325 MG/1; MG/1
1 TABLET ORAL EVERY 4 HOURS PRN
Qty: 18 TABLET | Refills: 0 | OUTPATIENT
Start: 2022-04-01 | End: 2022-04-26

## 2022-04-01 RX ADMIN — MORPHINE SULFATE 4 MG: 4 INJECTION, SOLUTION INTRAMUSCULAR; INTRAVENOUS at 08:04

## 2022-04-01 RX ADMIN — MORPHINE SULFATE 4 MG: 4 INJECTION, SOLUTION INTRAMUSCULAR; INTRAVENOUS at 10:04

## 2022-04-01 RX ADMIN — CEFTRIAXONE 1 G: 1 INJECTION, SOLUTION INTRAVENOUS at 10:04

## 2022-04-01 RX ADMIN — IOHEXOL 100 ML: 350 INJECTION, SOLUTION INTRAVENOUS at 08:04

## 2022-04-01 RX ADMIN — ONDANSETRON 4 MG: 2 INJECTION INTRAMUSCULAR; INTRAVENOUS at 08:04

## 2022-04-01 NOTE — ED PROVIDER NOTES
Encounter Date: 4/1/2022       History     Chief Complaint   Patient presents with    Hematuria     Pain on l side     61-year-old male who has a history of diabetes mellitus, hypertension, peripheral vascular disease, paroxysmal atrial fib, COPD, prior PE s, urinary retention for which he has a catheter since September of last year and who also has a history of kidney stones in the left renal mass, presents with complaints of having hematuria yesterday and today.  Patient denies any other sites of bleeding.  He is currently on Eliquis.  No history any fever chills or nausea and vomiting.  Denies any radicular pain into the inguinal area.  No penile pain or testicular pain.  Bowel habits have been unremarkable.  The patient states he has had multiple kidney stones previously but his left side pain at this time is on like that previously experienced.  He has also recently had shingles involving that left flank with lesions anteriorly over the left abdomen.  The patient did have his Donis catheter changed 3 days ago and had no initial hematuria but the bleeding has been noted since yesterday and throughout today        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    Hypertension      Past Surgical History:   Procedure Laterality Date    denies pertinent surgical history      INTRAMEDULLARY RODDING OF FEMUR Right 11/7/2021    Procedure: INSERTION, INTRAMEDULLARY HENNA, FEMUR;  Surgeon: Matt Milian MD;  Location: [facility] OR;  Service: Orthopedics;  Laterality: Right;    TOE AMPUTATION Right 10/12/2021    Procedure: AMPUTATION, TOE;  Surgeon: Milton Lauren DPM;  Location: [facility] OR;  Service: Podiatry;  Laterality: Right;     Family History   Problem Relation Age of Onset    Hypertension Father       Social History     Tobacco Use    Smoking status: Former Smoker    Smokeless tobacco: Never Used   Substance Use Topics    Alcohol use: Not Currently    Drug use: Not Currently     Review of Systems   Constitutional: Negative for activity change, chills, diaphoresis and fever.   HENT: Negative for congestion, sore throat and trouble swallowing.    Respiratory: Positive for shortness of breath. Negative for cough and chest tightness.    Cardiovascular: Negative for chest pain.   Gastrointestinal: Negative for abdominal pain, constipation, diarrhea, nausea and vomiting.   Genitourinary: Positive for flank pain and hematuria. Negative for frequency.   Musculoskeletal: Positive for back pain.   Skin: Negative for pallor, rash and wound.   Neurological: Negative for dizziness, light-headedness and headaches.   All other systems reviewed and are negative.      Physical Exam     Initial Vitals [04/01/22 0659]   BP Pulse Resp Temp SpO2   103/61 93 17 97.7 °F (36.5 °C) (!) 94 %      MAP       --         Physical Exam    Vitals reviewed.  Constitutional: He appears well-developed and well-nourished. He is not diaphoretic. No distress.   HENT:   Head: Normocephalic and atraumatic.   Right Ear: External ear normal.   Left Ear: External ear normal.   Nose: Nose normal.   Mouth/Throat: Oropharynx is clear and moist.   Eyes: Conjunctivae and EOM are normal. Pupils are equal, round, and reactive to light.   Neck: Neck supple. No JVD present.   Normal range of motion.  Cardiovascular: Normal rate, regular rhythm, normal heart sounds and intact distal pulses. Exam reveals no gallop and no friction rub.    No murmur heard.  Pulmonary/Chest: Breath sounds normal. No respiratory distress. He has no wheezes. He has no rhonchi. He has no rales. He exhibits no tenderness.   Abdominal: Abdomen is soft. Bowel sounds are normal. He exhibits no distension. There is abdominal tenderness.   Left flank and abdominal tenderness however  patient also has resolving shingles in that locale which may be accounting for his discomfort There is no rebound and no guarding.   Genitourinary:    Penis normal.      Genitourinary Comments: Donis catheter in place     Musculoskeletal:         General: No tenderness or edema. Normal range of motion.      Cervical back: Normal range of motion and neck supple.     Lymphadenopathy:     He has no cervical adenopathy.   Neurological: He is alert and oriented to person, place, and time. He has normal strength. GCS score is 15. GCS eye subscore is 4. GCS verbal subscore is 5. GCS motor subscore is 6.   Skin: Skin is warm and dry. Capillary refill takes less than 2 seconds. No rash noted. No erythema. No pallor.   Psychiatric: He has a normal mood and affect. His behavior is normal. Judgment and thought content normal.         ED Course   Procedures  Labs Reviewed   CBC W/ AUTO DIFFERENTIAL - Abnormal; Notable for the following components:       Result Value    Hemoglobin 13.6 (*)     MCH 26.5 (*)     MCHC 30.5 (*)     RDW 16.6 (*)     All other components within normal limits   COMPREHENSIVE METABOLIC PANEL - Abnormal; Notable for the following components:    Glucose 111 (*)     ALT 7 (*)     All other components within normal limits   PROTIME-INR - Abnormal; Notable for the following components:    PT 14.6 (*)     All other components within normal limits   URINALYSIS, REFLEX TO URINE CULTURE - Abnormal; Notable for the following components:    Protein, UA 2+ (*)     Occult Blood UA 3+ (*)     All other components within normal limits    Narrative:     Specimen Source->Urine   URINALYSIS MICROSCOPIC - Abnormal; Notable for the following components:    RBC, UA >100 (*)     WBC, UA 48 (*)     Hyaline Casts,  (*)     All other components within normal limits    Narrative:     Specimen Source->Urine   CULTURE, URINE   APTT   MAGNESIUM          Imaging Results          CT Abdomen Pelvis W Wo Contrast (Final result)   Result time 04/01/22 09:14:50    Final result by Zuhair Cisneros MD (04/01/22 09:14:50)                 Narrative:    CMS MANDATED QUALITY DATA - CT RADIATION - 436    All CT scans at this facility utilize dose modulation, iterative reconstruction, and/or weight based dosing when appropriate to reduce radiation dose to as low as reasonably achievable.        Reason: Flank pain, kidney stone suspected; Left renal mass, hematuria    TECHNIQUE: CT abdomen and pelvis with 100 mL Omnipaque 350.    COMPARISON: CT abdomen pelvis March 24, 2022.    FINDINGS:    Subpleural reticular opacities are unchanged bilaterally. Heart size is normal. Coronary artery atherosclerosis again noted.    Liver is normal size. The gallbladder and common bile duct are unremarkable. There is fatty atrophy of the pancreas no pancreatic lesions. The spleen and adrenal glands are unremarkable. Right kidney is normal size. 3 mm calculus at the midpole the right kidney is unchanged. Previously identified 6 mm calculus in the superior calyx is now in the inferior calyx. There is no hydronephrosis. Heterogeneously enhancing mass of the left kidney again noted measuring 4.5 cm. The ureters are normal caliber without obstructions. Bladder is decompressed with Donis catheter in place. There is no free fluid in the pelvis. Prostate gland and seminal vesicles are unremarkable.    Large and small bowel are normal caliber. Diverticulosis of the colon again noted. 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 Herniated loop of small bowel is distended proximally measuring 4 cm in diameter with multiple fluid distended loops of more proximal small bowel. The appendix is normal.    The abdominal aorta is normal caliber with atherosclerosis. There is no intra-abdominal lymphadenopathy. No mesenteric fat stranding or free fluid. Osseous structures are unchanged.    IMPRESSION:    1.  Left spigelian hernia again noted.  The small bowel is dilated at the spigelian hernia with more proximal fluid distended loops of small bowel, likely consistent with a low-grade obstruction.  2.  Bilateral nonobstructing renal calculi as described. No hydronephrosis or hydroureter. Bladder is decompressed with Donis catheter in place.  3.  Subpleural reticular opacities of the lungs are unchanged.  4.  Colonic diverticulosis.  5.  Unchanged heterogeneously enhancing left renal mass.    Electronically signed by:  Zuhair Cisneros DO  4/1/2022 9:14 AM CDT Workstation: [id number]                               Medications   cefTRIAXone (ROCEPHIN) 1 g/50 mL D5W IVPB (1 g Intravenous New Bag 4/1/22 1583)   ondansetron injection 4 mg (4 mg Intravenous Given 4/1/22 0832)   morphine injection 4 mg (4 mg Intravenous Given 4/1/22 0832)   iohexoL (OMNIPAQUE 350) injection 100 mL (100 mLs Intravenous Given 4/1/22 0859)   morphine injection 4 mg (4 mg Intravenous Given 4/1/22 1027)                Attending Attestation:             Attending ED Notes:   This 61-year-old male who has a chronic indwelling Donis catheter which was changed couple days ago and 1 day subsequent noted gross hematuria, presenting complaining of pain in his left flank area.  No fever chills or vomiting.  The patient is dull on Eliquis which she is taking twice daily.  Additionally the patient has a left renal mass that is known and has had recent shingles with his rash in his left flank area radiating around to his left anterior abdomen.  The patient's workup at this time showed unremarkable CBC and chemistries.  The urinalysis shows greater than 100 red cells per high-power field and 40 wbc's per high-power field.  Urine culture is being submitted.  The patient will be advised to hold his Eliquis for the next 2-3 days and will be started on Ceftin twice daily.  He is given a g of Rocephin IV prior to discharge.  He is to otherwise follow with his primary care physician within 1 week.   Cautioned to return to the ER if needed.  Of note the patient does have renal calculi it is not obstructing and has no evidence of ureterolithiasis currently.    Differential diagnosis includes hematuria secondary to renal calculi, renal mass, UTI.  The flank pain differential includes post herpetic neuralgia, renal mass, pyelonephritis, ureterolithiasis      ED Course as of 04/01/22 1106   Fri Apr 01, 2022   1035 NITRITE UA: SEE COMMENT [LG]      ED Course User Index  [LG] Abdelrahman Urbina Jr., MD             Clinical Impression:   Final diagnoses:  [R10.9] Flank pain  [R31.9] Hematuria, unspecified type (Primary)  [N28.89] Renal mass  [B02.29] Post herpetic neuralgia  [N39.0, R31.9] Urinary tract infection with hematuria, site unspecified          ED Disposition Condition    Discharge Stable        ED Prescriptions     Medication Sig Dispense Start Date End Date Auth. Provider    cefUROXime (CEFTIN) 500 MG tablet Take 1 tablet (500 mg total) by mouth every 12 (twelve) hours. 20 tablet 4/1/2022  Abdelrahman Urbina Jr., MD        Follow-up Information     Follow up With Specialties Details Why Contact Info    Santa Barnard MD  In 1 week  [address] [place] [address]  [phone]             Abdelrahman Urbina Jr., MD  04/01/22 1106

## 2022-04-03 LAB — BACTERIA UR CULT: NO GROWTH

## 2022-04-25 ENCOUNTER — HOSPITAL ENCOUNTER (EMERGENCY)
Facility: HOSPITAL | Age: 62
Discharge: HOME | End: 2022-04-26
Attending: EMERGENCY MEDICINE
Payer: MEDICAID

## 2022-04-25 DIAGNOSIS — R31.9 URINARY TRACT INFECTION WITH HEMATURIA, SITE UNSPECIFIED: ICD-10-CM

## 2022-04-25 DIAGNOSIS — M25.551 HIP PAIN, ACUTE, RIGHT: Primary | ICD-10-CM

## 2022-04-25 DIAGNOSIS — N39.0 URINARY TRACT INFECTION WITH HEMATURIA, SITE UNSPECIFIED: ICD-10-CM
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
## 2022-04-25 LAB
BACTERIA #/AREA URNS HPF: ABNORMAL /HPF
BILIRUB UR QL STRIP: NEGATIVE
CLARITY UR: ABNORMAL
COLOR UR: ABNORMAL
GLUCOSE UR QL STRIP: NEGATIVE
HGB UR QL STRIP: ABNORMAL
HYALINE CASTS #/AREA URNS LPF: 18 /LPF
KETONES UR QL STRIP: NEGATIVE
LEUKOCYTE ESTERASE UR QL STRIP: ABNORMAL
MICROSCOPIC COMMENT: ABNORMAL
NITRITE UR QL STRIP: POSITIVE
PH UR STRIP: 7 [PH] (ref 5–8)
PROT UR QL STRIP: ABNORMAL
RBC #/AREA URNS HPF: >100 /HPF (ref 0–4)
SP GR UR STRIP: 1.03 (ref 1–1.03)
SQUAMOUS #/AREA URNS HPF: 2 /HPF
URN SPEC COLLECT METH UR: ABNORMAL
UROBILINOGEN UR STRIP-ACNC: NEGATIVE EU/DL
WBC #/AREA URNS HPF: >100 /HPF (ref 0–5)

## 2022-04-25 PROCEDURE — 87186 SC STD MICRODIL/AGAR DIL: CPT | Performed by: EMERGENCY MEDICINE

## 2022-04-25 PROCEDURE — 99284 EMERGENCY DEPT VISIT MOD MDM: CPT

## 2022-04-25 PROCEDURE — 87077 CULTURE AEROBIC IDENTIFY: CPT | Performed by: EMERGENCY MEDICINE

## 2022-04-25 PROCEDURE — 87086 URINE CULTURE/COLONY COUNT: CPT | Performed by: EMERGENCY MEDICINE

## 2022-04-25 PROCEDURE — 25000003 PHARM REV CODE 250: Performed by: EMERGENCY MEDICINE

## 2022-04-25 PROCEDURE — 81001 URINALYSIS AUTO W/SCOPE: CPT | Performed by: EMERGENCY MEDICINE

## 2022-04-25 RX ORDER — METHOCARBAMOL 500 MG/1
1000 TABLET, FILM COATED ORAL
Status: COMPLETED | OUTPATIENT
Start: 2022-04-25 | End: 2022-04-25

## 2022-04-25 RX ORDER — HYDROCODONE BITARTRATE AND ACETAMINOPHEN 10; 325 MG/1; MG/1
1 TABLET ORAL
Status: COMPLETED | OUTPATIENT
Start: 2022-04-25 | End: 2022-04-25

## 2022-04-25 RX ORDER — ATORVASTATIN CALCIUM 20 MG/1
20 TABLET, FILM COATED ORAL NIGHTLY
COMMUNITY
Start: 2022-04-22

## 2022-04-25 RX ORDER — GABAPENTIN 100 MG/1
100 CAPSULE ORAL 2 TIMES DAILY
COMMUNITY
Start: 2022-04-22 | End: 2022-05-04 | Stop reason: SDUPTHER

## 2022-04-25 RX ADMIN — HYDROCODONE BITARTRATE AND ACETAMINOPHEN 1 TABLET: 10; 325 TABLET ORAL at 11:04

## 2022-04-25 RX ADMIN — METHOCARBAMOL 1000 MG: 500 TABLET ORAL at 11:04

## 2022-04-26 VITALS
HEART RATE: 84 BPM | SYSTOLIC BLOOD PRESSURE: 170 MMHG | RESPIRATION RATE: 18 BRPM | OXYGEN SATURATION: 95 % | DIASTOLIC BLOOD PRESSURE: 95 MMHG | TEMPERATURE: 98 F

## 2022-04-26 PROCEDURE — 96372 THER/PROPH/DIAG INJ SC/IM: CPT | Performed by: EMERGENCY MEDICINE

## 2022-04-26 PROCEDURE — 63600175 PHARM REV CODE 636 W HCPCS: Performed by: EMERGENCY MEDICINE

## 2022-04-26 RX ORDER — METHOCARBAMOL 500 MG/1
1000 TABLET, FILM COATED ORAL 4 TIMES DAILY
Qty: 40 TABLET | Refills: 0 | Status: SHIPPED | OUTPATIENT
Start: 2022-04-26 | End: 2022-05-01

## 2022-04-26 RX ORDER — CEFTRIAXONE 1 G/1
1 INJECTION, POWDER, FOR SOLUTION INTRAMUSCULAR; INTRAVENOUS
Status: COMPLETED | OUTPATIENT
Start: 2022-04-26 | End: 2022-04-26

## 2022-04-26 RX ORDER — CEPHALEXIN 500 MG/1
500 CAPSULE ORAL 4 TIMES DAILY
Qty: 40 CAPSULE | Refills: 0 | Status: SHIPPED | OUTPATIENT
Start: 2022-04-26 | End: 2022-05-06

## 2022-04-26 RX ORDER — HYDROCODONE BITARTRATE AND ACETAMINOPHEN 10; 325 MG/1; MG/1
1 TABLET ORAL EVERY 6 HOURS PRN
Qty: 12 TABLET | Refills: 0 | Status: SHIPPED | OUTPATIENT
Start: 2022-04-26 | End: 2022-04-29

## 2022-04-26 RX ADMIN — CEFTRIAXONE SODIUM 1 G: 1 INJECTION, POWDER, FOR SOLUTION INTRAMUSCULAR; INTRAVENOUS at 12:04

## 2022-04-26 NOTE — ED NOTES
62 yo pt w/   Past Medical History:   Diagnosis Date    Diabetes mellitus     Diabetic ketoacidosis without coma associated with type 2 diabetes mellitus 10/9/2021    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    Hypertension      And shingles comes to ED w/ fall. PT is A&OX4. Pt is ambulatory w/ walker. RR even and unlabored. abd tender to touch, pt has rash pt has shingles. Pt has 20 fr blanco. Urine pink w/ sediment.

## 2022-04-26 NOTE — ED PROVIDER NOTES
Encounter Date: 4/25/2022       History     Chief Complaint   Patient presents with    Fall     Pt fell two days ago trying to get up from couch,     Painful urination     Chief complaint is right hip pain.  This patient fell 2 days ago.  He was going from a sitting position on the couch to standing position to get to his walker and felt on his buttock area causing pain to his right hip.  He has had hip surgery on the right side.  He was able to stand and ambulate but is concerned because of increased pain.  He had a surgical procedure done to his right hip several years ago has a henna in his right femur.  The patient awake alert cooperative.  Also has history of COPD and continues to smoke.  He also complains of trouble urinating with burning        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    Hypertension      Past Surgical History:   Procedure Laterality Date    denies pertinent surgical history      INTRAMEDULLARY RODDING OF FEMUR Right 11/7/2021    Procedure: INSERTION, INTRAMEDULLARY HENNA, FEMUR;  Surgeon: Matt Milian MD;  Location: [facility] OR;  Service: Orthopedics;  Laterality: Right;    TOE AMPUTATION Right 10/12/2021    Procedure: AMPUTATION, TOE;  Surgeon: Milton Lauren DPM;  Location: [facility] OR;  Service: Podiatry;  Laterality: Right;     Family History   Problem Relation Age of Onset    Hypertension Father      Social History     Tobacco Use    Smoking status: Former Smoker    Smokeless tobacco: Never Used   Substance Use Topics    Alcohol use: Not Currently    Drug use: Not Currently     Review of Systems   Constitutional: Negative for chills and fever.   HENT: Negative for ear pain, rhinorrhea and sore throat.    Eyes: Negative for pain and visual disturbance.    Respiratory: Negative for cough and shortness of breath.    Cardiovascular: Negative for chest pain and palpitations.   Gastrointestinal: Negative for abdominal pain, constipation, diarrhea, nausea and vomiting.   Genitourinary: Negative for dysuria, frequency, hematuria and urgency.   Musculoskeletal: Negative for back pain, joint swelling and myalgias.        Right hip pain   Skin: Negative for rash.   Neurological: Negative for dizziness, seizures, weakness and headaches.   Psychiatric/Behavioral: Negative for dysphoric mood. The patient is not nervous/anxious.        Physical Exam     Initial Vitals [04/25/22 2121]   BP Pulse Resp Temp SpO2   (!) 167/80 88 20 98.9 °F (37.2 °C) 96 %      MAP       --         Physical Exam    Nursing note and vitals reviewed.  Constitutional: He appears well-developed and well-nourished.   HENT:   Head: Normocephalic and atraumatic.   Eyes: Conjunctivae, EOM and lids are normal. Pupils are equal, round, and reactive to light.   Neck: Trachea normal. Neck supple. No thyroid mass present.   Cardiovascular: Normal rate, regular rhythm and normal heart sounds.   Pulmonary/Chest: Breath sounds normal. No respiratory distress.   Abdominal: Abdomen is soft. There is no abdominal tenderness.   Musculoskeletal:         General: Normal range of motion.      Cervical back: Neck supple.     Neurological: He is alert and oriented to person, place, and time. He has normal strength and normal reflexes. No cranial nerve deficit or sensory deficit.   Skin: Skin is warm and dry.   Psychiatric: He has a normal mood and affect. His speech is normal and behavior is normal. Judgment and thought content normal.         ED Course   Procedures  Labs Reviewed   URINALYSIS, REFLEX TO URINE CULTURE - Abnormal; Notable for the following components:       Result Value    Color, UA Orange (*)     Appearance, UA Cloudy (*)     Protein, UA 2+ (*)     Occult Blood UA 3+ (*)     Nitrite, UA Positive (*)      Leukocytes, UA 3+ (*)     All other components within normal limits    Narrative:     Specimen Source->Urine   URINALYSIS MICROSCOPIC - Abnormal; Notable for the following components:    RBC, UA >100 (*)     WBC, UA >100 (*)     Bacteria Many (*)     Hyaline Casts, UA 18 (*)     All other components within normal limits    Narrative:     Specimen Source->Urine   CULTURE, URINE          Imaging Results          X-Ray Knee 3 View Right (In process)                X-Ray Hip 2 or 3 views Right (with Pelvis when performed) (In process)                  Medications   HYDROcodone-acetaminophen  mg per tablet 1 tablet (1 tablet Oral Given 4/25/22 2332)   methocarbamoL tablet 1,000 mg (1,000 mg Oral Given 4/25/22 2332)   cefTRIAXone injection 1 g (1 g Intramuscular Given 4/26/22 0017)     Medical Decision Making:   ED Management:  The patient has UTI as well as hip contusion will be discharged on medicines for both.  He will be given Robaxin Norco and Keflex.                      Clinical Impression:   Final diagnoses:  [W19.XXXA] Fall  [M25.551] Hip pain, acute, right (Primary)  [N39.0, R31.9] Urinary tract infection with hematuria, site unspecified          ED Disposition Condition    Discharge Stable        ED Prescriptions     Medication Sig Dispense Start Date End Date Auth. Provider    cephALEXin (KEFLEX) 500 MG capsule Take 1 capsule (500 mg total) by mouth 4 (four) times daily. for 10 days 40 capsule 4/26/2022 5/6/2022 Evelyn Alexis MD    HYDROcodone-acetaminophen (NORCO)  mg per tablet Take 1 tablet by mouth every 6 (six) hours as needed for Pain. 12 tablet 4/26/2022 4/29/2022 Evelyn Alexis MD    methocarbamoL (ROBAXIN) 500 MG Tab Take 2 tablets (1,000 mg total) by mouth 4 (four) times daily. for 5 days 40 tablet 4/26/2022 5/1/2022 Evelyn Alexis MD        Follow-up Information    None          Evelyn Alexis MD  04/26/22 0058

## 2022-04-28 LAB — BACTERIA UR CULT: ABNORMAL

## 2022-05-04 ENCOUNTER — HOSPITAL ENCOUNTER (EMERGENCY)
Facility: HOSPITAL | Age: 62
Discharge: HOME | End: 2022-05-04
Attending: EMERGENCY MEDICINE
Payer: MEDICAID

## 2022-05-04 VITALS
RESPIRATION RATE: 20 BRPM | WEIGHT: 200 LBS | SYSTOLIC BLOOD PRESSURE: 99 MMHG | DIASTOLIC BLOOD PRESSURE: 55 MMHG | HEART RATE: 94 BPM | TEMPERATURE: 98 F | OXYGEN SATURATION: 96 % | HEIGHT: 67 IN | BODY MASS INDEX: 31.39 KG/M2

## 2022-05-04 DIAGNOSIS — N39.0 URINARY TRACT INFECTION ASSOCIATED WITH INDWELLING URETHRAL CATHETER, SUBSEQUENT ENCOUNTER: ICD-10-CM

## 2022-05-04 DIAGNOSIS — T83.511D URINARY TRACT INFECTION ASSOCIATED WITH INDWELLING URETHRAL CATHETER, SUBSEQUENT ENCOUNTER: ICD-10-CM

## 2022-05-04 DIAGNOSIS — B02.29 POST HERPETIC NEURALGIA: Primary | ICD-10-CM

## 2022-05-04 LAB
BACTERIA #/AREA URNS HPF: NEGATIVE /HPF
BILIRUB UR QL STRIP: ABNORMAL
CLARITY UR: CLEAR
COLOR UR: YELLOW
GLUCOSE UR QL STRIP: ABNORMAL
HGB UR QL STRIP: ABNORMAL
HYALINE CASTS #/AREA URNS LPF: 227 /LPF
KETONES UR QL STRIP: ABNORMAL
LEUKOCYTE ESTERASE UR QL STRIP: NEGATIVE
MICROSCOPIC COMMENT: ABNORMAL
NITRITE UR QL STRIP: NEGATIVE
PH UR STRIP: 5 [PH] (ref 5–8)
PROT UR QL STRIP: ABNORMAL
RBC #/AREA URNS HPF: 22 /HPF (ref 0–4)
SP GR UR STRIP: >=1.03 (ref 1–1.03)
SQUAMOUS #/AREA URNS HPF: 1 /HPF
URN SPEC COLLECT METH UR: ABNORMAL
UROBILINOGEN UR STRIP-ACNC: 1 EU/DL
WBC #/AREA URNS HPF: >100 /HPF (ref 0–5)

## 2022-05-04 PROCEDURE — 81001 URINALYSIS AUTO W/SCOPE: CPT | Performed by: EMERGENCY MEDICINE

## 2022-05-04 PROCEDURE — 87086 URINE CULTURE/COLONY COUNT: CPT | Performed by: EMERGENCY MEDICINE

## 2022-05-04 PROCEDURE — 25000003 PHARM REV CODE 250: Performed by: EMERGENCY MEDICINE

## 2022-05-04 PROCEDURE — 99283 EMERGENCY DEPT VISIT LOW MDM: CPT

## 2022-05-04 RX ORDER — TALC
6 POWDER (GRAM) TOPICAL NIGHTLY
Status: ON HOLD | COMMUNITY
Start: 2022-02-22 | End: 2022-07-29 | Stop reason: CLARIF

## 2022-05-04 RX ORDER — ACYCLOVIR 50 MG/G
CREAM TOPICAL
COMMUNITY

## 2022-05-04 RX ORDER — HYDROCODONE BITARTRATE AND ACETAMINOPHEN 5; 325 MG/1; MG/1
1 TABLET ORAL
Status: COMPLETED | OUTPATIENT
Start: 2022-05-04 | End: 2022-05-04

## 2022-05-04 RX ORDER — FLUTICASONE PROPIONATE AND SALMETEROL 232; 14 UG/1; UG/1
1 POWDER, METERED RESPIRATORY (INHALATION) 2 TIMES DAILY
COMMUNITY
Start: 2022-02-22 | End: 2022-07-21 | Stop reason: ALTCHOICE

## 2022-05-04 RX ORDER — GABAPENTIN 100 MG/1
100 CAPSULE ORAL 3 TIMES DAILY
Qty: 90 CAPSULE | Refills: 0 | Status: SHIPPED | OUTPATIENT
Start: 2022-05-04 | End: 2022-06-28 | Stop reason: ALTCHOICE

## 2022-05-04 RX ORDER — INSULIN GLARGINE 100 [IU]/ML
35 INJECTION, SOLUTION SUBCUTANEOUS
Status: ON HOLD | COMMUNITY
Start: 2022-02-22 | End: 2022-07-29 | Stop reason: CLARIF

## 2022-05-04 RX ORDER — ACETAMINOPHEN 325 MG/1
650 TABLET ORAL EVERY 6 HOURS PRN
COMMUNITY
Start: 2022-02-22

## 2022-05-04 RX ORDER — ONDANSETRON 4 MG/1
4 TABLET, ORALLY DISINTEGRATING ORAL EVERY 8 HOURS PRN
COMMUNITY
Start: 2022-02-22

## 2022-05-04 RX ORDER — CAPSAICIN 0 G/G
CREAM TOPICAL
COMMUNITY

## 2022-05-04 RX ORDER — AMOXICILLIN 250 MG
2 CAPSULE ORAL 2 TIMES DAILY
COMMUNITY
Start: 2022-02-22

## 2022-05-04 RX ORDER — PANTOPRAZOLE SODIUM 40 MG/1
40 TABLET, DELAYED RELEASE ORAL DAILY
COMMUNITY
Start: 2022-04-22

## 2022-05-04 RX ORDER — NITROFURANTOIN 25; 75 MG/1; MG/1
100 CAPSULE ORAL 2 TIMES DAILY
Qty: 14 CAPSULE | Refills: 0 | Status: SHIPPED | OUTPATIENT
Start: 2022-05-04 | End: 2022-05-11

## 2022-05-04 RX ORDER — VALACYCLOVIR HYDROCHLORIDE 1 G/1
TABLET, FILM COATED ORAL
Status: ON HOLD | COMMUNITY
Start: 2022-03-15 | End: 2022-07-29 | Stop reason: CLARIF

## 2022-05-04 RX ORDER — ALPRAZOLAM 1 MG/1
1 TABLET ORAL EVERY 12 HOURS PRN
Status: ON HOLD | COMMUNITY
Start: 2022-03-15 | End: 2022-07-29 | Stop reason: CLARIF

## 2022-05-04 RX ADMIN — HYDROCODONE BITARTRATE AND ACETAMINOPHEN 1 TABLET: 5; 325 TABLET ORAL at 07:05

## 2022-05-04 NOTE — ED PROVIDER NOTES
Encounter Date: 5/4/2022       History     Chief Complaint   Patient presents with    Herpes Zoster    catheter problems     Patient here with complaints of burning his catheter also states that he has continued pain from shingles states shingles rash began 6 weeks ago he contacted his provider at [facility] but they were unable to provide him with medications at that time since he had not seen them in a while he does have a history of peripheral neuropathy secondary to diabetes is on Neurontin for his diabetic neuropathy but states that it does not work very well he also reports that he was treated for urinary tract infection is still has some residual burning to his penis he has an indwelling catheter that has been present for approximately a month and half there was no reported fever chills cough nausea vomiting or diarrhea        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    Hypertension      Past Surgical History:   Procedure Laterality Date    denies pertinent surgical history      INTRAMEDULLARY RODDING OF FEMUR Right 11/7/2021    Procedure: INSERTION, INTRAMEDULLARY HENNA, FEMUR;  Surgeon: Matt Milian MD;  Location: [facility] OR;  Service: Orthopedics;  Laterality: Right;    TOE AMPUTATION Right 10/12/2021    Procedure: AMPUTATION, TOE;  Surgeon: Milton Lauren DPM;  Location: [facility] OR;  Service: Podiatry;  Laterality: Right;     Family History   Problem Relation Age of Onset    Hypertension Father      Social History     Tobacco Use    Smoking status: Former Smoker    Smokeless tobacco: Never Used   Substance Use Topics    Alcohol use: Not Currently    Drug use: Not Currently     Review of Systems   Constitutional: Negative for chills, fatigue and fever.   HENT: Negative for  congestion.    Respiratory: Negative for cough and shortness of breath.    Cardiovascular: Negative for chest pain and palpitations.   Gastrointestinal: Negative for abdominal pain, diarrhea, nausea and vomiting.   Genitourinary: Positive for dysuria and penile pain. Negative for hematuria, scrotal swelling and testicular pain.   Skin: Positive for rash.   All other systems reviewed and are negative.      Physical Exam     Initial Vitals [05/04/22 0600]   BP Pulse Resp Temp SpO2   (!) 99/55 94 18 98.4 °F (36.9 °C) 96 %      MAP       --         Physical Exam    Constitutional: He appears well-developed and well-nourished. No distress.   HENT:   Head: Normocephalic and atraumatic.   Right Ear: External ear normal.   Left Ear: External ear normal.   Mouth/Throat: Oropharynx is clear and moist.   Eyes: Pupils are equal, round, and reactive to light.   Neck: Neck supple.   Normal range of motion.  Cardiovascular: Normal rate, regular rhythm, S1 normal, S2 normal, normal heart sounds and intact distal pulses.   Pulmonary/Chest: Breath sounds normal.   Abdominal: Abdomen is soft. Bowel sounds are normal. There is no abdominal tenderness.   Genitourinary:    Genitourinary Comments: Donis catheter in place     Musculoskeletal:         General: Normal range of motion.      Cervical back: Normal range of motion and neck supple.     Neurological: He is alert and oriented to person, place, and time. GCS eye subscore is 4. GCS verbal subscore is 5. GCS motor subscore is 6.   Skin: Skin is warm and dry. Rash noted.   Healed  shingles rash in a single dermatome   Psychiatric: He has a normal mood and affect. His behavior is normal.         ED Course   Procedures  Labs Reviewed   URINALYSIS, REFLEX TO URINE CULTURE - Abnormal; Notable for the following components:       Result Value    Specific Gravity, UA >=1.030 (*)     Protein, UA 2+ (*)     Glucose, UA 1+ (*)     Ketones, UA Trace (*)     Bilirubin (UA) 2+ (*)     Occult Blood  UA 2+ (*)     All other components within normal limits    Narrative:     Specimen Source->Urine   URINALYSIS MICROSCOPIC - Abnormal; Notable for the following components:    RBC, UA 22 (*)     WBC, UA >100 (*)     Hyaline Casts,  (*)     All other components within normal limits    Narrative:     Specimen Source->Urine   CULTURE, URINE          Imaging Results    None          Medications   HYDROcodone-acetaminophen 5-325 mg per tablet 1 tablet (1 tablet Oral Given 5/4/22 0724)     Medical Decision Making:   ED Management:  Patient does have evidence of persistent white blood cells in his urine does no evidence of bacteria given these findings will place patient on Macrobid will increase patient's Neurontin to 3 times a day recommend a follow-up with his primary care provider at [facility] for further adjustment of his Neurontin for his neuropathic pain return to ER for any worsened symptoms or new symptoms outpatient follow-up                      Clinical Impression:   Final diagnoses:  [B02.29] Post herpetic neuralgia (Primary)  [T83.511D, N39.0] Urinary tract infection associated with indwelling urethral catheter, subsequent encounter          ED Disposition Condition    Discharge Stable        ED Prescriptions     Medication Sig Dispense Start Date End Date Auth. Provider    nitrofurantoin, macrocrystal-monohydrate, (MACROBID) 100 MG capsule Take 1 capsule (100 mg total) by mouth 2 (two) times daily. for 7 days 14 capsule 5/4/2022 5/11/2022 Vazquez Schneider MD    gabapentin (NEURONTIN) 100 MG capsule Take 1 capsule (100 mg total) by mouth 3 (three) times daily. 90 capsule 5/4/2022 6/3/2022 Vazquez Schneider MD        Follow-up Information    None          Vazquez Schneider MD  05/04/22 2856

## 2022-05-05 LAB
BACTERIA UR CULT: NORMAL
BACTERIA UR CULT: NORMAL

## 2022-05-25 ENCOUNTER — HOSPITAL ENCOUNTER (EMERGENCY)
Facility: HOSPITAL | Age: 62
Discharge: HOME | End: 2022-05-25
Attending: EMERGENCY MEDICINE
Payer: MEDICAID

## 2022-05-25 VITALS
BODY MASS INDEX: 31.45 KG/M2 | TEMPERATURE: 99 F | WEIGHT: 200.38 LBS | SYSTOLIC BLOOD PRESSURE: 171 MMHG | DIASTOLIC BLOOD PRESSURE: 86 MMHG | HEART RATE: 91 BPM | OXYGEN SATURATION: 97 % | HEIGHT: 67 IN | RESPIRATION RATE: 18 BRPM

## 2022-05-25 DIAGNOSIS — M79.671 RIGHT FOOT PAIN: ICD-10-CM

## 2022-05-25 DIAGNOSIS — R52 PAIN: ICD-10-CM

## 2022-05-25 DIAGNOSIS — N30.00 ACUTE CYSTITIS WITHOUT HEMATURIA: Primary | ICD-10-CM

## 2022-05-25 DIAGNOSIS — N20.0 KIDNEY STONE: ICD-10-CM

## 2022-05-25 LAB
ALBUMIN SERPL BCP-MCNC: 3.6 G/DL (ref 3.5–5.2)
ALP SERPL-CCNC: 103 U/L (ref 55–135)
ALT SERPL W/O P-5'-P-CCNC: 7 U/L (ref 10–44)
ANION GAP SERPL CALC-SCNC: 10 MMOL/L (ref 8–16)
AST SERPL-CCNC: 11 U/L (ref 10–40)
BACTERIA #/AREA URNS HPF: NEGATIVE /HPF
BASOPHILS # BLD AUTO: 0.1 K/UL (ref 0–0.2)
BASOPHILS NFR BLD: 0.7 % (ref 0–1.9)
BILIRUB SERPL-MCNC: 0.8 MG/DL (ref 0.1–1)
BILIRUB UR QL STRIP: NEGATIVE
BUN SERPL-MCNC: 15 MG/DL (ref 8–23)
CALCIUM SERPL-MCNC: 9.2 MG/DL (ref 8.7–10.5)
CHLORIDE SERPL-SCNC: 106 MMOL/L (ref 95–110)
CLARITY UR: ABNORMAL
CO2 SERPL-SCNC: 23 MMOL/L (ref 23–29)
COLOR UR: YELLOW
CREAT SERPL-MCNC: 0.9 MG/DL (ref 0.5–1.4)
CRP SERPL-MCNC: 2.03 MG/DL
DIFFERENTIAL METHOD: ABNORMAL
EOSINOPHIL # BLD AUTO: 0.3 K/UL (ref 0–0.5)
EOSINOPHIL NFR BLD: 1.9 % (ref 0–8)
ERYTHROCYTE [DISTWIDTH] IN BLOOD BY AUTOMATED COUNT: 15.7 % (ref 11.5–14.5)
ERYTHROCYTE [SEDIMENTATION RATE] IN BLOOD BY WESTERGREN METHOD: 39 MM/HR (ref 0–10)
EST. GFR  (AFRICAN AMERICAN): >60 ML/MIN/1.73 M^2
EST. GFR  (NON AFRICAN AMERICAN): >60 ML/MIN/1.73 M^2
GLUCOSE SERPL-MCNC: 109 MG/DL (ref 70–110)
GLUCOSE UR QL STRIP: NEGATIVE
HCT VFR BLD AUTO: 40.7 % (ref 40–54)
HGB BLD-MCNC: 12.6 G/DL (ref 14–18)
HGB UR QL STRIP: ABNORMAL
HYALINE CASTS #/AREA URNS LPF: 5 /LPF
IMM GRANULOCYTES # BLD AUTO: 0.05 K/UL (ref 0–0.04)
IMM GRANULOCYTES NFR BLD AUTO: 0.3 % (ref 0–0.5)
KETONES UR QL STRIP: ABNORMAL
LEUKOCYTE ESTERASE UR QL STRIP: ABNORMAL
LIPASE SERPL-CCNC: 27 U/L (ref 4–60)
LYMPHOCYTES # BLD AUTO: 1.9 K/UL (ref 1–4.8)
LYMPHOCYTES NFR BLD: 12.2 % (ref 18–48)
MCH RBC QN AUTO: 28.6 PG (ref 27–31)
MCHC RBC AUTO-ENTMCNC: 31 G/DL (ref 32–36)
MCV RBC AUTO: 93 FL (ref 82–98)
MICROSCOPIC COMMENT: ABNORMAL
MONOCYTES # BLD AUTO: 1.6 K/UL (ref 0.3–1)
MONOCYTES NFR BLD: 10.2 % (ref 4–15)
NEUTROPHILS # BLD AUTO: 11.4 K/UL (ref 1.8–7.7)
NEUTROPHILS NFR BLD: 74.7 % (ref 38–73)
NITRITE UR QL STRIP: NEGATIVE
NRBC BLD-RTO: 0 /100 WBC
PH UR STRIP: 6 [PH] (ref 5–8)
PLATELET # BLD AUTO: 325 K/UL (ref 150–450)
PMV BLD AUTO: 9.6 FL (ref 9.2–12.9)
POTASSIUM SERPL-SCNC: 3.5 MMOL/L (ref 3.5–5.1)
PROT SERPL-MCNC: 7.3 G/DL (ref 6–8.4)
PROT UR QL STRIP: ABNORMAL
RBC # BLD AUTO: 4.4 M/UL (ref 4.6–6.2)
RBC #/AREA URNS HPF: >100 /HPF (ref 0–4)
SODIUM SERPL-SCNC: 139 MMOL/L (ref 136–145)
SP GR UR STRIP: >1.03 (ref 1–1.03)
SQUAMOUS #/AREA URNS HPF: 0 /HPF
URN SPEC COLLECT METH UR: ABNORMAL
UROBILINOGEN UR STRIP-ACNC: NEGATIVE EU/DL
WBC # BLD AUTO: 15.24 K/UL (ref 3.9–12.7)
WBC #/AREA URNS HPF: >100 /HPF (ref 0–5)

## 2022-05-25 PROCEDURE — 87147 CULTURE TYPE IMMUNOLOGIC: CPT | Performed by: EMERGENCY MEDICINE

## 2022-05-25 PROCEDURE — 99900031 HC PATIENT EDUCATION (STAT)

## 2022-05-25 PROCEDURE — 87086 URINE CULTURE/COLONY COUNT: CPT | Performed by: EMERGENCY MEDICINE

## 2022-05-25 PROCEDURE — 86140 C-REACTIVE PROTEIN: CPT | Performed by: EMERGENCY MEDICINE

## 2022-05-25 PROCEDURE — 85651 RBC SED RATE NONAUTOMATED: CPT | Performed by: EMERGENCY MEDICINE

## 2022-05-25 PROCEDURE — 94761 N-INVAS EAR/PLS OXIMETRY MLT: CPT

## 2022-05-25 PROCEDURE — 81001 URINALYSIS AUTO W/SCOPE: CPT | Performed by: EMERGENCY MEDICINE

## 2022-05-25 PROCEDURE — 27000221 HC OXYGEN, UP TO 24 HOURS

## 2022-05-25 PROCEDURE — 87077 CULTURE AEROBIC IDENTIFY: CPT | Performed by: EMERGENCY MEDICINE

## 2022-05-25 PROCEDURE — 63600175 PHARM REV CODE 636 W HCPCS: Performed by: EMERGENCY MEDICINE

## 2022-05-25 PROCEDURE — 94640 AIRWAY INHALATION TREATMENT: CPT

## 2022-05-25 PROCEDURE — 99285 EMERGENCY DEPT VISIT HI MDM: CPT | Mod: 25

## 2022-05-25 PROCEDURE — 83690 ASSAY OF LIPASE: CPT | Performed by: EMERGENCY MEDICINE

## 2022-05-25 PROCEDURE — 99900035 HC TECH TIME PER 15 MIN (STAT)

## 2022-05-25 PROCEDURE — 80053 COMPREHEN METABOLIC PANEL: CPT | Performed by: EMERGENCY MEDICINE

## 2022-05-25 PROCEDURE — 85025 COMPLETE CBC W/AUTO DIFF WBC: CPT | Performed by: EMERGENCY MEDICINE

## 2022-05-25 PROCEDURE — 96365 THER/PROPH/DIAG IV INF INIT: CPT

## 2022-05-25 PROCEDURE — 25000242 PHARM REV CODE 250 ALT 637 W/ HCPCS: Performed by: EMERGENCY MEDICINE

## 2022-05-25 PROCEDURE — 96375 TX/PRO/DX INJ NEW DRUG ADDON: CPT

## 2022-05-25 PROCEDURE — 87186 SC STD MICRODIL/AGAR DIL: CPT | Performed by: EMERGENCY MEDICINE

## 2022-05-25 RX ORDER — IPRATROPIUM BROMIDE AND ALBUTEROL SULFATE 2.5; .5 MG/3ML; MG/3ML
3 SOLUTION RESPIRATORY (INHALATION)
Status: COMPLETED | OUTPATIENT
Start: 2022-05-25 | End: 2022-05-25

## 2022-05-25 RX ORDER — HYDROMORPHONE HYDROCHLORIDE 1 MG/ML
1 INJECTION, SOLUTION INTRAMUSCULAR; INTRAVENOUS; SUBCUTANEOUS
Status: DISCONTINUED | OUTPATIENT
Start: 2022-05-25 | End: 2022-05-25 | Stop reason: HOSPADM

## 2022-05-25 RX ORDER — HYDROMORPHONE HYDROCHLORIDE 1 MG/ML
1 INJECTION, SOLUTION INTRAMUSCULAR; INTRAVENOUS; SUBCUTANEOUS
Status: COMPLETED | OUTPATIENT
Start: 2022-05-25 | End: 2022-05-25

## 2022-05-25 RX ORDER — TAMSULOSIN HYDROCHLORIDE 0.4 MG/1
0.4 CAPSULE ORAL DAILY
Qty: 10 CAPSULE | Refills: 0 | Status: ON HOLD | OUTPATIENT
Start: 2022-05-25 | End: 2022-07-29 | Stop reason: CLARIF

## 2022-05-25 RX ORDER — DOXYCYCLINE 100 MG/1
100 CAPSULE ORAL 2 TIMES DAILY
Qty: 20 CAPSULE | Refills: 0 | Status: SHIPPED | OUTPATIENT
Start: 2022-05-25 | End: 2022-06-04

## 2022-05-25 RX ORDER — CEFUROXIME AXETIL 500 MG/1
500 TABLET ORAL EVERY 12 HOURS
Qty: 20 TABLET | Refills: 0 | Status: SHIPPED | OUTPATIENT
Start: 2022-05-25 | End: 2022-06-28 | Stop reason: ALTCHOICE

## 2022-05-25 RX ADMIN — IPRATROPIUM BROMIDE AND ALBUTEROL SULFATE 3 ML: .5; 3 SOLUTION RESPIRATORY (INHALATION) at 04:05

## 2022-05-25 RX ADMIN — SODIUM CHLORIDE, SODIUM LACTATE, POTASSIUM CHLORIDE, AND CALCIUM CHLORIDE 1000 ML: .6; .31; .03; .02 INJECTION, SOLUTION INTRAVENOUS at 02:05

## 2022-05-25 RX ADMIN — HYDROMORPHONE HYDROCHLORIDE 1 MG: 1 INJECTION, SOLUTION INTRAMUSCULAR; INTRAVENOUS; SUBCUTANEOUS at 02:05

## 2022-05-25 RX ADMIN — CEFTRIAXONE 2 G: 2 INJECTION, SOLUTION INTRAVENOUS at 02:05

## 2022-05-25 NOTE — ED PROVIDER NOTES
Encounter Date: 5/25/2022       History     Chief Complaint   Patient presents with    Rash     Left lateral side pain from shingles x2 months    Foot Pain     Right foot pain x2 weeks, pt denies trauma    Dysuria     Burning when urine passes through chronic blanco x1.5 weeks, sediment in urine bag     62-year-old male present time insert department with burning with urination.  Patient has a indwelling Blanco catheter and feeling burning in that area.  Patient also see site events and the urine bag.  Patient has right foot pain for the past 2 weeks.  Patient had previous toe amputation.  Patient does have a history of diabetes.  Denies any trauma.  Patient also complains of flank pain bilaterally.  Denies fever or chills or nausea or vomiting or chest pain or shortness of breath or abdominal pain or weakness or numbness.        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    Hypertension      Past Surgical History:   Procedure Laterality Date    denies pertinent surgical history      INTRAMEDULLARY RODDING OF FEMUR Right 11/7/2021    Procedure: INSERTION, INTRAMEDULLARY HENNA, FEMUR;  Surgeon: Matt Milian MD;  Location: [facility] OR;  Service: Orthopedics;  Laterality: Right;    TOE AMPUTATION Right 10/12/2021    Procedure: AMPUTATION, TOE;  Surgeon: Milton Lauren DPM;  Location: [facility] OR;  Service: Podiatry;  Laterality: Right;     Family History   Problem Relation Age of Onset    Hypertension Father      Social History     Tobacco Use    Smoking status: Former Smoker    Smokeless tobacco: Never Used   Substance Use Topics    Alcohol use: Not Currently    Drug use: Not Currently     Review of Systems   Constitutional: Negative.    HENT: Negative.    Eyes: Negative.    Respiratory:  Negative.    Cardiovascular: Negative.    Gastrointestinal: Negative.    Endocrine: Negative.    Genitourinary: Positive for dysuria, flank pain and penile pain.   Musculoskeletal:        Right foot pain   Skin: Negative.    Allergic/Immunologic: Negative.    Neurological: Negative.    Hematological: Negative.    Psychiatric/Behavioral: Negative.    All other systems reviewed and are negative.      Physical Exam     Initial Vitals [05/25/22 0109]   BP Pulse Resp Temp SpO2   116/76 97 20 98.8 °F (37.1 °C) 97 %      MAP       --         Physical Exam    Nursing note and vitals reviewed.  Constitutional: He appears well-developed and well-nourished.   HENT:   Head: Normocephalic and atraumatic.   Nose: Nose normal.   Eyes: Conjunctivae and EOM are normal.   Neck: No tracheal deviation present.   Normal range of motion.  Cardiovascular: Normal rate, regular rhythm, normal heart sounds and intact distal pulses. Exam reveals no friction rub.    No murmur heard.  Pulmonary/Chest: Breath sounds normal. No respiratory distress. He has no wheezes. He has no rales.   Abdominal: Abdomen is soft. He exhibits no distension. There is abdominal tenderness.   Bilateral flank tenderness.  Indwelling urinary catheter and place   Musculoskeletal:         General: Normal range of motion.      Cervical back: Normal range of motion.      Comments: Right foot tenderness.  no drainage and the patient has evidence of previous amputation of big toe.  Intact distal pulses and dorsalis pedis pulse     Neurological: He is alert and oriented to person, place, and time. He has normal strength.   Skin: Skin is warm and dry. Capillary refill takes less than 2 seconds.   Psychiatric: He has a normal mood and affect. Thought content normal.         ED Course   Procedures  Labs Reviewed   CBC W/ AUTO DIFFERENTIAL - Abnormal; Notable for the following components:       Result Value    WBC 15.24 (*)     RBC 4.40 (*)     Hemoglobin 12.6 (*)     MCHC 31.0  (*)     RDW 15.7 (*)     Gran # (ANC) 11.4 (*)     Immature Grans (Abs) 0.05 (*)     Mono # 1.6 (*)     Gran % 74.7 (*)     Lymph % 12.2 (*)     All other components within normal limits   COMPREHENSIVE METABOLIC PANEL - Abnormal; Notable for the following components:    ALT 7 (*)     All other components within normal limits   URINALYSIS, REFLEX TO URINE CULTURE - Abnormal; Notable for the following components:    Appearance, UA Cloudy (*)     Specific Gravity, UA >1.030 (*)     Protein, UA 3+ (*)     Ketones, UA 1+ (*)     Occult Blood UA 3+ (*)     Leukocytes, UA 3+ (*)     All other components within normal limits    Narrative:     Specimen Source->Urine   SEDIMENTATION RATE - Abnormal; Notable for the following components:    Sed Rate 39 (*)     All other components within normal limits   C-REACTIVE PROTEIN - Abnormal; Notable for the following components:    CRP 2.03 (*)     All other components within normal limits   URINALYSIS MICROSCOPIC - Abnormal; Notable for the following components:    RBC, UA >100 (*)     WBC, UA >100 (*)     Hyaline Casts, UA 5 (*)     All other components within normal limits    Narrative:     Specimen Source->Urine   CULTURE, URINE   CULTURE, URINE   CULTURE, BLOOD   CULTURE, BLOOD   LIPASE   LACTIC ACID, PLASMA   SARS-COV-2 RNA AMPLIFICATION, QUAL          Imaging Results          X-Ray Foot Complete Right (In process)                CT Abdomen Pelvis  Without Contrast (Final result)  Result time 05/25/22 02:28:43    Final result by Kristen Contreras MD (05/25/22 02:28:43)                 Narrative:    EXAM DESCRIPTION:    CT ABDOMEN PELVIS WITHOUT CONTRAST    CLINICAL HISTORY:    62 years  Male  Flank pain, kidney stone suspected    COMPARISON:    4/1/2022 CT    TECHNIQUE:    CT without contrast This exam was performed according to our departmental dose-optimization program, which includes automated exposure control, adjustment of the mA and/or kV according to patient size and/or  use of iterative reconstruction technique.      FINDINGS:    Limited evaluation of the lung bases shows fibrotic change in each lung base. Osseous structures show postsurgical change of the right hip. Evaluation of solid visceral organs limited due to lack of IV contrast. As visualized the liver, spleen, adrenal glands, pancreas are unremarkable. The gallbladder is present. Nonobstructing renal calculi bilaterally. Partially exophytic left renal mass, grossly unchanged from prior. Mild to moderate left hydroureteronephrosis secondary to a 6.5 mm mid left ureteral calculus. This has a somewhat lobulated appearance and could reflect several adjacent renal calculi. Left Spigelian hernia containing portions of bowel without evidence for obstruction or incarceration at this time. Sigmoid diverticulosis. Normal appendix. No CT evidence for diverticulitis. Donis catheter in place. Urinary bladder is not distended. Moderate atheromatous change. No free air or free fluid.    IMPRESSION:    Mild to moderate left hydroureteronephrosis secondary to a lobulated 6.5 mm mid left ureteral calculus.    Multiple other incidental findings, as above stable in appearance        Electronically signed by:  Kristen Contreras DO  5/25/2022 2:28 AM CDT Workstation: [id number]                               Medications   cefTRIAXone (ROCEPHIN) 2 g/50 mL D5W IVPB (0 g Intravenous Stopped 5/25/22 0334)   HYDROmorphone injection 1 mg (has no administration in time range)   vancomycin - pharmacy to dose (has no administration in time range)   vancomycin (VANCOCIN) 1,500 mg in dextrose 5 % 500 mL IVPB (has no administration in time range)   lactated ringers bolus 1,000 mL (0 mLs Intravenous Stopped 5/25/22 0334)   HYDROmorphone injection 1 mg (1 mg Intravenous Given 5/25/22 0227)   albuterol-ipratropium 2.5 mg-0.5 mg/3 mL nebulizer solution 3 mL (3 mLs Nebulization Given 5/25/22 1143)     Medical Decision Making:   Differential Diagnosis:   Patient  complaining of dysuria.  Patient states he gets his urinary catheter changed once a month and it needs to be changed.  Patient also has foot pain.  No clear evidence of foot infection however given previous infections will do x-ray.  Will do CT scan to rule out evidence of pyelonephritis and will treat for urinary tract infection.  Patient advised to follow up with podiatrist Dr. Lauren  Clinical Tests:   Lab Tests: Reviewed  Radiological Study: Reviewed  ED Management:  Patient does have evidence of pyelonephritis and obstructing kidney stone and possible right foot infection so admission offered and Hospital Medicine consulted for evaluation for admission however patient is refusing to be admitted and wants to go home and he said he will follow-up with his doctors as outpatient and has an appointment with his doctor this Friday.  Patient leaving against medical advise at this time                      Clinical Impression:   Final diagnoses:  [R52] Pain  [N30.00] Acute cystitis without hematuria - Intractable pain (Primary)  [N20.0] Kidney stone  [M79.671] Right foot pain          ED Disposition Condition    Admit               Antonia Larson MD  05/25/22 0529       Antonia Larson MD  05/25/22 0529

## 2022-05-25 NOTE — RESPIRATORY THERAPY
05/25/22 0422   Patient Assessment/Suction   Level of Consciousness (AVPU) alert   Respiratory Effort Normal;Unlabored   Expansion/Accessory Muscles/Retractions no use of accessory muscles   All Lung Fields Breath Sounds clear   Rhythm/Pattern, Respiratory unlabored;pattern regular;depth regular   PRE-TX-O2   O2 Device (Oxygen Therapy) nasal cannula   $ Is the patient on Low Flow Oxygen? Yes   Flow (L/min) 2   SpO2 98 %   Pulse Oximetry Type Continuous   $ Pulse Oximetry - Multiple Charge Pulse Oximetry - Multiple   Pulse 96   Resp 18   Aerosol Therapy   $ Aerosol Therapy Charges Aerosol Treatment   Daily Review of Necessity (SVN) completed   Respiratory Treatment Status (SVN) given   Treatment Route (SVN) mask;air   Patient Position (SVN) HOB elevated   Post Treatment Assessment (SVN) breath sounds unchanged   Signs of Intolerance (SVN) none   Breath Sounds Post-Respiratory Treatment   Throughout All Fields Post-Treatment All Fields   Throughout All Fields Post-Treatment no change   Post-treatment Heart Rate (beats/min) 96   Post-treatment Resp Rate (breaths/min) 18   Education   $ Education Bronchodilator;15 min   Respiratory Evaluation   $ Care Plan Tech Time 15 min

## 2022-05-25 NOTE — DISCHARGE INSTRUCTIONS
You are leaving against medical advise.  Rest.  Return to emergency department for worsening symptoms or any problems we do have a kidney stone and possibly foot infection.

## 2022-05-27 LAB
BACTERIA UR CULT: ABNORMAL
BACTERIA UR CULT: ABNORMAL

## 2022-06-07 ENCOUNTER — TELEPHONE (OUTPATIENT)
Dept: CARDIOLOGY | Facility: CLINIC | Age: 62
End: 2022-06-07
Payer: MEDICAID

## 2022-06-07 NOTE — TELEPHONE ENCOUNTER
----- Message from Kisha Christie MA sent at 6/7/2022 10:52 AM CDT -----  Contact: VERA CASTELAN [[id number]]  Type: Needs Medical Advice    Who Called: VERA CASTELAN [[id number]]  Best Call Back Number: [phone]  Inquiry/Question: Would you kindly call VERA CASTELAN [[id number]] regarding a sooner hospital follow up appt  Thank you~

## 2022-06-09 ENCOUNTER — TELEPHONE (OUTPATIENT)
Dept: CARDIOLOGY | Facility: CLINIC | Age: 62
End: 2022-06-09
Payer: MEDICAID

## 2022-06-09 NOTE — TELEPHONE ENCOUNTER
----- Message from Elizabeth Mantilla sent at 6/9/2022 10:19 AM CDT -----  Contact: pt  Type: Needs Medical Advice         Who Called: Pt Care Giver- Pamela Leigh Call Back Number: [phone]  Additional Information: Requesting a call back regarding  pt is needing a hospital follow up appt. Pt was see in the hospital in Feb and has been trying to get a f.u appt to be seen. Care taker said Dr Bee informed them to be seen with in 1 week of his discharge but pt has been unable to make an appt.  Pt caretaker said she called last week and a message was sent but she had not heard from office.   Please Advise- Thank you

## 2022-06-14 ENCOUNTER — TELEPHONE (OUTPATIENT)
Dept: UROLOGY | Facility: CLINIC | Age: 62
End: 2022-06-14
Payer: MEDICAID

## 2022-06-14 NOTE — TELEPHONE ENCOUNTER
----- Message from Nneka Manzano sent at 6/14/2022 10:33 AM CDT -----  Look at last message you sent   ----- Message -----  From: Candace Martinez  Sent: 6/14/2022  10:27 AM CDT  To: Bess KENNEDY Staff    Type:  Sooner Appointment Request    Caller is requesting a sooner appointment.  Caller declined first available appointment listed below.  Caller will not accept being placed on the waitlist and is requesting a message be sent to doctor.    Name of Caller:  pt girl friend Shelbi  When is the first available appointment?  8/13  Symptoms:  UTI  Best Call Back Number:  [phone] (home)     Additional Information:  pt Girlfriend would like for him to be seen sooner--thank you!

## 2022-06-14 NOTE — TELEPHONE ENCOUNTER
Returned call and spoke to Clau, and patient, appt made and directions given, she verbally understood.

## 2022-06-21 ENCOUNTER — OFFICE VISIT (OUTPATIENT)
Dept: HEMATOLOGY/ONCOLOGY | Facility: CLINIC | Age: 62
End: 2022-06-21
Payer: MEDICAID

## 2022-06-21 VITALS
DIASTOLIC BLOOD PRESSURE: 45 MMHG | SYSTOLIC BLOOD PRESSURE: 76 MMHG | HEART RATE: 99 BPM | RESPIRATION RATE: 18 BRPM | BODY MASS INDEX: 31.39 KG/M2 | HEIGHT: 67 IN

## 2022-06-21 DIAGNOSIS — N28.89 LEFT RENAL MASS: Primary | ICD-10-CM

## 2022-06-21 DIAGNOSIS — L08.9 INFECTED WOUND: ICD-10-CM

## 2022-06-21 DIAGNOSIS — T14.8XXA INFECTED WOUND: ICD-10-CM

## 2022-06-21 DIAGNOSIS — N13.2 HYDRONEPHROSIS WITH URINARY OBSTRUCTION DUE TO RENAL CALCULUS: ICD-10-CM

## 2022-06-21 DIAGNOSIS — I26.99 BILATERAL PULMONARY EMBOLISM: ICD-10-CM

## 2022-06-21 PROCEDURE — 3046F HEMOGLOBIN A1C LEVEL >9.0%: CPT | Mod: CPTII,S$GLB,, | Performed by: INTERNAL MEDICINE

## 2022-06-21 PROCEDURE — 3078F PR MOST RECENT DIASTOLIC BLOOD PRESSURE < 80 MM HG: ICD-10-PCS | Mod: CPTII,S$GLB,, | Performed by: INTERNAL MEDICINE

## 2022-06-21 PROCEDURE — 99214 PR OFFICE/OUTPT VISIT, EST, LEVL IV, 30-39 MIN: ICD-10-PCS | Mod: S$GLB,,, | Performed by: INTERNAL MEDICINE

## 2022-06-21 PROCEDURE — 3074F SYST BP LT 130 MM HG: CPT | Mod: CPTII,S$GLB,, | Performed by: INTERNAL MEDICINE

## 2022-06-21 PROCEDURE — 3008F BODY MASS INDEX DOCD: CPT | Mod: CPTII,S$GLB,, | Performed by: INTERNAL MEDICINE

## 2022-06-21 PROCEDURE — 3078F DIAST BP <80 MM HG: CPT | Mod: CPTII,S$GLB,, | Performed by: INTERNAL MEDICINE

## 2022-06-21 PROCEDURE — 3046F PR MOST RECENT HEMOGLOBIN A1C LEVEL > 9.0%: ICD-10-PCS | Mod: CPTII,S$GLB,, | Performed by: INTERNAL MEDICINE

## 2022-06-21 PROCEDURE — 99214 OFFICE O/P EST MOD 30 MIN: CPT | Mod: S$GLB,,, | Performed by: INTERNAL MEDICINE

## 2022-06-21 PROCEDURE — 3074F PR MOST RECENT SYSTOLIC BLOOD PRESSURE < 130 MM HG: ICD-10-PCS | Mod: CPTII,S$GLB,, | Performed by: INTERNAL MEDICINE

## 2022-06-21 PROCEDURE — 3008F PR BODY MASS INDEX (BMI) DOCUMENTED: ICD-10-PCS | Mod: CPTII,S$GLB,, | Performed by: INTERNAL MEDICINE

## 2022-06-21 NOTE — PROGRESS NOTES
Hematology/Oncology  In Office Subsequent Encounter Note  6/21/22  Patient Name: James Jiang  MRN: [id number]  Hospital Length of Stay: 3 days  Code Status: Full Code   Primary Care Physician: Dr. Jcarlos Wilkinson MD  6/21/22  Subjective:     Chief Complaint: Wound Infection (Right hip surgery incision site)        History Present Illness:  61 y.o. male with history of right hip surgery in November 2021. He is admitted with a cellulitis or soft tissue infection involving the right hip sutures site.  While in the hospital he had a CT scan which showed a 4.2 cm renal mass suspicious for renal cancer he has been seen per Dr. Rodriguez of Urology.    The hip wound has healed, walks with a walker.  He is on Eliquis for PE.  Due for CTA 8/2022.  Likely will get CT of CAP and U/s of leg.    He has history of diabetes, ketoacidosis, gangrene of the toe, hypertension.  He has peripheral vascular disease.    Status post intramedullary henna placement at the right femur in November 7, 2021. Status post toe amputation at the right foot October 12, 2021.    He does not have allergies to medications.  He is a former smoker.  He does have an alcohol history but not presently.    He has L renal mass, and hydronephrosis 2nd to 6.5 mm stone; .to see Dr. Kellogg of .            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    Hypertension      Past Surgical History:   Procedure Laterality Date    denies pertinent surgical history      INTRAMEDULLARY RODDING OF FEMUR Right 11/7/2021    Procedure: INSERTION, INTRAMEDULLARY HENNA, FEMUR;  Surgeon: Matt Milian MD;  Location: [facility] OR;  Service: Orthopedics;  Laterality: Right;    TOE AMPUTATION Right 10/12/2021    Procedure: AMPUTATION, TOE;  Surgeon: Milton Lauren DPM;  Location:  "[facility] OR;  Service: Podiatry;  Laterality: Right;       Allergies:  Review of patient's allergies indicates:  No Known Allergies    Social/Family History:  Social History     Socioeconomic History    Marital status:    Tobacco Use    Smoking status: Former Smoker    Smokeless tobacco: Never Used   Substance and Sexual Activity    Alcohol use: Not Currently    Drug use: Not Currently     Family History   Problem Relation Age of Onset    Hypertension Father        ROS:    GEN: normal without any fever, night sweats or weight loss  HEENT: normal with no HA's, sore throat, stiff neck, changes in vision  CV: normal with no CP, SOB, PND, MIRANDA or orthopnea  PULM: normal with no SOB, cough, hemoptysis, sputum or pleuritic pain  GI: normal with no abdominal pain, nausea, vomiting, constipation, diarrhea, melanotic stools, BRBPR, or hematemesis  : normal with no hematuria, dysuria  BREAST: normal with no mass, discharge, pain  SKIN:  See history of present illness        Objective:     Vitals:  Blood pressure 116/76, pulse 80, temperature 97.9 °F (36.6 °C), temperature source Oral, resp. rate 17, height 5' 8" (1.727 m), weight 91.9 kg (202 lb 9.6 oz), SpO2 96 %.    Physical Exam:  GEN: no apparent distress, comfortable, chronically ill in appearance.  HEAD: atraumatic and normocephalic  EYES: no pallor, no icterus  ENT:  no pharyngeal erythema, external ears WNL; no nasal discharge  NECK: no masses, thyroid normal, trachea midline, no LAD/LN's, supple  CV: RRR with no murmur; normal pulse; normal S1 and S2; no pedal edema  CHEST: Normal respiratory effort; CTAB; normal breath sounds; no wheeze or crackles  ABDOM: nontender and nondistended; soft  no rebound/guarding, liver and spleen are not palpable  MUSC/Skeletal: ROM normal; no crepitus; joints normal  EXTREM: no clubbing, cyanosis, inflammation or swelling  SKIN:  Ecchymosis on arms  NEURO: grossly intact; motor/sensory WNL;  no tremors  PSYCH: normal mood, " affect and behavior  LYMPH: normal cervical, supraclavicular, axillary LN's    Creatinine is 0.6, hemoglobin is 10.9,  platelet count is 209,000,   WBC 10,000  The right leg wound culture grew  MRSA    CT scan show multiple small pulmonary emboli, peripheral vascular disease is noted.  4.2 cm left renal mass is noted.  2/2022.    CT renal mass unchanged, hydronephrosis 2nd to 6.5 mm renal stone seen.  5/2022.      Assessment/Plan:     (1) 61 y.o. male with MRSA cellulitis at the left hip, wound has closed.    (2) multiple small pulmonary emboli bilaterally on CTA.  Currently on Eliquis 5 mg 1 BID.  Recheck CTA 8/2022, after renal function maintained and hydronephrosis managed.    (3) left renal mass suspicious for renal cancer.   To see Dr. Kellogg regards surgery?  Would have to interrupt Eliquis for surgery and cover with lovenox bridge.    (4) mild anemia secondary to chronic disease.    Return to clinic see me outpatient in 2 months.    He had a CAT scan of abdomin and pelvis. 5/2022.  It showed renal mass unchanged.  But he has unilateral hydronephrosis 2nd 6.5 mm stone.

## 2022-06-27 NOTE — PROGRESS NOTES
[facility] Note    PCP: [facility]    Chief Complaint: renal mass    SUBJECTIVE:       History of Present Illness:  James Jiang is a 62 y.o. male who presents to clinic for renal mass. He is Established  to our clinic.     Consult originally from February while admitted to [facility]:  Consult received for incidentally discovered 5 cm left renal mass.     Patient is currently admitted for infected wound following right hip surgery in November. He failed to follow up for suture removal. He has undergone irrigation and debridement of the wound.      Noted to have a UTI with culture growing MRSA. He underwent a renal US which noted a solid left renal mass.   He then underwent CTA which has confirmed a 4.7 cm renal mass concerning for malignancy. He is also noted to have severe PVD.      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 His hemoglobin A1c was 11 however per notes from PCP was reportedly 5.9 in May.     He failed to show up for apts twice.   He currently has an indwelling blanco which has been in place since October. States he has never been able to empty his bladder. He says he had a cystoscopy about 5 years ago and had what he says was a stricture that was opened. Getting blanco exchanged with home health. He is on flomax. He does not want the blanco removed.   No gross hematuria.   Currently smoking, 1.5 pack a day.     In May he had a repeat scan and at time it showed a stone in the left ureter. He was unaware of this and has not been addressed.     Last urine culture: MRSA (22)    Lab Results   Component Value Date    CREATININE 0.9 2022     Family  hx: father  of bladder cancer, no kidney or prostate cancer  Hx of a fib, COPD, bilateral  "PE, DM, obesity    Past medical, family, and social history reviewed as documented in chart with pertinent positive medical, family, and social history detailed in HPI.    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    Hypertension      Past Surgical History:   Procedure Laterality Date    denies pertinent surgical history      INTRAMEDULLARY RODDING OF FEMUR Right 11/7/2021    Procedure: INSERTION, INTRAMEDULLARY HENNA, FEMUR;  Surgeon: Matt Milian MD;  Location: [facility] OR;  Service: Orthopedics;  Laterality: Right;    TOE AMPUTATION Right 10/12/2021    Procedure: AMPUTATION, TOE;  Surgeon: Milton Lauren DPM;  Location: [facility] OR;  Service: Podiatry;  Laterality: Right;     Family History   Problem Relation Age of Onset    Hypertension Father      Social History     Tobacco Use    Smoking status: Former Smoker    Smokeless tobacco: Never Used   Substance Use Topics    Alcohol use: Not Currently    Drug use: Not Currently        Review of Systems   Genitourinary: Positive for difficulty urinating, frequency, nocturia and urgency. Negative for hematuria.   Musculoskeletal: Positive for back pain.       OBJECTIVE:     Anticoagulation:  Eliquis 5 mg     Estimated body mass index is 31.39 kg/m² as calculated from the following:    Height as of this encounter: 5' 7" (1.702 m).    Weight as of this encounter: 90.9 kg (200 lb 6.4 oz).    Vital Signs (Most Recent)  Pulse: 89 (06/28/22 1356)  Resp: 18 (06/28/22 1356)  BP: 94/69 (06/28/22 1356)    Physical Exam  Constitutional:       General: He is not in acute distress.     Appearance: He is ill-appearing. He is not toxic-appearing or diaphoretic.      Comments: In wheelchair   HENT:      Head: Normocephalic and atraumatic.   Cardiovascular:    "   Rate and Rhythm: Normal rate and regular rhythm.   Pulmonary:      Effort: Pulmonary effort is normal. No respiratory distress.   Abdominal:      Palpations: Abdomen is soft.   Genitourinary:     Comments: Blanco draining dark brown urine  Skin:     Coloration: Skin is not jaundiced.   Neurological:      General: No focal deficit present.   Psychiatric:         Mood and Affect: Mood normal.         Behavior: Behavior normal.         Thought Content: Thought content normal.         BMP  Lab Results   Component Value Date     05/25/2022    K 3.5 05/25/2022     05/25/2022    CO2 23 05/25/2022    BUN 15 05/25/2022    CREATININE 0.9 05/25/2022    CALCIUM 9.2 05/25/2022    ANIONGAP 10 05/25/2022    ESTGFRAFRICA >60.0 05/25/2022    EGFRNONAA >60.0 05/25/2022       Lab Results   Component Value Date    WBC 15.24 (H) 05/25/2022    HGB 12.6 (L) 05/25/2022    HCT 40.7 05/25/2022    MCV 93 05/25/2022     05/25/2022     Imaging:  Per HPI    ASSESSMENT     1. Left renal mass    2. Calculus of urinary tract in diseases classified elsewhere    3. Chronic obstructive pulmonary disease, unspecified COPD type    4. Paroxysmal atrial fibrillation    5. Bilateral pulmonary embolism    6. Physical debility      PLAN:     - Long talk with patient and his girlfriend   - Ultimately he is a very poor candidate for major surgery, however he does have a large renal mass that ideally does require treatment   - First, he has a ureteral stone that has not been addressed - repeat CTRSS now to see if still present   - Complete staging with Chest CT and CBC, CMP, will get Hem A 1c  - He does not want his blanco removed, he can stop Flomax   - We reviewed his imaging, has a 4.7 cm endophytic midpole left renal mass, this will require radical nephrectomy   - He will need to be optimized for surgery and cleared by PCP and heme onc to come off anticoagulation   - Will follow up with him on imaging and lab results   - We discussed that  there is no urgency to this surgery and would prefer that he be medically optimized to be safe and if that requires waiting 6 months for surgery I do not think this will have a negative oncologic outcome     Raegan Rodriguez MD       I spent over 45 minutes with the patient. Over 50% of the visit was spent in counseling.

## 2022-06-28 ENCOUNTER — OFFICE VISIT (OUTPATIENT)
Dept: UROLOGY | Facility: CLINIC | Age: 62
End: 2022-06-28
Payer: MEDICAID

## 2022-06-28 VITALS
HEIGHT: 67 IN | WEIGHT: 200.38 LBS | RESPIRATION RATE: 18 BRPM | HEART RATE: 89 BPM | DIASTOLIC BLOOD PRESSURE: 69 MMHG | SYSTOLIC BLOOD PRESSURE: 94 MMHG | BODY MASS INDEX: 31.45 KG/M2

## 2022-06-28 DIAGNOSIS — J44.9 CHRONIC OBSTRUCTIVE PULMONARY DISEASE, UNSPECIFIED COPD TYPE: ICD-10-CM

## 2022-06-28 DIAGNOSIS — I48.0 PAROXYSMAL ATRIAL FIBRILLATION: Chronic | ICD-10-CM

## 2022-06-28 DIAGNOSIS — N22 CALCULUS OF URINARY TRACT IN DISEASES CLASSIFIED ELSEWHERE: ICD-10-CM

## 2022-06-28 DIAGNOSIS — N28.89 LEFT RENAL MASS: Primary | ICD-10-CM

## 2022-06-28 DIAGNOSIS — R53.81 PHYSICAL DEBILITY: ICD-10-CM

## 2022-06-28 DIAGNOSIS — I26.99 BILATERAL PULMONARY EMBOLISM: ICD-10-CM

## 2022-06-28 PROCEDURE — 99213 OFFICE O/P EST LOW 20 MIN: CPT | Mod: PBBFAC,PN | Performed by: STUDENT IN AN ORGANIZED HEALTH CARE EDUCATION/TRAINING PROGRAM

## 2022-06-28 PROCEDURE — 99204 PR OFFICE/OUTPT VISIT, NEW, LEVL IV, 45-59 MIN: ICD-10-PCS | Mod: S$PBB,,, | Performed by: STUDENT IN AN ORGANIZED HEALTH CARE EDUCATION/TRAINING PROGRAM

## 2022-06-28 PROCEDURE — 3078F DIAST BP <80 MM HG: CPT | Mod: CPTII,,, | Performed by: STUDENT IN AN ORGANIZED HEALTH CARE EDUCATION/TRAINING PROGRAM

## 2022-06-28 PROCEDURE — 3008F PR BODY MASS INDEX (BMI) DOCUMENTED: ICD-10-PCS | Mod: CPTII,,, | Performed by: STUDENT IN AN ORGANIZED HEALTH CARE EDUCATION/TRAINING PROGRAM

## 2022-06-28 PROCEDURE — 99999 PR PBB SHADOW E&M-EST. PATIENT-LVL III: CPT | Mod: PBBFAC,,, | Performed by: STUDENT IN AN ORGANIZED HEALTH CARE EDUCATION/TRAINING PROGRAM

## 2022-06-28 PROCEDURE — 3078F PR MOST RECENT DIASTOLIC BLOOD PRESSURE < 80 MM HG: ICD-10-PCS | Mod: CPTII,,, | Performed by: STUDENT IN AN ORGANIZED HEALTH CARE EDUCATION/TRAINING PROGRAM

## 2022-06-28 PROCEDURE — 3046F HEMOGLOBIN A1C LEVEL >9.0%: CPT | Mod: CPTII,,, | Performed by: STUDENT IN AN ORGANIZED HEALTH CARE EDUCATION/TRAINING PROGRAM

## 2022-06-28 PROCEDURE — 99999 PR PBB SHADOW E&M-EST. PATIENT-LVL III: ICD-10-PCS | Mod: PBBFAC,,, | Performed by: STUDENT IN AN ORGANIZED HEALTH CARE EDUCATION/TRAINING PROGRAM

## 2022-06-28 PROCEDURE — 3074F PR MOST RECENT SYSTOLIC BLOOD PRESSURE < 130 MM HG: ICD-10-PCS | Mod: CPTII,,, | Performed by: STUDENT IN AN ORGANIZED HEALTH CARE EDUCATION/TRAINING PROGRAM

## 2022-06-28 PROCEDURE — 3074F SYST BP LT 130 MM HG: CPT | Mod: CPTII,,, | Performed by: STUDENT IN AN ORGANIZED HEALTH CARE EDUCATION/TRAINING PROGRAM

## 2022-06-28 PROCEDURE — 3046F PR MOST RECENT HEMOGLOBIN A1C LEVEL > 9.0%: ICD-10-PCS | Mod: CPTII,,, | Performed by: STUDENT IN AN ORGANIZED HEALTH CARE EDUCATION/TRAINING PROGRAM

## 2022-06-28 PROCEDURE — 3008F BODY MASS INDEX DOCD: CPT | Mod: CPTII,,, | Performed by: STUDENT IN AN ORGANIZED HEALTH CARE EDUCATION/TRAINING PROGRAM

## 2022-06-28 PROCEDURE — 99204 OFFICE O/P NEW MOD 45 MIN: CPT | Mod: S$PBB,,, | Performed by: STUDENT IN AN ORGANIZED HEALTH CARE EDUCATION/TRAINING PROGRAM

## 2022-06-28 RX ORDER — GABAPENTIN 300 MG/1
300 CAPSULE ORAL 3 TIMES DAILY
COMMUNITY
Start: 2022-06-25 | End: 2022-10-27

## 2022-06-28 RX ORDER — MIDODRINE HYDROCHLORIDE 5 MG/1
TABLET ORAL
COMMUNITY
Start: 2022-06-24

## 2022-06-28 RX ORDER — SULFAMETHOXAZOLE AND TRIMETHOPRIM 800; 160 MG/1; MG/1
1 TABLET ORAL 2 TIMES DAILY
COMMUNITY
Start: 2022-06-25 | End: 2022-07-25

## 2022-06-28 NOTE — Clinical Note
Finally made it to see me. First need to address his ureteral stone. Then we can consider surgery. When do you think is safe to come off blood thinners for major surgery. Don't mind waiting a few months to get him as optimized as possible.

## 2022-06-29 ENCOUNTER — HOSPITAL ENCOUNTER (EMERGENCY)
Facility: HOSPITAL | Age: 62
Discharge: HOME | End: 2022-06-30
Attending: EMERGENCY MEDICINE
Payer: MEDICAID

## 2022-06-29 ENCOUNTER — TELEPHONE (OUTPATIENT)
Dept: UROLOGY | Facility: CLINIC | Age: 62
End: 2022-06-29
Payer: MEDICAID

## 2022-06-29 DIAGNOSIS — R31.9 URINARY TRACT INFECTION WITH HEMATURIA, SITE UNSPECIFIED: Primary | ICD-10-CM

## 2022-06-29 DIAGNOSIS — N39.0 URINARY TRACT INFECTION WITH HEMATURIA, SITE UNSPECIFIED: Primary | ICD-10-CM

## 2022-06-29 LAB
ALBUMIN SERPL BCP-MCNC: 3.9 G/DL (ref 3.5–5.2)
ALP SERPL-CCNC: 88 U/L (ref 55–135)
ALT SERPL W/O P-5'-P-CCNC: 10 U/L (ref 10–44)
ANION GAP SERPL CALC-SCNC: 8 MMOL/L (ref 8–16)
APTT PPP: 30.2 SEC (ref 23.3–35.1)
AST SERPL-CCNC: 12 U/L (ref 10–40)
BACTERIA #/AREA URNS HPF: NEGATIVE /HPF
BASOPHILS # BLD AUTO: 0.09 K/UL (ref 0–0.2)
BASOPHILS NFR BLD: 0.6 % (ref 0–1.9)
BILIRUB SERPL-MCNC: 0.5 MG/DL (ref 0.1–1)
BILIRUB UR QL STRIP: NEGATIVE
BUN SERPL-MCNC: 20 MG/DL (ref 8–23)
CALCIUM SERPL-MCNC: 9.4 MG/DL (ref 8.7–10.5)
CHLORIDE SERPL-SCNC: 100 MMOL/L (ref 95–110)
CLARITY UR: ABNORMAL
CO2 SERPL-SCNC: 26 MMOL/L (ref 23–29)
COLOR UR: ABNORMAL
CREAT SERPL-MCNC: 0.8 MG/DL (ref 0.5–1.4)
DIFFERENTIAL METHOD: ABNORMAL
EOSINOPHIL # BLD AUTO: 0.3 K/UL (ref 0–0.5)
EOSINOPHIL NFR BLD: 2.1 % (ref 0–8)
ERYTHROCYTE [DISTWIDTH] IN BLOOD BY AUTOMATED COUNT: 14.4 % (ref 11.5–14.5)
EST. GFR  (AFRICAN AMERICAN): >60 ML/MIN/1.73 M^2
EST. GFR  (NON AFRICAN AMERICAN): >60 ML/MIN/1.73 M^2
GLUCOSE SERPL-MCNC: 108 MG/DL (ref 70–110)
GLUCOSE UR QL STRIP: NEGATIVE
HCT VFR BLD AUTO: 39.6 % (ref 40–54)
HGB BLD-MCNC: 12.6 G/DL (ref 14–18)
HGB UR QL STRIP: ABNORMAL
HYALINE CASTS #/AREA URNS LPF: 0 /LPF
IMM GRANULOCYTES # BLD AUTO: 0.04 K/UL (ref 0–0.04)
IMM GRANULOCYTES NFR BLD AUTO: 0.3 % (ref 0–0.5)
INR PPP: 1.3
KETONES UR QL STRIP: NEGATIVE
LEUKOCYTE ESTERASE UR QL STRIP: ABNORMAL
LYMPHOCYTES # BLD AUTO: 2.6 K/UL (ref 1–4.8)
LYMPHOCYTES NFR BLD: 17.7 % (ref 18–48)
MCH RBC QN AUTO: 29 PG (ref 27–31)
MCHC RBC AUTO-ENTMCNC: 31.8 G/DL (ref 32–36)
MCV RBC AUTO: 91 FL (ref 82–98)
MICROSCOPIC COMMENT: ABNORMAL
MONOCYTES # BLD AUTO: 1 K/UL (ref 0.3–1)
MONOCYTES NFR BLD: 7.1 % (ref 4–15)
NEUTROPHILS # BLD AUTO: 10.4 K/UL (ref 1.8–7.7)
NEUTROPHILS NFR BLD: 72.2 % (ref 38–73)
NITRITE UR QL STRIP: NEGATIVE
NRBC BLD-RTO: 0 /100 WBC
PH UR STRIP: 7 [PH] (ref 5–8)
PLATELET # BLD AUTO: 322 K/UL (ref 150–450)
PMV BLD AUTO: 9.3 FL (ref 9.2–12.9)
POTASSIUM SERPL-SCNC: 4.4 MMOL/L (ref 3.5–5.1)
PROT SERPL-MCNC: 8.1 G/DL (ref 6–8.4)
PROT UR QL STRIP: ABNORMAL
PROTHROMBIN TIME: 14.9 SEC (ref 11.4–13.7)
RBC # BLD AUTO: 4.35 M/UL (ref 4.6–6.2)
RBC #/AREA URNS HPF: >100 /HPF (ref 0–4)
SODIUM SERPL-SCNC: 134 MMOL/L (ref 136–145)
SP GR UR STRIP: 1.01 (ref 1–1.03)
SQUAMOUS #/AREA URNS HPF: 1 /HPF
URN SPEC COLLECT METH UR: ABNORMAL
UROBILINOGEN UR STRIP-ACNC: NEGATIVE EU/DL
WBC # BLD AUTO: 14.42 K/UL (ref 3.9–12.7)
WBC #/AREA URNS HPF: 26 /HPF (ref 0–5)

## 2022-06-29 PROCEDURE — 51702 INSERT TEMP BLADDER CATH: CPT

## 2022-06-29 PROCEDURE — 25000003 PHARM REV CODE 250: Performed by: EMERGENCY MEDICINE

## 2022-06-29 PROCEDURE — 96374 THER/PROPH/DIAG INJ IV PUSH: CPT | Mod: 59

## 2022-06-29 PROCEDURE — 96375 TX/PRO/DX INJ NEW DRUG ADDON: CPT | Mod: 59

## 2022-06-29 PROCEDURE — 80053 COMPREHEN METABOLIC PANEL: CPT | Performed by: EMERGENCY MEDICINE

## 2022-06-29 PROCEDURE — 85025 COMPLETE CBC W/AUTO DIFF WBC: CPT | Performed by: EMERGENCY MEDICINE

## 2022-06-29 PROCEDURE — 81001 URINALYSIS AUTO W/SCOPE: CPT | Performed by: EMERGENCY MEDICINE

## 2022-06-29 PROCEDURE — 99284 EMERGENCY DEPT VISIT MOD MDM: CPT | Mod: 25

## 2022-06-29 PROCEDURE — 87086 URINE CULTURE/COLONY COUNT: CPT | Performed by: EMERGENCY MEDICINE

## 2022-06-29 PROCEDURE — 85730 THROMBOPLASTIN TIME PARTIAL: CPT | Performed by: EMERGENCY MEDICINE

## 2022-06-29 PROCEDURE — 85610 PROTHROMBIN TIME: CPT | Performed by: EMERGENCY MEDICINE

## 2022-06-29 PROCEDURE — 63600175 PHARM REV CODE 636 W HCPCS: Performed by: EMERGENCY MEDICINE

## 2022-06-29 RX ORDER — LIDOCAINE HYDROCHLORIDE 20 MG/ML
JELLY TOPICAL
Status: COMPLETED | OUTPATIENT
Start: 2022-06-29 | End: 2022-06-29

## 2022-06-29 RX ORDER — MORPHINE SULFATE 4 MG/ML
4 INJECTION, SOLUTION INTRAMUSCULAR; INTRAVENOUS
Status: COMPLETED | OUTPATIENT
Start: 2022-06-29 | End: 2022-06-29

## 2022-06-29 RX ORDER — ONDANSETRON 2 MG/ML
4 INJECTION INTRAMUSCULAR; INTRAVENOUS
Status: COMPLETED | OUTPATIENT
Start: 2022-06-29 | End: 2022-06-29

## 2022-06-29 RX ADMIN — ONDANSETRON 4 MG: 2 INJECTION INTRAMUSCULAR; INTRAVENOUS at 09:06

## 2022-06-29 RX ADMIN — MORPHINE SULFATE 4 MG: 4 INJECTION, SOLUTION INTRAMUSCULAR; INTRAVENOUS at 09:06

## 2022-06-29 RX ADMIN — LIDOCAINE HYDROCHLORIDE: 20 JELLY TOPICAL at 10:06

## 2022-06-29 NOTE — TELEPHONE ENCOUNTER
Returned call and spoke with patient, stated the PCP was suppose to fax over his previous lab work. Informed we had not received anything as of yet. Fax number given, they will contact them and make sure fax is correct, they  verbally understood.

## 2022-06-29 NOTE — TELEPHONE ENCOUNTER
----- Message from Maria G Meraz sent at 6/29/2022 11:37 AM CDT -----  Contact: Clau Watts (So)  Type: Patient Call Back    Who called:Clau Watts (So)    What is the request in detail: The patient would like to know if the results have came in from his blood work. He states that he has a UTI and would like medication     Can the clinic reply by [facility]?    Would the patient rather a call back or a response via [facility]?     Best call back number: [phone] (mobile)    Additional Information:

## 2022-06-30 VITALS
RESPIRATION RATE: 20 BRPM | HEART RATE: 70 BPM | HEIGHT: 67 IN | TEMPERATURE: 98 F | DIASTOLIC BLOOD PRESSURE: 65 MMHG | WEIGHT: 200 LBS | OXYGEN SATURATION: 97 % | SYSTOLIC BLOOD PRESSURE: 103 MMHG | BODY MASS INDEX: 31.39 KG/M2

## 2022-06-30 RX ORDER — CIPROFLOXACIN 500 MG/1
500 TABLET ORAL 2 TIMES DAILY
Qty: 14 TABLET | Refills: 0 | Status: SHIPPED | OUTPATIENT
Start: 2022-06-30 | End: 2022-07-07

## 2022-06-30 NOTE — ED PROVIDER NOTES
Encounter Date: 6/29/2022       History     Chief Complaint   Patient presents with    Hematuria     Had blanco replaced by home health today. Blood in blanco back and around urethra. Finished atx for UTI today.      61 yo man with PMH atrial fibrillation, pulmonary emboli anticoagulated with Xarelto, type 2 diabetes, right toe amputation in October 2021 secondary to gangrene, hypertension, depression and anxiety disorder, obesity, known left renal mass, chronic indwelling blanco presents to the ED with hematuria.  The patient states that a home health nurse attempted to change out his Blanco catheter today.  It was reportedly difficult to reinsert the catheter had and resistance was met.  The patient had a lot of pain.  The nurse was able to get urine return however.  About an hour later the patient attempted to stand up and had a large amount of blood come from his penis so he came directly to the emergency department.  He complains of severe pain in his penis.  He is on Eliquis.  He states that he completed a course of Bactrim yesterday but that his urine still looks like chocolate milk.  He has no fevers or chills.  No vomiting or diarrhea.  No back or abdominal pain.  No hematuria prior to blanco exchange.        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    Hypertension      Past Surgical History:   Procedure Laterality Date    denies pertinent surgical history      INTRAMEDULLARY RODDING OF FEMUR Right 11/7/2021    Procedure: INSERTION, INTRAMEDULLARY HENNA, FEMUR;  Surgeon: Matt Milian MD;  Location: [facility] OR;  Service: Orthopedics;  Laterality: Right;    TOE AMPUTATION Right 10/12/2021    Procedure: AMPUTATION, TOE;  Surgeon: Milton Lauren DPM;  Location: [facility] OR;  Service:  Podiatry;  Laterality: Right;     Family History   Problem Relation Age of Onset    Hypertension Father      Social History     Tobacco Use    Smoking status: Former Smoker    Smokeless tobacco: Never Used   Substance Use Topics    Alcohol use: Not Currently    Drug use: Not Currently     Review of Systems   Constitutional: Negative for fever.   HENT: Negative for sore throat.    Respiratory: Negative for shortness of breath.    Cardiovascular: Negative for chest pain.   Gastrointestinal: Negative for nausea.   Genitourinary: Positive for difficulty urinating and hematuria. Negative for dysuria.   Musculoskeletal: Negative for back pain.   Skin: Negative for rash.   Neurological: Negative for weakness.   Hematological: Does not bruise/bleed easily.   Psychiatric/Behavioral: Negative for confusion.   All other systems reviewed and are negative.      Physical Exam     Initial Vitals [06/29/22 2015]   BP Pulse Resp Temp SpO2   (!) 141/82 95 20 97.5 °F (36.4 °C) 96 %      MAP       --         Physical Exam    Nursing note and vitals reviewed.  Constitutional: He appears well-developed and well-nourished. No distress.   HENT:   Head: Normocephalic and atraumatic.   Eyes: EOM are normal.   Neck:   Normal range of motion.  Cardiovascular: Normal rate, regular rhythm, normal heart sounds and intact distal pulses.   No murmur heard.  Pulmonary/Chest: Breath sounds normal. No respiratory distress.   Abdominal: Abdomen is soft. There is no abdominal tenderness.   Genitourinary:    Genitourinary Comments: Indwelling blanco in place with bleeding from meatus around blanco (RN present for exam)     Musculoskeletal:         General: Normal range of motion.      Cervical back: Normal range of motion.     Neurological: He is alert and oriented to person, place, and time.   Skin: Skin is warm and dry.   Psychiatric: He has a normal mood and affect.         ED Course   Procedures  Labs Reviewed   URINALYSIS, REFLEX TO URINE CULTURE  - Abnormal; Notable for the following components:       Result Value    Appearance, UA Cloudy (*)     Protein, UA 2+ (*)     Occult Blood UA 3+ (*)     Leukocytes, UA 1+ (*)     All other components within normal limits    Narrative:     Specimen Source->Urine   CBC W/ AUTO DIFFERENTIAL - Abnormal; Notable for the following components:    WBC 14.42 (*)     RBC 4.35 (*)     Hemoglobin 12.6 (*)     Hematocrit 39.6 (*)     MCHC 31.8 (*)     Gran # (ANC) 10.4 (*)     Lymph % 17.7 (*)     All other components within normal limits   COMPREHENSIVE METABOLIC PANEL - Abnormal; Notable for the following components:    Sodium 134 (*)     All other components within normal limits   PROTIME-INR - Abnormal; Notable for the following components:    PT 14.9 (*)     All other components within normal limits   URINALYSIS MICROSCOPIC - Abnormal; Notable for the following components:    RBC, UA >100 (*)     WBC, UA 26 (*)     Hyaline Casts, UA 0.00 (*)     All other components within normal limits    Narrative:     Specimen Source->Urine   CULTURE, URINE   APTT          Imaging Results    None          Medications   LIDOcaine HCl 2% urojet ( Mucous Membrane Given 6/29/22 2202)   morphine injection 4 mg (4 mg Intravenous Given 6/29/22 2151)   ondansetron injection 4 mg (4 mg Intravenous Given 6/29/22 2151)     Medical Decision Making:   ED Management:  62-year-old male presents emergency department with blood from his penis after attempted Donis exchange.  On my bedside ultrasound the balloon was inflated in the urethra.  It was deflated and removed.  A 16 coude Bulgarian Donis catheter was easily inserted into the patient's bladder.  The patient has no further pain.  The bladder is draining clear yellow urine.  The patient does have a leukocytosis of 14 however this appears to be his baseline.  There is no left shift on the differential.  His hemoglobin is at baseline.  Renal function is within normal limits.  Urinalysis reveals 3+ blood,  1+ leukocytes, greater than 100 RBCs, 26 wbc's, negative bacteria and 1 squamous cell.  The patient is insistent that his urine still looks like chocolate milk and he feels as though it is infected.  He has had prior urine cultures with MRSA and E coli.  He just finished Bactrim yesterday reportedly.  Will send urine culture and start the patient on ciprofloxacin an abundance of caution.  He has been advised to follow-up with his urologist. The patient is aware of and agrees with the plan of care. Detailed return precautions discussed.    Nyla Melendez MD  Emergency Medicine  06/30/2022 4:55 AM                            Clinical Impression:   Final diagnoses:  [N39.0, R31.9] Urinary tract infection with hematuria, site unspecified (Primary)          ED Disposition Condition    Discharge Stable        ED Prescriptions     Medication Sig Dispense Start Date End Date Auth. Provider    ciprofloxacin HCl (CIPRO) 500 MG tablet Take 1 tablet (500 mg total) by mouth 2 (two) times daily. for 7 days 14 tablet 6/30/2022 7/7/2022 Nyla Melendez MD        Follow-up Information     Follow up With Specialties Details Why Contact Info Additional Information    Raegan Rodriguez MD Urology Schedule an appointment as soon as possible for a visit in 2 days  [address] [place] [address] [address]IVE  [address]  [facility] [id number] [phone]       Formerly [facility] - Emergency Dept Emergency Medicine  As needed, If symptoms worsen [address] [facility] [address] [phone] 1st floor           Nyla Melendez MD  06/30/22 3798

## 2022-06-30 NOTE — DISCHARGE INSTRUCTIONS
Take antibiotics as prescribed.  Urine culture was sent.  We will call you if we need to prescribe a different antibiotic.  Follow closely with Urology.  Return for worsening symptoms.

## 2022-07-03 LAB — BACTERIA UR CULT: NO GROWTH

## 2022-07-12 ENCOUNTER — HOSPITAL ENCOUNTER (OUTPATIENT)
Dept: RADIOLOGY | Facility: HOSPITAL | Age: 62
Discharge: HOME | End: 2022-07-12
Attending: STUDENT IN AN ORGANIZED HEALTH CARE EDUCATION/TRAINING PROGRAM
Payer: MEDICAID

## 2022-07-12 DIAGNOSIS — N22 CALCULUS OF URINARY TRACT IN DISEASES CLASSIFIED ELSEWHERE: ICD-10-CM

## 2022-07-12 DIAGNOSIS — N28.89 LEFT RENAL MASS: ICD-10-CM

## 2022-07-12 PROCEDURE — 71250 CT CHEST ABDOMEN PELVIS WITHOUT CONTRAST(XPD): ICD-10-PCS | Mod: 26,,, | Performed by: RADIOLOGY

## 2022-07-12 PROCEDURE — 74176 CT ABD & PELVIS W/O CONTRAST: CPT | Mod: 26,,, | Performed by: RADIOLOGY

## 2022-07-12 PROCEDURE — 71250 CT THORAX DX C-: CPT | Mod: 26,,, | Performed by: RADIOLOGY

## 2022-07-12 PROCEDURE — 74176 CT CHEST ABDOMEN PELVIS WITHOUT CONTRAST(XPD): ICD-10-PCS | Mod: 26,,, | Performed by: RADIOLOGY

## 2022-07-12 PROCEDURE — 74176 CT ABD & PELVIS W/O CONTRAST: CPT | Mod: TC

## 2022-07-13 DIAGNOSIS — N22 CALCULUS OF URINARY TRACT IN DISEASES CLASSIFIED ELSEWHERE: Primary | ICD-10-CM

## 2022-07-18 ENCOUNTER — HOSPITAL ENCOUNTER (EMERGENCY)
Facility: HOSPITAL | Age: 62
Discharge: HOME | End: 2022-07-18
Attending: EMERGENCY MEDICINE
Payer: MEDICAID

## 2022-07-18 VITALS
WEIGHT: 200 LBS | OXYGEN SATURATION: 95 % | HEART RATE: 81 BPM | BODY MASS INDEX: 31.32 KG/M2 | SYSTOLIC BLOOD PRESSURE: 151 MMHG | TEMPERATURE: 98 F | RESPIRATION RATE: 18 BRPM | DIASTOLIC BLOOD PRESSURE: 86 MMHG

## 2022-07-18 DIAGNOSIS — T83.9XXA PROBLEM WITH FOLEY CATHETER, INITIAL ENCOUNTER: Primary | ICD-10-CM

## 2022-07-18 PROCEDURE — 99283 EMERGENCY DEPT VISIT LOW MDM: CPT

## 2022-07-18 PROCEDURE — 51702 INSERT TEMP BLADDER CATH: CPT

## 2022-07-18 PROCEDURE — 25000003 PHARM REV CODE 250

## 2022-07-18 RX ORDER — LIDOCAINE HYDROCHLORIDE 20 MG/ML
JELLY TOPICAL
Status: COMPLETED | OUTPATIENT
Start: 2022-07-18 | End: 2022-07-18

## 2022-07-18 RX ORDER — LIDOCAINE HYDROCHLORIDE 20 MG/ML
JELLY TOPICAL
Status: COMPLETED
Start: 2022-07-18 | End: 2022-07-18

## 2022-07-18 RX ADMIN — LIDOCAINE HYDROCHLORIDE: 20 JELLY TOPICAL at 05:07

## 2022-07-18 NOTE — ED PROVIDER NOTES
Encounter Date: 7/18/2022       History     Chief Complaint   Patient presents with    blanco catheter issue     Leaking around insertion site    Dysuria     Chief complaint is patient is spraying  urine around his tip of his penis.  He does have a catheter in.  He has been having a catheter since September.  He is not sure why he has incontinence but he does.  He does have cancer of the left kidney as well.  That was diagnosed 2 months ago.  He states 2 weeks ago the Blanco was replaced here.  At 1 point the patient states the catheter and the ball was not in the bladder but in the penis area.  It caused him to have bleeding.  Therefore a small catheter was placed.  He is requesting a 20 Kenyan rather than the 16 Kenyan so that he will stop leaking and spraying urine.  Denies any other complaints.  No complaints of fever chills nausea vomiting weakness.  He states he has a cloudy urine and is to start antibiotics today.        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    Hypertension      Past Surgical History:   Procedure Laterality Date    denies pertinent surgical history      INTRAMEDULLARY RODDING OF FEMUR Right 11/7/2021    Procedure: INSERTION, INTRAMEDULLARY HENNA, FEMUR;  Surgeon: Matt Milian MD;  Location: [facility] OR;  Service: Orthopedics;  Laterality: Right;    TOE AMPUTATION Right 10/12/2021    Procedure: AMPUTATION, TOE;  Surgeon: Milton Lauren DPM;  Location: [facility] OR;  Service: Podiatry;  Laterality: Right;     Family History   Problem Relation Age of Onset    Hypertension Father      Social History     Tobacco Use    Smoking status: Former Smoker    Smokeless tobacco: Never Used   Substance Use Topics    Alcohol use: Not Currently    Drug use: Not Currently     Review  of Systems   Constitutional: Negative for chills and fever.   HENT: Negative for ear pain, rhinorrhea and sore throat.    Eyes: Negative for pain and visual disturbance.   Respiratory: Negative for cough and shortness of breath.    Cardiovascular: Negative for chest pain and palpitations.   Gastrointestinal: Negative for abdominal pain, constipation, diarrhea, nausea and vomiting.   Genitourinary: Positive for difficulty urinating. Negative for dysuria, frequency, hematuria and urgency.   Musculoskeletal: Negative for back pain, joint swelling and myalgias.   Skin: Negative for rash.   Neurological: Negative for dizziness, seizures, weakness and headaches.   Psychiatric/Behavioral: Negative for dysphoric mood. The patient is not nervous/anxious.        Physical Exam     Initial Vitals [07/18/22 0222]   BP Pulse Resp Temp SpO2   125/77 87 18 97.5 °F (36.4 °C) 95 %      MAP       --         Physical Exam    Nursing note and vitals reviewed.  Constitutional: He appears well-developed and well-nourished.   HENT:   Head: Normocephalic and atraumatic.   Eyes: Conjunctivae, EOM and lids are normal. Pupils are equal, round, and reactive to light.   Neck: Trachea normal. Neck supple. No thyroid mass present.   Cardiovascular: Normal rate, regular rhythm and normal heart sounds.   Pulmonary/Chest: Breath sounds normal. No respiratory distress.   Abdominal: Abdomen is soft. There is no abdominal tenderness.   Genitourinary:    Genitourinary Comments: Catheter appears in place.  No obvious leakage of urine at the present time.  Scrotal area normal.     Musculoskeletal:         General: Normal range of motion.      Cervical back: Neck supple.     Neurological: He is alert and oriented to person, place, and time. He has normal strength and normal reflexes. No cranial nerve deficit or sensory deficit.   Skin: Skin is warm and dry.   Psychiatric: He has a normal mood and affect. His speech is normal and behavior is normal. Judgment  and thought content normal.         ED Course   Procedures  Labs Reviewed - No data to display       Imaging Results    None          Medications   LIDOcaine HCl 2% (XYLOCAINE) 2 % urojet (has no administration in time range)   LIDOcaine HCl 2% urojet (has no administration in time range)     Medical Decision Making:   ED Management:  The patient is simply to have his Donis replaced.  He then will be discharged.                      Clinical Impression:   Final diagnoses:  [T83.9XXA] Problem with Donis catheter, initial encounter (Primary)          ED Disposition Condition    Discharge Stable        ED Prescriptions     None        Follow-up Information     Follow up With Specialties Details Why Contact [facility]  Schedule an appointment as soon as possible for a visit in 3 days  [address] [place] [id number]  [phone]             Evelyn Alexis MD  07/18/22 6727

## 2022-07-18 NOTE — ED NOTES
"PT STATES, " TUBE WAS CLOGGED UP. STARTED SPRAYING PEE EVERYWHERE. IT STARTED 2 OR 3 DAYS AGO." NO ACUTE DISTRESS NOTED. STABLE CONDITION. VISITOR AT BEDSIDE.   "

## 2022-07-20 ENCOUNTER — TELEPHONE (OUTPATIENT)
Dept: UROLOGY | Facility: CLINIC | Age: 62
End: 2022-07-20
Payer: MEDICAID

## 2022-07-20 NOTE — TELEPHONE ENCOUNTER
Spoke with Clau and she states the pt had blanco changed in the ER Sunday 7/18 and was inquiring if we could change appointment date and time for urine culture in preparation for the procedure scheduled on 7/29. I advised we can reschedule until tomorrow. She agreed with that plan.

## 2022-07-20 NOTE — TELEPHONE ENCOUNTER
----- Message from Albino Mercedes sent at 7/20/2022  7:40 AM CDT -----  Regarding: reschedule appointment  Contact: Caregiver, Shelbi Mario want to speak with a nurse regarding rescheduling appointment, please call back at [phone]

## 2022-07-21 ENCOUNTER — CLINICAL SUPPORT (OUTPATIENT)
Dept: UROLOGY | Facility: CLINIC | Age: 62
End: 2022-07-21
Payer: MEDICAID

## 2022-07-21 ENCOUNTER — OFFICE VISIT (OUTPATIENT)
Dept: PULMONOLOGY | Facility: CLINIC | Age: 62
End: 2022-07-21
Payer: MEDICAID

## 2022-07-21 VITALS
BODY MASS INDEX: 31.32 KG/M2 | DIASTOLIC BLOOD PRESSURE: 57 MMHG | HEIGHT: 67 IN | OXYGEN SATURATION: 96 % | HEART RATE: 92 BPM | SYSTOLIC BLOOD PRESSURE: 78 MMHG

## 2022-07-21 DIAGNOSIS — R91.8 OTHER NONSPECIFIC ABNORMAL FINDING OF LUNG FIELD: Primary | ICD-10-CM

## 2022-07-21 DIAGNOSIS — R91.8 MULTIPLE NODULES OF LUNG: ICD-10-CM

## 2022-07-21 DIAGNOSIS — N22 CALCULUS OF URINARY TRACT IN DISEASES CLASSIFIED ELSEWHERE: ICD-10-CM

## 2022-07-21 DIAGNOSIS — J44.9 CHRONIC OBSTRUCTIVE PULMONARY DISEASE, UNSPECIFIED COPD TYPE: ICD-10-CM

## 2022-07-21 PROCEDURE — 1159F PR MEDICATION LIST DOCUMENTED IN MEDICAL RECORD: ICD-10-PCS | Mod: CPTII,,, | Performed by: NURSE PRACTITIONER

## 2022-07-21 PROCEDURE — 3044F HG A1C LEVEL LT 7.0%: CPT | Mod: CPTII,,, | Performed by: NURSE PRACTITIONER

## 2022-07-21 PROCEDURE — 3078F PR MOST RECENT DIASTOLIC BLOOD PRESSURE < 80 MM HG: ICD-10-PCS | Mod: CPTII,,, | Performed by: NURSE PRACTITIONER

## 2022-07-21 PROCEDURE — 87086 URINE CULTURE/COLONY COUNT: CPT | Performed by: STUDENT IN AN ORGANIZED HEALTH CARE EDUCATION/TRAINING PROGRAM

## 2022-07-21 PROCEDURE — 3044F PR MOST RECENT HEMOGLOBIN A1C LEVEL <7.0%: ICD-10-PCS | Mod: CPTII,,, | Performed by: NURSE PRACTITIONER

## 2022-07-21 PROCEDURE — 3074F SYST BP LT 130 MM HG: CPT | Mod: CPTII,,, | Performed by: NURSE PRACTITIONER

## 2022-07-21 PROCEDURE — 99499 NO LOS: ICD-10-PCS | Mod: S$PBB,,, | Performed by: STUDENT IN AN ORGANIZED HEALTH CARE EDUCATION/TRAINING PROGRAM

## 2022-07-21 PROCEDURE — 87088 URINE BACTERIA CULTURE: CPT | Performed by: STUDENT IN AN ORGANIZED HEALTH CARE EDUCATION/TRAINING PROGRAM

## 2022-07-21 PROCEDURE — 87077 CULTURE AEROBIC IDENTIFY: CPT | Performed by: STUDENT IN AN ORGANIZED HEALTH CARE EDUCATION/TRAINING PROGRAM

## 2022-07-21 PROCEDURE — 87186 SC STD MICRODIL/AGAR DIL: CPT | Performed by: STUDENT IN AN ORGANIZED HEALTH CARE EDUCATION/TRAINING PROGRAM

## 2022-07-21 PROCEDURE — 1159F MED LIST DOCD IN RCRD: CPT | Mod: CPTII,,, | Performed by: NURSE PRACTITIONER

## 2022-07-21 PROCEDURE — 99215 OFFICE O/P EST HI 40 MIN: CPT | Mod: PBBFAC,PO | Performed by: NURSE PRACTITIONER

## 2022-07-21 PROCEDURE — 1160F PR REVIEW ALL MEDS BY PRESCRIBER/CLIN PHARMACIST DOCUMENTED: ICD-10-PCS | Mod: CPTII,,, | Performed by: NURSE PRACTITIONER

## 2022-07-21 PROCEDURE — 3074F PR MOST RECENT SYSTOLIC BLOOD PRESSURE < 130 MM HG: ICD-10-PCS | Mod: CPTII,,, | Performed by: NURSE PRACTITIONER

## 2022-07-21 PROCEDURE — 1160F RVW MEDS BY RX/DR IN RCRD: CPT | Mod: CPTII,,, | Performed by: NURSE PRACTITIONER

## 2022-07-21 PROCEDURE — 99999 PR PBB SHADOW E&M-EST. PATIENT-LVL V: CPT | Mod: PBBFAC,,, | Performed by: NURSE PRACTITIONER

## 2022-07-21 PROCEDURE — 99499 UNLISTED E&M SERVICE: CPT | Mod: S$PBB,,, | Performed by: STUDENT IN AN ORGANIZED HEALTH CARE EDUCATION/TRAINING PROGRAM

## 2022-07-21 PROCEDURE — 99999 PR PBB SHADOW E&M-EST. PATIENT-LVL V: ICD-10-PCS | Mod: PBBFAC,,, | Performed by: NURSE PRACTITIONER

## 2022-07-21 PROCEDURE — 3078F DIAST BP <80 MM HG: CPT | Mod: CPTII,,, | Performed by: NURSE PRACTITIONER

## 2022-07-21 PROCEDURE — 99204 OFFICE O/P NEW MOD 45 MIN: CPT | Mod: S$PBB,,, | Performed by: NURSE PRACTITIONER

## 2022-07-21 PROCEDURE — 3008F BODY MASS INDEX DOCD: CPT | Mod: CPTII,,, | Performed by: NURSE PRACTITIONER

## 2022-07-21 PROCEDURE — 3008F PR BODY MASS INDEX (BMI) DOCUMENTED: ICD-10-PCS | Mod: CPTII,,, | Performed by: NURSE PRACTITIONER

## 2022-07-21 PROCEDURE — 99204 PR OFFICE/OUTPT VISIT, NEW, LEVL IV, 45-59 MIN: ICD-10-PCS | Mod: S$PBB,,, | Performed by: NURSE PRACTITIONER

## 2022-07-21 RX ORDER — ALBUTEROL SULFATE 90 UG/1
2 AEROSOL, METERED RESPIRATORY (INHALATION) EVERY 4 HOURS PRN
Qty: 18 G | Refills: 11 | Status: SHIPPED | OUTPATIENT
Start: 2022-07-21

## 2022-07-21 NOTE — PROGRESS NOTES
7/21/2022    James Jiang  New Patient Consult    Chief Complaint   Patient presents with    surgical clearance    Shortness of Breath    Chest Pain       HPI:  Addendum 8/2/2022 insurance denied PET scan, Abdominal CT not sufficient to evaluate lung nodules with due CT chest. EBUS or CT biopsy if possible to follow.      7/21/2022- in office with girl friend Clau, followed by Dr. Rodriguez for left renal mass pending pulmonary clearance for nephrectomy. CT chest shows several small nodules with mediastinal lymph node involvement. Also followed by oncologist Dr. Mccarty.   Complaint of Shortness of breath, onset few years, no change with time, tried albuterol years earlier with benefit. Worse with exertion getting out of bed. Improves with rest.   No complaint of cough, chest tightness, or wheeze.     Social Hx:  Lives with GF and pet dog, retired [employer] business, no known Asbestosis exposure, Smoking Hx: currently smoking 1 pack daily, 54 pack years  Family Hx: no Lung Cancer, no COPD, no Asthma  Medical Hx: previous pneumonia 2021 hospitalized not intubated ; no previous shoulder/chest surgery          The chief compliant  problem is new to me  PFSH:  Past Medical History:   Diagnosis Date    Diabetes mellitus     Diabetic ketoacidosis without coma associated with type 2 diabetes mellitus 10/9/2021    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    Hypertension          Past Surgical History:   Procedure Laterality Date    denies pertinent surgical history      INTRAMEDULLARY RODDING OF FEMUR Right 11/7/2021    Procedure: INSERTION, INTRAMEDULLARY HENNA, FEMUR;  Surgeon: Matt Milian MD;  Location: [facility] OR;  Service: Orthopedics;  Laterality: Right;    TOE AMPUTATION Right 10/12/2021    Procedure: AMPUTATION, TOE;  Surgeon: Milton Lauren DPM;  Location: [facility] OR;  Service: Podiatry;  Laterality: Right;     Social History     Tobacco Use    Smoking status: Former Smoker    Smokeless  "tobacco: Never Used   Substance Use Topics    Alcohol use: Not Currently    Drug use: Not Currently     Family History   Problem Relation Age of Onset    Hypertension Father      Review of patient's allergies indicates:  No Known Allergies  I have reviewed past medical, family, and social history. I have reviewed previous nurse notes.    Performance Status:The patient's activity level is mobility with asit devices: uses walker or wheelchair         Review of Systems   Constitutional: Negative for activity change, appetite change, chills, diaphoresis, fever and unexpected weight change. Positive for fatigue  HENT: Negative for dental problem, postnasal drip, rhinorrhea, sinus pressure, sinus pain, sneezing, sore throat, trouble swallowing and voice change.  Positive for sinus drainage  Respiratory: Negative for apnea, cough, chest tightness, shortness of breath, wheezing and stridor.    Cardiovascular: Negative for palpitations and leg swelling. Positive for chest pain   Gastrointestinal: Negative for abdominal distention, abdominal pain, constipation and nausea.   Musculoskeletal: Negative for myalgias and neck pain. Positive for gait problem  Skin: Negative for color change. Positive pallor  Allergic/Immunologic: Negative for environmental allergies and food allergies.   Neurological: Negative for dizziness, speech difficulty, weakness, light-headedness, and headaches. Positive for weakness and numbness  Hematological: Negative for adenopathy. Does not bruise/bleed easily.   Psychiatric/Behavioral: Negative for dysphoric mood. The patient is nervous/anxious.  Positive for sleep disturbance         Exam:Comprehensive exam done. BP (!) 78/57 (BP Location: Right arm, Patient Position: Sitting, BP Method: Medium (Automatic))   Pulse 92   Ht 5' 7" (1.702 m)   SpO2 96% Comment: on room air at rest  BMI 31.32 kg/m²   Exam included Vitals as listed  Constitutional: He is oriented to person, place, and time. No " distress.  Nose: Nose normal.   Mouth/Throat: Uvula is midline, oropharynx is clear and moist and mucous membranes are normal. No dental caries. No oropharyngeal exudate, posterior oropharyngeal edema, posterior oropharyngeal erythema or tonsillar abscesses.  Mallapatti (M) score  Eyes: Pupils are equal, round, and reactive to light.   Neck: No JVD present. No thyromegaly present.   Cardiovascular: Normal rate, regular rhythm and normal heart sounds. Exam reveals no gallop and no friction rub.   No murmur heard.  Pulmonary/Chest: Effort normal and breath sounds diminished. No accessory muscle usage or stridor.   Abdominal: Soft. Distended abdomen. He exhibits no mass. There is no tenderness. No hepatosplenomegaly, hernias and normoactive bowel sounds  Musculoskeletal: Normal range of motion. exhibits no edema.   Lymphadenopathy:     He has no cervical adenopathy.     He has no axillary adenopathy.   Neurological:  alert and oriented to person, place, and time. not disoriented.   Skin: Skin is cool and dry. Capillary refill takes less 2 sec. Pallor, blue tint to lips. Nails show no clubbing.   Psychiatric: normal mood and affect. behavior is normal. Judgment and thought content normal.       Radiographs (ct chest and cxr) reviewed: results reviewed   CT Chest Abdomen Pelvis Without Contrast 07/12/2022   Known left renal mass   Several pulmonary nodules and mediastinal adenopathy.  Findings are indeterminate as to the possibility of metastatic disease and warrant continued imaging follow-up.  3. Pulmonary emphysema and UIP type interstitial fibrosis.          Labs reviewed       Lab Results   Component Value Date    WBC 12.16 07/12/2022    RBC 4.47 (L) 07/12/2022    HGB 13.0 (L) 07/12/2022    HCT 41.7 07/12/2022    MCV 93 07/12/2022    MCH 29.1 07/12/2022    MCHC 31.2 (L) 07/12/2022    RDW 13.9 07/12/2022     07/12/2022    MPV 9.5 07/12/2022    GRAN 8.4 (H) 07/12/2022    GRAN 69.0 07/12/2022    LYMPH 2.3  07/12/2022    LYMPH 19.2 07/12/2022    MONO 1.1 (H) 07/12/2022    MONO 8.9 07/12/2022    EOS 0.2 07/12/2022    BASO 0.09 07/12/2022    EOSINOPHIL 2.0 07/12/2022    BASOPHIL 0.7 07/12/2022       PFT will be done and results to be reviewed  Pulmonary Functions Testing Results:        Plan:  Clinical impression is ambiguous and will need repeated evaluation wrt nature of lung nodules. Will proceed with PET scan, if PET positive will proceed with EBUS for tissue diagnosis.    James was seen today for surgical clearance, shortness of breath and chest pain.    Diagnoses and all orders for this visit:    Other nonspecific abnormal finding of lung field  -     NM PET CT Routine Skull to Mid Thigh; Future    Multiple nodules of lung  -     NM PET CT Routine Skull to Mid Thigh; Future    Chronic obstructive pulmonary disease, unspecified COPD type  -     Complete PFT with bronchodilator; Future  -     fluticasone-umeclidin-vilanter (TRELEGY ELLIPTA) 100-62.5-25 mcg DsDv; Inhale 1 puff into the lungs once daily.  -     albuterol (VENTOLIN HFA) 90 mcg/actuation inhaler; Inhale 2 puffs into the lungs every 4 (four) hours as needed for Wheezing or Shortness of Breath. Rescue        Follow up in about 4 weeks (around 8/18/2022), or if symptoms worsen or fail to improve.    Discussed with patient above for education the following:      Patient Instructions   PET scan and lung function test    If PET is positive will proceed with EBUS procedure    Start new medication Trelegy 100 1 puff once a day every day, rinse mouth after using due to risk for thrush if mouth or tongue has white sores contact clinic      Albuterol Inhaler 1-2 puffs every 4 hours, for cough or shortness of breath

## 2022-07-21 NOTE — PATIENT INSTRUCTIONS
PET scan and lung function test    If PET is positive will proceed with EBUS procedure    Start new medication Trelegy 100 1 puff once a day every day, rinse mouth after using due to risk for thrush if mouth or tongue has white sores contact clinic      Albuterol Inhaler 1-2 puffs every 4 hours, for cough or shortness of breath

## 2022-07-21 NOTE — PROGRESS NOTES
Patient arrived to clinic to have urine collected for urine culture from blanco catheter. Blanco clamped, cloudy yellow urine to specimen cup. Specimen prepared for lab .

## 2022-07-24 LAB — BACTERIA UR CULT: ABNORMAL

## 2022-07-25 RX ORDER — SULFAMETHOXAZOLE AND TRIMETHOPRIM 800; 160 MG/1; MG/1
1 TABLET ORAL 2 TIMES DAILY
Qty: 28 TABLET | Refills: 0 | Status: SHIPPED | OUTPATIENT
Start: 2022-07-25 | End: 2022-08-08

## 2022-07-28 ENCOUNTER — ANESTHESIA EVENT (OUTPATIENT)
Dept: SURGERY | Facility: HOSPITAL | Age: 62
End: 2022-07-28
Payer: MEDICAID

## 2022-07-29 ENCOUNTER — ANESTHESIA (OUTPATIENT)
Dept: SURGERY | Facility: HOSPITAL | Age: 62
End: 2022-07-29
Payer: MEDICAID

## 2022-07-29 ENCOUNTER — HOSPITAL ENCOUNTER (OUTPATIENT)
Facility: HOSPITAL | Age: 62
Discharge: HOME | End: 2022-07-29
Attending: STUDENT IN AN ORGANIZED HEALTH CARE EDUCATION/TRAINING PROGRAM | Admitting: STUDENT IN AN ORGANIZED HEALTH CARE EDUCATION/TRAINING PROGRAM
Payer: MEDICAID

## 2022-07-29 ENCOUNTER — TELEPHONE (OUTPATIENT)
Dept: UROLOGY | Facility: CLINIC | Age: 62
End: 2022-07-29
Payer: MEDICAID

## 2022-07-29 VITALS
TEMPERATURE: 98 F | DIASTOLIC BLOOD PRESSURE: 55 MMHG | HEART RATE: 90 BPM | BODY MASS INDEX: 31.39 KG/M2 | RESPIRATION RATE: 16 BRPM | WEIGHT: 200 LBS | OXYGEN SATURATION: 96 % | SYSTOLIC BLOOD PRESSURE: 99 MMHG | HEIGHT: 67 IN

## 2022-07-29 DIAGNOSIS — N22 CALCULUS OF URINARY TRACT IN DISEASES CLASSIFIED ELSEWHERE: Primary | ICD-10-CM

## 2022-07-29 DIAGNOSIS — N20.1 URETERAL STONE: ICD-10-CM

## 2022-07-29 DIAGNOSIS — N28.89 LEFT RENAL MASS: ICD-10-CM

## 2022-07-29 DIAGNOSIS — Z01.810 PREOP CARDIOVASCULAR EXAM: ICD-10-CM

## 2022-07-29 LAB — POCT GLUCOSE: 145 MG/DL (ref 70–110)

## 2022-07-29 PROCEDURE — 82365 CALCULUS SPECTROSCOPY: CPT | Performed by: STUDENT IN AN ORGANIZED HEALTH CARE EDUCATION/TRAINING PROGRAM

## 2022-07-29 PROCEDURE — 71000039 HC RECOVERY, EACH ADD'L HOUR: Performed by: STUDENT IN AN ORGANIZED HEALTH CARE EDUCATION/TRAINING PROGRAM

## 2022-07-29 PROCEDURE — 63600175 PHARM REV CODE 636 W HCPCS: Performed by: STUDENT IN AN ORGANIZED HEALTH CARE EDUCATION/TRAINING PROGRAM

## 2022-07-29 PROCEDURE — D9220A PRA ANESTHESIA: ICD-10-PCS | Mod: ANES,,, | Performed by: ANESTHESIOLOGY

## 2022-07-29 PROCEDURE — D9220A PRA ANESTHESIA: ICD-10-PCS | Mod: CRNA,,, | Performed by: NURSE ANESTHETIST, CERTIFIED REGISTERED

## 2022-07-29 PROCEDURE — 52356 CYSTO/URETERO W/LITHOTRIPSY: CPT | Mod: LT,,, | Performed by: STUDENT IN AN ORGANIZED HEALTH CARE EDUCATION/TRAINING PROGRAM

## 2022-07-29 PROCEDURE — 63600175 PHARM REV CODE 636 W HCPCS: Performed by: ANESTHESIOLOGY

## 2022-07-29 PROCEDURE — 99900104 DSU ONLY-NO CHARGE-EA ADD'L HR (STAT): Performed by: STUDENT IN AN ORGANIZED HEALTH CARE EDUCATION/TRAINING PROGRAM

## 2022-07-29 PROCEDURE — 37000009 HC ANESTHESIA EA ADD 15 MINS: Performed by: STUDENT IN AN ORGANIZED HEALTH CARE EDUCATION/TRAINING PROGRAM

## 2022-07-29 PROCEDURE — C1758 CATHETER, URETERAL: HCPCS | Performed by: STUDENT IN AN ORGANIZED HEALTH CARE EDUCATION/TRAINING PROGRAM

## 2022-07-29 PROCEDURE — 74420 UROGRAPHY RTRGR +-KUB: CPT | Mod: 26,,, | Performed by: STUDENT IN AN ORGANIZED HEALTH CARE EDUCATION/TRAINING PROGRAM

## 2022-07-29 PROCEDURE — 27200651 HC AIRWAY, LMA: Performed by: ANESTHESIOLOGY

## 2022-07-29 PROCEDURE — C1769 GUIDE WIRE: HCPCS | Performed by: STUDENT IN AN ORGANIZED HEALTH CARE EDUCATION/TRAINING PROGRAM

## 2022-07-29 PROCEDURE — 63600175 PHARM REV CODE 636 W HCPCS: Performed by: NURSE ANESTHETIST, CERTIFIED REGISTERED

## 2022-07-29 PROCEDURE — 71000015 HC POSTOP RECOV 1ST HR: Performed by: STUDENT IN AN ORGANIZED HEALTH CARE EDUCATION/TRAINING PROGRAM

## 2022-07-29 PROCEDURE — 71000033 HC RECOVERY, INTIAL HOUR: Performed by: STUDENT IN AN ORGANIZED HEALTH CARE EDUCATION/TRAINING PROGRAM

## 2022-07-29 PROCEDURE — 36000707: Performed by: STUDENT IN AN ORGANIZED HEALTH CARE EDUCATION/TRAINING PROGRAM

## 2022-07-29 PROCEDURE — 25000003 PHARM REV CODE 250: Performed by: ANESTHESIOLOGY

## 2022-07-29 PROCEDURE — 00862 ANES XTRPRTL LWR ABD RNL PX: CPT | Performed by: STUDENT IN AN ORGANIZED HEALTH CARE EDUCATION/TRAINING PROGRAM

## 2022-07-29 PROCEDURE — 93005 ELECTROCARDIOGRAM TRACING: CPT

## 2022-07-29 PROCEDURE — 25500020 PHARM REV CODE 255: Performed by: STUDENT IN AN ORGANIZED HEALTH CARE EDUCATION/TRAINING PROGRAM

## 2022-07-29 PROCEDURE — D9220A PRA ANESTHESIA: Mod: CRNA,,, | Performed by: NURSE ANESTHETIST, CERTIFIED REGISTERED

## 2022-07-29 PROCEDURE — 27201423 OPTIME MED/SURG SUP & DEVICES STERILE SUPPLY: Performed by: STUDENT IN AN ORGANIZED HEALTH CARE EDUCATION/TRAINING PROGRAM

## 2022-07-29 PROCEDURE — 74420 PR  X-RAY RETROGRADE PYELOGRAM: ICD-10-PCS | Mod: 26,,, | Performed by: STUDENT IN AN ORGANIZED HEALTH CARE EDUCATION/TRAINING PROGRAM

## 2022-07-29 PROCEDURE — 93010 EKG 12-LEAD: ICD-10-PCS | Mod: ,,, | Performed by: SPECIALIST

## 2022-07-29 PROCEDURE — 37000008 HC ANESTHESIA 1ST 15 MINUTES: Performed by: STUDENT IN AN ORGANIZED HEALTH CARE EDUCATION/TRAINING PROGRAM

## 2022-07-29 PROCEDURE — 93010 ELECTROCARDIOGRAM REPORT: CPT | Mod: ,,, | Performed by: SPECIALIST

## 2022-07-29 PROCEDURE — C2617 STENT, NON-COR, TEM W/O DEL: HCPCS | Performed by: STUDENT IN AN ORGANIZED HEALTH CARE EDUCATION/TRAINING PROGRAM

## 2022-07-29 PROCEDURE — 36000706: Performed by: STUDENT IN AN ORGANIZED HEALTH CARE EDUCATION/TRAINING PROGRAM

## 2022-07-29 PROCEDURE — 25000003 PHARM REV CODE 250: Performed by: STUDENT IN AN ORGANIZED HEALTH CARE EDUCATION/TRAINING PROGRAM

## 2022-07-29 PROCEDURE — 52356 PR CYSTO/URETERO W/LITHOTRIPSY: ICD-10-PCS | Mod: LT,,, | Performed by: STUDENT IN AN ORGANIZED HEALTH CARE EDUCATION/TRAINING PROGRAM

## 2022-07-29 PROCEDURE — 25000003 PHARM REV CODE 250: Performed by: NURSE ANESTHETIST, CERTIFIED REGISTERED

## 2022-07-29 PROCEDURE — 99900103 DSU ONLY-NO CHARGE-INITIAL HR (STAT): Performed by: STUDENT IN AN ORGANIZED HEALTH CARE EDUCATION/TRAINING PROGRAM

## 2022-07-29 PROCEDURE — D9220A PRA ANESTHESIA: Mod: ANES,,, | Performed by: ANESTHESIOLOGY

## 2022-07-29 DEVICE — STENT SET URETERAL 6X28CM: Type: IMPLANTABLE DEVICE | Site: URETER | Status: FUNCTIONAL

## 2022-07-29 RX ORDER — ONDANSETRON 4 MG/1
8 TABLET, ORALLY DISINTEGRATING ORAL EVERY 8 HOURS PRN
Status: DISCONTINUED | OUTPATIENT
Start: 2022-07-29 | End: 2022-07-29 | Stop reason: HOSPADM

## 2022-07-29 RX ORDER — PROPOFOL 10 MG/ML
VIAL (ML) INTRAVENOUS
Status: DISCONTINUED | OUTPATIENT
Start: 2022-07-29 | End: 2022-07-29

## 2022-07-29 RX ORDER — MIDAZOLAM HYDROCHLORIDE 1 MG/ML
INJECTION INTRAMUSCULAR; INTRAVENOUS
Status: DISCONTINUED | OUTPATIENT
Start: 2022-07-29 | End: 2022-07-29

## 2022-07-29 RX ORDER — DIPHENHYDRAMINE HYDROCHLORIDE 50 MG/ML
25 INJECTION INTRAMUSCULAR; INTRAVENOUS ONCE
Status: COMPLETED | OUTPATIENT
Start: 2022-07-29 | End: 2022-07-29

## 2022-07-29 RX ORDER — VASOPRESSIN 20 [USP'U]/ML
INJECTION, SOLUTION INTRAMUSCULAR; SUBCUTANEOUS
Status: DISCONTINUED | OUTPATIENT
Start: 2022-07-29 | End: 2022-07-29

## 2022-07-29 RX ORDER — FENTANYL CITRATE 50 UG/ML
INJECTION, SOLUTION INTRAMUSCULAR; INTRAVENOUS
Status: DISCONTINUED | OUTPATIENT
Start: 2022-07-29 | End: 2022-07-29

## 2022-07-29 RX ORDER — LIDOCAINE HCL/PF 100 MG/5ML
SYRINGE (ML) INTRAVENOUS
Status: DISCONTINUED | OUTPATIENT
Start: 2022-07-29 | End: 2022-07-29

## 2022-07-29 RX ORDER — DEXAMETHASONE SODIUM PHOSPHATE 4 MG/ML
INJECTION, SOLUTION INTRA-ARTICULAR; INTRALESIONAL; INTRAMUSCULAR; INTRAVENOUS; SOFT TISSUE
Status: DISCONTINUED | OUTPATIENT
Start: 2022-07-29 | End: 2022-07-29

## 2022-07-29 RX ORDER — CEFAZOLIN SODIUM 2 G/50ML
2 SOLUTION INTRAVENOUS
Status: COMPLETED | OUTPATIENT
Start: 2022-07-29 | End: 2022-07-29

## 2022-07-29 RX ORDER — OXYCODONE AND ACETAMINOPHEN 5; 325 MG/1; MG/1
1 TABLET ORAL EVERY 4 HOURS PRN
Qty: 5 TABLET | Refills: 0 | Status: ON HOLD | OUTPATIENT
Start: 2022-07-29 | End: 2022-11-22 | Stop reason: HOSPADM

## 2022-07-29 RX ORDER — ONDANSETRON HYDROCHLORIDE 2 MG/ML
INJECTION, SOLUTION INTRAMUSCULAR; INTRAVENOUS
Status: DISCONTINUED | OUTPATIENT
Start: 2022-07-29 | End: 2022-07-29

## 2022-07-29 RX ORDER — METOCLOPRAMIDE HYDROCHLORIDE 5 MG/ML
10 INJECTION INTRAMUSCULAR; INTRAVENOUS EVERY 10 MIN PRN
Status: DISCONTINUED | OUTPATIENT
Start: 2022-07-29 | End: 2022-11-18

## 2022-07-29 RX ORDER — PHENYLEPHRINE HYDROCHLORIDE 10 MG/ML
INJECTION INTRAVENOUS
Status: DISCONTINUED | OUTPATIENT
Start: 2022-07-29 | End: 2022-07-29

## 2022-07-29 RX ORDER — VANCOMYCIN HCL IN 5 % DEXTROSE 1G/250ML
1000 PLASTIC BAG, INJECTION (ML) INTRAVENOUS
Status: COMPLETED | OUTPATIENT
Start: 2022-07-29 | End: 2022-07-29

## 2022-07-29 RX ORDER — HYDROCODONE BITARTRATE AND ACETAMINOPHEN 5; 325 MG/1; MG/1
1 TABLET ORAL EVERY 4 HOURS PRN
Status: DISCONTINUED | OUTPATIENT
Start: 2022-07-29 | End: 2022-07-29 | Stop reason: HOSPADM

## 2022-07-29 RX ORDER — FENTANYL CITRATE 50 UG/ML
25 INJECTION, SOLUTION INTRAMUSCULAR; INTRAVENOUS EVERY 5 MIN PRN
Status: DISCONTINUED | OUTPATIENT
Start: 2022-07-29 | End: 2022-11-18

## 2022-07-29 RX ORDER — LIDOCAINE HYDROCHLORIDE 10 MG/ML
1 INJECTION, SOLUTION EPIDURAL; INFILTRATION; INTRACAUDAL; PERINEURAL ONCE
Status: COMPLETED | OUTPATIENT
Start: 2022-07-29 | End: 2022-07-29

## 2022-07-29 RX ORDER — OXYCODONE HYDROCHLORIDE 5 MG/1
5 TABLET ORAL
Status: DISCONTINUED | OUTPATIENT
Start: 2022-07-29 | End: 2022-11-18

## 2022-07-29 RX ADMIN — DIPHENHYDRAMINE HYDROCHLORIDE 25 MG: 50 INJECTION, SOLUTION INTRAMUSCULAR; INTRAVENOUS at 10:07

## 2022-07-29 RX ADMIN — PHENYLEPHRINE HYDROCHLORIDE 100 MCG: 10 INJECTION INTRAVENOUS at 12:07

## 2022-07-29 RX ADMIN — PHENYLEPHRINE HYDROCHLORIDE 200 MCG: 10 INJECTION INTRAVENOUS at 11:07

## 2022-07-29 RX ADMIN — SODIUM CHLORIDE, SODIUM GLUCONATE, SODIUM ACETATE, POTASSIUM CHLORIDE, MAGNESIUM CHLORIDE, SODIUM PHOSPHATE, DIBASIC, AND POTASSIUM PHOSPHATE: .53; .5; .37; .037; .03; .012; .00082 INJECTION, SOLUTION INTRAVENOUS at 09:07

## 2022-07-29 RX ADMIN — SODIUM CHLORIDE, SODIUM GLUCONATE, SODIUM ACETATE, POTASSIUM CHLORIDE, MAGNESIUM CHLORIDE, SODIUM PHOSPHATE, DIBASIC, AND POTASSIUM PHOSPHATE: .53; .5; .37; .037; .03; .012; .00082 INJECTION, SOLUTION INTRAVENOUS at 12:07

## 2022-07-29 RX ADMIN — PHENYLEPHRINE HYDROCHLORIDE 200 MCG: 10 INJECTION INTRAVENOUS at 12:07

## 2022-07-29 RX ADMIN — PROPOFOL 50 MG: 10 INJECTION, EMULSION INTRAVENOUS at 11:07

## 2022-07-29 RX ADMIN — VASOPRESSIN 1 UNITS: 20 INJECTION, SOLUTION INTRAMUSCULAR; SUBCUTANEOUS at 12:07

## 2022-07-29 RX ADMIN — CEFAZOLIN SODIUM 2 G: 2 SOLUTION INTRAVENOUS at 12:07

## 2022-07-29 RX ADMIN — GLYCOPYRROLATE 0.1 MG: 0.2 INJECTION, SOLUTION INTRAMUSCULAR; INTRAVITREAL at 11:07

## 2022-07-29 RX ADMIN — VANCOMYCIN HYDROCHLORIDE 1000 MG: 1 INJECTION, POWDER, LYOPHILIZED, FOR SOLUTION INTRAVENOUS at 09:07

## 2022-07-29 RX ADMIN — DEXAMETHASONE SODIUM PHOSPHATE 4 MG: 4 INJECTION, SOLUTION INTRA-ARTICULAR; INTRALESIONAL; INTRAMUSCULAR; INTRAVENOUS; SOFT TISSUE at 12:07

## 2022-07-29 RX ADMIN — MIDAZOLAM HYDROCHLORIDE 1 MG: 1 INJECTION, SOLUTION INTRAMUSCULAR; INTRAVENOUS at 11:07

## 2022-07-29 RX ADMIN — LIDOCAINE HYDROCHLORIDE 50 MG: 20 INJECTION INTRAVENOUS at 11:07

## 2022-07-29 RX ADMIN — FENTANYL CITRATE 50 MCG: 50 INJECTION, SOLUTION INTRAMUSCULAR; INTRAVENOUS at 11:07

## 2022-07-29 RX ADMIN — LIDOCAINE HYDROCHLORIDE 10 MG: 10 INJECTION, SOLUTION EPIDURAL; INFILTRATION; INTRACAUDAL; PERINEURAL at 09:07

## 2022-07-29 RX ADMIN — ONDANSETRON 4 MG: 2 INJECTION, SOLUTION INTRAMUSCULAR; INTRAVENOUS at 12:07

## 2022-07-29 NOTE — DISCHARGE INSTRUCTIONS
"Discharge Instructions: After Your Surgery/Procedure  Youve just had surgery. During surgery you were given medicine called anesthesia to keep you relaxed and free of pain. After surgery you may have some pain or nausea. This is common. Here are some tips for feeling better and getting well after surgery.     Stay on schedule with your medication.   Going home  Your doctor or nurse will show you how to take care of yourself when you go home. He or she will also answer your questions. Have an adult family member or friend drive you home.      For your safety we recommend these precaution for the first 24 hours after your procedure:  Do not drive or use heavy equipment.  Do not make important decisions or sign legal papers.  Do not drink alcohol.  Have someone stay with you, if needed. He or she can watch for problems and help keep you safe.  Your concentration, balance, coordination, and judgement may be impaired for many hours after anesthesia.  Use caution when ambulating or standing up.     You may feel weak and "washed out" after anesthesia and surgery.      Subtle residual effects of general anesthesia or sedation with regional / local anesthesia can last more than 24 hours.  Rest for the remainder of the day or longer if your Doctor/Surgeon has advised you to do so.  Although you may feel normal within the first 24 hours, your reflexes and mental ability may be impaired without you realizing it.  You may feel dizzy, lightheaded or sleepy for 24 hours or longer.      Be sure to go to all follow-up visits with your doctor. And rest after your surgery for as long as your doctor tells you to.  Coping with pain  If you have pain after surgery, pain medicine will help you feel better. Take it as told, before pain becomes severe. Also, ask your doctor or pharmacist about other ways to control pain. This might be with heat, ice, or relaxation. And follow any other instructions your surgeon or nurse gives you.  Tips " for taking pain medicine  To get the best relief possible, remember these points:  Pain medicines can upset your stomach. Taking them with a little food may help.  Most pain relievers taken by mouth need at least 20 to 30 minutes to start to work.  Taking medicine on a schedule can help you remember to take it. Try to time your medicine so that you can take it before starting an activity. This might be before you get dressed, go for a walk, or sit down for dinner.  Constipation is a common side effect of pain medicines. Call your doctor before taking any medicines such as laxatives or stool softeners to help ease constipation. Also ask if you should skip any foods. Drinking lots of fluids and eating foods such as fruits and vegetables that are high in fiber can also help. Remember, do not take laxatives unless your surgeon has prescribed them.  Drinking alcohol and taking pain medicine can cause dizziness and slow your breathing. It can even be deadly. Do not drink alcohol while taking pain medicine.  Pain medicine can make you react more slowly to things. Do not drive or run machinery while taking pain medicine.  Your health care provider may tell you to take acetaminophen to help ease your pain. Ask him or her how much you are supposed to take each day. Acetaminophen or other pain relievers may interact with your prescription medicines or other over-the-counter (OTC) drugs. Some prescription medicines have acetaminophen and other ingredients. Using both prescription and OTC acetaminophen for pain can cause you to overdose. Read the labels on your OTC medicines with care. This will help you to clearly know the list of ingredients, how much to take, and any warnings. It may also help you not take too much acetaminophen. If you have questions or do not understand the information, ask your pharmacist or health care provider to explain it to you before you take the OTC medicine.  Managing nausea  Some people have an  upset stomach after surgery. This is often because of anesthesia, pain, or pain medicine, or the stress of surgery. These tips will help you handle nausea and eat healthy foods as you get better. If you were on a special food plan before surgery, ask your doctor if you should follow it while you get better. These tips may help:  Do not push yourself to eat. Your body will tell you when to eat and how much.  Start off with clear liquids and soup. They are easier to digest.  Next try semi-solid foods, such as mashed potatoes, applesauce, and gelatin, as you feel ready.  Slowly move to solid foods. Dont eat fatty, rich, or spicy foods at first.  Do not force yourself to have 3 large meals a day. Instead eat smaller amounts more often.  Take pain medicines with a small amount of solid food, such as crackers or toast, to avoid nausea.     Call your surgeon if  You still have pain an hour after taking medicine. The medicine may not be strong enough.  You feel too sleepy, dizzy, or groggy. The medicine may be too strong.  You have side effects like nausea, vomiting, or skin changes, such as rash, itching, or hives.       If you have obstructive sleep apnea  You were given anesthesia medicine during surgery to keep you comfortable and free of pain. After surgery, you may have more apnea spells because of this medicine and other medicines you were given. The spells may last longer than usual.   At home:  Keep using the continuous positive airway pressure (CPAP) device when you sleep. Unless your health care provider tells you not to, use it when you sleep, day or night. CPAP is a common device used to treat obstructive sleep apnea.  Talk with your provider before taking any pain medicine, muscle relaxants, or sedatives. Your provider will tell you about the possible dangers of taking these medicines.  © 0135-4357 [employer]. [address] [place] [address], [place] [address]. All rights reserved. This information is  not intended as a substitute for professional medical care. Always follow your healthcare professional's instructions.     We hope your stay was comfortable as you heal now, mend and rest.    For we have enjoyed taking care of you by giving your our best.    And as you get better, by regaining your health and strength;   We count it as a privilege to have served you and hope your time at [facility] was well spent.      Thank  You!!!

## 2022-07-29 NOTE — ANESTHESIA PREPROCEDURE EVALUATION
07/29/2022  James Jiang is a 62 y.o., male.      Pre-op Assessment    I have reviewed the Patient Summary Reports.     I have reviewed the Nursing Notes. I have reviewed the NPO Status.   I have reviewed the Medications.     Review of Systems  Anesthesia Hx:  No problems with previous Anesthesia    Social:  Former Smoker    Cardiovascular:   Hypertension, well controlled Dysrhythmias atrial fibrillation    Pulmonary:   Pneumonia COPD, moderate Chronic respiratory failure   Renal/:   Chronic Renal Disease    Neurological:  Neurology Normal    Endocrine:   Diabetes, well controlled, type 2  Obesity / BMI > 30      Physical Exam  General: Well nourished, Cooperative, Alert and Oriented    Airway:  Mallampati: I   Mouth Opening: Normal  Neck ROM: Normal ROM        Anesthesia Plan  Type of Anesthesia, risks & benefits discussed:    Anesthesia Type: Gen ETT, Gen Supraglottic Airway, Gen Natural Airway, MAC  Intra-op Monitoring Plan: Standard ASA Monitors  Post Op Pain Control Plan: multimodal analgesia  Induction:  IV  Airway Plan: Direct, Video and Fiberoptic, Post-Induction  Informed Consent: Informed consent signed with the Patient and all parties understand the risks and agree with anesthesia plan.  All questions answered.   ASA Score: 3  Anesthesia Plan Notes: Hx of sepsis, MRSA, & cellulitis.  Pt acutely SOB and feeling unwell in pre-op 8-10 minutes after starting Vancomycin.  12 lead unremarkable.  Redness and SOB passed quickly but Pt c/o Headache and is refusing Vancomycin after some Benadryl.    Ready For Surgery From Anesthesia Perspective.     .

## 2022-07-29 NOTE — PROGRESS NOTES
"Patient refusing to have vancomycin restarted. Educated on need due to MRSA in urine. Patient states "I don't want to feel like that again, I don't want it". Dr. Rodriguez aware.  "

## 2022-07-29 NOTE — PROGRESS NOTES
Orders received to give 25 IV benadryl, wait 15 minutes and restart Vancomycin  at 80 ml/hr per Dr. Heaton.

## 2022-07-29 NOTE — PLAN OF CARE
Patient and friend given discharge instructions. Urology nurse visit August 2nd to have blanco exchanged and stent removed.  Prescription for Percocet sent to pharmacy.  Educated on post-anesthesia precautions, dressing care and when to notify MD. Verbalized understanding. Peripheral IV removed with catheter intact. Blanco draining light pink urine without clots. All belongings given back to patient. Patient transferred to car via wheelchair and left with friend to home.

## 2022-07-29 NOTE — TRANSFER OF CARE
"Anesthesia Transfer of Care Note    Patient: James Jiang    Procedure(s) Performed: Procedure(s) (LRB):  REMOVAL, CALCULUS, URETER, URETEROSCOPIC (Left)  CYSTOURETEROSCOPY, WITH RETROGRADE PYELOGRAM AND URETERAL STENT INSERTION (Left)  LITHOTRIPSY, USING LASER (Left)    Patient location: PACU    Anesthesia Type: general    Transport from OR: Transported from OR on 2-3 L/min O2 by NC with adequate spontaneous ventilation    Post pain: adequate analgesia    Post assessment: no apparent anesthetic complications and tolerated procedure well    Post vital signs: stable    Level of consciousness: sedated and responds to stimulation    Nausea/Vomiting: no nausea/vomiting    Complications: none    Transfer of care protocol was followed      Last vitals:   Visit Vitals  /70   Pulse 84   Temp 36.7 °C (98.1 °F) (Skin)   Resp 16   Ht 5' 7" (1.702 m)   Wt 90.7 kg (200 lb)   SpO2 (!) 94%   BMI 31.32 kg/m²     "

## 2022-07-29 NOTE — PROGRESS NOTES
Patient complaining of sudden onset of not feeling good. Face flushed, O2 sats decreasing to low 80's. Vancomycin stopped. Placed on 3 L O2. Notifed anesthesiologist Dr. Olguin.

## 2022-07-29 NOTE — TELEPHONE ENCOUNTER
----- Message from Raegan Rodriguez MD sent at 7/29/2022 12:51 PM CDT -----  Nurse visit on 8/2 for stent removal which his attached to his blanco and blanco exchange.   Has a pulm apt at 9, but can just come after that

## 2022-07-29 NOTE — ANESTHESIA POSTPROCEDURE EVALUATION
Anesthesia Post Evaluation    Patient: James Jiang    Procedure(s) Performed: Procedure(s) (LRB):  REMOVAL, CALCULUS, URETER, URETEROSCOPIC (Left)  CYSTOURETEROSCOPY, WITH RETROGRADE PYELOGRAM AND URETERAL STENT INSERTION (Left)  LITHOTRIPSY, USING LASER (Left)    Final Anesthesia Type: general      Patient location during evaluation: PACU  Patient participation: Yes- Able to Participate  Level of consciousness: awake and alert and oriented  Post-procedure vital signs: reviewed and stable  Pain management: adequate  Airway patency: patent    PONV status at discharge: No PONV  Anesthetic complications: no      Cardiovascular status: blood pressure returned to baseline and stable  Respiratory status: unassisted and spontaneous ventilation  Hydration status: euvolemic  Follow-up not needed.          Vitals Value Taken Time   BP 85/57 07/29/22 1306   Temp 36.7 °C (98.1 °F) 07/29/22 1250   Pulse 86 07/29/22 1310   Resp 20 07/29/22 1310   SpO2 99 % 07/29/22 1310   Vitals shown include unvalidated device data.      No case tracking events are documented in the log.      Pain/Ayaz Score: No data recorded

## 2022-07-29 NOTE — H&P
[facility] Note    PCP: [facility]    Chief Complaint: renal mass    SUBJECTIVE:       History of Present Illness:  James Jiang is a 62 y.o. male who presents to clinic for renal mass. He is Established  to our clinic.     Consult originally from February while admitted to [facility]:  Consult received for incidentally discovered 5 cm left renal mass.     Patient is currently admitted for infected wound following right hip surgery in November. He failed to follow up for suture removal. He has undergone irrigation and debridement of the wound.      Noted to have a UTI with culture growing MRSA. He underwent a renal US which noted a solid left renal mass.   He then underwent CTA which has confirmed a 4.7 cm renal mass concerning for malignancy. He is also noted to have severe PVD.      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 His hemoglobin A1c was 11 however per notes from PCP was reportedly 5.9 in May.     He failed to show up for apts twice.   He currently has an indwelling blanco which has been in place since October. States he has never been able to empty his bladder. He says he had a cystoscopy about 5 years ago and had what he says was a stricture that was opened. Getting blanco exchanged with home health. He is on flomax. He does not want the blanco removed.   No gross hematuria.   Currently smoking, 1.5 pack a day.     In May he had a repeat scan and at time it showed a stone in the left ureter. He was unaware of this and has not been addressed.     Last urine culture: MRSA (22)    Lab Results   Component Value Date    CREATININE 0.8 2022     Family  hx: father  of bladder cancer, no kidney or prostate cancer  Hx of a fib, COPD, bilateral  "PE, DM, obesity    Past medical, family, and social history reviewed as documented in chart with pertinent positive medical, family, and social history detailed in HPI.    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    Hypertension      Past Surgical History:   Procedure Laterality Date    denies pertinent surgical history      INTRAMEDULLARY RODDING OF FEMUR Right 11/7/2021    Procedure: INSERTION, INTRAMEDULLARY HENNA, FEMUR;  Surgeon: Matt Milian MD;  Location: [facility] OR;  Service: Orthopedics;  Laterality: Right;    TOE AMPUTATION Right 10/12/2021    Procedure: AMPUTATION, TOE;  Surgeon: Milton Lauren DPM;  Location: [facility] OR;  Service: Podiatry;  Laterality: Right;     Family History   Problem Relation Age of Onset    Hypertension Father      Social History     Tobacco Use    Smoking status: Former Smoker    Smokeless tobacco: Never Used   Substance Use Topics    Alcohol use: Not Currently    Drug use: Not Currently        Review of Systems   Genitourinary: Positive for difficulty urinating, frequency, nocturia and urgency. Negative for hematuria.   Musculoskeletal: Positive for back pain.       OBJECTIVE:     Anticoagulation:  Eliquis 5 mg     Estimated body mass index is 31.32 kg/m² as calculated from the following:    Height as of this encounter: 5' 7" (1.702 m).    Weight as of this encounter: 90.7 kg (200 lb).    Vital Signs (Most Recent)  Temp: 99.3 °F (37.4 °C) (07/29/22 0922)  Pulse: 85 (07/29/22 0922)  Resp: 16 (07/29/22 0922)  BP: 102/66 (07/29/22 0922)  SpO2: (!) 92 % (07/29/22 0922)    Physical Exam  Constitutional:       General: He is not in acute distress.     Appearance: He is ill-appearing. He is not toxic-appearing or diaphoretic.      Comments: In wheelchair "   HENT:      Head: Normocephalic and atraumatic.   Cardiovascular:      Rate and Rhythm: Normal rate and regular rhythm.   Pulmonary:      Effort: Pulmonary effort is normal. No respiratory distress.   Abdominal:      Palpations: Abdomen is soft.   Genitourinary:     Comments: Donis draining dark brown urine  Skin:     Coloration: Skin is not jaundiced.   Neurological:      General: No focal deficit present.   Psychiatric:         Mood and Affect: Mood normal.         Behavior: Behavior normal.         Thought Content: Thought content normal.         BMP  Lab Results   Component Value Date     07/12/2022    K 4.1 07/12/2022     07/12/2022    CO2 28 07/12/2022    BUN 17 07/12/2022    CREATININE 0.8 07/12/2022    CALCIUM 9.9 07/12/2022    ANIONGAP 11 07/12/2022    ESTGFRAFRICA >60 07/12/2022    EGFRNONAA >60 07/12/2022       Lab Results   Component Value Date    WBC 12.16 07/12/2022    HGB 13.0 (L) 07/12/2022    HCT 41.7 07/12/2022    MCV 93 07/12/2022     07/12/2022     Imaging:  Per HPI    ASSESSMENT     1. Left renal mass    2. Ureteral stone      PLAN:     - Patient continues to have left ureteral stone now at [facility]  - Had recent UTI for which he has been on abx  - Plan for stent today and treatment of UTI unless stone is readily accessible at UO  - Consent obtained     Raegan Rodriguez MD

## 2022-07-29 NOTE — PLAN OF CARE
Criteria met for transfer per anesthesia  Awake alert and oriented  No acute resp distress  Vs wnl and stable  Denies pain  IV patent  Stent strings secure to penis with tegaderm  Donis cath patent and draining light pink urine- no visible clots  Tolerating po with no PONV  Bedside report given to ANIYA Blanca

## 2022-07-29 NOTE — DISCHARGE SUMMARY
[facility]  Discharge Note  Short Stay    Admit Date: 7/29/2022    Discharge Date and Time: 07/29/2022 12:52 PM      Attending Physician: Raegan Rodriguez MD     Discharge Provider: Raegan Rodriguez    Diagnoses:  Active Hospital Problems    Diagnosis  POA    *Calculus of urinary tract in diseases classified elsewhere [N22]  Yes    Bilateral pulmonary embolism [I26.99]  Yes    Paroxysmal atrial fibrillation [I48.0]  Yes     Chronic    COPD (chronic obstructive pulmonary disease) [J44.9]  Yes    Atrial fibrillation with RVR [I48.91]  Yes    Class 2 obesity in adult [E66.9]  Yes      Resolved Hospital Problems   No resolved problems to display.       Discharged Condition: good    Hospital Course: Patient was admitted for outpatient procedure and tolerated the procedure well with no complications. The patient was discharged home in good condition on the same day.       Final Diagnoses: Same as principal problem.    Disposition: Home or Self Care    Follow up/Patient Instructions:    Medications:  Reconciled Home Medications:   Current Discharge Medication List      START taking these medications    Details   oxyCODONE-acetaminophen (PERCOCET) 5-325 mg per tablet Take 1 tablet by mouth every 4 (four) hours as needed for Pain.  Qty: 5 tablet, Refills: 0    Comments: Quantity prescribed more than 7 day supply? No         CONTINUE these medications which have NOT CHANGED    Details   apixaban (ELIQUIS) 5 mg Tab Take 1 tablet (5 mg total) by mouth 2 (two) times daily.  Qty: 60 tablet, Refills: 0    Associated Diagnoses: Bilateral pulmonary embolism      atorvastatin (LIPITOR) 20 MG tablet Take 20 mg by mouth every evening.      gabapentin (NEURONTIN) 300 MG capsule Take 300 mg by mouth 3 (three) times daily.      metFORMIN (GLUCOPHAGE) 500 MG tablet Take 500 mg by mouth 2 (two) times a day.       midodrine (PROAMATINE) 5 MG Tab midodrine 5 mg tablet   TAKE 1 TABLET BY MOUTH THREE TIMES DAILY       pantoprazole (PROTONIX) 40 MG tablet Take 40 mg by mouth once daily.      senna-docusate 8.6-50 mg (PERICOLACE) 8.6-50 mg per tablet Take 2 tablets by mouth 2 (two) times daily.      simvastatin (ZOCOR) 40 MG tablet Take 40 mg by mouth once daily.       sulfamethoxazole-trimethoprim 800-160mg (BACTRIM DS) 800-160 mg Tab Take 1 tablet by mouth 2 (two) times daily. for 14 days  Qty: 28 tablet, Refills: 0      acetaminophen (TYLENOL) 325 MG tablet Take 650 mg by mouth every 6 (six) hours as needed.      acyclovir 5% (ZOVIRAX) 5 % Crea Zovirax 5 % topical cream   APPLY TO THE AFFECTED AREA(S) BY TOPICAL ROUTE 5 TIMES PER DAY x 7 days      !! albuterol (PROVENTIL HFA) 90 mcg/actuation inhaler Inhale 2 puffs into the lungs every 6 (six) hours as needed for Wheezing. Rescue  Qty: 18 g, Refills: 0      !! albuterol (PROVENTIL/VENTOLIN HFA) 90 mcg/actuation inhaler Inhale 2 puffs into the lungs every 6 (six) hours as needed.       !! albuterol (VENTOLIN HFA) 90 mcg/actuation inhaler Inhale 2 puffs into the lungs every 4 (four) hours as needed for Wheezing or Shortness of Breath. Rescue  Qty: 18 g, Refills: 11    Associated Diagnoses: Chronic obstructive pulmonary disease, unspecified COPD type      capsaicin 0.1 % Crea capsaicin 0.1 % topical cream   Apply 1 application 3 times a day by topical route as needed for 7 days.      collagenase (SANTYL) ointment Apply topically once daily.  Qty: 30 g, Refills: 0      FLUoxetine 10 MG capsule Take 10 mg by mouth once daily. HCS      fluticasone-umeclidin-vilanter (TRELEGY ELLIPTA) 100-62.5-25 mcg DsDv Inhale 1 puff into the lungs once daily.  Qty: 60 each, Refills: 11    Associated Diagnoses: Chronic obstructive pulmonary disease, unspecified COPD type      glipiZIDE (GLUCOTROL) 5 MG tablet Take 5 mg by mouth daily with breakfast.       insulin aspart U-100 (NOVOLOG) 100 unit/mL (3 mL) InPn pen Inject 5 Units into the skin 3 (three) times daily.  Qty: 15 mL, Refills: 1     "Comments: 15 ml 1 box OK per MD 12/7/21      ondansetron (ZOFRAN-ODT) 4 MG TbDL Take 4 mg by mouth every 8 (eight) hours as needed.      pen needle, diabetic 31 gauge x 5/16" Ndle Use with insulin up to three times daily  Qty: 100 each, Refills: 0       !! - Potential duplicate medications found. Please discuss with provider.        Discharge Procedure Orders   Diet general     Call MD for:  temperature >100.4     Call MD for:  persistent nausea and vomiting     Call MD for:  severe uncontrolled pain     No dressing needed     Activity as tolerated      Follow-up Information     Raegan Rodriguez MD Follow up in 3 week(s).    Specialty: Urology  Contact information:  [address] [place] [address] DRIVE  [address]  [place] [address] [phone]             UROLOGY, NURSE SLIDELL Follow up on 8/2/2022.    Specialty: Urology  Why: stent removal/blanco exchange                         Raegan Rodriguez MD  Urology Department    "

## 2022-07-29 NOTE — PLAN OF CARE
Patient prepared for surgery. Surgical and anesthesia consents signed. Performed incentive spirometer teaching. Belongings in pre-op locker. Text message alerts set up with friend Clau.

## 2022-08-01 ENCOUNTER — TELEPHONE (OUTPATIENT)
Dept: PULMONOLOGY | Facility: CLINIC | Age: 62
End: 2022-08-01
Payer: MEDICAID

## 2022-08-01 ENCOUNTER — TELEPHONE (OUTPATIENT)
Dept: UROLOGY | Facility: CLINIC | Age: 62
End: 2022-08-01
Payer: MEDICAID

## 2022-08-01 NOTE — TELEPHONE ENCOUNTER
Spoke with patient's wife she states she has a appointment tomorrow morning and patient is scheduled tomorrow to have stent removed. Wants to know if they can come tomorrow afternoon. Informed her stent will need to be removed in the morning. Wife states she will just cancel her appointment.

## 2022-08-01 NOTE — TELEPHONE ENCOUNTER
----- Message from Tyra Handley sent at 8/1/2022 12:13 PM CDT -----  Contact: Wife  Type:  Needs Medical Advice    Who Called:  Wife    Would the patient rather a call back or a response via [facility]?   Call    Best Call Back Number:  [phone]    Additional Information:  Wife needs to speak to the nurse about the nurse visit for tomorrow     She is needing a later time tomorrow     Please call to advise

## 2022-08-01 NOTE — TELEPHONE ENCOUNTER
Left voicemail     ----- Message from Tyra Handley sent at 8/1/2022 12:10 PM CDT -----  Contact: Wife  Type:  Needs Medical Advice    Who Called: Wife    Would the patient rather a call back or a response via [facility]?  Call    Best Call Back Number:  [phone]    Additional Information:  Wife needs to speak to the nurse about the patients  PFT test she canceled     Please call to advise

## 2022-08-02 ENCOUNTER — TELEPHONE (OUTPATIENT)
Dept: UROLOGY | Facility: CLINIC | Age: 62
End: 2022-08-02
Payer: MEDICAID

## 2022-08-02 NOTE — TELEPHONE ENCOUNTER
Spoke with the pt's caregiver and gave instructions on how to d/c stent. Pt nervous therefore appointment rescheduled till tomorrow. Pt will also need to have blanco replaced.

## 2022-08-02 NOTE — TELEPHONE ENCOUNTER
----- Message from Porsha Hernandez sent at 8/2/2022  7:40 AM CDT -----  Contact: His care giver Clau  Pt care giver called asking if they could get instructions on how to remove the stent at home since pt is unable to come in today. Pt caregiver asked to cancel the nurse visit today. Please call back [phone].

## 2022-08-03 ENCOUNTER — TELEPHONE (OUTPATIENT)
Dept: PULMONOLOGY | Facility: CLINIC | Age: 62
End: 2022-08-03
Payer: MEDICAID

## 2022-08-03 ENCOUNTER — CLINICAL SUPPORT (OUTPATIENT)
Dept: UROLOGY | Facility: CLINIC | Age: 62
End: 2022-08-03
Payer: MEDICAID

## 2022-08-03 DIAGNOSIS — Z97.8 INDWELLING FOLEY CATHETER PRESENT: Primary | ICD-10-CM

## 2022-08-03 PROCEDURE — 99499 UNLISTED E&M SERVICE: CPT | Mod: S$PBB,,, | Performed by: STUDENT IN AN ORGANIZED HEALTH CARE EDUCATION/TRAINING PROGRAM

## 2022-08-03 PROCEDURE — 99499 NO LOS: ICD-10-PCS | Mod: S$PBB,,, | Performed by: STUDENT IN AN ORGANIZED HEALTH CARE EDUCATION/TRAINING PROGRAM

## 2022-08-03 NOTE — TELEPHONE ENCOUNTER
Called to advise peer to peer was done. Patient has been approved through peer to peer. They will call to schedule PET scan.  ----- Message from Porsha Hernandez sent at 8/2/2022  7:46 AM CDT -----  Contact: Care giver brenda Gates care giver called to ask why the PET scan was denied by the insurance. Please call back at [phone] and advise about authorization.

## 2022-08-03 NOTE — PROGRESS NOTES
Patient arrived to clinic to have stent removed and catheter changed. Stent string attached to blanco catheter, deflated 10 ml saline balloon, removed 20 fr blanco catheter and then stent pulled out, all intack  patient tolerated well. Patient draped and prepped, inserted 20 fr blanco catheter with yellow urine returned. Inflated 20 ml saline balloon, stat lock and  bag attached, patient tolerated well.

## 2022-08-04 LAB
COMPN STONE: NORMAL
SPECIMEN SOURCE: NORMAL
STONE ANALYSIS IR-IMP: NORMAL

## 2022-08-09 ENCOUNTER — HOSPITAL ENCOUNTER (OUTPATIENT)
Dept: RADIOLOGY | Facility: HOSPITAL | Age: 62
Discharge: HOME | End: 2022-08-09
Attending: NURSE PRACTITIONER
Payer: MEDICAID

## 2022-08-09 VITALS — BODY MASS INDEX: 31.39 KG/M2 | WEIGHT: 200 LBS | HEIGHT: 67 IN

## 2022-08-09 DIAGNOSIS — R91.8 OTHER NONSPECIFIC ABNORMAL FINDING OF LUNG FIELD: ICD-10-CM

## 2022-08-09 DIAGNOSIS — R91.8 MULTIPLE NODULES OF LUNG: ICD-10-CM

## 2022-08-09 LAB — GLUCOSE SERPL-MCNC: 190 MG/DL (ref 70–110)

## 2022-08-09 PROCEDURE — 78815 PET IMAGE W/CT SKULL-THIGH: CPT | Mod: TC,PO

## 2022-08-11 ENCOUNTER — TELEPHONE (OUTPATIENT)
Dept: PULMONOLOGY | Facility: CLINIC | Age: 62
End: 2022-08-11
Payer: MEDICAID

## 2022-08-11 ENCOUNTER — PATIENT MESSAGE (OUTPATIENT)
Dept: PULMONOLOGY | Facility: CLINIC | Age: 62
End: 2022-08-11
Payer: MEDICAID

## 2022-08-11 NOTE — TELEPHONE ENCOUNTER
Sent portal message   ----- Message from Nakia Yan NP sent at 8/10/2022 11:32 AM CDT -----  Mediastinal lymph node is not PET active, parotid gland nodule is active and requires further evaluation.

## 2022-08-17 DIAGNOSIS — K11.1 PAROTID GLAND ENLARGEMENT: Primary | ICD-10-CM

## 2022-08-18 ENCOUNTER — TELEPHONE (OUTPATIENT)
Dept: PULMONOLOGY | Facility: CLINIC | Age: 62
End: 2022-08-18
Payer: MEDICAID

## 2022-08-18 ENCOUNTER — PATIENT MESSAGE (OUTPATIENT)
Dept: PULMONOLOGY | Facility: CLINIC | Age: 62
End: 2022-08-18
Payer: MEDICAID

## 2022-08-18 NOTE — TELEPHONE ENCOUNTER
Notified via portal    ----- Message from Nakia Yan NP sent at 8/17/2022  4:57 PM CDT -----  Shows nodule right throat, referral to ENT sent

## 2022-08-18 NOTE — TELEPHONE ENCOUNTER
Notified via portal    ----- Message from Nakia Yan NP sent at 8/17/2022  4:54 PM CDT -----  PET scan does not show metastasis to lungs, have pft soon. Will notify Dr. Rodriguez

## 2022-08-19 ENCOUNTER — TELEPHONE (OUTPATIENT)
Dept: PULMONOLOGY | Facility: CLINIC | Age: 62
End: 2022-08-19
Payer: MEDICAID

## 2022-08-19 NOTE — TELEPHONE ENCOUNTER
Spoke with patient. Patient will contact when ready to schedule. Patient caregiver in hospital. Will wait till she returns.     ----- Message from Nakia Yan NP sent at 8/18/2022  4:50 PM CDT -----  Please call and schedule PFT for this patient, needs to be done ASAP so decision on surgery clearance can be made.

## 2022-08-26 ENCOUNTER — HOSPITAL ENCOUNTER (EMERGENCY)
Facility: HOSPITAL | Age: 62
Discharge: HOME | End: 2022-08-26
Attending: EMERGENCY MEDICINE
Payer: MEDICAID

## 2022-08-26 VITALS
DIASTOLIC BLOOD PRESSURE: 71 MMHG | BODY MASS INDEX: 31.32 KG/M2 | SYSTOLIC BLOOD PRESSURE: 117 MMHG | RESPIRATION RATE: 20 BRPM | WEIGHT: 200 LBS | TEMPERATURE: 98 F | HEART RATE: 88 BPM | OXYGEN SATURATION: 94 %

## 2022-08-26 DIAGNOSIS — T83.511S URINARY TRACT INFECTION ASSOCIATED WITH CATHETERIZATION OF URINARY TRACT, UNSPECIFIED INDWELLING URINARY CATHETER TYPE, SEQUELA: Primary | ICD-10-CM

## 2022-08-26 DIAGNOSIS — N39.0 URINARY TRACT INFECTION ASSOCIATED WITH CATHETERIZATION OF URINARY TRACT, UNSPECIFIED INDWELLING URINARY CATHETER TYPE, SEQUELA: Primary | ICD-10-CM

## 2022-08-26 LAB
ALBUMIN SERPL BCP-MCNC: 3.6 G/DL (ref 3.5–5.2)
ALP SERPL-CCNC: 111 U/L (ref 55–135)
ALT SERPL W/O P-5'-P-CCNC: 11 U/L (ref 10–44)
ANION GAP SERPL CALC-SCNC: 7 MMOL/L (ref 8–16)
AST SERPL-CCNC: 14 U/L (ref 10–40)
BACTERIA #/AREA URNS HPF: NEGATIVE /HPF
BASOPHILS # BLD AUTO: 0.06 K/UL (ref 0–0.2)
BASOPHILS NFR BLD: 0.6 % (ref 0–1.9)
BILIRUB SERPL-MCNC: 0.5 MG/DL (ref 0.1–1)
BILIRUB UR QL STRIP: NEGATIVE
BUN SERPL-MCNC: 12 MG/DL (ref 8–23)
CALCIUM SERPL-MCNC: 9 MG/DL (ref 8.7–10.5)
CHLORIDE SERPL-SCNC: 100 MMOL/L (ref 95–110)
CLARITY UR: ABNORMAL
CO2 SERPL-SCNC: 29 MMOL/L (ref 23–29)
COLOR UR: YELLOW
CREAT SERPL-MCNC: 0.6 MG/DL (ref 0.5–1.4)
DIFFERENTIAL METHOD: ABNORMAL
EOSINOPHIL # BLD AUTO: 0.3 K/UL (ref 0–0.5)
EOSINOPHIL NFR BLD: 2.8 % (ref 0–8)
ERYTHROCYTE [DISTWIDTH] IN BLOOD BY AUTOMATED COUNT: 13.5 % (ref 11.5–14.5)
EST. GFR  (NO RACE VARIABLE): >60 ML/MIN/1.73 M^2
GLUCOSE SERPL-MCNC: 160 MG/DL (ref 70–110)
GLUCOSE UR QL STRIP: ABNORMAL
HCT VFR BLD AUTO: 38.7 % (ref 40–54)
HGB BLD-MCNC: 12.3 G/DL (ref 14–18)
HGB UR QL STRIP: ABNORMAL
HYALINE CASTS #/AREA URNS LPF: 2 /LPF
IMM GRANULOCYTES # BLD AUTO: 0.05 K/UL (ref 0–0.04)
IMM GRANULOCYTES NFR BLD AUTO: 0.5 % (ref 0–0.5)
KETONES UR QL STRIP: NEGATIVE
LEUKOCYTE ESTERASE UR QL STRIP: ABNORMAL
LYMPHOCYTES # BLD AUTO: 2 K/UL (ref 1–4.8)
LYMPHOCYTES NFR BLD: 19.7 % (ref 18–48)
MCH RBC QN AUTO: 29.4 PG (ref 27–31)
MCHC RBC AUTO-ENTMCNC: 31.8 G/DL (ref 32–36)
MCV RBC AUTO: 93 FL (ref 82–98)
MICROSCOPIC COMMENT: ABNORMAL
MONOCYTES # BLD AUTO: 1 K/UL (ref 0.3–1)
MONOCYTES NFR BLD: 10 % (ref 4–15)
NEUTROPHILS # BLD AUTO: 6.9 K/UL (ref 1.8–7.7)
NEUTROPHILS NFR BLD: 66.4 % (ref 38–73)
NITRITE UR QL STRIP: NEGATIVE
NRBC BLD-RTO: 0 /100 WBC
PH UR STRIP: 7 [PH] (ref 5–8)
PLATELET # BLD AUTO: 287 K/UL (ref 150–450)
PMV BLD AUTO: 10.4 FL (ref 9.2–12.9)
POTASSIUM SERPL-SCNC: 4 MMOL/L (ref 3.5–5.1)
PROT SERPL-MCNC: 7.1 G/DL (ref 6–8.4)
PROT UR QL STRIP: ABNORMAL
RBC # BLD AUTO: 4.18 M/UL (ref 4.6–6.2)
RBC #/AREA URNS HPF: >100 /HPF (ref 0–4)
SODIUM SERPL-SCNC: 136 MMOL/L (ref 136–145)
SP GR UR STRIP: 1.02 (ref 1–1.03)
SQUAMOUS #/AREA URNS HPF: 0 /HPF
URN SPEC COLLECT METH UR: ABNORMAL
UROBILINOGEN UR STRIP-ACNC: NEGATIVE EU/DL
WBC # BLD AUTO: 10.36 K/UL (ref 3.9–12.7)
WBC #/AREA URNS HPF: >100 /HPF (ref 0–5)

## 2022-08-26 PROCEDURE — 25000003 PHARM REV CODE 250: Performed by: EMERGENCY MEDICINE

## 2022-08-26 PROCEDURE — 99900035 HC TECH TIME PER 15 MIN (STAT)

## 2022-08-26 PROCEDURE — 85025 COMPLETE CBC W/AUTO DIFF WBC: CPT | Performed by: EMERGENCY MEDICINE

## 2022-08-26 PROCEDURE — 96375 TX/PRO/DX INJ NEW DRUG ADDON: CPT

## 2022-08-26 PROCEDURE — 99900031 HC PATIENT EDUCATION (STAT)

## 2022-08-26 PROCEDURE — 80053 COMPREHEN METABOLIC PANEL: CPT | Performed by: EMERGENCY MEDICINE

## 2022-08-26 PROCEDURE — 87086 URINE CULTURE/COLONY COUNT: CPT | Performed by: EMERGENCY MEDICINE

## 2022-08-26 PROCEDURE — 94640 AIRWAY INHALATION TREATMENT: CPT

## 2022-08-26 PROCEDURE — 25000242 PHARM REV CODE 250 ALT 637 W/ HCPCS: Performed by: EMERGENCY MEDICINE

## 2022-08-26 PROCEDURE — 63600175 PHARM REV CODE 636 W HCPCS: Performed by: EMERGENCY MEDICINE

## 2022-08-26 PROCEDURE — 94761 N-INVAS EAR/PLS OXIMETRY MLT: CPT

## 2022-08-26 PROCEDURE — 99284 EMERGENCY DEPT VISIT MOD MDM: CPT

## 2022-08-26 PROCEDURE — 96365 THER/PROPH/DIAG IV INF INIT: CPT

## 2022-08-26 PROCEDURE — 81001 URINALYSIS AUTO W/SCOPE: CPT | Performed by: EMERGENCY MEDICINE

## 2022-08-26 PROCEDURE — 87147 CULTURE TYPE IMMUNOLOGIC: CPT | Performed by: EMERGENCY MEDICINE

## 2022-08-26 PROCEDURE — 87186 SC STD MICRODIL/AGAR DIL: CPT | Performed by: EMERGENCY MEDICINE

## 2022-08-26 PROCEDURE — 87077 CULTURE AEROBIC IDENTIFY: CPT | Performed by: EMERGENCY MEDICINE

## 2022-08-26 RX ORDER — LIDOCAINE HYDROCHLORIDE 20 MG/ML
JELLY TOPICAL
Status: COMPLETED | OUTPATIENT
Start: 2022-08-26 | End: 2022-08-26

## 2022-08-26 RX ORDER — MORPHINE SULFATE 4 MG/ML
4 INJECTION, SOLUTION INTRAMUSCULAR; INTRAVENOUS
Status: COMPLETED | OUTPATIENT
Start: 2022-08-26 | End: 2022-08-26

## 2022-08-26 RX ORDER — IPRATROPIUM BROMIDE AND ALBUTEROL SULFATE 2.5; .5 MG/3ML; MG/3ML
3 SOLUTION RESPIRATORY (INHALATION) ONCE
Status: COMPLETED | OUTPATIENT
Start: 2022-08-26 | End: 2022-08-26

## 2022-08-26 RX ORDER — HYDROCODONE BITARTRATE AND ACETAMINOPHEN 5; 325 MG/1; MG/1
1 TABLET ORAL
Status: COMPLETED | OUTPATIENT
Start: 2022-08-26 | End: 2022-08-26

## 2022-08-26 RX ORDER — CEFUROXIME AXETIL 500 MG/1
500 TABLET ORAL EVERY 12 HOURS
Qty: 14 TABLET | Refills: 0 | Status: SHIPPED | OUTPATIENT
Start: 2022-08-26 | End: 2022-09-02

## 2022-08-26 RX ORDER — ONDANSETRON 2 MG/ML
4 INJECTION INTRAMUSCULAR; INTRAVENOUS
Status: COMPLETED | OUTPATIENT
Start: 2022-08-26 | End: 2022-08-26

## 2022-08-26 RX ADMIN — ONDANSETRON 4 MG: 2 INJECTION INTRAMUSCULAR; INTRAVENOUS at 05:08

## 2022-08-26 RX ADMIN — LIDOCAINE HYDROCHLORIDE 10 ML: 20 JELLY TOPICAL at 05:08

## 2022-08-26 RX ADMIN — CEFTRIAXONE 1 G: 1 INJECTION, SOLUTION INTRAVENOUS at 07:08

## 2022-08-26 RX ADMIN — IPRATROPIUM BROMIDE AND ALBUTEROL SULFATE 3 ML: .5; 3 SOLUTION RESPIRATORY (INHALATION) at 05:08

## 2022-08-26 RX ADMIN — HYDROCODONE BITARTRATE AND ACETAMINOPHEN 1 TABLET: 5; 325 TABLET ORAL at 07:08

## 2022-08-26 RX ADMIN — MORPHINE SULFATE 4 MG: 4 INJECTION, SOLUTION INTRAMUSCULAR; INTRAVENOUS at 05:08

## 2022-08-26 NOTE — ED PROVIDER NOTES
Encounter Date: 8/26/2022       History     Chief Complaint   Patient presents with    Urinary Tract Infection     62-year-old male with a history of diabetes, hypertension, COPD, renal cancer with chronic indwelling Donis catheter presents emergency department with bladder spasms and leaking around his catheter.  This has been ongoing for the past 3-4 days.  He states the symptoms are typically present when he gets a urinary tract infection.  He also notes that his urine appears more cloudy than normal.  He tells me that he recently completed a 10 day course of antibiotics for urinary tract infection although he does not recall the name of the antibiotic or exactly when he finished the antibiotic.  He has no abdominal pain, vomiting or flank pain.  He has no systemic symptoms such as fevers or chills.  He does tell me that he missed his appointment with his urologist 3 days ago.      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    Hypertension      Past Surgical History:   Procedure Laterality Date    CYSTOURETEROSCOPY WITH RETROGRADE PYELOGRAPHY AND INSERTION OF STENT INTO URETER Left 7/29/2022    Procedure: CYSTOURETEROSCOPY, WITH RETROGRADE PYELOGRAM AND URETERAL STENT INSERTION;  Surgeon: Raegan Rodriguez MD;  Location: [facility] OR;  Service: Urology;  Laterality: Left;    denies pertinent surgical history      INTRAMEDULLARY RODDING OF FEMUR Right 11/7/2021    Procedure: INSERTION, INTRAMEDULLARY HENNA, FEMUR;  Surgeon: Matt Milian MD;  Location: [facility] OR;  Service: Orthopedics;  Laterality: Right;    LASER LITHOTRIPSY Left 7/29/2022    Procedure: LITHOTRIPSY, USING LASER;  Surgeon: Raegan GILMORE  MD Michael;  Location: [facility] OR;  Service: Urology;  Laterality: Left;    TOE AMPUTATION Right 10/12/2021    Procedure: AMPUTATION, TOE;  Surgeon: Milton Lauren DPM;  Location: [facility] OR;  Service: Podiatry;  Laterality: Right;    URETEROSCOPIC REMOVAL OF URETERIC CALCULUS Left 7/29/2022    Procedure: REMOVAL, CALCULUS, URETER, URETEROSCOPIC;  Surgeon: Raegan Rodriguez MD;  Location: [facility] OR;  Service: Urology;  Laterality: Left;     Family History   Problem Relation Age of Onset    Hypertension Father      Social History     Tobacco Use    Smoking status: Former    Smokeless tobacco: Never   Substance Use Topics    Alcohol use: Not Currently    Drug use: Not Currently     Review of Systems   Constitutional:  Negative for fever.   HENT:  Negative for sore throat.    Respiratory:  Negative for shortness of breath.    Cardiovascular:  Negative for chest pain.   Gastrointestinal:  Negative for nausea.   Genitourinary:  Positive for dysuria.   Musculoskeletal:  Negative for back pain.   Skin:  Negative for rash.   Neurological:  Negative for weakness.   Hematological:  Does not bruise/bleed easily.   All other systems reviewed and are negative.    Physical Exam     Initial Vitals [08/26/22 0457]   BP Pulse Resp Temp SpO2   (!) 160/78 89 14 98.4 °F (36.9 °C) (!) 94 %      MAP       --         Physical Exam    Nursing note and vitals reviewed.  Constitutional:   Disheveled   HENT:   Head: Normocephalic and atraumatic.   Eyes: EOM are normal.   Neck:   Normal range of motion.  Cardiovascular:  Normal rate, regular rhythm, normal heart sounds and intact distal pulses.           No murmur heard.  Pulmonary/Chest: He has wheezes (Mild end-expiratory in basis).   Abdominal: Abdomen is soft. There is no abdominal tenderness.   Genitourinary:    Genitourinary Comments: No CVA tenderness, indwelling Donis in place with no obvious penile lesions and no bleeding     Musculoskeletal:      Cervical back: Normal range of motion.      Neurological: He is alert and oriented to person, place, and time.   Skin: Skin is warm and dry. Capillary refill takes less than 2 seconds.   Psychiatric: He has a normal mood and affect.       ED Course   Procedures  Labs Reviewed   CULTURE, URINE - Abnormal; Notable for the following components:       Result Value    Urine Culture, Routine   (*)     Value: STAPHYLOCOCCUS AUREUS  > 100,000 cfu/ml  Susceptibility pending      All other components within normal limits    Narrative:     Specimen Source->Urine   COMPREHENSIVE METABOLIC PANEL - Abnormal; Notable for the following components:    Glucose 160 (*)     Anion Gap 7 (*)     All other components within normal limits   CBC W/ AUTO DIFFERENTIAL - Abnormal; Notable for the following components:    RBC 4.18 (*)     Hemoglobin 12.3 (*)     Hematocrit 38.7 (*)     MCHC 31.8 (*)     Immature Grans (Abs) 0.05 (*)     All other components within normal limits   URINALYSIS, REFLEX TO URINE CULTURE - Abnormal; Notable for the following components:    Appearance, UA Hazy (*)     Protein, UA 3+ (*)     Glucose, UA Trace (*)     Occult Blood UA 3+ (*)     Leukocytes, UA 3+ (*)     All other components within normal limits    Narrative:     Specimen Source->Urine   URINALYSIS MICROSCOPIC - Abnormal; Notable for the following components:    RBC, UA >100 (*)     WBC, UA >100 (*)     Hyaline Casts, UA 2 (*)     All other components within normal limits    Narrative:     Specimen Source->Urine          Imaging Results    None          Medications   LIDOcaine HCl 2% urojet (10 mLs Mucous Membrane Given 8/26/22 0539)   ondansetron injection 4 mg (4 mg Intravenous Given 8/26/22 0540)   morphine injection 4 mg (4 mg Intravenous Given 8/26/22 0540)   albuterol-ipratropium 2.5 mg-0.5 mg/3 mL nebulizer solution 3 mL (3 mLs Nebulization Given 8/26/22 2567)   cefTRIAXone (ROCEPHIN) 1 g/50 mL D5W IVPB (0 g Intravenous Stopped 8/26/22 6646)   HYDROcodone-acetaminophen 5-325 mg per  tablet 1 tablet (1 tablet Oral Given 8/26/22 0727)     Medical Decision Making:   ED Management:  62-year-old male presents emergency department with bladder spasms, leaking urine is and cloudy urine.  Plan for labs, pain control and Donis exchange.  Will transfer care to Dr. Cerna at 6:00 a.m..    Nyla Melendez MD  Emergency Medicine  08/26/2022 5:58 AM             ED Course as of 08/27/22 1547   Fri Aug 26, 2022   0717 Patient's urine does appear cloudy.  Will start patient on Rocephin from the emergency department and additionally will give Ceftin for the next 7 days.  He will follow-up with his urologist. [AP]      ED Course User Index  [AP] Alfredo Cerna MD               Clinical Impression:   Final diagnoses:  [T83.511S, N39.0] Urinary tract infection associated with catheterization of urinary tract, unspecified indwelling urinary catheter type, sequela (Primary)        ED Disposition Condition    Discharge Stable          ED Prescriptions       Medication Sig Dispense Start Date End Date Auth. Provider    cefUROXime (CEFTIN) 500 MG tablet Take 1 tablet (500 mg total) by mouth every 12 (twelve) hours. for 7 days 14 tablet 8/26/2022 9/2/2022 Alfredo Cerna MD          Follow-up Information       Follow up With Specialties Details Why Contact Info    Raegan Rodriguez MD Urology In 2 days  [address] [place] [address]  [address]  [facility] [address]  [phone]               Nyla Melendez MD  08/27/22 1541

## 2022-08-26 NOTE — ED NOTES
"PRIVATE ROOM. EVEN AND NON LABORED RESPIRATIONS.  AIRWAY CLEAR.  PULSES REGULAR.  < 3" CAPILLARY REFILL. SKIN WDI EXCEPT SCALING AT TOP OF HEAD.  STATES ADDRESSING WITH DERMATOLOGY.   MAEW.  NON DISTENDED OBESE ABDOMEN. FC TO GRAVITY. CLOUDY PARTICULATE LACED SCANT OUTPUT. ALERT, ORIENTED AND STATES AMBULATORY WITH WALKER.  DISHEVELED. DISTRESSED AT THIS TIME.  FC SECURE ADJUSTED FOR COMFORT. CALL LIGHT IN REACH. FAMILY AT BS. FALLS PRECAUTIONS.  "

## 2022-08-26 NOTE — CARE UPDATE
08/26/22 0553   Patient Assessment/Suction   Level of Consciousness (AVPU) alert   Respiratory Effort Unlabored   Expansion/Accessory Muscles/Retractions no use of accessory muscles   All Lung Fields Breath Sounds clear;diminished   Rhythm/Pattern, Respiratory no shortness of breath reported   Cough Frequency no cough   PRE-TX-O2   O2 Device (Oxygen Therapy) room air   SpO2 (!) 93 %   Pulse Oximetry Type Continuous   $ Pulse Oximetry - Multiple Charge Pulse Oximetry - Multiple   Pulse 84   Resp 20   Positioning   Head of Bed (HOB) Positioning HOB elevated;HOB at 20-30 degrees   Aerosol Therapy   $ Aerosol Therapy Charges Aerosol Treatment   Daily Review of Necessity (SVN) completed   Respiratory Treatment Status (SVN) given   Treatment Route (SVN) mask   Patient Position (SVN) semi-Norris's   Post Treatment Assessment (SVN) breath sounds improved   Signs of Intolerance (SVN) none   Breath Sounds Post-Respiratory Treatment   Throughout All Fields Post-Treatment All Fields   Throughout All Fields Post-Treatment aeration increased   Post-treatment Heart Rate (beats/min) 82   Post-treatment Resp Rate (breaths/min) 20   Education   $ Education Bronchodilator;15 min   Respiratory Evaluation   $ Care Plan Tech Time 15 min

## 2022-08-28 LAB — BACTERIA UR CULT: ABNORMAL

## 2022-08-31 ENCOUNTER — TELEPHONE (OUTPATIENT)
Dept: UROLOGY | Facility: CLINIC | Age: 62
End: 2022-08-31
Payer: MEDICAID

## 2022-08-31 ENCOUNTER — TELEPHONE (OUTPATIENT)
Dept: PULMONOLOGY | Facility: CLINIC | Age: 62
End: 2022-08-31
Payer: MEDICAID

## 2022-08-31 NOTE — TELEPHONE ENCOUNTER
Spoke with caregiver and patient. Patient scheduled 9/20 at 2:00 with Isela Bell NP    ----- Message from Angelia Corona sent at 8/31/2022  8:07 AM CDT -----  Contact: [phone]  Type: Needs Medical Advice  Who Called:  Clau     Reese Call Back Number: [phone]  Additional Information: MS. Howard is calling to schedule pt for a 1wk fu. Pls call back and advise

## 2022-08-31 NOTE — TELEPHONE ENCOUNTER
----- Message from Angelia Corona sent at 8/31/2022  8:05 AM CDT -----  Contact: [phone]  Type: Needs Medical Advice  Who Called:  Claumac Leigh Call Back Number: [phone]  Additional Information: MS. Howard is calling to schedule pt for a 3wk fu. Pls call back and advise

## 2022-09-07 ENCOUNTER — HOSPITAL ENCOUNTER (EMERGENCY)
Facility: HOSPITAL | Age: 62
Discharge: HOME | End: 2022-09-07
Attending: STUDENT IN AN ORGANIZED HEALTH CARE EDUCATION/TRAINING PROGRAM
Payer: MEDICAID

## 2022-09-07 VITALS
DIASTOLIC BLOOD PRESSURE: 77 MMHG | HEART RATE: 85 BPM | TEMPERATURE: 98 F | BODY MASS INDEX: 31.39 KG/M2 | WEIGHT: 200 LBS | RESPIRATION RATE: 18 BRPM | SYSTOLIC BLOOD PRESSURE: 138 MMHG | OXYGEN SATURATION: 90 % | HEIGHT: 67 IN

## 2022-09-07 DIAGNOSIS — Z97.8 CHRONIC INDWELLING FOLEY CATHETER: Primary | ICD-10-CM

## 2022-09-07 DIAGNOSIS — N34.2 URETHRITIS: ICD-10-CM

## 2022-09-07 LAB
ALBUMIN SERPL BCP-MCNC: 3.8 G/DL (ref 3.5–5.2)
ALP SERPL-CCNC: 97 U/L (ref 55–135)
ALT SERPL W/O P-5'-P-CCNC: 12 U/L (ref 10–44)
ANION GAP SERPL CALC-SCNC: 8 MMOL/L (ref 8–16)
APTT PPP: 29.3 SEC (ref 23.3–35.1)
AST SERPL-CCNC: 14 U/L (ref 10–40)
BACTERIA #/AREA URNS HPF: ABNORMAL /HPF
BASOPHILS # BLD AUTO: 0.06 K/UL (ref 0–0.2)
BASOPHILS NFR BLD: 0.7 % (ref 0–1.9)
BILIRUB SERPL-MCNC: 0.4 MG/DL (ref 0.1–1)
BILIRUB UR QL STRIP: NEGATIVE
BNP SERPL-MCNC: 72 PG/ML (ref 0–99)
BUN SERPL-MCNC: 22 MG/DL (ref 8–23)
CALCIUM SERPL-MCNC: 9.7 MG/DL (ref 8.7–10.5)
CHLORIDE SERPL-SCNC: 103 MMOL/L (ref 95–110)
CLARITY UR: ABNORMAL
CO2 SERPL-SCNC: 29 MMOL/L (ref 23–29)
COLOR UR: YELLOW
CREAT SERPL-MCNC: 0.8 MG/DL (ref 0.5–1.4)
DIFFERENTIAL METHOD: ABNORMAL
EOSINOPHIL # BLD AUTO: 0.2 K/UL (ref 0–0.5)
EOSINOPHIL NFR BLD: 2.7 % (ref 0–8)
ERYTHROCYTE [DISTWIDTH] IN BLOOD BY AUTOMATED COUNT: 14.1 % (ref 11.5–14.5)
EST. GFR  (NO RACE VARIABLE): >60 ML/MIN/1.73 M^2
GLUCOSE SERPL-MCNC: 224 MG/DL (ref 70–110)
GLUCOSE UR QL STRIP: ABNORMAL
HCT VFR BLD AUTO: 40.2 % (ref 40–54)
HGB BLD-MCNC: 12.8 G/DL (ref 14–18)
HGB UR QL STRIP: ABNORMAL
HYALINE CASTS #/AREA URNS LPF: 10 /LPF
IMM GRANULOCYTES # BLD AUTO: 0.03 K/UL (ref 0–0.04)
IMM GRANULOCYTES NFR BLD AUTO: 0.3 % (ref 0–0.5)
INR PPP: 1.2
KETONES UR QL STRIP: NEGATIVE
LACTATE SERPL-SCNC: 1.7 MMOL/L (ref 0.5–1.9)
LEUKOCYTE ESTERASE UR QL STRIP: ABNORMAL
LYMPHOCYTES # BLD AUTO: 2.6 K/UL (ref 1–4.8)
LYMPHOCYTES NFR BLD: 29.7 % (ref 18–48)
MAGNESIUM SERPL-MCNC: 1.7 MG/DL (ref 1.6–2.6)
MCH RBC QN AUTO: 30 PG (ref 27–31)
MCHC RBC AUTO-ENTMCNC: 31.8 G/DL (ref 32–36)
MCV RBC AUTO: 94 FL (ref 82–98)
MICROSCOPIC COMMENT: ABNORMAL
MONOCYTES # BLD AUTO: 0.8 K/UL (ref 0.3–1)
MONOCYTES NFR BLD: 8.8 % (ref 4–15)
NEUTROPHILS # BLD AUTO: 5 K/UL (ref 1.8–7.7)
NEUTROPHILS NFR BLD: 57.8 % (ref 38–73)
NITRITE UR QL STRIP: NEGATIVE
NRBC BLD-RTO: 0 /100 WBC
PH UR STRIP: 8 [PH] (ref 5–8)
PHOSPHATE SERPL-MCNC: 4.1 MG/DL (ref 2.7–4.5)
PLATELET # BLD AUTO: 290 K/UL (ref 150–450)
PMV BLD AUTO: 9.9 FL (ref 9.2–12.9)
POTASSIUM SERPL-SCNC: 4.6 MMOL/L (ref 3.5–5.1)
PROCALCITONIN SERPL IA-MCNC: <0.05 NG/ML (ref 0–0.5)
PROT SERPL-MCNC: 7.8 G/DL (ref 6–8.4)
PROT UR QL STRIP: ABNORMAL
PROTHROMBIN TIME: 14.3 SEC (ref 11.4–13.7)
RBC # BLD AUTO: 4.26 M/UL (ref 4.6–6.2)
RBC #/AREA URNS HPF: 71 /HPF (ref 0–4)
SODIUM SERPL-SCNC: 140 MMOL/L (ref 136–145)
SP GR UR STRIP: 1.02 (ref 1–1.03)
SQUAMOUS #/AREA URNS HPF: 0 /HPF
TROPONIN I SERPL DL<=0.01 NG/ML-MCNC: <0.03 NG/ML
URN SPEC COLLECT METH UR: ABNORMAL
UROBILINOGEN UR STRIP-ACNC: NEGATIVE EU/DL
WBC # BLD AUTO: 8.66 K/UL (ref 3.9–12.7)
WBC #/AREA URNS HPF: >100 /HPF (ref 0–5)

## 2022-09-07 PROCEDURE — 83880 ASSAY OF NATRIURETIC PEPTIDE: CPT | Performed by: EMERGENCY MEDICINE

## 2022-09-07 PROCEDURE — 81001 URINALYSIS AUTO W/SCOPE: CPT | Performed by: EMERGENCY MEDICINE

## 2022-09-07 PROCEDURE — 87186 SC STD MICRODIL/AGAR DIL: CPT | Performed by: EMERGENCY MEDICINE

## 2022-09-07 PROCEDURE — 87040 BLOOD CULTURE FOR BACTERIA: CPT | Mod: 59 | Performed by: EMERGENCY MEDICINE

## 2022-09-07 PROCEDURE — 63600175 PHARM REV CODE 636 W HCPCS: Performed by: EMERGENCY MEDICINE

## 2022-09-07 PROCEDURE — 83605 ASSAY OF LACTIC ACID: CPT | Performed by: EMERGENCY MEDICINE

## 2022-09-07 PROCEDURE — 85730 THROMBOPLASTIN TIME PARTIAL: CPT | Performed by: EMERGENCY MEDICINE

## 2022-09-07 PROCEDURE — 96360 HYDRATION IV INFUSION INIT: CPT

## 2022-09-07 PROCEDURE — 84145 PROCALCITONIN (PCT): CPT | Performed by: EMERGENCY MEDICINE

## 2022-09-07 PROCEDURE — 80053 COMPREHEN METABOLIC PANEL: CPT | Performed by: EMERGENCY MEDICINE

## 2022-09-07 PROCEDURE — 85025 COMPLETE CBC W/AUTO DIFF WBC: CPT | Performed by: EMERGENCY MEDICINE

## 2022-09-07 PROCEDURE — 87077 CULTURE AEROBIC IDENTIFY: CPT | Performed by: EMERGENCY MEDICINE

## 2022-09-07 PROCEDURE — 87086 URINE CULTURE/COLONY COUNT: CPT | Performed by: EMERGENCY MEDICINE

## 2022-09-07 PROCEDURE — 84484 ASSAY OF TROPONIN QUANT: CPT | Performed by: EMERGENCY MEDICINE

## 2022-09-07 PROCEDURE — 51702 INSERT TEMP BLADDER CATH: CPT

## 2022-09-07 PROCEDURE — 83735 ASSAY OF MAGNESIUM: CPT | Performed by: EMERGENCY MEDICINE

## 2022-09-07 PROCEDURE — 25000003 PHARM REV CODE 250: Performed by: STUDENT IN AN ORGANIZED HEALTH CARE EDUCATION/TRAINING PROGRAM

## 2022-09-07 PROCEDURE — 85610 PROTHROMBIN TIME: CPT | Performed by: EMERGENCY MEDICINE

## 2022-09-07 PROCEDURE — 84100 ASSAY OF PHOSPHORUS: CPT | Performed by: EMERGENCY MEDICINE

## 2022-09-07 PROCEDURE — 99284 EMERGENCY DEPT VISIT MOD MDM: CPT | Mod: 25

## 2022-09-07 RX ORDER — HYDROCODONE BITARTRATE AND ACETAMINOPHEN 5; 325 MG/1; MG/1
1 TABLET ORAL
Status: COMPLETED | OUTPATIENT
Start: 2022-09-07 | End: 2022-09-07

## 2022-09-07 RX ORDER — CIPROFLOXACIN 500 MG/1
500 TABLET ORAL
Status: COMPLETED | OUTPATIENT
Start: 2022-09-07 | End: 2022-09-07

## 2022-09-07 RX ORDER — CIPROFLOXACIN 500 MG/1
500 TABLET ORAL 2 TIMES DAILY
Qty: 10 TABLET | Refills: 0 | Status: SHIPPED | OUTPATIENT
Start: 2022-09-07 | End: 2022-09-12

## 2022-09-07 RX ORDER — LIDOCAINE HYDROCHLORIDE 20 MG/ML
10 JELLY TOPICAL
Status: COMPLETED | OUTPATIENT
Start: 2022-09-07 | End: 2022-09-07

## 2022-09-07 RX ADMIN — CIPROFLOXACIN 500 MG: 500 TABLET, FILM COATED ORAL at 07:09

## 2022-09-07 RX ADMIN — SODIUM CHLORIDE, SODIUM LACTATE, POTASSIUM CHLORIDE, AND CALCIUM CHLORIDE 2721 ML: .6; .31; .03; .02 INJECTION, SOLUTION INTRAVENOUS at 05:09

## 2022-09-07 RX ADMIN — HYDROCODONE BITARTRATE AND ACETAMINOPHEN 1 TABLET: 5; 325 TABLET ORAL at 07:09

## 2022-09-07 RX ADMIN — LIDOCAINE HYDROCHLORIDE 10 ML: 20 JELLY TOPICAL at 07:09

## 2022-09-07 NOTE — DISCHARGE INSTRUCTIONS
Future Appointments   Date Time Provider Department Center   9/13/2022 11:00 AM Raegan Rodriguez MD [facility]   9/20/2022  2:00 PM Isela Bell NP [facility] [place]     Thank you for coming to our Emergency Department today. It is important to remember that some problems or medical conditions are difficult to diagnose and may not be found during your Emergency Department visit.     Be sure to follow up with your primary care doctor and review all labs/imaging/tests that were performed during your ER visit with them. Some labs/tests may be outside of the normal range and require non-emergent follow-up and further investigation to help diagnose/exclude/prevent complications or other potentially serious medical conditions that were not addressed during your ER visit.    If you do not have a primary care doctor, you may contact the one listed on your discharge paperwork or you may also call the [facility] Appointment Desk at [phone] to schedule an appointment and establish care with one. It is important to your health that you have a primary care doctor.    Please take all medications as directed. All medications may potentially have side-effects and it is impossible to predict which medications may give you side-effects or what side-effects (if any) they will give you. If you feel that you are having a negative effect or side-effect of any medication you should immediately stop taking them and seek medical attention. If you feel that you are having a life-threatening reaction call 911.    Return to the ER with any questions/concerns, new/concerning symptoms, worsening or failure to improve.     Do not drive, swim, climb to height, take a bath, operate heavy machinery, drink alcohol or take potentially sedating medications, sign any legal documents or make any important decisions for 24 hours if you have received any pain medications, sedatives or mood altering drugs during your ER  visit or within 24 hours of taking them if they have been prescribed to you.     You can find additional resources for Dentists, hearing aids, durable medical equipment, low cost pharmacies and other resources at [email or web address]

## 2022-09-07 NOTE — ED PROVIDER NOTES
"Encounter Date: 9/7/2022       History     Chief Complaint   Patient presents with    Urinary Tract Infection     Pt has chronic blanco. Pt takes meds for low BP     62-year-old male with chronic indwelling Blanco catheter for urinary continence presents for burning pain at the head of his penis, worse with movement, lasting 1 minute in time, getting better on its own.  It is associated with "oatmeal colored urine ".  He has had chronic urinary tract infections since the placement of the catheter.  Most recently the catheter was changed on August 26th here [place].  Was started on antibiotics and then changed to Bactrim 1 week ago which he just completed.  He came in this morning because the pain worsened, he is out of antibiotics, he still having symptoms.  He denies fevers or chills.  Denies abdominal pain, nausea or vomiting.    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 OR;  Service: Orthopedics;  Laterality: Right;    LASER LITHOTRIPSY Left 7/29/2022    Procedure: LITHOTRIPSY, USING LASER;  Surgeon: Raegan Rodriguez MD;  Location: [facility] OR;  Service: Urology;  " Laterality: Left;    TOE AMPUTATION Right 10/12/2021    Procedure: AMPUTATION, TOE;  Surgeon: Milton Lauren DPM;  Location: [facility] OR;  Service: Podiatry;  Laterality: Right;    URETEROSCOPIC REMOVAL OF URETERIC CALCULUS Left 7/29/2022    Procedure: REMOVAL, CALCULUS, URETER, URETEROSCOPIC;  Surgeon: Raegan Rodriguez MD;  Location: [facility] OR;  Service: Urology;  Laterality: Left;     Family History   Problem Relation Age of Onset    Hypertension Father      Social History     Tobacco Use    Smoking status: Former    Smokeless tobacco: Never   Substance Use Topics    Alcohol use: Not Currently    Drug use: Not Currently     Review of Systems   Constitutional:  Negative for fever.   HENT:  Negative for sore throat.    Respiratory:  Negative for shortness of breath.    Cardiovascular:  Negative for chest pain.   Gastrointestinal:  Negative for nausea.   Genitourinary:  Positive for dysuria.        Urethral discharge   Musculoskeletal:  Negative for back pain.   Skin:  Negative for rash.   Neurological:  Negative for weakness.   Hematological:  Does not bruise/bleed easily.     Physical Exam     Initial Vitals [09/07/22 0456]   BP Pulse Resp Temp SpO2   (!) 74/57 94 18 97.1 °F (36.2 °C) 95 %      MAP       --         Physical Exam    Nursing note and vitals reviewed.  Constitutional: He appears well-developed and well-nourished. No distress.   Pleasant 62-year-old male   HENT:   Head: Normocephalic and atraumatic.   Mouth/Throat: Oropharynx is clear and moist.   Eyes: Conjunctivae and EOM are normal. Pupils are equal, round, and reactive to light.   Neck: No thyromegaly present.   Normal range of motion.  Cardiovascular:  Normal rate, regular rhythm and intact distal pulses.           Pulmonary/Chest: Breath sounds normal. No respiratory distress. He has no wheezes.   Abdominal: Abdomen is soft. Bowel sounds are normal. He exhibits no distension. There is no abdominal tenderness. There is no rebound and no guarding.    Genitourinary:    Genitourinary Comments: Donis catheter in place with thick sediment in the urine.  White, yellowu rethral discharge noted, glans penis normal otherwise, no lesions     Musculoskeletal:         General: No tenderness or edema. Normal range of motion.      Cervical back: Normal range of motion.     Neurological: He is alert and oriented to person, place, and time. He has normal strength. No cranial nerve deficit.   Skin: Skin is warm and dry. No rash noted.   Psychiatric: He has a normal mood and affect. His behavior is normal. Thought content normal.       ED Course   Procedures  Labs Reviewed   CBC W/ AUTO DIFFERENTIAL - Abnormal; Notable for the following components:       Result Value    RBC 4.26 (*)     Hemoglobin 12.8 (*)     MCHC 31.8 (*)     All other components within normal limits   COMPREHENSIVE METABOLIC PANEL - Abnormal; Notable for the following components:    Glucose 224 (*)     All other components within normal limits   PROTIME-INR - Abnormal; Notable for the following components:    PT 14.3 (*)     All other components within normal limits   CULTURE, BLOOD   CULTURE, BLOOD   LACTIC ACID, PLASMA   MAGNESIUM   PHOSPHORUS   APTT   B-TYPE NATRIURETIC PEPTIDE   TROPONIN I   PROCALCITONIN   URINALYSIS, REFLEX TO URINE CULTURE   LACTIC ACID, PLASMA          Imaging Results              X-Ray Chest AP Portable (Final result)  Result time 09/07/22 06:43:51      Final result by Rio Asher MD (09/07/22 06:43:51)                   Narrative:    Chest single view    CLINICAL DATA: Sepsis    FINDINGS: Comparison to March 24. The heart is enlarged. Rightward tracheal shift stable. There is bilateral mild chronic interstitial prominence, unchanged. No new infiltrates or effusions are identified.    IMPRESSION:  1. Chronic findings as above. Stable appearance of the chest compared to March 2022.    Electronically signed by:  Rio Asher MD  9/7/2022 6:43 AM CDT Workstation:  [id number]                                     Medications   lactated ringers bolus 2,721 mL (0 mLs Intravenous Stopped 9/7/22 0630)   ciprofloxacin HCl tablet 500 mg (500 mg Oral Given 9/7/22 0739)   HYDROcodone-acetaminophen 5-325 mg per tablet 1 tablet (1 tablet Oral Given 9/7/22 0739)   LIDOcaine HCl 2% urojet 10 mL (10 mLs Topical (Top) Given 9/7/22 0740)     Medical Decision Making:   Initial Assessment:   62-year-old male with chronic indwelling Donis presents for dysuria, urethral irritation, and foul-smelling urine.  Sepsis workup initiated because the patient has low blood pressure in addition to a possible source.    Differential Diagnosis:   Urinary tract infection, urethritis, sepsis, pyelonephritis,   Less concern for sexually transmitted disease given the patient is not sexually active  ED Management:  Lactate normal, no leukocytosis, CBC and CMP within normal limits.  Donis exchanged because of the purulence discharge in clinical ureteritis  Will start the patient on Cipro  Given the patient has no systemic symptoms, no nausea or vomiting, no fevers chills, no abdominal pain, no back pain and is chronically low blood pressure for which he takes midodrine, is not tachycardic, he is mentating normally I doubt this is sepsis this time           ED Course as of 09/07/22 0815   Wed Sep 07, 2022   0813 CBC auto differential(!)  No leukocytosis, normal hemoglobin [BS]   0813 Comprehensive metabolic panel(!)  Mild elevation in glucose consistent with baseline diabetes, otherwise normal [BS]   0814 Lactic acid, plasma #1 [BS]   0814 Troponin I [BS]   0814 Procalcitonin  Less concerned for acute systemic bacterial infection [BS]   0814 Brain natriuretic peptide [BS]   0814 Patient is feeling well and wants to go home.  His blood pressure responded to his home midodrine.  He has no systemic symptoms but only local urethritis.  Stable for discharge with outpatient follow-up with Urology on the 13th along with  "strict return precautions for worsening symptoms or systemic systems. [BS]      ED Course User Index  [BS] Roberto Perez MD    Sepsis Perfusion Assessment: "I attest a sepsis perfusion exam was performed within 6 hours of sepsis, severe sepsis, or septic shock presentation, following fluid resuscitation."        Clinical Impression:   Final diagnoses:  [Z97.8] Chronic indwelling Donis catheter (Primary)  [N34.2] Urethritis      ED Disposition Condition    Discharge Stable          ED Prescriptions       Medication Sig Dispense Start Date End Date Auth. Provider    ciprofloxacin HCl (CIPRO) 500 MG tablet Take 1 tablet (500 mg total) by mouth 2 (two) times daily. for 5 days 10 tablet 9/7/2022 9/12/2022 Roberto Perez MD          Follow-up Information       Follow up With Specialties Details Why Contact Info    Raegan Rodriguez MD Urology Go on 9/13/2022 To recheck today's symptoms [address] [place] [address]  [address]  [facility] [address]  [phone]               Roberto Perez MD  09/07/22 0815    "

## 2022-09-09 LAB — BACTERIA UR CULT: ABNORMAL

## 2022-09-12 LAB
BACTERIA BLD CULT: NORMAL
BACTERIA BLD CULT: NORMAL

## 2022-09-13 ENCOUNTER — TELEPHONE (OUTPATIENT)
Dept: PULMONOLOGY | Facility: CLINIC | Age: 62
End: 2022-09-13
Payer: MEDICAID

## 2022-09-13 ENCOUNTER — TELEPHONE (OUTPATIENT)
Dept: UROLOGY | Facility: CLINIC | Age: 62
End: 2022-09-13
Payer: MEDICAID

## 2022-09-13 DIAGNOSIS — J44.9 CHRONIC OBSTRUCTIVE PULMONARY DISEASE, UNSPECIFIED COPD TYPE: Primary | ICD-10-CM

## 2022-09-13 NOTE — PROGRESS NOTES
[facility] Note    PCP: [facility]    Chief Complaint: renal mass    SUBJECTIVE:       History of Present Illness:  James Jiang is a 62 y.o. male who presents to clinic for renal mass. He is Established  to our clinic.     S/p left ureteroscopy on 7/29.  PET scan showed no chest activity.   Have not yet received pulmonary clearance. Scheduled for PFTs on Monday.   His HA1c is 6.2    Has had no issues since his ureteroscopy.   Today he states that he has thought about surgery and is very hesitant. He is worried about need for dialysis in the future and absolutely would not want this.   Catheter exchanged in ED a week ago.       6/28/22  Consult originally from February while admitted to [facility]:  Consult received for incidentally discovered 5 cm left renal mass.     Patient is currently admitted for infected wound following right hip surgery in November. He failed to follow up for suture removal. He has undergone irrigation and debridement of the wound.      Noted to have a UTI with culture growing MRSA. He underwent a renal US which noted a solid left renal mass.   He then underwent CTA which has confirmed a 4.7 cm renal mass concerning for malignancy. He is also noted to have severe PVD.      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 His hemoglobin A1c was 11 however per notes from PCP was reportedly 5.9 in May.     He failed to show up for apts twice.   He currently has an indwelling blanco which has been in place since October. States he has never been able to empty his bladder. He says he had a cystoscopy about 5 years ago and had what he says was a stricture that was opened. Getting blanco exchanged with home health. He is on flomax. He does not want the  blanco removed.   No gross hematuria.   Currently smoking, 1.5 pack a day.     In May he had a repeat scan and at time it showed a stone in the left ureter. He was unaware of this and has not been addressed.     Last urine culture: MRSA (22)    Lab Results   Component Value Date    CREATININE 0.8 2022     Family  hx: father  of bladder cancer, no kidney or prostate cancer  Hx of a fib, COPD, bilateral PE, DM, obesity    Past medical, family, and social history reviewed as documented in chart with pertinent positive medical, family, and social history detailed in HPI.    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    Hypertension      Past Surgical History:   Procedure Laterality Date    CYSTOURETEROSCOPY WITH RETROGRADE PYELOGRAPHY AND INSERTION OF STENT INTO URETER Left 2022    Procedure: CYSTOURETEROSCOPY, WITH RETROGRADE PYELOGRAM AND URETERAL STENT INSERTION;  Surgeon: Raegan Rodriguez MD;  Location: [facility] OR;  Service: Urology;  Laterality: Left;    denies pertinent surgical history      INTRAMEDULLARY RODDING OF FEMUR Right 2021    Procedure: INSERTION, INTRAMEDULLARY HENNA, FEMUR;  Surgeon: Matt Milian MD;  Location: [facility] OR;  Service: Orthopedics;  Laterality: Right;    LASER LITHOTRIPSY Left 2022    Procedure: LITHOTRIPSY, USING LASER;  Surgeon: Raegan Rodriguez MD;  Location: [facility] OR;  Service: Urology;  Laterality: Left;    TOE AMPUTATION Right 10/12/2021    Procedure: AMPUTATION, TOE;  Surgeon: Milton Lauren DPM;  Location: [facility] OR;  Service: Podiatry;  Laterality: Right;    URETEROSCOPIC REMOVAL OF URETERIC CALCULUS Left 2022    Procedure: REMOVAL,  "CALCULUS, URETER, URETEROSCOPIC;  Surgeon: Raegan Rodriguez MD;  [facility];  Service: Urology;  Laterality: Left;     Family History   Problem Relation Age of Onset    Hypertension Father      Social History     Tobacco Use    Smoking status: Former    Smokeless tobacco: Never   Substance Use Topics    Alcohol use: Not Currently    Drug use: Not Currently        Review of Systems   Constitutional:  Negative for fever.   Genitourinary:  Positive for dysuria. Negative for hematuria.     OBJECTIVE:     Anticoagulation:  Eliquis 5 mg     Estimated body mass index is 31.32 kg/m² as calculated from the following:    Height as of 9/7/22: 5' 7" (1.702 m).    Weight as of 9/7/22: 90.7 kg (200 lb).    Vital Signs (Most Recent)  Pulse: 90 (09/15/22 0958)  BP: 105/70 (09/15/22 0958)    Physical Exam  Constitutional:       General: He is not in acute distress.     Appearance: He is ill-appearing. He is not toxic-appearing or diaphoretic.      Comments: In wheelchair   HENT:      Head: Normocephalic and atraumatic.   Cardiovascular:      Rate and Rhythm: Normal rate and regular rhythm.   Pulmonary:      Effort: Pulmonary effort is normal. No respiratory distress.   Abdominal:      Palpations: Abdomen is soft.   Genitourinary:     Comments: Donis draining clear yellow urine  Skin:     Coloration: Skin is not jaundiced.   Neurological:      General: No focal deficit present.   Psychiatric:         Mood and Affect: Mood normal.         Behavior: Behavior normal.         Thought Content: Thought content normal.       BMP  Lab Results   Component Value Date     09/07/2022    K 4.6 09/07/2022     09/07/2022    CO2 29 09/07/2022    BUN 22 09/07/2022    CREATININE 0.8 09/07/2022    CALCIUM 9.7 09/07/2022    ANIONGAP 8 09/07/2022    ESTGFRAFRICA >60 07/12/2022    EGFRNONAA >60 07/12/2022       Lab Results   Component Value Date    WBC 8.66 09/07/2022    HGB 12.8 (L) 09/07/2022    HCT 40.2 09/07/2022    MCV 94 " 09/07/2022     09/07/2022     Imaging:  Per HPI    ASSESSMENT     1. Left renal mass    2. Physical debility    3. Bilateral pulmonary embolism    4. Chronic obstructive pulmonary disease, unspecified COPD type    5. Paroxysmal atrial fibrillation    6. Urinary retention      PLAN:     - Long talk with patient and his girlfriend   - Ultimately he is a very poor candidate for major surgery, however he does have a large renal mass that ideally does require treatment   - We reviewed his imaging, has a 4.7 cm endophytic midpole left renal mass, this will require radical nephrectomy   - He will need to be optimized for surgery and cleared by PCP and heme onc to come off anticoagulation  - He is scheduled for PFTs next week  - Once we have clearance we can get him scheduled. However, today he states he is having second thoughts on surgery.   - We discussed that there is no urgency to this surgery. Hobbs masses for surveillance are less than 2 cm, however given his complex history I am ok with close surveillance if he prefers. We discussed higher risk of growth and spread of his mass potentially making him incurable   - Follow up in 6 months with CT unless he decides to proceed with surgery sooner     Raegan Rodriguez MD       I spent 30 minutes with the patient. Over 50% of the visit was spent in counseling.

## 2022-09-13 NOTE — TELEPHONE ENCOUNTER
Patient scheduled for PFT on Monday 9/19 at 11:30 AM. Patient v/u.    ----- Message from Isela Bell NP sent at 9/13/2022 11:15 AM CDT -----  Regarding: RE: PFT  Contact: self  Done  ----- Message -----  From: Belia Wright  Sent: 9/13/2022  10:29 AM CDT  To: Isela Bell NP  Subject: PFT                                              Please place order for PFT. Patient had one scheduled that got canceled, and now has been moved to finalized. I cannot schedule from it.       ----- Message -----  From: Angy Tabor  Sent: 9/13/2022  10:12 AM CDT  To: [facility] Staff    Type: Sooner Appointment Request        Caller is requesting a sooner appointment. Caller declined first available appointment listed below. Caller will not accept being placed on the waitlist and is requesting a message be sent to doctor.        Name of Caller: Patient   Best Call Back Number: [phone]  Additional Information: Pt wants to know can the office r/s his breathing test b4 this appt he has with  YVONNE Bell is competed. Plz call out to pt to r/s and medically advise on what the next step should be.  Thanks

## 2022-09-13 NOTE — TELEPHONE ENCOUNTER
----- Message from Angy Tabor sent at 9/13/2022 10:08 AM CDT -----  Contact: self  Type: Sooner Appointment Request        Caller is requesting a sooner appointment. Caller declined first available appointment listed below. Caller will not accept being placed on the waitlist and is requesting a message be sent to doctor.        Name of Caller: Patient   Best Call Back Number: [phone]  Additional Information: Pt states he just needs to get r/s. Pt didn't get to take medication and other factors so he wont make appt today. Plz call out to pt to get r/s. Thanks

## 2022-09-13 NOTE — TELEPHONE ENCOUNTER
Returned call and spoke with Rachelle, she states the patient has not been taking his med for dizziness and has been falling, they have to reschedule appt for today. Appt rescheduled to later this week, she verbally understood.

## 2022-09-15 ENCOUNTER — OFFICE VISIT (OUTPATIENT)
Dept: UROLOGY | Facility: CLINIC | Age: 62
End: 2022-09-15
Payer: MEDICAID

## 2022-09-15 VITALS — SYSTOLIC BLOOD PRESSURE: 105 MMHG | DIASTOLIC BLOOD PRESSURE: 70 MMHG | HEART RATE: 90 BPM

## 2022-09-15 DIAGNOSIS — J44.9 CHRONIC OBSTRUCTIVE PULMONARY DISEASE, UNSPECIFIED COPD TYPE: ICD-10-CM

## 2022-09-15 DIAGNOSIS — R33.9 URINARY RETENTION: ICD-10-CM

## 2022-09-15 DIAGNOSIS — I48.0 PAROXYSMAL ATRIAL FIBRILLATION: Chronic | ICD-10-CM

## 2022-09-15 DIAGNOSIS — R53.81 PHYSICAL DEBILITY: ICD-10-CM

## 2022-09-15 DIAGNOSIS — I26.99 BILATERAL PULMONARY EMBOLISM: ICD-10-CM

## 2022-09-15 DIAGNOSIS — N28.89 LEFT RENAL MASS: Primary | ICD-10-CM

## 2022-09-15 PROCEDURE — 99214 PR OFFICE/OUTPT VISIT, EST, LEVL IV, 30-39 MIN: ICD-10-PCS | Mod: S$PBB,,, | Performed by: STUDENT IN AN ORGANIZED HEALTH CARE EDUCATION/TRAINING PROGRAM

## 2022-09-15 PROCEDURE — 3078F DIAST BP <80 MM HG: CPT | Mod: CPTII,,, | Performed by: STUDENT IN AN ORGANIZED HEALTH CARE EDUCATION/TRAINING PROGRAM

## 2022-09-15 PROCEDURE — 99999 PR PBB SHADOW E&M-EST. PATIENT-LVL III: CPT | Mod: PBBFAC,,, | Performed by: STUDENT IN AN ORGANIZED HEALTH CARE EDUCATION/TRAINING PROGRAM

## 2022-09-15 PROCEDURE — 3044F PR MOST RECENT HEMOGLOBIN A1C LEVEL <7.0%: ICD-10-PCS | Mod: CPTII,,, | Performed by: STUDENT IN AN ORGANIZED HEALTH CARE EDUCATION/TRAINING PROGRAM

## 2022-09-15 PROCEDURE — 3074F SYST BP LT 130 MM HG: CPT | Mod: CPTII,,, | Performed by: STUDENT IN AN ORGANIZED HEALTH CARE EDUCATION/TRAINING PROGRAM

## 2022-09-15 PROCEDURE — 3078F PR MOST RECENT DIASTOLIC BLOOD PRESSURE < 80 MM HG: ICD-10-PCS | Mod: CPTII,,, | Performed by: STUDENT IN AN ORGANIZED HEALTH CARE EDUCATION/TRAINING PROGRAM

## 2022-09-15 PROCEDURE — 3044F HG A1C LEVEL LT 7.0%: CPT | Mod: CPTII,,, | Performed by: STUDENT IN AN ORGANIZED HEALTH CARE EDUCATION/TRAINING PROGRAM

## 2022-09-15 PROCEDURE — 1159F MED LIST DOCD IN RCRD: CPT | Mod: CPTII,,, | Performed by: STUDENT IN AN ORGANIZED HEALTH CARE EDUCATION/TRAINING PROGRAM

## 2022-09-15 PROCEDURE — 99999 PR PBB SHADOW E&M-EST. PATIENT-LVL III: ICD-10-PCS | Mod: PBBFAC,,, | Performed by: STUDENT IN AN ORGANIZED HEALTH CARE EDUCATION/TRAINING PROGRAM

## 2022-09-15 PROCEDURE — 99213 OFFICE O/P EST LOW 20 MIN: CPT | Mod: PBBFAC,PN | Performed by: STUDENT IN AN ORGANIZED HEALTH CARE EDUCATION/TRAINING PROGRAM

## 2022-09-15 PROCEDURE — 3074F PR MOST RECENT SYSTOLIC BLOOD PRESSURE < 130 MM HG: ICD-10-PCS | Mod: CPTII,,, | Performed by: STUDENT IN AN ORGANIZED HEALTH CARE EDUCATION/TRAINING PROGRAM

## 2022-09-15 PROCEDURE — 99214 OFFICE O/P EST MOD 30 MIN: CPT | Mod: S$PBB,,, | Performed by: STUDENT IN AN ORGANIZED HEALTH CARE EDUCATION/TRAINING PROGRAM

## 2022-09-15 PROCEDURE — 1159F PR MEDICATION LIST DOCUMENTED IN MEDICAL RECORD: ICD-10-PCS | Mod: CPTII,,, | Performed by: STUDENT IN AN ORGANIZED HEALTH CARE EDUCATION/TRAINING PROGRAM

## 2022-09-18 ENCOUNTER — TELEPHONE (OUTPATIENT)
Dept: HEMATOLOGY/ONCOLOGY | Facility: HOSPITAL | Age: 62
End: 2022-09-18

## 2022-09-19 ENCOUNTER — HOSPITAL ENCOUNTER (OUTPATIENT)
Dept: PULMONOLOGY | Facility: HOSPITAL | Age: 62
Discharge: HOME | End: 2022-09-19
Attending: NURSE PRACTITIONER
Payer: MEDICAID

## 2022-09-19 ENCOUNTER — TELEPHONE (OUTPATIENT)
Dept: PULMONOLOGY | Facility: CLINIC | Age: 62
End: 2022-09-19
Payer: MEDICAID

## 2022-09-19 DIAGNOSIS — J44.9 CHRONIC OBSTRUCTIVE PULMONARY DISEASE, UNSPECIFIED COPD TYPE: ICD-10-CM

## 2022-09-19 PROCEDURE — 94010 BREATHING CAPACITY TEST: CPT

## 2022-09-19 PROCEDURE — 94729 DIFFUSING CAPACITY: CPT

## 2022-09-19 PROCEDURE — 94727 GAS DIL/WSHOT DETER LNG VOL: CPT

## 2022-09-19 PROCEDURE — 94060 EVALUATION OF WHEEZING: CPT

## 2022-09-19 NOTE — TELEPHONE ENCOUNTER
This man was last seen in 6/2022.  He did not follow up here.  Schedule him to come in with in 1 month.

## 2022-09-19 NOTE — TELEPHONE ENCOUNTER
Tried to leave voicemail. During middle of leaving message voicemail said message canceled thanks for calling and hung up.     ----- Message from Albert Zimmer sent at 9/19/2022  8:57 AM CDT -----  Type:  Sooner Appointment Request    Caller is requesting a sooner appointment.  Caller declined first available appointment listed below.  Caller will not accept being placed on the waitlist and is requesting a message be sent to doctor.    Name of Caller:  RUSLAN Watts  When is the first available appointment?  Pt needs to reschedule-- Out of Template-- EP Medicaid  Symptoms: FU   Best Call Back Number:  [phone]  Additional Information:

## 2022-09-19 NOTE — RESPIRATORY THERAPY
Patient came in for a PFT.  One FVC recorded before patient refused to complete the test.  Patient states that he is very claustrophobic and does not want to do the test, MD notified.

## 2022-09-20 LAB
BRPFT: NORMAL
FEF 25 75 LLN: 1.19
FEF 25 75 PRE REF: 84.9 %
FEF 25 75 REF: 2.54
FEV1 FVC LLN: 65
FEV1 FVC PRE REF: 101.4 %
FEV1 FVC REF: 77
FEV1 LLN: 2.3
FEV1 PRE REF: 71.4 %
FEV1 REF: 3.1
FVC LLN: 3.03
FVC PRE REF: 70.3 %
FVC REF: 4.01
PEF LLN: 6.11
PEF PRE REF: 46.9 %
PEF REF: 8.23
PRE FEF 25 75: 2.16 L/S
PRE FET 100: 4.54 SEC
PRE FEV1 FVC: 78.48 %
PRE FEV1: 2.21 L
PRE FVC: 2.82 L
PRE PEF: 3.86 L/S

## 2022-09-21 ENCOUNTER — TELEPHONE (OUTPATIENT)
Dept: PULMONOLOGY | Facility: CLINIC | Age: 62
End: 2022-09-21
Payer: MEDICAID

## 2022-09-21 NOTE — TELEPHONE ENCOUNTER
Tried to speak to pt but LVM to call us back     ----- Message from Isela Bell NP sent at 9/19/2022  4:53 PM CDT -----  Lung function test with limited results - based on lung capacity noted, would be able to clear for surgical clearance if patient wishes.

## 2022-09-26 ENCOUNTER — PATIENT MESSAGE (OUTPATIENT)
Dept: PULMONOLOGY | Facility: CLINIC | Age: 62
End: 2022-09-26
Payer: MEDICAID

## 2022-10-04 ENCOUNTER — TELEPHONE (OUTPATIENT)
Dept: UROLOGY | Facility: CLINIC | Age: 62
End: 2022-10-04
Payer: MEDICAID

## 2022-10-04 NOTE — TELEPHONE ENCOUNTER
Returned call and spoke with patient, he states he has to cancel his nurse visit to have catheter changed, he will call back to reschedule.

## 2022-10-04 NOTE — TELEPHONE ENCOUNTER
----- Message from Queenie Obando, Patient Care Assistant sent at 10/4/2022  8:07 AM CDT -----  Regarding: appointment  Contact: pt  Type: Needs Medical Advice  Who Called:  pt   Best Call Back Number: [phone] (home)     Additional Information: pt states he would like a callback regarding cancelling an nurse visit on today. Please call pt to advise. Thanks!

## 2022-10-11 ENCOUNTER — TELEPHONE (OUTPATIENT)
Dept: UROLOGY | Facility: CLINIC | Age: 62
End: 2022-10-11
Payer: MEDICAID

## 2022-10-11 NOTE — TELEPHONE ENCOUNTER
----- Message from Porsche Foley sent at 10/11/2022  2:41 PM CDT -----  Regarding: returning call  Contact: patient  Type:  Patient Returning Call    Who Called:  caregiver, Clau Mac  Who Left Message for Patient:  unknown  Does the patient know what this is regarding?:  nurse appt  Best Call Back Number:  [phone] (home)   Additional Information:  Please call Clau to schedule. Thanks!

## 2022-10-12 ENCOUNTER — CLINICAL SUPPORT (OUTPATIENT)
Dept: UROLOGY | Facility: CLINIC | Age: 62
End: 2022-10-12
Payer: MEDICAID

## 2022-10-12 DIAGNOSIS — Z97.8 INDWELLING FOLEY CATHETER PRESENT: Primary | ICD-10-CM

## 2022-10-12 PROCEDURE — 99499 UNLISTED E&M SERVICE: CPT | Mod: S$PBB,,, | Performed by: STUDENT IN AN ORGANIZED HEALTH CARE EDUCATION/TRAINING PROGRAM

## 2022-10-12 PROCEDURE — 99499 NO LOS: ICD-10-PCS | Mod: S$PBB,,, | Performed by: STUDENT IN AN ORGANIZED HEALTH CARE EDUCATION/TRAINING PROGRAM

## 2022-10-12 NOTE — PROGRESS NOTES
Patient arrived to clinic to have catheter changed. Assist to exam table, deflated 10 ml saline balloon. Patient draped and prepped, inserted 20 fr blanco catheter, 20 ml saline ,  bag and stat lock attached.  nurse for next cath changed.

## 2022-10-27 ENCOUNTER — HOSPITAL ENCOUNTER (EMERGENCY)
Facility: HOSPITAL | Age: 62
Discharge: HOME | End: 2022-10-27
Attending: EMERGENCY MEDICINE
Payer: MEDICAID

## 2022-10-27 VITALS
WEIGHT: 200 LBS | TEMPERATURE: 98 F | RESPIRATION RATE: 18 BRPM | BODY MASS INDEX: 30.31 KG/M2 | OXYGEN SATURATION: 95 % | HEIGHT: 68 IN | HEART RATE: 79 BPM | SYSTOLIC BLOOD PRESSURE: 132 MMHG | DIASTOLIC BLOOD PRESSURE: 67 MMHG

## 2022-10-27 DIAGNOSIS — T83.011A MALFUNCTION OF FOLEY CATHETER, INITIAL ENCOUNTER: ICD-10-CM

## 2022-10-27 DIAGNOSIS — Z76.0 ENCOUNTER FOR MEDICATION REFILL: Primary | ICD-10-CM

## 2022-10-27 PROCEDURE — 25000003 PHARM REV CODE 250: Performed by: EMERGENCY MEDICINE

## 2022-10-27 PROCEDURE — 99283 EMERGENCY DEPT VISIT LOW MDM: CPT

## 2022-10-27 RX ORDER — LIDOCAINE HYDROCHLORIDE 20 MG/ML
JELLY TOPICAL
Status: COMPLETED | OUTPATIENT
Start: 2022-10-27 | End: 2022-10-27

## 2022-10-27 RX ORDER — GABAPENTIN 800 MG/1
800 TABLET ORAL 3 TIMES DAILY PRN
Qty: 30 TABLET | Refills: 0 | OUTPATIENT
Start: 2022-10-27 | End: 2022-11-22

## 2022-10-27 RX ADMIN — GABAPENTIN 800 MG: 300 CAPSULE ORAL at 05:10

## 2022-10-27 RX ADMIN — LIDOCAINE HYDROCHLORIDE: 20 JELLY TOPICAL at 05:10

## 2022-10-27 NOTE — ED PROVIDER NOTES
"Encounter Date: 10/27/2022       History     Chief Complaint   Patient presents with    Medication Refill     Pt states he is out of his Gabapentin and is in pain from his "toes to shoulders"      63 yo man with PMH diabetes with neuropathic pain as well as chronic indwelling blanco secondary to urinary retention presents to the ED with two complaints. First, he ran out of his gabapentin 2 days ago. He reports attempting to contact his PCP twice about a refill in the past two days but has not received a phone call back. Pain became severe this am so he presented to the ED. He complains of "shooting, electrical pain" all over his body consistent with chronic pain.  He also complains of leakage from his Blanco catheter.  This was changed in clinic on October 12th.  He reports that since that time he has had leakage around the catheter and is requesting an additional Blanco change in the emergency department.  He denies any suprapubic pain, change in color of urine or fevers, chills.    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 OR;  Service: Urology;  Laterality: Left;    denies pertinent surgical history      INTRAMEDULLARY RODDING OF FEMUR Right 11/7/2021    Procedure: INSERTION, INTRAMEDULLARY HENNA, FEMUR;  Surgeon: Matt Milian MD;  Location: [facility] OR;  Service: Orthopedics;  Laterality: Right;    " LASER LITHOTRIPSY Left 7/29/2022    Procedure: LITHOTRIPSY, USING LASER;  Surgeon: Raegan Rodriguez MD;  Location: [facility] OR;  Service: Urology;  Laterality: Left;    TOE AMPUTATION Right 10/12/2021    Procedure: AMPUTATION, TOE;  Surgeon: Milton Lauren DPM;  Location: [facility] OR;  Service: Podiatry;  Laterality: Right;    URETEROSCOPIC REMOVAL OF URETERIC CALCULUS Left 7/29/2022    Procedure: REMOVAL, CALCULUS, URETER, URETEROSCOPIC;  Surgeon: Raegan Rodriguez MD;  Location: [facility] OR;  Service: Urology;  Laterality: Left;     Family History   Problem Relation Age of Onset    Hypertension Father      Social History     Tobacco Use    Smoking status: Former    Smokeless tobacco: Never   Substance Use Topics    Alcohol use: Not Currently    Drug use: Not Currently     Review of Systems   Constitutional:  Negative for fever.   HENT:  Negative for sore throat.    Respiratory:  Negative for shortness of breath.    Cardiovascular:  Negative for chest pain.   Gastrointestinal:  Negative for abdominal pain, diarrhea, nausea and vomiting.   Genitourinary:  Negative for dysuria.   Musculoskeletal:  Negative for back pain.   Skin:  Negative for rash.   Neurological:  Negative for weakness.   Hematological:  Does not bruise/bleed easily.   All other systems reviewed and are negative.    Physical Exam     Initial Vitals [10/27/22 0506]   BP Pulse Resp Temp SpO2   132/67 79 18 97.5 °F (36.4 °C) 95 %      MAP       --         Physical Exam    Nursing note and vitals reviewed.  Constitutional: He appears well-developed and well-nourished. No distress.   HENT:   Head: Normocephalic and atraumatic.   Eyes: EOM are normal.   Neck:   Normal range of motion.  Cardiovascular:  Normal rate, regular rhythm, normal heart sounds and intact distal pulses.           No murmur heard.  Pulmonary/Chest: Breath sounds normal. No respiratory distress. He has no wheezes. He has no rales.   Abdominal: Abdomen is soft. There is no abdominal  tenderness.   Musculoskeletal:         General: Normal range of motion.      Cervical back: Normal range of motion.     Neurological: He is alert and oriented to person, place, and time. GCS score is 15. GCS eye subscore is 4. GCS verbal subscore is 5. GCS motor subscore is 6.   Skin: Skin is warm and dry.   Psychiatric: He has a normal mood and affect.       ED Course   Procedures  Labs Reviewed - No data to display       Imaging Results    None          Medications   LIDOcaine HCl 2% urojet (has no administration in time range)   gabapentin capsule 800 mg (800 mg Oral Given 10/27/22 0550)     Medical Decision Making:   ED Management:  62-year-old male presents emergency department with a request for refills of his gabapentin.  Provide a short refill until the patient can get an appointment with his PCP but he has been advised that we will not provide any additional refills from the ED. Will also exchange blanco catheter in ED. Asymptomatic so will not check UA at this time. Advised to FU with PCP and Urology. The patient is aware of and agrees with the plan of care. Detailed return precautions discussed.    Nyla Melendez MD  Emergency Medicine  10/27/2022 6:09 AM                                Clinical Impression:   Final diagnoses:  [Z76.0] Encounter for medication refill (Primary)  [T83.011A] Malfunction of Blanco catheter, initial encounter        ED Disposition Condition    Discharge Stable          ED Prescriptions       Medication Sig Dispense Start Date End Date Auth. Provider    gabapentin (NEURONTIN) 800 MG tablet Take 1 tablet (800 mg total) by mouth 3 (three) times daily as needed (pain). 30 tablet 10/27/2022 11/6/2022 Nyla Melendez MD          Follow-up Information       Follow up With Specialties Details Why Contact Info Additional Information    [facility] - Emergency Dept Emergency Medicine  As needed, If symptoms worsen [address] [facility]  [address]  [phone] 1st floor             Nyla Melendez MD  10/27/22 0610

## 2022-10-27 NOTE — DISCHARGE INSTRUCTIONS
Future Appointments   Date Time Provider Department Center   10/27/2022 10:45 AM BRENT Mccarty MD [facility] HEM ONC [facility] [place]   3/14/2023  9:00 AM [facility] OIC CT1 LIMIT 450 LBS [facility] IM CT [facility]   3/28/2023  9:00 AM Raegan Rodriguez MD [facility] UROLOGY [place]     Please follow up as scheduled. You were given a short supply of gabapentin today but please follow up with your PCP for additional refills.

## 2022-11-01 ENCOUNTER — PATIENT OUTREACH (OUTPATIENT)
Dept: EMERGENCY MEDICINE | Facility: HOSPITAL | Age: 62
End: 2022-11-01

## 2022-11-14 ENCOUNTER — TELEPHONE (OUTPATIENT)
Dept: UROLOGY | Facility: CLINIC | Age: 62
End: 2022-11-14
Payer: MEDICAID

## 2022-11-14 NOTE — TELEPHONE ENCOUNTER
----- Message from Raegan Rodriguez MD sent at 11/14/2022 12:47 PM CST -----  Contact: pt at [phone]    ----- Message -----  From: Gail Lawsno  Sent: 11/14/2022  12:43 PM CST  To: Raegan Rodriguez MD    Type: Needs Medical Advice  Who Called: Marycarmenaaron Leigh Call Back Number: [phone]  Additional Information: pt need a call back at your earliest convene.

## 2022-11-15 ENCOUNTER — TELEPHONE (OUTPATIENT)
Dept: UROLOGY | Facility: CLINIC | Age: 62
End: 2022-11-15
Payer: MEDICAID

## 2022-11-15 ENCOUNTER — LAB VISIT (OUTPATIENT)
Dept: LAB | Facility: HOSPITAL | Age: 62
End: 2022-11-15
Payer: MEDICAID

## 2022-11-15 DIAGNOSIS — G62.9 PERIPHERAL NERVE DISORDER: ICD-10-CM

## 2022-11-15 DIAGNOSIS — J44.89 OBSTRUCTIVE CHRONIC BRONCHITIS WITHOUT EXACERBATION: ICD-10-CM

## 2022-11-15 DIAGNOSIS — R30.0 DYSURIA: ICD-10-CM

## 2022-11-15 DIAGNOSIS — I95.9 HYPOTENSION, UNSPECIFIED: ICD-10-CM

## 2022-11-15 DIAGNOSIS — Z97.8 FOLEY CATHETER IN PLACE: ICD-10-CM

## 2022-11-15 DIAGNOSIS — E07.9 DISEASE OF THYROID GLAND: ICD-10-CM

## 2022-11-15 DIAGNOSIS — N39.0 URINARY TRACT INFECTION, SITE NOT SPECIFIED: Primary | ICD-10-CM

## 2022-11-15 DIAGNOSIS — E11.8 DIABETIC COMPLICATION: ICD-10-CM

## 2022-11-15 DIAGNOSIS — D49.512 NEOPLASM OF LEFT KIDNEY: ICD-10-CM

## 2022-11-15 LAB
BACTERIA #/AREA URNS HPF: ABNORMAL /HPF
BILIRUB UR QL STRIP: NEGATIVE
CLARITY UR: ABNORMAL
COLOR UR: YELLOW
GLUCOSE UR QL STRIP: ABNORMAL
HGB UR QL STRIP: ABNORMAL
HYALINE CASTS #/AREA URNS LPF: 36 /LPF
KETONES UR QL STRIP: ABNORMAL
LEUKOCYTE ESTERASE UR QL STRIP: ABNORMAL
MICROSCOPIC COMMENT: ABNORMAL
NITRITE UR QL STRIP: POSITIVE
PH UR STRIP: 7 [PH] (ref 5–8)
PROT UR QL STRIP: ABNORMAL
RBC #/AREA URNS HPF: 29 /HPF (ref 0–4)
SP GR UR STRIP: >1.03 (ref 1–1.03)
SQUAMOUS #/AREA URNS HPF: 5 /HPF
URN SPEC COLLECT METH UR: ABNORMAL
UROBILINOGEN UR STRIP-ACNC: ABNORMAL EU/DL
WBC #/AREA URNS HPF: >100 /HPF (ref 0–5)
YEAST URNS QL MICRO: ABNORMAL

## 2022-11-15 PROCEDURE — 87077 CULTURE AEROBIC IDENTIFY: CPT | Performed by: NURSE PRACTITIONER

## 2022-11-15 PROCEDURE — 87186 SC STD MICRODIL/AGAR DIL: CPT | Performed by: NURSE PRACTITIONER

## 2022-11-15 PROCEDURE — 81001 URINALYSIS AUTO W/SCOPE: CPT | Performed by: NURSE PRACTITIONER

## 2022-11-15 PROCEDURE — 87086 URINE CULTURE/COLONY COUNT: CPT | Performed by: NURSE PRACTITIONER

## 2022-11-15 NOTE — TELEPHONE ENCOUNTER
----- Message from Bernadette Reis sent at 11/15/2022  1:56 PM CST -----  Contact: pt at [phone]  Type:  Patient Returning Call    Who Called:  pt  Who Left Message for Patient:  Lin  Does the patient know what this is regarding?:  yes  Best Call Back Number:  [phone]    Additional Information:  Please call back and advise.

## 2022-11-15 NOTE — TELEPHONE ENCOUNTER
Spoke with the pt and he states he states he thinks he has a UTI because it hurts when he stands up. He is requesting antibiotics. I asked if he had it pulled too tight and he declined. Advised I will send a message to Dr Rodriguez and call him back tomorrow with her decision. He agreed with that plan.

## 2022-11-17 LAB — BACTERIA UR CULT: ABNORMAL

## 2022-11-18 ENCOUNTER — HOSPITAL ENCOUNTER (INPATIENT)
Facility: HOSPITAL | Age: 62
LOS: 4 days | Discharge: HOME HEALTH | DRG: 603 | End: 2022-11-22
Attending: EMERGENCY MEDICINE | Admitting: INTERNAL MEDICINE
Payer: MEDICAID

## 2022-11-18 DIAGNOSIS — L02.219 ABSCESS OF SUPRAPUBIC REGION: ICD-10-CM

## 2022-11-18 DIAGNOSIS — Z97.8 CHRONIC INDWELLING FOLEY CATHETER: ICD-10-CM

## 2022-11-18 DIAGNOSIS — R53.81 PHYSICAL DEBILITY: ICD-10-CM

## 2022-11-18 DIAGNOSIS — I48.91 ATRIAL FIBRILLATION: ICD-10-CM

## 2022-11-18 DIAGNOSIS — B37.2 CUTANEOUS CANDIDIASIS: ICD-10-CM

## 2022-11-18 DIAGNOSIS — R07.9 CHEST PAIN: ICD-10-CM

## 2022-11-18 DIAGNOSIS — N39.0 URINARY TRACT INFECTION ASSOCIATED WITH INDWELLING URETHRAL CATHETER, INITIAL ENCOUNTER: ICD-10-CM

## 2022-11-18 DIAGNOSIS — R21 RASH AND NONSPECIFIC SKIN ERUPTION: Primary | ICD-10-CM

## 2022-11-18 DIAGNOSIS — T83.511A URINARY TRACT INFECTION ASSOCIATED WITH INDWELLING URETHRAL CATHETER, INITIAL ENCOUNTER: ICD-10-CM

## 2022-11-18 DIAGNOSIS — N28.89 LEFT RENAL MASS: ICD-10-CM

## 2022-11-18 PROBLEM — C64.9 RENAL CELL CARCINOMA: Status: ACTIVE | Noted: 2022-11-18

## 2022-11-18 LAB
ALBUMIN SERPL BCP-MCNC: 3.1 G/DL (ref 3.5–5.2)
ALP SERPL-CCNC: 143 U/L (ref 55–135)
ALT SERPL W/O P-5'-P-CCNC: 9 U/L (ref 10–44)
ANION GAP SERPL CALC-SCNC: 9 MMOL/L (ref 8–16)
AST SERPL-CCNC: 12 U/L (ref 10–40)
BACTERIA #/AREA URNS HPF: ABNORMAL /HPF
BASOPHILS # BLD AUTO: 0.1 K/UL (ref 0–0.2)
BASOPHILS NFR BLD: 0.6 % (ref 0–1.9)
BILIRUB SERPL-MCNC: 1.2 MG/DL (ref 0.1–1)
BILIRUB UR QL STRIP: NEGATIVE
BUN SERPL-MCNC: 16 MG/DL (ref 8–23)
CALCIUM SERPL-MCNC: 8.7 MG/DL (ref 8.7–10.5)
CHLORIDE SERPL-SCNC: 94 MMOL/L (ref 95–110)
CLARITY UR: ABNORMAL
CO2 SERPL-SCNC: 28 MMOL/L (ref 23–29)
COLOR UR: YELLOW
CREAT SERPL-MCNC: 0.8 MG/DL (ref 0.5–1.4)
CREAT SERPL-MCNC: 0.9 MG/DL (ref 0.5–1.4)
CRP SERPL-MCNC: 11.36 MG/DL
DIFFERENTIAL METHOD: ABNORMAL
EOSINOPHIL # BLD AUTO: 0.1 K/UL (ref 0–0.5)
EOSINOPHIL NFR BLD: 0.8 % (ref 0–8)
ERYTHROCYTE [DISTWIDTH] IN BLOOD BY AUTOMATED COUNT: 12.6 % (ref 11.5–14.5)
ERYTHROCYTE [SEDIMENTATION RATE] IN BLOOD BY WESTERGREN METHOD: 90 MM/HR (ref 0–10)
EST. GFR  (NO RACE VARIABLE): >60 ML/MIN/1.73 M^2
GLUCOSE SERPL-MCNC: 216 MG/DL (ref 70–110)
GLUCOSE SERPL-MCNC: 286 MG/DL (ref 70–110)
GLUCOSE UR QL STRIP: ABNORMAL
HCT VFR BLD AUTO: 39 % (ref 40–54)
HGB BLD-MCNC: 12.6 G/DL (ref 14–18)
HGB UR QL STRIP: ABNORMAL
HYALINE CASTS #/AREA URNS LPF: 71 /LPF
IMM GRANULOCYTES # BLD AUTO: 0.1 K/UL (ref 0–0.04)
IMM GRANULOCYTES NFR BLD AUTO: 0.6 % (ref 0–0.5)
KETONES UR QL STRIP: ABNORMAL
LACTATE SERPL-SCNC: 0.7 MMOL/L (ref 0.5–1.9)
LEUKOCYTE ESTERASE UR QL STRIP: ABNORMAL
LYMPHOCYTES # BLD AUTO: 2.3 K/UL (ref 1–4.8)
LYMPHOCYTES NFR BLD: 13.6 % (ref 18–48)
MCH RBC QN AUTO: 29.3 PG (ref 27–31)
MCHC RBC AUTO-ENTMCNC: 32.3 G/DL (ref 32–36)
MCV RBC AUTO: 91 FL (ref 82–98)
MICROSCOPIC COMMENT: ABNORMAL
MONOCYTES # BLD AUTO: 1.3 K/UL (ref 0.3–1)
MONOCYTES NFR BLD: 7.8 % (ref 4–15)
NEUTROPHILS # BLD AUTO: 12.7 K/UL (ref 1.8–7.7)
NEUTROPHILS NFR BLD: 76.6 % (ref 38–73)
NITRITE UR QL STRIP: POSITIVE
NRBC BLD-RTO: 0 /100 WBC
PH UR STRIP: 6 [PH] (ref 5–8)
PLATELET # BLD AUTO: 386 K/UL (ref 150–450)
PMV BLD AUTO: 10 FL (ref 9.2–12.9)
POTASSIUM SERPL-SCNC: 4.3 MMOL/L (ref 3.5–5.1)
PROT SERPL-MCNC: 7.9 G/DL (ref 6–8.4)
PROT UR QL STRIP: ABNORMAL
RBC # BLD AUTO: 4.3 M/UL (ref 4.6–6.2)
RBC #/AREA URNS HPF: >100 /HPF (ref 0–4)
SAMPLE: NORMAL
SODIUM SERPL-SCNC: 131 MMOL/L (ref 136–145)
SP GR UR STRIP: 1.02 (ref 1–1.03)
SQUAMOUS #/AREA URNS HPF: 6 /HPF
URN SPEC COLLECT METH UR: ABNORMAL
UROBILINOGEN UR STRIP-ACNC: 1 EU/DL
WBC # BLD AUTO: 16.6 K/UL (ref 3.9–12.7)
WBC #/AREA URNS HPF: >100 /HPF (ref 0–5)
YEAST URNS QL MICRO: ABNORMAL

## 2022-11-18 PROCEDURE — 63600175 PHARM REV CODE 636 W HCPCS: Performed by: NURSE PRACTITIONER

## 2022-11-18 PROCEDURE — 25000003 PHARM REV CODE 250: Performed by: NURSE PRACTITIONER

## 2022-11-18 PROCEDURE — 82962 GLUCOSE BLOOD TEST: CPT

## 2022-11-18 PROCEDURE — 99285 EMERGENCY DEPT VISIT HI MDM: CPT | Mod: 25

## 2022-11-18 PROCEDURE — 25500020 PHARM REV CODE 255: Performed by: EMERGENCY MEDICINE

## 2022-11-18 PROCEDURE — 87147 CULTURE TYPE IMMUNOLOGIC: CPT | Performed by: NURSE PRACTITIONER

## 2022-11-18 PROCEDURE — 85651 RBC SED RATE NONAUTOMATED: CPT | Performed by: NURSE PRACTITIONER

## 2022-11-18 PROCEDURE — 81001 URINALYSIS AUTO W/SCOPE: CPT | Performed by: NURSE PRACTITIONER

## 2022-11-18 PROCEDURE — 12000002 HC ACUTE/MED SURGE SEMI-PRIVATE ROOM

## 2022-11-18 PROCEDURE — 83605 ASSAY OF LACTIC ACID: CPT | Performed by: NURSE PRACTITIONER

## 2022-11-18 PROCEDURE — 87070 CULTURE OTHR SPECIMN AEROBIC: CPT | Performed by: NURSE PRACTITIONER

## 2022-11-18 PROCEDURE — 96365 THER/PROPH/DIAG IV INF INIT: CPT

## 2022-11-18 PROCEDURE — 87086 URINE CULTURE/COLONY COUNT: CPT | Performed by: NURSE PRACTITIONER

## 2022-11-18 PROCEDURE — 87186 SC STD MICRODIL/AGAR DIL: CPT | Performed by: NURSE PRACTITIONER

## 2022-11-18 PROCEDURE — 86140 C-REACTIVE PROTEIN: CPT | Performed by: NURSE PRACTITIONER

## 2022-11-18 PROCEDURE — 80053 COMPREHEN METABOLIC PANEL: CPT | Performed by: NURSE PRACTITIONER

## 2022-11-18 PROCEDURE — 96361 HYDRATE IV INFUSION ADD-ON: CPT

## 2022-11-18 PROCEDURE — 87077 CULTURE AEROBIC IDENTIFY: CPT | Performed by: NURSE PRACTITIONER

## 2022-11-18 PROCEDURE — 96375 TX/PRO/DX INJ NEW DRUG ADDON: CPT

## 2022-11-18 PROCEDURE — 63600175 PHARM REV CODE 636 W HCPCS: Mod: JG | Performed by: NURSE PRACTITIONER

## 2022-11-18 PROCEDURE — 85025 COMPLETE CBC W/AUTO DIFF WBC: CPT | Performed by: NURSE PRACTITIONER

## 2022-11-18 RX ORDER — MORPHINE SULFATE 4 MG/ML
8 INJECTION, SOLUTION INTRAMUSCULAR; INTRAVENOUS
Status: COMPLETED | OUTPATIENT
Start: 2022-11-18 | End: 2022-11-18

## 2022-11-18 RX ORDER — ONDANSETRON 2 MG/ML
4 INJECTION INTRAMUSCULAR; INTRAVENOUS EVERY 6 HOURS PRN
Status: DISCONTINUED | OUTPATIENT
Start: 2022-11-18 | End: 2022-11-21

## 2022-11-18 RX ORDER — NALOXONE HCL 0.4 MG/ML
0.02 VIAL (ML) INJECTION
Status: DISCONTINUED | OUTPATIENT
Start: 2022-11-18 | End: 2022-11-22 | Stop reason: HOSPADM

## 2022-11-18 RX ORDER — IPRATROPIUM BROMIDE AND ALBUTEROL SULFATE 2.5; .5 MG/3ML; MG/3ML
3 SOLUTION RESPIRATORY (INHALATION) EVERY 4 HOURS PRN
Status: DISCONTINUED | OUTPATIENT
Start: 2022-11-18 | End: 2022-11-22 | Stop reason: HOSPADM

## 2022-11-18 RX ORDER — BUDESONIDE 0.5 MG/2ML
0.5 INHALANT ORAL EVERY 12 HOURS
Status: DISCONTINUED | OUTPATIENT
Start: 2022-11-19 | End: 2022-11-22 | Stop reason: HOSPADM

## 2022-11-18 RX ORDER — LANOLIN ALCOHOL/MO/W.PET/CERES
800 CREAM (GRAM) TOPICAL
Status: DISCONTINUED | OUTPATIENT
Start: 2022-11-18 | End: 2022-11-22 | Stop reason: HOSPADM

## 2022-11-18 RX ORDER — IPRATROPIUM BROMIDE 0.5 MG/2.5ML
0.5 SOLUTION RESPIRATORY (INHALATION) EVERY 6 HOURS
Status: DISCONTINUED | OUTPATIENT
Start: 2022-11-19 | End: 2022-11-22 | Stop reason: HOSPADM

## 2022-11-18 RX ORDER — PANTOPRAZOLE SODIUM 40 MG/1
40 TABLET, DELAYED RELEASE ORAL DAILY
Status: DISCONTINUED | OUTPATIENT
Start: 2022-11-19 | End: 2022-11-22 | Stop reason: HOSPADM

## 2022-11-18 RX ORDER — MIDODRINE HYDROCHLORIDE 2.5 MG/1
5 TABLET ORAL
Status: DISCONTINUED | OUTPATIENT
Start: 2022-11-19 | End: 2022-11-22 | Stop reason: HOSPADM

## 2022-11-18 RX ORDER — IBUPROFEN 200 MG
16 TABLET ORAL
Status: DISCONTINUED | OUTPATIENT
Start: 2022-11-18 | End: 2022-11-22 | Stop reason: HOSPADM

## 2022-11-18 RX ORDER — LINEZOLID 600 MG/1
600 TABLET, FILM COATED ORAL EVERY 12 HOURS
Status: DISCONTINUED | OUTPATIENT
Start: 2022-11-18 | End: 2022-11-19

## 2022-11-18 RX ORDER — GABAPENTIN 400 MG/1
400 CAPSULE ORAL 3 TIMES DAILY
COMMUNITY
Start: 2022-09-20

## 2022-11-18 RX ORDER — AMOXICILLIN 250 MG
1 CAPSULE ORAL 2 TIMES DAILY PRN
Status: DISCONTINUED | OUTPATIENT
Start: 2022-11-18 | End: 2022-11-22 | Stop reason: HOSPADM

## 2022-11-18 RX ORDER — GLUCAGON 1 MG
1 KIT INJECTION
Status: DISCONTINUED | OUTPATIENT
Start: 2022-11-18 | End: 2022-11-22 | Stop reason: HOSPADM

## 2022-11-18 RX ORDER — INSULIN ASPART 100 [IU]/ML
0-5 INJECTION, SOLUTION INTRAVENOUS; SUBCUTANEOUS
Status: DISCONTINUED | OUTPATIENT
Start: 2022-11-18 | End: 2022-11-22 | Stop reason: HOSPADM

## 2022-11-18 RX ORDER — ATORVASTATIN CALCIUM 20 MG/1
20 TABLET, FILM COATED ORAL NIGHTLY
Status: DISCONTINUED | OUTPATIENT
Start: 2022-11-18 | End: 2022-11-22 | Stop reason: HOSPADM

## 2022-11-18 RX ORDER — ARFORMOTEROL TARTRATE 15 UG/2ML
15 SOLUTION RESPIRATORY (INHALATION) 2 TIMES DAILY
Status: DISCONTINUED | OUTPATIENT
Start: 2022-11-19 | End: 2022-11-22 | Stop reason: HOSPADM

## 2022-11-18 RX ORDER — IBUPROFEN 200 MG
24 TABLET ORAL
Status: DISCONTINUED | OUTPATIENT
Start: 2022-11-18 | End: 2022-11-22 | Stop reason: HOSPADM

## 2022-11-18 RX ORDER — POLYETHYLENE GLYCOL 3350 17 G/17G
17 POWDER, FOR SOLUTION ORAL 2 TIMES DAILY PRN
Status: DISCONTINUED | OUTPATIENT
Start: 2022-11-18 | End: 2022-11-22 | Stop reason: HOSPADM

## 2022-11-18 RX ORDER — IBUPROFEN 200 MG
1 TABLET ORAL DAILY
Status: DISCONTINUED | OUTPATIENT
Start: 2022-11-19 | End: 2022-11-22 | Stop reason: HOSPADM

## 2022-11-18 RX ORDER — SIMETHICONE 80 MG
1 TABLET,CHEWABLE ORAL 4 TIMES DAILY PRN
Status: DISCONTINUED | OUTPATIENT
Start: 2022-11-18 | End: 2022-11-22 | Stop reason: HOSPADM

## 2022-11-18 RX ORDER — ACETAMINOPHEN 325 MG/1
650 TABLET ORAL EVERY 8 HOURS PRN
Status: DISCONTINUED | OUTPATIENT
Start: 2022-11-18 | End: 2022-11-22 | Stop reason: HOSPADM

## 2022-11-18 RX ORDER — NITROFURANTOIN 25; 75 MG/1; MG/1
CAPSULE ORAL
Status: ON HOLD | COMMUNITY
End: 2022-11-22 | Stop reason: HOSPADM

## 2022-11-18 RX ORDER — TALC
6 POWDER (GRAM) TOPICAL NIGHTLY PRN
Status: DISCONTINUED | OUTPATIENT
Start: 2022-11-18 | End: 2022-11-22 | Stop reason: HOSPADM

## 2022-11-18 RX ORDER — SODIUM CHLORIDE 0.9 % (FLUSH) 0.9 %
10 SYRINGE (ML) INJECTION
Status: DISCONTINUED | OUTPATIENT
Start: 2022-11-18 | End: 2022-11-22 | Stop reason: HOSPADM

## 2022-11-18 RX ORDER — CIPROFLOXACIN 500 MG/1
500 TABLET ORAL 2 TIMES DAILY
Status: ON HOLD | COMMUNITY
Start: 2022-11-17 | End: 2022-11-22 | Stop reason: HOSPADM

## 2022-11-18 RX ORDER — HYDROCODONE BITARTRATE AND ACETAMINOPHEN 5; 325 MG/1; MG/1
1 TABLET ORAL EVERY 4 HOURS PRN
Status: DISCONTINUED | OUTPATIENT
Start: 2022-11-18 | End: 2022-11-22 | Stop reason: HOSPADM

## 2022-11-18 RX ORDER — MORPHINE SULFATE 4 MG/ML
4 INJECTION, SOLUTION INTRAMUSCULAR; INTRAVENOUS EVERY 4 HOURS PRN
Status: DISCONTINUED | OUTPATIENT
Start: 2022-11-18 | End: 2022-11-22 | Stop reason: HOSPADM

## 2022-11-18 RX ADMIN — LINEZOLID 600 MG: 600 TABLET, FILM COATED ORAL at 08:11

## 2022-11-18 RX ADMIN — IOHEXOL 100 ML: 350 INJECTION, SOLUTION INTRAVENOUS at 04:11

## 2022-11-18 RX ADMIN — GABAPENTIN 400 MG: 300 CAPSULE ORAL at 10:11

## 2022-11-18 RX ADMIN — CEFTAROLINE FOSAMIL 600 MG: 600 POWDER, FOR SOLUTION INTRAVENOUS at 05:11

## 2022-11-18 RX ADMIN — SODIUM CHLORIDE 1000 ML: 9 INJECTION, SOLUTION INTRAVENOUS at 03:11

## 2022-11-18 RX ADMIN — MORPHINE SULFATE 8 MG: 4 INJECTION, SOLUTION INTRAMUSCULAR; INTRAVENOUS at 05:11

## 2022-11-18 RX ADMIN — ATORVASTATIN CALCIUM 20 MG: 20 TABLET, FILM COATED ORAL at 10:11

## 2022-11-18 RX ADMIN — MORPHINE SULFATE 4 MG: 4 INJECTION, SOLUTION INTRAMUSCULAR; INTRAVENOUS at 10:11

## 2022-11-18 RX ADMIN — PIPERACILLIN SODIUM AND TAZOBACTAM SODIUM 3.38 G: 3; .375 INJECTION, POWDER, LYOPHILIZED, FOR SOLUTION INTRAVENOUS at 10:11

## 2022-11-18 NOTE — ED PROVIDER NOTES
Source of History:  Patient, chart    Chief complaint:  Rash (Rash to lower abdomen and genitals.  )      HPI:  James Jiang is a 62 y.o. male with medical history of sepsis, AFib, diabetes, AFib, chronic UTIs presenting with lower abdominal pain and rash to lower abdomen and groin.  Patient states he has had redness for the past few months but pain and drainage has increased over the past 2 days.  Patient had home health check a urine yesterday and was found to have a UTI.  Patient started on Cipro and took his 1st dose this morning.    This is the extent to the patients complaints today here in the emergency department.    ROS: As per HPI and below:  Constitutional: No fever.  No chills.  Eyes: No visual changes.   ENT: No sore throat. No ear pain.  GI:  Positive for abdominal pain.  Positive for constipation.  No nausea.  No vomiting.    Urinary:  Positive for painful urination.  MSK: No back pain. No joint pain.   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    Hypertension      Past Surgical History:   Procedure Laterality Date    CYSTOURETEROSCOPY WITH RETROGRADE PYELOGRAPHY AND INSERTION OF STENT INTO URETER Left 7/29/2022    Procedure: CYSTOURETEROSCOPY, WITH RETROGRADE PYELOGRAM AND URETERAL STENT INSERTION;  Surgeon: Raegan Rodriguez MD;  Location: [facility] OR;  Service: Urology;  Laterality: Left;    denies pertinent surgical history      INTRAMEDULLARY RODDING OF FEMUR Right 11/7/2021    Procedure: INSERTION, INTRAMEDULLARY HENNA, FEMUR;  Surgeon: Matt Milian MD;  Location: [facility] OR;  Service: Orthopedics;  Laterality:  "Right;    LASER LITHOTRIPSY Left 7/29/2022    Procedure: LITHOTRIPSY, USING LASER;  Surgeon: Raegan Rodriguez MD;  Location: [facility] OR;  Service: Urology;  Laterality: Left;    TOE AMPUTATION Right 10/12/2021    Procedure: AMPUTATION, TOE;  Surgeon: Milton Lauren DPM;  Location: [facility] OR;  Service: Podiatry;  Laterality: Right;    URETEROSCOPIC REMOVAL OF URETERIC CALCULUS Left 7/29/2022    Procedure: REMOVAL, CALCULUS, URETER, URETEROSCOPIC;  Surgeon: Raegan Rodriguez MD;  Location: [facility] OR;  Service: Urology;  Laterality: Left;       Social History     Tobacco Use    Smoking status: Former    Smokeless tobacco: Never   Substance Use Topics    Alcohol use: Not Currently    Drug use: Not Currently       Physical Exam:    /78 (BP Location: Right arm, Patient Position: Lying)   Pulse 88   Temp 97.8 °F (36.6 °C) (Oral)   Resp 18   Ht 5' 7" (1.702 m)   Wt 103 kg (227 lb 1.2 oz)   SpO2 (!) 93%   BMI 35.56 kg/m²   Nursing note and vital signs reviewed.  Constitutional: No acute distress.  Nontoxic.  Sickly appearing.  Eyes: No conjunctival injection.  Extraocular muscles are intact.  ENT: Normal phonation.  Musculoskeletal: Good range of motion all joints.  No deformities.  Neck supple.  No meningismus. Neurovascularly intact.  Skin:  Red erythematous excoriated rash noted to lower abdomen and groin.  Purulent drainage noted.  Tenderness to palpation.  Psych: Appropriate, conversant.    Labs/Imaging that have been ordered have been independently reviewed and interpreted by myself.    Labs Reviewed   CBC W/ AUTO DIFFERENTIAL - Abnormal; Notable for the following components:       Result Value    WBC 16.60 (*)     RBC 4.30 (*)     Hemoglobin 12.6 (*)     Hematocrit 39.0 (*)     Immature Granulocytes 0.6 (*)     Gran # (ANC) 12.7 (*)     Immature Grans (Abs) 0.10 (*)     Mono # 1.3 (*)     Gran % 76.6 (*)     Lymph % 13.6 (*)     All other components within normal limits   COMPREHENSIVE METABOLIC PANEL - " Abnormal; Notable for the following components:    Sodium 131 (*)     Chloride 94 (*)     Glucose 286 (*)     Albumin 3.1 (*)     Total Bilirubin 1.2 (*)     Alkaline Phosphatase 143 (*)     ALT 9 (*)     All other components within normal limits   SEDIMENTATION RATE - Abnormal; Notable for the following components:    Sed Rate 90 (*)     All other components within normal limits   C-REACTIVE PROTEIN - Abnormal; Notable for the following components:    CRP 11.36 (*)     All other components within normal limits   URINALYSIS, REFLEX TO URINE CULTURE - Abnormal; Notable for the following components:    Appearance, UA Cloudy (*)     Protein, UA 2+ (*)     Glucose, UA 3+ (*)     Ketones, UA 2+ (*)     Occult Blood UA 3+ (*)     Nitrite, UA Positive (*)     Leukocytes, UA 1+ (*)     All other components within normal limits    Narrative:     Replace blanco and obtain urine specimen  Specimen Source->Urine   URINALYSIS MICROSCOPIC - Abnormal; Notable for the following components:    RBC, UA >100 (*)     WBC, UA >100 (*)     Bacteria Many (*)     Yeast, UA Few (*)     Hyaline Casts, UA 71 (*)     All other components within normal limits    Narrative:     Replace blanco and obtain urine specimen  Specimen Source->Urine   POCT GLUCOSE - Abnormal; Notable for the following components:    POC Glucose 216 (*)     All other components within normal limits   LACTIC ACID, PLASMA   POCT GLUCOSE, HAND-HELD DEVICE   ISTAT CREATININE     Imaging Results              CT Abdomen Pelvis With Contrast (Final result)  Result time 11/18/22 17:13:53      Final result by Xavier Lopez MD (11/18/22 17:13:53)                   Narrative:    CMS MANDATED QUALITY DATA-CT RADIATION DOSE-436  All CT scans at this facility use dose modulation, iterative reconstruction, and or weight-based dosing when appropriate to reduce radiation dose to as low as reasonably achievable.    HISTORY: Abdominal pain, acute, nonlocalized    FINDINGS: Axial  postcontrast imaging was performed with 100 mL Omnipaque 350 IV contrast. Comparison to multiple prior exams.    CT ABDOMEN: Scattered reticular and linear opacities in both lower lungs are nonspecific, with multiple right lower lung noncalcified pulmonary nodules, measuring up to 7 mm in diameter, nonspecific.    The liver and gallbladder enhance normally, with no calcified gallstones or biliary ductal dilatation. The spleen is normal in size and enhances normally, with the pancreas, adrenal glands and right kidney enhancing normally. There is a 3 mm nonobstructing right renal calculus. A 5.6 cm heterogeneously enhancing solid mass in the left kidney is compatible with renal cell carcinoma, with no hydronephrosis.    Scattered calcified plaque involves normal caliber abdominal aorta and iliac arteries. There is no bowel wall thickening, inflammation, or bowel obstruction. The appendix is normal. There are scattered colon diverticula without evidence of acute diverticulitis. There is a left lower anterior abdominal wall Spigelian hernia, containing portion of the colon, mesenteric vessels and fat. No ascites or intraperitoneal free air.    CT PELVIS: The sigmoid colon and rectum enhance normally, with Donis catheter and air within collapsed urinary bladder. There are several calcified pelvic phleboliths, with no pelvic free fluid or mass, and no enlarged pelvic or inguinal lymph nodes.    There is a 4.8 cm masslike heterogeneously dense area in the midline suprapubic subcutaneous fat with ill-defined margins, potentially localized phlegmon and or developing soft tissue abscess. This has no well-defined rim enhancement. The extraperitoneal soft tissues otherwise enhance normally. No acute fractures or destructive osseous lesions.    IMPRESSION:  1. A 4.8 cm masslike area in the midline suprapubic subcutaneous fat, potentially localized phlegmon and or developing soft tissue abscess.  2. A 5.6 cm enhancing left renal  mass is compatible renal cell carcinoma, and urology referral and tissue diagnosis are recommended.  3. Additional observations as described.    Electronically signed by:  Xavier Lopez MD  11/18/2022 5:13 PM CST Workstation: [id number]                                  I decided to obtain the patient's medical records.    Summary of Medical Records:  HH saw patient yesterday.  Urinalysis shows positive nitrites and leukocytes.  Started on Cipro.  Followed by urology.    MDM/ Differential Dx:  Emergent evaluation of a sickly appearing 63 yo male presenting for lower abdominal pain and rash worsening over the past 2 days.  On exam pt is A&Ox3. VSS. Nonfebrile and nontoxic appearing.  Mucous membranes pink and moist.  Breath sounds clear bilaterally.  Abdomen soft with generalized TTP noted. Red erythematous excoriated rash noted to lower abdomen and groin.  Purulent drainage noted.  Tenderness to palpation.  Pt speaking in full sentences.  Steady gait appreciated. Cap refill < 3 seconds.      Differential diagnoses include but are not limited to cellulitis, Candida infection, fungal infection, abscess, UTI, pyelonephritis, others.      I will get labs, imaging, medicate and reassess.  I discussed this case with my supervising physician.    ED Course as of 11/20/22 1831   Fri Nov 18, 2022   1630 CBC shows slight leukocytosis of 16.60.  H&H is stable at 12.6 and 39.  CMP shows elevated total bili at 1.2 with an alk-phos of 143.  Sed rate elevated at 90 with a CRP of 11.36.  Lactic of 0.7.  Patient does have known UTI from urine yesterday. [RZ]   1730 CT shows a 4.8 cm masslike area in the midline suprapubic subcutaneous fat, potentially localized phlegmon and or developing soft tissue abscess.  Renal mass also noted but is known to Urologist.   [RZ]   1814 Discussed case with hospital medicine.  Will admit for further evaluation and treatment of rash with possible abscess formation.  Awaiting bed assignment. [RZ]      ED  Course User Index  [RZ] Мария Heaton NP                Attending Attestation:     Physician Attestation Statement for NP/PA:   I reviewed the chart but I did not personally examine the patient. The face to face encounter was performed by the NP/PA.          Diagnostic Impression:    1. Rash and nonspecific skin eruption    2. Cutaneous candidiasis    3. Abscess of suprapubic region    4. Chest pain    5. Atrial fibrillation    6. Left renal mass    7. Chronic indwelling Donis catheter    8. Urinary tract infection associated with indwelling urethral catheter, initial encounter         ED Disposition Condition    Admit Stable               Мария Heaton NP  11/18/22 1820       Laura Coughlin MD  11/20/22 183

## 2022-11-19 PROBLEM — L03.311 ABDOMINAL WALL CELLULITIS: Status: ACTIVE | Noted: 2022-11-19

## 2022-11-19 LAB
ANION GAP SERPL CALC-SCNC: 11 MMOL/L (ref 8–16)
BACTERIA #/AREA URNS HPF: NEGATIVE /HPF
BASOPHILS # BLD AUTO: 0.11 K/UL (ref 0–0.2)
BASOPHILS NFR BLD: 0.7 % (ref 0–1.9)
BILIRUB UR QL STRIP: NEGATIVE
BUN SERPL-MCNC: 14 MG/DL (ref 8–23)
CALCIUM SERPL-MCNC: 8.7 MG/DL (ref 8.7–10.5)
CHLORIDE SERPL-SCNC: 91 MMOL/L (ref 95–110)
CLARITY UR: CLEAR
CO2 SERPL-SCNC: 27 MMOL/L (ref 23–29)
COLOR UR: COLORLESS
CREAT SERPL-MCNC: 0.9 MG/DL (ref 0.5–1.4)
DIFFERENTIAL METHOD: ABNORMAL
EOSINOPHIL # BLD AUTO: 0.2 K/UL (ref 0–0.5)
EOSINOPHIL NFR BLD: 1.4 % (ref 0–8)
ERYTHROCYTE [DISTWIDTH] IN BLOOD BY AUTOMATED COUNT: 12.4 % (ref 11.5–14.5)
EST. GFR  (NO RACE VARIABLE): >60 ML/MIN/1.73 M^2
GLUCOSE SERPL-MCNC: 264 MG/DL (ref 70–110)
GLUCOSE SERPL-MCNC: 283 MG/DL (ref 70–110)
GLUCOSE SERPL-MCNC: 413 MG/DL (ref 70–110)
GLUCOSE UR QL STRIP: ABNORMAL
HCT VFR BLD AUTO: 42.9 % (ref 40–54)
HGB BLD-MCNC: 13.8 G/DL (ref 14–18)
HGB UR QL STRIP: ABNORMAL
HYALINE CASTS #/AREA URNS LPF: 1 /LPF
IMM GRANULOCYTES # BLD AUTO: 0.09 K/UL (ref 0–0.04)
IMM GRANULOCYTES NFR BLD AUTO: 0.6 % (ref 0–0.5)
INR PPP: 1.3
KETONES UR QL STRIP: NEGATIVE
LEUKOCYTE ESTERASE UR QL STRIP: ABNORMAL
LYMPHOCYTES # BLD AUTO: 1.9 K/UL (ref 1–4.8)
LYMPHOCYTES NFR BLD: 12.3 % (ref 18–48)
MAGNESIUM SERPL-MCNC: 1.7 MG/DL (ref 1.6–2.6)
MCH RBC QN AUTO: 29.2 PG (ref 27–31)
MCHC RBC AUTO-ENTMCNC: 32.2 G/DL (ref 32–36)
MCV RBC AUTO: 91 FL (ref 82–98)
MICROSCOPIC COMMENT: ABNORMAL
MONOCYTES # BLD AUTO: 1.2 K/UL (ref 0.3–1)
MONOCYTES NFR BLD: 7.8 % (ref 4–15)
NEUTROPHILS # BLD AUTO: 11.8 K/UL (ref 1.8–7.7)
NEUTROPHILS NFR BLD: 77.2 % (ref 38–73)
NITRITE UR QL STRIP: NEGATIVE
NRBC BLD-RTO: 0 /100 WBC
PH UR STRIP: 8 [PH] (ref 5–8)
PLATELET # BLD AUTO: 417 K/UL (ref 150–450)
PMV BLD AUTO: 9.9 FL (ref 9.2–12.9)
POTASSIUM SERPL-SCNC: 4.3 MMOL/L (ref 3.5–5.1)
PROT UR QL STRIP: ABNORMAL
PROTHROMBIN TIME: 15 SEC (ref 11.4–13.7)
RBC # BLD AUTO: 4.72 M/UL (ref 4.6–6.2)
RBC #/AREA URNS HPF: 54 /HPF (ref 0–4)
SODIUM SERPL-SCNC: 129 MMOL/L (ref 136–145)
SP GR UR STRIP: 1.01 (ref 1–1.03)
SQUAMOUS #/AREA URNS HPF: 1 /HPF
URN SPEC COLLECT METH UR: ABNORMAL
UROBILINOGEN UR STRIP-ACNC: NEGATIVE EU/DL
WBC # BLD AUTO: 15.25 K/UL (ref 3.9–12.7)
WBC #/AREA URNS HPF: 23 /HPF (ref 0–5)
YEAST URNS QL MICRO: ABNORMAL

## 2022-11-19 PROCEDURE — 63600175 PHARM REV CODE 636 W HCPCS: Performed by: NURSE PRACTITIONER

## 2022-11-19 PROCEDURE — 83036 HEMOGLOBIN GLYCOSYLATED A1C: CPT | Performed by: NURSE PRACTITIONER

## 2022-11-19 PROCEDURE — 87147 CULTURE TYPE IMMUNOLOGIC: CPT | Performed by: STUDENT IN AN ORGANIZED HEALTH CARE EDUCATION/TRAINING PROGRAM

## 2022-11-19 PROCEDURE — 99900035 HC TECH TIME PER 15 MIN (STAT)

## 2022-11-19 PROCEDURE — 87040 BLOOD CULTURE FOR BACTERIA: CPT | Performed by: INTERNAL MEDICINE

## 2022-11-19 PROCEDURE — S4991 NICOTINE PATCH NONLEGEND: HCPCS | Performed by: NURSE PRACTITIONER

## 2022-11-19 PROCEDURE — 94640 AIRWAY INHALATION TREATMENT: CPT

## 2022-11-19 PROCEDURE — 80048 BASIC METABOLIC PNL TOTAL CA: CPT | Performed by: NURSE PRACTITIONER

## 2022-11-19 PROCEDURE — 99223 1ST HOSP IP/OBS HIGH 75: CPT | Mod: ,,, | Performed by: STUDENT IN AN ORGANIZED HEALTH CARE EDUCATION/TRAINING PROGRAM

## 2022-11-19 PROCEDURE — 25000242 PHARM REV CODE 250 ALT 637 W/ HCPCS: Performed by: STUDENT IN AN ORGANIZED HEALTH CARE EDUCATION/TRAINING PROGRAM

## 2022-11-19 PROCEDURE — 85025 COMPLETE CBC W/AUTO DIFF WBC: CPT | Performed by: NURSE PRACTITIONER

## 2022-11-19 PROCEDURE — 12000002 HC ACUTE/MED SURGE SEMI-PRIVATE ROOM

## 2022-11-19 PROCEDURE — 93005 ELECTROCARDIOGRAM TRACING: CPT | Performed by: SPECIALIST

## 2022-11-19 PROCEDURE — 94799 UNLISTED PULMONARY SVC/PX: CPT

## 2022-11-19 PROCEDURE — 99223 PR INITIAL HOSPITAL CARE,LEVL III: ICD-10-PCS | Mod: ,,, | Performed by: UROLOGY

## 2022-11-19 PROCEDURE — 27000221 HC OXYGEN, UP TO 24 HOURS

## 2022-11-19 PROCEDURE — 25000003 PHARM REV CODE 250: Performed by: FAMILY MEDICINE

## 2022-11-19 PROCEDURE — 83735 ASSAY OF MAGNESIUM: CPT | Performed by: NURSE PRACTITIONER

## 2022-11-19 PROCEDURE — 94761 N-INVAS EAR/PLS OXIMETRY MLT: CPT

## 2022-11-19 PROCEDURE — 87070 CULTURE OTHR SPECIMN AEROBIC: CPT | Performed by: STUDENT IN AN ORGANIZED HEALTH CARE EDUCATION/TRAINING PROGRAM

## 2022-11-19 PROCEDURE — 25000003 PHARM REV CODE 250: Performed by: NURSE PRACTITIONER

## 2022-11-19 PROCEDURE — 93010 EKG 12-LEAD: ICD-10-PCS | Mod: ,,, | Performed by: SPECIALIST

## 2022-11-19 PROCEDURE — 87086 URINE CULTURE/COLONY COUNT: CPT | Performed by: INTERNAL MEDICINE

## 2022-11-19 PROCEDURE — 87077 CULTURE AEROBIC IDENTIFY: CPT | Performed by: STUDENT IN AN ORGANIZED HEALTH CARE EDUCATION/TRAINING PROGRAM

## 2022-11-19 PROCEDURE — 63600175 PHARM REV CODE 636 W HCPCS: Performed by: STUDENT IN AN ORGANIZED HEALTH CARE EDUCATION/TRAINING PROGRAM

## 2022-11-19 PROCEDURE — 99223 1ST HOSP IP/OBS HIGH 75: CPT | Mod: ,,, | Performed by: UROLOGY

## 2022-11-19 PROCEDURE — 93010 ELECTROCARDIOGRAM REPORT: CPT | Mod: ,,, | Performed by: SPECIALIST

## 2022-11-19 PROCEDURE — 87075 CULTR BACTERIA EXCEPT BLOOD: CPT | Performed by: STUDENT IN AN ORGANIZED HEALTH CARE EDUCATION/TRAINING PROGRAM

## 2022-11-19 PROCEDURE — 99900031 HC PATIENT EDUCATION (STAT)

## 2022-11-19 PROCEDURE — 85610 PROTHROMBIN TIME: CPT | Performed by: NURSE PRACTITIONER

## 2022-11-19 PROCEDURE — 81001 URINALYSIS AUTO W/SCOPE: CPT | Performed by: INTERNAL MEDICINE

## 2022-11-19 PROCEDURE — 87186 SC STD MICRODIL/AGAR DIL: CPT | Mod: 59 | Performed by: STUDENT IN AN ORGANIZED HEALTH CARE EDUCATION/TRAINING PROGRAM

## 2022-11-19 PROCEDURE — 99223 PR INITIAL HOSPITAL CARE,LEVL III: ICD-10-PCS | Mod: ,,, | Performed by: STUDENT IN AN ORGANIZED HEALTH CARE EDUCATION/TRAINING PROGRAM

## 2022-11-19 PROCEDURE — 36415 COLL VENOUS BLD VENIPUNCTURE: CPT | Performed by: INTERNAL MEDICINE

## 2022-11-19 RX ORDER — MEROPENEM AND SODIUM CHLORIDE 1 G/50ML
1 INJECTION, SOLUTION INTRAVENOUS
Status: DISCONTINUED | OUTPATIENT
Start: 2022-11-19 | End: 2022-11-22 | Stop reason: HOSPADM

## 2022-11-19 RX ORDER — LINEZOLID 2 MG/ML
600 INJECTION, SOLUTION INTRAVENOUS
Status: DISCONTINUED | OUTPATIENT
Start: 2022-11-19 | End: 2022-11-22 | Stop reason: HOSPADM

## 2022-11-19 RX ORDER — ENOXAPARIN SODIUM 100 MG/ML
40 INJECTION SUBCUTANEOUS
Status: DISCONTINUED | OUTPATIENT
Start: 2022-11-19 | End: 2022-11-21

## 2022-11-19 RX ORDER — LIDOCAINE HYDROCHLORIDE 20 MG/ML
JELLY TOPICAL DAILY
Status: COMPLETED | OUTPATIENT
Start: 2022-11-19 | End: 2022-11-19

## 2022-11-19 RX ADMIN — LINEZOLID 600 MG: 600 INJECTION, SOLUTION INTRAVENOUS at 09:11

## 2022-11-19 RX ADMIN — MEROPENEM AND SODIUM CHLORIDE 1 G: 1 INJECTION, SOLUTION INTRAVENOUS at 11:11

## 2022-11-19 RX ADMIN — ARFORMOTEROL TARTRATE 15 MCG: 15 SOLUTION RESPIRATORY (INHALATION) at 07:11

## 2022-11-19 RX ADMIN — POLYETHYLENE GLYCOL 3350 17 G: 17 POWDER, FOR SOLUTION ORAL at 01:11

## 2022-11-19 RX ADMIN — BUDESONIDE 0.5 MG: 0.5 INHALANT RESPIRATORY (INHALATION) at 09:11

## 2022-11-19 RX ADMIN — HYDROCODONE BITARTRATE AND ACETAMINOPHEN 1 TABLET: 5; 325 TABLET ORAL at 01:11

## 2022-11-19 RX ADMIN — GABAPENTIN 800 MG: 300 CAPSULE ORAL at 09:11

## 2022-11-19 RX ADMIN — MORPHINE SULFATE 4 MG: 4 INJECTION, SOLUTION INTRAMUSCULAR; INTRAVENOUS at 09:11

## 2022-11-19 RX ADMIN — LIDOCAINE HYDROCHLORIDE: 20 JELLY TOPICAL at 10:11

## 2022-11-19 RX ADMIN — NICOTINE 1 PATCH: 21 PATCH, EXTENDED RELEASE TRANSDERMAL at 09:11

## 2022-11-19 RX ADMIN — GABAPENTIN 800 MG: 300 CAPSULE ORAL at 03:11

## 2022-11-19 RX ADMIN — IPRATROPIUM BROMIDE 0.5 MG: 0.5 SOLUTION RESPIRATORY (INHALATION) at 09:11

## 2022-11-19 RX ADMIN — BUDESONIDE 0.5 MG: 0.5 INHALANT RESPIRATORY (INHALATION) at 07:11

## 2022-11-19 RX ADMIN — IPRATROPIUM BROMIDE 0.5 MG: 0.5 SOLUTION RESPIRATORY (INHALATION) at 01:11

## 2022-11-19 RX ADMIN — HYDROCODONE BITARTRATE AND ACETAMINOPHEN 1 TABLET: 5; 325 TABLET ORAL at 10:11

## 2022-11-19 RX ADMIN — ATORVASTATIN CALCIUM 20 MG: 20 TABLET, FILM COATED ORAL at 09:11

## 2022-11-19 RX ADMIN — IPRATROPIUM BROMIDE 0.5 MG: 0.5 SOLUTION RESPIRATORY (INHALATION) at 07:11

## 2022-11-19 RX ADMIN — PIPERACILLIN SODIUM AND TAZOBACTAM SODIUM 3.38 G: 3; .375 INJECTION, POWDER, LYOPHILIZED, FOR SOLUTION INTRAVENOUS at 04:11

## 2022-11-19 RX ADMIN — MEROPENEM AND SODIUM CHLORIDE 1 G: 1 INJECTION, SOLUTION INTRAVENOUS at 05:11

## 2022-11-19 RX ADMIN — HYDROCODONE BITARTRATE AND ACETAMINOPHEN 1 TABLET: 5; 325 TABLET ORAL at 12:11

## 2022-11-19 RX ADMIN — HYDROCODONE BITARTRATE AND ACETAMINOPHEN 1 TABLET: 5; 325 TABLET ORAL at 05:11

## 2022-11-19 RX ADMIN — ARFORMOTEROL TARTRATE 15 MCG: 15 SOLUTION RESPIRATORY (INHALATION) at 09:11

## 2022-11-19 NOTE — HPI
James Jiang is a 62-year-old male Is a 62-year-old male with chronic medical problems including renal cell carcinoma, hypotension, chronic urinary tract infections, atrial fibrillation who presented to the emergency department this morning complaining of severe abdominal pain.  Patient states that he has had abdominal pain for about a year and it is his skin that is hurting but over the last several days his lower abdomen has been very tender and he got up from bed and blood started pouring out of his lower abdomen.  He said pain is severe, it has gotten progressively worse over the last several days.  Associated symptoms are constipation with no bowel movement in the last week and discomfort with urination.  He does see Urology regularly and has a chronic indwelling Donis.  He said it was last changed 3 weeks ago.  He did have a urine specimen sent around the 15th but not sure how was obtained because he said he has not had his catheter changed in 3 weeks.  Urine culture with ESBL Klebsiella pneumonia greater than 100,000 CFU/mL.  He was started on ciprofloxacin and took 1 dose.  He denies fevers or chills, nausea or vomiting, headaches or dizziness, he said he is short of breath but it is at baseline, he also complains of chronic right hip pain for the last year after a fracture and surgery. he is a long-term smoker and said he smokes as much as he can.    In the ER WBC elevated at 16.6, H&H 12.6/39, platelets are 86, glucose elevated to 86, sodium low at 131, chloride low at 94, albumin 3.1, alk-phos 143 and total bilirubin 1.2.  CRP elevated at 11.36. Initial O2 sat 91% on room air, he was placed on nasal cannula O2 and saturation 95-96%.  Blood pressure systolic 100-109, heart rate 77-85 and afebrile with temperature 97.8°.  In the ED was given 600 mg IV ceftaroline and 1 L normal saline with 8 mg IV morphine.  At time of exam he says pain is somewhat relieved with morphine.  CT abdomen and pelvis revealed a 3  mm nonobstructing right renal calculus and a 5.6 cm solid mass in the left kidney compatible with renal cell carcinoma no hydronephrosis.  He has a 4.8 cm masslike area in the midline suprapubic subcutaneous fat either localized phlegmon and/or developing soft tissue abscess.  He will be admitted to the hospitalist service for further evaluation and treatment with consult to Infectious Disease, Urology and IR for possible drainage of abscess.

## 2022-11-19 NOTE — SUBJECTIVE & OBJECTIVE
Interval History:     Review of Systems   Constitutional:  Negative for activity change and appetite change.   HENT:  Negative for congestion and dental problem.    Eyes:  Negative for discharge and itching.   Respiratory:  Negative for shortness of breath.    Cardiovascular:  Negative for chest pain.   Gastrointestinal:  Negative for abdominal distention and abdominal pain.   Endocrine: Negative for cold intolerance.   Genitourinary:  Negative for difficulty urinating and dysuria.   Musculoskeletal:  Negative for arthralgias and back pain.   Skin:  Negative for color change.   Neurological:  Negative for dizziness and facial asymmetry.   Hematological:  Negative for adenopathy.   Psychiatric/Behavioral:  Negative for agitation and behavioral problems.    Objective:     Vital Signs (Most Recent):  Temp: (P) 97.3 °F (36.3 °C) (11/19/22 1115)  Pulse: 84 (11/19/22 1333)  Resp: 20 (11/19/22 1333)  BP: (!) (P) 169/103 (11/19/22 1115)  SpO2: 96 % (11/19/22 1333)   Vital Signs (24h Range):  Temp:  [97.3 °F (36.3 °C)-98.4 °F (36.9 °C)] (P) 97.3 °F (36.3 °C)  Pulse:  [71-89] 84  Resp:  [16-20] 20  SpO2:  [84 %-96 %] 96 %  BP: (140-180)/(90-96) (P) 169/103     Weight: 103 kg (227 lb 1.2 oz)  Body mass index is 35.56 kg/m².    Intake/Output Summary (Last 24 hours) at 11/19/2022 1557  Last data filed at 11/19/2022 1149  Gross per 24 hour   Intake 525 ml   Output 2500 ml   Net -1975 ml      Physical Exam  Vitals and nursing note reviewed.   Constitutional:       Appearance: He is well-developed.   HENT:      Head: Atraumatic.      Right Ear: External ear normal.      Left Ear: External ear normal.      Nose: Nose normal.      Mouth/Throat:      Mouth: Mucous membranes are moist.   Cardiovascular:      Rate and Rhythm: Normal rate.   Pulmonary:      Effort: Pulmonary effort is normal.   Abdominal:      Comments: Wound on lower abdomen area intact   Musculoskeletal:         General: Normal range of motion.      Cervical back: Full  passive range of motion without pain and normal range of motion.   Skin:     General: Skin is warm.   Neurological:      Mental Status: He is alert and oriented to person, place, and time.   Psychiatric:         Behavior: Behavior normal.       Significant Labs: All pertinent labs within the past 24 hours have been reviewed.  CBC:   Recent Labs   Lab 11/18/22  1503 11/19/22  0530   WBC 16.60* 15.25*   HGB 12.6* 13.8*   HCT 39.0* 42.9    417     CMP:   Recent Labs   Lab 11/18/22  1503 11/19/22  0530   * 129*   K 4.3 4.3   CL 94* 91*   CO2 28 27   * 283*   BUN 16 14   CREATININE 0.9 0.9   CALCIUM 8.7 8.7   PROT 7.9  --    ALBUMIN 3.1*  --    BILITOT 1.2*  --    ALKPHOS 143*  --    AST 12  --    ALT 9*  --    ANIONGAP 9 11       Significant Imaging: I have reviewed all pertinent imaging results/findings within the past 24 hours.

## 2022-11-19 NOTE — PROGRESS NOTES
Replaced by [facility] Medicine  Progress Note    Patient Name: James Jiang  MRN: [id number]  Patient Class: IP- Inpatient   Admission Date: 11/18/2022  Length of Stay: 1 days  Attending Physician: Amando Avalos MD  Primary Care Provider: [facility]        Subjective:     Principal Problem:Suprapubic abscess        HPI:  James Jiang is a 62-year-old male Is a 62-year-old male with chronic medical problems including renal cell carcinoma, hypotension, chronic urinary tract infections, atrial fibrillation who presented to the emergency department this morning complaining of severe abdominal pain.  Patient states that he has had abdominal pain for about a year and it is his skin that is hurting but over the last several days his lower abdomen has been very tender and he got up from bed and blood started pouring out of his lower abdomen.  He said pain is severe, it has gotten progressively worse over the last several days.  Associated symptoms are constipation with no bowel movement in the last week and discomfort with urination.  He does see Urology regularly and has a chronic indwelling Donis.  He said it was last changed 3 weeks ago.  He did have a urine specimen sent around the 15th but not sure how was obtained because he said he has not had his catheter changed in 3 weeks.  Urine culture with ESBL Klebsiella pneumonia greater than 100,000 CFU/mL.  He was started on ciprofloxacin and took 1 dose.  He denies fevers or chills, nausea or vomiting, headaches or dizziness, he said he is short of breath but it is at baseline, he also complains of chronic right hip pain for the last year after a fracture and surgery. he is a long-term smoker and said he smokes as much as he can.    In the ER WBC elevated at 16.6, H&H 12.6/39, platelets are 86, glucose elevated to 86, sodium low at 131, chloride low at 94, albumin 3.1, alk-phos 143 and total bilirubin 1.2.  CRP  elevated at 11.36. Initial O2 sat 91% on room air, he was placed on nasal cannula O2 and saturation 95-96%.  Blood pressure systolic 100-109, heart rate 77-85 and afebrile with temperature 97.8°.  In the ED was given 600 mg IV ceftaroline and 1 L normal saline with 8 mg IV morphine.  At time of exam he says pain is somewhat relieved with morphine.  CT abdomen and pelvis revealed a 3 mm nonobstructing right renal calculus and a 5.6 cm solid mass in the left kidney compatible with renal cell carcinoma no hydronephrosis.  He has a 4.8 cm masslike area in the midline suprapubic subcutaneous fat either localized phlegmon and/or developing soft tissue abscess.  He will be admitted to the hospitalist service for further evaluation and treatment with consult to Infectious Disease, Urology and IR for possible drainage of abscess.      Overview/Hospital Course:  11/19  Pus expressed from lower abdominal area   No other issues  Possible OR visit tomorrow for I&D      Interval History:     Review of Systems   Constitutional:  Negative for activity change and appetite change.   HENT:  Negative for congestion and dental problem.    Eyes:  Negative for discharge and itching.   Respiratory:  Negative for shortness of breath.    Cardiovascular:  Negative for chest pain.   Gastrointestinal:  Negative for abdominal distention and abdominal pain.   Endocrine: Negative for cold intolerance.   Genitourinary:  Negative for difficulty urinating and dysuria.   Musculoskeletal:  Negative for arthralgias and back pain.   Skin:  Negative for color change.   Neurological:  Negative for dizziness and facial asymmetry.   Hematological:  Negative for adenopathy.   Psychiatric/Behavioral:  Negative for agitation and behavioral problems.    Objective:     Vital Signs (Most Recent):  Temp: (P) 97.3 °F (36.3 °C) (11/19/22 1115)  Pulse: 84 (11/19/22 1333)  Resp: 20 (11/19/22 1333)  BP: (!) (P) 169/103 (11/19/22 1115)  SpO2: 96 % (11/19/22 1333)   Vital  Signs (24h Range):  Temp:  [97.3 °F (36.3 °C)-98.4 °F (36.9 °C)] (P) 97.3 °F (36.3 °C)  Pulse:  [71-89] 84  Resp:  [16-20] 20  SpO2:  [84 %-96 %] 96 %  BP: (140-180)/(90-96) (P) 169/103     Weight: 103 kg (227 lb 1.2 oz)  Body mass index is 35.56 kg/m².    Intake/Output Summary (Last 24 hours) at 11/19/2022 1557  Last data filed at 11/19/2022 1149  Gross per 24 hour   Intake 525 ml   Output 2500 ml   Net -1975 ml      Physical Exam  Vitals and nursing note reviewed.   Constitutional:       Appearance: He is well-developed.   HENT:      Head: Atraumatic.      Right Ear: External ear normal.      Left Ear: External ear normal.      Nose: Nose normal.      Mouth/Throat:      Mouth: Mucous membranes are moist.   Cardiovascular:      Rate and Rhythm: Normal rate.   Pulmonary:      Effort: Pulmonary effort is normal.   Abdominal:      Comments: Wound on lower abdomen area intact   Musculoskeletal:         General: Normal range of motion.      Cervical back: Full passive range of motion without pain and normal range of motion.   Skin:     General: Skin is warm.   Neurological:      Mental Status: He is alert and oriented to person, place, and time.   Psychiatric:         Behavior: Behavior normal.       Significant Labs: All pertinent labs within the past 24 hours have been reviewed.  CBC:   Recent Labs   Lab 11/18/22  1503 11/19/22  0530   WBC 16.60* 15.25*   HGB 12.6* 13.8*   HCT 39.0* 42.9    417     CMP:   Recent Labs   Lab 11/18/22  1503 11/19/22  0530   * 129*   K 4.3 4.3   CL 94* 91*   CO2 28 27   * 283*   BUN 16 14   CREATININE 0.9 0.9   CALCIUM 8.7 8.7   PROT 7.9  --    ALBUMIN 3.1*  --    BILITOT 1.2*  --    ALKPHOS 143*  --    AST 12  --    ALT 9*  --    ANIONGAP 9 11       Significant Imaging: I have reviewed all pertinent imaging results/findings within the past 24 hours.      Assessment/Plan:      * Suprapubic abscess  Suprapubic area, abdominal wall area cellulitis and Possible Phlegmon  vs Abscess  I&D on 11/20      Physical debility  Ongoing issue  In the past pt has been rejected by multiple SNFs      Abdominal wall cellulitis  As above       Urinary tract infection associated with indwelling urethral catheter  Chronic indwelling blanco  Evident UTI  Continue iv abx       Noncompliance  This is a chronic ongoing issue       Left renal mass  Mostly renal cell carcinoma  Hasnt followed up with Oncologist so far  Pt continues to smoke       Paroxysmal atrial fibrillation  Chronic stable issue   Maintain present regime  Eliquis on hold in view with OR visit for I&D       Chronic respiratory failure  On Home oxygen     COPD (chronic obstructive pulmonary disease)  DuoNebs every 4 hours as needed for wheezing or shortness of breath    VTE Risk Mitigation (From admission, onward)         Ordered     enoxaparin injection 40 mg  Every 24 hours (non-standard times)         11/19/22 1602     Reason for No Pharmacological VTE Prophylaxis  Once        Question:  Reasons:  Answer:  Already adequately anticoagulated on oral Anticoagulants    11/18/22 1928     IP VTE HIGH RISK PATIENT  Once         11/18/22 1928     Place sequential compression device  Until discontinued         11/18/22 1928                Discharge Planning   KELSY:      Code Status: Full Code   Is the patient medically ready for discharge?:     Reason for patient still in hospital (select all that apply): Treatment                     Amando Avalos MD  Department of Hospital Medicine   [facility]

## 2022-11-19 NOTE — ASSESSMENT & PLAN NOTE
Followed by Oncology, has not started definitive treatment yet and needs surgery but needs clearance by  pulmonology.  And PET scan that showed a parotid lesion and lung nodule not thought to be cancerous

## 2022-11-19 NOTE — CONSULTS
[facility] Consult Note:    James Jiang is a 62 y.o. male who presents for urinary tract infection and left renal mass.    Patient with a history of DM, HTN, atrial fibrillation and recurrent urinary tract infection who presented to the emergency department on 11/18/22 with a lower abdominal rash with purulent drainage.     WBC 15 and UA micro with > 100 RBC, > 100 WBC and positive nitrite concerning for infection. Urine culture with gram negative rods. CT abdomen pelvis with contrast with a 4.8 cm midline suprapubic sub cutaneous soft tissue abscess and a 5.6 cm enhancing left renal mass concerning for renal cell carcinoma. Urology consulted to assist with management.     On review of records, he has been managed by Dr. Rodriguez in the past for nephrolithiasis and a known left renal mass. Plan is for definitive surgical management, but patient is awaiting pulmonary clearance. At his last visit, plan was for close follow-up with repeat imaging.     He has an chronic indwelling blanco in place that is exchanged every 4 weeks.     No gross hematuria, bone pain, unintentional weight loss,  trauma or  malignancy.      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    Hypertension        Past Surgical History:   Procedure Laterality Date    CYSTOURETEROSCOPY WITH RETROGRADE PYELOGRAPHY AND INSERTION OF STENT INTO URETER Left 7/29/2022    Procedure: CYSTOURETEROSCOPY, WITH RETROGRADE PYELOGRAM AND URETERAL STENT INSERTION;  Surgeon: Raegan Rodriguez MD;  Location: [facility] OR;  Service: Urology;  Laterality: Left;    denies pertinent surgical history      INTRAMEDULLARY RODDING OF FEMUR Right 11/7/2021    Procedure: INSERTION, INTRAMEDULLARY HENNA, FEMUR;  Surgeon: Matt Milian MD;  Location: [facility] OR;  Service: Orthopedics;   Laterality: Right;    LASER LITHOTRIPSY Left 7/29/2022    Procedure: LITHOTRIPSY, USING LASER;  Surgeon: Raegan Rodriguez MD;  Location: [facility] OR;  Service: Urology;  Laterality: Left;    TOE AMPUTATION Right 10/12/2021    Procedure: AMPUTATION, TOE;  Surgeon: Milton Lauren DPM;  Location: [facility] OR;  Service: Podiatry;  Laterality: Right;    URETEROSCOPIC REMOVAL OF URETERIC CALCULUS Left 7/29/2022    Procedure: REMOVAL, CALCULUS, URETER, URETEROSCOPIC;  Surgeon: Raegan Rodriguez MD;  Location: [facility] OR;  Service: Urology;  Laterality: Left;       Family History   Problem Relation Age of Onset    Hypertension Father        Review of patient's allergies indicates:   Allergen Reactions    Vancomycin analogues Shortness Of Breath, Anxiety and Other (See Comments)     flushed       Medications Reviewed: see MAR      FOCUSED PHYSICAL EXAM:    Vitals:    11/19/22 1333   BP:    Pulse: 84   Resp: 20   Temp:      Body mass index is 35.56 kg/m².       General: Alert, cooperative, no distress, appears stated age  Abdomen: Purulent drainage from suprapubic area  : 20 Latvian blanco in place with clear urine        LABS:    Lab Results   Component Value Date    WBC 15.25 (H) 11/19/2022    HGB 13.8 (L) 11/19/2022    HCT 42.9 11/19/2022    MCV 91 11/19/2022     11/19/2022       BMP  Lab Results   Component Value Date     (L) 11/19/2022    K 4.3 11/19/2022    CL 91 (L) 11/19/2022    CO2 27 11/19/2022    BUN 14 11/19/2022    CREATININE 0.9 11/19/2022    CALCIUM 8.7 11/19/2022    ANIONGAP 11 11/19/2022    EGFRNORACEVR >60.0 11/19/2022         IMAGING:    Images independently reviewed by me      Assessment/Diagnosis:    Left renal mass - 5.6 cm  Recurrent urinary tract infection  Chronic blanco catheter    Plans:    - Today's visit was spent almost entirely on counseling. Extensive discussion with patient regarding the etiology and management of renal masses. Explained that solid masses are usually malignant and the  most common solid renal mass is renal cell carcinoma. Informed patient that small solid renal masses are more likely to be benign than larger renal masses (20% of solid masses < 4 cm are benign, whereas only 8% of masses > 4 cm are benign).  We discussed that renal masses are slow-growing and there is a low risk of metastasis with small renal masses.  - Explained that his mass has been increasing in size and would likely benefit from definitive surgical management, however he is a poor surgical candidate and does not follow up to appointments. Will need to follow up with Dr. Rodriguez soon after discharge to discuss the appropriate outpatient management.   - Recommend exchanging 20 Irish blanco catheter while inpatient as it has been in for 3 weeks.   - Follow up urine culture and tailor to culture specific antibiotics based on results.   - Continue medical management with primary team.

## 2022-11-19 NOTE — ASSESSMENT & PLAN NOTE
Will consult  evaluation, may need skilled nursing facility or rehab facility, says he has difficulty getting around

## 2022-11-19 NOTE — HOSPITAL COURSE
Pt with H/o L renal mass who is noncompliant with Oncology appointments/Poor insight / who was on hospice care before ,got admitted with Suprapubic area  abscess and associated Abdominal wall cellulitis  Pt had I&D on 11/20/22  Urinary tract infection associated with indwelling urethral catheter is an ongoing issue for this pt because of Chronic indwelling blanco  Pt was  started on iv abx, Midline was inserted and got discharged to home with PO zyvox and iv meropenem   Education was provided regarding personal Hygiene/Compliance    Regarding: WI- medication issue   ----- Message from Mónica Mann sent at 6/13/2022  7:16 PM CDT -----  Patient Name: David Herrera  Caller:  Alejandro   Patient Call back #:[phone]   Services provided: Pharmacy  Are you currently at (or near) your place of residence? Yes [facility] [id number]   Call Center Account #: [facility] [address]  ----  Patient MRN:  [id number]  Page Call back #:[phone]  Reason for call: pt takes 2 diff abx that need to be refrigerated, patient lost power     Patients needing callback are informed of the following:   Please be aware the return phone call may come from a unidentified phone number and also keep in mind that call back times vary based on call volumes.  If your condition becomes life threatening while you wait for a callback, you should seek immediate medical assistance by calling 911 or going to the Emergency Department for evaluation.

## 2022-11-19 NOTE — ASSESSMENT & PLAN NOTE
CT shows suprapubic abscess, will obtain culture of drainage, start linezolid 600 mg oral twice daily and Zosyn 3.375 g IV every 8 hours, consult to IR for possible drainage, consult to Infectious Disease for antibiotic management, consult Wound Care for evaluation

## 2022-11-19 NOTE — CONSULTS
"[facility]  Adult Nutrition   Consult Note (Initial Assessment)     SUMMARY     Recommendations  1.) Advance diet as tolerated to diabetic, cardiac restrictions.   2.) Add Colton BID x14 days to aid in wound healing.   3.) Suggest GI motility agents: stool softeners, laxatives.   4.) Consider lactobacillus acidophilus.  5.) Update HbA1C lab.    Goals:   1.) Diet to advance within 48hr.   2.) Progression of wound healing.  Nutrition Goal Status: new    Dietitian Rounds Brief  Consult for education received. Pt admitted to [facility] complaining of severe abdominal pain. +suprapubic abscess present, no photos at this time. Wound care consulted. Pt currently NPO. PTA, poor appetite/intake d/t abdominal pain. +constipation with LBM . No GI medications scheduled at this time. No other GI distress. Skin: wound to abdomen. Last HbA1C 6.2% (2022). No education provided at this time. UBW 90.7kg, admit wt 103kg. Wt gain likely r/t fluid retention. +renal carcinoma without treatment. NFPE completed with no s/s of wasting. Pt at risk for malnutrition.    Diet order:   Current Diet Order: NPO       Evaluation of Received Nutrient/Fluid Intake  Energy Calories Required: not meeting needs  Protein Required: not meeting needs  Fluid Required: not meeting needs  Tolerance: other (see comments) (NPO)     % Intake of Estimated Energy Needs: 0%  % Meal Intake: NPO    No intake or output data in the 24 hours ending 22 0739     Anthropometrics  Temp: 97.9 °F (36.6 °C)  Height Method: Stated  Height: 5' 7" (170.2 cm)  Height (inches): 67 in  Weight Method: Bed Scale  Weight: 103 kg (227 lb 1.2 oz)  Weight (lb): 227.08 lb  Ideal Body Weight (IBW), Male: 148 lb  % Ideal Body Weight, Male (lb): 153.43 %  BMI (Calculated): 35.6  BMI Grade: 35 - 39.9 - obesity - grade II  Usual Body Weight (UBW), k.7 kg  % Usual Body Weight: 113.8       Estimated/Assessed Needs  Weight Used For Calorie Calculations: 103 kg (227 lb 1.2 " oz)  Energy Calorie Requirements (kcal): 2060-2575  Energy Need Method: Kcal/kg (20-25)  Protein Requirements:   Weight Used For Protein Calculations: 103 kg (227 lb 1.2 oz) (0.8-1.0)  Fluid Requirements (mL): 2060-2575     RDA Method (mL): 2060  CHO Requirement: 45-60gm CHO per meal    Reason for Assessment  Reason For Assessment: consult  Diagnosis: infection/sepsis  Relevant Medical History: renal cell carcinoma, hypotension, chronic urinary tract infections, atrial fibrillation, DM2       Nutrition/Diet History  Factors Affecting Nutritional Intake: constipation, decreased appetite, pain, NPO    Nutrition Risk Screen  Nutrition Risk Screen: no indicators present     MST Score: 0  Have you recently lost weight without trying?: No  Weight loss score: 0  Have you been eating poorly because of a decreased appetite?: No  Appetite score: 0       Weight History:  Wt Readings from Last 5 Encounters:   11/18/22 103 kg (227 lb 1.2 oz)   10/27/22 90.7 kg (200 lb)   09/07/22 90.7 kg (200 lb)   08/26/22 90.7 kg (200 lb)   08/09/22 90.7 kg (200 lb)        Lab/Procedures/Meds: Pertinent Labs/Meds Reviewed    Medications:Pertinent Medications Reviewed  Scheduled Meds:   budesonide  0.5 mg Nebulization Q12H    And    ipratropium  0.5 mg Nebulization Q6H    And    arformoteroL  15 mcg Nebulization BID    atorvastatin  20 mg Oral QHS    gabapentin  800 mg Oral TID    linezolid  600 mg Oral Q12H    midodrine  5 mg Oral TID WM    nicotine  1 patch Transdermal Daily    pantoprazole  40 mg Oral Daily    piperacillin-tazobactam (ZOSYN) IVPB  3.375 g Intravenous Q8H     Continuous Infusions:  PRN Meds:.acetaminophen, albuterol-ipratropium, dextrose 10%, dextrose 10%, glucagon (human recombinant), glucose, glucose, HYDROcodone-acetaminophen, insulin aspart U-100, magnesium oxide, magnesium oxide, melatonin, morphine, naloxone, ondansetron, polyethylene glycol, potassium bicarbonate, potassium bicarbonate, senna-docusate 8.6-50 mg,  simethicone, sodium chloride 0.9%    Labs: Pertinent Labs Reviewed  Clinical Chemistry:  Recent Labs   Lab 11/18/22  1503 11/19/22  0530   * 129*   K 4.3 4.3   CL 94* 91*   CO2 28 27   * 283*   BUN 16 14   CREATININE 0.9 0.9   CALCIUM 8.7 8.7   PROT 7.9  --    ALBUMIN 3.1*  --    BILITOT 1.2*  --    ALKPHOS 143*  --    AST 12  --    ALT 9*  --    ANIONGAP 9 11   MG  --  1.7     CBC:   Recent Labs   Lab 11/19/22  0530   WBC 15.25*   RBC 4.72   HGB 13.8*   HCT 42.9      MCV 91   MCH 29.2   MCHC 32.2     Inflammatory Labs:  Recent Labs   Lab 11/18/22  1503   CRP 11.36*     Monitor and Evaluation  Food and Nutrient Intake: energy intake, food and beverage intake  Food and Nutrient Adminstration: diet order  Knowledge/Beliefs/Attitudes: food and nutrition knowledge/skill  Physical Activity and Function: nutrition-related ADLs and IADLs  Anthropometric Measurements: weight, weight change  Biochemical Data, Medical Tests and Procedures: electrolyte and renal panel, gastrointestinal profile, glucose/endocrine profile  Nutrition-Focused Physical Findings: overall appearance     Nutrition Risk  Level of Risk/Frequency of Follow-up: high     Nutrition Follow-Up  RD Follow-up?: Yes      Niki Loyola RD 11/19/2022 7:39 AM

## 2022-11-19 NOTE — CONSULTS
Consult Note  Infectious Disease    Reason for Consult: Suprapubic abscess, antibiotic recommendations      HPI: James Jiang is a 62 y.o. male active heavy smoker, known to our service for prior admission Feb/2021 for R septic hip MRSA s/p washout he was sent home on hospice, he has past medical history of diabetes, HTN, chronic indwelling Donis secondary to urinary retention, L ureteral stone and L renal mass follows with Dr Rodriguez/Urology outpatient last Donis exchange 10/12/22.  As per records, patient has not followed with Oncology for incidental left kidney mass likely consistent with renal cell carcinoma as per imaging.  He presents with worsening lower abdominal pain with associated redness, and a spontaneous drainage of purulent fluid.  Patient denies fever or chills, no headache, no cough, no shortness of breath, no nausea or vomit.  He has been constipated, last bowel movement greater than a week ago.  Patient states his last Donis exchange was about 3 weeks ago, as per chart, was done on 10/12.    In the ER, hemodynamically stable, afebrile. S/p one dose of Teflaro IV.  Labs on admission with leukocytosis 16.6, left shift 76.6%, H&H 12.6/39, platelet count 386   ESR 90, CRP 11.3   Hyponatremia 131, creatinine 0.9, alk-phos 103, AST 12/ALT 9   Lactic acid 0.7   UA from Donis cloudy, positive nitrates, 2+ protein, 3+ glucose, RBC>100, WBC>100, many bacteria, few yeast.  Chest x-ray revealed chronic interstitial preliminary scar; no acute process.    CT abdomen/pelvis revealed a 4.8 cm masslike area in the suprapubic subcutaneous fat, questionable phlegmon or developing soft tissue abscess.  Again, a 5.6 cm enhancing left renal mass most compatible with renal cell carcinoma.    Patient admitted for sepsis likely secondary to complicated UTI in the setting of chronic indwelling Donis in addition to lower abdominal cellulitis.    Empirically started on Zyvox and Zosyn IV.      ID consult for  suprapubic abscess, antibiotic guidance.    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    Hypertension      Past Surgical History:   Procedure Laterality Date    CYSTOURETEROSCOPY WITH RETROGRADE PYELOGRAPHY AND INSERTION OF STENT INTO URETER Left 7/29/2022    Procedure: CYSTOURETEROSCOPY, WITH RETROGRADE PYELOGRAM AND URETERAL STENT INSERTION;  Surgeon: Raegan Rodriguez MD;  Location: [facility] OR;  Service: Urology;  Laterality: Left;    denies pertinent surgical history      INTRAMEDULLARY RODDING OF FEMUR Right 11/7/2021    Procedure: INSERTION, INTRAMEDULLARY HENNA, FEMUR;  Surgeon: Matt Milian MD;  Location: [facility] OR;  Service: Orthopedics;  Laterality: Right;    LASER LITHOTRIPSY Left 7/29/2022    Procedure: LITHOTRIPSY, USING LASER;  Surgeon: Raegan Rodriguez MD;  Location: [facility] OR;  Service: Urology;  Laterality: Left;    TOE AMPUTATION Right 10/12/2021    Procedure: AMPUTATION, TOE;  Surgeon: Milton Lauren DPM;  Location: [facility] OR;  Service: Podiatry;  Laterality: Right;    URETEROSCOPIC REMOVAL OF URETERIC CALCULUS Left 7/29/2022    Procedure: REMOVAL, CALCULUS, URETER, URETEROSCOPIC;  Surgeon: Raegan Rodriguez MD;  Location: [facility] OR;  Service: Urology;  Laterality: Left;     Social History     Tobacco Use    Smoking status: Former    Smokeless tobacco: Never   Substance Use Topics    Alcohol use: Not Currently        Family History   Problem Relation Age of Onset    Hypertension Father        Pertinent medications noted:     Review of Systems:   No chills, fever, sweats, weight loss  No change in vision, loss of vision or diplopia  No sinus congestion, purulent nasal discharge, post nasal  drip or facial pain  No pain in mouth or throat. No problems with teeth, gums.  No chest pain, palpitations, syncope  No cough, sputum production, shortness of breath, dyspnea on exertion, pleurisy, hemoptysis  No dysphagia, odynophagia  No nausea, vomiting, diarrhea, constipation, blood in stool, or focal abd pain,    Chronic indwelling Donis, cloudy urine  No swelling of joints, redness of joints, injuries, or new focal pain  No unusual headaches, dizziness, vertigo, numbness, paresthesias, neuropathy, falls  No anxiety, depression, substance abuse, sleep disturbance  No bleeding, lymphadenopathy  Lower abdominal pain, redness, edema, pus    Outdoor activities: Lives at home with significant other   Travel: none  Implants: none   Antibiotic History: cipro 11/17    EXAM & DIAGNOSTICS REVIEWED:   Vitals:     Temp:  [97.8 °F (36.6 °C)-97.9 °F (36.6 °C)]   Temp: 97.9 °F (36.6 °C) (11/19/22 0346)  Pulse: 71 (11/19/22 0733)  Resp: 20 (11/19/22 0733)  BP: (!) 140/92 (11/19/22 0346)  SpO2: 96 % (11/19/22 0733)  No intake or output data in the 24 hours ending 11/19/22 0810    General:  Disheveled, In NAD. Alert and attentive, cooperative, comfortable on O2 by NC 2L  Eyes:  Anicteric, PERRL  ENT:  No ulcers, exudates, thrush, nares patent, edentulous  Neck:  Supple, no adenopathy appreciated, large beard  Lungs: Scattered rales L>R  Heart:  S1/S2+, regular rhythm, no murmurs  Abd:  Lower abdomen with redness, indurated area palpated, pus draining, cultures sent, very tender to palpation and discomfort due to constipation - +BS, soft  :  Donis, very cloudy urine, sediment noted in bag  Musc:  Joints without effusion, swelling,  erythema, synovitis  Skin:  Warm, healed herpes zoster L flank T7-8 distribution, impressive long yellow dirty nails of all fingers and toes  Wound:   Neuro:  Following commands, no acute focal deficit   Psych:  Calm, cooperative  Lymphatic:     No cervical, supraclavicular nodes  Extrem: No LE  edema b/l  VAD:  Peripheral IV       Isolation: Contact - H/o MRSA and ESBL      General Labs reviewed:  Recent Labs   Lab 11/18/22  1503 11/19/22  0530   WBC 16.60* 15.25*   HGB 12.6* 13.8*   HCT 39.0* 42.9    417       Recent Labs   Lab 11/18/22  1503 11/19/22  0530   * 129*   K 4.3 4.3   CL 94* 91*   CO2 28 27   BUN 16 14   CREATININE 0.9 0.9   CALCIUM 8.7 8.7   PROT 7.9  --    BILITOT 1.2*  --    ALKPHOS 143*  --    ALT 9*  --    AST 12  --      Recent Labs   Lab 11/18/22  1503   CRP 11.36*     Recent Labs   Lab 11/18/22  1503   SEDRATE 90*       Estimated Creatinine Clearance: 94.9 mL/min (based on SCr of 0.9 mg/dL).     Prior Micro:  Latest urine cultures:   11/15 ESBL Klebsiella pneumoniae  9/7 ESBL Klebsiella pneumoniae  8/26 MRSA     Micro:  Microbiology Results (last 7 days)       Procedure Component Value Units Date/Time    Urine culture [[id number]]  (Abnormal) Collected: 11/18/22 2029    Order Status: Completed Specimen: Urine Updated: 11/19/22 0741     Urine Culture, Routine GRAM NEGATIVE HENNA, NON-LACTOSE   >100,000 cfu/ml  Identification and susceptibility pending      Narrative:      Replace blanco and obtain urine specimen  Specimen Source->Urine    Aerobic culture [[id number]] Collected: 11/18/22 2236    Order Status: Sent Specimen: Abscess from Abdomen Updated: 11/18/22 2244            Imaging Reviewed:  CXR  CT abdomen/pelvis:   1. A 4.8 cm masslike area in the midline suprapubic subcutaneous fat, potentially localized phlegmon and or developing soft tissue abscess.  2. A 5.6 cm enhancing left renal mass is compatible renal cell carcinoma, and urology referral and tissue diagnosis are recommended.  3. Additional observations as described.    Cardiology:       IMPRESSION & PLAN     Lower abdominal cellulitis, rule out abscess in addition to complicated UTI in the setting of chronic indwelling Blanco    ESR 90, CRP 11.3 high   Cultures taken at bedside in process   UA positive,  culture GNR non-lactose  pending final     2. PMHx: PAD, Diabetes, hemoglobin A1c 6.2% (in July), AFib, HTN, COPD, depression/anxiety    3. Heavy smoker    4. 4 cm solid left renal mass compatible with primary renal neoplasm - patient has not followed with Oncology     Recommendations:  Adequate source control  Surgery to drain lower abdominal phlegmon  Follow cultures taken at bedside  Linezolid 600mg IV q12h  Stop Zosyn IV  Meropenem 1g IV q8h, recent ESBL Klebsiella UTI 11/15  Patient will need to establish care with Heme/Onc for L kidney mass  Aspiration precautions   Bowel regimen   He would benefit from a bath and clipping all his long nails could be possible source of skin infection     Will follow     D/w patient, significant other, Dr Avalos, Dr García        Medical Decision Making during this encounter was  [_] Low Complexity  [_] Moderate Complexity  [xx] High Complexity

## 2022-11-19 NOTE — CARE UPDATE
11/19/22 0733   Patient Assessment/Suction   Level of Consciousness (AVPU) alert   Respiratory Effort Unlabored   Expansion/Accessory Muscles/Retractions no use of accessory muscles   All Lung Fields Breath Sounds diminished   Rhythm/Pattern, Respiratory no shortness of breath reported   Cough Frequency no cough   PRE-TX-O2   O2 Device (Oxygen Therapy) nasal cannula   $ Is the patient on Low Flow Oxygen? Yes   Flow (L/min) 2   SpO2 96 %   Pulse Oximetry Type Intermittent   $ Pulse Oximetry - Multiple Charge Pulse Oximetry - Multiple   Pulse 71   Resp 20   Aerosol Therapy   $ Aerosol Therapy Charges Aerosol Treatment   Daily Review of Necessity (SVN) completed   Respiratory Treatment Status (SVN) given   Treatment Route (SVN) mouthpiece;oxygen   Patient Position (SVN) HOB elevated   Post Treatment Assessment (SVN) breath sounds unchanged;vital signs unchanged   Signs of Intolerance (SVN) none   Education   $ Education Bronchodilator;30 min   Respiratory Evaluation   $ Care Plan Tech Time 30 min   $ Eval/Re-eval Charges Re-evaluation

## 2022-11-19 NOTE — SUBJECTIVE & OBJECTIVE
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    Hypertension        Past Surgical History:   Procedure Laterality Date    CYSTOURETEROSCOPY WITH RETROGRADE PYELOGRAPHY AND INSERTION OF STENT INTO URETER Left 7/29/2022    Procedure: CYSTOURETEROSCOPY, WITH RETROGRADE PYELOGRAM AND URETERAL STENT INSERTION;  Surgeon: Raegan Rodriguez MD;  Location: [facility] OR;  Service: Urology;  Laterality: Left;    denies pertinent surgical history      INTRAMEDULLARY RODDING OF FEMUR Right 11/7/2021    Procedure: INSERTION, INTRAMEDULLARY HENNA, FEMUR;  Surgeon: Matt Milian MD;  Location: [facility] OR;  Service: Orthopedics;  Laterality: Right;    LASER LITHOTRIPSY Left 7/29/2022    Procedure: LITHOTRIPSY, USING LASER;  Surgeon: Raegan Rodriguez MD;  Location: [facility] OR;  Service: Urology;  Laterality: Left;    TOE AMPUTATION Right 10/12/2021    Procedure: AMPUTATION, TOE;  Surgeon: Milton Lauren DPM;  Location: [facility] OR;  Service: Podiatry;  Laterality: Right;    URETEROSCOPIC REMOVAL OF URETERIC CALCULUS Left 7/29/2022    Procedure: REMOVAL, CALCULUS, URETER, URETEROSCOPIC;  Surgeon: Raegan Rodriguez MD;  Location: [facility];  Service: Urology;  Laterality: Left;       Review of patient's allergies indicates:   Allergen Reactions    Vancomycin analogues Shortness Of Breath, Anxiety and Other (See Comments)     flushed       Current Facility-Administered Medications on File Prior to Encounter   Medication    electrolyte-S (ISOLYTE)    electrolyte-S (ISOLYTE)    [DISCONTINUED] fentaNYL 50 mcg/mL injection 25 mcg    [DISCONTINUED] metoclopramide HCl injection 10 mg    [DISCONTINUED] oxyCODONE immediate release tablet 5 mg     Current Outpatient Medications on File Prior to Encounter   Medication Sig    acetaminophen (TYLENOL) 325 MG tablet Take 650 mg by mouth every 6 (six)  hours as needed.    acyclovir 5% (ZOVIRAX) 5 % Crea Zovirax 5 % topical cream   APPLY TO THE AFFECTED AREA(S) BY TOPICAL ROUTE 5 TIMES PER DAY x 7 days    albuterol (PROVENTIL HFA) 90 mcg/actuation inhaler Inhale 2 puffs into the lungs every 6 (six) hours as needed for Wheezing. Rescue    albuterol (PROVENTIL/VENTOLIN HFA) 90 mcg/actuation inhaler Inhale 2 puffs into the lungs every 6 (six) hours as needed.     albuterol (VENTOLIN HFA) 90 mcg/actuation inhaler Inhale 2 puffs into the lungs every 4 (four) hours as needed for Wheezing or Shortness of Breath. Rescue    apixaban (ELIQUIS) 5 mg Tab Take 1 tablet (5 mg total) by mouth 2 (two) times daily.    atorvastatin (LIPITOR) 20 MG tablet Take 20 mg by mouth every evening.    capsaicin 0.1 % Crea capsaicin 0.1 % topical cream   Apply 1 application 3 times a day by topical route as needed for 7 days.    ciprofloxacin HCl (CIPRO) 500 MG tablet Take 500 mg by mouth 2 (two) times daily.    collagenase (SANTYL) ointment Apply topically once daily. (Patient taking differently: Apply 1 g topically once daily.)    FLUoxetine 10 MG capsule Take 10 mg by mouth once daily. HCS    fluticasone-umeclidin-vilanter (TRELEGY ELLIPTA) 100-62.5-25 mcg DsDv Inhale 1 puff into the lungs once daily.    gabapentin (NEURONTIN) 400 MG capsule Take 400 mg by mouth 3 (three) times daily.    glipiZIDE (GLUCOTROL) 5 MG tablet Take 5 mg by mouth daily with breakfast.     insulin aspart U-100 (NOVOLOG) 100 unit/mL (3 mL) InPn pen Inject 5 Units into the skin 3 (three) times daily.    metFORMIN (GLUCOPHAGE) 500 MG tablet Take 500 mg by mouth 2 (two) times a day.     midodrine (PROAMATINE) 5 MG Tab midodrine 5 mg tablet   TAKE 1 TABLET BY MOUTH THREE TIMES DAILY    nitrofurantoin, macrocrystal-monohydrate, (MACROBID) 100 MG capsule nitrofurantoin monohydrate/macrocrystals 100 mg capsule   TAKE 1 CAPSULE BY MOUTH TWICE DAILY    ondansetron (ZOFRAN-ODT) 4 MG TbDL Take 4 mg by mouth every 8 (eight)  "hours as needed.    oxyCODONE-acetaminophen (PERCOCET) 5-325 mg per tablet Take 1 tablet by mouth every 4 (four) hours as needed for Pain.    pantoprazole (PROTONIX) 40 MG tablet Take 40 mg by mouth once daily.    pen needle, diabetic 31 gauge x 5/16" Ndle Use with insulin up to three times daily (Patient taking differently: 1 pen by Misc.(Non-Drug; Combo Route) route 3 (three) times daily.)    senna-docusate 8.6-50 mg (PERICOLACE) 8.6-50 mg per tablet Take 2 tablets by mouth 2 (two) times daily.    simvastatin (ZOCOR) 40 MG tablet Take 40 mg by mouth once daily.      Family History       Problem Relation (Age of Onset)    Hypertension Father          Tobacco Use    Smoking status: Former    Smokeless tobacco: Never   Substance and Sexual Activity    Alcohol use: Not Currently    Drug use: Not Currently    Sexual activity: Not on file     Review of Systems   All other systems reviewed and are negative.  Twelve point review systems obtained and negative unless stated above in HPI.  Objective:     Vital Signs (Most Recent):  Temp: 97.8 °F (36.6 °C) (11/18/22 1329)  Pulse: (P) 75 (11/18/22 1717)  Resp: 16 (11/18/22 1734)  BP: 108/67 (11/18/22 1448)  SpO2: 95 % (11/18/22 1448) Vital Signs (24h Range):  Temp:  [97.8 °F (36.6 °C)] 97.8 °F (36.6 °C)  Pulse:  [75-85] (P) 75  Resp:  [16-18] 16  SpO2:  [91 %-96 %] 95 %  BP: (100-109)/(61-67) 108/67     Weight: 90.7 kg (200 lb)  Body mass index is 31.32 kg/m².    Physical Exam  Constitutional:       Appearance: Ill appearance: Chronically.      Comments: He is disheveled   HENT:      Head: Normocephalic.      Right Ear: External ear normal.      Left Ear: External ear normal.      Nose: Nose normal.      Mouth/Throat:      Mouth: Mucous membranes are dry.   Eyes:      Conjunctiva/sclera: Conjunctivae normal.   Cardiovascular:      Rate and Rhythm: Normal rate and regular rhythm.      Heart sounds: Normal heart sounds.   Pulmonary:      Effort: Pulmonary effort is normal.      " Breath sounds: Normal breath sounds.   Abdominal:      Comments: Obese abdomen, warm to touch and erythematous, under her pannus is small opening with large amount of purulent bloody drainage.   Musculoskeletal:      Cervical back: Normal range of motion.      Comments: Generally weak moves all extremities.  Mild edema to lower extremities.   Skin:     General: Skin is warm and dry.      Capillary Refill: Capillary refill takes less than 2 seconds.      Comments: Post toenails and fingernails are very long yellowed and dirty.   Neurological:      General: No focal deficit present.      Mental Status: He is alert. Mental status is at baseline.   Psychiatric:      Comments: Depressed mood, flat affect.           Significant Labs: All pertinent labs within the past 24 hours have been reviewed.    Significant Imaging: I have reviewed all pertinent imaging results/findings within the past 24 hours.

## 2022-11-19 NOTE — ASSESSMENT & PLAN NOTE
Paroxysmal atrial fibrillation, obtain 12 lead EKG in a.m., hold Eliquis until evaluated by IR for possible drainage, resume Eliquis if no procedures, monitor on telemetry

## 2022-11-19 NOTE — PROGRESS NOTES
Automatic Inhaler to Nebulizer Interchange    fluticasone/umeclidinium/vilanterol (Trelegy) 100 mcg/ 62.5 mcg/ 25 mcg changed to budesonide 0.5 mg twice daily AND ipratropium 0.5 mg every 6 hour scheduled AND arformoterol 15 mcg twice daily per [facility] Automatic Therapeutic Sustitutions Protocol.    Please contact pharmacy at extension [phone] with any questions.     Thank you,   Lorene Singleton

## 2022-11-19 NOTE — CONSULTS
[facility]  General Surgery  Consult Note    Consults  Subjective:     Chief Complaint/Reason for Admission:  Surgery consult for suprapubic abscess    History of Present Illness:   62-year-old chronically ill male admitted with suprapubic abscess.  He presented with worsening lower abdominal pain associated with increasing redness and induration.  He had spinous drainage of purulent fluid.  CT scan showed 4.8 cm area in the suprapubic midline subcutaneous fat that appeared to be a developing soft tissue abscess.  There is also a 5.6 cm left renal mass compatible with renal cell carcinoma.  Patient is awake and alert.  He is hemodynamically stable with hypertension.  He reports no fevers or chills.  He is afebrile.  He has recent history of right septic hip status post washout in February of this year.  He has chronic indwelling Donis catheter for urinary retention/bladder dysfunction.  He was on Eliquis for history of pulmonary embolism.  He has diabetes type 2, hypertension, obesity with BMI 35.5      IMPRESSION:  1. A 4.8 cm masslike area in the midline suprapubic subcutaneous fat, potentially localized phlegmon and or developing soft tissue abscess.  2. A 5.6 cm enhancing left renal mass is compatible renal cell carcinoma, and urology referral and tissue diagnosis are recommended    Current Facility-Administered Medications on File Prior to Encounter   Medication    electrolyte-S (ISOLYTE)    electrolyte-S (ISOLYTE)    [DISCONTINUED] fentaNYL 50 mcg/mL injection 25 mcg    [DISCONTINUED] metoclopramide HCl injection 10 mg    [DISCONTINUED] oxyCODONE immediate release tablet 5 mg     Current Outpatient Medications on File Prior to Encounter   Medication Sig    acetaminophen (TYLENOL) 325 MG tablet Take 650 mg by mouth every 6 (six) hours as needed.    acyclovir 5% (ZOVIRAX) 5 % Crea Zovirax 5 % topical cream   APPLY TO THE AFFECTED AREA(S) BY TOPICAL ROUTE 5 TIMES PER DAY x 7 days    albuterol  (PROVENTIL HFA) 90 mcg/actuation inhaler Inhale 2 puffs into the lungs every 6 (six) hours as needed for Wheezing. Rescue    albuterol (PROVENTIL/VENTOLIN HFA) 90 mcg/actuation inhaler Inhale 2 puffs into the lungs every 6 (six) hours as needed.     albuterol (VENTOLIN HFA) 90 mcg/actuation inhaler Inhale 2 puffs into the lungs every 4 (four) hours as needed for Wheezing or Shortness of Breath. Rescue    apixaban (ELIQUIS) 5 mg Tab Take 1 tablet (5 mg total) by mouth 2 (two) times daily.    atorvastatin (LIPITOR) 20 MG tablet Take 20 mg by mouth every evening.    ciprofloxacin HCl (CIPRO) 500 MG tablet Take 500 mg by mouth 2 (two) times daily.    gabapentin (NEURONTIN) 400 MG capsule Take 400 mg by mouth 3 (three) times daily.    glipiZIDE (GLUCOTROL) 5 MG tablet Take 5 mg by mouth daily with breakfast.     metFORMIN (GLUCOPHAGE) 500 MG tablet Take 500 mg by mouth 2 (two) times a day.     midodrine (PROAMATINE) 5 MG Tab midodrine 5 mg tablet   TAKE 1 TABLET BY MOUTH THREE TIMES DAILY    nitrofurantoin, macrocrystal-monohydrate, (MACROBID) 100 MG capsule nitrofurantoin monohydrate/macrocrystals 100 mg capsule   TAKE 1 CAPSULE BY MOUTH TWICE DAILY    ondansetron (ZOFRAN-ODT) 4 MG TbDL Take 4 mg by mouth every 8 (eight) hours as needed.    oxyCODONE-acetaminophen (PERCOCET) 5-325 mg per tablet Take 1 tablet by mouth every 4 (four) hours as needed for Pain.    pantoprazole (PROTONIX) 40 MG tablet Take 40 mg by mouth once daily.    senna-docusate 8.6-50 mg (PERICOLACE) 8.6-50 mg per tablet Take 2 tablets by mouth 2 (two) times daily.    simvastatin (ZOCOR) 40 MG tablet Take 40 mg by mouth once daily.     capsaicin 0.1 % Crea capsaicin 0.1 % topical cream   Apply 1 application 3 times a day by topical route as needed for 7 days.    collagenase (SANTYL) ointment Apply topically once daily. (Patient taking differently: Apply 1 g topically once daily.)    FLUoxetine 10 MG capsule Take 10 mg by mouth once daily. HCS     "fluticasone-umeclidin-vilanter (TRELEGY ELLIPTA) 100-62.5-25 mcg DsDv Inhale 1 puff into the lungs once daily.    insulin aspart U-100 (NOVOLOG) 100 unit/mL (3 mL) InPn pen Inject 5 Units into the skin 3 (three) times daily.    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    Hypertension      Past Surgical History:   Procedure Laterality Date    CYSTOURETEROSCOPY WITH RETROGRADE PYELOGRAPHY AND INSERTION OF STENT INTO URETER Left 7/29/2022    Procedure: CYSTOURETEROSCOPY, WITH RETROGRADE PYELOGRAM AND URETERAL STENT INSERTION;  Surgeon: Raegan Rodriguez MD;  Location: [facility] OR;  Service: Urology;  Laterality: Left;    denies pertinent surgical history      INTRAMEDULLARY RODDING OF FEMUR Right 11/7/2021    Procedure: INSERTION, INTRAMEDULLARY HENNA, FEMUR;  Surgeon: Matt Milian MD;  Location: [facility] OR;  Service: Orthopedics;  Laterality: Right;    LASER LITHOTRIPSY Left 7/29/2022    Procedure: LITHOTRIPSY, USING LASER;  Surgeon: Raegan Rodriguez MD;  Location: [facility] OR;  Service: Urology;  Laterality: Left;    TOE AMPUTATION Right 10/12/2021    Procedure: AMPUTATION, TOE;  Surgeon: Milton Lauren DPM;  Location: [facility] OR;  Service: Podiatry;  Laterality: Right;    URETEROSCOPIC REMOVAL OF URETERIC CALCULUS Left 7/29/2022    Procedure: REMOVAL, CALCULUS, URETER, URETEROSCOPIC;  Surgeon: Raegan Rodriguez MD;  Location: [facility] OR;  Service: Urology;  Laterality: Left;     Family History       Problem Relation (Age of " Onset)    Hypertension Father          Tobacco Use    Smoking status: Former    Smokeless tobacco: Never   Substance and Sexual Activity    Alcohol use: Not Currently    Drug use: Not Currently    Sexual activity: Not on file     Review of Systems   Constitutional:  Positive for activity change and fatigue. Negative for appetite change, chills, fever and unexpected weight change.   HENT:  Negative for hearing loss, rhinorrhea, sore throat and voice change.    Eyes:  Negative for photophobia and visual disturbance.   Respiratory:  Negative for cough, choking and shortness of breath.    Cardiovascular:  Negative for chest pain, palpitations and leg swelling.   Gastrointestinal:  Positive for abdominal pain. Negative for blood in stool, constipation, diarrhea, nausea and vomiting.   Endocrine: Negative for cold intolerance, heat intolerance, polydipsia and polyuria.   Genitourinary:  Positive for difficulty urinating.   Musculoskeletal:  Negative for arthralgias, back pain, joint swelling and neck stiffness.   Skin:  Positive for wound. Negative for color change, pallor and rash.   Neurological:  Negative for dizziness, seizures, syncope and headaches.   Hematological:  Negative for adenopathy. Does not bruise/bleed easily.   Psychiatric/Behavioral:  Negative for agitation, behavioral problems and confusion.    Objective:     Vital Signs (Most Recent):  Temp: (P) 97.3 °F (36.3 °C) (11/19/22 1115)  Pulse: 84 (11/19/22 1333)  Resp: 20 (11/19/22 1333)  BP: (!) (P) 169/103 (11/19/22 1115)  SpO2: 96 % (11/19/22 1333)   Vital Signs (24h Range):  Temp:  [97.3 °F (36.3 °C)-98.4 °F (36.9 °C)] (P) 97.3 °F (36.3 °C)  Pulse:  [71-89] 84  Resp:  [16-20] 20  SpO2:  [84 %-96 %] 96 %  BP: (140-180)/(90-96) (P) 169/103     Weight: 103 kg (227 lb 1.2 oz)  Body mass index is 35.56 kg/m².      Intake/Output Summary (Last 24 hours) at 11/19/2022 1721  Last data filed at 11/19/2022 1149  Gross per 24 hour   Intake 525 ml   Output 2500 ml    Net -1975 ml       Physical Exam  Constitutional:       General: He is awake. He is not in acute distress.     Appearance: He is morbidly obese.      Comments: Obese male in no acute distress.   HENT:      Head: Normocephalic and atraumatic.   Pulmonary:      Effort: No tachypnea, bradypnea, accessory muscle usage or respiratory distress.   Abdominal:      General: There is no distension.      Palpations: Abdomen is soft.      Tenderness: There is abdominal tenderness in the suprapubic area.          Comments: Area of induration, cellulitis with central area of drainage from a sinus.  Drainage is purulent.   Musculoskeletal:      Cervical back: Neck supple.   Neurological:      Mental Status: He is alert and oriented to person, place, and time.   Psychiatric:         Behavior: Behavior is cooperative.       Significant Labs:  CBC:   Recent Labs   Lab 11/19/22  0530   WBC 15.25*   RBC 4.72   HGB 13.8*   HCT 42.9      MCV 91   MCH 29.2   MCHC 32.2     CMP:   Recent Labs   Lab 11/18/22  1503 11/19/22  0530   * 283*   CALCIUM 8.7 8.7   ALBUMIN 3.1*  --    PROT 7.9  --    * 129*   K 4.3 4.3   CO2 28 27   CL 94* 91*   BUN 16 14   CREATININE 0.9 0.9   ALKPHOS 143*  --    ALT 9*  --    AST 12  --    BILITOT 1.2*  --        Significant Diagnostics:  I have reviewed all pertinent imaging results/findings within the past 24 hours.    Assessment/Plan:     Active Diagnoses:    Diagnosis Date Noted POA    PRINCIPAL PROBLEM:  Suprapubic abscess [L02.219] 11/18/2022 Yes    Abdominal wall cellulitis [L03.311] 11/19/2022 Unknown    Urinary tract infection associated with indwelling urethral catheter [T83.511A, N39.0] 11/18/2022 Yes    Left renal mass [N28.89]  Yes    Physical debility [R53.81] 11/26/2021 Yes    Paroxysmal atrial fibrillation [I48.0] 11/08/2021 Yes     Chronic    COPD (chronic obstructive pulmonary disease) [J44.9] 11/06/2021 Yes    Chronic respiratory failure [J96.10] 11/06/2021 Yes    Class 2  obesity in adult [E66.9] 10/09/2021 Yes    Noncompliance [Z91.199] 10/09/2021 Not Applicable      Problems Resolved During this Admission:    Diagnosis Date Noted Date Resolved POA    Chronic indwelling Donis catheter [Z97.8] 11/18/2022 11/18/2022 Not Applicable   -patient has suprapubic abscess.  It is now spontaneously draining.  He would benefit from further I&D.  Will plan drainage during this hospital stay.  His Eliquis has been held.    Thank you for your consult.     Won García III, MD  General Surgery  [facility]

## 2022-11-19 NOTE — ASSESSMENT & PLAN NOTE
Patient followed by Urology and has chronic indwelling catheter, he was recently placed on Cipro for an ESBL Klebsiella pneumoniae urinary tract infection but we are going to change catheter and obtain UA and re-culture, organism is covered by the Zosyn

## 2022-11-19 NOTE — PLAN OF CARE
Problem: Skin Injury Risk Increased  Goal: Skin Health and Integrity  Intervention: Promote and Optimize Oral Intake  Flowsheets (Taken 11/19/2022 0741)  Oral Nutrition Promotion: other (see comments)     Problem: Oral Intake Inadequate  Goal: Improved Oral Intake  Outcome: Ongoing, Progressing  Intervention: Promote and Optimize Oral Intake  Flowsheets (Taken 11/19/2022 0741)  Oral Nutrition Promotion: other (see comments)     Recommendations  1.) Advance diet as tolerated to diabetic, cardiac restrictions.   2.) Add Colton BID x14 days to aid in wound healing.   3.) Suggest GI motility agents: stool softeners, laxatives.   4.) Consider lactobacillus acidophilus.  5.) Update HbA1C lab.      Goals:   1.) Diet to advance within 48hr.   2.) Progression of wound healing.  Nutrition Goal Status: new

## 2022-11-19 NOTE — H&P
[facility] - Emergency Dept  Hospital Medicine  History & Physical    Patient Name: James Jiang  MRN: [id number]  Patient Class: IP- Inpatient  Admission Date: 11/18/2022  Attending Physician: Clarence Rico MD   Primary Care Provider: [facility]     Time seen 1832    Patient information was obtained from patient and ER records.     Subjective:     Principal Problem:Suprapubic abscess    Chief Complaint:   Chief Complaint   Patient presents with    Rash     Rash to lower abdomen and genitals.          HPI: James Jiang is a 62-year-old male Is a 62-year-old male with chronic medical problems including renal cell carcinoma, hypotension, chronic urinary tract infections, atrial fibrillation who presented to the emergency department this morning complaining of severe abdominal pain.  Patient states that he has had abdominal pain for about a year and it is his skin that is hurting but over the last several days his lower abdomen has been very tender and he got up from bed and blood started pouring out of his lower abdomen.  He said pain is severe, it has gotten progressively worse over the last several days.  Associated symptoms are constipation with no bowel movement in the last week and discomfort with urination.  He does see Urology regularly and has a chronic indwelling Donis.  He said it was last changed 3 weeks ago.  He did have a urine specimen sent around the 15th but not sure how was obtained because he said he has not had his catheter changed in 3 weeks.  Urine culture with ESBL Klebsiella pneumonia greater than 100,000 CFU/mL.  He was started on ciprofloxacin and took 1 dose.  He denies fevers or chills, nausea or vomiting, headaches or dizziness, he said he is short of breath but it is at baseline, he also complains of chronic right hip pain for the last year after a fracture and surgery. he is a long-term smoker and said he smokes as much as he  can.    In the ER WBC elevated at 16.6, H&H 12.6/39, platelets are 86, glucose elevated to 86, sodium low at 131, chloride low at 94, albumin 3.1, alk-phos 143 and total bilirubin 1.2.  CRP elevated at 11.36. Initial O2 sat 91% on room air, he was placed on nasal cannula O2 and saturation 95-96%.  Blood pressure systolic 100-109, heart rate 77-85 and afebrile with temperature 97.8°.  In the ED was given 600 mg IV ceftaroline and 1 L normal saline with 8 mg IV morphine.  At time of exam he says pain is somewhat relieved with morphine.  CT abdomen and pelvis revealed a 3 mm nonobstructing right renal calculus and a 5.6 cm solid mass in the left kidney compatible with renal cell carcinoma no hydronephrosis.  He has a 4.8 cm masslike area in the midline suprapubic subcutaneous fat either localized phlegmon and/or developing soft tissue abscess.  He will be admitted to the hospitalist service for further evaluation and treatment with consult to Infectious Disease, Urology and IR for possible drainage of abscess.      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    Hypertension    Renal cell carcinoma  Paroxysmal atrial fibrillation  COPD  Pulmonary embolus  Chronic Donis catheter  DVT  Obesity  Kidney stones  MSSA bacteremia  Urinary retention      Past Surgical History:   Procedure Laterality Date    CYSTOURETEROSCOPY WITH RETROGRADE PYELOGRAPHY AND INSERTION OF STENT INTO URETER Left 7/29/2022    Procedure: CYSTOURETEROSCOPY, WITH RETROGRADE PYELOGRAM AND URETERAL STENT INSERTION;  Surgeon: Raegan Rodriguez MD;  Location: [facility] OR;  Service: Urology;  Laterality: Left;    denies pertinent surgical history      INTRAMEDULLARY RODDING OF FEMUR Right 11/7/2021    Procedure: INSERTION, INTRAMEDULLARY HENNA, FEMUR;  Surgeon: Matt Milian MD;  Location: [facility] OR;   Service: Orthopedics;  Laterality: Right;    LASER LITHOTRIPSY Left 7/29/2022    Procedure: LITHOTRIPSY, USING LASER;  Surgeon: Raegan Rodriguez MD;  Location: [facility] OR;  Service: Urology;  Laterality: Left;    TOE AMPUTATION Right 10/12/2021    Procedure: AMPUTATION, TOE;  Surgeon: Milton Lauren DPM;  Location: [facility] OR;  Service: Podiatry;  Laterality: Right;    URETEROSCOPIC REMOVAL OF URETERIC CALCULUS Left 7/29/2022    Procedure: REMOVAL, CALCULUS, URETER, URETEROSCOPIC;  Surgeon: Raegan Rodriguez MD;  Location: [facility] [place] OR;  Service: Urology;  Laterality: Left;       Review of patient's allergies indicates:   Allergen Reactions    Vancomycin analogues Shortness Of Breath, Anxiety and Other (See Comments)     flushed       Current Facility-Administered Medications on File Prior to Encounter   Medication    electrolyte-S (ISOLYTE)    electrolyte-S (ISOLYTE)    [DISCONTINUED] fentaNYL 50 mcg/mL injection 25 mcg    [DISCONTINUED] metoclopramide HCl injection 10 mg    [DISCONTINUED] oxyCODONE immediate release tablet 5 mg     Current Outpatient Medications on File Prior to Encounter   Medication Sig    acetaminophen (TYLENOL) 325 MG tablet Take 650 mg by mouth every 6 (six) hours as needed.    acyclovir 5% (ZOVIRAX) 5 % Crea Zovirax 5 % topical cream   APPLY TO THE AFFECTED AREA(S) BY TOPICAL ROUTE 5 TIMES PER DAY x 7 days    albuterol (PROVENTIL HFA) 90 mcg/actuation inhaler Inhale 2 puffs into the lungs every 6 (six) hours as needed for Wheezing. Rescue    albuterol (PROVENTIL/VENTOLIN HFA) 90 mcg/actuation inhaler Inhale 2 puffs into the lungs every 6 (six) hours as needed.     albuterol (VENTOLIN HFA) 90 mcg/actuation inhaler Inhale 2 puffs into the lungs every 4 (four) hours as needed for Wheezing or Shortness of Breath. Rescue    apixaban (ELIQUIS) 5 mg Tab Take 1 tablet (5 mg total) by mouth 2 (two) times daily.    atorvastatin (LIPITOR) 20 MG tablet Take 20 mg by mouth every evening.    capsaicin 0.1 % Crea  "capsaicin 0.1 % topical cream   Apply 1 application 3 times a day by topical route as needed for 7 days.    ciprofloxacin HCl (CIPRO) 500 MG tablet Take 500 mg by mouth 2 (two) times daily.    collagenase (SANTYL) ointment Apply topically once daily. (Patient taking differently: Apply 1 g topically once daily.)    FLUoxetine 10 MG capsule Take 10 mg by mouth once daily. HCS    fluticasone-umeclidin-vilanter (TRELEGY ELLIPTA) 100-62.5-25 mcg DsDv Inhale 1 puff into the lungs once daily.    gabapentin (NEURONTIN) 400 MG capsule Take 400 mg by mouth 3 (three) times daily.    glipiZIDE (GLUCOTROL) 5 MG tablet Take 5 mg by mouth daily with breakfast.     insulin aspart U-100 (NOVOLOG) 100 unit/mL (3 mL) InPn pen Inject 5 Units into the skin 3 (three) times daily.    metFORMIN (GLUCOPHAGE) 500 MG tablet Take 500 mg by mouth 2 (two) times a day.     midodrine (PROAMATINE) 5 MG Tab midodrine 5 mg tablet   TAKE 1 TABLET BY MOUTH THREE TIMES DAILY    nitrofurantoin, macrocrystal-monohydrate, (MACROBID) 100 MG capsule nitrofurantoin monohydrate/macrocrystals 100 mg capsule   TAKE 1 CAPSULE BY MOUTH TWICE DAILY    ondansetron (ZOFRAN-ODT) 4 MG TbDL Take 4 mg by mouth every 8 (eight) hours as needed.    oxyCODONE-acetaminophen (PERCOCET) 5-325 mg per tablet Take 1 tablet by mouth every 4 (four) hours as needed for Pain.    pantoprazole (PROTONIX) 40 MG tablet Take 40 mg by mouth once daily.    pen needle, diabetic 31 gauge x 5/16" Ndle Use with insulin up to three times daily (Patient taking differently: 1 pen by Misc.(Non-Drug; Combo Route) route 3 (three) times daily.)    senna-docusate 8.6-50 mg (PERICOLACE) 8.6-50 mg per tablet Take 2 tablets by mouth 2 (two) times daily.    simvastatin (ZOCOR) 40 MG tablet Take 40 mg by mouth once daily.      Family History       Problem Relation (Age of Onset)    Hypertension Father          Tobacco Use    Smoking status: Former    Smokeless tobacco: Never   Substance and Sexual Activity "    Alcohol use: Not Currently    Drug use: Not Currently    Sexual activity: Not on file     Review of Systems   All other systems reviewed and are negative.  Twelve point review systems obtained and negative unless stated above in HPI.      Objective:     Vital Signs (Most Recent):  Temp: 97.8 °F (36.6 °C) (11/18/22 1329)  Pulse: (P) 75 (11/18/22 1717)  Resp: 16 (11/18/22 1734)  BP: 108/67 (11/18/22 1448)  SpO2: 95 % (11/18/22 1448) Vital Signs (24h Range):  Temp:  [97.8 °F (36.6 °C)] 97.8 °F (36.6 °C)  Pulse:  [75-85] (P) 75  Resp:  [16-18] 16  SpO2:  [91 %-96 %] 95 %  BP: (100-109)/(61-67) 108/67     Weight: 90.7 kg (200 lb)  Body mass index is 31.32 kg/m².    Physical Exam  Limited physical exam due to patient being in the hallway in the ER  Constitutional:       Appearance: Ill appearance: Chronically.      Comments: He is disheveled   HENT:      Head: Normocephalic.      Right Ear: External ear normal.      Left Ear: External ear normal.      Nose: Nose normal.      Mouth/Throat:      Mouth: Mucous membranes are dry.   Eyes:      Conjunctiva/sclera: Conjunctivae normal.   Cardiovascular:      Rate and Rhythm: Normal rate and regular rhythm.      Heart sounds: Normal heart sounds.   Pulmonary:      Effort: Pulmonary effort is normal.      Breath sounds: Normal breath sounds.   Abdominal:   Active bowel sounds, tender all over to light touch.  Patient says his skin hurts.     Comments: Obese abdomen, warm to touch and erythematous, under her pannus is small opening with large amount of purulent bloody drainage.   Musculoskeletal:      Cervical back: Normal range of motion.      Comments: Generally weak moves all extremities.  Mild edema to lower extremities.  Right great toe amputation site well approximated.  Skin:     General: Skin is warm and dry.  Erythematous and warm to touch abdomen.  Small opening under lower abdominal fold with copious purulent bloody drainage.     Capillary Refill: Capillary refill  takes less than 2 seconds.      Comments: toenails and fingernails are long yellowed and dirty.   Neurological:      General: No focal deficit present.      Mental Status: He is alert. Mental status is at baseline.   Psychiatric:      Comments: Depressed mood, flat affect.   G/U:  Urinary catheter with cloudy yellow urine with sediment.  No drainage from the urethra.     Significant Labs: All pertinent labs within the past 24 hours have been reviewed.    Significant Imaging: I have reviewed all pertinent imaging results/findings within the past 24 hours.    Assessment/Plan:     * Suprapubic abscess  CT shows suprapubic abscess, will obtain culture of drainage, start linezolid 600 mg oral twice daily and Zosyn 3.375 g IV every 8 hours, consult to IR for possible drainage, consult to Infectious Disease for antibiotic management, consult Wound Care for evaluation      Urinary tract infection associated with indwelling urethral catheter  Patient followed by Urology and has chronic indwelling catheter, he was recently placed on Cipro for an ESBL Klebsiella pneumoniae urinary tract infection but we are going to change catheter and obtain UA and re-culture, organism is covered by the Zosyn      Renal cell carcinoma  Followed by Oncology, has not started definitive treatment yet and needs surgery but needs clearance by  pulmonology.  And PET scan that showed a parotid lesion that needs to be evaluated and lung nodule not thought to be cancerous      Paroxysmal atrial fibrillation  Paroxysmal atrial fibrillation, monitor on Telemetry, hold Eliquis until evaluated by IR for possible drainage, resume Eliquis if no procedures, monitor on telemetry      COPD (chronic obstructive pulmonary disease)  DuoNebs every 4 hours as needed for wheezing or shortness of breath, chest x-ray in a.m.    Physical debility  Will consult  evaluation, may need skilled nursing facility or rehab facility, says he has difficulty  getting around        VTE Risk Mitigation (From admission, onward)           Ordered     Reason for No Pharmacological VTE Prophylaxis  Once        Question:  Reasons:  Answer:  Already adequately anticoagulated on oral Anticoagulants    11/18/22 1928     IP VTE HIGH RISK PATIENT  Once         11/18/22 1928     Place sequential compression device  Until discontinued         11/18/22 1928                       Irma Welsh NP  Department of Hospital Medicine   [facility] - Emergency Dept

## 2022-11-20 ENCOUNTER — ANESTHESIA (OUTPATIENT)
Dept: SURGERY | Facility: HOSPITAL | Age: 62
DRG: 603 | End: 2022-11-20
Payer: MEDICAID

## 2022-11-20 ENCOUNTER — ANESTHESIA EVENT (OUTPATIENT)
Dept: SURGERY | Facility: HOSPITAL | Age: 62
DRG: 603 | End: 2022-11-20
Payer: MEDICAID

## 2022-11-20 LAB
ANION GAP SERPL CALC-SCNC: 8 MMOL/L (ref 8–16)
BACTERIA UR CULT: ABNORMAL
BASOPHILS # BLD AUTO: 0.1 K/UL (ref 0–0.2)
BASOPHILS NFR BLD: 0.8 % (ref 0–1.9)
BUN SERPL-MCNC: 15 MG/DL (ref 8–23)
CALCIUM SERPL-MCNC: 9.2 MG/DL (ref 8.7–10.5)
CHLORIDE SERPL-SCNC: 95 MMOL/L (ref 95–110)
CO2 SERPL-SCNC: 31 MMOL/L (ref 23–29)
CREAT SERPL-MCNC: 0.9 MG/DL (ref 0.5–1.4)
DIFFERENTIAL METHOD: ABNORMAL
EOSINOPHIL # BLD AUTO: 0.3 K/UL (ref 0–0.5)
EOSINOPHIL NFR BLD: 2.3 % (ref 0–8)
ERYTHROCYTE [DISTWIDTH] IN BLOOD BY AUTOMATED COUNT: 12.6 % (ref 11.5–14.5)
EST. GFR  (NO RACE VARIABLE): >60 ML/MIN/1.73 M^2
ESTIMATED AVG GLUCOSE: 312 MG/DL (ref 68–131)
GLUCOSE SERPL-MCNC: 172 MG/DL (ref 70–110)
GLUCOSE SERPL-MCNC: 227 MG/DL (ref 70–110)
GLUCOSE SERPL-MCNC: 265 MG/DL (ref 70–110)
GLUCOSE SERPL-MCNC: 268 MG/DL (ref 70–110)
GLUCOSE SERPL-MCNC: 324 MG/DL (ref 70–110)
GLUCOSE SERPL-MCNC: 380 MG/DL (ref 70–110)
HBA1C MFR BLD: 12.5 % (ref 4.5–6.2)
HCT VFR BLD AUTO: 46.1 % (ref 40–54)
HGB BLD-MCNC: 14.8 G/DL (ref 14–18)
IMM GRANULOCYTES # BLD AUTO: 0.12 K/UL (ref 0–0.04)
IMM GRANULOCYTES NFR BLD AUTO: 0.9 % (ref 0–0.5)
LYMPHOCYTES # BLD AUTO: 1.5 K/UL (ref 1–4.8)
LYMPHOCYTES NFR BLD: 11.2 % (ref 18–48)
MCH RBC QN AUTO: 29.4 PG (ref 27–31)
MCHC RBC AUTO-ENTMCNC: 32.1 G/DL (ref 32–36)
MCV RBC AUTO: 92 FL (ref 82–98)
MONOCYTES # BLD AUTO: 1.2 K/UL (ref 0.3–1)
MONOCYTES NFR BLD: 9 % (ref 4–15)
NEUTROPHILS # BLD AUTO: 10.1 K/UL (ref 1.8–7.7)
NEUTROPHILS NFR BLD: 75.8 % (ref 38–73)
NRBC BLD-RTO: 0 /100 WBC
PLATELET # BLD AUTO: 481 K/UL (ref 150–450)
PMV BLD AUTO: 9.8 FL (ref 9.2–12.9)
POTASSIUM SERPL-SCNC: 4.6 MMOL/L (ref 3.5–5.1)
RBC # BLD AUTO: 5.03 M/UL (ref 4.6–6.2)
SODIUM SERPL-SCNC: 134 MMOL/L (ref 136–145)
WBC # BLD AUTO: 13.27 K/UL (ref 3.9–12.7)

## 2022-11-20 PROCEDURE — 80048 BASIC METABOLIC PNL TOTAL CA: CPT | Performed by: NURSE PRACTITIONER

## 2022-11-20 PROCEDURE — 99900035 HC TECH TIME PER 15 MIN (STAT)

## 2022-11-20 PROCEDURE — 10061 PR DRAIN SKIN ABSCESS COMPLIC: ICD-10-PCS | Mod: ,,, | Performed by: SURGERY

## 2022-11-20 PROCEDURE — 87075 CULTR BACTERIA EXCEPT BLOOD: CPT | Performed by: INTERNAL MEDICINE

## 2022-11-20 PROCEDURE — 94761 N-INVAS EAR/PLS OXIMETRY MLT: CPT

## 2022-11-20 PROCEDURE — 63600175 PHARM REV CODE 636 W HCPCS: Performed by: STUDENT IN AN ORGANIZED HEALTH CARE EDUCATION/TRAINING PROGRAM

## 2022-11-20 PROCEDURE — 25000003 PHARM REV CODE 250: Performed by: INTERNAL MEDICINE

## 2022-11-20 PROCEDURE — 25000242 PHARM REV CODE 250 ALT 637 W/ HCPCS: Performed by: STUDENT IN AN ORGANIZED HEALTH CARE EDUCATION/TRAINING PROGRAM

## 2022-11-20 PROCEDURE — 87205 SMEAR GRAM STAIN: CPT | Performed by: INTERNAL MEDICINE

## 2022-11-20 PROCEDURE — 25000003 PHARM REV CODE 250: Performed by: ANESTHESIOLOGY

## 2022-11-20 PROCEDURE — 87147 CULTURE TYPE IMMUNOLOGIC: CPT | Performed by: INTERNAL MEDICINE

## 2022-11-20 PROCEDURE — 87206 SMEAR FLUORESCENT/ACID STAI: CPT | Performed by: INTERNAL MEDICINE

## 2022-11-20 PROCEDURE — 63600175 PHARM REV CODE 636 W HCPCS: Performed by: INTERNAL MEDICINE

## 2022-11-20 PROCEDURE — 37000008 HC ANESTHESIA 1ST 15 MINUTES: Performed by: SURGERY

## 2022-11-20 PROCEDURE — 99900031 HC PATIENT EDUCATION (STAT)

## 2022-11-20 PROCEDURE — 87116 MYCOBACTERIA CULTURE: CPT | Performed by: INTERNAL MEDICINE

## 2022-11-20 PROCEDURE — 87102 FUNGUS ISOLATION CULTURE: CPT | Performed by: INTERNAL MEDICINE

## 2022-11-20 PROCEDURE — 12000002 HC ACUTE/MED SURGE SEMI-PRIVATE ROOM

## 2022-11-20 PROCEDURE — 27000221 HC OXYGEN, UP TO 24 HOURS

## 2022-11-20 PROCEDURE — S4991 NICOTINE PATCH NONLEGEND: HCPCS | Performed by: NURSE PRACTITIONER

## 2022-11-20 PROCEDURE — 99233 PR SUBSEQUENT HOSPITAL CARE,LEVL III: ICD-10-PCS | Mod: ,,, | Performed by: STUDENT IN AN ORGANIZED HEALTH CARE EDUCATION/TRAINING PROGRAM

## 2022-11-20 PROCEDURE — 25000003 PHARM REV CODE 250: Performed by: NURSE PRACTITIONER

## 2022-11-20 PROCEDURE — 10061 I&D ABSCESS COMP/MULTIPLE: CPT | Mod: ,,, | Performed by: SURGERY

## 2022-11-20 PROCEDURE — 94799 UNLISTED PULMONARY SVC/PX: CPT

## 2022-11-20 PROCEDURE — 87186 SC STD MICRODIL/AGAR DIL: CPT | Performed by: INTERNAL MEDICINE

## 2022-11-20 PROCEDURE — 25000003 PHARM REV CODE 250: Performed by: NURSE ANESTHETIST, CERTIFIED REGISTERED

## 2022-11-20 PROCEDURE — 82962 GLUCOSE BLOOD TEST: CPT | Performed by: SURGERY

## 2022-11-20 PROCEDURE — 94640 AIRWAY INHALATION TREATMENT: CPT

## 2022-11-20 PROCEDURE — 36000705 HC OR TIME LEV I EA ADD 15 MIN: Performed by: SURGERY

## 2022-11-20 PROCEDURE — 63600175 PHARM REV CODE 636 W HCPCS: Performed by: NURSE PRACTITIONER

## 2022-11-20 PROCEDURE — 37000009 HC ANESTHESIA EA ADD 15 MINS: Performed by: SURGERY

## 2022-11-20 PROCEDURE — 36415 COLL VENOUS BLD VENIPUNCTURE: CPT | Performed by: NURSE PRACTITIONER

## 2022-11-20 PROCEDURE — 87070 CULTURE OTHR SPECIMN AEROBIC: CPT | Performed by: INTERNAL MEDICINE

## 2022-11-20 PROCEDURE — 63600175 PHARM REV CODE 636 W HCPCS: Performed by: NURSE ANESTHETIST, CERTIFIED REGISTERED

## 2022-11-20 PROCEDURE — 99233 SBSQ HOSP IP/OBS HIGH 50: CPT | Mod: ,,, | Performed by: STUDENT IN AN ORGANIZED HEALTH CARE EDUCATION/TRAINING PROGRAM

## 2022-11-20 PROCEDURE — 36000704 HC OR TIME LEV I 1ST 15 MIN: Performed by: SURGERY

## 2022-11-20 PROCEDURE — 87077 CULTURE AEROBIC IDENTIFY: CPT | Performed by: INTERNAL MEDICINE

## 2022-11-20 PROCEDURE — 71000033 HC RECOVERY, INTIAL HOUR: Performed by: SURGERY

## 2022-11-20 PROCEDURE — 85025 COMPLETE CBC W/AUTO DIFF WBC: CPT | Performed by: NURSE PRACTITIONER

## 2022-11-20 RX ORDER — SODIUM CHLORIDE 9 MG/ML
INJECTION, SOLUTION INTRAVENOUS CONTINUOUS
Status: DISCONTINUED | OUTPATIENT
Start: 2022-11-20 | End: 2022-11-21

## 2022-11-20 RX ORDER — ONDANSETRON 2 MG/ML
INJECTION INTRAMUSCULAR; INTRAVENOUS
Status: DISCONTINUED | OUTPATIENT
Start: 2022-11-20 | End: 2022-11-20

## 2022-11-20 RX ORDER — PHENYLEPHRINE HCL IN 0.9% NACL 1 MG/10 ML
SYRINGE (ML) INTRAVENOUS
Status: DISCONTINUED | OUTPATIENT
Start: 2022-11-20 | End: 2022-11-20

## 2022-11-20 RX ORDER — HYDROMORPHONE HYDROCHLORIDE 1 MG/ML
0.2 INJECTION, SOLUTION INTRAMUSCULAR; INTRAVENOUS; SUBCUTANEOUS EVERY 5 MIN PRN
Status: DISCONTINUED | OUTPATIENT
Start: 2022-11-20 | End: 2022-11-20 | Stop reason: HOSPADM

## 2022-11-20 RX ORDER — DIPHENHYDRAMINE HYDROCHLORIDE 50 MG/ML
12.5 INJECTION INTRAMUSCULAR; INTRAVENOUS
Status: DISCONTINUED | OUTPATIENT
Start: 2022-11-20 | End: 2022-11-20 | Stop reason: HOSPADM

## 2022-11-20 RX ORDER — HYDRALAZINE HYDROCHLORIDE 20 MG/ML
10 INJECTION INTRAMUSCULAR; INTRAVENOUS EVERY 4 HOURS PRN
Status: DISCONTINUED | OUTPATIENT
Start: 2022-11-20 | End: 2022-11-22 | Stop reason: HOSPADM

## 2022-11-20 RX ORDER — ONDANSETRON 2 MG/ML
4 INJECTION INTRAMUSCULAR; INTRAVENOUS DAILY PRN
Status: DISCONTINUED | OUTPATIENT
Start: 2022-11-20 | End: 2022-11-20 | Stop reason: HOSPADM

## 2022-11-20 RX ORDER — FAMOTIDINE 10 MG/ML
INJECTION INTRAVENOUS
Status: DISCONTINUED | OUTPATIENT
Start: 2022-11-20 | End: 2022-11-20

## 2022-11-20 RX ORDER — SODIUM CHLORIDE, SODIUM LACTATE, POTASSIUM CHLORIDE, CALCIUM CHLORIDE 600; 310; 30; 20 MG/100ML; MG/100ML; MG/100ML; MG/100ML
INJECTION, SOLUTION INTRAVENOUS CONTINUOUS PRN
Status: DISCONTINUED | OUTPATIENT
Start: 2022-11-20 | End: 2022-11-20

## 2022-11-20 RX ORDER — PROPOFOL 10 MG/ML
INJECTION, EMULSION INTRAVENOUS
Status: DISCONTINUED | OUTPATIENT
Start: 2022-11-20 | End: 2022-11-20

## 2022-11-20 RX ORDER — FENTANYL CITRATE 50 UG/ML
INJECTION, SOLUTION INTRAMUSCULAR; INTRAVENOUS
Status: DISCONTINUED | OUTPATIENT
Start: 2022-11-20 | End: 2022-11-20

## 2022-11-20 RX ORDER — MEPERIDINE HYDROCHLORIDE 50 MG/ML
12.5 INJECTION INTRAMUSCULAR; INTRAVENOUS; SUBCUTANEOUS EVERY 10 MIN PRN
Status: DISCONTINUED | OUTPATIENT
Start: 2022-11-20 | End: 2022-11-20 | Stop reason: HOSPADM

## 2022-11-20 RX ORDER — LIDOCAINE HCL/PF 100 MG/5ML
SYRINGE (ML) INTRAVENOUS
Status: DISCONTINUED | OUTPATIENT
Start: 2022-11-20 | End: 2022-11-20

## 2022-11-20 RX ORDER — LACTULOSE 10 G/15ML
30 SOLUTION ORAL 3 TIMES DAILY
Status: DISCONTINUED | OUTPATIENT
Start: 2022-11-20 | End: 2022-11-21

## 2022-11-20 RX ORDER — ACETAMINOPHEN 10 MG/ML
INJECTION, SOLUTION INTRAVENOUS
Status: DISCONTINUED | OUTPATIENT
Start: 2022-11-20 | End: 2022-11-20

## 2022-11-20 RX ORDER — OXYCODONE HYDROCHLORIDE 5 MG/1
5 TABLET ORAL
Status: DISCONTINUED | OUTPATIENT
Start: 2022-11-20 | End: 2022-11-20 | Stop reason: HOSPADM

## 2022-11-20 RX ADMIN — Medication 6 MG: at 09:11

## 2022-11-20 RX ADMIN — BUDESONIDE 0.5 MG: 0.5 INHALANT RESPIRATORY (INHALATION) at 07:11

## 2022-11-20 RX ADMIN — PROPOFOL 125 MG: 10 INJECTION, EMULSION INTRAVENOUS at 10:11

## 2022-11-20 RX ADMIN — ONDANSETRON 4 MG: 2 INJECTION INTRAMUSCULAR; INTRAVENOUS at 10:11

## 2022-11-20 RX ADMIN — ENOXAPARIN SODIUM 40 MG: 100 INJECTION SUBCUTANEOUS at 04:11

## 2022-11-20 RX ADMIN — MEROPENEM AND SODIUM CHLORIDE 1 G: 1 INJECTION, SOLUTION INTRAVENOUS at 10:11

## 2022-11-20 RX ADMIN — ATORVASTATIN CALCIUM 20 MG: 20 TABLET, FILM COATED ORAL at 08:11

## 2022-11-20 RX ADMIN — IPRATROPIUM BROMIDE 0.5 MG: 0.5 SOLUTION RESPIRATORY (INHALATION) at 01:11

## 2022-11-20 RX ADMIN — FAMOTIDINE 20 MG: 10 INJECTION, SOLUTION INTRAVENOUS at 10:11

## 2022-11-20 RX ADMIN — LIDOCAINE HYDROCHLORIDE 100 MG: 20 INJECTION, SOLUTION INTRAVENOUS at 10:11

## 2022-11-20 RX ADMIN — FENTANYL CITRATE 75 MCG: 50 INJECTION INTRAMUSCULAR; INTRAVENOUS at 10:11

## 2022-11-20 RX ADMIN — Medication 100 MCG: at 10:11

## 2022-11-20 RX ADMIN — IPRATROPIUM BROMIDE 0.5 MG: 0.5 SOLUTION RESPIRATORY (INHALATION) at 07:11

## 2022-11-20 RX ADMIN — HYDROCODONE BITARTRATE AND ACETAMINOPHEN 1 TABLET: 5; 325 TABLET ORAL at 09:11

## 2022-11-20 RX ADMIN — SODIUM CHLORIDE: 0.9 INJECTION, SOLUTION INTRAVENOUS at 04:11

## 2022-11-20 RX ADMIN — SODIUM CHLORIDE, SODIUM LACTATE, POTASSIUM CHLORIDE, AND CALCIUM CHLORIDE: .6; .31; .03; .02 INJECTION, SOLUTION INTRAVENOUS at 10:11

## 2022-11-20 RX ADMIN — ACETAMINOPHEN 1000 MG: 10 INJECTION INTRAVENOUS at 10:11

## 2022-11-20 RX ADMIN — MIDODRINE HYDROCHLORIDE 5 MG: 2.5 TABLET ORAL at 04:11

## 2022-11-20 RX ADMIN — GABAPENTIN 800 MG: 300 CAPSULE ORAL at 08:11

## 2022-11-20 RX ADMIN — ARFORMOTEROL TARTRATE 15 MCG: 15 SOLUTION RESPIRATORY (INHALATION) at 07:11

## 2022-11-20 RX ADMIN — MEROPENEM AND SODIUM CHLORIDE 1 G: 1 INJECTION, SOLUTION INTRAVENOUS at 05:11

## 2022-11-20 RX ADMIN — SODIUM CHLORIDE 1000 ML: 9 INJECTION, SOLUTION INTRAVENOUS at 01:11

## 2022-11-20 RX ADMIN — LINEZOLID 600 MG: 600 INJECTION, SOLUTION INTRAVENOUS at 08:11

## 2022-11-20 RX ADMIN — MORPHINE SULFATE 4 MG: 4 INJECTION, SOLUTION INTRAMUSCULAR; INTRAVENOUS at 08:11

## 2022-11-20 RX ADMIN — MEROPENEM AND SODIUM CHLORIDE 1 G: 1 INJECTION, SOLUTION INTRAVENOUS at 02:11

## 2022-11-20 RX ADMIN — FENTANYL CITRATE 25 MCG: 50 INJECTION INTRAMUSCULAR; INTRAVENOUS at 10:11

## 2022-11-20 RX ADMIN — NICOTINE 1 PATCH: 21 PATCH, EXTENDED RELEASE TRANSDERMAL at 08:11

## 2022-11-20 RX ADMIN — LACTULOSE 30 G: 20 SOLUTION ORAL at 04:11

## 2022-11-20 RX ADMIN — OXYCODONE HYDROCHLORIDE 5 MG: 5 TABLET ORAL at 11:11

## 2022-11-20 RX ADMIN — INSULIN ASPART 2 UNITS: 100 INJECTION, SOLUTION INTRAVENOUS; SUBCUTANEOUS at 09:11

## 2022-11-20 RX ADMIN — HYDROCODONE BITARTRATE AND ACETAMINOPHEN 1 TABLET: 5; 325 TABLET ORAL at 03:11

## 2022-11-20 RX ADMIN — INSULIN HUMAN 3 UNITS: 100 INJECTION, SOLUTION PARENTERAL at 11:11

## 2022-11-20 RX ADMIN — SENNOSIDES AND DOCUSATE SODIUM 1 TABLET: 8.6; 5 TABLET ORAL at 09:11

## 2022-11-20 RX ADMIN — HYDRALAZINE HYDROCHLORIDE 10 MG: 20 INJECTION INTRAMUSCULAR; INTRAVENOUS at 01:11

## 2022-11-20 RX ADMIN — GABAPENTIN 800 MG: 300 CAPSULE ORAL at 04:11

## 2022-11-20 RX ADMIN — MIDODRINE HYDROCHLORIDE 5 MG: 2.5 TABLET ORAL at 12:11

## 2022-11-20 NOTE — ANESTHESIA PROCEDURE NOTES
Intubation    Date/Time: 11/20/2022 10:25 AM  Performed by: Ze Banerjee CRNA  Authorized by: Abdulkadir Alvarez MD     Intubation:     Induction:  Intravenous    Intubated:  Postinduction    Mask Ventilation:  Not attempted    Attempts:  1    Attempted By:  CRNA    Difficult Airway Encountered?: No      Complications:  None    Airway Device:  Supraglottic airway/LMA    Airway Device Size:  4.0    Secured at:  The lips    Placement Verified By:  Capnometry    Complicating Factors:  None    Findings Post-Intubation:  BS equal bilateral and atraumatic/condition of teeth unchanged

## 2022-11-20 NOTE — ASSESSMENT & PLAN NOTE
Suprapubic area, abdominal wall area cellulitis and Possible Phlegmon vs Abscess  S/p I&D on 11/20/22  Follow up cultures  Possible home within 2 days with abx

## 2022-11-20 NOTE — CARE UPDATE
11/20/22 0729   Patient Assessment/Suction   Level of Consciousness (AVPU) alert   Respiratory Effort Normal;Unlabored   Expansion/Accessory Muscles/Retractions no use of accessory muscles;no retractions;expansion symmetric   All Lung Fields Breath Sounds diminished   Rhythm/Pattern, Respiratory unlabored;pattern regular;depth regular;chest wiggle adequate;no shortness of breath reported   Cough Frequency infrequent   Cough Type good   PRE-TX-O2   O2 Device (Oxygen Therapy) nasal cannula   $ Is the patient on Low Flow Oxygen? Yes   Flow (L/min) 2   SpO2 97 %   Pulse Oximetry Type Intermittent   $ Pulse Oximetry - Multiple Charge Pulse Oximetry - Multiple   Pulse 78   Resp 19   Aerosol Therapy   $ Aerosol Therapy Charges Aerosol Treatment   Daily Review of Necessity (SVN) completed   Respiratory Treatment Status (SVN) given   Treatment Route (SVN) oxygen;mask   Patient Position (SVN) HOB elevated   Post Treatment Assessment (SVN) increased aeration   Signs of Intolerance (SVN) none   Breath Sounds Post-Respiratory Treatment   Throughout All Fields Post-Treatment All Fields   Throughout All Fields Post-Treatment aeration increased   Post-treatment Heart Rate (beats/min) 78   Post-treatment Resp Rate (breaths/min) 19   Education   $ Education Bronchodilator;15 min   Respiratory Evaluation   $ Care Plan Tech Time 15 min

## 2022-11-20 NOTE — BRIEF OP NOTE
[facility]  Brief Operative Note    SUMMARY     Surgery Date: 11/20/2022     Surgeon(s) and Role:     * Won García III, MD - Primary    Assisting Surgeon: None    Pre-op Diagnosis:  Abscess of suprapubic region [L02.219]    Post-op Diagnosis:  Post-Op Diagnosis Codes:     * Abscess of suprapubic region [L02.219]    Procedure(s) (LRB):  INCISION AND DRAINAGE, suprapubic ABSCESS (N/A)    Anesthesia: General    Operative Findings:  Patient brought to the operating room transferred the operating table supine position.  He was general given anesthesia.  LMA placed.  The lower abdomen was prepped and draped.  He had a fluctuant abscess in the suprapubic position.  There was an overlying sinus draining large amount of pus.  3 cm incision was made through the site of the sinus.  Large amount of pus was encountered.  It was cultured.  The abscess cavity was irrigated and drained.  Betadine-soaked gauze was packed into the cavity.  It was bandaged.  He was extubated brought to recovery room stable condition.  Instrument counts correct   Complications none   Estimated Blood Loss: 10 mL  Estimated Blood Loss has been documented.         Specimens:   Specimen (24h ago, onward)      None            [id number]

## 2022-11-20 NOTE — SUBJECTIVE & OBJECTIVE
Interval History:     Review of Systems   Constitutional:  Negative for activity change and appetite change.   HENT:  Negative for congestion and dental problem.    Eyes:  Negative for discharge and itching.   Respiratory:  Negative for shortness of breath.    Cardiovascular:  Negative for chest pain.   Gastrointestinal:  Negative for abdominal distention and abdominal pain.   Endocrine: Negative for cold intolerance.   Genitourinary:  Negative for difficulty urinating and dysuria.   Musculoskeletal:  Negative for arthralgias and back pain.   Skin:  Positive for wound. Negative for color change.   Neurological:  Negative for dizziness and facial asymmetry.   Hematological:  Negative for adenopathy.   Psychiatric/Behavioral:  Negative for agitation and behavioral problems.    Objective:     Vital Signs (Most Recent):  Temp: 97.2 °F (36.2 °C) (11/20/22 1205)  Pulse: 80 (11/20/22 1205)  Resp: 16 (11/20/22 1205)  BP: (!) 64/54 (nurse notified) (11/20/22 1205)  SpO2: (!) 93 % (11/20/22 1205)   Vital Signs (24h Range):  Temp:  [97 °F (36.1 °C)-97.9 °F (36.6 °C)] 97.2 °F (36.2 °C)  Pulse:  [71-85] 80  Resp:  [15-20] 16  SpO2:  [86 %-98 %] 93 %  BP: ()/() 64/54     Weight: 103 kg (227 lb 1.2 oz)  Body mass index is 35.56 kg/m².    Intake/Output Summary (Last 24 hours) at 11/20/2022 1250  Last data filed at 11/20/2022 1124  Gross per 24 hour   Intake 800 ml   Output 3400 ml   Net -2600 ml      Physical Exam  Vitals and nursing note reviewed.   Constitutional:       Appearance: He is well-developed.   HENT:      Head: Atraumatic.      Right Ear: External ear normal.      Left Ear: External ear normal.      Nose: Nose normal.      Mouth/Throat:      Mouth: Mucous membranes are moist.   Eyes:      General: No scleral icterus.  Cardiovascular:      Rate and Rhythm: Normal rate.   Pulmonary:      Effort: Pulmonary effort is normal.   Abdominal:      Comments: Lower abdomen bandage intact   Musculoskeletal:          General: Normal range of motion.      Cervical back: Full passive range of motion without pain and normal range of motion.   Skin:     General: Skin is warm.   Neurological:      Mental Status: He is alert and oriented to person, place, and time.   Psychiatric:         Behavior: Behavior normal.       Significant Labs: All pertinent labs within the past 24 hours have been reviewed.  CBC:   Recent Labs   Lab 11/18/22  1503 11/19/22  0530 11/20/22  0457   WBC 16.60* 15.25* 13.27*   HGB 12.6* 13.8* 14.8   HCT 39.0* 42.9 46.1    417 481*     CMP:   Recent Labs   Lab 11/18/22  1503 11/19/22  0530 11/20/22  0457   * 129* 134*   K 4.3 4.3 4.6   CL 94* 91* 95   CO2 28 27 31*   * 283* 380*   BUN 16 14 15   CREATININE 0.9 0.9 0.9   CALCIUM 8.7 8.7 9.2   PROT 7.9  --   --    ALBUMIN 3.1*  --   --    BILITOT 1.2*  --   --    ALKPHOS 143*  --   --    AST 12  --   --    ALT 9*  --   --    ANIONGAP 9 11 8       Significant Imaging: I have reviewed all pertinent imaging results/findings within the past 24 hours.

## 2022-11-20 NOTE — CARE UPDATE
11/19/22 2105   Patient Assessment/Suction   Level of Consciousness (AVPU) alert   Respiratory Effort Unlabored   Expansion/Accessory Muscles/Retractions no use of accessory muscles   All Lung Fields Breath Sounds diminished   Rhythm/Pattern, Respiratory no shortness of breath reported   Cough Frequency no cough   PRE-TX-O2   O2 Device (Oxygen Therapy) nasal cannula   $ Is the patient on Low Flow Oxygen? Yes   Flow (L/min) 2   SpO2 96 %   Pulse Oximetry Type Intermittent   $ Pulse Oximetry - Multiple Charge Pulse Oximetry - Multiple   Pulse 85   Resp 15   Positioning   Head of Bed (HOB) Positioning HOB elevated;HOB at 30-45 degrees   Aerosol Therapy   $ Aerosol Therapy Charges Aerosol Treatment   Daily Review of Necessity (SVN) completed   Respiratory Treatment Status (SVN) given   Treatment Route (SVN) mouthpiece   Patient Position (SVN) HOB elevated   Post Treatment Assessment (SVN) breath sounds improved   Signs of Intolerance (SVN) none   Breath Sounds Post-Respiratory Treatment   Throughout All Fields Post-Treatment All Fields   Throughout All Fields Post-Treatment aeration increased   Post-treatment Heart Rate (beats/min) 77   Post-treatment Resp Rate (breaths/min) 22   Education   $ Education Bronchodilator;15 min   Respiratory Evaluation   $ Care Plan Tech Time 15 min

## 2022-11-20 NOTE — PROGRESS NOTES
Progress Note  Infectious Disease    Reason for Consult: Suprapubic abscess, antibiotic recommendations      HPI: James Jiang is a 62 y.o. male active heavy smoker, known to our service for prior admission Feb/2021 for R septic hip MRSA s/p washout he was sent home on hospice, he has past medical history of diabetes, HTN, chronic indwelling Donis secondary to urinary retention, L ureteral stone and L renal mass follows with Dr Rodriguez/Urology outpatient last Donis exchange 10/12/22.  As per records, patient has not followed with Oncology for incidental left kidney mass likely consistent with renal cell carcinoma as per imaging.  He presents with worsening lower abdominal pain with associated redness, and a spontaneous drainage of purulent fluid.  Patient denies fever or chills, no headache, no cough, no shortness of breath, no nausea or vomit.  He has been constipated, last bowel movement greater than a week ago.  Patient states his last Donis exchange was about 3 weeks ago, as per chart, was done on 10/12.    In the ER, hemodynamically stable, afebrile. S/p one dose of Teflaro IV.  Labs on admission with leukocytosis 16.6, left shift 76.6%, H&H 12.6/39, platelet count 386   ESR 90, CRP 11.3   Hyponatremia 131, creatinine 0.9, alk-phos 103, AST 12/ALT 9   Lactic acid 0.7   UA from Donis cloudy, positive nitrates, 2+ protein, 3+ glucose, RBC>100, WBC>100, many bacteria, few yeast.  Chest x-ray revealed chronic interstitial preliminary scar; no acute process.    CT abdomen/pelvis revealed a 4.8 cm masslike area in the suprapubic subcutaneous fat, questionable phlegmon or developing soft tissue abscess.  Again, a 5.6 cm enhancing left renal mass most compatible with renal cell carcinoma.    Patient admitted for sepsis likely secondary to complicated UTI in the setting of chronic indwelling Donis in addition to lower abdominal cellulitis.    Empirically started on Zyvox and Zosyn IV.      ID consult for  suprapubic abscess, antibiotic guidance.    INTERVAL HISTORY:   11/19-20:  Interim reviewed, patient seen examined at bedside.  Status post I and D in the OR awaiting OR note.  Patient still complaining of severe constipation, has not had a bowel movement for more than 10 days.  Donis catheter exchange overnight, significant amount of pus coming out of urethra noted, repeat UA with pyuria 23, no bacteria.  Labs reviewed, white count down to 13.2, left shift 75.8%, H&H 14.8/46.1, platelet count 481.  Glucose 380, stable kidney function.  Hemoglobin A1c 12.5%.  Micro reviewed, wound cultures from yesterday grew Staph aureus suspecting MRSA, and GNR, lactose  likely ESBL Klebsiella pneumoniae.  Urine culture from admission ESBL Klebsiella pneumoniae.    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    Hypertension      Past Surgical History:   Procedure Laterality Date    CYSTOURETEROSCOPY WITH RETROGRADE PYELOGRAPHY AND INSERTION OF STENT INTO URETER Left 7/29/2022    Procedure: CYSTOURETEROSCOPY, WITH RETROGRADE PYELOGRAM AND URETERAL STENT INSERTION;  Surgeon: Raegan Rodriguez MD;  Location: [facility] OR;  Service: Urology;  Laterality: Left;    denies pertinent surgical history      INTRAMEDULLARY RODDING OF FEMUR Right 11/7/2021    Procedure: INSERTION, INTRAMEDULLARY HENNA, FEMUR;  Surgeon: Matt Milian MD;  Location: [facility] OR;  Service: Orthopedics;  Laterality: Right;    LASER LITHOTRIPSY Left 7/29/2022    Procedure: LITHOTRIPSY, USING LASER;  Surgeon: Raegan Rodriguez MD;  Location: [facility] OR;  Service: Urology;  Laterality: Left;    TOE AMPUTATION Right 10/12/2021    Procedure: AMPUTATION,  TOE;  Surgeon: Milton Lauren DPM;  Location: [facility] OR;  Service: Podiatry;  Laterality: Right;    URETEROSCOPIC REMOVAL OF URETERIC CALCULUS Left 7/29/2022    Procedure: REMOVAL, CALCULUS, URETER, URETEROSCOPIC;  Surgeon: Raegan Rodriguez MD;  Location: [facility] OR;  Service: Urology;  Laterality: Left;     Social History     Tobacco Use    Smoking status: Former    Smokeless tobacco: Never   Substance Use Topics    Alcohol use: Not Currently        Family History   Problem Relation Age of Onset    Hypertension Father        Pertinent medications noted:     Review of Systems:   No chills, fever, sweats, weight loss  No change in vision, loss of vision or diplopia  No sinus congestion, purulent nasal discharge, post nasal drip or facial pain  No pain in mouth or throat. No problems with teeth, gums.  No chest pain, palpitations, syncope  No cough, sputum production, shortness of breath, dyspnea on exertion, pleurisy, hemoptysis  No dysphagia, odynophagia  No nausea, vomiting, diarrhea, +constipation, blood in stool, or focal abd pain,    Chronic indwelling Donis, cloudy urine  No swelling of joints, redness of joints, injuries, or new focal pain  No unusual headaches, dizziness, vertigo, numbness, paresthesias, neuropathy, falls  No anxiety, depression, substance abuse, sleep disturbance  No bleeding, lymphadenopathy  Lower abdominal pain, redness, edema, pus    Outdoor activities: Lives at home with significant other   Travel: none  Implants: none   Antibiotic History: cipro 11/17    EXAM & DIAGNOSTICS REVIEWED:   Vitals:     Temp:  [97 °F (36.1 °C)-97.9 °F (36.6 °C)]   Temp: (P) 97 °F (36.1 °C) (11/20/22 0856)  Pulse: 84 (11/20/22 0730)  Resp: 20 (11/20/22 0730)  BP: (!) 87/67 (nurse notified) (11/20/22 0730)  SpO2: 98 % (11/20/22 0730)    Intake/Output Summary (Last 24 hours) at 11/20/2022 1036  Last data filed at 11/20/2022 0912  Gross per 24 hour   Intake 900 ml   Output 5800 ml   Net -4900 ml       General:   Disheveled, In NAD. Alert and attentive, cooperative, comfortable on O2 by NC 2L  Eyes:  Anicteric, PERRL  ENT:  No ulcers, exudates, thrush, nares patent, edentulous  Neck:  Supple, large beard  Lungs: Scattered rales L>R  Heart:  S1/S2+, regular rhythm, no murmurs  Abd:  Lower abdomen with dressing in place, not disturbed.  11/19: Lower abdomen with redness, indurated area palpated, pus draining, cultures sent, very tender to palpation and discomfort due to constipation - +BS, soft  :  Donis, hazy urine  Musc:  Joints without effusion, swelling,  erythema, synovitis  Skin:  Warm, healed herpes zoster L flank T7-8 distribution, impressive long yellow dirty nails of all fingers and toes  Wound:   Neuro:  Following commands, no acute focal deficit   Psych:  Calm, cooperative  Lymphatic:      Extrem: No LE edema b/l  VAD:  Peripheral IV       Isolation: Contact - H/o MRSA and ESBL      General Labs reviewed:  Recent Labs   Lab 11/18/22  1503 11/19/22  0530 11/20/22  0457   WBC 16.60* 15.25* 13.27*   HGB 12.6* 13.8* 14.8   HCT 39.0* 42.9 46.1    417 481*       Recent Labs   Lab 11/18/22  1503 11/19/22  0530 11/20/22  0457   * 129* 134*   K 4.3 4.3 4.6   CL 94* 91* 95   CO2 28 27 31*   BUN 16 14 15   CREATININE 0.9 0.9 0.9   CALCIUM 8.7 8.7 9.2   PROT 7.9  --   --    BILITOT 1.2*  --   --    ALKPHOS 143*  --   --    ALT 9*  --   --    AST 12  --   --      Recent Labs   Lab 11/18/22  1503   CRP 11.36*     Recent Labs   Lab 11/18/22  1503   SEDRATE 90*       Estimated Creatinine Clearance: 94.9 mL/min (based on SCr of 0.9 mg/dL).     Prior Micro:  Latest urine cultures:   11/15 ESBL Klebsiella pneumoniae  9/7 ESBL Klebsiella pneumoniae  8/26 MRSA     Micro:  Microbiology Results (last 7 days)       Procedure Component Value Units Date/Time    Aerobic culture [[id number]]  (Abnormal) Collected: 11/19/22 0927    Order Status: Completed Specimen: Abscess from Abdomen Updated: 11/20/22 0855     Aerobic  Bacterial Culture STAPHYLOCOCCUS AUREUS  Many  For susceptibility see order # [id number]        GRAM NEGATIVE HENNA, LACTOSE   For susceptibility see order # [id number]      Aerobic culture [[id number]]  (Abnormal) Collected: 11/18/22 2236    Order Status: Completed Specimen: Abscess from Abdomen Updated: 11/20/22 0848     Aerobic Bacterial Culture STAPHYLOCOCCUS AUREUS  Many  Susceptibility pending        GRAM NEGATIVE HENNA, LACTOSE   Rare  Identification and susceptibility pending      Urine culture [[id number]]  (Abnormal)  (Susceptibility) Collected: 11/18/22 2029    Order Status: Completed Specimen: Urine Updated: 11/20/22 0730     Urine Culture, Routine KLEBSIELLA PNEUMONIAE ESBL  >100,000 cfu/ml      Narrative:      Replace blanco and obtain urine specimen  Specimen Source->Urine    Urine culture [[id number]] Collected: 11/19/22 2235    Order Status: No result Specimen: Urine Updated: 11/19/22 2315    Blood culture [[id number]] Collected: 11/19/22 1424    Order Status: Completed Specimen: Blood from Antecubital, Right Updated: 11/19/22 2117     Blood Culture, Routine No Growth to date    Culture, Anaerobe [[id number]] Collected: 11/19/22 0927    Order Status: Sent Specimen: Abscess from Abdomen Updated: 11/19/22 0956            Imaging Reviewed:  CXR  CT abdomen/pelvis:   1. A 4.8 cm masslike area in the midline suprapubic subcutaneous fat, potentially localized phlegmon and or developing soft tissue abscess.  2. A 5.6 cm enhancing left renal mass is compatible renal cell carcinoma, and urology referral and tissue diagnosis are recommended.  3. Additional observations as described.    Cardiology:       IMPRESSION & PLAN     Lower abdominal cellulitis/abscess  in addition to complicated UTI in the setting of chronic indwelling Blanco    ESR 90, CRP 11.3 high   Cultures taken at bedside Staph aureus suspecting MRSA, and GNR-lactose , pending ID likely same ESBL   UA positive, culture grew ESBL  Klebsiella pneumoniae   Blood cultures x 1 no growth to date, pending final    2. Poorly controled diabetes, HbA1c: 12.5%    3. PMHx: PAD, AFib, HTN, COPD, depression/anxiety    4. Heavy smoker    5. 4 cm solid left renal mass compatible with primary renal neoplasm - patient has not followed with Oncology     Recommendations:  Follow OR cultures   Linezolid 600mg IV q12h  Meropenem 1g IV q8h for ESBL Klebsiella pneumonia  Aspiration precautions   Bowel regimen   He would benefit from a bath and clipping all his long nails could be possible source of skin infection - agreed to have it done tomorrow    Will follow     D/w patient, Dr Avalos      Medical Decision Making during this encounter was  [_] Low Complexity  [_] Moderate Complexity  [xx] High Complexity

## 2022-11-20 NOTE — ANESTHESIA POSTPROCEDURE EVALUATION
Anesthesia Post Evaluation    Patient: James Jiang    Procedure(s) Performed: Procedure(s) (LRB):  INCISION AND DRAINAGE, ABSCESS (N/A)    Final Anesthesia Type: general      Patient location during evaluation: PACU  Patient participation: Yes- Able to Participate  Level of consciousness: awake and alert  Post-procedure vital signs: reviewed and stable  Pain management: adequate  Airway patency: patent    PONV status at discharge: No PONV  Anesthetic complications: no      Cardiovascular status: blood pressure returned to baseline and stable  Respiratory status: unassisted and room air  Hydration status: euvolemic  Follow-up not needed.          Vitals Value Taken Time   /70 11/20/22 1116   Temp 36.3 °C (97.3 °F) 11/20/22 1105   Pulse 81 11/20/22 1128   Resp 19 11/20/22 1128   SpO2 96 % 11/20/22 1128   Vitals shown include unvalidated device data.      Event Time   Out of Recovery 11/20/2022 11:28:00         Pain/Ayaz Score: Pain Rating Prior to Med Admin: 4 (11/20/2022 11:19 AM)  Pain Rating Post Med Admin: 0 (11/20/2022  5:20 AM)  Ayaz Score: 10 (11/20/2022 11:10 AM)

## 2022-11-20 NOTE — NURSING
Pt. Requested that enema be given tomorrow- 11-.    Pt. Worried about Incision site with rolling onto side and receiving enema.

## 2022-11-20 NOTE — PLAN OF CARE
[facility]  Initial Discharge Assessment       Primary Care Provider: [facility]    Admission Diagnosis: Abscess of suprapubic region [L02.219]    Admission Date: 11/18/2022  Expected Discharge Date: TBD    Resumption of care home health with [facility]    Discharge Barriers Identified: None    Payor: MEDICAID / Plan: [place] [employer] / Product Type: Managed Medicaid /     Extended Emergency Contact Information  Primary Emergency Contact: Clau Watts  Address: [address] [place] [address] [facility]  Home Phone: [phone]  Mobile Phone: [phone]  Relation: Significant other   needed? No    Discharge Plan A: Home Health  Discharge Plan B: Home Health      [facility] [address]  [place] [address]  [address]  [place] [address]  Phone: [phone] Fax: [phone]      Initial Assessment (most recent)       Adult Discharge Assessment - 11/19/22 1030          Discharge Assessment    Assessment Type Discharge Planning Assessment     Confirmed/corrected address, phone number and insurance Yes     Confirmed Demographics Correct on Facesheet     Source of Information patient;family     Communicated KELSY with patient/caregiver Date not available/Unable to determine     Reason For Admission Suprapubic abscess     Lives With significant other     Facility Arrived From: home     Do you expect to return to your current living situation? Yes     Do you have help at home or someone to help you manage your care at home? Yes     Who are your caregiver(s) and their phone number(s)? Significant other / Clau     Prior to hospitilization cognitive status: Alert/Oriented     Current cognitive status: Alert/Oriented     Walking or Climbing Stairs Difficulty ambulation difficulty, requires equipment     Home Accessibility wheelchair accessible     Equipment Currently Used at Home  wheelchair;walker, rolling     Readmission within 30 days? No     Do you currently have service(s) that help you manage your care at home? Yes     Name and Contact number of agency [facility]     Is the pt/caregiver preference to resume services with current agency Yes     Do you take prescription medications? Yes     Do you have prescription coverage? Yes     Do you have any problems affording any of your prescribed medications? No     Is the patient taking medications as prescribed? yes     Who is going to help you get home at discharge? significant other / Clau     How do you get to doctors appointments? family or friend will provide     Are you on dialysis? No     Do you take coumadin? No     Discharge Plan A Home Health     Discharge Plan B Home Health     DME Needed Upon Discharge  none     Discharge Plan discussed with: Patient;Spouse/sig other     Discharge Barriers Identified None

## 2022-11-20 NOTE — OP NOTE
Surgery Date: 11/20/2022     Surgeon(s) and Role:     * Won García III, MD - Primary    Assisting Surgeon: None    Pre-op Diagnosis:  Abscess of suprapubic region [L02.219]    Post-op Diagnosis:  Post-Op Diagnosis Codes:     * Abscess of suprapubic region [L02.219]    Procedure(s) (LRB):  INCISION AND DRAINAGE, suprapubic ABSCESS (N/A)    Anesthesia: General    Operative Findings:  Patient brought to the operating room transferred the operating table supine position.  He was general given anesthesia.  LMA placed.  The lower abdomen was prepped and draped.  He had a fluctuant abscess in the suprapubic position.  There was an overlying sinus draining large amount of pus.  3 cm incision was made through the site of the sinus.  Large amount of pus was encountered.  It was cultured.  The abscess cavity was irrigated and drained.  Betadine-soaked gauze was packed into the cavity.  It was bandaged.  He was extubated brought to recovery room stable condition.  Instrument counts correct   Complications none   Estimated Blood Loss: 10 mL  Estimated Blood Loss has been documented.         Specimens:   Specimen (24h ago, onward)      None            [id number]

## 2022-11-20 NOTE — NURSING
"Changed blanco out at 2210. Replaced with 20fr and 25cc of NS placed in Balloon. Patient tolerated well, applied "urojet" and provided 5-10 minutes prior to insertion, tolerated well. Patient noted to have ongoing pus/yellow drainage from urethra, after insertion a clean catch urine was collected for a new Ua/Reflex.   "

## 2022-11-20 NOTE — PROGRESS NOTES
[facility]  Progress Note    Patient Name: James Jiang  MRN: [id number]  Patient Class: IP- Inpatient   Admission Date: 11/18/2022  Length of Stay: 2 days  Attending Physician: Amando Avalos MD  Primary Care Provider: [facility]        Subjective:     Principal Problem:Suprapubic abscess        HPI:  James Jiang is a 62-year-old male Is a 62-year-old male with chronic medical problems including renal cell carcinoma, hypotension, chronic urinary tract infections, atrial fibrillation who presented to the emergency department this morning complaining of severe abdominal pain.  Patient states that he has had abdominal pain for about a year and it is his skin that is hurting but over the last several days his lower abdomen has been very tender and he got up from bed and blood started pouring out of his lower abdomen.  He said pain is severe, it has gotten progressively worse over the last several days.  Associated symptoms are constipation with no bowel movement in the last week and discomfort with urination.  He does see Urology regularly and has a chronic indwelling Donis.  He said it was last changed 3 weeks ago.  He did have a urine specimen sent around the 15th but not sure how was obtained because he said he has not had his catheter changed in 3 weeks.  Urine culture with ESBL Klebsiella pneumonia greater than 100,000 CFU/mL.  He was started on ciprofloxacin and took 1 dose.  He denies fevers or chills, nausea or vomiting, headaches or dizziness, he said he is short of breath but it is at baseline, he also complains of chronic right hip pain for the last year after a fracture and surgery. he is a long-term smoker and said he smokes as much as he can.    In the ER WBC elevated at 16.6, H&H 12.6/39, platelets are 86, glucose elevated to 86, sodium low at 131, chloride low at 94, albumin 3.1, alk-phos 143 and total bilirubin 1.2.  CRP  elevated at 11.36. Initial O2 sat 91% on room air, he was placed on nasal cannula O2 and saturation 95-96%.  Blood pressure systolic 100-109, heart rate 77-85 and afebrile with temperature 97.8°.  In the ED was given 600 mg IV ceftaroline and 1 L normal saline with 8 mg IV morphine.  At time of exam he says pain is somewhat relieved with morphine.  CT abdomen and pelvis revealed a 3 mm nonobstructing right renal calculus and a 5.6 cm solid mass in the left kidney compatible with renal cell carcinoma no hydronephrosis.  He has a 4.8 cm masslike area in the midline suprapubic subcutaneous fat either localized phlegmon and/or developing soft tissue abscess.  He will be admitted to the hospitalist service for further evaluation and treatment with consult to Infectious Disease, Urology and IR for possible drainage of abscess.      Overview/Hospital Course:  11/19  Pus expressed from lower abdominal area   No other issues  Possible OR visit tomorrow for I&D    11/20  Pt today had I&D  Postoperatively pt was hypotensive      Interval History:     Review of Systems   Constitutional:  Negative for activity change and appetite change.   HENT:  Negative for congestion and dental problem.    Eyes:  Negative for discharge and itching.   Respiratory:  Negative for shortness of breath.    Cardiovascular:  Negative for chest pain.   Gastrointestinal:  Negative for abdominal distention and abdominal pain.   Endocrine: Negative for cold intolerance.   Genitourinary:  Negative for difficulty urinating and dysuria.   Musculoskeletal:  Negative for arthralgias and back pain.   Skin:  Positive for wound. Negative for color change.   Neurological:  Negative for dizziness and facial asymmetry.   Hematological:  Negative for adenopathy.   Psychiatric/Behavioral:  Negative for agitation and behavioral problems.    Objective:     Vital Signs (Most Recent):  Temp: 97.2 °F (36.2 °C) (11/20/22 1205)  Pulse: 80 (11/20/22 1205)  Resp: 16 (11/20/22  1205)  BP: (!) 64/54 (nurse notified) (11/20/22 1205)  SpO2: (!) 93 % (11/20/22 1205)   Vital Signs (24h Range):  Temp:  [97 °F (36.1 °C)-97.9 °F (36.6 °C)] 97.2 °F (36.2 °C)  Pulse:  [71-85] 80  Resp:  [15-20] 16  SpO2:  [86 %-98 %] 93 %  BP: ()/() 64/54     Weight: 103 kg (227 lb 1.2 oz)  Body mass index is 35.56 kg/m².    Intake/Output Summary (Last 24 hours) at 11/20/2022 1250  Last data filed at 11/20/2022 1124  Gross per 24 hour   Intake 800 ml   Output 3400 ml   Net -2600 ml      Physical Exam  Vitals and nursing note reviewed.   Constitutional:       Appearance: He is well-developed.   HENT:      Head: Atraumatic.      Right Ear: External ear normal.      Left Ear: External ear normal.      Nose: Nose normal.      Mouth/Throat:      Mouth: Mucous membranes are moist.   Eyes:      General: No scleral icterus.  Cardiovascular:      Rate and Rhythm: Normal rate.   Pulmonary:      Effort: Pulmonary effort is normal.   Abdominal:      Comments: Lower abdomen bandage intact   Musculoskeletal:         General: Normal range of motion.      Cervical back: Full passive range of motion without pain and normal range of motion.   Skin:     General: Skin is warm.   Neurological:      Mental Status: He is alert and oriented to person, place, and time.   Psychiatric:         Behavior: Behavior normal.       Significant Labs: All pertinent labs within the past 24 hours have been reviewed.  CBC:   Recent Labs   Lab 11/18/22  1503 11/19/22  0530 11/20/22  0457   WBC 16.60* 15.25* 13.27*   HGB 12.6* 13.8* 14.8   HCT 39.0* 42.9 46.1    417 481*     CMP:   Recent Labs   Lab 11/18/22  1503 11/19/22  0530 11/20/22  0457   * 129* 134*   K 4.3 4.3 4.6   CL 94* 91* 95   CO2 28 27 31*   * 283* 380*   BUN 16 14 15   CREATININE 0.9 0.9 0.9   CALCIUM 8.7 8.7 9.2   PROT 7.9  --   --    ALBUMIN 3.1*  --   --    BILITOT 1.2*  --   --    ALKPHOS 143*  --   --    AST 12  --   --    ALT 9*  --   --    ANIONGAP 9  11 8       Significant Imaging: I have reviewed all pertinent imaging results/findings within the past 24 hours.      Assessment/Plan:      * Suprapubic abscess  Suprapubic area, abdominal wall area cellulitis and Possible Phlegmon vs Abscess  S/p I&D on 11/20/22  Follow up cultures  Possible home within 2 days with abx       Physical debility  Ongoing issue  In the past pt has been rejected by multiple SNFs      Abdominal wall cellulitis  S/p I&D on 11/20/22  Follow up cultures  Possible home within 2 days with abx       Urinary tract infection associated with indwelling urethral catheter  Chronic indwelling blanco  Evident UTI  Continue iv abx       Noncompliance  This is a chronic ongoing issue       Left renal mass  Mostly renal cell carcinoma  Hasnt followed up with Oncologist so far  Pt continues to smoke       Paroxysmal atrial fibrillation  Chronic stable issue   Maintain present regime  Eliquis on hold in view with OR visit for I&D       Chronic respiratory failure  On Home oxygen     COPD (chronic obstructive pulmonary disease)  DuoNebs every 4 hours as needed for wheezing or shortness of breath      VTE Risk Mitigation (From admission, onward)         Ordered     enoxaparin injection 40 mg  Every 24 hours (non-standard times)         11/19/22 1602     Reason for No Pharmacological VTE Prophylaxis  Once        Question:  Reasons:  Answer:  Already adequately anticoagulated on oral Anticoagulants    11/18/22 1928     IP VTE HIGH RISK PATIENT  Once         11/18/22 1928     Place sequential compression device  Until discontinued         11/18/22 1928                Discharge Planning   KELSY: 11/22/2022     Code Status: Full Code   Is the patient medically ready for discharge?: No    Reason for patient still in hospital (select all that apply): Treatment  Discharge Plan A: Home Health                  Amando Avalos MD  Department of Hospital Medicine   [facility]

## 2022-11-20 NOTE — TRANSFER OF CARE
"Anesthesia Transfer of Care Note    Patient: James Jiang    Procedure(s) Performed: Procedure(s) (LRB):  INCISION AND DRAINAGE, ABSCESS (N/A)    Patient location: PACU    Anesthesia Type: general    Transport from OR: Transported from OR on 2-3 L/min O2 by NC with adequate spontaneous ventilation    Post pain: adequate analgesia    Post assessment: no apparent anesthetic complications    Post vital signs: stable    Level of consciousness: awake    Nausea/Vomiting: no nausea/vomiting    Complications: none    Transfer of care protocol was followed      Last vitals:   Visit Vitals  BP (!) 87/67 (BP Location: Left arm, Patient Position: Lying)   Pulse 84   Temp (P) 36.1 °C (97 °F)   Resp 20   Ht 5' 7" (1.702 m)   Wt 103 kg (227 lb 1.2 oz)   SpO2 98%   BMI 35.56 kg/m²     "

## 2022-11-20 NOTE — ANESTHESIA PREPROCEDURE EVALUATION
11/20/2022  James Jiang is a 62 y.o., male.      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     Sepsis due to Staphylococcus aureus    Atrial fibrillation with RVR    Cellulitis of great toe of left foot    Type 2 diabetes mellitus with hyperglycemia    MSSA bacteremia    Closed fracture of right hip with routine healing    COPD (chronic obstructive pulmonary disease)    Chronic respiratory failure    Paroxysmal atrial fibrillation    Bilateral pulmonary embolism    Shock    Physical debility    Community acquired pneumonia of left lower lobe of lung    Amputation of right great toe    Infected wound    Left renal mass    Urinary retention    Calculus of urinary tract in diseases classified elsewhere    Suprapubic abscess    Urinary tract infection associated with indwelling urethral catheter    Abdominal wall cellulitis       Past Surgical History:   Procedure Laterality Date    CYSTOURETEROSCOPY WITH RETROGRADE PYELOGRAPHY AND INSERTION OF STENT INTO URETER Left 7/29/2022    Procedure: CYSTOURETEROSCOPY, WITH RETROGRADE PYELOGRAM AND URETERAL STENT INSERTION;  Surgeon: Raegan Rodriguez MD;  Location: [facility] OR;  Service: Urology;  Laterality: Left;    denies pertinent surgical history      INTRAMEDULLARY RODDING OF FEMUR Right 11/7/2021    Procedure: INSERTION, INTRAMEDULLARY HENNA, FEMUR;  Surgeon: Matt Milian MD;  Location[facility] OR;  Service: Orthopedics;  Laterality: Right;    LASER LITHOTRIPSY Left 7/29/2022    Procedure: LITHOTRIPSY, USING LASER;  Surgeon: Raegan Rodriguez MD;  Location: [facility] OR;  Service: Urology;   Laterality: Left;    TOE AMPUTATION Right 10/12/2021    Procedure: AMPUTATION, TOE;  Surgeon: Milton Lauren DPM;  Location: [facility] OR;  Service: Podiatry;  Laterality: Right;    URETEROSCOPIC REMOVAL OF URETERIC CALCULUS Left 7/29/2022    Procedure: REMOVAL, CALCULUS, URETER, URETEROSCOPIC;  Surgeon: Raegan Rodriguez MD;  Location: [facility] OR;  Service: Urology;  Laterality: Left;        Tobacco Use:  The patient  reports that he has quit smoking. He has never used smokeless tobacco.     Results for orders placed or performed during the hospital encounter of 11/18/22   EKG 12-lead    Collection Time: 11/19/22  3:45 AM    Narrative    Test Reason : R07.9,    Vent. Rate : 097 BPM     Atrial Rate : 097 BPM     P-R Int : 162 ms          QRS Dur : 108 ms      QT Int : 362 ms       P-R-T Axes : 044 -10 -32 degrees     QTc Int : 459 ms    Normal sinus rhythm  Nonspecific ST and T wave abnormality  Abnormal ECG  When compared with ECG of 19-NOV-2022 03:43,  Premature ventricular complexes are no longer Present    Referred By: [email or web address]   SELF           Confirmed By:              Lab Results   Component Value Date    WBC 13.27 (H) 11/20/2022    HGB 14.8 11/20/2022    HCT 46.1 11/20/2022    MCV 92 11/20/2022     (H) 11/20/2022     BMP  Lab Results   Component Value Date     (L) 11/20/2022    K 4.6 11/20/2022    CL 95 11/20/2022    CO2 31 (H) 11/20/2022    BUN 15 11/20/2022    CREATININE 0.9 11/20/2022    CALCIUM 9.2 11/20/2022    ANIONGAP 8 11/20/2022     (H) 11/20/2022     (H) 11/19/2022     (H) 11/18/2022       Results for orders placed during the hospital encounter of 11/05/21    Echo    Interpretation Summary  · The estimated PA systolic pressure is 46 mmHg.  · Concentric hypertrophy and hyperdynamic systolic function.  · The estimated ejection fraction is 80%.  · Normal left ventricular diastolic function.  · Moderate tricuspid regurgitation.  · There is mild pulmonary hypertension.  ·  Moderate mitral regurgitation.            Pre-op Assessment    I have reviewed the Patient Summary Reports.    I have reviewed the Nursing Notes. I have reviewed the NPO Status.   I have reviewed the Medications.     Review of Systems  Anesthesia Hx:  No problems with previous Anesthesia  Denies Family Hx of Anesthesia complications.   Denies Personal Hx of Anesthesia complications.   Social:  Non-Smoker    Cardiovascular:   Hypertension Dysrhythmias atrial fibrillation ECG has been reviewed. Incomplete RBBB   Pulmonary:   Pneumonia COPD pulmonary emboli anticoagulated with Xarelto  Home O2 2LNC   Renal/:  Renal/ Normal     Musculoskeletal:  Musculoskeletal Normal status post ORIF of his right hip from November 2021 with incision site breakdown     running subjective fevers complaining of chills and pain to his surgical wound sites   Neurological:  Neurology Normal    Endocrine:   Diabetes, poorly controlled, type 2    Dermatological:   Diabetic wet gangrene of the toe   Psych:   depression             Anesthesia Plan  Type of Anesthesia, risks & benefits discussed:  Anesthesia Type:  Gen Supraglottic Airway    Patient's Preference:   Plan Factors:          Intra-op Monitoring Plan: standard ASA monitors  Intra-op Monitoring Plan Comments:   Post Op Pain Control Plan: multimodal analgesia, IV/PO Opioids PRN and per primary service following discharge from PACU  Post Op Pain Control Plan Comments:     Induction:   IV  Beta Blocker:  Patient is on a Beta-Blocker and has received one dose within the past 24 hours (No further documentation required).       Informed Consent: Informed consent signed with the Patient and all parties understand the risks and agree with anesthesia plan.  All questions answered.  Anesthesia consent signed with patient.  ASA Score: 3   Emergent   Day of Surgery Review of History & Physical:        Anesthesia Plan Notes: LMA  No decadron  Multimodal analgesia with ofirmev 1000mg, local by  surgeon.  PONV prophylaxis with Pepcid 20 mg IV, and Zofran 4 mg IV.          Ready For Surgery From Anesthesia Perspective.                Anesthesia Plan  Type of Anesthesia, risks & benefits discussed:    Anesthesia Type: Gen Supraglottic Airway  Intra-op Monitoring Plan: standard ASA monitors  Post Op Pain Control Plan: multimodal analgesia, IV/PO Opioids PRN and per primary service following discharge from PACU  Induction:  IV  Informed Consent: Informed consent signed with the Patient and all parties understand the risks and agree with anesthesia plan.  All questions answered.   ASA Score: 3 Emergent  Anesthesia Plan Notes: LMA  No decadron  Multimodal analgesia with ofirmev 1000mg, local by surgeon.  PONV prophylaxis with Pepcid 20 mg IV, and Zofran 4 mg IV.      Ready For Surgery From Anesthesia Perspective.       .

## 2022-11-21 LAB
ANION GAP SERPL CALC-SCNC: 10 MMOL/L (ref 8–16)
BASOPHILS # BLD AUTO: 0.11 K/UL (ref 0–0.2)
BASOPHILS NFR BLD: 0.7 % (ref 0–1.9)
BUN SERPL-MCNC: 18 MG/DL (ref 8–23)
CALCIUM SERPL-MCNC: 8.9 MG/DL (ref 8.7–10.5)
CHLORIDE SERPL-SCNC: 95 MMOL/L (ref 95–110)
CO2 SERPL-SCNC: 29 MMOL/L (ref 23–29)
CREAT SERPL-MCNC: 1.1 MG/DL (ref 0.5–1.4)
DIFFERENTIAL METHOD: ABNORMAL
EOSINOPHIL # BLD AUTO: 0.3 K/UL (ref 0–0.5)
EOSINOPHIL NFR BLD: 1.7 % (ref 0–8)
ERYTHROCYTE [DISTWIDTH] IN BLOOD BY AUTOMATED COUNT: 12.5 % (ref 11.5–14.5)
EST. GFR  (NO RACE VARIABLE): >60 ML/MIN/1.73 M^2
GLUCOSE SERPL-MCNC: 278 MG/DL (ref 70–110)
GLUCOSE SERPL-MCNC: 298 MG/DL (ref 70–110)
GLUCOSE SERPL-MCNC: 306 MG/DL (ref 70–110)
GLUCOSE SERPL-MCNC: 342 MG/DL (ref 70–110)
GLUCOSE SERPL-MCNC: 376 MG/DL (ref 70–110)
HCT VFR BLD AUTO: 43.8 % (ref 40–54)
HGB BLD-MCNC: 13.7 G/DL (ref 14–18)
IMM GRANULOCYTES # BLD AUTO: 0.11 K/UL (ref 0–0.04)
IMM GRANULOCYTES NFR BLD AUTO: 0.7 % (ref 0–0.5)
LYMPHOCYTES # BLD AUTO: 1.6 K/UL (ref 1–4.8)
LYMPHOCYTES NFR BLD: 10.5 % (ref 18–48)
MCH RBC QN AUTO: 28.9 PG (ref 27–31)
MCHC RBC AUTO-ENTMCNC: 31.3 G/DL (ref 32–36)
MCV RBC AUTO: 92 FL (ref 82–98)
MONOCYTES # BLD AUTO: 1.1 K/UL (ref 0.3–1)
MONOCYTES NFR BLD: 7.7 % (ref 4–15)
NEUTROPHILS # BLD AUTO: 11.7 K/UL (ref 1.8–7.7)
NEUTROPHILS NFR BLD: 78.7 % (ref 38–73)
NRBC BLD-RTO: 0 /100 WBC
PLATELET # BLD AUTO: 460 K/UL (ref 150–450)
PMV BLD AUTO: 10.2 FL (ref 9.2–12.9)
POTASSIUM SERPL-SCNC: 4.5 MMOL/L (ref 3.5–5.1)
RBC # BLD AUTO: 4.74 M/UL (ref 4.6–6.2)
SODIUM SERPL-SCNC: 134 MMOL/L (ref 136–145)
WBC # BLD AUTO: 14.87 K/UL (ref 3.9–12.7)

## 2022-11-21 PROCEDURE — 25000003 PHARM REV CODE 250: Performed by: NURSE PRACTITIONER

## 2022-11-21 PROCEDURE — 25000003 PHARM REV CODE 250: Performed by: INTERNAL MEDICINE

## 2022-11-21 PROCEDURE — 27000221 HC OXYGEN, UP TO 24 HOURS

## 2022-11-21 PROCEDURE — 94761 N-INVAS EAR/PLS OXIMETRY MLT: CPT

## 2022-11-21 PROCEDURE — 12000002 HC ACUTE/MED SURGE SEMI-PRIVATE ROOM

## 2022-11-21 PROCEDURE — 63600175 PHARM REV CODE 636 W HCPCS: Performed by: NURSE PRACTITIONER

## 2022-11-21 PROCEDURE — 94640 AIRWAY INHALATION TREATMENT: CPT

## 2022-11-21 PROCEDURE — 25000242 PHARM REV CODE 250 ALT 637 W/ HCPCS: Performed by: STUDENT IN AN ORGANIZED HEALTH CARE EDUCATION/TRAINING PROGRAM

## 2022-11-21 PROCEDURE — 99900031 HC PATIENT EDUCATION (STAT)

## 2022-11-21 PROCEDURE — 99233 SBSQ HOSP IP/OBS HIGH 50: CPT | Mod: ,,, | Performed by: STUDENT IN AN ORGANIZED HEALTH CARE EDUCATION/TRAINING PROGRAM

## 2022-11-21 PROCEDURE — 63600175 PHARM REV CODE 636 W HCPCS: Performed by: INTERNAL MEDICINE

## 2022-11-21 PROCEDURE — 63600175 PHARM REV CODE 636 W HCPCS: Performed by: STUDENT IN AN ORGANIZED HEALTH CARE EDUCATION/TRAINING PROGRAM

## 2022-11-21 PROCEDURE — 99233 PR SUBSEQUENT HOSPITAL CARE,LEVL III: ICD-10-PCS | Mod: ,,, | Performed by: STUDENT IN AN ORGANIZED HEALTH CARE EDUCATION/TRAINING PROGRAM

## 2022-11-21 PROCEDURE — S4991 NICOTINE PATCH NONLEGEND: HCPCS | Performed by: NURSE PRACTITIONER

## 2022-11-21 PROCEDURE — 85025 COMPLETE CBC W/AUTO DIFF WBC: CPT | Performed by: NURSE PRACTITIONER

## 2022-11-21 PROCEDURE — 36415 COLL VENOUS BLD VENIPUNCTURE: CPT | Performed by: NURSE PRACTITIONER

## 2022-11-21 PROCEDURE — 80048 BASIC METABOLIC PNL TOTAL CA: CPT | Performed by: NURSE PRACTITIONER

## 2022-11-21 PROCEDURE — 99900035 HC TECH TIME PER 15 MIN (STAT)

## 2022-11-21 RX ORDER — GABAPENTIN 300 MG/1
300 CAPSULE ORAL 3 TIMES DAILY
Status: DISCONTINUED | OUTPATIENT
Start: 2022-11-21 | End: 2022-11-22 | Stop reason: HOSPADM

## 2022-11-21 RX ORDER — INSULIN ASPART 100 [IU]/ML
5 INJECTION, SOLUTION INTRAVENOUS; SUBCUTANEOUS
Status: DISCONTINUED | OUTPATIENT
Start: 2022-11-21 | End: 2022-11-22 | Stop reason: HOSPADM

## 2022-11-21 RX ORDER — ONDANSETRON 2 MG/ML
4 INJECTION INTRAMUSCULAR; INTRAVENOUS EVERY 4 HOURS PRN
Status: DISCONTINUED | OUTPATIENT
Start: 2022-11-21 | End: 2022-11-22 | Stop reason: HOSPADM

## 2022-11-21 RX ADMIN — SODIUM CHLORIDE: 0.9 INJECTION, SOLUTION INTRAVENOUS at 10:11

## 2022-11-21 RX ADMIN — ATORVASTATIN CALCIUM 20 MG: 20 TABLET, FILM COATED ORAL at 08:11

## 2022-11-21 RX ADMIN — IPRATROPIUM BROMIDE 0.5 MG: 0.5 SOLUTION RESPIRATORY (INHALATION) at 01:11

## 2022-11-21 RX ADMIN — IPRATROPIUM BROMIDE 0.5 MG: 0.5 SOLUTION RESPIRATORY (INHALATION) at 07:11

## 2022-11-21 RX ADMIN — LINEZOLID 600 MG: 600 INJECTION, SOLUTION INTRAVENOUS at 08:11

## 2022-11-21 RX ADMIN — INSULIN ASPART 3 UNITS: 100 INJECTION, SOLUTION INTRAVENOUS; SUBCUTANEOUS at 07:11

## 2022-11-21 RX ADMIN — HYDROCODONE BITARTRATE AND ACETAMINOPHEN 1 TABLET: 5; 325 TABLET ORAL at 07:11

## 2022-11-21 RX ADMIN — INSULIN ASPART 3 UNITS: 100 INJECTION, SOLUTION INTRAVENOUS; SUBCUTANEOUS at 06:11

## 2022-11-21 RX ADMIN — ENOXAPARIN SODIUM 40 MG: 100 INJECTION SUBCUTANEOUS at 06:11

## 2022-11-21 RX ADMIN — GABAPENTIN 300 MG: 300 CAPSULE ORAL at 08:11

## 2022-11-21 RX ADMIN — PANTOPRAZOLE SODIUM 40 MG: 40 TABLET, DELAYED RELEASE ORAL at 06:11

## 2022-11-21 RX ADMIN — MEROPENEM AND SODIUM CHLORIDE 1 G: 1 INJECTION, SOLUTION INTRAVENOUS at 10:11

## 2022-11-21 RX ADMIN — HYDROCODONE BITARTRATE AND ACETAMINOPHEN 1 TABLET: 5; 325 TABLET ORAL at 09:11

## 2022-11-21 RX ADMIN — BUDESONIDE 0.5 MG: 0.5 INHALANT RESPIRATORY (INHALATION) at 07:11

## 2022-11-21 RX ADMIN — INSULIN ASPART 4 UNITS: 100 INJECTION, SOLUTION INTRAVENOUS; SUBCUTANEOUS at 01:11

## 2022-11-21 RX ADMIN — NICOTINE 1 PATCH: 21 PATCH, EXTENDED RELEASE TRANSDERMAL at 09:11

## 2022-11-21 RX ADMIN — MORPHINE SULFATE 4 MG: 4 INJECTION, SOLUTION INTRAMUSCULAR; INTRAVENOUS at 10:11

## 2022-11-21 RX ADMIN — APIXABAN 5 MG: 5 TABLET, FILM COATED ORAL at 08:11

## 2022-11-21 RX ADMIN — MEROPENEM AND SODIUM CHLORIDE 1 G: 1 INJECTION, SOLUTION INTRAVENOUS at 01:11

## 2022-11-21 RX ADMIN — ACETAMINOPHEN 650 MG: 325 TABLET ORAL at 11:11

## 2022-11-21 RX ADMIN — GABAPENTIN 300 MG: 300 CAPSULE ORAL at 03:11

## 2022-11-21 RX ADMIN — INSULIN ASPART 5 UNITS: 100 INJECTION, SOLUTION INTRAVENOUS; SUBCUTANEOUS at 04:11

## 2022-11-21 RX ADMIN — INSULIN ASPART 5 UNITS: 100 INJECTION, SOLUTION INTRAVENOUS; SUBCUTANEOUS at 01:11

## 2022-11-21 RX ADMIN — GABAPENTIN 800 MG: 300 CAPSULE ORAL at 08:11

## 2022-11-21 RX ADMIN — HYDROCODONE BITARTRATE AND ACETAMINOPHEN 1 TABLET: 5; 325 TABLET ORAL at 03:11

## 2022-11-21 RX ADMIN — INSULIN ASPART 4 UNITS: 100 INJECTION, SOLUTION INTRAVENOUS; SUBCUTANEOUS at 08:11

## 2022-11-21 RX ADMIN — MEROPENEM AND SODIUM CHLORIDE 1 G: 1 INJECTION, SOLUTION INTRAVENOUS at 06:11

## 2022-11-21 RX ADMIN — ARFORMOTEROL TARTRATE 15 MCG: 15 SOLUTION RESPIRATORY (INHALATION) at 07:11

## 2022-11-21 RX ADMIN — HYDRALAZINE HYDROCHLORIDE 10 MG: 20 INJECTION INTRAMUSCULAR; INTRAVENOUS at 07:11

## 2022-11-21 RX ADMIN — ONDANSETRON 4 MG: 2 INJECTION INTRAMUSCULAR; INTRAVENOUS at 02:11

## 2022-11-21 NOTE — RESPIRATORY THERAPY
11/20/22 1945   Patient Assessment/Suction   Level of Consciousness (AVPU) alert   Respiratory Effort Normal;Unlabored   Expansion/Accessory Muscles/Retractions no use of accessory muscles   All Lung Fields Breath Sounds equal bilaterally;diminished;clear   Rhythm/Pattern, Respiratory unlabored   Cough Frequency no cough   PRE-TX-O2   O2 Device (Oxygen Therapy) nasal cannula w/ humidification   Flow (L/min) 2   SpO2 96 %   Pulse Oximetry Type Intermittent   $ Pulse Oximetry - Multiple Charge Pulse Oximetry - Multiple   Pulse 80   Resp 16   Aerosol Therapy   $ Aerosol Therapy Charges Aerosol Treatment  (Atrovent)   Daily Review of Necessity (SVN) completed   Respiratory Treatment Status (SVN) given   Treatment Route (SVN) mask   Patient Position (SVN) HOB elevated   Post Treatment Assessment (SVN) breath sounds unchanged   Signs of Intolerance (SVN) none   Breath Sounds Post-Respiratory Treatment   Post-treatment Heart Rate (beats/min) 80   Post-treatment Resp Rate (breaths/min) 18   Education   $ Education Bronchodilator;15 min   Respiratory Evaluation   $ Care Plan Tech Time 15 min   $ Eval/Re-eval Charges Re-evaluation   Evaluation For Re-Eval 3 day

## 2022-11-21 NOTE — PLAN OF CARE
11/21/22 0742   Patient Assessment/Suction   Level of Consciousness (AVPU) alert   Respiratory Effort Normal;Unlabored   All Lung Fields Breath Sounds diminished;clear   Rhythm/Pattern, Respiratory depth regular;pattern regular;unlabored;no shortness of breath reported   PRE-TX-O2   O2 Device (Oxygen Therapy) nasal cannula w/ humidification   $ Is the patient on Low Flow Oxygen? Yes   Flow (L/min) 2   SpO2 95 %   Pulse Oximetry Type Intermittent   $ Pulse Oximetry - Multiple Charge Pulse Oximetry - Multiple   Pulse 85   Resp 18   Aerosol Therapy   $ Aerosol Therapy Charges Aerosol Treatment   Daily Review of Necessity (SVN) completed   Respiratory Treatment Status (SVN) given   Treatment Route (SVN) oxygen;mask   Patient Position (SVN) Norris's;HOB elevated   Post Treatment Assessment (SVN) breath sounds unchanged   Signs of Intolerance (SVN) none   Education   $ Education Bronchodilator;15 min   Respiratory Evaluation   $ Care Plan Tech Time 15 min

## 2022-11-21 NOTE — CARE UPDATE
11/21/22 0748   Preset CPAP/BiPAP Settings   $ Is patient using? No/refused     Pt does not use at home. Declined hospital unit.

## 2022-11-21 NOTE — PROGRESS NOTES
Progress Note  Infectious Disease    Reason for Consult: Suprapubic abscess, antibiotic recommendations      HPI: James Jiang is a 62 y.o. male active heavy smoker, known to our service for prior admission Feb/2021 for R septic hip MRSA s/p washout he was sent home on hospice, he has past medical history of diabetes, HTN, chronic indwelling Donis secondary to urinary retention, L ureteral stone and L renal mass follows with Dr Rodriguez/Urology outpatient last Donis exchange 10/12/22.  As per records, patient has not followed with Oncology for incidental left kidney mass likely consistent with renal cell carcinoma as per imaging.  He presents with worsening lower abdominal pain with associated redness, and a spontaneous drainage of purulent fluid.  Patient denies fever or chills, no headache, no cough, no shortness of breath, no nausea or vomit.  He has been constipated, last bowel movement greater than a week ago.  Patient states his last Donis exchange was about 3 weeks ago, as per chart, was done on 10/12.    In the ER, hemodynamically stable, afebrile. S/p one dose of Teflaro IV.  Labs on admission with leukocytosis 16.6, left shift 76.6%, H&H 12.6/39, platelet count 386   ESR 90, CRP 11.3   Hyponatremia 131, creatinine 0.9, alk-phos 103, AST 12/ALT 9   Lactic acid 0.7   UA from Donis cloudy, positive nitrates, 2+ protein, 3+ glucose, RBC>100, WBC>100, many bacteria, few yeast.  Chest x-ray revealed chronic interstitial preliminary scar; no acute process.    CT abdomen/pelvis revealed a 4.8 cm masslike area in the suprapubic subcutaneous fat, questionable phlegmon or developing soft tissue abscess.  Again, a 5.6 cm enhancing left renal mass most compatible with renal cell carcinoma.    Patient admitted for sepsis likely secondary to complicated UTI in the setting of chronic indwelling Donis in addition to lower abdominal cellulitis.    Empirically started on Zyvox and Zosyn IV.      ID consult for  suprapubic abscess, antibiotic guidance.    INTERVAL HISTORY:   11/19-20:  Interim reviewed, patient seen examined at bedside.  Status post I and D in the OR awaiting OR note.  Patient still complaining of severe constipation, has not had a bowel movement for more than 10 days.  Donis catheter exchange overnight, significant amount of pus coming out of urethra noted, repeat UA with pyuria 23, no bacteria.  Labs reviewed, white count down to 13.2, left shift 75.8%, H&H 14.8/46.1, platelet count 481.  Glucose 380, stable kidney function.  Hemoglobin A1c 12.5%.  Micro reviewed, wound cultures from yesterday grew Staph aureus suspecting MRSA, and GNR, lactose  likely ESBL Klebsiella pneumoniae.  Urine culture from admission ESBL Klebsiella pneumoniae.    11/21:  Interim reviewed, patient seen examined at bedside, hypertensive, afebrile.  Finally had 4 bowel movements, Donis in place.  Op-note reviewed, large amount of pus encounter, culture.  Dressing in place.  Seen this morning by Wound Care, no further drainage of pus noted.  Discussed with patient importance of adequate glucose control, hemoglobin A1c 12.5.  Micro reviewed, MRSA and ESBL Klebsiella pneumoniae from cultures taken at bedside from lower abdominal abscess.  OR cultures growing Staph aureus, likely MRSA.    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    Hypertension      Past Surgical History:   Procedure Laterality Date    CYSTOURETEROSCOPY WITH RETROGRADE PYELOGRAPHY AND INSERTION OF STENT INTO URETER Left 7/29/2022    Procedure: CYSTOURETEROSCOPY, WITH RETROGRADE PYELOGRAM AND URETERAL STENT INSERTION;  Surgeon: Raegan Rodriguez MD;   Location: [facility] OR;  Service: Urology;  Laterality: Left;    denies pertinent surgical history      INCISION AND DRAINAGE OF ABSCESS N/A 11/20/2022    Procedure: INCISION AND DRAINAGE, ABSCESS;  Surgeon: Won García III, MD;  Location: [facility] OR;  Service: General;  Laterality: N/A;  suprapubic    INTRAMEDULLARY RODDING OF FEMUR Right 11/7/2021    Procedure: INSERTION, INTRAMEDULLARY HENNA, FEMUR;  Surgeon: Matt Milian MD;  Location: [facility] OR;  Service: Orthopedics;  Laterality: Right;    LASER LITHOTRIPSY Left 7/29/2022    Procedure: LITHOTRIPSY, USING LASER;  Surgeon: Raegan Rodriguez MD;  Location: [facility] OR;  Service: Urology;  Laterality: Left;    TOE AMPUTATION Right 10/12/2021    Procedure: AMPUTATION, TOE;  Surgeon: Milton Lauren DPM;  Location: [facility] OR;  Service: Podiatry;  Laterality: Right;    URETEROSCOPIC REMOVAL OF URETERIC CALCULUS Left 7/29/2022    Procedure: REMOVAL, CALCULUS, URETER, URETEROSCOPIC;  Surgeon: Raegan Rodriguez MD;  Location: [facility] OR;  Service: Urology;  Laterality: Left;     Social History     Tobacco Use    Smoking status: Former    Smokeless tobacco: Never   Substance Use Topics    Alcohol use: Not Currently        Family History   Problem Relation Age of Onset    Hypertension Father        Pertinent medications noted:     Review of Systems:   No chills, fever, sweats, weight loss  No change in vision, loss of vision or diplopia  No sinus congestion, purulent nasal discharge, post nasal drip or facial pain  No pain in mouth or throat. No problems with teeth, gums.  No chest pain, palpitations, syncope  No cough, sputum production, shortness of breath, dyspnea on exertion, pleurisy, hemoptysis  No dysphagia, odynophagia  No nausea, vomiting, diarrhea, +constipation, blood in stool, or focal abd pain,    Chronic indwelling Donis, cloudy urine  No swelling of joints, redness of joints, injuries, or new focal pain  No unusual headaches, dizziness, vertigo, numbness,  paresthesias, neuropathy, falls  No anxiety, depression, substance abuse, sleep disturbance  No bleeding, lymphadenopathy  Lower abdominal pain, redness, edema, pus    Outdoor activities: Lives at home with significant other   Travel: none  Implants: none   Antibiotic History: cipro 11/17    EXAM & DIAGNOSTICS REVIEWED:   Vitals:     Temp:  [97.7 °F (36.5 °C)-98.8 °F (37.1 °C)]   Temp: 98.8 °F (37.1 °C) (11/21/22 0742)  Pulse: 92 (11/21/22 1353)  Resp: 18 (11/21/22 1353)  BP: 124/88 (11/21/22 0742)  SpO2: 95 % (11/21/22 1353)  No intake or output data in the 24 hours ending 11/21/22 1436      General:  Disheveled, In NAD. Alert and attentive, cooperative, comfortable on O2 by NC 2L  Eyes:  Anicteric, PERRL  ENT:  No ulcers, exudates, thrush, nares patent, edentulous  Neck:  Supple, large beard  Lungs: Scattered rales L>R  Heart:  S1/S2+, regular rhythm, no murmurs  Abd:  Lower abdomen with dressing in place, not disturbed.  11/19: Lower abdomen with redness, indurated area palpated, pus draining, cultures sent, very tender to palpation and discomfort due to constipation - +BS, soft  :  Donis, hazy urine  Musc:  Joints without effusion, swelling,  erythema, synovitis  Skin:  Warm, healed herpes zoster L flank T7-8 distribution, impressive long yellow dirty nails of all fingers and toes  Wound:   Neuro:  Following commands, no acute focal deficit   Psych:  Calm, cooperative  Lymphatic:      Extrem: No LE edema b/l  VAD:  Peripheral IV       Isolation: Contact - H/o MRSA and ESBL    11/21:        General Labs reviewed:  Recent Labs   Lab 11/19/22  0530 11/20/22  0457 11/21/22  0616   WBC 15.25* 13.27* 14.87*   HGB 13.8* 14.8 13.7*   HCT 42.9 46.1 43.8    481* 460*       Recent Labs   Lab 11/18/22  1503 11/19/22  0530 11/20/22  0457 11/21/22  0618   * 129* 134* 134*   K 4.3 4.3 4.6 4.5   CL 94* 91* 95 95   CO2 28 27 31* 29   BUN 16 14 15 18   CREATININE 0.9 0.9 0.9 1.1   CALCIUM 8.7 8.7 9.2 8.9   PROT 7.9   --   --   --    BILITOT 1.2*  --   --   --    ALKPHOS 143*  --   --   --    ALT 9*  --   --   --    AST 12  --   --   --      Recent Labs   Lab 11/18/22  1503   CRP 11.36*     Recent Labs   Lab 11/18/22  1503   SEDRATE 90*       Estimated Creatinine Clearance: 77.6 mL/min (based on SCr of 1.1 mg/dL).     Prior Micro:  Latest urine cultures:   11/15 ESBL Klebsiella pneumoniae  9/7 ESBL Klebsiella pneumoniae  8/26 MRSA     Micro:  Microbiology Results (last 7 days)       Procedure Component Value Units Date/Time    Aerobic culture [[id number]]  (Abnormal) Collected: 11/20/22 1109    Order Status: Completed Specimen: Abscess from Abdomen Updated: 11/21/22 0744     Aerobic Bacterial Culture STAPHYLOCOCCUS AUREUS  Many  Susceptibility pending      Narrative:      Abdomen (swab)    Aerobic culture [[id number]]  (Abnormal) Collected: 11/19/22 0927    Order Status: Completed Specimen: Abscess from Abdomen Updated: 11/21/22 0714     Aerobic Bacterial Culture METHICILLIN RESISTANT STAPHYLOCOCCUS AUREUS  Many  For susceptibility see order # [id number]        KLEBSIELLA PNEUMONIAE ESBL  Moderate  For susceptibility see order # [id number]      Aerobic culture [[id number]]  (Abnormal)  (Susceptibility) Collected: 11/18/22 2236    Order Status: Completed Specimen: Abscess from Abdomen Updated: 11/21/22 0712     Aerobic Bacterial Culture METHICILLIN RESISTANT STAPHYLOCOCCUS AUREUS  Many  Known MRSA patient        KLEBSIELLA PNEUMONIAE ESBL  Rare  Known ESBL patient      Urine culture [[id number]] Collected: 11/19/22 2235    Order Status: Completed Specimen: Urine Updated: 11/21/22 0647     Urine Culture, Routine No growth to date    Narrative:      Specimen Source->Urine    Blood culture [[id number]] Collected: 11/19/22 1424    Order Status: Completed Specimen: Blood from Antecubital, Right Updated: 11/20/22 1632     Blood Culture, Routine No Growth to date      No Growth to date    Gram stain [[id number]] Collected: 11/20/22 1109     Order Status: Completed Specimen: Abscess from Abdomen Updated: 11/20/22 1344     Gram Stain Result Moderate WBC's      Few Gram positive cocci    Narrative:      Abdomen    Fungus culture [[id number]] Collected: 11/20/22 1109    Order Status: Sent Specimen: Abscess from Abdomen Updated: 11/20/22 1225    AFB Culture & Smear [[id number]] Collected: 11/20/22 1109    Order Status: Sent Specimen: Abscess from Abdomen Updated: 11/20/22 1225    Culture, Anaerobic [[id number]] Collected: 11/20/22 1109    Order Status: Sent Specimen: Abscess from Abdomen Updated: 11/20/22 1222    Urine culture [[id number]]  (Abnormal)  (Susceptibility) Collected: 11/18/22 2029    Order Status: Completed Specimen: Urine Updated: 11/20/22 0730     Urine Culture, Routine KLEBSIELLA PNEUMONIAE ESBL  >100,000 cfu/ml      Narrative:      Replace blanco and obtain urine specimen  Specimen Source->Urine    Culture, Anaerobe [[id number]] Collected: 11/19/22 0927    Order Status: Sent Specimen: Abscess from Abdomen Updated: 11/19/22 0956            Imaging Reviewed:  CXR  CT abdomen/pelvis:   1. A 4.8 cm masslike area in the midline suprapubic subcutaneous fat, potentially localized phlegmon and or developing soft tissue abscess.  2. A 5.6 cm enhancing left renal mass is compatible renal cell carcinoma, and urology referral and tissue diagnosis are recommended.  3. Additional observations as described.    Cardiology:       IMPRESSION & PLAN     Lower abdominal cellulitis/abscess in addition to complicated UTI in the setting of chronic indwelling Blanco s/p I&D by Surgery 11/20  OR cultures Staph aureus, likely MRSA  ESR 90, CRP 11.3 high   Cultures taken at bedside MRSA, and ESBL Klebsiella pneumoniae   UA positive, culture grew ESBL Klebsiella pneumoniae   Blood cultures x 1 no growth to date, pending final    2. Poorly controled diabetes, HbA1c: 12.5%    3. PMHx: PAD, AFib, HTN, COPD, depression/anxiety    4. Heavy smoker    5. 4 cm solid left renal mass  compatible with primary renal neoplasm - patient has not followed with Oncology     Recommendations:  Midline   Upon discharge, Ertapenem 1g IV daily  Linezolid 600mg PO twice a day   Above for 2 weeks, end date 12/4/22  Monitor CBC w/diff, CMP, CRP weekly while on antibiotics  Follow up ID clinic in 3-4 weeks    Will sign-off, call us back with any questions     D/w patient, Dr Avalos      Medical Decision Making during this encounter was  [_] Low Complexity  [_] Moderate Complexity  [xx] High Complexity

## 2022-11-21 NOTE — PLAN OF CARE
[facility]  Discharge Reassessment    Primary Care Provider: [facility]    Expected Discharge Date: 11/22/2022    MD discussed this Pt this day, 11/21. Pt to discharge with IV infusions and resumption of home health.  sent referral to [facility] and [facility] for IV infusion services via [facility].     Reassessment (most recent)       Discharge Reassessment - 11/21/22 6646          Discharge Reassessment    Assessment Type Discharge Planning Reassessment     Did the patient's condition or plan change since previous assessment? Yes     Communicated KELSY with patient/caregiver Yes     Discharge Plan A Home Health     Discharge Plan B Home Health     DME Needed Upon Discharge  none     Discharge Barriers Identified None     Why the patient remains in the hospital Requires continued medical care        Post-Acute Status    Post-Acute Authorization Home Health;IV Infusion     Home Health Status Referrals Sent     IV Infusion Status Referral(s) sent     Discharge Delays None known at this time

## 2022-11-21 NOTE — CONSULTS
Mid abdominal skin fold 2 incisions 0.5x1.5x4cm, [id number] painful indurated.  Dressing was saturated with bloody dry drainage.  Removed gauze packing, no active drainage.  Irrigated with wound cleanser, packed smaller incision with 1/2  inch strip gauze packing, larger incision packed with 1 inch strip gauze packing.  Patient tolerated after being medicated by nurse.  Cover with 4x4s and abd pad secured with paper tape

## 2022-11-22 ENCOUNTER — TELEPHONE (OUTPATIENT)
Dept: PHARMACY | Facility: CLINIC | Age: 62
End: 2022-11-22
Payer: MEDICAID

## 2022-11-22 VITALS
WEIGHT: 227.06 LBS | TEMPERATURE: 98 F | HEIGHT: 67 IN | BODY MASS INDEX: 35.64 KG/M2 | RESPIRATION RATE: 18 BRPM | DIASTOLIC BLOOD PRESSURE: 54 MMHG | OXYGEN SATURATION: 93 % | HEART RATE: 81 BPM | SYSTOLIC BLOOD PRESSURE: 73 MMHG

## 2022-11-22 PROBLEM — L03.311 ABDOMINAL WALL CELLULITIS: Status: RESOLVED | Noted: 2022-11-19 | Resolved: 2022-11-22

## 2022-11-22 PROBLEM — N39.0 URINARY TRACT INFECTION ASSOCIATED WITH INDWELLING URETHRAL CATHETER: Status: RESOLVED | Noted: 2022-11-18 | Resolved: 2022-11-22

## 2022-11-22 PROBLEM — L02.219 SUPRAPUBIC ABSCESS: Status: RESOLVED | Noted: 2022-11-18 | Resolved: 2022-11-22

## 2022-11-22 PROBLEM — T83.511A URINARY TRACT INFECTION ASSOCIATED WITH INDWELLING URETHRAL CATHETER: Status: RESOLVED | Noted: 2022-11-18 | Resolved: 2022-11-22

## 2022-11-22 LAB
ANION GAP SERPL CALC-SCNC: 11 MMOL/L (ref 8–16)
BACTERIA SPEC AEROBE CULT: ABNORMAL
BACTERIA UR CULT: NO GROWTH
BASOPHILS # BLD AUTO: 0.17 K/UL (ref 0–0.2)
BASOPHILS NFR BLD: 1.2 % (ref 0–1.9)
BUN SERPL-MCNC: 18 MG/DL (ref 8–23)
CALCIUM SERPL-MCNC: 9.4 MG/DL (ref 8.7–10.5)
CHLORIDE SERPL-SCNC: 93 MMOL/L (ref 95–110)
CO2 SERPL-SCNC: 28 MMOL/L (ref 23–29)
CREAT SERPL-MCNC: 0.8 MG/DL (ref 0.5–1.4)
DIFFERENTIAL METHOD: ABNORMAL
EOSINOPHIL # BLD AUTO: 0.4 K/UL (ref 0–0.5)
EOSINOPHIL NFR BLD: 3.1 % (ref 0–8)
ERYTHROCYTE [DISTWIDTH] IN BLOOD BY AUTOMATED COUNT: 12.6 % (ref 11.5–14.5)
EST. GFR  (NO RACE VARIABLE): >60 ML/MIN/1.73 M^2
GLUCOSE SERPL-MCNC: 266 MG/DL (ref 70–110)
GLUCOSE SERPL-MCNC: 290 MG/DL (ref 70–110)
GLUCOSE SERPL-MCNC: 308 MG/DL (ref 70–110)
HCT VFR BLD AUTO: 49.3 % (ref 40–54)
HGB BLD-MCNC: 15.3 G/DL (ref 14–18)
IMM GRANULOCYTES # BLD AUTO: 0.09 K/UL (ref 0–0.04)
IMM GRANULOCYTES NFR BLD AUTO: 0.6 % (ref 0–0.5)
LYMPHOCYTES # BLD AUTO: 2.6 K/UL (ref 1–4.8)
LYMPHOCYTES NFR BLD: 18.5 % (ref 18–48)
MCH RBC QN AUTO: 28.7 PG (ref 27–31)
MCHC RBC AUTO-ENTMCNC: 31 G/DL (ref 32–36)
MCV RBC AUTO: 93 FL (ref 82–98)
MONOCYTES # BLD AUTO: 1.2 K/UL (ref 0.3–1)
MONOCYTES NFR BLD: 8.5 % (ref 4–15)
NEUTROPHILS # BLD AUTO: 9.5 K/UL (ref 1.8–7.7)
NEUTROPHILS NFR BLD: 68.1 % (ref 38–73)
NRBC BLD-RTO: 0 /100 WBC
PLATELET # BLD AUTO: 507 K/UL (ref 150–450)
PMV BLD AUTO: 10 FL (ref 9.2–12.9)
POTASSIUM SERPL-SCNC: 4.7 MMOL/L (ref 3.5–5.1)
RBC # BLD AUTO: 5.33 M/UL (ref 4.6–6.2)
SODIUM SERPL-SCNC: 132 MMOL/L (ref 136–145)
WBC # BLD AUTO: 14.02 K/UL (ref 3.9–12.7)

## 2022-11-22 PROCEDURE — 25000003 PHARM REV CODE 250: Performed by: NURSE PRACTITIONER

## 2022-11-22 PROCEDURE — 63600175 PHARM REV CODE 636 W HCPCS: Performed by: NURSE PRACTITIONER

## 2022-11-22 PROCEDURE — 25000003 PHARM REV CODE 250: Performed by: INTERNAL MEDICINE

## 2022-11-22 PROCEDURE — 80048 BASIC METABOLIC PNL TOTAL CA: CPT | Performed by: NURSE PRACTITIONER

## 2022-11-22 PROCEDURE — 85025 COMPLETE CBC W/AUTO DIFF WBC: CPT | Performed by: NURSE PRACTITIONER

## 2022-11-22 PROCEDURE — 63600175 PHARM REV CODE 636 W HCPCS: Performed by: STUDENT IN AN ORGANIZED HEALTH CARE EDUCATION/TRAINING PROGRAM

## 2022-11-22 PROCEDURE — 36415 COLL VENOUS BLD VENIPUNCTURE: CPT | Performed by: NURSE PRACTITIONER

## 2022-11-22 PROCEDURE — S4991 NICOTINE PATCH NONLEGEND: HCPCS | Performed by: NURSE PRACTITIONER

## 2022-11-22 RX ORDER — LINEZOLID 600 MG/1
600 TABLET, FILM COATED ORAL EVERY 12 HOURS
Qty: 24 TABLET | Refills: 0 | Status: SHIPPED | OUTPATIENT
Start: 2022-11-22 | End: 2022-12-04

## 2022-11-22 RX ORDER — HYDROCODONE BITARTRATE AND ACETAMINOPHEN 5; 325 MG/1; MG/1
1 TABLET ORAL EVERY 12 HOURS PRN
Qty: 10 TABLET | Refills: 0 | Status: SHIPPED | OUTPATIENT
Start: 2022-11-22

## 2022-11-22 RX ORDER — MEROPENEM AND SODIUM CHLORIDE 1 G/50ML
1 INJECTION, SOLUTION INTRAVENOUS EVERY 8 HOURS
Qty: 1800 ML | Refills: 0
Start: 2022-11-22 | End: 2022-12-04

## 2022-11-22 RX ADMIN — INSULIN ASPART 5 UNITS: 100 INJECTION, SOLUTION INTRAVENOUS; SUBCUTANEOUS at 12:11

## 2022-11-22 RX ADMIN — MORPHINE SULFATE 4 MG: 4 INJECTION, SOLUTION INTRAMUSCULAR; INTRAVENOUS at 01:11

## 2022-11-22 RX ADMIN — MEROPENEM AND SODIUM CHLORIDE 1 G: 1 INJECTION, SOLUTION INTRAVENOUS at 02:11

## 2022-11-22 RX ADMIN — GABAPENTIN 300 MG: 300 CAPSULE ORAL at 09:11

## 2022-11-22 RX ADMIN — INSULIN ASPART 5 UNITS: 100 INJECTION, SOLUTION INTRAVENOUS; SUBCUTANEOUS at 08:11

## 2022-11-22 RX ADMIN — LINEZOLID 600 MG: 600 INJECTION, SOLUTION INTRAVENOUS at 08:11

## 2022-11-22 RX ADMIN — NICOTINE 1 PATCH: 21 PATCH, EXTENDED RELEASE TRANSDERMAL at 09:11

## 2022-11-22 RX ADMIN — GABAPENTIN 300 MG: 300 CAPSULE ORAL at 03:11

## 2022-11-22 RX ADMIN — HYDROCODONE BITARTRATE AND ACETAMINOPHEN 1 TABLET: 5; 325 TABLET ORAL at 03:11

## 2022-11-22 RX ADMIN — MEROPENEM AND SODIUM CHLORIDE 1 G: 1 INJECTION, SOLUTION INTRAVENOUS at 12:11

## 2022-11-22 RX ADMIN — INSULIN ASPART 3 UNITS: 100 INJECTION, SOLUTION INTRAVENOUS; SUBCUTANEOUS at 08:11

## 2022-11-22 RX ADMIN — PANTOPRAZOLE SODIUM 40 MG: 40 TABLET, DELAYED RELEASE ORAL at 05:11

## 2022-11-22 RX ADMIN — APIXABAN 5 MG: 5 TABLET, FILM COATED ORAL at 08:11

## 2022-11-22 RX ADMIN — INSULIN ASPART 3 UNITS: 100 INJECTION, SOLUTION INTRAVENOUS; SUBCUTANEOUS at 12:11

## 2022-11-22 RX ADMIN — HYDROCODONE BITARTRATE AND ACETAMINOPHEN 1 TABLET: 5; 325 TABLET ORAL at 08:11

## 2022-11-22 NOTE — NURSING
"Patient states " I do not have to wear this heart thingy,I am going home tomorrow,It keep getting in my way,and I can't sleep with it." Educated him on me watching his heart activity,and monitoring,He said "my heart is fine"  "

## 2022-11-22 NOTE — PLAN OF CARE
Problem: Skin Injury Risk Increased  Goal: Skin Health and Integrity  Intervention: Promote and Optimize Oral Intake  Flowsheets (Taken 11/22/2022 1032)  Oral Nutrition Promotion: calorie-dense liquids provided--Colton 2 x / day for 14 days for wound healing.     Problem: Oral Intake Inadequate  Goal: Improved Oral Intake  Intervention: Promote and Optimize Oral Intake  Flowsheets (Taken 11/22/2022 1032)  Oral Nutrition Promotion: calorie-dense liquids provided

## 2022-11-22 NOTE — NURSING
" 11/22/2022      Assumed care of patient.   Assessment and vital signs assessed.   Labs and meds reviewed.  Last documentation =  Temp: 97.3 °F (36.3 °C) (11/22/22 0804)  Pulse: 80 (11/22/22 0804)  Resp: 18 (11/22/22 0820)  BP: 113/82 (11/22/22 0804)  SpO2: (!) 93 % (11/22/22 0804)   Patient stil requiring 6L on hi tatum mask, temporarily removed to reassess dropped into 75-78%.   Mouth care and repositioning provided.  Pain meds given, patient calling out for pain to lower back and buttocks.   Patient did eat a few bites of apple sauce, drank a few sips of thickened coffee, and requested to lay down.  Once patient was laid flat, her restlessness diminished and patient easily dozed off. Resting comfortably.    1120  Patient pulled O2 off. Replaced, otherwise remains very calm, and appears comfortable.  Son was here this morning visiting. Copy of POA paperwork made and original was returned to son. Passed to charge RNNiki for scanning.    1515  Spoke with Aaron Child. Patient ready for discharge.  IV antibiotics set up with home health therapy. MIRTA midline and blanco cath going with patient at discharge. Disharge instructions discussed with patient. Refused allowing RN to change dressing to suprapubic abscess site, stating "I am ready to go.' Caregiver at bedside to transport patient back home. Patient has no clothes, and refuses disposable scrub pants due to wound location. Patient will have to leave in gown as patient as requested.     1635  Patient escorted out of facility with assist in wheelchair. All belongings placed on rolling cart and brought out by caregiver, who is transporting             "

## 2022-11-22 NOTE — PROGRESS NOTES
[facility] Medicine  Progress Note    Patient Name: James Jiang  MRN: [id number]  Patient Class: IP- Inpatient   Admission Date: 11/18/2022  Length of Stay: 3 days  Attending Physician: Amando Avalos MD  Primary Care Provider: [facility]        Subjective:     Principal Problem:Suprapubic abscess        HPI:  James Jiang is a 62-year-old male Is a 62-year-old male with chronic medical problems including renal cell carcinoma, hypotension, chronic urinary tract infections, atrial fibrillation who presented to the emergency department this morning complaining of severe abdominal pain.  Patient states that he has had abdominal pain for about a year and it is his skin that is hurting but over the last several days his lower abdomen has been very tender and he got up from bed and blood started pouring out of his lower abdomen.  He said pain is severe, it has gotten progressively worse over the last several days.  Associated symptoms are constipation with no bowel movement in the last week and discomfort with urination.  He does see Urology regularly and has a chronic indwelling Donis.  He said it was last changed 3 weeks ago.  He did have a urine specimen sent around the 15th but not sure how was obtained because he said he has not had his catheter changed in 3 weeks.  Urine culture with ESBL Klebsiella pneumonia greater than 100,000 CFU/mL.  He was started on ciprofloxacin and took 1 dose.  He denies fevers or chills, nausea or vomiting, headaches or dizziness, he said he is short of breath but it is at baseline, he also complains of chronic right hip pain for the last year after a fracture and surgery. he is a long-term smoker and said he smokes as much as he can.    In the ER WBC elevated at 16.6, H&H 12.6/39, platelets are 86, glucose elevated to 86, sodium low at 131, chloride low at 94, albumin 3.1, alk-phos 143 and total bilirubin 1.2.  CRP  elevated at 11.36. Initial O2 sat 91% on room air, he was placed on nasal cannula O2 and saturation 95-96%.  Blood pressure systolic 100-109, heart rate 77-85 and afebrile with temperature 97.8°.  In the ED was given 600 mg IV ceftaroline and 1 L normal saline with 8 mg IV morphine.  At time of exam he says pain is somewhat relieved with morphine.  CT abdomen and pelvis revealed a 3 mm nonobstructing right renal calculus and a 5.6 cm solid mass in the left kidney compatible with renal cell carcinoma no hydronephrosis.  He has a 4.8 cm masslike area in the midline suprapubic subcutaneous fat either localized phlegmon and/or developing soft tissue abscess.  He will be admitted to the hospitalist service for further evaluation and treatment with consult to Infectious Disease, Urology and IR for possible drainage of abscess.      Overview/Hospital Course:  11/19  Pus expressed from lower abdominal area   No other issues  Possible OR visit tomorrow for I&D    11/20  Pt today had I&D  Postoperatively pt was hypotensive    11/21  Pt medically stable  Had Bms  Awaiting HH infusion and Insurance approval      Interval History:     Review of Systems   Constitutional:  Negative for activity change and appetite change.   HENT:  Negative for congestion and dental problem.    Eyes:  Negative for discharge and itching.   Respiratory:  Negative for shortness of breath.    Cardiovascular:  Negative for chest pain.   Gastrointestinal:  Negative for abdominal distention and abdominal pain.   Endocrine: Negative for cold intolerance.   Genitourinary:  Negative for difficulty urinating and dysuria.   Musculoskeletal:  Negative for arthralgias and back pain.   Skin:  Positive for wound. Negative for color change.   Neurological:  Negative for dizziness and facial asymmetry.   Hematological:  Negative for adenopathy.   Psychiatric/Behavioral:  Negative for agitation and behavioral problems.    Objective:     Vital Signs (Most  Recent):  Temp: 98.7 °F (37.1 °C) (11/21/22 1646)  Pulse: 76 (11/21/22 1646)  Resp: 20 (11/21/22 1646)  BP: (!) 141/80 (11/21/22 1646)  SpO2: 95 % (11/21/22 1353)   Vital Signs (24h Range):  Temp:  [97.7 °F (36.5 °C)-98.8 °F (37.1 °C)] 98.7 °F (37.1 °C)  Pulse:  [73-92] 76  Resp:  [16-20] 20  SpO2:  [94 %-96 %] 95 %  BP: (111-195)/(70-96) 141/80     Weight: 103 kg (227 lb 1.2 oz)  Body mass index is 35.56 kg/m².    Intake/Output Summary (Last 24 hours) at 11/21/2022 1922  Last data filed at 11/21/2022 1647  Gross per 24 hour   Intake 480 ml   Output 500 ml   Net -20 ml      Physical Exam  Vitals and nursing note reviewed.   Constitutional:       Appearance: He is well-developed.   HENT:      Head: Atraumatic.      Right Ear: External ear normal.      Left Ear: External ear normal.      Nose: Nose normal.      Mouth/Throat:      Mouth: Mucous membranes are moist.   Cardiovascular:      Rate and Rhythm: Normal rate.   Pulmonary:      Effort: Pulmonary effort is normal.   Musculoskeletal:         General: Normal range of motion.      Cervical back: Full passive range of motion without pain and normal range of motion.   Skin:     Comments: Suprapubic area wound and bandage intact   Neurological:      Mental Status: He is alert and oriented to person, place, and time.   Psychiatric:         Behavior: Behavior normal.       Significant Labs: All pertinent labs within the past 24 hours have been reviewed.  CBC:   Recent Labs   Lab 11/20/22 0457 11/21/22  0616   WBC 13.27* 14.87*   HGB 14.8 13.7*   HCT 46.1 43.8   * 460*     CMP:   Recent Labs   Lab 11/20/22 0457 11/21/22  0618   * 134*   K 4.6 4.5   CL 95 95   CO2 31* 29   * 342*   BUN 15 18   CREATININE 0.9 1.1   CALCIUM 9.2 8.9   ANIONGAP 8 10       Significant Imaging: I have reviewed all pertinent imaging results/findings within the past 24 hours.      Assessment/Plan:      * Suprapubic abscess  Suprapubic area, abdominal wall area cellulitis and  Possible Phlegmon vs Abscess  S/p I&D on 11/20/22  Awaiting HH infusion and Insurance approval  Medically stable       Physical debility  Ongoing issue  In the past pt has been rejected by multiple SNFs      Abdominal wall cellulitis  Resolved       Urinary tract infection associated with indwelling urethral catheter  Chronic indwelling blanco  Evident UTI  Continue iv abx       Noncompliance  This is a chronic ongoing issue       Left renal mass  Mostly renal cell carcinoma  Hasnt followed up with Oncologist so far  Pt continues to smoke       Paroxysmal atrial fibrillation  Chronic stable issue   Maintain present regime        Chronic respiratory failure  On Home oxygen     COPD (chronic obstructive pulmonary disease)  DuoNebs every 4 hours as needed for wheezing or shortness of breath    VTE Risk Mitigation (From admission, onward)         Ordered     enoxaparin injection 40 mg  Every 24 hours (non-standard times)         11/19/22 1602     Reason for No Pharmacological VTE Prophylaxis  Once        Question:  Reasons:  Answer:  Already adequately anticoagulated on oral Anticoagulants    11/18/22 1928     IP VTE HIGH RISK PATIENT  Once         11/18/22 1928     Place sequential compression device  Until discontinued         11/18/22 1928                Discharge Planning   KELSY: 11/22/2022     Code Status: Full Code   Is the patient medically ready for discharge?: No    Reason for patient still in hospital (select all that apply): Treatment  Discharge Plan A: Home Health   Discharge Delays: None known at this time              Amando Avalos MD  Department of Hospital Medicine   [facility]

## 2022-11-22 NOTE — PROGRESS NOTES
[facility]  Adult Nutrition   Progress Note (Follow-Up)    SUMMARY      Recommendations:   1. Diabetic 2000 kcal diet due to A1c > 10%  2. Add Colton BID for healing.   3. Recommend out patient diabetic education.  4. RD to monitor for intake, labs, and bowel function and adjust as needed.     Goals:   Goals:   1.) Diet to advance within 48hr. Met  2.) Progression of wound healing. Ongoing    Dietitian Rounds Brief  Patient with new Hgb A1c lab 12.5%. Changed diet to Diabetic 2000 kcal. Glucose lab > 300 . Patient had BM 11/20 and 11/21 s/p enemas. Stool softener added. Intake much better post-BM. Plan is to dc soon, possibly today. Will recommend out patient diabetic education.     Diet order: Diabetic 2000 kcal diet    Oral Supplement: Colton BID    % Intake of Estimated Energy Needs: 75 - 100 %  % Meal Intake: 75 - 100 %    Estimated/Assessed Needs  Weight Used For Calorie Calculations: 103 kg (227 lb 1.2 oz)  Energy Calorie Requirements (kcal): 2060-2575  Energy Need Method: Kcal/kg (20-25)  Protein Requirements:   Weight Used For Protein Calculations: 103 kg (227 lb 1.2 oz) (0.8-1.0)  Fluid Requirements (mL): 2060-2575     RDA Method (mL): 2060  CHO Requirement: 45-60gm CHO per meal    Weight History:  Wt Readings from Last 5 Encounters:   11/18/22 103 kg (227 lb 1.2 oz)   10/27/22 90.7 kg (200 lb)   09/07/22 90.7 kg (200 lb)   08/26/22 90.7 kg (200 lb)   08/09/22 90.7 kg (200 lb)      Reason for Assessment  Reason For Assessment: consult  Diagnosis: infection/sepsis  Relevant Medical History: renal cell carcinoma, hypotension, chronic urinary tract infections, atrial fibrillation, DM2  General Information Comments: Consult for education received. Pt admitted to [facility] complaining of severe abdominal pain. +suprapubic abscess present, no photos at this time. Wound care consulted. Pt currently NPO. PTA, poor appetite/intake d/t abdominal pain. +constipation with LBM 11/11. No GI medications  scheduled at this time. No other GI distress. Skin: wound to abdomen. Last HbA1C 6.2% (7/2022). No education provided at this time. UBW 90.7kg, admit wt 103kg. Wt gain likely r/t fluid retention. +renal carcinoma without treatment. NFPE completed with no s/s of wasting. Pt at risk for malnutrition.    Medications:Pertinent Medications Reviewed  Scheduled Meds:   apixaban  5 mg Oral BID    budesonide  0.5 mg Nebulization Q12H    And    ipratropium  0.5 mg Nebulization Q6H    And    arformoteroL  15 mcg Nebulization BID    atorvastatin  20 mg Oral QHS    gabapentin  300 mg Oral TID    insulin aspart U-100  5 Units Subcutaneous TIDWM    linezolid  600 mg Intravenous Q12H    meropenem (MERREM) IVPB  1 g Intravenous Q8H    midodrine  5 mg Oral TID WM    nicotine  1 patch Transdermal Daily    pantoprazole  40 mg Oral Daily     Continuous Infusions:  PRN Meds:.acetaminophen, albuterol-ipratropium, dextrose 10%, dextrose 10%, glucagon (human recombinant), glucose, glucose, hydrALAZINE, HYDROcodone-acetaminophen, insulin aspart U-100, magnesium oxide, magnesium oxide, melatonin, morphine, naloxone, ondansetron, polyethylene glycol, potassium bicarbonate, potassium bicarbonate, senna-docusate 8.6-50 mg, simethicone, sodium chloride 0.9%    Labs: Pertinent Labs Reviewed  Clinical Chemistry:  Recent Labs   Lab 11/18/22  1503 11/19/22  0530 11/20/22  0457 11/22/22  0443   * 129*   < > 132*   K 4.3 4.3   < > 4.7   CL 94* 91*   < > 93*   CO2 28 27   < > 28   * 283*   < > 308*   BUN 16 14   < > 18   CREATININE 0.9 0.9   < > 0.8   CALCIUM 8.7 8.7   < > 9.4   PROT 7.9  --   --   --    ALBUMIN 3.1*  --   --   --    BILITOT 1.2*  --   --   --    ALKPHOS 143*  --   --   --    AST 12  --   --   --    ALT 9*  --   --   --    ANIONGAP 9 11   < > 11   MG  --  1.7  --   --     < > = values in this interval not displayed.     CBC:   Recent Labs   Lab 11/22/22  0443   WBC 14.02*   RBC 5.33   HGB 15.3   HCT 49.3   *   MCV 93    MCH 28.7   MCHC 31.0*     Inflammatory Labs:  Recent Labs   Lab 11/18/22  1503   CRP 11.36*     Diabetes:  Recent Labs   Lab 11/19/22  0530   HGBA1C 12.5*     Monitor and Evaluation  Food and Nutrient Intake: energy intake, food and beverage intake  Food and Nutrient Adminstration: diet order  Knowledge/Beliefs/Attitudes: food and nutrition knowledge/skill  Physical Activity and Function: nutrition-related ADLs and IADLs  Anthropometric Measurements: weight, weight change  Biochemical Data, Medical Tests and Procedures: electrolyte and renal panel, gastrointestinal profile, glucose/endocrine profile  Nutrition-Focused Physical Findings: overall appearance     Nutrition Risk  Level of Risk/Frequency of Follow-up: high     Nutrition Follow-Up  RD Follow-up?: Yes    Bessie Buchanan RD 11/22/2022 10:16 AM

## 2022-11-22 NOTE — DISCHARGE INSTRUCTIONS
How does diabetes affect the feet and toenails?  Diabetes can create a variety of problems in the feet and toenails and most of them are caused by diabetic neuropathy, peripheral artery disease, and fungal infections.    Diabetic neuropathy, also called diabetic peripheral neuropathy, is one of the leading causes of problems with your legs and feet.    It occurs when high blood sugar levels cause nerve damage in your feet and legs that can lead to pain, tingling, or numbness.    The lack of sensations can make it difficult for you to detect any wounds or sores on your feet which may result in an infection and subsequently amputation.    Peripheral artery disease (PAD), also known as peripheral vascular disease or peripheral arterial disease, is when diabetes narrows the blood vessels in your legs and feet which can lead to leg pain when walking.    It causes poor blood flow and can make it difficult for your wounds to heal and gives you a greater risk for infection too.    Fungal infections of the toenails and feet are also more common if you have diabetes due to your increased risk of infections.    What are the diabetic foot and toenail problems caused by diabetes?  The most common problems caused by diabetes include:    Foot ulcers  These are open sores on the feet that can become infected. Diabetic foot ulcers are usually caused by a combination of diabetes, peripheral artery disease, and diabetic neuropathy.    Bunions  A bunion is when the big toe joint sticks out at an abnormal angle and often causes pain. Bunions are more common if you have diabetes due to the increased risk of foot problems and not being able to feel them.    Calluses and corns  Calluses and corns are areas of thickened skin that can form on the feet from rubbing or pressure and they often cause pain when walking or wearing shoes.    Athletes foot  Athletes foot is a fungal infection that thrives in moist environments.    If you have  diabetes you are more susceptible to developing athletes foot because diabetes can cause decreased blood flow to the feet and slow healing of wounds or cuts which can create the perfect environment for fungus to grow.    The most common symptoms of athletes foot are itchiness, redness, and scaling on the feet.    Toenail fungal infection  Toenail fungal infections are also more common with diabetes as the fungus that grows thrives in moist and warm environments.    The most common symptom of a toenail fungal infection is a yellow or discolored toenail.    If you have diabetes and notice any changes in your toenails or think you have an infected nail it is important to speak with your doctor as soon as possible.    Diabetic blisters  Diabetes also puts you at risk for diabetic blisters which are also called bullosis diabeticorum, or diabetic bullae. They are large, fluid-filled blisters that usually occur on your feet and are not painful.    They are most common on the toes or soles of the feet and they heal on their own but if you have diabetes it is important to monitor them closely as they can become infected.    If you have a blister seek medical attention from your doctor or podiatrist (foot doctor) and do not puncture or pop them yourself.    Plantar warts  Plantar warts are growths on the soles of your feet that are caused by a virus and can be painful. Plantar warts can often go away on their own but if they do not, there are treatments available.    Dry skin  Diabetes can cause changes in your skin that make it drier and more prone to cracking. Cracked skin can lead to infections so it is important to keep your feet moisturized.    Hammertoes  Due to a lack of feeling in your feet from diabetic neuropathy you also run the risk of foot deformities like hammertoes. Hammertoes are when the toes bend at the middle joint and can become painful.    Ingrown toenails  An ingrown toenail is when the nail grows into  the skin around it. If you have one, it can be painful and cause swelling and redness.    What are the complications caused by diabetic foot problems?  Some common diabetes complications are caused by these problems and they include:    Charcot foot  Charcot foot, or neuropathic arthropathy, is a foot complication that can occur with diabetic neuropathy. It is caused by the loss of feeling in your feet which can lead to deformities and fractures.    Charcot foot often starts with a foot injury that goes unnoticed because of the lack of feeling. The injury then progresses and the foot starts to change shape.    Charcot foot can be difficult to treat and often requires surgery.    Infections  Foot ulcers, plantar warts, and Athletes foot are all foot infections that can often lead to more serious complications and can lead to amputation.    Amputation  One of the most serious complications of diabetes is amputation. Amputations are often necessary when diabetes leads to an infection that will not heal or when there is severe nerve damage.    Deformities  As noted above, deformities of your feet and toes are also possible like Charcot foot, claw foot, hammertoes, and bunions.    Abscesses  An abscess is a pocket of pus that can form under the skin. It is often caused by an infection and can be painful. If you have diabetes and develop an abscess it is important to seek medical attention as they can often lead to more serious infections.    Gangrene  Gangrene is a serious medical condition and you need to seek medical care immediately if you have it. If you have gangrene, the blood flow to an area of your body is cut off and the tissue dies. Gangrene often leads to amputation.    What are the treatment options for diabetic foot and toenail problems?  Treatment for diabetic foot and toenail problems will vary depending on the problem. Treatment options include:    Surgical treatments like surgical debridement where the  diabetic foot ulcer is cleaned and any dead or infected tissue is removed  Antibiotics for bacterial infections  Antifungal medications for fungal infections  Topical treatments  Changes in footwear including custom orthotic insoles which are shoe inserts that can help your feet  Wound care  Amputation  Please talk to your doctor or podiatrist to determine the best treatment options for you.

## 2022-11-22 NOTE — SUBJECTIVE & OBJECTIVE
Interval History:     Review of Systems   Constitutional:  Negative for activity change and appetite change.   HENT:  Negative for congestion and dental problem.    Eyes:  Negative for discharge and itching.   Respiratory:  Negative for shortness of breath.    Cardiovascular:  Negative for chest pain.   Gastrointestinal:  Negative for abdominal distention and abdominal pain.   Endocrine: Negative for cold intolerance.   Genitourinary:  Negative for difficulty urinating and dysuria.   Musculoskeletal:  Negative for arthralgias and back pain.   Skin:  Positive for wound. Negative for color change.   Neurological:  Negative for dizziness and facial asymmetry.   Hematological:  Negative for adenopathy.   Psychiatric/Behavioral:  Negative for agitation and behavioral problems.    Objective:     Vital Signs (Most Recent):  Temp: 98.7 °F (37.1 °C) (11/21/22 1646)  Pulse: 76 (11/21/22 1646)  Resp: 20 (11/21/22 1646)  BP: (!) 141/80 (11/21/22 1646)  SpO2: 95 % (11/21/22 1353)   Vital Signs (24h Range):  Temp:  [97.7 °F (36.5 °C)-98.8 °F (37.1 °C)] 98.7 °F (37.1 °C)  Pulse:  [73-92] 76  Resp:  [16-20] 20  SpO2:  [94 %-96 %] 95 %  BP: (111-195)/(70-96) 141/80     Weight: 103 kg (227 lb 1.2 oz)  Body mass index is 35.56 kg/m².    Intake/Output Summary (Last 24 hours) at 11/21/2022 1922  Last data filed at 11/21/2022 1647  Gross per 24 hour   Intake 480 ml   Output 500 ml   Net -20 ml      Physical Exam  Vitals and nursing note reviewed.   Constitutional:       Appearance: He is well-developed.   HENT:      Head: Atraumatic.      Right Ear: External ear normal.      Left Ear: External ear normal.      Nose: Nose normal.      Mouth/Throat:      Mouth: Mucous membranes are moist.   Cardiovascular:      Rate and Rhythm: Normal rate.   Pulmonary:      Effort: Pulmonary effort is normal.   Musculoskeletal:         General: Normal range of motion.      Cervical back: Full passive range of motion without pain and normal range of  motion.   Skin:     Comments: Suprapubic area wound and bandage intact   Neurological:      Mental Status: He is alert and oriented to person, place, and time.   Psychiatric:         Behavior: Behavior normal.       Significant Labs: All pertinent labs within the past 24 hours have been reviewed.  CBC:   Recent Labs   Lab 11/20/22 0457 11/21/22  0616   WBC 13.27* 14.87*   HGB 14.8 13.7*   HCT 46.1 43.8   * 460*     CMP:   Recent Labs   Lab 11/20/22 0457 11/21/22  0618   * 134*   K 4.6 4.5   CL 95 95   CO2 31* 29   * 342*   BUN 15 18   CREATININE 0.9 1.1   CALCIUM 9.2 8.9   ANIONGAP 8 10       Significant Imaging: I have reviewed all pertinent imaging results/findings within the past 24 hours.

## 2022-11-22 NOTE — PLAN OF CARE
[facility]  Discharge Final Note    Primary Care Provider: [facility]    Expected Discharge Date: 11/22/2022     met with Pt at bedside to confirm discharge plans. Pt verbalized plans to discharge home and resume home health services with [facility] as well as IV infusion services from [facility].  observed Pt's referrals in [facility]: Pt accepted by [facility] and [facility] infusion. Pt to start home health services 11/23.      sent email to Nory ([employer] [facility]) to aid in scheduling Pt with follow-up appointment. Pt has no other needs to be addressed by case management. Pt cleared to discharge by case management.        Final Discharge Note (most recent)       Final Note - 11/22/22 1521          Final Note    Assessment Type Final Discharge Note     Anticipated Discharge Disposition Home-Health Care [facility]     What phone number can be called within the next 1-3 days to see how you are doing after discharge? [phone]     Hospital Resources/Appts/Education Provided Provided patient/caregiver with written discharge plan information;Appointments scheduled by Navigator/Coordinator        Post-Acute Status    Post-Acute Authorization Home Health;IV Infusion     Home Health Status Set-up Complete/Auth obtained     IV Infusion Status Set-up Complete/Auth obtained     Discharge Delays None known at this time                     Important Message from Medicare             Contact Info       Won García III, MD   Specialty: General Surgery, Surgery    [address]  [address]  [place] [address]   Phone: [phone]       Next Steps: Follow up in 1 week(s)    BRENT Mccarty MD   Specialty: Hematology, Oncology, Hematology and Oncology    [address]  [address]  [place] [address]   Phone: [phone]       Next Steps: Follow up in 1 week(s)

## 2022-11-22 NOTE — NURSING
11/21/2022      Assumed care of patient.   Assessment and vital signs assessed.   Labs and meds reviewed.  AOX4, dressing to abd fold, c/d/I.  Patient has IV fluids and abt.  Norco PRN pain  Patient is unkempt, extra long fingernails with debris under them. Personal drinking cup is very dirty but patient did not want it washed.  Chronic indwelling blanco, for ongoing urology issues. Blanco changed in ED upon arrival.  Draining yellow clear urine.      1230  Ongoing pain mgmt.  Wound care assessed patient, performed dressing change and morphine IV given for pain.    1900 patient possibly discharging tomorrow with home health and ongoing IV abt.  Report given to MARGARITA Celaya.

## 2022-11-23 LAB
BACTERIA SPEC ANAEROBE CULT: NORMAL
BACTERIA SPEC ANAEROBE CULT: NORMAL
GRAM STN SPEC: NORMAL
GRAM STN SPEC: NORMAL

## 2022-11-23 NOTE — DISCHARGE SUMMARY
[facility] Medicine  Discharge Summary      Patient Name: James Jiang  MRN: [id number]  [facility]: [id number]  Patient Class: IP- Inpatient  Admission Date: 11/18/2022  Hospital Length of Stay: 4 days  Discharge Date and Time: 11/22/2022  4:49 PM  Attending Physician: No att. providers found   Discharging Provider: Amando Avalos MD  Primary Care Provider: [facility]    Primary Care Team: Networked reference to record PCT     HPI:   James Jiang is a 62-year-old male Is a 62-year-old male with chronic medical problems including renal cell carcinoma, hypotension, chronic urinary tract infections, atrial fibrillation who presented to the emergency department this morning complaining of severe abdominal pain.  Patient states that he has had abdominal pain for about a year and it is his skin that is hurting but over the last several days his lower abdomen has been very tender and he got up from bed and blood started pouring out of his lower abdomen.  He said pain is severe, it has gotten progressively worse over the last several days.  Associated symptoms are constipation with no bowel movement in the last week and discomfort with urination.  He does see Urology regularly and has a chronic indwelling Donis.  He said it was last changed 3 weeks ago.  He did have a urine specimen sent around the 15th but not sure how was obtained because he said he has not had his catheter changed in 3 weeks.  Urine culture with ESBL Klebsiella pneumonia greater than 100,000 CFU/mL.  He was started on ciprofloxacin and took 1 dose.  He denies fevers or chills, nausea or vomiting, headaches or dizziness, he said he is short of breath but it is at baseline, he also complains of chronic right hip pain for the last year after a fracture and surgery. he is a long-term smoker and said he smokes as much as he can.    In the ER WBC elevated at 16.6, H&H 12.6/39, platelets are 86,  glucose elevated to 86, sodium low at 131, chloride low at 94, albumin 3.1, alk-phos 143 and total bilirubin 1.2.  CRP elevated at 11.36. Initial O2 sat 91% on room air, he was placed on nasal cannula O2 and saturation 95-96%.  Blood pressure systolic 100-109, heart rate 77-85 and afebrile with temperature 97.8°.  In the ED was given 600 mg IV ceftaroline and 1 L normal saline with 8 mg IV morphine.  At time of exam he says pain is somewhat relieved with morphine.  CT abdomen and pelvis revealed a 3 mm nonobstructing right renal calculus and a 5.6 cm solid mass in the left kidney compatible with renal cell carcinoma no hydronephrosis.  He has a 4.8 cm masslike area in the midline suprapubic subcutaneous fat either localized phlegmon and/or developing soft tissue abscess.  He will be admitted to the hospitalist service for further evaluation and treatment with consult to Infectious Disease, Urology and IR for possible drainage of abscess.      Procedure(s) (LRB):  INCISION AND DRAINAGE, ABSCESS (N/A)      Hospital Course:   Pt with H/o L renal mass who is noncompliant with Oncology appointments/Poor insight / who was on hospice care before ,got admitted with Suprapubic area  abscess and associated Abdominal wall cellulitis  Pt had I&D on 11/20/22  Urinary tract infection associated with indwelling urethral catheter is an ongoing issue for this pt because of Chronic indwelling blanco  Pt was  started on iv abx, Midline was inserted and got discharged to home with PO zyvox and iv meropenem   Education was provided regarding personal Hygiene/Compliance        Goals of Care Treatment Preferences:  Code Status: Full Code      Consults:   Consults (From admission, onward)        Status Ordering Provider     Inpatient consult to Urology  Once        Provider:  Dany Welsh Jr., MD    Completed ELADIA ARMENTA     Inpatient consult to Infectious Diseases  Once        Provider:  Aziza Amezcua MD    Completed  ELADIA ARMENTA     Inpatient consult to Registered Dietitian/Nutritionist  Once        Provider:  (Not yet assigned)    Completed ELADIA ARMENTA          No new Assessment & Plan notes have been filed under this hospital service since the last note was generated.  Service: Hospital Medicine    Final Active Diagnoses:    Diagnosis Date Noted POA    Physical debility [R53.81] 11/26/2021 Yes    Noncompliance [Z91.199] 10/09/2021 Not Applicable    Left renal mass [N28.89]  Yes    Paroxysmal atrial fibrillation [I48.0] 11/08/2021 Yes     Chronic    COPD (chronic obstructive pulmonary disease) [J44.9] 11/06/2021 Yes    Chronic respiratory failure [J96.10] 11/06/2021 Yes    Class 2 obesity in adult [E66.9] 10/09/2021 Yes      Problems Resolved During this Admission:    Diagnosis Date Noted Date Resolved POA    PRINCIPAL PROBLEM:  Suprapubic abscess [L02.219] 11/18/2022 11/22/2022 Yes    Abdominal wall cellulitis [L03.311] 11/19/2022 11/22/2022 Yes    Urinary tract infection associated with indwelling urethral catheter [T83.511A, N39.0] 11/18/2022 11/22/2022 Yes    Chronic indwelling Donis catheter [Z97.8] 11/18/2022 11/18/2022 Not Applicable       Discharged Condition: good    Disposition: Home-Health Care [facility]    Follow Up:   Follow-up Information     Won García III, MD Follow up in 1 week(s).    Specialties: General Surgery, Surgery  Contact information:  [address] [facility]  [address]  [place] [address]  [phone]             BRENT Mccarty MD Follow up in 1 week(s).    Specialties: Hematology, Oncology, Hematology and Oncology  Contact information:  [address]  [address]  [place] [address]  [phone]                       Patient Instructions:      Ambulatory referral/consult to Home Health   Standing Status: Future   Referral Priority: Routine Referral Type: Home Health Care   Referral Reason: Specialty Services Required   Requested Specialty: Home Health Services   Number of Visits  "Requested: 1       Significant Diagnostic Studies: Labs:   CMP   Recent Labs   Lab 11/22/22 0443   *   K 4.7   CL 93*   CO2 28   *   BUN 18   CREATININE 0.8   CALCIUM 9.4   ANIONGAP 11    and CBC   Recent Labs   Lab 11/22/22 0443   WBC 14.02*   HGB 15.3   HCT 49.3   *       Pending Diagnostic Studies:     Procedure Component Value Units Date/Time    EKG 12-lead [[id number]] Collected: 11/19/22 0345    Order Status: Sent Lab Status: In process Updated: 11/19/22 1528    Narrative:      Test Reason : R07.9,    Vent. Rate : 097 BPM     Atrial Rate : 097 BPM     P-R Int : 162 ms          QRS Dur : 108 ms      QT Int : 362 ms       P-R-T Axes : 044 -10 -32 degrees     QTc Int : 459 ms    Normal sinus rhythm  Nonspecific ST and T wave abnormality  Abnormal ECG  When compared with ECG of 19-NOV-2022 03:43,  Premature ventricular complexes are no longer Present    Referred By: [email or web address]   SELF           Confirmed By:     EKG 12-lead [[id number]]     Order Status: Sent Lab Status: No result          Medications:  Reconciled Home Medications:      Medication List      START taking these medications    HYDROcodone-acetaminophen 5-325 mg per tablet  Commonly known as: NORCO  Take 1 tablet by mouth every 12 (twelve) hours as needed for Pain.     linezolid 600 mg Tab  Commonly known as: ZYVOX  Take 1 tablet (600 mg total) by mouth every 12 (twelve) hours. for 12 days     meropenem-0.9% sodium chloride 1 gram/50 mL Pgbk  Inject 50 mLs (1 g total) into the vein every 8 (eight) hours. for 12 days        CHANGE how you take these medications    BD ULTRA-FINE SHORT PEN NEEDLE 31 gauge x 5/16" Ndle  Generic drug: pen needle, diabetic  Use with insulin up to three times daily  What changed:   · how much to take  · how to take this  · when to take this     collagenase ointment  Commonly known as: SANTYL  Apply topically once daily.  What changed: how much to take     gabapentin 400 MG capsule  Commonly known as: " NEURONTIN  Take 400 mg by mouth 3 (three) times daily.  What changed: Another medication with the same name was removed. Continue taking this medication, and follow the directions you see here.        CONTINUE taking these medications    acetaminophen 325 MG tablet  Commonly known as: TYLENOL  Take 650 mg by mouth every 6 (six) hours as needed.     acyclovir 5% 5 % Crea  Commonly known as: ZOVIRAX  Zovirax 5 % topical cream   APPLY TO THE AFFECTED AREA(S) BY TOPICAL ROUTE 5 TIMES PER DAY x 7 days     * albuterol 90 mcg/actuation inhaler  Commonly known as: PROVENTIL HFA  Inhale 2 puffs into the lungs every 6 (six) hours as needed for Wheezing. Rescue     * albuterol 90 mcg/actuation inhaler  Commonly known as: VENTOLIN HFA  Inhale 2 puffs into the lungs every 4 (four) hours as needed for Wheezing or Shortness of Breath. Rescue     * albuterol 90 mcg/actuation inhaler  Commonly known as: PROVENTIL/VENTOLIN HFA  Inhale 2 puffs into the lungs every 6 (six) hours as needed.     apixaban 5 mg Tab  Commonly known as: ELIQUIS  Take 1 tablet (5 mg total) by mouth 2 (two) times daily.     atorvastatin 20 MG tablet  Commonly known as: LIPITOR  Take 20 mg by mouth every evening.     capsaicin 0.1 % Crea  capsaicin 0.1 % topical cream   Apply 1 application 3 times a day by topical route as needed for 7 days.     FLUoxetine 10 MG capsule  Take 10 mg by mouth once daily. HCS     fluticasone-umeclidin-vilanter 100-62.5-25 mcg Dsdv  Commonly known as: TRELEGY ELLIPTA  Inhale 1 puff into the lungs once daily.     glipiZIDE 5 MG tablet  Commonly known as: GLUCOTROL  Take 5 mg by mouth daily with breakfast.     metFORMIN 500 MG tablet  Commonly known as: GLUCOPHAGE  Take 500 mg by mouth 2 (two) times a day.     midodrine 5 MG Tab  Commonly known as: PROAMATINE  midodrine 5 mg tablet   TAKE 1 TABLET BY MOUTH THREE TIMES DAILY     NovoLOG Flexpen U-100 Insulin 100 unit/mL (3 mL) Inpn pen  Generic drug: insulin aspart U-100  Inject 5  Units into the skin 3 (three) times daily.     ondansetron 4 MG Tbdl  Commonly known as: ZOFRAN-ODT  Take 4 mg by mouth every 8 (eight) hours as needed.     pantoprazole 40 MG tablet  Commonly known as: PROTONIX  Take 40 mg by mouth once daily.     senna-docusate 8.6-50 mg 8.6-50 mg per tablet  Commonly known as: PERICOLACE  Take 2 tablets by mouth 2 (two) times daily.     simvastatin 40 MG tablet  Commonly known as: ZOCOR  Take 40 mg by mouth once daily.         * This list has 3 medication(s) that are the same as other medications prescribed for you. Read the directions carefully, and ask your doctor or other care provider to review them with you.            STOP taking these medications    ciprofloxacin HCl 500 MG tablet  Commonly known as: CIPRO     nitrofurantoin (macrocrystal-monohydrate) 100 MG capsule  Commonly known as: MACROBID     oxyCODONE-acetaminophen 5-325 mg per tablet  Commonly known as: PERCOCET            Indwelling Lines/Drains at time of discharge:   Lines/Drains/Airways     Drain  Duration                Ureteral Drain/Stent 07/29/22 Left ureter 6 Fr. 117 days         Urethral Catheter 11/20/22 0206 3 days              Physical Exam   Cardiovascular: Normal rate.   Neurological: He is alert.     Time spent on the discharge of patient: 45 minutes         Amando Avalos MD  Department of Hospital Medicine  [facility]

## 2022-11-24 LAB — BACTERIA BLD CULT: NORMAL

## 2022-11-25 ENCOUNTER — TELEPHONE (OUTPATIENT)
Dept: INFECTIOUS DISEASES | Facility: HOSPITAL | Age: 62
End: 2022-11-25

## 2022-11-25 DIAGNOSIS — L02.211 CUTANEOUS ABSCESS OF ABDOMINAL WALL: Primary | ICD-10-CM

## 2022-11-25 LAB
BACTERIA SPEC AEROBE CULT: ABNORMAL
BACTERIA SPEC AEROBE CULT: ABNORMAL

## 2022-11-25 NOTE — TELEPHONE ENCOUNTER
Patient contacted; he can only com to the ER tomorrow morning 11/26 for a dressing change.   Referral to [facility] for wound care ordered.   ER contacted and plan of care discussed.

## 2022-11-27 ENCOUNTER — HOSPITAL ENCOUNTER (EMERGENCY)
Facility: HOSPITAL | Age: 62
Discharge: HOME | End: 2022-11-27
Attending: EMERGENCY MEDICINE
Payer: MEDICAID

## 2022-11-27 VITALS
TEMPERATURE: 98 F | DIASTOLIC BLOOD PRESSURE: 96 MMHG | SYSTOLIC BLOOD PRESSURE: 184 MMHG | WEIGHT: 216 LBS | HEART RATE: 72 BPM | RESPIRATION RATE: 18 BRPM | OXYGEN SATURATION: 96 % | HEIGHT: 67 IN | BODY MASS INDEX: 33.9 KG/M2

## 2022-11-27 DIAGNOSIS — Z09 ENCOUNTER FOR RECHECK OF ABSCESS FOLLOWING INCISION AND DRAINAGE: Primary | ICD-10-CM

## 2022-11-27 DIAGNOSIS — Z51.89 ENCOUNTER FOR WOUND RE-CHECK: ICD-10-CM

## 2022-11-27 PROCEDURE — 96372 THER/PROPH/DIAG INJ SC/IM: CPT | Performed by: EMERGENCY MEDICINE

## 2022-11-27 PROCEDURE — 63600175 PHARM REV CODE 636 W HCPCS: Performed by: EMERGENCY MEDICINE

## 2022-11-27 PROCEDURE — 99284 EMERGENCY DEPT VISIT MOD MDM: CPT

## 2022-11-27 RX ORDER — MORPHINE SULFATE 4 MG/ML
4 INJECTION, SOLUTION INTRAMUSCULAR; INTRAVENOUS ONCE
Status: COMPLETED | OUTPATIENT
Start: 2022-11-27 | End: 2022-11-27

## 2022-11-27 RX ADMIN — MORPHINE SULFATE 4 MG: 4 INJECTION, SOLUTION INTRAMUSCULAR; INTRAVENOUS at 07:11

## 2022-11-27 NOTE — DISCHARGE INSTRUCTIONS
RETURN TO EMERGENCY DEPARTMENT WITHOUT FAIL, IF YOUR SYMPTOMS WORSEN, IF YOU GET NEW OR DIFFERENT SYMPTOMS, IF YOU ARE UNABLE TO FOLLOW UP AS DIRECTED, OR IF YOU HAVE ANY CONCERNS OR WORRIES.    Monitor for signs of infection.

## 2022-11-27 NOTE — ED PROVIDER NOTES
Encounter Date: 11/27/2022       History     Chief Complaint   Patient presents with    Wound Check     ABD SURGERY Sunday FOR ABSCESS, PACKING CHECK     62-year-old male presents emergency department for packing change.  He had a I&D performed by Dr. De Jesus in the operating room for a large suprapubic abscess.  Patient states that the packing was changed the next day on Monday.  He is supposed to have a packing change 2 days ago but was unable to make it he says that home health nurses supposed to do it but there was not an order and he usually needs to get pain medication before having a packing change.  He is supposed to come yesterday to the ER but he was having some diarrhea so came today to get his packing changed.  He is not been having any fever.  He is not been having any chills.  He is not have any other complaints other than the diarrhea, loose and watery.    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    Hypertension      Past Surgical History:   Procedure Laterality Date    CYSTOURETEROSCOPY WITH RETROGRADE PYELOGRAPHY AND INSERTION OF STENT INTO URETER Left 7/29/2022    Procedure: CYSTOURETEROSCOPY, WITH RETROGRADE PYELOGRAM AND URETERAL STENT INSERTION;  Surgeon: Raegan Rodriguez MD;  Location: [facility] OR;  Service: Urology;  Laterality: Left;    denies pertinent surgical history      INCISION AND DRAINAGE OF ABSCESS N/A 11/20/2022    Procedure: INCISION AND DRAINAGE, ABSCESS;  Surgeon: Won García III, MD;  Location: [facility] OR;  Service: General;  Laterality: N/A;  suprapubic    INTRAMEDULLARY RODDING OF FEMUR Right 11/7/2021    Procedure: INSERTION, INTRAMEDULLARY HENNA, FEMUR;  Surgeon:  Matt Milian MD;  Location: [facility] OR;  Service: Orthopedics;  Laterality: Right;    LASER LITHOTRIPSY Left 7/29/2022    Procedure: LITHOTRIPSY, USING LASER;  Surgeon: Raegan Rodriguez MD;  Location: [facility] OR;  Service: Urology;  Laterality: Left;    TOE AMPUTATION Right 10/12/2021    Procedure: AMPUTATION, TOE;  Surgeon: Milton Lauren DPM;  Location: [facility] OR;  Service: Podiatry;  Laterality: Right;    URETEROSCOPIC REMOVAL OF URETERIC CALCULUS Left 7/29/2022    Procedure: REMOVAL, CALCULUS, URETER, URETEROSCOPIC;  Surgeon: Raegan Rodriguez MD;  Location: [facility] OR;  Service: Urology;  Laterality: Left;     Family History   Problem Relation Age of Onset    Hypertension Father      Social History     Tobacco Use    Smoking status: Former    Smokeless tobacco: Never   Substance Use Topics    Alcohol use: Not Currently    Drug use: Not Currently     Review of Systems   Constitutional:  Negative for chills and fever.   HENT:  Negative for congestion and sore throat.    Respiratory:  Negative for shortness of breath.    Cardiovascular:  Negative for chest pain and palpitations.   Gastrointestinal:  Positive for diarrhea. Negative for abdominal pain, nausea and vomiting.   Genitourinary:  Negative for dysuria and flank pain.   Musculoskeletal:  Negative for back pain.   Skin:  Positive for wound. Negative for rash.   Hematological:  Does not bruise/bleed easily.   All other systems reviewed and are negative.    Physical Exam     Initial Vitals [11/27/22 0704]   BP Pulse Resp Temp SpO2   (!) 185/99 70 20 98.9 °F (37.2 °C) 96 %      MAP       --         Physical Exam    Nursing note and vitals reviewed.  Constitutional: He appears well-developed and well-nourished. He is not diaphoretic. No distress.   HENT:   Head: Normocephalic and atraumatic.   Mouth/Throat: Oropharynx is clear and moist. No oropharyngeal exudate.   Eyes: Conjunctivae and EOM are normal. Pupils are equal, round, and reactive to light.   Neck:  Neck supple. No tracheal deviation present.   Cardiovascular:  Normal rate, regular rhythm, normal heart sounds and intact distal pulses.           No murmur heard.  Pulmonary/Chest: Breath sounds normal. No stridor. No respiratory distress. He has no wheezes. He has no rhonchi. He has no rales.   Abdominal: Abdomen is soft. Bowel sounds are normal. He exhibits no distension. There is no abdominal tenderness.   Suprapubic area there is 2 incisions present with packing in place.  No obvious surrounding cellulitis.  Appropriate healing abscess noted.  The area is damp and moist.  No purulent drainage noted. There is no rebound and no guarding.   Genitourinary:    Genitourinary Comments: Donis catheter in place.     Musculoskeletal:         General: No tenderness or edema. Normal range of motion.      Cervical back: Neck supple.     Neurological: He is alert and oriented to person, place, and time. He has normal strength. No cranial nerve deficit or sensory deficit. GCS score is 15. GCS eye subscore is 4. GCS verbal subscore is 5. GCS motor subscore is 6.   Skin: Skin is warm and dry. Capillary refill takes less than 2 seconds. No rash noted. No erythema. No pallor.   Psychiatric: He has a normal mood and affect. His behavior is normal. Thought content normal.       ED Course   Procedures  Labs Reviewed - No data to display       Imaging Results    None          Medications   morphine injection 4 mg (4 mg Intramuscular Given 11/27/22 0748)     Medical Decision Making:   Clinical Tests:   Lab Tests: Ordered and Reviewed  Radiological Study: Ordered and Reviewed  Medical Tests: Ordered and Reviewed  ED Management:  62-year-old male presents emergency department for packing removal and re-initiation packing.  Wound appears well healed.  No evidence of any surrounding cellulitis.  Wound is been repacked by myself.  Patient tolerated procedure well without difficulty.  He will follow-up in 2-3 days for packing removal wound  recheck.    I had a detailed discussion with the patient and/or guardian regarding: The historical points, exam findings, and diagnostic results supporting the discharge diagnosis, lab results, pertinent radiology results, and the need for outpatient follow-up, for definitive care with a family practitioner and to return to the emergency department if symptoms worsen or persist or if there are any questions or concerns that arise at home. All questions have been answered in detail. Strict return to Emergency Department precautions have been provided.    A dictation software program was used for this note.  Please expect some simple typographical  errors in this note.                             Clinical Impression:   Final diagnoses:  [Z09] Encounter for recheck of abscess following incision and drainage (Primary)  [Z51.89] Encounter for wound re-check      ED Disposition Condition    Discharge Stable          ED Prescriptions    None       Follow-up Information       Follow up With Specialties Details Why Contact Info Additional Information    [facility]  In 3 days For wound re-check [address]  [facility] [address]  [phone]       [facility] - Emergency Dept Emergency Medicine  If symptoms worsen [address]  [facility] [address]  [phone] 1st floor             Lalit Jackson, DO  11/27/22 0910

## 2022-11-29 ENCOUNTER — OFFICE VISIT (OUTPATIENT)
Dept: HEMATOLOGY/ONCOLOGY | Facility: CLINIC | Age: 62
End: 2022-11-29
Payer: MEDICAID

## 2022-11-29 VITALS
DIASTOLIC BLOOD PRESSURE: 71 MMHG | RESPIRATION RATE: 16 BRPM | BODY MASS INDEX: 33.83 KG/M2 | SYSTOLIC BLOOD PRESSURE: 112 MMHG | TEMPERATURE: 98 F | HEIGHT: 67 IN | HEART RATE: 76 BPM

## 2022-11-29 DIAGNOSIS — N13.2 HYDRONEPHROSIS WITH URINARY OBSTRUCTION DUE TO RENAL CALCULUS: ICD-10-CM

## 2022-11-29 DIAGNOSIS — N28.89 LEFT RENAL MASS: Primary | ICD-10-CM

## 2022-11-29 DIAGNOSIS — I26.99 BILATERAL PULMONARY EMBOLISM: ICD-10-CM

## 2022-11-29 DIAGNOSIS — L08.9 INFECTED WOUND: ICD-10-CM

## 2022-11-29 DIAGNOSIS — T14.8XXA INFECTED WOUND: ICD-10-CM

## 2022-11-29 PROCEDURE — 3074F SYST BP LT 130 MM HG: CPT | Mod: CPTII,S$GLB,, | Performed by: INTERNAL MEDICINE

## 2022-11-29 PROCEDURE — 3008F BODY MASS INDEX DOCD: CPT | Mod: CPTII,S$GLB,, | Performed by: INTERNAL MEDICINE

## 2022-11-29 PROCEDURE — 3078F DIAST BP <80 MM HG: CPT | Mod: CPTII,S$GLB,, | Performed by: INTERNAL MEDICINE

## 2022-11-29 PROCEDURE — 99214 OFFICE O/P EST MOD 30 MIN: CPT | Mod: S$GLB,,, | Performed by: INTERNAL MEDICINE

## 2022-11-29 PROCEDURE — 3008F PR BODY MASS INDEX (BMI) DOCUMENTED: ICD-10-PCS | Mod: CPTII,S$GLB,, | Performed by: INTERNAL MEDICINE

## 2022-11-29 PROCEDURE — 3046F HEMOGLOBIN A1C LEVEL >9.0%: CPT | Mod: CPTII,S$GLB,, | Performed by: INTERNAL MEDICINE

## 2022-11-29 PROCEDURE — 1159F MED LIST DOCD IN RCRD: CPT | Mod: CPTII,S$GLB,, | Performed by: INTERNAL MEDICINE

## 2022-11-29 PROCEDURE — 3046F PR MOST RECENT HEMOGLOBIN A1C LEVEL > 9.0%: ICD-10-PCS | Mod: CPTII,S$GLB,, | Performed by: INTERNAL MEDICINE

## 2022-11-29 PROCEDURE — 3078F PR MOST RECENT DIASTOLIC BLOOD PRESSURE < 80 MM HG: ICD-10-PCS | Mod: CPTII,S$GLB,, | Performed by: INTERNAL MEDICINE

## 2022-11-29 PROCEDURE — 1159F PR MEDICATION LIST DOCUMENTED IN MEDICAL RECORD: ICD-10-PCS | Mod: CPTII,S$GLB,, | Performed by: INTERNAL MEDICINE

## 2022-11-29 PROCEDURE — 99214 PR OFFICE/OUTPT VISIT, EST, LEVL IV, 30-39 MIN: ICD-10-PCS | Mod: S$GLB,,, | Performed by: INTERNAL MEDICINE

## 2022-11-29 PROCEDURE — 1111F PR DISCHARGE MEDS RECONCILED W/ CURRENT OUTPATIENT MED LIST: ICD-10-PCS | Mod: CPTII,S$GLB,, | Performed by: INTERNAL MEDICINE

## 2022-11-29 PROCEDURE — 1111F DSCHRG MED/CURRENT MED MERGE: CPT | Mod: CPTII,S$GLB,, | Performed by: INTERNAL MEDICINE

## 2022-11-29 PROCEDURE — 3074F PR MOST RECENT SYSTOLIC BLOOD PRESSURE < 130 MM HG: ICD-10-PCS | Mod: CPTII,S$GLB,, | Performed by: INTERNAL MEDICINE

## 2022-11-29 RX ORDER — ERTAPENEM 1 G/1
INJECTION, POWDER, LYOPHILIZED, FOR SOLUTION INTRAMUSCULAR; INTRAVENOUS
COMMUNITY
Start: 2022-11-22

## 2022-11-29 NOTE — PROGRESS NOTES
Hematology/Oncology  In Office Subsequent Encounter Note  11/29/22  Patient Name: James Jiang  MRN: [id number]  Hospital Length of Stay: 3 days  Code Status: Full Code   Primary Care Physician: Dr. Jcarlos Wilkinson MD  11/29/22  Subjective:     Chief Complaint: Wound Infection (Right hip surgery incision site)        History Present Illness:  61 y.o. male with history of right hip surgery in November 2021. He is admitted with a cellulitis or soft tissue infection involving the right hip sutures site.  While in the hospital he had a CT scan which showed a 4.2 cm renal mass suspicious for renal cancer he has been seen per Dr. Rodriguez of Urology.    The hip wound has healed, walks with a walker.  He is on Eliquis for PE.  Due for CTA 8/2022.  Likely will get CT of CAP and U/s of leg.    He has history of diabetes, ketoacidosis, gangrene of the toe, hypertension.  He has peripheral vascular disease.    Status post intramedullary henna placement at the right femur in November 7, 2021. Status post toe amputation at the right foot October 12, 2021.    He does not have allergies to medications.  He is a former smoker.  He does have an alcohol history but not presently.    He has L renal mass, and hydronephrosis 2nd to 6.5 mm stone; .to see Dr. Kellogg of .            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    Hypertension      Past Surgical History:   Procedure Laterality Date    denies pertinent surgical history      INTRAMEDULLARY RODDING OF FEMUR Right 11/7/2021    Procedure: INSERTION, INTRAMEDULLARY HENNA, FEMUR;  Surgeon: Matt Milian MD;  Location: [facility] OR;  Service: Orthopedics;  Laterality: Right;    TOE AMPUTATION Right 10/12/2021    Procedure: AMPUTATION, TOE;  Surgeon: Milton Lauren DPM;   "Location: [facility] OR;  Service: Podiatry;  Laterality: Right;       Allergies:  Review of patient's allergies indicates:  No Known Allergies    Social/Family History:  Social History     Socioeconomic History    Marital status:    Tobacco Use    Smoking status: Former Smoker    Smokeless tobacco: Never Used   Substance and Sexual Activity    Alcohol use: Not Currently    Drug use: Not Currently     Family History   Problem Relation Age of Onset    Hypertension Father        ROS:    GEN: normal without any fever, night sweats or weight loss  HEENT: normal with no HA's, sore throat, stiff neck, changes in vision  CV: normal with no CP, SOB, PND, MIRANDA or orthopnea  PULM: normal with no SOB, cough, hemoptysis, sputum or pleuritic pain  GI: normal with no abdominal pain, nausea, vomiting, constipation, diarrhea, melanotic stools, BRBPR, or hematemesis  : normal with no hematuria, dysuria  BREAST: normal with no mass, discharge, pain  SKIN:  See history of present illness        Objective:     Vitals:  Blood pressure 116/76, pulse 80, temperature 97.9 °F (36.6 °C), temperature source Oral, resp. rate 17, height 5' 8" (1.727 m), weight 91.9 kg (202 lb 9.6 oz), SpO2 96 %.    Physical Exam:  GEN: no apparent distress, comfortable, chronically ill in appearance.  HEAD: atraumatic and normocephalic  EYES: no pallor, no icterus  ENT:  no pharyngeal erythema, external ears WNL; no nasal discharge  NECK: no masses, thyroid normal, trachea midline, no LAD/LN's, supple  CV: RRR with no murmur; normal pulse; normal S1 and S2; no pedal edema  CHEST: Normal respiratory effort; CTAB; normal breath sounds; no wheeze or crackles  ABDOM: nontender and nondistended; soft  no rebound/guarding, liver and spleen are not palpable  MUSC/Skeletal: ROM normal; no crepitus; joints normal  EXTREM: no clubbing, cyanosis, inflammation or swelling  SKIN:  Ecchymosis on arms  NEURO: grossly intact; motor/sensory WNL;  no tremors  PSYCH: normal " mood, affect and behavior  LYMPH: normal cervical, supraclavicular, axillary LN's    Creatinine is 0.6, hemoglobin is 10.9,  platelet count is 209,000,   WBC 10,000  The right leg wound culture grew  MRSA    CT scan show multiple small pulmonary emboli, peripheral vascular disease is noted.  4.2 cm left renal mass is noted.  2/2022.    CT renal mass unchanged, hydronephrosis 2nd to 6.5 mm renal stone seen.  5/2022.      Assessment/Plan:     (1) 61 y.o. male with MRSA cellulitis at the left hip, wound has closed.    (2) multiple small pulmonary emboli bilaterally on CTA.  Currently on Eliquis 5 mg 1 BID.  Recheck CTA 8/2022, after renal function maintained and hydronephrosis managed.    (3) left renal mass suspicious for renal cancer.   To see Dr. Kellogg regards surgery?  Would have to interrupt Eliquis for surgery and cover with lovenox bridge.    (4) mild anemia secondary to chronic disease.    Return to clinic see me outpatient in 2 months.    He had a CAT scan of abdomin and pelvis. 5/2022.  It showed renal mass unchanged.  But he has unilateral hydronephrosis 2nd 6.5 mm stone.    New CT renal mass seen, phlegmon or abscess seen.  Dr. Rodriguez  evaluating.  He had I/D of area 11/2022.

## 2022-11-29 NOTE — PROGRESS NOTES
ED Navigator attempted to contact patient on 3 or more separate occasions, patient is unable to reach. ED Navigator to close encounter at this time.     Emily Dillon  ED Navigator

## 2022-12-08 ENCOUNTER — TELEPHONE (OUTPATIENT)
Dept: SURGERY | Facility: CLINIC | Age: 62
End: 2022-12-08

## 2022-12-08 ENCOUNTER — TELEPHONE (OUTPATIENT)
Dept: INFECTIOUS DISEASES | Facility: CLINIC | Age: 62
End: 2022-12-08

## 2022-12-08 DIAGNOSIS — L02.211 CUTANEOUS ABSCESS OF ABDOMINAL WALL: Primary | ICD-10-CM

## 2022-12-08 DIAGNOSIS — A41.01 SEPSIS DUE TO STAPHYLOCOCCUS AUREUS: ICD-10-CM

## 2022-12-08 NOTE — TELEPHONE ENCOUNTER
--> Spoke to Clau, advised end of care was 12/4, and to stop ertapenem IV.  Asked her to bring the patient to the lab to obtain stool sample for C diff. She also states patient is having dark urine  Orders for UA with reflex to culture and blood work in.         Patient's care giver Ms Olguin called [phone]    Patient is having diarrhea approximately 12 times a day    Small liquid amounts     He tried imodium with no help     I advised Ms Olguin to have patient stop and not  Take imodium ; stay away from dairy products , and  Drink water ( to avoid dehydration) until they hear   Back from us.    Please Advise     ANTONIETA BECKMAN   12/08/22

## 2022-12-13 ENCOUNTER — OFFICE VISIT (OUTPATIENT)
Dept: INFECTIOUS DISEASES | Facility: CLINIC | Age: 62
End: 2022-12-13
Payer: MEDICAID

## 2022-12-13 ENCOUNTER — TELEPHONE (OUTPATIENT)
Dept: UROLOGY | Facility: CLINIC | Age: 62
End: 2022-12-13

## 2022-12-13 VITALS
OXYGEN SATURATION: 95 % | HEART RATE: 78 BPM | SYSTOLIC BLOOD PRESSURE: 132 MMHG | TEMPERATURE: 98 F | DIASTOLIC BLOOD PRESSURE: 74 MMHG | BODY MASS INDEX: 33.9 KG/M2 | WEIGHT: 216 LBS | HEIGHT: 67 IN

## 2022-12-13 DIAGNOSIS — Z16.12 ESBL (EXTENDED SPECTRUM BETA-LACTAMASE) PRODUCING BACTERIA INFECTION: ICD-10-CM

## 2022-12-13 DIAGNOSIS — A49.9 ESBL (EXTENDED SPECTRUM BETA-LACTAMASE) PRODUCING BACTERIA INFECTION: ICD-10-CM

## 2022-12-13 DIAGNOSIS — E11.65 TYPE 2 DIABETES MELLITUS WITH HYPERGLYCEMIA, UNSPECIFIED WHETHER LONG TERM INSULIN USE: Primary | ICD-10-CM

## 2022-12-13 DIAGNOSIS — A41.01 SEPSIS DUE TO STAPHYLOCOCCUS AUREUS: ICD-10-CM

## 2022-12-13 DIAGNOSIS — L02.211 CUTANEOUS ABSCESS OF ABDOMINAL WALL: ICD-10-CM

## 2022-12-13 PROCEDURE — 1159F PR MEDICATION LIST DOCUMENTED IN MEDICAL RECORD: ICD-10-PCS | Mod: CPTII,S$GLB,, | Performed by: STUDENT IN AN ORGANIZED HEALTH CARE EDUCATION/TRAINING PROGRAM

## 2022-12-13 PROCEDURE — 3046F HEMOGLOBIN A1C LEVEL >9.0%: CPT | Mod: CPTII,S$GLB,, | Performed by: STUDENT IN AN ORGANIZED HEALTH CARE EDUCATION/TRAINING PROGRAM

## 2022-12-13 PROCEDURE — 3078F PR MOST RECENT DIASTOLIC BLOOD PRESSURE < 80 MM HG: ICD-10-PCS | Mod: CPTII,S$GLB,, | Performed by: STUDENT IN AN ORGANIZED HEALTH CARE EDUCATION/TRAINING PROGRAM

## 2022-12-13 PROCEDURE — 99214 PR OFFICE/OUTPT VISIT, EST, LEVL IV, 30-39 MIN: ICD-10-PCS | Mod: S$GLB,,, | Performed by: STUDENT IN AN ORGANIZED HEALTH CARE EDUCATION/TRAINING PROGRAM

## 2022-12-13 PROCEDURE — 3075F PR MOST RECENT SYSTOLIC BLOOD PRESS GE 130-139MM HG: ICD-10-PCS | Mod: CPTII,S$GLB,, | Performed by: STUDENT IN AN ORGANIZED HEALTH CARE EDUCATION/TRAINING PROGRAM

## 2022-12-13 PROCEDURE — 1159F MED LIST DOCD IN RCRD: CPT | Mod: CPTII,S$GLB,, | Performed by: STUDENT IN AN ORGANIZED HEALTH CARE EDUCATION/TRAINING PROGRAM

## 2022-12-13 PROCEDURE — 3008F PR BODY MASS INDEX (BMI) DOCUMENTED: ICD-10-PCS | Mod: CPTII,S$GLB,, | Performed by: STUDENT IN AN ORGANIZED HEALTH CARE EDUCATION/TRAINING PROGRAM

## 2022-12-13 PROCEDURE — 3008F BODY MASS INDEX DOCD: CPT | Mod: CPTII,S$GLB,, | Performed by: STUDENT IN AN ORGANIZED HEALTH CARE EDUCATION/TRAINING PROGRAM

## 2022-12-13 PROCEDURE — 1111F PR DISCHARGE MEDS RECONCILED W/ CURRENT OUTPATIENT MED LIST: ICD-10-PCS | Mod: CPTII,S$GLB,, | Performed by: STUDENT IN AN ORGANIZED HEALTH CARE EDUCATION/TRAINING PROGRAM

## 2022-12-13 PROCEDURE — 3046F PR MOST RECENT HEMOGLOBIN A1C LEVEL > 9.0%: ICD-10-PCS | Mod: CPTII,S$GLB,, | Performed by: STUDENT IN AN ORGANIZED HEALTH CARE EDUCATION/TRAINING PROGRAM

## 2022-12-13 PROCEDURE — 1111F DSCHRG MED/CURRENT MED MERGE: CPT | Mod: CPTII,S$GLB,, | Performed by: STUDENT IN AN ORGANIZED HEALTH CARE EDUCATION/TRAINING PROGRAM

## 2022-12-13 PROCEDURE — 3075F SYST BP GE 130 - 139MM HG: CPT | Mod: CPTII,S$GLB,, | Performed by: STUDENT IN AN ORGANIZED HEALTH CARE EDUCATION/TRAINING PROGRAM

## 2022-12-13 PROCEDURE — 99214 OFFICE O/P EST MOD 30 MIN: CPT | Mod: S$GLB,,, | Performed by: STUDENT IN AN ORGANIZED HEALTH CARE EDUCATION/TRAINING PROGRAM

## 2022-12-13 PROCEDURE — 3078F DIAST BP <80 MM HG: CPT | Mod: CPTII,S$GLB,, | Performed by: STUDENT IN AN ORGANIZED HEALTH CARE EDUCATION/TRAINING PROGRAM

## 2022-12-13 NOTE — TELEPHONE ENCOUNTER
----- Message from Joseph Tinajero Patient Care Assistant sent at 12/13/2022  2:19 PM CST -----  Contact: Pt Caregiver  Type: Needs Medical Advice    Who Called: Pt Fannie  Reese Call Back Number: [phone] (home) [phone]  Inquiry/Question: Caregiver is calling to schedule a nurse visit to have the pt's Catheter changed. Please advise. Thank you~

## 2022-12-13 NOTE — PROGRESS NOTES
Subjective: Hospital follow up       Patient ID: James Jiang is a 62 y.o. male.    Chief Complaint:: hospital follow up visit     HPI James Jiang is a 62 y.o. male., very pleasant, active heavy smoker, known to our service for prior admission Feb/2021 for R septic hip MRSA s/p washout he was sent home on hospice, he has past medical history of diabetes, HTN, chronic indwelling Donis secondary to urinary retention, L ureteral stone and L renal mass follows with Dr Rodriguez/Urology outpatient last Donis exchange 10/12/22.  Seen by Heme/onc recently, recommended Urology follow up outpatient.     Last seen at [facility] for sepsis secondary to lower abdominal abscess s/p I&D by Surgery 11/20. MRSA and ESBL E coli isolated. He was sent home on Ertapenem IV and Zyvox PO .     Patient is here for follow-up, roommate present.  He is in a wheelchair although able to stand up to be examined.  He states he is getting weekly wound care, will ask [facility] to see if they can do it at least twice a week.  He denies any constitutional symptoms, no fever or chills. Has Donis exchange tomorrow.     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    Hypertension      Past Surgical History:   Procedure Laterality Date    CYSTOURETEROSCOPY WITH RETROGRADE PYELOGRAPHY AND INSERTION OF STENT INTO URETER Left 7/29/2022    Procedure: CYSTOURETEROSCOPY, WITH RETROGRADE PYELOGRAM AND URETERAL STENT INSERTION;  Surgeon: Raegan Rodriguez MD;  Location: [facility];  Service: Urology;  Laterality: Left;    denies pertinent surgical history      INCISION AND DRAINAGE OF ABSCESS N/A 11/20/2022    Procedure: INCISION AND DRAINAGE, ABSCESS;  " Surgeon: Won García III, MD;  Location: [facility];  Service: General;  Laterality: N/A;  suprapubic    INTRAMEDULLARY RODDING OF FEMUR Right 11/7/2021    Procedure: INSERTION, INTRAMEDULLARY HENNA, FEMUR;  Surgeon: Matt Milian MD;  Location: [facility] OR;  Service: Orthopedics;  Laterality: Right;    LASER LITHOTRIPSY Left 7/29/2022    Procedure: LITHOTRIPSY, USING LASER;  Surgeon: Raegan Rodriguez MD;  Location: [facility] OR;  Service: Urology;  Laterality: Left;    TOE AMPUTATION Right 10/12/2021    Procedure: AMPUTATION, TOE;  Surgeon: Milton Lauren DPM;  Location: [facility] OR;  Service: Podiatry;  Laterality: Right;    URETEROSCOPIC REMOVAL OF URETERIC CALCULUS Left 7/29/2022    Procedure: REMOVAL, CALCULUS, URETER, URETEROSCOPIC;  Surgeon: Raegan Rodriguez MD;  Location: [facility] OR;  Service: Urology;  Laterality: Left;     Social History     Tobacco Use    Smoking status: Former    Smokeless tobacco: Never   Substance Use Topics    Alcohol use: Not Currently        Family History   Problem Relation Age of Onset    Hypertension Father          Review of Systems   Constitutional:  Negative for chills and fever.   HENT:  Negative for sinus pain.    Respiratory:  Negative for cough and shortness of breath.    Cardiovascular:  Negative for chest pain and leg swelling.   Gastrointestinal:  Positive for abdominal pain. Negative for diarrhea and nausea.   Genitourinary:         Chronic indwelling Donis   Musculoskeletal:  Positive for gait problem.   Skin:  Positive for wound.   Neurological:  Negative for headaches.         VAD: L Midline to be removed    Objective:      Blood pressure 132/74, pulse 78, temperature 98.1 °F (36.7 °C), height 5' 7" (1.702 m), weight 98 kg (216 lb), SpO2 95 %. Body mass index is 33.83 kg/m².  Physical Exam  Constitutional:       Appearance: Normal appearance. He is obese.   HENT:      Mouth/Throat:      Mouth: Mucous membranes are moist.      Pharynx: Oropharynx is clear.      " Comments: Edentulous   Eyes:      Extraocular Movements: Extraocular movements intact.      Pupils: Pupils are equal, round, and reactive to light.   Cardiovascular:      Rate and Rhythm: Normal rate and regular rhythm.      Pulses: Normal pulses.      Heart sounds: Normal heart sounds.   Pulmonary:      Effort: Pulmonary effort is normal.      Breath sounds: Normal breath sounds.   Abdominal:      General: Bowel sounds are normal.      Palpations: Abdomen is soft.      Tenderness: There is no abdominal tenderness. There is no rebound.      Comments: Lower abdominal wound with 2 orifices noted; no pus noted, packing and dressing changed   Genitourinary:     Comments: Donis in place  Musculoskeletal:         General: Normal range of motion.      Cervical back: Normal range of motion and neck supple.      Right lower leg: Edema present.      Left lower leg: Edema present.   Skin:     General: Skin is warm.      Capillary Refill: Capillary refill takes less than 2 seconds.   Neurological:      Mental Status: He is alert and oriented to person, place, and time. Mental status is at baseline.      Sensory: Sensory deficit present.      Motor: Weakness present.      Comments: Wheelchair, able to stand up for physical exam   Psychiatric:         Thought Content: Thought content normal.             VAD: L Midline removed       MICRO:  11/20 OR cultures:  Methicillin resistant Staphylococcus aureus       CULTURE, AEROBIC  (SPECIFY SOURCE)     Ceftriaxone 32 mcg/mL Resistant     Clindamycin >4 mcg/mL Resistant     Erythromycin >4 mcg/mL Resistant     Oxacillin >2 mcg/mL Resistant     Penicillin 8 mcg/mL Resistant     Tetracycline >8 mcg/mL Resistant     Trimeth/Sulfa <=0.5/9.5 m... Sensitive     Vancomycin 1 mcg/mL Sensitive      11/19: wound cultures at bedside  Methicillin resistant Staphylococcus aureus Klebsiella pneumoniae esbl       CULTURE, AEROBIC  (SPECIFY SOURCE) CULTURE, AEROBIC  (SPECIFY SOURCE)     Amp/Sulbactam    16/8 mcg/mL Intermediate     Ampicillin   >16 mcg/mL Resistant     Cefazolin   >16 mcg/mL Resistant     Cefepime   >16 mcg/mL Resistant     Ceftriaxone 32 mcg/mL Resistant >32 mcg/mL Resistant     Ciprofloxacin   <=1 mcg/mL Sensitive     Clindamycin >4 mcg/mL Resistant       Ertapenem   <=0.5 mcg/mL Sensitive     Erythromycin >4 mcg/mL Resistant       Gentamicin   <=4 mcg/mL Sensitive     Levofloxacin   <=2 mcg/mL Sensitive     Linezolid <=1 mcg/mL Sensitive       Meropenem   <=1 mcg/mL Sensitive     Oxacillin >2 mcg/mL Resistant       Penicillin 8 mcg/mL Resistant       Piperacillin/Tazo   <=16 mcg/mL Sensitive     Tetracycline >8 mcg/mL Resistant <=4 mcg/mL Sensitive     Tobramycin   <=4 mcg/mL Sensitive     Trimeth/Sulfa <=0.5/9.5 m... Sensitive >2/38 mcg/mL Resistant     Vancomycin 1 mcg/mL Sensitive            Recent Diagnostics:  CXR  CT abdomen/pelvis:   1. A 4.8 cm masslike area in the midline suprapubic subcutaneous fat, potentially localized phlegmon and or developing soft tissue abscess.  2. A 5.6 cm enhancing left renal mass is compatible renal cell carcinoma, and urology referral and tissue diagnosis are recommended.  3. Additional observations as described.        Assessment and Plan:          Lower abdominal cellulitis/abscess in addition to complicated UTI in the setting of chronic indwelling Donis s/p I&D by Surgery 11/20  Completed Ertapenem IV and Zyvox PO 12/8  Last CRP normal, results available on media   Wound care, will ask [facility] for twice a week dressing changes    2. Poorly controled diabetes, HbA1c: 12.5%  PCP referral     3. PMHx: PAD, AFib, HTN, COPD, depression/anxiety     4. Heavy smoker     5. 4 cm solid left renal mass compatible with primary renal neoplasm    Heme/onc outpatient    Urology follow up outpatient to address kidney mass        Cutaneous abscess of abdominal wall    Sepsis due to Staphylococcus aureus    ESBL (extended spectrum beta-lactamase) producing bacteria  infection          This note was created using Dragon voice recognition software that occasionally misinterpreted phrases or words.

## 2022-12-13 NOTE — TELEPHONE ENCOUNTER
Returned call and spoke with caregiver, states patient having trouble with catheter and would like it changed. Put on nurse visit for tomorrow, he verbally understood.

## 2022-12-14 ENCOUNTER — CLINICAL SUPPORT (OUTPATIENT)
Dept: UROLOGY | Facility: CLINIC | Age: 62
End: 2022-12-14
Payer: MEDICAID

## 2022-12-14 DIAGNOSIS — Z97.8 INDWELLING FOLEY CATHETER PRESENT: Primary | ICD-10-CM

## 2022-12-14 PROCEDURE — 99499 NO LOS: ICD-10-PCS | Mod: S$PBB,,, | Performed by: STUDENT IN AN ORGANIZED HEALTH CARE EDUCATION/TRAINING PROGRAM

## 2022-12-14 PROCEDURE — 99499 UNLISTED E&M SERVICE: CPT | Mod: S$PBB,,, | Performed by: STUDENT IN AN ORGANIZED HEALTH CARE EDUCATION/TRAINING PROGRAM

## 2022-12-14 NOTE — PROGRESS NOTES
Patient arrived to clinic to have catheter changed, two patient identifier done. Deflated 30 ml saline balloon, removed 20 fr blanco catheter without difficulty. Patient draped and prepped, lidocaine gel inserted. Inserted 20 ml coude blanco catheter without difficulty, patient tolerated well. Stat lock and  bag attached.

## 2022-12-19 LAB — FUNGUS SPEC CULT: NORMAL

## 2022-12-20 ENCOUNTER — OFFICE VISIT (OUTPATIENT)
Dept: SURGERY | Facility: CLINIC | Age: 62
End: 2022-12-20
Payer: MEDICAID

## 2022-12-20 DIAGNOSIS — L02.219 SUPRAPUBIC ABSCESS: Primary | ICD-10-CM

## 2022-12-20 PROCEDURE — 3046F HEMOGLOBIN A1C LEVEL >9.0%: CPT | Mod: CPTII,S$GLB,, | Performed by: SURGERY

## 2022-12-20 PROCEDURE — 99024 POSTOP FOLLOW-UP VISIT: CPT | Mod: S$GLB,,, | Performed by: SURGERY

## 2022-12-20 PROCEDURE — 1160F PR REVIEW ALL MEDS BY PRESCRIBER/CLIN PHARMACIST DOCUMENTED: ICD-10-PCS | Mod: CPTII,S$GLB,, | Performed by: SURGERY

## 2022-12-20 PROCEDURE — 99024 PR POST-OP FOLLOW-UP VISIT: ICD-10-PCS | Mod: S$GLB,,, | Performed by: SURGERY

## 2022-12-20 PROCEDURE — 3046F PR MOST RECENT HEMOGLOBIN A1C LEVEL > 9.0%: ICD-10-PCS | Mod: CPTII,S$GLB,, | Performed by: SURGERY

## 2022-12-20 PROCEDURE — 1159F PR MEDICATION LIST DOCUMENTED IN MEDICAL RECORD: ICD-10-PCS | Mod: CPTII,S$GLB,, | Performed by: SURGERY

## 2022-12-20 PROCEDURE — 1160F RVW MEDS BY RX/DR IN RCRD: CPT | Mod: CPTII,S$GLB,, | Performed by: SURGERY

## 2022-12-20 PROCEDURE — 1159F MED LIST DOCD IN RCRD: CPT | Mod: CPTII,S$GLB,, | Performed by: SURGERY

## 2022-12-22 NOTE — PROGRESS NOTES
Subjective:       Patient ID: James Jiang is a 62 y.o. male.    Chief Complaint:  Postop incision and drainage suprapubic abscess    HPI:  Patient is status post incision drainage of large suprapubic abscess.  The wound is being packed with iodoform.  He states he is feeling much better.  No fevers or chills.    Review of Systems    Objective:      Physical Exam  Pulmonary:      Effort: Pulmonary effort is normal. No respiratory distress.   Abdominal:          Comments: Small wound still open.  It is packed with iodoform.  No surrounding cellulitis.  No tenderness.  Wound depth still about 1.5 to 2 cm.   Neurological:      Mental Status: He is alert and oriented to person, place, and time.       Assessment/Plan:   Suprapubic abscess      Status post incision drainage.  Wound repacked today.  Continue to pack wound until healed.  Follow up 2- 3 weeks

## 2022-12-28 ENCOUNTER — TELEPHONE (OUTPATIENT)
Dept: SURGERY | Facility: CLINIC | Age: 62
End: 2022-12-28

## 2022-12-28 NOTE — TELEPHONE ENCOUNTER
----- Message from Mis Almaraz sent at 12/28/2022  4:22 PM CST -----  Regarding: information  Name of Who is Calling: [facility]           What is the request in detail: second request for records for patients , any labs,xray progress notes also           Can the clinic reply by [facility]: no           What Number to Call Back if not [facility]:  [phone] fax  [phone] phone

## 2023-01-03 ENCOUNTER — OFFICE VISIT (OUTPATIENT)
Dept: INFECTIOUS DISEASES | Facility: CLINIC | Age: 63
End: 2023-01-03
Payer: MEDICAID

## 2023-01-03 VITALS
DIASTOLIC BLOOD PRESSURE: 64 MMHG | HEIGHT: 67 IN | SYSTOLIC BLOOD PRESSURE: 130 MMHG | OXYGEN SATURATION: 95 % | TEMPERATURE: 97 F | BODY MASS INDEX: 33.83 KG/M2 | HEART RATE: 82 BPM

## 2023-01-03 DIAGNOSIS — L98.9 SKIN LESION OF SCALP: Primary | ICD-10-CM

## 2023-01-03 DIAGNOSIS — T83.511D URINARY TRACT INFECTION ASSOCIATED WITH INDWELLING URETHRAL CATHETER, SUBSEQUENT ENCOUNTER: ICD-10-CM

## 2023-01-03 DIAGNOSIS — N39.0 URINARY TRACT INFECTION ASSOCIATED WITH INDWELLING URETHRAL CATHETER, SUBSEQUENT ENCOUNTER: ICD-10-CM

## 2023-01-03 PROCEDURE — 3075F SYST BP GE 130 - 139MM HG: CPT | Mod: CPTII,S$GLB,, | Performed by: STUDENT IN AN ORGANIZED HEALTH CARE EDUCATION/TRAINING PROGRAM

## 2023-01-03 PROCEDURE — 3008F PR BODY MASS INDEX (BMI) DOCUMENTED: ICD-10-PCS | Mod: CPTII,S$GLB,, | Performed by: STUDENT IN AN ORGANIZED HEALTH CARE EDUCATION/TRAINING PROGRAM

## 2023-01-03 PROCEDURE — 1159F MED LIST DOCD IN RCRD: CPT | Mod: CPTII,S$GLB,, | Performed by: STUDENT IN AN ORGANIZED HEALTH CARE EDUCATION/TRAINING PROGRAM

## 2023-01-03 PROCEDURE — 1159F PR MEDICATION LIST DOCUMENTED IN MEDICAL RECORD: ICD-10-PCS | Mod: CPTII,S$GLB,, | Performed by: STUDENT IN AN ORGANIZED HEALTH CARE EDUCATION/TRAINING PROGRAM

## 2023-01-03 PROCEDURE — 3078F PR MOST RECENT DIASTOLIC BLOOD PRESSURE < 80 MM HG: ICD-10-PCS | Mod: CPTII,S$GLB,, | Performed by: STUDENT IN AN ORGANIZED HEALTH CARE EDUCATION/TRAINING PROGRAM

## 2023-01-03 PROCEDURE — 99214 PR OFFICE/OUTPT VISIT, EST, LEVL IV, 30-39 MIN: ICD-10-PCS | Mod: S$GLB,,, | Performed by: STUDENT IN AN ORGANIZED HEALTH CARE EDUCATION/TRAINING PROGRAM

## 2023-01-03 PROCEDURE — 3075F PR MOST RECENT SYSTOLIC BLOOD PRESS GE 130-139MM HG: ICD-10-PCS | Mod: CPTII,S$GLB,, | Performed by: STUDENT IN AN ORGANIZED HEALTH CARE EDUCATION/TRAINING PROGRAM

## 2023-01-03 PROCEDURE — 99214 OFFICE O/P EST MOD 30 MIN: CPT | Mod: S$GLB,,, | Performed by: STUDENT IN AN ORGANIZED HEALTH CARE EDUCATION/TRAINING PROGRAM

## 2023-01-03 PROCEDURE — 3008F BODY MASS INDEX DOCD: CPT | Mod: CPTII,S$GLB,, | Performed by: STUDENT IN AN ORGANIZED HEALTH CARE EDUCATION/TRAINING PROGRAM

## 2023-01-03 PROCEDURE — 3078F DIAST BP <80 MM HG: CPT | Mod: CPTII,S$GLB,, | Performed by: STUDENT IN AN ORGANIZED HEALTH CARE EDUCATION/TRAINING PROGRAM

## 2023-01-03 RX ORDER — KETOCONAZOLE 20 MG/G
CREAM TOPICAL DAILY
Qty: 30 G | Refills: 2 | Status: SHIPPED | OUTPATIENT
Start: 2023-01-03 | End: 2023-02-02

## 2023-01-03 RX ORDER — LINEZOLID 600 MG/1
600 TABLET, FILM COATED ORAL EVERY 12 HOURS
Qty: 14 TABLET | Refills: 0 | Status: SHIPPED | OUTPATIENT
Start: 2023-01-03 | End: 2023-01-10

## 2023-01-03 NOTE — PATIENT INSTRUCTIONS
UA and culture to be collected next urology visit   Linezolid 600mg PO twice a day for 7 days   PCP follow up next week   Dermatology referral for scalp lesion

## 2023-01-04 ENCOUNTER — TELEPHONE (OUTPATIENT)
Dept: INFECTIOUS DISEASES | Facility: CLINIC | Age: 63
End: 2023-01-04

## 2023-01-04 LAB
ACID FAST MOD KINY STN SPEC: NORMAL
MYCOBACTERIUM SPEC QL CULT: NORMAL

## 2023-01-04 NOTE — PROGRESS NOTES
Subjective: Hospital follow up       Patient ID: James Jiang is a 62 y.o. male.    Chief Complaint:: Follow-up    HPI James Jiang is a 62 y.o. male., very pleasant, active heavy smoker, known to our service for prior admission Feb/2021 for R septic hip MRSA s/p washout he was sent home on hospice, he has past medical history of diabetes, HTN, chronic indwelling Donis secondary to urinary retention, L ureteral stone and L renal mass follows with Dr Rodriguez/Urology outpatient last Donis exchange 10/12/22.  Seen by Heme/onc recently, recommended Urology follow up outpatient.     Last seen at [facility] for sepsis secondary to lower abdominal abscess s/p I&D by Surgery 11/20. MRSA and ESBL E coli isolated. He was sent home on Ertapenem IV and Zyvox PO .     Patient is here for follow-up, roommate present.  He is in a wheelchair although able to stand up to be examined.  He states he is getting weekly wound care, will ask [facility] to see if they can do it at least twice a week.  He denies any constitutional symptoms, no fever or chills. Has Donis exchange tomorrow.     1/3/23:  Interim reviewed, patient is here for follow-up.  Roommate present.  He is complaining of some dysuria, states it burns when he is sitting for prolonged time of time on the couch.  He feels like he might have a UTI.  Plan to exchange Donis next week at urology clinic.  He denies any fever chills, nausea or vomit.  As per roommate, he is getting weekly wound care, 1 of the wounds has healed, and the L1 no longer is requiring packing.  Patient noted to have rash on his face, suggestive of seborrheic dermatitis.  Impressive big lesion on top of the scalp noted, patient states it was biopsied about 3 years ago but because of COVID he was not able to follow-up with Dermatology.  Will refer for skin biopsy.    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    Hypertension      Past Surgical History:   Procedure Laterality Date    CYSTOURETEROSCOPY WITH RETROGRADE PYELOGRAPHY AND INSERTION OF STENT INTO URETER Left 7/29/2022    Procedure: CYSTOURETEROSCOPY, WITH RETROGRADE PYELOGRAM AND URETERAL STENT INSERTION;  Surgeon: Raegan Rodriguez MD;  Location: [facility];  Service: Urology;  Laterality: Left;    denies pertinent surgical history      INCISION AND DRAINAGE OF ABSCESS N/A 11/20/2022    Procedure: INCISION AND DRAINAGE, ABSCESS;  Surgeon: Won García III, MD;  Location: [facility];  Service: General;  Laterality: N/A;  suprapubic    INTRAMEDULLARY RODDING OF FEMUR Right 11/7/2021    Procedure: INSERTION, INTRAMEDULLARY HENNA, FEMUR;  Surgeon: Matt Milian MD;  Location: [facility];  Service: Orthopedics;  Laterality: Right;    LASER LITHOTRIPSY Left 7/29/2022    Procedure: LITHOTRIPSY, USING LASER;  Surgeon: Raegan Rodriguez MD;  Location: [facility];  Service: Urology;  Laterality: Left;    TOE AMPUTATION Right 10/12/2021    Procedure: AMPUTATION, TOE;  Surgeon: Milton Lauren DPM;  Location: [facility];  Service: Podiatry;  Laterality: Right;    URETEROSCOPIC REMOVAL OF URETERIC CALCULUS Left 7/29/2022    Procedure: REMOVAL, CALCULUS, URETER, URETEROSCOPIC;  Surgeon: Raegan Rodriguez MD;  Location: [facility];  Service: Urology;  Laterality: Left;     Social History     Tobacco Use    Smoking status: Every Day     Packs/day: 0.50     Types: Cigarettes    Smokeless tobacco: Never   Substance Use Topics    Alcohol use: Not Currently        Family History   Problem Relation Age of Onset    Hypertension Father          Review of Systems   Constitutional:  Negative for chills and fever.   HENT:  Negative for sinus pain.    Respiratory:   "Negative for cough and shortness of breath.    Cardiovascular:  Negative for chest pain and leg swelling.   Gastrointestinal:  Negative for abdominal pain, diarrhea and nausea.   Genitourinary:         Chronic indwelling Donis   Musculoskeletal:  Positive for gait problem.   Skin:  Positive for wound.   Neurological:  Negative for headaches.         VAD: L Midline to be removed    Objective:      Blood pressure 130/64, pulse 82, temperature 97.3 °F (36.3 °C), height 5' 7" (1.702 m), SpO2 95 %. Body mass index is 33.83 kg/m².  Physical Exam  Constitutional:       Appearance: Normal appearance. He is obese.   HENT:      Mouth/Throat:      Mouth: Mucous membranes are moist.      Pharynx: Oropharynx is clear.      Comments: Edentulous   Eyes:      Extraocular Movements: Extraocular movements intact.      Pupils: Pupils are equal, round, and reactive to light.   Cardiovascular:      Rate and Rhythm: Normal rate and regular rhythm.      Pulses: Normal pulses.      Heart sounds: Normal heart sounds.   Pulmonary:      Effort: Pulmonary effort is normal.      Breath sounds: Normal breath sounds.   Abdominal:      General: Bowel sounds are normal.      Palpations: Abdomen is soft.      Tenderness: There is no abdominal tenderness. There is no rebound.      Comments: Lower abdominal wound with1 wound healed, other wound with dressing in place   Genitourinary:     Comments: Donis in place  Musculoskeletal:         General: Normal range of motion.      Cervical back: Normal range of motion and neck supple.      Right lower leg: Edema present.      Left lower leg: Edema present.   Skin:     General: Skin is warm.      Capillary Refill: Capillary refill takes less than 2 seconds.   Neurological:      Mental Status: He is alert and oriented to person, place, and time. Mental status is at baseline.      Sensory: Sensory deficit present.      Motor: Weakness present.      Comments: Wheelchair, able to stand up for physical exam "   Psychiatric:         Thought Content: Thought content normal.             VAD: L Midline removed       MICRO:  Urine cultures 9/7 - 11/15-11/18 ESBL Klebsiella pneumoniae  Urine culture 8/26 MRSA     11/20 OR cultures:  Methicillin resistant Staphylococcus aureus       CULTURE, AEROBIC  (SPECIFY SOURCE)     Ceftriaxone 32 mcg/mL Resistant     Clindamycin >4 mcg/mL Resistant     Erythromycin >4 mcg/mL Resistant     Oxacillin >2 mcg/mL Resistant     Penicillin 8 mcg/mL Resistant     Tetracycline >8 mcg/mL Resistant     Trimeth/Sulfa <=0.5/9.5 m... Sensitive     Vancomycin 1 mcg/mL Sensitive      11/19: wound cultures at bedside  Methicillin resistant Staphylococcus aureus Klebsiella pneumoniae esbl       CULTURE, AEROBIC  (SPECIFY SOURCE) CULTURE, AEROBIC  (SPECIFY SOURCE)     Amp/Sulbactam   16/8 mcg/mL Intermediate     Ampicillin   >16 mcg/mL Resistant     Cefazolin   >16 mcg/mL Resistant     Cefepime   >16 mcg/mL Resistant     Ceftriaxone 32 mcg/mL Resistant >32 mcg/mL Resistant     Ciprofloxacin   <=1 mcg/mL Sensitive     Clindamycin >4 mcg/mL Resistant       Ertapenem   <=0.5 mcg/mL Sensitive     Erythromycin >4 mcg/mL Resistant       Gentamicin   <=4 mcg/mL Sensitive     Levofloxacin   <=2 mcg/mL Sensitive     Linezolid <=1 mcg/mL Sensitive       Meropenem   <=1 mcg/mL Sensitive     Oxacillin >2 mcg/mL Resistant       Penicillin 8 mcg/mL Resistant       Piperacillin/Tazo   <=16 mcg/mL Sensitive     Tetracycline >8 mcg/mL Resistant <=4 mcg/mL Sensitive     Tobramycin   <=4 mcg/mL Sensitive     Trimeth/Sulfa <=0.5/9.5 m... Sensitive >2/38 mcg/mL Resistant     Vancomycin 1 mcg/mL Sensitive            Recent Diagnostics:  CXR  CT abdomen/pelvis:   1. A 4.8 cm masslike area in the midline suprapubic subcutaneous fat, potentially localized phlegmon and or developing soft tissue abscess.  2. A 5.6 cm enhancing left renal mass is compatible renal cell carcinoma, and urology referral and tissue diagnosis are  recommended.  3. Additional observations as described.        Assessment and Plan:          H/o lower abdominal cellulitis/abscess in addition to complicated UTI in the setting of chronic indwelling Donis s/p I&D by Surgery 11/20, improved  Completed Ertapenem IV and Zyvox PO 12/8  Last CRP normal, results available on media   UA with reflex to culture ordered to be performed at Urology clinic next week  Will check culture and call patient with results, he agrees to IV antibiotics if needed  Zyvox 600mg PO twice a day for 7-10 days to cover prior MRSA     2. Poorly controled diabetes, HbA1c: 12.5%  PCP referral     3. PMHx: PAD, AFib, HTN, COPD, depression/anxiety     4. Heavy smoker     5. 4 cm solid left renal mass compatible with primary renal neoplasm    Heme/onc outpatient    Urology follow up outpatient to address kidney mass    6. Seborrheic dermatitis/large skin lesion on scalp   Ketoconazole cream to affected areas  Dermatology referral for skin biopsy and further management        Skin lesion of scalp  -     Ambulatory referral/consult to Dermatology; Future; Expected date: 01/10/2023  -     ketoconazole (NIZORAL) 2 % cream; Apply topically once daily.  Dispense: 30 g; Refill: 2    Urinary tract infection associated with indwelling urethral catheter, subsequent encounter  -     Urinalysis; Future; Expected date: 01/10/2023  -     Urine Culture High Risk; Future; Expected date: 01/10/2023  -     linezolid (ZYVOX) 600 mg Tab; Take 1 tablet (600 mg total) by mouth every 12 (twelve) hours. for 7 days  Dispense: 14 tablet; Refill: 0        This note was created using Dragon voice recognition software that occasionally misinterpreted phrases or words.

## 2023-01-04 NOTE — TELEPHONE ENCOUNTER
Dermatology referral: out-of-network insurance; spoke with patient; states he's established with [facility] and will talk with PCP about referral.

## 2023-01-06 ENCOUNTER — TELEPHONE (OUTPATIENT)
Dept: UROLOGY | Facility: CLINIC | Age: 63
End: 2023-01-06

## 2023-01-06 NOTE — TELEPHONE ENCOUNTER
Returned call she states the patient catheter is leaking around the penis and having some burning. Patient not on any med for bladder spasms. Advised to use OTC Azo and if no improvement over the weekend, to contact the office on Monday and have the catheter changed, she verbally understood.

## 2023-01-06 NOTE — TELEPHONE ENCOUNTER
----- Message from Fátima Garrett sent at 1/6/2023  7:36 AM CST -----  Contact: Wife  Type: Needs Nurse Visit    Who Called: Wife  Best Call Back Number: [phone]    Inquiry/Question: pt  states that his cath is leaking and would like to come in so someone can check it     Thank you~

## 2023-01-09 ENCOUNTER — TELEPHONE (OUTPATIENT)
Dept: UROLOGY | Facility: CLINIC | Age: 63
End: 2023-01-09

## 2023-01-09 NOTE — TELEPHONE ENCOUNTER
----- Message from Lyndsey Nichols sent at 1/9/2023 10:19 AM CST -----  Regarding: Needs to schedule  Type: Needs Medical Advice  Who Called:  Clau / Caregiver    Best Call Back Number: [phone]  Additional Information: Needs to schedule nurse visit for THIS Friday to change catheter Please call to advise

## 2023-01-13 ENCOUNTER — CLINICAL SUPPORT (OUTPATIENT)
Dept: UROLOGY | Facility: CLINIC | Age: 63
End: 2023-01-13
Payer: MEDICAID

## 2023-01-13 DIAGNOSIS — Z97.8 INDWELLING FOLEY CATHETER PRESENT: Primary | ICD-10-CM

## 2023-01-13 PROCEDURE — 99499 NO LOS: ICD-10-PCS | Mod: S$PBB,,, | Performed by: STUDENT IN AN ORGANIZED HEALTH CARE EDUCATION/TRAINING PROGRAM

## 2023-01-13 PROCEDURE — 51702 PR INSERTION OF TEMPORARY INDWELLING BLADDER CATHETER, SIMPLE: ICD-10-PCS | Mod: S$PBB,,, | Performed by: STUDENT IN AN ORGANIZED HEALTH CARE EDUCATION/TRAINING PROGRAM

## 2023-01-13 PROCEDURE — 99499 UNLISTED E&M SERVICE: CPT | Mod: S$PBB,,, | Performed by: STUDENT IN AN ORGANIZED HEALTH CARE EDUCATION/TRAINING PROGRAM

## 2023-01-13 PROCEDURE — 51702 INSERT TEMP BLADDER CATH: CPT | Mod: PBBFAC,PN

## 2023-01-13 PROCEDURE — 51702 INSERT TEMP BLADDER CATH: CPT | Mod: S$PBB,,, | Performed by: STUDENT IN AN ORGANIZED HEALTH CARE EDUCATION/TRAINING PROGRAM

## 2023-01-13 NOTE — PROGRESS NOTES
Patient arrived to clinic to have catheter changed, two patient identifier done,  assisted to exam table. Deflated 20 ml saline balloon and removed 20 fr blanco catheter. Patient draped and prepped inserted 20 fr blanco, coude catheter without difficulty, yellow urine returned to urinal. Stat lock and  bag attached. Patient states he is having more frequent bladder spasms. Informed will give message to provider for advisement.

## 2023-01-13 NOTE — PROGRESS NOTES
Can go to lab to drop off urine culture since blanco just changed yesterday.   Also, I am not even sure why he has this catheter and I have recommended to him to have a voiding trial which he has refused. Bladder spasms are a effect of having catheter in place.

## 2023-01-17 ENCOUNTER — TELEPHONE (OUTPATIENT)
Dept: UROLOGY | Facility: CLINIC | Age: 63
End: 2023-01-17

## 2023-01-17 NOTE — TELEPHONE ENCOUNTER
"Spoke with patient, he states he is still having the bladder spasms when he sits in his recliner and stands up. Asked is he interested in voiding trial and trying to do without it, states" No,  I can't do without it ". Wants to ask the provider if he goes down in size with the catheter, from the 20 fr back to the 16 fr would that help the spasms. He remembered he had the larger catheter because of  it being clogged by debris, and says he no longer sees the debris.  Informed will give message to provider and call him back with advisement. Patient verbally understood.  "

## 2023-01-17 NOTE — TELEPHONE ENCOUNTER
----- Message from Oma Carver sent at 1/17/2023 12:31 PM CST -----  Contact: Caregiver  Type:  Patient Returning Call    Who Called: Caregiver     Who Left Message for Patient Lin     Does the patient know what this is regarding?: yes     Would the patient rather a call back or a response via [facility]? Call     Best Call Back Number: [phone]    Additional Information:

## 2023-01-17 NOTE — TELEPHONE ENCOUNTER
----- Message from Nneka Sevilla sent at 1/17/2023  9:55 AM CST -----  Contact: Self  Type:  Patient Returning Call    Who Called:  Patient  Who Left Message for Patient:  N/A-needs to speak to Dr Rodriguez's nurse  Does the patient know what this is regarding?:  having trouble with his catheter  Best Call Back Number:  [phone]  Additional Information:  Thank You

## 2023-01-18 ENCOUNTER — TELEPHONE (OUTPATIENT)
Dept: UROLOGY | Facility: CLINIC | Age: 63
End: 2023-01-18

## 2023-01-18 NOTE — TELEPHONE ENCOUNTER
Spoke with patient, advisement given, patient still states he cannot do without the catheter, he will come in and do urine sample. Placed on nurse visit for tomorrow morning to do sterile urine collection from catheter, patient verbally understood.

## 2023-01-18 NOTE — TELEPHONE ENCOUNTER
----- Message from Wilfredo Meraz sent at 1/18/2023 12:09 PM CST -----      Name of Who is Calling:PT          What is the request in detail:PT/caregiver is requesting a call back to discuss the ongoing issue with PT francis, she states she has been reaching out and has not heard anything from the office. Please be Advised!          Can the clinic reply by [facility]:no          What Number to Call Back if not in [phone]

## 2023-01-19 ENCOUNTER — CLINICAL SUPPORT (OUTPATIENT)
Dept: UROLOGY | Facility: CLINIC | Age: 63
End: 2023-01-19
Payer: MEDICAID

## 2023-01-19 DIAGNOSIS — R30.0 DYSURIA: Primary | ICD-10-CM

## 2023-01-19 LAB
BACTERIA #/AREA URNS HPF: ABNORMAL /HPF
BILIRUB UR QL STRIP: NEGATIVE
CLARITY UR: CLEAR
COLOR UR: ABNORMAL
GLUCOSE UR QL STRIP: ABNORMAL
HGB UR QL STRIP: NEGATIVE
KETONES UR QL STRIP: NEGATIVE
LEUKOCYTE ESTERASE UR QL STRIP: NEGATIVE
MICROSCOPIC COMMENT: ABNORMAL
NITRITE UR QL STRIP: NEGATIVE
PH UR STRIP: 6 [PH] (ref 5–8)
PROT UR QL STRIP: NEGATIVE
RBC #/AREA URNS HPF: >100 /HPF (ref 0–4)
SP GR UR STRIP: 1.01 (ref 1–1.03)
URN SPEC COLLECT METH UR: ABNORMAL
UROBILINOGEN UR STRIP-ACNC: NEGATIVE EU/DL
WBC #/AREA URNS HPF: 5 /HPF (ref 0–5)
YEAST URNS QL MICRO: ABNORMAL

## 2023-01-19 PROCEDURE — 87077 CULTURE AEROBIC IDENTIFY: CPT | Performed by: STUDENT IN AN ORGANIZED HEALTH CARE EDUCATION/TRAINING PROGRAM

## 2023-01-19 PROCEDURE — 99499 UNLISTED E&M SERVICE: CPT | Mod: S$PBB,,, | Performed by: STUDENT IN AN ORGANIZED HEALTH CARE EDUCATION/TRAINING PROGRAM

## 2023-01-19 PROCEDURE — 87086 URINE CULTURE/COLONY COUNT: CPT | Performed by: STUDENT IN AN ORGANIZED HEALTH CARE EDUCATION/TRAINING PROGRAM

## 2023-01-19 PROCEDURE — 87186 SC STD MICRODIL/AGAR DIL: CPT | Performed by: STUDENT IN AN ORGANIZED HEALTH CARE EDUCATION/TRAINING PROGRAM

## 2023-01-19 PROCEDURE — 81000 URINALYSIS NONAUTO W/SCOPE: CPT | Performed by: STUDENT IN AN ORGANIZED HEALTH CARE EDUCATION/TRAINING PROGRAM

## 2023-01-19 PROCEDURE — 87088 URINE BACTERIA CULTURE: CPT | Performed by: STUDENT IN AN ORGANIZED HEALTH CARE EDUCATION/TRAINING PROGRAM

## 2023-01-19 PROCEDURE — 99499 NO LOS: ICD-10-PCS | Mod: S$PBB,,, | Performed by: STUDENT IN AN ORGANIZED HEALTH CARE EDUCATION/TRAINING PROGRAM

## 2023-01-19 NOTE — PROGRESS NOTES
Patient arrived to clinic to give urine sample from catheter. Catheter clamped, orange colored color urine to specimen cup,30 ml ,specimen prepared for lab pick.

## 2023-01-20 ENCOUNTER — PATIENT MESSAGE (OUTPATIENT)
Dept: UROLOGY | Facility: CLINIC | Age: 63
End: 2023-01-20

## 2023-01-21 LAB — BACTERIA UR CULT: ABNORMAL

## 2023-01-23 RX ORDER — SULFAMETHOXAZOLE AND TRIMETHOPRIM 800; 160 MG/1; MG/1
1 TABLET ORAL 2 TIMES DAILY
Qty: 14 TABLET | Refills: 0 | Status: SHIPPED | OUTPATIENT
Start: 2023-01-23 | End: 2023-01-30

## 2023-01-31 ENCOUNTER — TELEPHONE (OUTPATIENT)
Dept: UROLOGY | Facility: CLINIC | Age: 63
End: 2023-01-31

## 2023-01-31 NOTE — TELEPHONE ENCOUNTER
Patient and friend returned call, informed provider is aware the bladder spasms are a norm because of patient having the catheter for so long. He can either wear briefs for when the spasms happen or do without the catheter. Patient states he will not do not having the catheter, he will use briefs, they verbally understood.

## 2023-01-31 NOTE — TELEPHONE ENCOUNTER
----- Message from Porsche Foley sent at 1/31/2023 11:39 AM CST -----  Regarding: Advice  Contact: Caregiver Clau Mac  Type: Needs Medical Advice  Who Called:  caregiver Clau Watts  Symptoms (please be specific):  Catheter  How long has patient had these symptoms:    Pharmacy name and phone #:    Best Call Back Number: [phone]    Additional Information: Clau states patient is still having trouble with his catheter. Please call Clau to advise. Thanks!

## 2023-02-01 ENCOUNTER — TELEPHONE (OUTPATIENT)
Dept: INFECTIOUS DISEASES | Facility: CLINIC | Age: 63
End: 2023-02-01

## 2023-02-01 NOTE — TELEPHONE ENCOUNTER
--> please send picture, thanks     Ms Olguin called  [phone]    Patient is c/o wound on stomach is sensitive    Denies fever   Denies warm to touch  Denies redness    No wound care for 2 weeks ;   was told by wound care that wound is closed     Requested a picture be sent via portal     Please advise    ANTONIETA BECKMAN  2/01/23

## 2023-02-08 ENCOUNTER — TELEPHONE (OUTPATIENT)
Dept: UROLOGY | Facility: CLINIC | Age: 63
End: 2023-02-08

## 2023-02-08 NOTE — TELEPHONE ENCOUNTER
Returned call, she stated the patient needed more stat locks, he is using the last one. Informed I will leave some at the  to be picked up today, she verbally understood.

## 2023-02-08 NOTE — TELEPHONE ENCOUNTER
----- Message from Aparna Storm sent at 2/8/2023 10:02 AM CST -----  Regarding: NURSE CALL BACK ASAP  Contact: Jasmyn  Type: Patient Call Back         Who called: Clau - Caregiver         What is the request in detail: requesting a call abck from nurse; states he is having trouble with his catheter          Best call back number: [phone] - Clau; [phone]         Additional Information: requesting call back asap           Thank You

## 2023-02-27 PROBLEM — J96.10 CHRONIC RESPIRATORY FAILURE: Status: RESOLVED | Noted: 2021-11-06 | Resolved: 2023-02-27

## 2023-05-23 ENCOUNTER — HOSPITAL ENCOUNTER (INPATIENT)
Facility: HOSPITAL | Age: 63
LOS: 2 days | Discharge: LEFT BEFORE DISCHARGE | DRG: 871 | End: 2023-05-25
Attending: EMERGENCY MEDICINE | Admitting: INTERNAL MEDICINE
Payer: MEDICARE

## 2023-05-23 DIAGNOSIS — R06.02 SHORTNESS OF BREATH: ICD-10-CM

## 2023-05-23 DIAGNOSIS — A41.9 SEPTIC SHOCK: Primary | ICD-10-CM

## 2023-05-23 DIAGNOSIS — G93.41 METABOLIC ENCEPHALOPATHY: ICD-10-CM

## 2023-05-23 DIAGNOSIS — R65.21 SEPTIC SHOCK: Primary | ICD-10-CM

## 2023-05-23 DIAGNOSIS — I25.10 ASCVD (ARTERIOSCLEROTIC CARDIOVASCULAR DISEASE): ICD-10-CM

## 2023-05-23 DIAGNOSIS — N12 PYELONEPHRITIS: ICD-10-CM

## 2023-05-23 DIAGNOSIS — I21.4 NON-ST ELEVATION MYOCARDIAL INFARCTION (NSTEMI): ICD-10-CM

## 2023-05-23 DIAGNOSIS — M79.606 LEG PAIN: ICD-10-CM

## 2023-05-23 DIAGNOSIS — R41.82 ALTERED MENTAL STATUS: ICD-10-CM

## 2023-05-23 LAB
ALBUMIN SERPL BCP-MCNC: 3.7 G/DL (ref 3.5–5.2)
ALLENS TEST: ABNORMAL
ALP SERPL-CCNC: 170 U/L (ref 55–135)
ALT SERPL W/O P-5'-P-CCNC: 21 U/L (ref 10–44)
ANION GAP SERPL CALC-SCNC: 15 MMOL/L (ref 8–16)
APTT PPP: 29.2 SEC (ref 21–32)
AST SERPL-CCNC: 42 U/L (ref 10–40)
BACTERIA #/AREA URNS HPF: ABNORMAL /HPF
BASOPHILS # BLD AUTO: 0.04 K/UL (ref 0–0.2)
BASOPHILS NFR BLD: 0.3 % (ref 0–1.9)
BILIRUB SERPL-MCNC: 1.4 MG/DL (ref 0.1–1)
BILIRUB UR QL STRIP: NEGATIVE
BNP SERPL-MCNC: 311 PG/ML (ref 0–99)
BUN SERPL-MCNC: 32 MG/DL (ref 8–23)
CALCIUM SERPL-MCNC: 9.5 MG/DL (ref 8.7–10.5)
CHLORIDE SERPL-SCNC: 96 MMOL/L (ref 95–110)
CLARITY UR: ABNORMAL
CO2 SERPL-SCNC: 24 MMOL/L (ref 23–29)
COLOR UR: ABNORMAL
CREAT SERPL-MCNC: 1.5 MG/DL (ref 0.5–1.4)
CREAT SERPL-MCNC: 1.6 MG/DL (ref 0.5–1.4)
DELSYS: ABNORMAL
DIFFERENTIAL METHOD: ABNORMAL
EOSINOPHIL # BLD AUTO: 0 K/UL (ref 0–0.5)
EOSINOPHIL NFR BLD: 0.2 % (ref 0–8)
ERYTHROCYTE [DISTWIDTH] IN BLOOD BY AUTOMATED COUNT: 13.7 % (ref 11.5–14.5)
EST. GFR  (NO RACE VARIABLE): 47.5 ML/MIN/1.73 M^2
FLOW: 3.5
GLUCOSE SERPL-MCNC: 374 MG/DL (ref 70–110)
GLUCOSE SERPL-MCNC: 414 MG/DL (ref 70–110)
GLUCOSE SERPL-MCNC: 435 MG/DL (ref 70–110)
GLUCOSE SERPL-MCNC: 453 MG/DL (ref 70–110)
GLUCOSE UR QL STRIP: ABNORMAL
HCO3 UR-SCNC: 23.9 MMOL/L (ref 24–28)
HCT VFR BLD AUTO: 34.8 % (ref 40–54)
HGB BLD-MCNC: 11.1 G/DL (ref 14–18)
HGB UR QL STRIP: ABNORMAL
HYALINE CASTS #/AREA URNS LPF: 4 /LPF
IMM GRANULOCYTES # BLD AUTO: 0.05 K/UL (ref 0–0.04)
IMM GRANULOCYTES NFR BLD AUTO: 0.4 % (ref 0–0.5)
INFLUENZA A, MOLECULAR: NEGATIVE
INFLUENZA B, MOLECULAR: NEGATIVE
INR PPP: 1.1 (ref 0.8–1.2)
KETONES UR QL STRIP: ABNORMAL
LACTATE SERPL-SCNC: 1.7 MMOL/L (ref 0.5–1.9)
LACTATE SERPL-SCNC: 2.3 MMOL/L (ref 0.5–1.9)
LEUKOCYTE ESTERASE UR QL STRIP: ABNORMAL
LIPASE SERPL-CCNC: 19 U/L (ref 4–60)
LYMPHOCYTES # BLD AUTO: 0.4 K/UL (ref 1–4.8)
LYMPHOCYTES NFR BLD: 3 % (ref 18–48)
MAGNESIUM SERPL-MCNC: 1.6 MG/DL (ref 1.6–2.6)
MCH RBC QN AUTO: 28 PG (ref 27–31)
MCHC RBC AUTO-ENTMCNC: 31.9 G/DL (ref 32–36)
MCV RBC AUTO: 88 FL (ref 82–98)
MICROSCOPIC COMMENT: ABNORMAL
MODE: ABNORMAL
MONOCYTES # BLD AUTO: 0.1 K/UL (ref 0.3–1)
MONOCYTES NFR BLD: 1 % (ref 4–15)
NEUTROPHILS # BLD AUTO: 11.4 K/UL (ref 1.8–7.7)
NEUTROPHILS NFR BLD: 95.1 % (ref 38–73)
NITRITE UR QL STRIP: POSITIVE
NRBC BLD-RTO: 0 /100 WBC
PCO2 BLDA: 36.4 MMHG (ref 35–45)
PH SMN: 7.42 [PH] (ref 7.35–7.45)
PH UR STRIP: 6 [PH] (ref 5–8)
PLATELET # BLD AUTO: 201 K/UL (ref 150–450)
PMV BLD AUTO: 10.9 FL (ref 9.2–12.9)
PO2 BLDA: 75 MMHG (ref 80–100)
POC BE: -1 MMOL/L
POC SATURATED O2: 95 % (ref 95–100)
POC TCO2: 25 MMOL/L (ref 23–27)
POTASSIUM SERPL-SCNC: 4 MMOL/L (ref 3.5–5.1)
PROT SERPL-MCNC: 8 G/DL (ref 6–8.4)
PROT UR QL STRIP: ABNORMAL
PROTHROMBIN TIME: 11.9 SEC (ref 9–12.5)
RBC # BLD AUTO: 3.97 M/UL (ref 4.6–6.2)
RBC #/AREA URNS HPF: >100 /HPF (ref 0–4)
SAMPLE: ABNORMAL
SAMPLE: ABNORMAL
SARS-COV-2 RDRP RESP QL NAA+PROBE: NEGATIVE
SITE: ABNORMAL
SODIUM SERPL-SCNC: 135 MMOL/L (ref 136–145)
SP GR UR STRIP: 1.03 (ref 1–1.03)
SPECIMEN SOURCE: NORMAL
SQUAMOUS #/AREA URNS HPF: 2 /HPF
TROPONIN I SERPL HS-MCNC: 5812.3 PG/ML (ref 0–14.9)
TROPONIN I SERPL HS-MCNC: 6355.8 PG/ML (ref 0–14.9)
URN SPEC COLLECT METH UR: ABNORMAL
UROBILINOGEN UR STRIP-ACNC: NEGATIVE EU/DL
WBC # BLD AUTO: 11.99 K/UL (ref 3.9–12.7)
WBC #/AREA URNS HPF: >100 /HPF (ref 0–5)
YEAST URNS QL MICRO: ABNORMAL

## 2023-05-23 PROCEDURE — 82803 BLOOD GASES ANY COMBINATION: CPT

## 2023-05-23 PROCEDURE — 93005 ELECTROCARDIOGRAM TRACING: CPT | Performed by: SPECIALIST

## 2023-05-23 PROCEDURE — 84484 ASSAY OF TROPONIN QUANT: CPT | Mod: 91 | Performed by: INTERNAL MEDICINE

## 2023-05-23 PROCEDURE — 87186 SC STD MICRODIL/AGAR DIL: CPT | Mod: 59 | Performed by: EMERGENCY MEDICINE

## 2023-05-23 PROCEDURE — 87086 URINE CULTURE/COLONY COUNT: CPT | Performed by: EMERGENCY MEDICINE

## 2023-05-23 PROCEDURE — 87154 CUL TYP ID BLD PTHGN 6+ TRGT: CPT | Performed by: EMERGENCY MEDICINE

## 2023-05-23 PROCEDURE — C9113 INJ PANTOPRAZOLE SODIUM, VIA: HCPCS | Performed by: INTERNAL MEDICINE

## 2023-05-23 PROCEDURE — 63600175 PHARM REV CODE 636 W HCPCS: Performed by: EMERGENCY MEDICINE

## 2023-05-23 PROCEDURE — 82962 GLUCOSE BLOOD TEST: CPT

## 2023-05-23 PROCEDURE — 36600 WITHDRAWAL OF ARTERIAL BLOOD: CPT

## 2023-05-23 PROCEDURE — 87077 CULTURE AEROBIC IDENTIFY: CPT | Mod: 59 | Performed by: EMERGENCY MEDICINE

## 2023-05-23 PROCEDURE — 96375 TX/PRO/DX INJ NEW DRUG ADDON: CPT

## 2023-05-23 PROCEDURE — 99900035 HC TECH TIME PER 15 MIN (STAT)

## 2023-05-23 PROCEDURE — 85610 PROTHROMBIN TIME: CPT | Performed by: INTERNAL MEDICINE

## 2023-05-23 PROCEDURE — 83880 ASSAY OF NATRIURETIC PEPTIDE: CPT | Performed by: INTERNAL MEDICINE

## 2023-05-23 PROCEDURE — 25000003 PHARM REV CODE 250: Performed by: INTERNAL MEDICINE

## 2023-05-23 PROCEDURE — 83735 ASSAY OF MAGNESIUM: CPT | Performed by: EMERGENCY MEDICINE

## 2023-05-23 PROCEDURE — 25000003 PHARM REV CODE 250: Performed by: EMERGENCY MEDICINE

## 2023-05-23 PROCEDURE — 83605 ASSAY OF LACTIC ACID: CPT | Mod: 91 | Performed by: EMERGENCY MEDICINE

## 2023-05-23 PROCEDURE — U0002 COVID-19 LAB TEST NON-CDC: HCPCS | Performed by: EMERGENCY MEDICINE

## 2023-05-23 PROCEDURE — 93010 EKG 12-LEAD: ICD-10-PCS | Mod: ,,, | Performed by: SPECIALIST

## 2023-05-23 PROCEDURE — 99291 CRITICAL CARE FIRST HOUR: CPT

## 2023-05-23 PROCEDURE — 81001 URINALYSIS AUTO W/SCOPE: CPT | Performed by: EMERGENCY MEDICINE

## 2023-05-23 PROCEDURE — 85730 THROMBOPLASTIN TIME PARTIAL: CPT | Performed by: INTERNAL MEDICINE

## 2023-05-23 PROCEDURE — 96367 TX/PROPH/DG ADDL SEQ IV INF: CPT

## 2023-05-23 PROCEDURE — 87147 CULTURE TYPE IMMUNOLOGIC: CPT | Performed by: EMERGENCY MEDICINE

## 2023-05-23 PROCEDURE — 96366 THER/PROPH/DIAG IV INF ADDON: CPT

## 2023-05-23 PROCEDURE — 27000221 HC OXYGEN, UP TO 24 HOURS

## 2023-05-23 PROCEDURE — 96365 THER/PROPH/DIAG IV INF INIT: CPT

## 2023-05-23 PROCEDURE — 84484 ASSAY OF TROPONIN QUANT: CPT | Performed by: EMERGENCY MEDICINE

## 2023-05-23 PROCEDURE — 87502 INFLUENZA DNA AMP PROBE: CPT | Performed by: EMERGENCY MEDICINE

## 2023-05-23 PROCEDURE — 87040 BLOOD CULTURE FOR BACTERIA: CPT | Mod: 59 | Performed by: EMERGENCY MEDICINE

## 2023-05-23 PROCEDURE — 63600175 PHARM REV CODE 636 W HCPCS: Performed by: INTERNAL MEDICINE

## 2023-05-23 PROCEDURE — 93010 ELECTROCARDIOGRAM REPORT: CPT | Mod: ,,, | Performed by: SPECIALIST

## 2023-05-23 PROCEDURE — 80053 COMPREHEN METABOLIC PANEL: CPT | Performed by: EMERGENCY MEDICINE

## 2023-05-23 PROCEDURE — 36415 COLL VENOUS BLD VENIPUNCTURE: CPT | Performed by: INTERNAL MEDICINE

## 2023-05-23 PROCEDURE — 85025 COMPLETE CBC W/AUTO DIFF WBC: CPT | Performed by: EMERGENCY MEDICINE

## 2023-05-23 PROCEDURE — 20000000 HC ICU ROOM

## 2023-05-23 PROCEDURE — 83690 ASSAY OF LIPASE: CPT | Performed by: EMERGENCY MEDICINE

## 2023-05-23 RX ORDER — GLUCAGON 1 MG
1 KIT INJECTION
Status: DISCONTINUED | OUTPATIENT
Start: 2023-05-23 | End: 2023-05-25 | Stop reason: HOSPADM

## 2023-05-23 RX ORDER — SITAGLIPTIN 100 MG/1
100 TABLET, FILM COATED ORAL DAILY
COMMUNITY
Start: 2023-05-17

## 2023-05-23 RX ORDER — LORAZEPAM 1 MG/1
1 TABLET ORAL EVERY 4 HOURS PRN
COMMUNITY
Start: 2023-05-16

## 2023-05-23 RX ORDER — FENTANYL 75 UG/H
1 PATCH TRANSDERMAL
COMMUNITY
Start: 2023-05-05

## 2023-05-23 RX ORDER — LINEZOLID 2 MG/ML
600 INJECTION, SOLUTION INTRAVENOUS
Status: DISCONTINUED | OUTPATIENT
Start: 2023-05-23 | End: 2023-05-24

## 2023-05-23 RX ORDER — MIDODRINE HYDROCHLORIDE 10 MG/1
10 TABLET ORAL EVERY 8 HOURS
COMMUNITY
Start: 2023-05-08

## 2023-05-23 RX ORDER — ALBUTEROL SULFATE 1.25 MG/3ML
1.25 SOLUTION RESPIRATORY (INHALATION) EVERY 4 HOURS PRN
COMMUNITY
Start: 2023-02-28

## 2023-05-23 RX ORDER — LEVOFLOXACIN 5 MG/ML
750 INJECTION, SOLUTION INTRAVENOUS
Status: COMPLETED | OUTPATIENT
Start: 2023-05-23 | End: 2023-05-23

## 2023-05-23 RX ORDER — SODIUM CHLORIDE 0.9 % (FLUSH) 0.9 %
10 SYRINGE (ML) INJECTION
Status: DISCONTINUED | OUTPATIENT
Start: 2023-05-23 | End: 2023-05-25 | Stop reason: HOSPADM

## 2023-05-23 RX ORDER — LIDOCAINE AND PRILOCAINE 25; 25 MG/G; MG/G
1 CREAM TOPICAL
COMMUNITY
Start: 2023-05-22

## 2023-05-23 RX ORDER — OXYBUTYNIN CHLORIDE 10 MG/1
10 TABLET, EXTENDED RELEASE ORAL DAILY
COMMUNITY
Start: 2023-05-05

## 2023-05-23 RX ORDER — PHENAZOPYRIDINE HYDROCHLORIDE 100 MG/1
100 TABLET, FILM COATED ORAL
COMMUNITY
Start: 2023-05-05

## 2023-05-23 RX ORDER — LACTULOSE 10 G/15ML
30 SOLUTION ORAL; RECTAL 2 TIMES DAILY PRN
COMMUNITY
Start: 2023-03-12

## 2023-05-23 RX ORDER — CIPROFLOXACIN 500 MG/1
500 TABLET ORAL 2 TIMES DAILY
COMMUNITY
Start: 2023-05-23

## 2023-05-23 RX ORDER — HYDROCODONE BITARTRATE AND ACETAMINOPHEN 10; 325 MG/1; MG/1
1 TABLET ORAL EVERY 4 HOURS PRN
COMMUNITY
Start: 2023-05-05

## 2023-05-23 RX ORDER — SENNOSIDES 8.6 MG/1
1-2 TABLET ORAL DAILY PRN
COMMUNITY
Start: 2023-05-09

## 2023-05-23 RX ORDER — MORPHINE SULFATE 20 MG/ML
1 SOLUTION ORAL
COMMUNITY
Start: 2023-05-16

## 2023-05-23 RX ORDER — ONDANSETRON 8 MG/1
8 TABLET, ORALLY DISINTEGRATING ORAL 3 TIMES DAILY
COMMUNITY
Start: 2023-05-19

## 2023-05-23 RX ORDER — HYOSCYAMINE SULFATE 0.12 MG/1
0.12 TABLET SUBLINGUAL EVERY 4 HOURS PRN
COMMUNITY
Start: 2023-05-05

## 2023-05-23 RX ORDER — PANTOPRAZOLE SODIUM 40 MG/1
40 TABLET, DELAYED RELEASE ORAL DAILY
COMMUNITY
Start: 2023-05-05

## 2023-05-23 RX ORDER — NOREPINEPHRINE BITARTRATE/D5W 4MG/250ML
0-3 PLASTIC BAG, INJECTION (ML) INTRAVENOUS CONTINUOUS
Status: DISCONTINUED | OUTPATIENT
Start: 2023-05-23 | End: 2023-05-24

## 2023-05-23 RX ORDER — PANTOPRAZOLE SODIUM 40 MG/10ML
40 INJECTION, POWDER, LYOPHILIZED, FOR SOLUTION INTRAVENOUS DAILY
Status: DISCONTINUED | OUTPATIENT
Start: 2023-05-23 | End: 2023-05-24

## 2023-05-23 RX ORDER — TRAMADOL HYDROCHLORIDE 50 MG/1
150 TABLET ORAL EVERY 6 HOURS PRN
COMMUNITY
Start: 2023-05-05

## 2023-05-23 RX ORDER — ACETAMINOPHEN 650 MG/1
650 SUPPOSITORY RECTAL
Status: COMPLETED | OUTPATIENT
Start: 2023-05-23 | End: 2023-05-23

## 2023-05-23 RX ORDER — DIAZEPAM 5 MG/1
5 TABLET ORAL 2 TIMES DAILY
COMMUNITY
Start: 2023-05-09

## 2023-05-23 RX ORDER — GABAPENTIN 800 MG/1
800 TABLET ORAL 4 TIMES DAILY
COMMUNITY
Start: 2023-05-22

## 2023-05-23 RX ORDER — HEPARIN SODIUM,PORCINE/D5W 25000/250
0-40 INTRAVENOUS SOLUTION INTRAVENOUS CONTINUOUS
Status: DISCONTINUED | OUTPATIENT
Start: 2023-05-23 | End: 2023-05-23

## 2023-05-23 RX ORDER — ONDANSETRON 2 MG/ML
4 INJECTION INTRAMUSCULAR; INTRAVENOUS
Status: COMPLETED | OUTPATIENT
Start: 2023-05-23 | End: 2023-05-23

## 2023-05-23 RX ORDER — IBUPROFEN 200 MG
24 TABLET ORAL
Status: DISCONTINUED | OUTPATIENT
Start: 2023-05-23 | End: 2023-05-25 | Stop reason: HOSPADM

## 2023-05-23 RX ORDER — INSULIN ASPART 100 [IU]/ML
1-10 INJECTION, SOLUTION INTRAVENOUS; SUBCUTANEOUS EVERY 4 HOURS
Status: DISCONTINUED | OUTPATIENT
Start: 2023-05-23 | End: 2023-05-24

## 2023-05-23 RX ORDER — IBUPROFEN 200 MG
16 TABLET ORAL
Status: DISCONTINUED | OUTPATIENT
Start: 2023-05-23 | End: 2023-05-25 | Stop reason: HOSPADM

## 2023-05-23 RX ORDER — HYDROCODONE BITARTRATE AND ACETAMINOPHEN 5; 325 MG/1; MG/1
1 TABLET ORAL EVERY 6 HOURS PRN
Status: DISCONTINUED | OUTPATIENT
Start: 2023-05-23 | End: 2023-05-25 | Stop reason: HOSPADM

## 2023-05-23 RX ADMIN — PIPERACILLIN SODIUM AND TAZOBACTAM SODIUM 3.38 G: 3; .375 INJECTION, POWDER, LYOPHILIZED, FOR SOLUTION INTRAVENOUS at 03:05

## 2023-05-23 RX ADMIN — ACETAMINOPHEN 650 MG: 650 SUPPOSITORY RECTAL at 03:05

## 2023-05-23 RX ADMIN — ONDANSETRON 4 MG: 2 INJECTION INTRAMUSCULAR; INTRAVENOUS at 03:05

## 2023-05-23 RX ADMIN — PANTOPRAZOLE SODIUM 40 MG: 40 INJECTION, POWDER, LYOPHILIZED, FOR SOLUTION INTRAVENOUS at 09:05

## 2023-05-23 RX ADMIN — HYDROCODONE BITARTRATE AND ACETAMINOPHEN 1 TABLET: 5; 325 TABLET ORAL at 09:05

## 2023-05-23 RX ADMIN — LEVOFLOXACIN 750 MG: 5 INJECTION, SOLUTION INTRAVENOUS at 03:05

## 2023-05-23 RX ADMIN — INSULIN ASPART 5 UNITS: 100 INJECTION, SOLUTION INTRAVENOUS; SUBCUTANEOUS at 09:05

## 2023-05-23 RX ADMIN — LINEZOLID 600 MG: 600 INJECTION, SOLUTION INTRAVENOUS at 10:05

## 2023-05-23 RX ADMIN — SODIUM CHLORIDE 2313 ML: 0.9 INJECTION, SOLUTION INTRAVENOUS at 03:05

## 2023-05-23 RX ADMIN — MEROPENEM 2 G: 1 INJECTION, POWDER, FOR SOLUTION INTRAVENOUS at 10:05

## 2023-05-23 RX ADMIN — NOREPINEPHRINE BITARTRATE 0.05 MCG/KG/MIN: 4 INJECTION, SOLUTION INTRAVENOUS at 04:05

## 2023-05-23 RX ADMIN — INSULIN HUMAN 5 UNITS: 100 INJECTION, SOLUTION PARENTERAL at 05:05

## 2023-05-23 NOTE — H&P
[facility] - Emergency Dept    History & Physical      Patient Name: James Jiang  MRN: [id number]  Admission Date: 5/23/2023  Attending Physician: Tulio Neal MD   Primary Care Provider: No primary care provider on file.         Patient information was obtained from past medical records and ER records.     Subjective:     Principal Problem:<principal problem not specified>    Chief Complaint:   Chief Complaint   Patient presents with    Altered Mental Status     Onset today         HPI: 63-year-old male history of heavy smoker, atrial fibrillation history of right septic hip MRSA with hx washout, is on hospice, lower abdominal abscess, hypertension, history of ESBL UTI, chronic indwelling Donis catheter secondary to urinary retention, left ureteral stone, left renal mass thought to be renal cell carcinoma, last Donis exchange was yesterday.    Of note pt has alternative chart MRN [id number], will need to be merged.    In the ER patient was hypoxic confused, hypotensive.  Fentanyl patch was removed with improvement in blood pressure and mentation.  Patient was in septic shock, presumptively from UTI.  Also had hs-troponin elevation 6355    Family History    None       Tobacco Use    Smoking status: Not on file    Smokeless tobacco: Not on file   Substance and Sexual Activity    Alcohol use: Not on file    Drug use: Not on file    Sexual activity: Not on file     Review of Systems  Objective:     Vital Signs (Most Recent):  Temp: 100.1 °F (37.8 °C) (05/23/23 1700)  Pulse: (!) 123 (05/23/23 1815)  Resp: (!) 26 (05/23/23 1815)  BP: (!) 113/59 (05/23/23 1815)  SpO2: (!) 93 % (05/23/23 1815) Vital Signs (24h Range):  Temp:  [97.7 °F (36.5 °C)-100.1 °F (37.8 °C)] 100.1 °F (37.8 °C)  Pulse:  [104-135] 123  Resp:  [20-36] 26  SpO2:  [78 %-94 %] 93 %  BP: ()/(43-62) 113/59     Weight: 77.1 kg (170 lb)  Body mass index is 25.85 kg/m².    Physical Exam  Constitutional:       Comments: Inattentive but  alert, able to answer questions   HENT:      Head: Normocephalic and atraumatic.      Right Ear: External ear normal.      Left Ear: External ear normal.      Nose: Nose normal.      Mouth/Throat:      Mouth: Mucous membranes are dry.   Eyes:      Extraocular Movements: Extraocular movements intact.      Pupils: Pupils are equal, round, and reactive to light.   Cardiovascular:      Rate and Rhythm: Rhythm irregular.      Pulses: Normal pulses.      Heart sounds: Normal heart sounds.   Pulmonary:      Effort: Pulmonary effort is normal.      Breath sounds: Normal breath sounds.   Abdominal:      General: Abdomen is flat.      Palpations: Abdomen is soft.   Musculoskeletal:         General: Normal range of motion.   Skin:     General: Skin is warm.      Capillary Refill: Capillary refill takes less than 2 seconds.   Neurological:      General: No focal deficit present.      Mental Status: He is oriented to person, place, and time.   Psychiatric:         Mood and Affect: Mood normal.         Behavior: Behavior normal.         CRANIAL NERVES     CN III, IV, VI   Pupils are equal, round, and reactive to light.    Significant Labs: All pertinent labs within the past 24 hours have been reviewed.    Significant Imaging: I have reviewed all pertinent imaging results/findings within the past 24 hours.    Assessment/Plan:     There are no hospital problems to display for this patient.    VTE Risk Mitigation (From admission, onward)           Ordered     heparin 25,000 units in dextrose 5% (100 units/ml) IV bolus from bag - ADDITIONAL PRN BOLUS - 60 units/kg (max bolus 4000 units)  As needed (PRN)        Question:  Heparin Infusion Adjustment (DO NOT MODIFY ANSWER)  Answer:  [email or web address]\epic\Images\Pharmacy\HeparinInfusions\heparin LOW INTENSITY nomogram for [facility] [id number].pdf    05/23/23 9542     heparin 25,000 units in dextrose 5% (100 units/ml) IV bolus from bag - ADDITIONAL PRN BOLUS - 30 units/kg (max bolus 4000 units)  As  needed (PRN)        Question:  Heparin Infusion Adjustment (DO NOT MODIFY ANSWER)  Answer:  \\[email or web address]\epic\Images\Pharmacy\HeparinInfusions\heparin LOW INTENSITY nomogram for [facility] [id number].pdf    05/23/23 1846     heparin 25,000 units in dextrose 5% (100 units/ml) IV bolus from bag INITIAL BOLUS (max bolus 4000 units)  Once        Question:  Heparin Infusion Adjustment (DO NOT MODIFY ANSWER)  Answer:  \\[email or web address]\epic\Images\Pharmacy\HeparinInfusions\heparin LOW INTENSITY nomogram for [facility] [id number].pdf    05/23/23 1846     heparin 25,000 units in dextrose 5% 250 mL (100 units/mL) infusion LOW INTENSITY nomogram - [facility]  Continuous        Question Answer Comment   Heparin Infusion Adjustment (DO NOT MODIFY ANSWER) \\[email or web address]\epic\Images\Pharmacy\HeparinInfusions\heparin LOW INTENSITY nomogram for [facility] [id number].pdf    Begin at (in units/kg/hr) 12        05/23/23 1846     IP VTE HIGH RISK PATIENT  Once         05/23/23 1826     Place sequential compression device  Until discontinued         05/23/23 1826                    #Septic shock, UTI, hx ESBL/MRSA  #Acute hypoxemic respiratory failure  #Metabolic encephalopathy  #bibasilar airspace opacities, heavy smoker  #Uncontrolled Diabetes with elevated anion gap, lactic acidosis; A1c 12.5%  #4 cm solid left renal mass compatible with primary renal neoplasm   #JORGE-1.6 (baseline 1.8)  #Vancomycin allergy  #Hematuria  #History of renal stones, history of ESBL  NSTEMI  #PMHx: PAD, AFib, HTN, COPD, depression/anxiety    pt has alternative chart MRN [id number], will need to be merged    -Start meropenem/zyvox, f/u culture data  -with significant hypoxia and bibasilar infiltrates, possible pneumonia  -contact isolation for mrsa/esbl  -Heparin drip contraindicated given significant hematuria, monitor hematuria, urology consult  -get TLC  -replace blanco  -Trend CE, echo  -Sliding scale insulin q.4 hours  -Critical care consult, urology consult (hematuria, rcc) , cardiology csx  (nstemi)  Gentle IV fluids  -Levophed    Full code, previously was on hospice:  Wants chest compressions, no ventilator.  Wants dialysis if needed, wants left heart catheterization if needed, wants central line if needed    Needs med rec  Critical care time 40 minutes    Tulio Neal MD  Department of Hospital Medicine   [facility] - Emergency Dept

## 2023-05-23 NOTE — ED PROVIDER NOTES
Encounter Date: 5/23/2023       History     Chief Complaint   Patient presents with    Altered Mental Status     Onset today      Mostly history obtained from hospice nurse, Winifred.  No old records available.  Reportedly patient was on hospice with metastatic renal carcinoma.  Patient with several falls over last couple days.  Increased right leg pain.  History of right hip surgery in the past.  Patient with increasing weakness and confusion.  Worsening oxygen requirement.  No definite fever chills.  No definite head trauma.    Review of patient's allergies indicates:  No Known Allergies  No past medical history on file.  No past surgical history on file.  No family history on file.     Review of Systems   Unable to perform ROS: Mental status change     Physical Exam     Initial Vitals [05/23/23 1443]   BP Pulse Resp Temp SpO2   (!) 88/53 (!) 120 20 97.7 °F (36.5 °C) (!) 78 %      MAP       --         Physical Exam    Nursing note and vitals reviewed.  Constitutional: He is not diaphoretic. No distress.   HENT:   Head: Normocephalic and atraumatic.   Eyes: Conjunctivae and EOM are normal.   Pinpoint pupils   Neck: Neck supple.   Normal range of motion.  Cardiovascular:  Regular rhythm.           Pulmonary/Chest: Breath sounds normal.   Abdominal: Abdomen is soft. Bowel sounds are normal.   Abdomen is soft with no definite reproducible abdominal tenderness   Musculoskeletal:         General: Normal range of motion.      Cervical back: Normal range of motion and neck supple.     Neurological:   Patient is alert.  His moving all extremities.   Skin: No rash noted.   Psychiatric:   Patient with mumbling speech.  He is unable to answer questions.  He really does not follow commands.  There appears to be dry emesis to beard and chest.       ED Course   Critical Care    Date/Time: 5/23/2023 5:48 PM  Performed by: Edward Partida MD  Authorized by: Edward Partida MD   Direct patient critical care time: 15  minutes  Additional history critical care time: 5 minutes  Ordering / reviewing critical care time: 5 minutes  Documentation critical care time: 5 minutes  Consulting other physicians critical care time: 5 minutes  Consult with family critical care time: 5 minutes  Total critical care time (exclusive of procedural time) : 40 minutes      Labs Reviewed   URINALYSIS, REFLEX TO URINE CULTURE - Abnormal; Notable for the following components:       Result Value    Color, UA Brown (*)     Appearance, UA Cloudy (*)     Protein, UA 1+ (*)     Glucose, UA 4+ (*)     Ketones, UA 1+ (*)     Occult Blood UA 3+ (*)     Nitrite, UA Positive (*)     Leukocytes, UA 3+ (*)     All other components within normal limits    Narrative:     Specimen Source->Urine   TROPONIN I HIGH SENSITIVITY - Abnormal; Notable for the following components:    Troponin I High Sensitivity 6355.8 (*)     All other components within normal limits    Narrative:     Trop critical result(s) repeated. Called and verbal readback obtained   from Minh Norton Nursing manager/ed by [facility] 05/23/2023 17:02   COMPREHENSIVE METABOLIC PANEL - Abnormal; Notable for the following components:    Sodium 135 (*)     Glucose 374 (*)     BUN 32 (*)     Creatinine 1.6 (*)     Total Bilirubin 1.4 (*)     Alkaline Phosphatase 170 (*)     AST 42 (*)     eGFR 47.5 (*)     All other components within normal limits   CBC W/ AUTO DIFFERENTIAL - Abnormal; Notable for the following components:    RBC 3.97 (*)     Hemoglobin 11.1 (*)     Hematocrit 34.8 (*)     MCHC 31.9 (*)     Gran # (ANC) 11.4 (*)     Immature Grans (Abs) 0.05 (*)     Lymph # 0.4 (*)     Mono # 0.1 (*)     Gran % 95.1 (*)     Lymph % 3.0 (*)     Mono % 1.0 (*)     All other components within normal limits   LACTIC ACID, PLASMA - Abnormal; Notable for the following components:    Lactate (Lactic Acid) 2.3 (*)     All other components within normal limits    Narrative:     LA critical result(s) repeated. Called and  verbal readback obtained   from Minh Norton Nurse Manager/ed by [facility] 05/23/2023 17:03   URINALYSIS MICROSCOPIC - Abnormal; Notable for the following components:    RBC, UA >100 (*)     WBC, UA >100 (*)     Hyaline Casts, UA 4 (*)     All other components within normal limits    Narrative:     Specimen Source->Urine   ISTAT PROCEDURE - Abnormal; Notable for the following components:    POC PO2 75 (*)     POC HCO3 23.9 (*)     All other components within normal limits   ISTAT CREATININE - Abnormal; Notable for the following components:    POC Creatinine 1.5 (*)     All other components within normal limits   POCT GLUCOSE - Abnormal; Notable for the following components:    POC Glucose 435 (*)     All other components within normal limits   POCT GLUCOSE - Abnormal; Notable for the following components:    POC Glucose 453 (*)     All other components within normal limits   CULTURE, BLOOD   CULTURE, BLOOD   CULTURE, URINE   MAGNESIUM   LIPASE   SARS-COV-2 RNA AMPLIFICATION, QUAL   INFLUENZA A AND B ANTIGEN    Narrative:     Specimen Source->Nasopharyngeal Swab   PROTIME-INR   APTT   LACTIC ACID, PLASMA   POCT CREATININE   POCT GLUCOSE MONITORING CONTINUOUS   POCT GLUCOSE MONITORING CONTINUOUS        ECG Results              EKG 12-lead (In process)  Result time 05/23/23 14:53:15      In process by Interface, Lab In [facility] (05/23/23 14:53:15)                   Narrative:    Test Reason : R41.82,    Vent. Rate : 118 BPM     Atrial Rate : 118 BPM     P-R Int : 146 ms          QRS Dur : 092 ms      QT Int : 326 ms       P-R-T Axes : 037 -11 059 degrees     QTc Int : 456 ms    Sinus tachycardia with Premature atrial complexes with Aberrant conduction  Nonspecific ST abnormality  Abnormal ECG  No previous ECGs available    Referred By: [email or web address]   SELF           Confirmed By:                                   Imaging Results              CT Head Without Contrast (In process)                      CT Abdomen Pelvis  Without  Contrast (In process)    Procedure changed from CT Abdomen Pelvis With Contrast                    X-Ray Hip 2 or 3 views Right (with Pelvis when performed) (Final result)  Result time 05/23/23 16:29:02      Final result by Eduin Ojeda MD (05/23/23 16:29:02)                   Narrative:    Reason: AMS, right hip pain    FINDINGS:  AP pelvis and 2 views of right hip show no acute fracture, dislocation, or destructive osseous lesion. Moderate degenerative narrowing affects bilateral hip joint spaces. Antegrade intramedullary nail and proximal helical blade lie in right proximal femur without loosening or other complication. Pubic symphysis and sacroiliac joints are maintained. Arterial vascular calcifications are present along with pelvic phleboliths and surgical clips projecting about the left pelvis.    IMPRESSION:    1. Moderate right hip osteoarthrosis.  2. No acute abnormality of right hip.    Electronically signed by:  Eduin Ojeda MD  5/23/2023 4:29 PM CDT Workstation: [id number]                                     X-Ray Femur 2 AP/LAT Right (Final result)  Result time 05/23/23 16:28:52      Final result by Eduin Ojeda MD (05/23/23 16:28:52)                   Narrative:    Reason: AMS, right upper leg pain    FINDINGS:  2 views of right femur show no acute fracture or dislocation. Antegrade intramedullary nail is transfixed with proximal helical blade and single distal interlocking screw. Surgical hardware shows no loosening or other complication.    Arterial vascular calcifications are present.    IMPRESSION:  No acute abnormality of right femur.    Electronically signed by:  Eduin Ojeda MD  5/23/2023 4:28 PM CDT Workstation: [id number]                                     X-Ray Chest AP Portable (Final result)  Result time 05/23/23 16:13:59      Final result by Sanju Ramirez MD (05/23/23 16:13:59)                   Narrative:      EXAM: XR CHEST AP PORTABLE    HISTORY: Altered mental status.  Dyspnea.    COMPARISON:PA and lateral chest dated 4/8/2011.    FINDINGS: The lungs are poorly inflated. There is marked cardiomegaly and also widening of the mediastinum, in part due to portable technique. However significant true cardiomegaly and mediastinal widening due to volume overload is also suspected. There is prominent pulmonary vascular congestion and moderate diffuse bilateral pulmonary edema. No significant pleural effusion is identified.    IMPRESSION:   Congestive heart failure.    Electronically signed by:  Irvin Ramirez MD  5/23/2023 4:13 PM CDT Workstation: [id number]                                     Medications   NORepinephrine 4 mg in dextrose 5% 250 mL infusion (premix) (titrating) (0.05 mcg/kg/min × 77.1 kg Intravenous New Bag 5/23/23 1629)   ondansetron injection 4 mg (4 mg Intravenous Given 5/23/23 1535)   sodium chloride 0.9% bolus 2,313 mL 2,313 mL (0 mLs Intravenous Stopped 5/23/23 1617)   piperacillin-tazobactam 3.375 g in dextrose 5 % 100 mL IVPB (ready to mix) (0 g Intravenous Stopped 5/23/23 1607)   levoFLOXacin 750 mg/150 mL IVPB 750 mg (0 mg Intravenous Stopped 5/23/23 1714)   acetaminophen suppository 650 mg (650 mg Rectal Given 5/23/23 1539)   insulin regular injection 5 Units 0.05 mL (5 Units Intravenous Given 5/23/23 1701)     Medical Decision Making:   History:   I obtained history from: someone other than patient.       <> Summary of History: Hospice nurseWinifred gives most of history  Differential Diagnosis:   Sepsis, CVA, acute coronary syndrome  Independently Interpreted Test(s):   I have ordered and independently interpreted X-rays - see summary below.       <> Summary of X-Ray Reading(s): Cardiomegaly, pulmonary edema  I have ordered and independently interpreted EKG Reading(s) - see summary below       <> Summary of EKG Reading(s): Normal sinus rhythm with a ventricular rate of 75.  Inverted T-waves in precordial leads.  No ST elevation.  Clinical Tests:   Lab Tests:  "Reviewed       <> Summary of Lab: Creatinine 1.6, troponin over 6000  Radiological Study: Reviewed  Medical Tests: Reviewed  Sepsis Perfusion Assessment: "I attest a sepsis perfusion exam was performed within 6 hours of sepsis, severe sepsis, or septic shock presentation, following fluid resuscitation."  ED Management:  Patient presents with hypotension, hypoxia and altered mental status.  I did remove fentanyl patch.  Given hypotension and rectal temperature 102° patient is in septic shock.  Fluid resuscitation initiated with 30 milliliter/kilogram bolus.  Patient received 1300 mL of normal saline.  Normal saline discontinued due to patient in overt pulmonary edema with chest x-ray, physical exam consistent with pulmonary edema.  Patient is on Levophed with improving blood pressure.  Patient does have pyelonephritis as etiology of infection.  CT of the head and abdomen and pelvis are pending at time of dictation.  Hospitalist consulted for admission.  Broad-spectrum antibiotics initiated.  Patient reexamined and mental status is improving.  Patient is now answering questions.  Patient does have very elevated troponin of 6355.  This is likely secondary to septic shock.                          Clinical Impression:   Final diagnoses:  [R41.82] Altered mental status  [M79.606] Leg pain  [A41.9, R65.21] Septic shock (Primary)  [I21.4] Non-ST elevation myocardial infarction (NSTEMI)  [G93.41] Metabolic encephalopathy  [N12] Pyelonephritis        ED Disposition Condition    Admit Stable                Edward Partida MD  05/23/23 6060    "

## 2023-05-24 ENCOUNTER — ANESTHESIA EVENT (OUTPATIENT)
Dept: INTENSIVE CARE | Facility: HOSPITAL | Age: 63
DRG: 871 | End: 2023-05-24
Payer: MEDICARE

## 2023-05-24 ENCOUNTER — CLINICAL SUPPORT (OUTPATIENT)
Dept: CARDIOLOGY | Facility: HOSPITAL | Age: 63
DRG: 871 | End: 2023-05-24
Attending: INTERNAL MEDICINE
Payer: MEDICARE

## 2023-05-24 ENCOUNTER — ANESTHESIA (OUTPATIENT)
Dept: INTENSIVE CARE | Facility: HOSPITAL | Age: 63
DRG: 871 | End: 2023-05-24
Payer: MEDICARE

## 2023-05-24 VITALS
WEIGHT: 235.69 LBS | DIASTOLIC BLOOD PRESSURE: 97 MMHG | HEIGHT: 68 IN | BODY MASS INDEX: 35.72 KG/M2 | HEART RATE: 68 BPM | OXYGEN SATURATION: 96 % | SYSTOLIC BLOOD PRESSURE: 180 MMHG | RESPIRATION RATE: 18 BRPM | TEMPERATURE: 98 F

## 2023-05-24 VITALS — HEIGHT: 68 IN | WEIGHT: 235 LBS | BODY MASS INDEX: 35.61 KG/M2

## 2023-05-24 PROBLEM — Z51.5 HOSPICE CARE PATIENT: Chronic | Status: ACTIVE | Noted: 2023-05-24

## 2023-05-24 PROBLEM — D72.829 LEUCOCYTOSIS: Status: ACTIVE | Noted: 2023-05-24

## 2023-05-24 PROBLEM — N39.0 UTI (URINARY TRACT INFECTION): Status: ACTIVE | Noted: 2023-05-24

## 2023-05-24 PROBLEM — R65.21 SEPTIC SHOCK: Status: RESOLVED | Noted: 2023-05-24 | Resolved: 2023-05-24

## 2023-05-24 PROBLEM — A41.9 SEPTIC SHOCK: Status: RESOLVED | Noted: 2023-05-24 | Resolved: 2023-05-24

## 2023-05-24 PROBLEM — N17.9 AKI (ACUTE KIDNEY INJURY): Status: ACTIVE | Noted: 2023-05-24

## 2023-05-24 PROBLEM — D64.9 ANEMIA: Chronic | Status: ACTIVE | Noted: 2023-05-24

## 2023-05-24 PROBLEM — R78.81 BACTEREMIA: Status: ACTIVE | Noted: 2023-05-24

## 2023-05-24 PROBLEM — R79.89 TROPONIN I ABOVE REFERENCE RANGE: Status: ACTIVE | Noted: 2023-05-24

## 2023-05-24 PROBLEM — R65.21 SEPTIC SHOCK: Status: ACTIVE | Noted: 2023-05-24

## 2023-05-24 PROBLEM — A41.9 SEPTIC SHOCK: Status: ACTIVE | Noted: 2023-05-24

## 2023-05-24 PROBLEM — J96.10 CHRONIC RESPIRATORY FAILURE: Chronic | Status: ACTIVE | Noted: 2023-05-24

## 2023-05-24 PROBLEM — Z51.5 COMFORT MEASURES ONLY STATUS: Status: ACTIVE | Noted: 2023-05-24

## 2023-05-24 PROBLEM — Z87.898 HISTORY OF URINARY RETENTION: Chronic | Status: ACTIVE | Noted: 2023-05-24

## 2023-05-24 PROBLEM — F32.A ANXIETY AND DEPRESSION: Chronic | Status: ACTIVE | Noted: 2023-05-24

## 2023-05-24 PROBLEM — J44.9 COPD (CHRONIC OBSTRUCTIVE PULMONARY DISEASE): Chronic | Status: ACTIVE | Noted: 2023-05-24

## 2023-05-24 PROBLEM — N28.89 LEFT RENAL MASS: Chronic | Status: ACTIVE | Noted: 2023-05-24

## 2023-05-24 PROBLEM — E11.9 DIABETES MELLITUS: Chronic | Status: ACTIVE | Noted: 2023-05-24

## 2023-05-24 PROBLEM — Z71.89 GOALS OF CARE, COUNSELING/DISCUSSION: Status: ACTIVE | Noted: 2023-05-24

## 2023-05-24 PROBLEM — F41.9 ANXIETY AND DEPRESSION: Chronic | Status: ACTIVE | Noted: 2023-05-24

## 2023-05-24 LAB
ACINETOBACTER CALCOACETICUS/BAUMANNII COMPLEX: NOT DETECTED
ALBUMIN SERPL BCP-MCNC: 3.2 G/DL (ref 3.5–5.2)
ALP SERPL-CCNC: 101 U/L (ref 55–135)
ALT SERPL W/O P-5'-P-CCNC: 16 U/L (ref 10–44)
ANION GAP SERPL CALC-SCNC: 10 MMOL/L (ref 8–16)
ANION GAP SERPL CALC-SCNC: 12 MMOL/L (ref 8–16)
AORTIC ROOT ANNULUS: 3.6 CM
AORTIC VALVE CUSP SEPERATION: 1.4 CM
AST SERPL-CCNC: 35 U/L (ref 10–40)
AV INDEX (PROSTH): 0.69
AV MEAN GRADIENT: 6 MMHG
AV PEAK GRADIENT: 10 MMHG
AV VALVE AREA: 2.62 CM2
AV VELOCITY RATIO: 0.65
BACTEROIDES FRAGILIS: NOT DETECTED
BASOPHILS # BLD AUTO: 0.05 K/UL (ref 0–0.2)
BASOPHILS NFR BLD: 0.2 % (ref 0–1.9)
BILIRUB SERPL-MCNC: 1.3 MG/DL (ref 0.1–1)
BSA FOR ECHO PROCEDURE: 2.26 M2
BUN SERPL-MCNC: 29 MG/DL (ref 8–23)
BUN SERPL-MCNC: 32 MG/DL (ref 8–23)
CALCIUM SERPL-MCNC: 8.6 MG/DL (ref 8.7–10.5)
CALCIUM SERPL-MCNC: 8.7 MG/DL (ref 8.7–10.5)
CANDIDA ALBICANS: NOT DETECTED
CANDIDA AURIS: NOT DETECTED
CANDIDA GLABRATA: NOT DETECTED
CANDIDA KRUSEI: NOT DETECTED
CANDIDA PARAPSILOSIS: NOT DETECTED
CANDIDA TROPICALIS: NOT DETECTED
CHLORIDE SERPL-SCNC: 97 MMOL/L (ref 95–110)
CHLORIDE SERPL-SCNC: 99 MMOL/L (ref 95–110)
CO2 SERPL-SCNC: 24 MMOL/L (ref 23–29)
CO2 SERPL-SCNC: 25 MMOL/L (ref 23–29)
CREAT SERPL-MCNC: 1.4 MG/DL (ref 0.5–1.4)
CREAT SERPL-MCNC: 1.6 MG/DL (ref 0.5–1.4)
CRYPTOCOCCUS NEOFORMANS/GATTII: NOT DETECTED
CTX-M GENE: NOT DETECTED
CV ECHO LV RWT: 0.48 CM
DIFFERENTIAL METHOD: ABNORMAL
DOP CALC AO PEAK VEL: 1.61 M/S
DOP CALC AO VTI: 29.3 CM
DOP CALC LVOT AREA: 3.8 CM2
DOP CALC LVOT DIAMETER: 2.2 CM
DOP CALC LVOT PEAK VEL: 1.05 M/S
DOP CALC LVOT STROKE VOLUME: 76.75 CM3
DOP CALC MV VTI: 23.8 CM
DOP CALCLVOT PEAK VEL VTI: 20.2 CM
E WAVE DECELERATION TIME: 198 MSEC
E/A RATIO: 1.08
E/E' RATIO: 8.5 M/S
ECHO LV POSTERIOR WALL: 1.07 CM (ref 0.6–1.1)
EJECTION FRACTION: 60 %
ENTEROBACTER CLOACAE COMPLEX: NOT DETECTED
ENTEROBACTERALES: DETECTED
ENTEROCOCCUS FAECALIS: NOT DETECTED
ENTEROCOCCUS FAECIUM: NOT DETECTED
EOSINOPHIL # BLD AUTO: 0 K/UL (ref 0–0.5)
EOSINOPHIL NFR BLD: 0.1 % (ref 0–8)
ERYTHROCYTE [DISTWIDTH] IN BLOOD BY AUTOMATED COUNT: 13.9 % (ref 11.5–14.5)
ERYTHROCYTE [DISTWIDTH] IN BLOOD BY AUTOMATED COUNT: 13.9 % (ref 11.5–14.5)
ESCHERICHIA COLI: NOT DETECTED
EST. GFR  (NO RACE VARIABLE): 47.5 ML/MIN/1.73 M^2
EST. GFR  (NO RACE VARIABLE): 55.8 ML/MIN/1.73 M^2
FRACTIONAL SHORTENING: 32 % (ref 28–44)
GLUCOSE SERPL-MCNC: 225 MG/DL (ref 70–110)
GLUCOSE SERPL-MCNC: 227 MG/DL (ref 70–110)
GLUCOSE SERPL-MCNC: 246 MG/DL (ref 70–110)
GLUCOSE SERPL-MCNC: 254 MG/DL (ref 70–110)
GLUCOSE SERPL-MCNC: 264 MG/DL (ref 70–110)
GLUCOSE SERPL-MCNC: 291 MG/DL (ref 70–110)
GLUCOSE SERPL-MCNC: 324 MG/DL (ref 70–110)
HAEMOPHILUS INFLUENZAE: NOT DETECTED
HCT VFR BLD AUTO: 31.3 % (ref 40–54)
HCT VFR BLD AUTO: 31.5 % (ref 40–54)
HGB BLD-MCNC: 10 G/DL (ref 14–18)
HGB BLD-MCNC: 9.9 G/DL (ref 14–18)
IMM GRANULOCYTES # BLD AUTO: 0.27 K/UL (ref 0–0.04)
IMM GRANULOCYTES NFR BLD AUTO: 1.3 % (ref 0–0.5)
IMP GENE: NOT DETECTED
INTERVENTRICULAR SEPTUM: 1.11 CM (ref 0.6–1.1)
IVC DIAMETER: 1.61 CM
IVRT: 113 MSEC
KLEBSIELLA AEROGENES: NOT DETECTED
KLEBSIELLA OXYTOCA: NOT DETECTED
KLEBSIELLA PNEUMONIAE GROUP: NOT DETECTED
KPC: NOT DETECTED
LEFT ATRIUM SIZE: 4.1 CM
LEFT ATRIUM VOLUME INDEX MOD: 44.1 ML/M2
LEFT ATRIUM VOLUME MOD: 96.6 CM3
LEFT INTERNAL DIMENSION IN SYSTOLE: 3.04 CM (ref 2.1–4)
LEFT VENTRICLE DIASTOLIC VOLUME INDEX: 41.87 ML/M2
LEFT VENTRICLE DIASTOLIC VOLUME: 91.7 ML
LEFT VENTRICLE MASS INDEX: 78 G/M2
LEFT VENTRICLE SYSTOLIC VOLUME INDEX: 16.5 ML/M2
LEFT VENTRICLE SYSTOLIC VOLUME: 36.2 ML
LEFT VENTRICULAR INTERNAL DIMENSION IN DIASTOLE: 4.47 CM (ref 3.5–6)
LEFT VENTRICULAR MASS: 170.96 G
LISTERIA MONOCYTOGENES: NOT DETECTED
LV LATERAL E/E' RATIO: 7.56 M/S
LV SEPTAL E/E' RATIO: 9.71 M/S
LVOT MG: 2 MMHG
LVOT MV: 0.69 CM/S
LYMPHOCYTES # BLD AUTO: 1.1 K/UL (ref 1–4.8)
LYMPHOCYTES NFR BLD: 5.3 % (ref 18–48)
MCH RBC QN AUTO: 28.3 PG (ref 27–31)
MCH RBC QN AUTO: 28.4 PG (ref 27–31)
MCHC RBC AUTO-ENTMCNC: 31.6 G/DL (ref 32–36)
MCHC RBC AUTO-ENTMCNC: 31.7 G/DL (ref 32–36)
MCR-1: ABNORMAL
MCV RBC AUTO: 89 FL (ref 82–98)
MCV RBC AUTO: 90 FL (ref 82–98)
MEC A/C AND MREJ (MRSA): DETECTED
MEC A/C: ABNORMAL
MONOCYTES # BLD AUTO: 1.4 K/UL (ref 0.3–1)
MONOCYTES NFR BLD: 6.5 % (ref 4–15)
MV MEAN GRADIENT: 2 MMHG
MV PEAK A VEL: 0.63 M/S
MV PEAK E VEL: 0.68 M/S
MV PEAK GRADIENT: 4 MMHG
MV STENOSIS PRESSURE HALF TIME: 100 MS
MV VALVE AREA BY CONTINUITY EQUATION: 3.22 CM2
MV VALVE AREA P 1/2 METHOD: 2.2 CM2
NDM GENE: NOT DETECTED
NEISSERIA MENINGITIDIS: NOT DETECTED
NEUTROPHILS # BLD AUTO: 18.7 K/UL (ref 1.8–7.7)
NEUTROPHILS NFR BLD: 86.6 % (ref 38–73)
NRBC BLD-RTO: 0 /100 WBC
OXA-48-LIKE: NOT DETECTED
PLATELET # BLD AUTO: 207 K/UL (ref 150–450)
PLATELET # BLD AUTO: 212 K/UL (ref 150–450)
PMV BLD AUTO: 10.9 FL (ref 9.2–12.9)
PMV BLD AUTO: 11.8 FL (ref 9.2–12.9)
POTASSIUM SERPL-SCNC: 4.3 MMOL/L (ref 3.5–5.1)
POTASSIUM SERPL-SCNC: 4.4 MMOL/L (ref 3.5–5.1)
PROT SERPL-MCNC: 7.4 G/DL (ref 6–8.4)
PROTEUS SPECIES: NOT DETECTED
PSEUDOMONAS AERUGINOSA: NOT DETECTED
PV MV: 0.62 M/S
PV PEAK VELOCITY: 0.95 CM/S
RA PRESSURE: 3 MMHG
RBC # BLD AUTO: 3.48 M/UL (ref 4.6–6.2)
RBC # BLD AUTO: 3.53 M/UL (ref 4.6–6.2)
RV TISSUE DOPPLER FREE WALL SYSTOLIC VELOCITY 1 (APICAL 4 CHAMBER VIEW): 0.01 CM/S
SALMONELLA SP: NOT DETECTED
SERRATIA MARCESCENS: NOT DETECTED
SODIUM SERPL-SCNC: 133 MMOL/L (ref 136–145)
SODIUM SERPL-SCNC: 134 MMOL/L (ref 136–145)
STAPHYLOCOCCUS AUREUS: DETECTED
STAPHYLOCOCCUS EPIDERMIDIS: NOT DETECTED
STAPHYLOCOCCUS LUGDUNESIS: NOT DETECTED
STAPHYLOCOCCUS SPECIES: ABNORMAL
STENOTROPHOMONAS MALTOPHILIA: NOT DETECTED
STJ: 2.81 CM
STREPTOCOCCUS AGALACTIAE: NOT DETECTED
STREPTOCOCCUS PNEUMONIAE: NOT DETECTED
STREPTOCOCCUS PYOGENES: NOT DETECTED
STREPTOCOCCUS SPECIES: NOT DETECTED
TDI LATERAL: 0.09 M/S
TDI SEPTAL: 0.07 M/S
TDI: 0.08 M/S
TRICUSPID ANNULAR PLANE SYSTOLIC EXCURSION: 1.42 CM
TROPONIN I SERPL HS-MCNC: 2990.4 PG/ML (ref 0–14.9)
VAN A/B: ABNORMAL
VIM GENE: NOT DETECTED
WBC # BLD AUTO: 21.56 K/UL (ref 3.9–12.7)
WBC # BLD AUTO: 24.11 K/UL (ref 3.9–12.7)

## 2023-05-24 PROCEDURE — 25000003 PHARM REV CODE 250: Performed by: INTERNAL MEDICINE

## 2023-05-24 PROCEDURE — 99900035 HC TECH TIME PER 15 MIN (STAT)

## 2023-05-24 PROCEDURE — 93306 ECHO (CUPID ONLY): ICD-10-PCS | Mod: 26,,, | Performed by: SPECIALIST

## 2023-05-24 PROCEDURE — 27000221 HC OXYGEN, UP TO 24 HOURS

## 2023-05-24 PROCEDURE — 84484 ASSAY OF TROPONIN QUANT: CPT | Performed by: INTERNAL MEDICINE

## 2023-05-24 PROCEDURE — 85027 COMPLETE CBC AUTOMATED: CPT | Performed by: INTERNAL MEDICINE

## 2023-05-24 PROCEDURE — 63600175 PHARM REV CODE 636 W HCPCS: Performed by: INTERNAL MEDICINE

## 2023-05-24 PROCEDURE — 80048 BASIC METABOLIC PNL TOTAL CA: CPT | Performed by: INTERNAL MEDICINE

## 2023-05-24 PROCEDURE — 36415 COLL VENOUS BLD VENIPUNCTURE: CPT | Performed by: INTERNAL MEDICINE

## 2023-05-24 PROCEDURE — 80053 COMPREHEN METABOLIC PANEL: CPT | Performed by: INTERNAL MEDICINE

## 2023-05-24 PROCEDURE — 25000003 PHARM REV CODE 250

## 2023-05-24 PROCEDURE — 36556 INSERT NON-TUNNEL CV CATH: CPT | Mod: ,,, | Performed by: ANESTHESIOLOGY

## 2023-05-24 PROCEDURE — 93306 TTE W/DOPPLER COMPLETE: CPT | Mod: 26,,, | Performed by: SPECIALIST

## 2023-05-24 PROCEDURE — 36620 ARTERIAL LINE: ICD-10-PCS | Mod: ,,, | Performed by: ANESTHESIOLOGY

## 2023-05-24 PROCEDURE — 99223 1ST HOSP IP/OBS HIGH 75: CPT | Mod: ,,, | Performed by: INTERNAL MEDICINE

## 2023-05-24 PROCEDURE — 94799 UNLISTED PULMONARY SVC/PX: CPT

## 2023-05-24 PROCEDURE — C9113 INJ PANTOPRAZOLE SODIUM, VIA: HCPCS | Performed by: INTERNAL MEDICINE

## 2023-05-24 PROCEDURE — 92610 EVALUATE SWALLOWING FUNCTION: CPT

## 2023-05-24 PROCEDURE — 99223 PR INITIAL HOSPITAL CARE,LEVL III: ICD-10-PCS | Mod: ,,, | Performed by: INTERNAL MEDICINE

## 2023-05-24 PROCEDURE — 36620 INSERTION CATHETER ARTERY: CPT | Mod: ,,, | Performed by: ANESTHESIOLOGY

## 2023-05-24 PROCEDURE — 25000003 PHARM REV CODE 250: Performed by: ANESTHESIOLOGY

## 2023-05-24 PROCEDURE — 94761 N-INVAS EAR/PLS OXIMETRY MLT: CPT

## 2023-05-24 PROCEDURE — 36556 CENTRAL LINE: ICD-10-PCS | Mod: ,,, | Performed by: ANESTHESIOLOGY

## 2023-05-24 PROCEDURE — 93306 TTE W/DOPPLER COMPLETE: CPT

## 2023-05-24 PROCEDURE — 85025 COMPLETE CBC W/AUTO DIFF WBC: CPT | Performed by: INTERNAL MEDICINE

## 2023-05-24 PROCEDURE — 99900031 HC PATIENT EDUCATION (STAT)

## 2023-05-24 PROCEDURE — 12000002 HC ACUTE/MED SURGE SEMI-PRIVATE ROOM

## 2023-05-24 RX ORDER — HYDROMORPHONE HYDROCHLORIDE 2 MG/1
2 TABLET ORAL
Status: DISCONTINUED | OUTPATIENT
Start: 2023-05-24 | End: 2023-05-25 | Stop reason: HOSPADM

## 2023-05-24 RX ORDER — LORAZEPAM 0.5 MG/1
1 TABLET ORAL EVERY 6 HOURS PRN
Status: DISCONTINUED | OUTPATIENT
Start: 2023-05-24 | End: 2023-05-25 | Stop reason: HOSPADM

## 2023-05-24 RX ORDER — SODIUM CHLORIDE, SODIUM LACTATE, POTASSIUM CHLORIDE, CALCIUM CHLORIDE 600; 310; 30; 20 MG/100ML; MG/100ML; MG/100ML; MG/100ML
INJECTION, SOLUTION INTRAVENOUS CONTINUOUS
Status: DISCONTINUED | OUTPATIENT
Start: 2023-05-24 | End: 2023-05-24

## 2023-05-24 RX ORDER — INSULIN ASPART 100 [IU]/ML
1-10 INJECTION, SOLUTION INTRAVENOUS; SUBCUTANEOUS
Status: DISCONTINUED | OUTPATIENT
Start: 2023-05-25 | End: 2023-05-25 | Stop reason: HOSPADM

## 2023-05-24 RX ORDER — CHLORHEXIDINE GLUCONATE ORAL RINSE 1.2 MG/ML
15 SOLUTION DENTAL 2 TIMES DAILY
Status: DISCONTINUED | OUTPATIENT
Start: 2023-05-24 | End: 2023-05-25 | Stop reason: HOSPADM

## 2023-05-24 RX ORDER — MUPIROCIN 20 MG/G
OINTMENT TOPICAL 2 TIMES DAILY
Status: DISCONTINUED | OUTPATIENT
Start: 2023-05-24 | End: 2023-05-25 | Stop reason: HOSPADM

## 2023-05-24 RX ORDER — OXYMETAZOLINE HCL 0.05 %
2 SPRAY, NON-AEROSOL (ML) NASAL 2 TIMES DAILY
Status: DISCONTINUED | OUTPATIENT
Start: 2023-05-24 | End: 2023-05-24

## 2023-05-24 RX ORDER — OXYMETAZOLINE HCL 0.05 %
2 SPRAY, NON-AEROSOL (ML) NASAL 2 TIMES DAILY
Status: DISCONTINUED | OUTPATIENT
Start: 2023-05-24 | End: 2023-05-25 | Stop reason: HOSPADM

## 2023-05-24 RX ORDER — LINEZOLID 2 MG/ML
600 INJECTION, SOLUTION INTRAVENOUS
Status: DISCONTINUED | OUTPATIENT
Start: 2023-05-24 | End: 2023-05-25 | Stop reason: HOSPADM

## 2023-05-24 RX ADMIN — SODIUM CHLORIDE, SODIUM LACTATE, POTASSIUM CHLORIDE, AND CALCIUM CHLORIDE: .6; .31; .03; .02 INJECTION, SOLUTION INTRAVENOUS at 09:05

## 2023-05-24 RX ADMIN — INSULIN ASPART 6 UNITS: 100 INJECTION, SOLUTION INTRAVENOUS; SUBCUTANEOUS at 10:05

## 2023-05-24 RX ADMIN — HYDROMORPHONE HYDROCHLORIDE 2 MG: 2 TABLET ORAL at 05:05

## 2023-05-24 RX ADMIN — HYDROCODONE BITARTRATE AND ACETAMINOPHEN 1 TABLET: 5; 325 TABLET ORAL at 04:05

## 2023-05-24 RX ADMIN — MUPIROCIN 1 G: 20 OINTMENT TOPICAL at 09:05

## 2023-05-24 RX ADMIN — LORAZEPAM 1 MG: 1 TABLET ORAL at 05:05

## 2023-05-24 RX ADMIN — PANTOPRAZOLE SODIUM 40 MG: 40 INJECTION, POWDER, LYOPHILIZED, FOR SOLUTION INTRAVENOUS at 09:05

## 2023-05-24 RX ADMIN — INSULIN ASPART 4 UNITS: 100 INJECTION, SOLUTION INTRAVENOUS; SUBCUTANEOUS at 02:05

## 2023-05-24 RX ADMIN — Medication 2 SPRAY: at 02:05

## 2023-05-24 RX ADMIN — MUPIROCIN 1 G: 20 OINTMENT TOPICAL at 08:05

## 2023-05-24 RX ADMIN — MEROPENEM 2 G: 1 INJECTION, POWDER, FOR SOLUTION INTRAVENOUS at 09:05

## 2023-05-24 RX ADMIN — LORAZEPAM 1 MG: 1 TABLET ORAL at 10:05

## 2023-05-24 RX ADMIN — LINEZOLID 600 MG: 600 INJECTION, SOLUTION INTRAVENOUS at 09:05

## 2023-05-24 RX ADMIN — MEROPENEM 2 G: 1 INJECTION, POWDER, FOR SOLUTION INTRAVENOUS at 07:05

## 2023-05-24 RX ADMIN — HYDROMORPHONE HYDROCHLORIDE 2 MG: 2 TABLET ORAL at 10:05

## 2023-05-24 RX ADMIN — CHLORHEXIDINE GLUCONATE 15 ML: 1.2 RINSE ORAL at 08:05

## 2023-05-24 RX ADMIN — INSULIN ASPART 3 UNITS: 100 INJECTION, SOLUTION INTRAVENOUS; SUBCUTANEOUS at 06:05

## 2023-05-24 RX ADMIN — LINEZOLID 600 MG: 600 INJECTION, SOLUTION INTRAVENOUS at 08:05

## 2023-05-24 RX ADMIN — Medication 2 SPRAY: at 09:05

## 2023-05-24 RX ADMIN — HYDROMORPHONE HYDROCHLORIDE 2 MG: 2 TABLET ORAL at 11:05

## 2023-05-24 RX ADMIN — HYDROCODONE BITARTRATE AND ACETAMINOPHEN 1 TABLET: 5; 325 TABLET ORAL at 08:05

## 2023-05-24 RX ADMIN — CHLORHEXIDINE GLUCONATE 15 ML: 1.2 RINSE ORAL at 09:05

## 2023-05-24 NOTE — NURSING
[facility]  Wound Care    Patient Name:  James Jiang   MRN:  [id number]  Date: 5/24/2023  Diagnosis: <principal problem not specified>    History:     No past medical history on file.    Social History     Socioeconomic History    Marital status:        Precautions:     Allergies as of 05/23/2023    (No Known Allergies)       [facility] Assessment Details/Treatment   05/24/2023  63 yr old male  in bed spoke to him from the door   asked if he wanted me to see him for any pressure   says that he is going on hospice so he don't need anything saw that he had triad and antifungal powder  at bedside  and instructed on the use of the products

## 2023-05-24 NOTE — CONSULTS
Pulmonary/Critical Care Consult      PATIENT NAME: James Jiang  MRN: [id number]  TODAY'S DATE: 2023  ADMIT DATE: 2023  AGE: 63 y.o. : 1960    CONSULT REQUESTED BY: Cherie Hernandez MD    REASON FOR CONSULT:   Septic shock    HISTORY OF PRESENT ILLNESS   James Jiang is a 63 y.o. male with a PMH of afib, right septic hip w/ MRSA s/p washout, lower abdominal abscess, HTN, ESBL UTI, chronic indwelling Donis, left renal mass thought to be RCC, renal stones, and heavy smoking who is currently on Hospice on whom we have been consulted for septic shock.    The patient was sent to the ED by his significant other/caregiver because he was delirious. He takes Ativan daily. He was initially hypotensive and he was on norepinephrine, which has since been titrated off. Biofire PCR has identified MRSA and Enterobacterales on blood culture samples. He is on empiric linezolid and meropenem.    The patient is now lucid, and he would like to go back on Hospice. He is clear that he would not want to return to the hospital in the future.      REVIEW OF SYSTEMS  GENERAL: Feeling unwell. No fevers, chills, or night sweats.  EYES: Vision is good.  ENT: No sinusitis or pharyngitis.   HEART: No chest pain or palpitations.  LUNGS: No cough, sputum, or wheezing.  GI: No abdominal pain, nausea, vomiting, or diarrhea.  : No dysuria, urgency, or frequency.  SKIN: No lesions or rashes.  MUSCULOSKELETAL: Pain.  NEURO: No headaches or neuropathy.  LYMPH: No edema or adenopathy.  PSYCH: No anxiety or depression.  ENDO: No unexpected weight change.    ALLERGIES  Review of patient's allergies indicates:  No Known Allergies    INPATIENT SCHEDULED MEDICATIONS   chlorhexidine  15 mL Mouth/Throat BID    insulin aspart U-100  1-10 Units Subcutaneous Q4H    linezolid  600 mg Intravenous Q12H    meropenem (MERREM) IVPB  2 g Intravenous Q8H    mupirocin   Nasal BID    oxymetazoline  2 spray Each Nostril BID    pantoprazole  40 mg  Intravenous Daily      lactated ringers 100 mL/hr at 05/24/23 0908       MEDICAL AND SURGICAL HISTORY  No past medical history on file.  No past surgical history on file.    ALCOHOL, TOBACCO AND DRUG USE  Social History     Tobacco Use   Smoking Status Not on file   Smokeless Tobacco Not on file     Social History     Substance and Sexual Activity   Alcohol Use Not on file     Social History     Substance and Sexual Activity   Drug Use Not on file       FAMILY HISTORY  No family history on file.    VITAL SIGNS (MOST RECENT)  Temp: 98.3 °F (36.8 °C) (05/24/23 0800)  Pulse: 80 (05/24/23 0900)  Resp: (!) 28 (05/24/23 1055)  BP: (!) 141/68 (05/24/23 0315)  SpO2: 95 % (05/24/23 0900)    INTAKE AND OUTPUT (LAST 24 HOURS):  Intake/Output Summary (Last 24 hours) at 5/24/2023 1117  Last data filed at 5/24/2023 0624  Gross per 24 hour   Intake --   Output 900 ml   Net -900 ml       WEIGHT  Wt Readings from Last 1 Encounters:   05/24/23 106.9 kg (235 lb 11.2 oz)       PHYSICAL EXAM  GENERAL: A&O. NAD.  HEENT: Extraocular movements intact. Pharynx moist.  NECK: Supple. No JVD or hepatojugular reflux.  HEART: Regular rate and normal rhythm. No murmur or gallop auscultated.  LUNGS: Clear to auscultation and percussion. Lung excursion symmetrical.   ABDOMEN: Soft, non-tender, non-distended, no masses palpated.  EXTREMITIES: Normal muscle tone and joint movement, no cyanosis or clubbing.   LYMPHATICS: No adenopathy palpated, no edema.  SKIN: Dry, intact, no lesions.   NEURO: No gross deficit.  PSYCH: Appropriate affect    ACUTE PHASE REACTANT (LAST 24 HOURS)  No results for input(s): FERRITIN, CRP, LDH, DDIMER in the last 24 hours.    CBC LAST (LAST 24 HOURS)  Recent Labs   Lab 05/24/23  0358   WBC 21.56*   RBC 3.48*   HGB 9.9*   HCT 31.3*   MCV 90   MCH 28.4   MCHC 31.6*   RDW 13.9      MPV 11.8   GRAN 86.6*  18.7*   LYMPH 5.3*  1.1   MONO 6.5  1.4*   BASO 0.05   NRBC 0       CHEMISTRY LAST (LAST 24 HOURS)  Recent Labs    Lab   0000 05/23/23  1506 05/23/23  1511 05/24/23  0130 05/24/23  0358   NA  --   --  135*   < > 134*   K  --   --  4.0   < > 4.3   CL  --   --  96   < > 99   CO2  --   --  24   < > 25   ANIONGAP  --   --  15   < > 10   BUN  --   --  32*   < > 29*   CREATININE  --   --  1.6*   < > 1.4   GLU  --   --  374*   < > 264*   CALCIUM  --   --  9.5   < > 8.7   PH  --  7.424  --   --   --    MG  --   --  1.6  --   --    ALBUMIN   < >  --  3.7  --  3.2*   PROT   < >  --  8.0  --  7.4   ALKPHOS   < >  --  170*  --  101   ALT   < >  --  21  --  16   AST   < >  --  42*  --  35   BILITOT   < >  --  1.4*  --  1.3*    < > = values in this interval not displayed.       COAGULATION LAST (LAST 24 HOURS)  Recent Labs   Lab 05/23/23  1903   INR 1.1   APTT 29.2       CARDIAC PROFILE (LAST 24 HOURS)  Recent Labs   Lab 05/23/23  1511   *       LAST 7 DAYS MICROBIOLOGY   Microbiology Results (last 7 days)       Procedure Component Value Units Date/Time    Blood culture [[id number]] Collected: 05/23/23 1458    Order Status: Completed Specimen: Blood from Peripheral, Forearm, Left Updated: 05/24/23 1111     Blood Culture, Routine Gram stain valentino bottle: Gram positive cocci and Gram negative rods      Positive results previously called 05:40  05/24/2023 KS3      Gram stain aer bottle: Gram positive cocci      Positive results previously called    Urine culture [[id number]]  (Abnormal) Collected: 05/23/23 1525    Order Status: Completed Specimen: Urine Updated: 05/24/23 0803     Urine Culture, Routine GRAM NEGATIVE HENNA, LACTOSE   >100,000 cfu/ml  Identification and susceptibility pending      Narrative:      Specimen Source->Urine    Blood culture [[id number]]     Order Status: No result Specimen: Blood     Blood culture [[id number]] Collected: 05/23/23 1511    Order Status: Completed Specimen: Blood from Peripheral, Hand, Left Updated: 05/24/23 0511     Blood Culture, Routine Gram stain valentino bottle: Gram negative rods and Gram  positive cocci      Results called to and read back by: Arpan Pearce RN in ICU  05/24/2023      02:23 KS3      Gram stain aer bottle: Gram negative rods and  Gram positive cocci      Positive results previously called  05/24/2023  05:10 KS3    Rapid Organism ID by PCR (from Blood culture) [[id number]]  (Abnormal) Collected: 05/23/23 1511    Order Status: Completed Updated: 05/24/23 0340     Enterococcus faecalis Not Detected     Enterococcus faecium Not Detected     Listeria Monocytogenes Not Detected     Staphylococcus spp. See species for ID     Staphylococcus aureus Detected     Staphylococcus epidermidis Not Detected     Staphylococcus lugdunensis Not Detected     Streptococcus species Not Detected     Streptococcus agalactiae Not Detected     Streptococcus pneumoniae Not Detected     Streptococcus pyogenes Not Detected     Acinetobacter calcoaceticus/baumannii complex Not Detected     Bacteroides fragilis Not Detected     Enterobacerales Detected     Enterobacter cloacae complex Not Detected     Escherichia Not Detected     Klebsiella aerogenes Not Detected     Klebsiella oxytoca Not Detected     Klebsiella pneumoniae group Not Detected     Proteus Not Detected     Salmonella sp Not Detected     Serratia marcescens Not Detected     Haemophilus influenzae Not Detected     Neisseria meningtidis Not Detected     Pseudomonas aeruginosa Not Detected     Stenotrophomonas maltophilia Not Detected     Candida albicans Not Detected     Candida auris Not Detected     Candida glabrata Not Detected     Candida krusei Not Detected     Candida parapsilosis Not Detected     Candida tropicalis Not Detected     Cryptococcus neoformans/gattii Not Detected     CTX-M (ESBL ) Not Detected     IMP (Carbapenem resistant) Not Detected     KPC resistance gene (Carbapenem resistant) Not Detected     mcr-1  Test not applicable     mec A/C  Test not applicable     mec A/C and MREJ (MRSA) gene Detected     Comment: Antimicrobial  resistance gene critical result(s) called and verbal   readback obtained from Arpan Pearce RN in ICU by KS3 05/24/2023 03:40          NDM (Carbapenem resistant) Not Detected     OXA-48-like (Carbapenem resistant) Not Detected     van A/B (VRE gene) Test not applicable     VIM (Carbapenem resistant) Not Detected    Narrative:      Antimicrobial resistance gene critical result(s) called and verbal   readback obtained from Arpan Pearce RN in ICU by KS3 05/24/2023 03:40            MOST RECENT IMAGING  X-Ray Chest AP Portable  PORTABLE CHEST X-RAY    CLINICAL HISTORY: line placement    COMPARISON: 5/23/2023.    FINDINGS: Portable AP view of the chest was obtained with overlying monitor leads in place. Right IJ central line has been placed and terminates in the distribution of the SVC without pneumothorax. The lungs are better aerated. There are persistent edematous changes. Normal heart size. No significant pleural fluid.    IMPRESSION: No pneumothorax following central line insertion    Electronically signed by:  Jerrod Jerome MD  5/24/2023 12:42 AM CDT Workstation: [id number]      CURRENT VISIT EKG  Results for orders placed or performed during the hospital encounter of 05/23/23   EKG 12-lead    Narrative    Test Reason : R41.82,    Vent. Rate : 118 BPM     Atrial Rate : 118 BPM     P-R Int : 146 ms          QRS Dur : 092 ms      QT Int : 326 ms       P-R-T Axes : 037 -11 059 degrees     QTc Int : 456 ms    Sinus tachycardia with Premature atrial complexes with Aberrant conduction  Nonspecific ST abnormality  Abnormal ECG  No previous ECGs available    Referred By: [email or web address]   SELF           Confirmed By:        ECHOCARDIOGRAM RESULTS  No results found for this or any previous visit.        RESPIRATORY SUPPORT              LAST ARTERIAL BLOOD GAS  ABG  Recent Labs   Lab 05/23/23  1506   PH 7.424   PO2 75*   PCO2 36.4   HCO3 23.9*   BE -1       IMPRESSION AND PLAN  James Jiang is a 63 y.o. male with a PMH of afib,  right septic hip w/ MRSA s/p washout, lower abdominal abscess, HTN, ESBL UTI, chronic indwelling Donis, left renal mass thought to be RCC, renal stones, and heavy smoking who is currently on Hospice on whom we have been consulted for septic shock.    #Septic shock, resolved  #Bacteremia  #Complicated UTI  #Likely renal cell carcinoma on Hospice  #Type 2 NSTEMI (demand ischemia)  #Chronic hypoxic respiratory failure on 4 L at baseline  - empiric Abx pending culture results  - appreciate ID recs  - fluid resuscitation  - appreciate palliative care assistance  - continue supplemental O2 as needed  - continue empiric linezolid and meropenem for now   - may stop at any point, as this is consistent with the patient's GOC  - ID consult pending      Code status: DNR/DNI  Dispo: transfer to med/surg    Discussed with hospitalist, RN, CM, patient's significant other/caregiver. I have reviewed the patient's most recent CBC and serum chemistry, as well as the most recent chest x-ray and/or chest CT. My interpretation of the chest imaging is interstitial and alveolar hazy opacities on CXR that may represent edema, pneumonia, or pneumonitis; lung bases on CT abd/pelvis show patchy GGOs.    Pulmonary & Critical Care Medicine will sign off at this time.   Please call with any further questions or concerns.    Lul Denney MD  [facility]  Department of Pulmonology  Date of Service: 05/24/2023  11:17 AM

## 2023-05-24 NOTE — PROGRESS NOTES
Pharmacist Renal Dose Adjustment Note    James Jiang is a 63 y.o. male being treated with the medication meropenem    Patient Data:    Vital Signs (Most Recent):  Temp: 98.1 °F (36.7 °C) (05/24/23 0315)  Pulse: 80 (05/24/23 0615)  Resp: (!) 24 (05/24/23 0615)  BP: (!) 141/68 (05/24/23 0315)  SpO2: (!) 94 % (05/24/23 0615) Vital Signs (72h Range):  Temp:  [97.7 °F (36.5 °C)-100.1 °F (37.8 °C)]   Pulse:  []   Resp:  [13-36]   BP: ()/(43-84)   SpO2:  [78 %-99 %]   Arterial Line BP: ()/(48-71)      Recent Labs   Lab 05/23/23  1511 05/24/23  0130 05/24/23  0358   CREATININE 1.6* 1.6* 1.4     Serum creatinine: 1.4 mg/dL 05/24/23 0358  Estimated creatinine clearance: 62.4 mL/min    Meropenem 2 gm every 12 hour will be changed to meropenem 2 gm every 8 hour    Pharmacist's Name: Yolanda Hernandez  Pharmacist's Extension: [phone]

## 2023-05-24 NOTE — PLAN OF CARE
05/24/23 1104   Post-Acute Status   Post-Acute Authorization Placement   Post-Acute Placement Status Set-up Complete/Auth obtained   Coverage medicare   Discharge Delays None known at this time   Discharge Plan   Discharge Plan A Hospice/home   Discharge Plan B Hospice/home     Spoke to patient who was AAOx3.  Patient request to discharge home with resumption of [facility].  Spoke to Ryan with [facility] who is ok to take pt back at discharge and will come speak with patient while in hospital.    CarePort Alert: YES response from [facility], [facility] re: Referral [id number] for patient in [facility] [id number]: Yes, willing to accept patient Rep, Ryan, on his way to sign consents with patient

## 2023-05-24 NOTE — ANESTHESIA PROCEDURE NOTES
Central Line    Diagnosis: Congestive heart failure  Patient location during procedure: ICU  Timeout: 5/24/2023 12:05 AM  Procedure end time: 5/24/2023 12:20 AM    Staffing  Authorizing Provider: Julián Patton MD  Performing Provider: Julián Patton MD    Staffing  Performed: anesthesiologist   Anesthesiologist: Julián Patton MD  Anesthesiologist was present at the time of the procedure.  Preanesthetic Checklist  Completed: patient identified, site marked, risks and benefits discussed, monitors and equipment checked, timeout performed and anesthesia consent given  Indication   Indication: vascular access     Anesthesia   local infiltration    Central Line   Skin Prep: skin prepped with ChloraPrep, skin prep agent completely dried prior to procedure  Sterile Barriers Followed: Yes    All five maximal barriers used- gloves, gown, cap, mask, and large sterile sheet    hand hygiene performed prior to central venous catheter insertion  Location: right internal jugular.   Catheter type: triple lumen  Catheter Size: 7 Fr  Inserted Catheter Length: 16 cm  Ultrasound: vascular probe with ultrasound   Vessel Caliber: medium, patent, compressibility normal  Needle advanced into vessel with real time Ultrasound guidance.  Guidewire confirmed in vessel.  sterile gel and probe cover used in ultrasound-guided central venous catheter insertion  Manometry: none  Insertion Attempts: 1   Securement:line sutured, chlorhexidine patch, sterile dressing applied and blood return through all ports    Post-Procedure   X-Ray: no pneumothorax on x-ray, placement verified by x-ray, tip termination and successful placement  Tip termination comments: SVC   Adverse Events:none      Guidewire Guidewire removed intact.   Additional Notes  No indication of arterial puncture at any time.  All ports aspirated and flushed easily.

## 2023-05-24 NOTE — PROGRESS NOTES
[facility] Medicine  Progress Note    Patient Name: James Jiang  MRN: [id number]  Patient Class: IP- Inpatient   Admission Date: 5/23/2023  Length of Stay: 1 days  Attending Physician: Cherie Hernandez MD  Primary Care Provider: Primary Doctor No        Subjective:     Principal Problem:Bacteremia        HPI:  63-year-old male history of heavy smoker, atrial fibrillation history of right septic hip MRSA with hx washout, is on hospice, lower abdominal abscess, hypertension, history of ESBL UTI, chronic indwelling Donis catheter secondary to urinary retention, left ureteral stone, left renal mass thought to be renal cell carcinoma, last Donis exchange was yesterday.     Of note pt has alternative chart MRN [id number], will need to be merged.     In the ER patient was hypoxic confused, hypotensive.  Fentanyl patch was removed with improvement in blood pressure and mentation.  Patient was in septic shock, presumptively from UTI.  Also had hs-troponin elevation 6355      Overview/Hospital Course:  No notes on file    Interval History: Patient is on home hospice with [facility], states lives with caregiver/ significant other.  Brought into the ED due to altered mental status.  Does report mild shortness of breath, states he is on home oxygen.  States previously he was able ambulate to bathroom but not recently, states he has had 2 falls.  He was unkept on arrival to the ED. He has met with multiple providers including palliative Care, he is alert and oriented, he confirms that he wishes to be discharged home with hospice, does not wish to be re-hospitalized, he adamantly declines any form of placement.  T-max 100.1°.  He is off Levophed.  Labs with WBC 21, hemoglobin 9.9, BUN/ creatinine 29/1.4, albumin 3.2.  Blood cultures on presentation with Staph aureus and Enterobacter.  Preliminary urine culture with Gram-negative oleg.  Echocardiogram with EF of 60%.  Of note patient with history of urinary  retention with chronic indwelling Donis catheter, Donis was removed on admission, gross hematuria subsequently noted (now resolved), Donis not able to be replaced, he has condom catheter, he urinated during my encounter, communicated with nursing needs scheduled bladder scan to ensure he is not retaining prior to discharging with hospice.  Communicated with consultants.    Review of Systems   Constitutional:  Negative for fever.   Respiratory:  Positive for shortness of breath.    Musculoskeletal:         Fall at home   Neurological:  Positive for weakness.   Objective:     Vital Signs (Most Recent):  Temp: 98 °F (36.7 °C) (05/24/23 1115)  Pulse: 79 (05/24/23 1400)  Resp: (!) 26 (05/24/23 1400)  BP: (!) 118/52 (05/24/23 1115)  SpO2: 96 % (05/24/23 1400) Vital Signs (24h Range):  Temp:  [98 °F (36.7 °C)-98.9 °F (37.2 °C)] 98 °F (36.7 °C)  Pulse:  [] 79  Resp:  [13-41] 26  SpO2:  [91 %-99 %] 96 %  BP: ()/(47-84) 118/52  Arterial Line BP: ()/(48-71) 112/54     Weight: 106.9 kg (235 lb 11.2 oz)  Body mass index is 35.84 kg/m².    Intake/Output Summary (Last 24 hours) at 5/24/2023 1702  Last data filed at 5/24/2023 0624  Gross per 24 hour   Intake --   Output 900 ml   Net -900 ml         Physical Exam  Vitals and nursing note reviewed.   Constitutional:       Comments: Lying in bed, chronically ill appearing, cooperative   HENT:      Head: Normocephalic and atraumatic.      Mouth/Throat:      Mouth: Mucous membranes are moist.   Cardiovascular:      Rate and Rhythm: Normal rate and regular rhythm.   Pulmonary:      Comments: On NC, no wheeze or accessory muscle use  Abdominal:      General: There is no distension.      Palpations: Abdomen is soft.      Tenderness: There is no abdominal tenderness. There is no guarding.   Genitourinary:     Comments: Condom catheter in place  Neurological:      Mental Status: He is alert and oriented to person, place, and time.      Comments: Generalized weakness    Psychiatric:         Mood and Affect: Mood normal.           Significant Labs: Blood Culture:   Recent Labs   Lab 05/23/23  1458 05/23/23  1511   LABBLOO Gram stain valentino bottle: Gram positive cocci and Gram negative rods  Positive results previously called 05:40  05/24/2023 KS3  Gram stain aer bottle: Gram positive cocci  Positive results previously called Gram stain valentino bottle: Gram negative rods and Gram positive cocci  Results called to and read back by: Arpan Pearce RN in ICU  05/24/2023  02:23 KS3  Gram stain aer bottle: Gram negative rods and  Gram positive cocci  Positive results previously called  05/24/2023  05:10 KS3     BMP:   Recent Labs   Lab 05/23/23  1511 05/24/23  0130 05/24/23  0358   *   < > 264*   *   < > 134*   K 4.0   < > 4.3   CL 96   < > 99   CO2 24   < > 25   BUN 32*   < > 29*   CREATININE 1.6*   < > 1.4   CALCIUM 9.5   < > 8.7   MG 1.6  --   --     < > = values in this interval not displayed.     CBC:   Recent Labs   Lab 05/23/23  1511 05/24/23  0130 05/24/23  0358   WBC 11.99 24.11* 21.56*   HGB 11.1* 10.0* 9.9*   HCT 34.8* 31.5* 31.3*    212 207     CMP:   Recent Labs   Lab 05/23/23  1511 05/24/23  0130 05/24/23  0358   * 133* 134*   K 4.0 4.4 4.3   CL 96 97 99   CO2 24 24 25   * 324* 264*   BUN 32* 32* 29*   CREATININE 1.6* 1.6* 1.4   CALCIUM 9.5 8.6* 8.7   PROT 8.0  --  7.4   ALBUMIN 3.7  --  3.2*   BILITOT 1.4*  --  1.3*   ALKPHOS 170*  --  101   AST 42*  --  35   ALT 21  --  16   ANIONGAP 15 12 10     Cardiac Markers:   Recent Labs   Lab 05/23/23  1511   *     Magnesium:   Recent Labs   Lab 05/23/23  1511   MG 1.6     Pathology Results  (Last 10 years)      None          Troponin:   Recent Labs   Lab 05/23/23  1511 05/23/23  1903 05/24/23  0358   TROPONINIHS 6355.8* 5812.3* 2990.4*     TSH: No results for input(s): TSH in the last 4320 hours.  Urine Culture:   Recent Labs   Lab 05/23/23  1525   LABURIN GRAM NEGATIVE HENNA, LACTOSE    >100,000 cfu/ml  Identification and susceptibility pending  *     Urine Studies:   Recent Labs   Lab 05/23/23  1525   COLORU Brown*   APPEARANCEUA Cloudy*   PHUR 6.0   SPECGRAV 1.030   PROTEINUA 1+*   GLUCUA 4+*   KETONESU 1+*   BILIRUBINUA Negative   OCCULTUA 3+*   NITRITE Positive*   UROBILINOGEN Negative   LEUKOCYTESUR 3+*   RBCUA >100*   WBCUA >100*   BACTERIA Occasional   SQUAMEPITHEL 2   HYALINECASTS 4*       Significant Imaging: I have reviewed all pertinent imaging results/findings within the past 24 hours.    X-Ray Hip 2 or 3 views Right (with Pelvis when performed)    Result Date: 5/23/2023  Reason: AMS, right hip pain FINDINGS: AP pelvis and 2 views of right hip show no acute fracture, dislocation, or destructive osseous lesion. Moderate degenerative narrowing affects bilateral hip joint spaces. Antegrade intramedullary nail and proximal helical blade lie in right proximal femur without loosening or other complication. Pubic symphysis and sacroiliac joints are maintained. Arterial vascular calcifications are present along with pelvic phleboliths and surgical clips projecting about the left pelvis. IMPRESSION: 1. Moderate right hip osteoarthrosis. 2. No acute abnormality of right hip. Electronically signed by:  Eduin Ojeda MD  5/23/2023 4:29 PM CDT Workstation: [id number]    X-Ray Femur 2 AP/LAT Right    Result Date: 5/23/2023  Reason: AMS, right upper leg pain FINDINGS: 2 views of right femur show no acute fracture or dislocation. Antegrade intramedullary nail is transfixed with proximal helical blade and single distal interlocking screw. Surgical hardware shows no loosening or other complication. Arterial vascular calcifications are present. IMPRESSION: No acute abnormality of right femur. Electronically signed by:  Eduin Ojeda MD  5/23/2023 4:28 PM CDT Workstation: [id number]    CT Head Without Contrast    Result Date: 5/23/2023  CT of the head: 5/23/2023 6:14 PM CDT HISTORY: 63 years   old Male with Mental status change, unknown cause. COMPARISON: None available TECHNIQUE: Multiple axial contiguous images were obtained through the head without intravenous contrast administered. This exam was performed according to our departmental dose-optimization program, which includes automated exposure control, adjustment of the mA and/or KV according to the patient's size and/or use of iterative reconstruction technique. FINDINGS: The ventricles and cerebral sulci demonstrate mild prominence, consistent with cerebral atrophy. There are mild periventricular hypodensities, suggestive of periventricular white matter changes. The gray-white matter differentiation is within normal limits. Both globes appear symmetric. The mastoid air cells appear clear. There is minimal mucoperiosteal thickening of the ethmoid sinuses. There is profound atherosclerosis seen at the carotid and vertebral arteries. The calvarium is intact. No extra-axial fluid collection is seen. No midline shift or mass effect is apparent. There are no findings to suggest acute intracranial hemorrhage. IMPRESSION: 1. No acute intracranial hemorrhage is seen. 2. There is mild cerebral atrophy and periventricular white matter changes are seen. Electronically signed by:  Kamilla Shearer DO  5/23/2023 6:15 PM CDT Workstation: [id number]    X-Ray Chest AP Portable    Result Date: 5/24/2023  PORTABLE CHEST X-RAY CLINICAL HISTORY: line placement COMPARISON: 5/23/2023. FINDINGS: Portable AP view of the chest was obtained with overlying monitor leads in place. Right IJ central line has been placed and terminates in the distribution of the SVC without pneumothorax. The lungs are better aerated. There are persistent edematous changes. Normal heart size. No significant pleural fluid. IMPRESSION: No pneumothorax following central line insertion Electronically signed by:  Jerrod Jerome MD  5/24/2023 12:42 AM CDT Workstation: [id number]    X-Ray Chest AP  Portable    Result Date: 5/23/2023  EXAM: XR CHEST AP PORTABLE HISTORY: Altered mental status. Dyspnea. COMPARISON:PA and lateral chest dated 4/8/2011. FINDINGS: The lungs are poorly inflated. There is marked cardiomegaly and also widening of the mediastinum, in part due to portable technique. However significant true cardiomegaly and mediastinal widening due to volume overload is also suspected. There is prominent pulmonary vascular congestion and moderate diffuse bilateral pulmonary edema. No significant pleural effusion is identified. IMPRESSION:   Congestive heart failure. Electronically signed by:  Irvin Ramirez MD  5/23/2023 4:13 PM CDT Workstation: [id number]    US Lower Extremity Veins Bilateral    Result Date: 5/23/2023  BILATERAL LOWER EXTREMITY VENOUS DOPPLER CLINICAL HISTORY: Pain, swelling, possible deep venous thrombosis. FINDINGS: Sonographic evaluation of the left and right lower extremity deep venous system was performed. Gray scale, color, and spectral Doppler analysis was performed along with graded compression maneuvers where applicable. Where visualized, there is no evidence of intraluminal thrombus identified to suggest deep venous thrombosis. IMPRESSION: 1. No evidence of right lower extremity deep venous thrombosis where visualized. 2. No evidence of left lower extremity deep venous thrombosis where visualized. Electronically signed by:  Jerrod Jerome MD  5/23/2023 10:13 PM CDT Workstation: [id number]    Echo    Result Date: 5/24/2023  · The left ventricle is normal in size with normal systolic function. · Normal right ventricular size with low normal right ventricular systolic function. · Normal left ventricular diastolic function. · The estimated ejection fraction is 60%. · Atrial fibrillation not observed. · Mild left atrial enlargement. · Normal central venous pressure (3 mmHg).      CT Abdomen Pelvis  Without Contrast    Result Date: 5/23/2023  CT ABDOMEN AND PELVIS WITHOUT INTRAVENOUS  CONTRAST: 5/23/2023 6:15 PM CDT HISTORY: 63 years  old Male with Abdominal abscess/infection suspected. COMPARISON: None available TECHNIQUE: Axial contiguous images were obtained from the lung bases to the proximal femurs without intravenous contrast administered. Sagittal and coronal reconstructions were also obtained and reviewed. This exam was performed according to our departmental dose-optimization program, which includes automated exposure control, adjustment of the mA and/or KV according to the patient's size and/or use of iterative reconstruction technique. FINDINGS:  Evaluation of the upper abdomen is limited by the lack of intravenous contrast. LUNG BASES: There are bibasilar airspace opacities, right greater than left. The cardiac silhouette is enlarged. There is also some interlobular septal thickening. Mitral annular calcifications and coronary artery calcifications are seen.  No discrete pleural effusions are seen. LIVER: The visualized hepatic parenchyma demonstrates no focal abnormality. GALLBLADDER: The gallbladder demonstrates no evidence of calcified gallstones. SPLEEN: The spleen is normal in size. PANCREAS: The pancreas is unremarkable. ADRENAL GLANDS: The bilateral adrenal glands are unremarkable. KIDNEYS: Both kidneys demonstrate no hydronephrosis. There is a punctate left renal calcification. PELVIS: The urinary bladder is mildly distended. STOMACH: The stomach is mildly distended. BOWEL: The small bowel loops appear unremarkable. No pericolonic inflammatory stranding is seen. There is a moderate of stool seen at the colon. There is a left anterior abdominal wall hernia containing portions of the colon. There are multiple diverticula seen within the sigmoid and descending colon, without evidence to suggest diverticulitis. APPENDIX: The appendix is unremarkable. PERITONEUM: There is no evidence of pneumoperitoneum or free fluid. VESSELS: The IVC is unremarkable. The abdominal aorta is within  normal limits of size. There is mild atherosclerotic calcification at the aorta and iliac vasculature. There is likely stenosis at the superior mesenteric artery which is probably chronic secondary to atherosclerosis. LYMPH NODES: No significantly enlarged lymph nodes are seen in the abdomen or pelvis. BONES AND SOFT TISSUES: No acute osseous abnormality is seen. There is an intramedullary oleg and interlocking screw seen at the left hip. There are multilevel degenerative changes seen at the spine. There are compression deformities of the superior endplates of L3 and L4 which are of indeterminate age. These demonstrate at least 25% loss of height. No significant retropulsion is seen. IMPRESSION: There are bibasilar airspace opacities which can be seen with atelectasis and/or infection. There are compression deformities of the superior endplates of L3 and L4 which are of indeterminate age. There is a left anterior abdominal wall hernia containing portions of the colon. There are punctate nonobstructive left renal calculus. There are bibasilar airspace opacities which may reflect edema, atelectasis, or infection. Electronically signed by:  Kamilla Shearer DO  5/23/2023 6:22 PM CDT Workstation: [id number]         Assessment/Plan:      Active Hospital Problems    Diagnosis    *Bacteremia    JORGE (acute kidney injury)    Hospice care patient    COPD (chronic obstructive pulmonary disease)    Chronic respiratory failure    Troponin I above reference range    History of urinary retention    Anemia    Left renal mass, concerning for malignancy    Leucocytosis    Diabetes mellitus, uncontrolled    Anxiety and depression    UTI (urinary tract infection), complicated     Plan:  Transfer to medical floor as per critical Care/ Pulmonary  Contact isolation with history of ESBL and staph bacteremia while hospitalized   Case management following for re-initiation of home hospice, caregiver unable to take patient home  today, plans for discharge tomorrow   Currently on linezolid ( allergic to vancomycin ) and meropenem while hospitalized for bacteremia   Continue home supplemental oxygen  Resumed on lorazepam and pain medication for comfort   Scheduled bladder scan given prior history of urinary retention with Donis, rule out retention prior to discharging home, communicated with nursing   Resume oral diet  Discontinue unnecessary interventions and further lab checks given goals of care discussion   Appreciate all consultant's input   Case management following for discharge   Further plan as per hospital course        VTE Risk Mitigation (From admission, onward)         Ordered     IP VTE HIGH RISK PATIENT  Once         05/23/23 1826     Place sequential compression device  Until discontinued         05/23/23 1826                Discharge Planning   KELSY:      Code Status: DNR   Is the patient medically ready for discharge?:     Reason for patient still in hospital (select all that apply): Patient trending condition  Discharge Plan A: Hospice/home   Discharge Delays: None known at this time              Cherie Hernandez MD  Department of Hospital Medicine   [facility]

## 2023-05-24 NOTE — PLAN OF CARE
[facility]  Initial Discharge Assessment       Primary Care Provider: Primary Doctor No    Admission Diagnosis: Septic shock [A41.9, R65.21]    Admission Date: 5/23/2023  Expected Discharge Date:     Transition of Care Barriers: None    Assessment completed with pt at bedside.  Patient intends to discharge home on [facility].    Payor: MEDICARE / Plan: MEDICARE PART A & B / Product Type: Government /     Extended Emergency Contact Information  Primary Emergency Contact: Clau Molina  Address: [address] [place], [place] [id number] [place] [address]  Home Phone: [phone]  Mobile Phone: [phone]  Relation: Roommate  Preferred language: English   needed? No    Discharge Plan A: Hospice/home  Discharge Plan B: Hospice/home    No Pharmacies Listed    Initial Assessment (most recent)       Adult Discharge Assessment - 05/24/23 1039          Discharge Assessment    Assessment Type Discharge Planning Assessment     Confirmed/corrected address, phone number and insurance Yes     Confirmed Demographics Correct on Facesheet     Source of Information patient;health record;health care advocate     Communicated KELSY with patient/caregiver No     Reason For Admission ams     People in Home friend(s)     Facility Arrived From: home     Do you expect to return to your current living situation? Yes     Do you have help at home or someone to help you manage your care at home? Yes     Who are your caregiver(s) and their phone number(s)? Clau Molina (Roommate)   [phone]     Prior to hospitilization cognitive status: Alert/Oriented     Current cognitive status: Alert/Oriented     Equipment Currently Used at Home bedside commode;hospital bed;grab bar;glucometer;oxygen;walker, rolling     Readmission within 30 days? No     Patient currently being followed by outpatient case management? No     Do you currently have service(s) that help you manage your care at home? Yes      Name and Contact number of agency [facility] [phone]     Is the pt/caregiver preference to resume services with current agency Yes     Do you take prescription medications? Yes     Do you have prescription coverage? Yes     Coverage medicare     Do you have any problems affording any of your prescribed medications? No     Is the patient taking medications as prescribed? yes     Who is going to help you get home at discharge? TracyClau (Roommate)   [phone]     How do you get to doctors appointments? family or friend will provide     Are you on dialysis? No     Do you take coumadin? No     Discharge Plan A Hospice/home     Discharge Plan B Hospice/home     DME Needed Upon Discharge  none     Discharge Plan discussed with: Patient     Transition of Care Barriers None

## 2023-05-24 NOTE — HPI
63-year-old male history of heavy smoker, atrial fibrillation history of right septic hip MRSA with hx washout, is on hospice, lower abdominal abscess, hypertension, history of ESBL UTI, chronic indwelling Donis catheter secondary to urinary retention, left ureteral stone, left renal mass thought to be renal cell carcinoma, last Donis exchange was yesterday.     Of note pt has alternative chart MRN [id number], will need to be merged.     In the ER patient was hypoxic confused, hypotensive.  Fentanyl patch was removed with improvement in blood pressure and mentation.  Patient was in septic shock, presumptively from UTI.  Also had hs-troponin elevation 6355

## 2023-05-24 NOTE — SUBJECTIVE & OBJECTIVE
Interval History: Patient is on home hospice with [facility], states lives with caregiver/ significant other.  Brought into the ED due to altered mental status.  Does report mild shortness of breath, states he is on home oxygen.  States previously he was able ambulate to bathroom but not recently, states he has had 2 falls.  He was unkept on arrival to the ED. He has met with multiple providers including palliative Care, he is alert and oriented, he confirms that he wishes to be discharged home with hospice, does not wish to be re-hospitalized, he adamantly declines any form of placement.  T-max 100.1°.  He is off Levophed.  Labs with WBC 21, hemoglobin 9.9, BUN/ creatinine 29/1.4, albumin 3.2.  Blood cultures on presentation with Staph aureus and Enterobacter.  Preliminary urine culture with Gram-negative oleg.  Echocardiogram with EF of 60%.  Of note patient with history of urinary retention with chronic indwelling Donis catheter, Donis was removed on admission, gross hematuria subsequently noted (now resolved), Donis not able to be replaced, he has condom catheter, he urinated during my encounter, communicated with nursing needs scheduled bladder scan to ensure he is not retaining prior to discharging with hospice.  Communicated with consultants.    Review of Systems   Constitutional:  Negative for fever.   Respiratory:  Positive for shortness of breath.    Musculoskeletal:         Fall at home   Neurological:  Positive for weakness.   Objective:     Vital Signs (Most Recent):  Temp: 98 °F (36.7 °C) (05/24/23 1115)  Pulse: 79 (05/24/23 1400)  Resp: (!) 26 (05/24/23 1400)  BP: (!) 118/52 (05/24/23 1115)  SpO2: 96 % (05/24/23 1400) Vital Signs (24h Range):  Temp:  [98 °F (36.7 °C)-98.9 °F (37.2 °C)] 98 °F (36.7 °C)  Pulse:  [] 79  Resp:  [13-41] 26  SpO2:  [91 %-99 %] 96 %  BP: ()/(47-84) 118/52  Arterial Line BP: ()/(48-71) 112/54     Weight: 106.9 kg (235 lb 11.2 oz)  Body mass index is 35.84  kg/m².    Intake/Output Summary (Last 24 hours) at 5/24/2023 1702  Last data filed at 5/24/2023 0624  Gross per 24 hour   Intake --   Output 900 ml   Net -900 ml         Physical Exam  Vitals and nursing note reviewed.   Constitutional:       Comments: Lying in bed, chronically ill appearing, cooperative   HENT:      Head: Normocephalic and atraumatic.      Mouth/Throat:      Mouth: Mucous membranes are moist.   Cardiovascular:      Rate and Rhythm: Normal rate and regular rhythm.   Pulmonary:      Comments: On NC, no wheeze or accessory muscle use  Abdominal:      General: There is no distension.      Palpations: Abdomen is soft.      Tenderness: There is no abdominal tenderness. There is no guarding.   Genitourinary:     Comments: Condom catheter in place  Neurological:      Mental Status: He is alert and oriented to person, place, and time.      Comments: Generalized weakness   Psychiatric:         Mood and Affect: Mood normal.           Significant Labs: Blood Culture:   Recent Labs   Lab 05/23/23  1458 05/23/23  1511   LABBLOO Gram stain valentino bottle: Gram positive cocci and Gram negative rods  Positive results previously called 05:40  05/24/2023 KS3  Gram stain aer bottle: Gram positive cocci  Positive results previously called Gram stain valentino bottle: Gram negative rods and Gram positive cocci  Results called to and read back by: Arpan Pearce RN in ICU  05/24/2023  02:23 KS3  Gram stain aer bottle: Gram negative rods and  Gram positive cocci  Positive results previously called  05/24/2023  05:10 KS3     BMP:   Recent Labs   Lab 05/23/23  1511 05/24/23  0130 05/24/23  0358   *   < > 264*   *   < > 134*   K 4.0   < > 4.3   CL 96   < > 99   CO2 24   < > 25   BUN 32*   < > 29*   CREATININE 1.6*   < > 1.4   CALCIUM 9.5   < > 8.7   MG 1.6  --   --     < > = values in this interval not displayed.     CBC:   Recent Labs   Lab 05/23/23  1511 05/24/23  0130 05/24/23  0358   WBC 11.99 24.11* 21.56*   HGB  11.1* 10.0* 9.9*   HCT 34.8* 31.5* 31.3*    212 207     CMP:   Recent Labs   Lab 05/23/23  1511 05/24/23  0130 05/24/23  0358   * 133* 134*   K 4.0 4.4 4.3   CL 96 97 99   CO2 24 24 25   * 324* 264*   BUN 32* 32* 29*   CREATININE 1.6* 1.6* 1.4   CALCIUM 9.5 8.6* 8.7   PROT 8.0  --  7.4   ALBUMIN 3.7  --  3.2*   BILITOT 1.4*  --  1.3*   ALKPHOS 170*  --  101   AST 42*  --  35   ALT 21  --  16   ANIONGAP 15 12 10     Cardiac Markers:   Recent Labs   Lab 05/23/23  1511   *     Magnesium:   Recent Labs   Lab 05/23/23  1511   MG 1.6     Pathology Results  (Last 10 years)      None          Troponin:   Recent Labs   Lab 05/23/23  1511 05/23/23  1903 05/24/23  0358   TROPONINIHS 6355.8* 5812.3* 2990.4*     TSH: No results for input(s): TSH in the last 4320 hours.  Urine Culture:   Recent Labs   Lab 05/23/23  1525   LABURIN GRAM NEGATIVE HENNA, LACTOSE   >100,000 cfu/ml  Identification and susceptibility pending  *     Urine Studies:   Recent Labs   Lab 05/23/23  1525   COLORU Brown*   APPEARANCEUA Cloudy*   PHUR 6.0   SPECGRAV 1.030   PROTEINUA 1+*   GLUCUA 4+*   KETONESU 1+*   BILIRUBINUA Negative   OCCULTUA 3+*   NITRITE Positive*   UROBILINOGEN Negative   LEUKOCYTESUR 3+*   RBCUA >100*   WBCUA >100*   BACTERIA Occasional   SQUAMEPITHEL 2   HYALINECASTS 4*       Significant Imaging: I have reviewed all pertinent imaging results/findings within the past 24 hours.    X-Ray Hip 2 or 3 views Right (with Pelvis when performed)    Result Date: 5/23/2023  Reason: AMS, right hip pain FINDINGS: AP pelvis and 2 views of right hip show no acute fracture, dislocation, or destructive osseous lesion. Moderate degenerative narrowing affects bilateral hip joint spaces. Antegrade intramedullary nail and proximal helical blade lie in right proximal femur without loosening or other complication. Pubic symphysis and sacroiliac joints are maintained. Arterial vascular calcifications are present along with  pelvic phleboliths and surgical clips projecting about the left pelvis. IMPRESSION: 1. Moderate right hip osteoarthrosis. 2. No acute abnormality of right hip. Electronically signed by:  Eduin Ojeda MD  5/23/2023 4:29 PM CDT Workstation: [id number]    X-Ray Femur 2 AP/LAT Right    Result Date: 5/23/2023  Reason: AMS, right upper leg pain FINDINGS: 2 views of right femur show no acute fracture or dislocation. Antegrade intramedullary nail is transfixed with proximal helical blade and single distal interlocking screw. Surgical hardware shows no loosening or other complication. Arterial vascular calcifications are present. IMPRESSION: No acute abnormality of right femur. Electronically signed by:  Eduin Ojeda MD  5/23/2023 4:28 PM CDT Workstation: [id number]    CT Head Without Contrast    Result Date: 5/23/2023  CT of the head: 5/23/2023 6:14 PM CDT HISTORY: 63 years  old Male with Mental status change, unknown cause. COMPARISON: None available TECHNIQUE: Multiple axial contiguous images were obtained through the head without intravenous contrast administered. This exam was performed according to our departmental dose-optimization program, which includes automated exposure control, adjustment of the mA and/or KV according to the patient's size and/or use of iterative reconstruction technique. FINDINGS: The ventricles and cerebral sulci demonstrate mild prominence, consistent with cerebral atrophy. There are mild periventricular hypodensities, suggestive of periventricular white matter changes. The gray-white matter differentiation is within normal limits. Both globes appear symmetric. The mastoid air cells appear clear. There is minimal mucoperiosteal thickening of the ethmoid sinuses. There is profound atherosclerosis seen at the carotid and vertebral arteries. The calvarium is intact. No extra-axial fluid collection is seen. No midline shift or mass effect is apparent. There are no findings to suggest acute  intracranial hemorrhage. IMPRESSION: 1. No acute intracranial hemorrhage is seen. 2. There is mild cerebral atrophy and periventricular white matter changes are seen. Electronically signed by:  Kamilla Shearer DO  5/23/2023 6:15 PM CDT Workstation: [id number]    X-Ray Chest AP Portable    Result Date: 5/24/2023  PORTABLE CHEST X-RAY CLINICAL HISTORY: line placement COMPARISON: 5/23/2023. FINDINGS: Portable AP view of the chest was obtained with overlying monitor leads in place. Right IJ central line has been placed and terminates in the distribution of the SVC without pneumothorax. The lungs are better aerated. There are persistent edematous changes. Normal heart size. No significant pleural fluid. IMPRESSION: No pneumothorax following central line insertion Electronically signed by:  Jerrod Jerome MD  5/24/2023 12:42 AM CDT Workstation: [id number]    X-Ray Chest AP Portable    Result Date: 5/23/2023  EXAM: XR CHEST AP PORTABLE HISTORY: Altered mental status. Dyspnea. COMPARISON:PA and lateral chest dated 4/8/2011. FINDINGS: The lungs are poorly inflated. There is marked cardiomegaly and also widening of the mediastinum, in part due to portable technique. However significant true cardiomegaly and mediastinal widening due to volume overload is also suspected. There is prominent pulmonary vascular congestion and moderate diffuse bilateral pulmonary edema. No significant pleural effusion is identified. IMPRESSION:   Congestive heart failure. Electronically signed by:  Irvin Ramirez MD  5/23/2023 4:13 PM CDT Workstation: [id number]    US Lower Extremity Veins Bilateral    Result Date: 5/23/2023  BILATERAL LOWER EXTREMITY VENOUS DOPPLER CLINICAL HISTORY: Pain, swelling, possible deep venous thrombosis. FINDINGS: Sonographic evaluation of the left and right lower extremity deep venous system was performed. Gray scale, color, and spectral Doppler analysis was performed along with graded compression maneuvers where  applicable. Where visualized, there is no evidence of intraluminal thrombus identified to suggest deep venous thrombosis. IMPRESSION: 1. No evidence of right lower extremity deep venous thrombosis where visualized. 2. No evidence of left lower extremity deep venous thrombosis where visualized. Electronically signed by:  Jerrod Jerome MD  5/23/2023 10:13 PM CDT Workstation: [id number]    Echo    Result Date: 5/24/2023  · The left ventricle is normal in size with normal systolic function. · Normal right ventricular size with low normal right ventricular systolic function. · Normal left ventricular diastolic function. · The estimated ejection fraction is 60%. · Atrial fibrillation not observed. · Mild left atrial enlargement. · Normal central venous pressure (3 mmHg).      CT Abdomen Pelvis  Without Contrast    Result Date: 5/23/2023  CT ABDOMEN AND PELVIS WITHOUT INTRAVENOUS CONTRAST: 5/23/2023 6:15 PM CDT HISTORY: 63 years  old Male with Abdominal abscess/infection suspected. COMPARISON: None available TECHNIQUE: Axial contiguous images were obtained from the lung bases to the proximal femurs without intravenous contrast administered. Sagittal and coronal reconstructions were also obtained and reviewed. This exam was performed according to our departmental dose-optimization program, which includes automated exposure control, adjustment of the mA and/or KV according to the patient's size and/or use of iterative reconstruction technique. FINDINGS:  Evaluation of the upper abdomen is limited by the lack of intravenous contrast. LUNG BASES: There are bibasilar airspace opacities, right greater than left. The cardiac silhouette is enlarged. There is also some interlobular septal thickening. Mitral annular calcifications and coronary artery calcifications are seen.  No discrete pleural effusions are seen. LIVER: The visualized hepatic parenchyma demonstrates no focal abnormality. GALLBLADDER: The gallbladder demonstrates  no evidence of calcified gallstones. SPLEEN: The spleen is normal in size. PANCREAS: The pancreas is unremarkable. ADRENAL GLANDS: The bilateral adrenal glands are unremarkable. KIDNEYS: Both kidneys demonstrate no hydronephrosis. There is a punctate left renal calcification. PELVIS: The urinary bladder is mildly distended. STOMACH: The stomach is mildly distended. BOWEL: The small bowel loops appear unremarkable. No pericolonic inflammatory stranding is seen. There is a moderate of stool seen at the colon. There is a left anterior abdominal wall hernia containing portions of the colon. There are multiple diverticula seen within the sigmoid and descending colon, without evidence to suggest diverticulitis. APPENDIX: The appendix is unremarkable. PERITONEUM: There is no evidence of pneumoperitoneum or free fluid. VESSELS: The IVC is unremarkable. The abdominal aorta is within normal limits of size. There is mild atherosclerotic calcification at the aorta and iliac vasculature. There is likely stenosis at the superior mesenteric artery which is probably chronic secondary to atherosclerosis. LYMPH NODES: No significantly enlarged lymph nodes are seen in the abdomen or pelvis. BONES AND SOFT TISSUES: No acute osseous abnormality is seen. There is an intramedullary oleg and interlocking screw seen at the left hip. There are multilevel degenerative changes seen at the spine. There are compression deformities of the superior endplates of L3 and L4 which are of indeterminate age. These demonstrate at least 25% loss of height. No significant retropulsion is seen. IMPRESSION: There are bibasilar airspace opacities which can be seen with atelectasis and/or infection. There are compression deformities of the superior endplates of L3 and L4 which are of indeterminate age. There is a left anterior abdominal wall hernia containing portions of the colon. There are punctate nonobstructive left renal calculus. There are bibasilar  airspace opacities which may reflect edema, atelectasis, or infection. Electronically signed by:  Kamilla Shearer DO  5/23/2023 6:22 PM CDT Workstation: [id number]

## 2023-05-24 NOTE — NURSING
"Pt admitted to ICU room 2204 from ER. Pt looked to be in very deconditioned state; very filthy; removed multiple layers of dirt and skin build up from groin and lower abd. Also washed and removed layers of dirt from armpits and buttocks. Adams has dried food, maybe clumps of vomit. That has since been shaved. Buttocks also noted to have a small nickel size wound; picture taken for chart and wound care to be consulted. Pt was cleaned with CHG. This nurse asked pt when the last time he had a bath was, pt stated "I don't remember, it's been so long". Pt is on hospice and is taken care of by an in home caregiver. Pt also states that he is unable to get into shower d/t height of tub wall and does not receive bed baths at home.Blanco catheter was removed per Dr. eNal as per hospital policy; blanco was put in by unknown source PTA. Once blanco was removed, penis began to gush blood. Dr. Neal was notified and heparin gtt (was not started) was dc'd. Pt is still able to urinate. Urology consulted and condom catheter placed.   Pt came to ICU on levophed gtt to GANESH midline, central line and arterial line were ordered for closer monitoring. Since been placed without complications by Dr. Patton.  Presently, pt is lying in bed; A&O x4. Pt is very sensitive to touch, he moans and cries out when being cleaned or touched. Incontinent to bowel and bladder. NC @ 3L. Oxymask was used for central line insertion. Pt in no distress. Oriented to ICU. Repeated requests for food and drink.   Troponins elevated. Cardiology consulted. Glucose elevated. Covered with SSI.   BLE US completed.     "

## 2023-05-24 NOTE — PT/OT/SLP EVAL
Speech Language Pathology Evaluation  Bedside Swallow    Patient Name:  James Jiang   MRN:  [id number]  Admitting Diagnosis: <principal problem not specified>    Recommendations:                 General Recommendations:  Follow-up not indicated  Diet recommendations:  Soft & Bite Sized Diet - IDDSI Level 6, Thin liquids - IDDSI Level 0   Aspiration Precautions: Standard aspiration precautions   General Precautions: Standard,    Communication strategies:  none    Assessment:     James Jiang is a 63 y.o. male with an SLP diagnosis of  WFL .  He presents with functional oral phase, though mild difficulty masticating hard, dry bolus textures due to lack of dentition. No overt s/s aspiration or pharyngeal dysphagia noted. Pt w/ decreased endurance, impacting ability to tolerate complex textures and potentially impeding adequate PO intake. Rec IDDSI 6- soft and bite sized diet w/ thin liquids. No further ST warranted, as pt as baseline and LRD.    History:     No past medical history on file.    No past surgical history on file.    Social History: Patient lives with his roommate at home.    Prior Intubation HX:  none this admit    Modified Barium Swallow: none found in epic or reported by pt      Chest X-Rays:   Imaging Results              CT Abdomen Pelvis  Without Contrast (Final result)  Result time 05/23/23 18:22:02   Procedure changed from CT Abdomen Pelvis With Contrast     Final result by Kamilla Shearer DO (05/23/23 18:22:02)                   Narrative:    CT ABDOMEN AND PELVIS WITHOUT INTRAVENOUS CONTRAST: 5/23/2023 6:15 PM CDT    HISTORY: 63 years  old Male with Abdominal abscess/infection suspected.    COMPARISON: None available    TECHNIQUE: Axial contiguous images were obtained from the lung bases to the proximal femurs without intravenous contrast administered. Sagittal and coronal reconstructions were also obtained and reviewed. This exam was performed according to our departmental dose-optimization  program, which includes automated exposure control, adjustment of the mA and/or KV according to the patient's size and/or use of iterative reconstruction technique.    FINDINGS:  Evaluation of the upper abdomen is limited by the lack of intravenous contrast.    LUNG BASES: There are bibasilar airspace opacities, right greater than left. The cardiac silhouette is enlarged. There is also some interlobular septal thickening. Mitral annular calcifications and coronary artery calcifications are seen.  No discrete pleural effusions are seen.    LIVER: The visualized hepatic parenchyma demonstrates no focal abnormality.    GALLBLADDER: The gallbladder demonstrates no evidence of calcified gallstones.    SPLEEN: The spleen is normal in size.    PANCREAS: The pancreas is unremarkable.    ADRENAL GLANDS: The bilateral adrenal glands are unremarkable.    KIDNEYS: Both kidneys demonstrate no hydronephrosis. There is a punctate left renal calcification.    PELVIS: The urinary bladder is mildly distended.    STOMACH: The stomach is mildly distended.    BOWEL: The small bowel loops appear unremarkable. No pericolonic inflammatory stranding is seen. There is a moderate of stool seen at the colon. There is a left anterior abdominal wall hernia containing portions of the colon. There are multiple diverticula seen within the sigmoid and descending colon, without evidence to suggest diverticulitis.    APPENDIX: The appendix is unremarkable.    PERITONEUM: There is no evidence of pneumoperitoneum or free fluid.    VESSELS: The IVC is unremarkable. The abdominal aorta is within normal limits of size. There is mild atherosclerotic calcification at the aorta and iliac vasculature. There is likely stenosis at the superior mesenteric artery which is probably chronic secondary to atherosclerosis.    LYMPH NODES: No significantly enlarged lymph nodes are seen in the abdomen or pelvis.    BONES AND SOFT TISSUES: No acute osseous abnormality is  seen. There is an intramedullary oleg and interlocking screw seen at the left hip. There are multilevel degenerative changes seen at the spine. There are compression deformities of the superior endplates of L3 and L4 which are of indeterminate age. These demonstrate at least 25% loss of height. No significant retropulsion is seen.    IMPRESSION: There are bibasilar airspace opacities which can be seen with atelectasis and/or infection.    There are compression deformities of the superior endplates of L3 and L4 which are of indeterminate age.    There is a left anterior abdominal wall hernia containing portions of the colon.    There are punctate nonobstructive left renal calculus.    There are bibasilar airspace opacities which may reflect edema, atelectasis, or infection.    Electronically signed by:  Kamilla Shearer DO  5/23/2023 6:22 PM CDT Workstation: [id number]                                     CT Head Without Contrast (Final result)  Result time 05/23/23 18:15:05      Final result by Kamilla Shearer DO (05/23/23 18:15:05)                   Narrative:    CT of the head: 5/23/2023 6:14 PM CDT    HISTORY: 63 years  old Male with Mental status change, unknown cause.    COMPARISON: None available    TECHNIQUE: Multiple axial contiguous images were obtained through the head without intravenous contrast administered. This exam was performed according to our departmental dose-optimization program, which includes automated exposure control, adjustment of the mA and/or KV according to the patient's size and/or use of iterative reconstruction technique.    FINDINGS: The ventricles and cerebral sulci demonstrate mild prominence, consistent with cerebral atrophy. There are mild periventricular hypodensities, suggestive of periventricular white matter changes.    The gray-white matter differentiation is within normal limits.    Both globes appear symmetric.    The mastoid air cells appear clear.    There is minimal  mucoperiosteal thickening of the ethmoid sinuses.    There is profound atherosclerosis seen at the carotid and vertebral arteries.    The calvarium is intact.    No extra-axial fluid collection is seen.    No midline shift or mass effect is apparent.    There are no findings to suggest acute intracranial hemorrhage.    IMPRESSION: 1. No acute intracranial hemorrhage is seen.  2. There is mild cerebral atrophy and periventricular white matter changes are seen.        Electronically signed by:  Kamilla Shearer DO  5/23/2023 6:15 PM CDT Workstation: [id number]                                     X-Ray Hip 2 or 3 views Right (with Pelvis when performed) (Final result)  Result time 05/23/23 16:29:02      Final result by Eduin Ojeda MD (05/23/23 16:29:02)                   Narrative:    Reason: AMS, right hip pain    FINDINGS:  AP pelvis and 2 views of right hip show no acute fracture, dislocation, or destructive osseous lesion. Moderate degenerative narrowing affects bilateral hip joint spaces. Antegrade intramedullary nail and proximal helical blade lie in right proximal femur without loosening or other complication. Pubic symphysis and sacroiliac joints are maintained. Arterial vascular calcifications are present along with pelvic phleboliths and surgical clips projecting about the left pelvis.    IMPRESSION:    1. Moderate right hip osteoarthrosis.  2. No acute abnormality of right hip.    Electronically signed by:  Eduin Ojeda MD  5/23/2023 4:29 PM CDT Workstation: [id number]                                     X-Ray Femur 2 AP/LAT Right (Final result)  Result time 05/23/23 16:28:52      Final result by Eduin Ojeda MD (05/23/23 16:28:52)                   Narrative:    Reason: AMS, right upper leg pain    FINDINGS:  2 views of right femur show no acute fracture or dislocation. Antegrade intramedullary nail is transfixed with proximal helical blade and single distal interlocking screw. Surgical hardware shows no  loosening or other complication.    Arterial vascular calcifications are present.    IMPRESSION:  No acute abnormality of right femur.    Electronically signed by:  Eduin Ojeda MD  5/23/2023 4:28 PM CDT Workstation: [id number]                                     X-Ray Chest AP Portable (Final result)  Result time 05/23/23 16:13:59      Final result by Sanju Ramirez MD (05/23/23 16:13:59)                   Narrative:      EXAM: XR CHEST AP PORTABLE    HISTORY: Altered mental status. Dyspnea.    COMPARISON:PA and lateral chest dated 4/8/2011.    FINDINGS: The lungs are poorly inflated. There is marked cardiomegaly and also widening of the mediastinum, in part due to portable technique. However significant true cardiomegaly and mediastinal widening due to volume overload is also suspected. There is prominent pulmonary vascular congestion and moderate diffuse bilateral pulmonary edema. No significant pleural effusion is identified.    IMPRESSION:   Congestive heart failure.    Electronically signed by:  Irvin Ramirez MD  5/23/2023 4:13 PM CDT Workstation: [id number]                                      Prior diet: Regular textures, thin liquids; he does report some difficulty (choking) when eating ground beef.    Occupation/hobbies/homemaking: Pt was on hospice care prior to current admit; he reports he will likely discharge on hospice care.    Subjective     Pt awake in bed. Agreeable to ST eval. Slight fatigue appearance.  Patient goals: none stated     Pain/Comfort:       Respiratory Status: Nasal cannula, flow 3 L/min    Objective:   Pt awake, alert, and oriented x4. Adequately followed directives and engaged in conversation.    Oral Musculature Evaluation  Dentition: edentulous, other (see comments) (no dentures)  Secretion Management: adequate  Mucosal Quality: adequate, coated tongue  Mandibular Strength and Mobility: WFL  Oral Labial Strength and Mobility: WFL  Lingual Strength and Mobility: WFL  Velar  Elevation: WFL  Buccal Strength and Mobility: WFL  Volitional Cough: elicited; adequate  Volitional Swallow: palpated; adequate laryngeal elevation/excursion  Voice Prior to PO Intake: clear    Bedside Swallow Eval:   Consistencies Assessed:  Thin liquids water via cup edge and straw  Puree tsp bites pudding  Mixed consistencies tsp bites diced peaches  Solids bites of cracker      Oral Phase:   Effortful mastication w/ dry cracker  Remaining aspects of oral phase WFL    Pharyngeal Phase:   no overt clinical signs/symptoms of aspiration  no overt clinical signs/symptoms of pharyngeal dysphagia    Compensatory Strategies  None    Treatment: Pt educated re purpose of CSE, role of SLP, results of CSE, diet recs, and swallowing precautions. Pt expressed understanding of recs.      Goals:   Multidisciplinary Problems       SLP Goals       Not on file                    Plan:     Patient to be seen:      Plan of Care expires:     Plan of Care reviewed with:  patient, other (see comments) (nursing, MD)   SLP Follow-Up:  No       Discharge recommendations:      Barriers to Discharge:  None    Time Tracking:     SLP Treatment Date:   05/24/23  Speech Start Time:  0942  Speech Stop Time:  1003     Speech Total Time (min):  21 min    Billable Minutes: Eval Swallow and Oral Function 21 min    05/24/2023

## 2023-05-24 NOTE — PROGRESS NOTES
"Called Winifred with [facility] [phone]. Ms. Vitale is pt's Hospice RN. Reports pt came from home. Lives with an "impaired caregiver" who is also his friend. Reports the caregiver works from 2:30pm to midnight everyday and pt remains home alone during these hours. Hospice nurse reports she found pt on the floor at home. Winifred also reports pt refuses bed baths and "he remains filthy. It is not a safe environment. He needs nursing home placement but he refuses." Has been on hospice services for 3 months and was a FULL CODE. Case management and Dr. Robles updated. PC Team will continue to follow.   "

## 2023-05-24 NOTE — PLAN OF CARE
Problem: Infection  Goal: Absence of Infection Signs and Symptoms  Outcome: Ongoing, Progressing     Problem: Skin Injury Risk Increased  Goal: Skin Health and Integrity  Outcome: Ongoing, Progressing     Problem: Adult Inpatient Plan of Care  Goal: Plan of Care Review  Outcome: Ongoing, Progressing

## 2023-05-24 NOTE — HPI
63-year-old male with multiple medical problems significant for tobacco use, atrial fibrillation, multiple significant infections including MRSA of hip and ESBL UTI, renal cell carcinoma, and urinary retention with chronic indwelling Donis.  He initially presented to the ED with altered mental status.  He is currently admitted and being treated for septic shock, acute respiratory failure with hypoxemia, and encephalopathy.  Given his multiple comorbidities he carries a guarded potentially quite grim prognosis.  I have been asked to assist with goals of care.

## 2023-05-24 NOTE — ANESTHESIA PROCEDURE NOTES
Arterial line    Diagnosis: Congestive heart failure    Patient location during procedure: ICU  Procedure start time: 5/24/2023 12:25 AM  Timeout: 5/24/2023 12:25 AM  Procedure end time: 5/24/2023 12:30 AM    Staffing  Authorizing Provider: Julián Patton MD  Performing Provider: Julián Patton MD    Anesthesiologist was present at the time of the procedure.    Preanesthetic Checklist  Completed: patient identified, risks and benefits discussed, monitors and equipment checked, timeout performed and anesthesia consent givenArterial line  Skin Prep: chlorhexidine gluconate  Local Infiltration: lidocaine  Orientation: right  Location: radial    Catheter Size: 20 G  Catheter placement by Ultrasound guidance. Heme positive aspiration all ports.   Vessel Caliber: medium, patent, compressibility normal  Vascular Doppler:  not done  Needle advanced into vessel with real time Ultrasound guidance.  Sterile sheath used.Insertion Attempts: 1  Assessment  Dressing: sutured in place and taped and tegaderm  Patient: Tolerated well

## 2023-05-24 NOTE — NURSING
Nurses Note -- 4 Eyes      5/24/2023   6:00 PM      Skin assessed during: Transfer      [x] No Altered Skin Integrity Present    []Prevention Measures Documented      [] Yes- Altered Skin Integrity Present or Discovered   [] LDA Added if Not in Epic (Describe Wound)   [] New Altered Skin Integrity was Present on Admit and Documented in LDA   [] Wound Image Taken    Wound Care Consulted? No    Attending Nurse:  [id number]     Second RN/Staff Member:  [id number]

## 2023-05-24 NOTE — NURSING
Nurses Note -- 4 Eyes      5/23/2023   11:12 PM      Skin assessed during: Admit      [x] No Pressure Injuries Present    [x]Prevention Measures Documented      [x] Yes- Altered Skin Integrity Present or Discovered   [x] LDA Added if Not in Epic (Describe Wound)   [x] New Altered Skin Integrity was Present on Admit and Documented in LDA   [x] Wound Image Taken    Wound Care Consulted? Yes    Attending Nurse:  Hina Olmos RN     Second RN/Staff Member:  Luis Pearce RN

## 2023-05-25 LAB — BACTERIA UR CULT: ABNORMAL

## 2023-05-25 PROCEDURE — 63600175 PHARM REV CODE 636 W HCPCS: Performed by: INTERNAL MEDICINE

## 2023-05-25 RX ORDER — ONDANSETRON 2 MG/ML
4 INJECTION INTRAMUSCULAR; INTRAVENOUS EVERY 8 HOURS PRN
Status: DISCONTINUED | OUTPATIENT
Start: 2023-05-25 | End: 2023-05-25 | Stop reason: HOSPADM

## 2023-05-25 RX ADMIN — ONDANSETRON 4 MG: 2 INJECTION INTRAMUSCULAR; INTRAVENOUS at 01:05

## 2023-05-25 NOTE — NURSING
Patient requesting to leave AMA. MD notified and Dr. Lynn came to bedside to discuss possible complications of leaving AMA, patient verbalized understanding of teaching but still insisted on leaving. Spoke with patient's emergency contact Clau and she is agreeable to pick patient up. Clau arrived at 0300 and patient was rolled out to private vehicle via wheelchair by nurse. When patient left he was alert, oriented x 4, respirations even and unlabored, skin warm and dry.

## 2023-05-25 NOTE — SUBJECTIVE & OBJECTIVE
Interval History: See HPI    No past medical history on file.    No past surgical history on file.    Review of patient's allergies indicates:  No Known Allergies    Medications:  Continuous Infusions:  Scheduled Meds:   chlorhexidine  15 mL Mouth/Throat BID    [START ON 5/25/2023] insulin aspart U-100  1-10 Units Subcutaneous TIDWM    linezolid  600 mg Intravenous Q12H    meropenem (MERREM) IVPB  2 g Intravenous Q8H    mupirocin   Nasal BID    oxymetazoline  2 spray Each Nostril BID     PRN Meds:dextrose 50%, dextrose 50%, glucagon (human recombinant), glucose, glucose, HYDROcodone-acetaminophen, HYDROmorphone, LORazepam, sodium chloride 0.9%    Family History    None       Tobacco Use    Smoking status: Not on file    Smokeless tobacco: Not on file   Substance and Sexual Activity    Alcohol use: Not on file    Drug use: Not on file    Sexual activity: Not on file       Review of Systems  Objective:     Vital Signs (Most Recent):  Temp: 98.1 °F (36.7 °C) (05/24/23 2000)  Pulse: 80 (05/24/23 2000)  Resp: 17 (05/24/23 2055)  BP: (!) 170/97 (05/24/23 2000)  SpO2: 96 % (05/24/23 2000) Vital Signs (24h Range):  Temp:  [98 °F (36.7 °C)-98.7 °F (37.1 °C)] 98.1 °F (36.7 °C)  Pulse:  [] 80  Resp:  [13-41] 17  SpO2:  [91 %-99 %] 96 %  BP: ()/(47-97) 170/97  Arterial Line BP: ()/(47-71) 109/47     Weight: 106.9 kg (235 lb 11.2 oz)  Body mass index is 35.84 kg/m².       Physical Exam  Vitals reviewed.   Constitutional:       General: He is not in acute distress.     Appearance: He is ill-appearing.   HENT:      Head: Normocephalic and atraumatic.      Right Ear: External ear normal.      Left Ear: External ear normal.      Nose: Nose normal. No congestion.      Mouth/Throat:      Mouth: Mucous membranes are moist.      Pharynx: No oropharyngeal exudate.   Eyes:      General:         Right eye: No discharge.         Left eye: No discharge.      Extraocular Movements: Extraocular movements intact.    Cardiovascular:      Rate and Rhythm: Normal rate and regular rhythm.   Pulmonary:      Effort: Pulmonary effort is normal. No respiratory distress.   Abdominal:      General: There is no distension.      Palpations: Abdomen is soft.   Skin:     General: Skin is warm.   Neurological:      General: No focal deficit present.      Mental Status: He is alert and oriented to person, place, and time.   Psychiatric:         Mood and Affect: Mood normal.         Behavior: Behavior normal.         Thought Content: Thought content normal.         Judgment: Judgment normal.          Review of Symptoms      Symptom Assessment (ESAS 0-10 Scale)  Pain:  5  Dyspnea:  0  Anxiety:  7  Nausea:  0  Depression:  4  Anorexia:  0  Fatigue:  5  Insomnia:  0  Restlessness:  0  Agitation:  0         Pain Assessment:    Location(s): other      Performance Status:  40    Living Arrangements:  Lives with friend    Psychosocial/Cultural:   See Palliative Psychosocial Note: No  **Primary  to Follow**  Palliative Care  Consult: No  Other       Location (Other): right hip       Location: right       Quality: sharp        Quantity: 5/10 in intensity        Chronicity: Onset 6 month(s) ago, unchanged        Aggravating Factors: activity and recumbency        Alleviating Factors: opiates        Associated Symptoms: none    Advance Care Planning   Advance Directives:   Do Not Resuscitate Status: Yes      Decision Making:  Patient answered questions  Goals of Care: The patient endorses that what is most important right now is to focus on quality of life, even if it means sacrificing a little time and comfort and QOL     Accordingly, we have decided that the best plan to meet the patient's goals includes enrolling in hospice care       Significant Labs: BMP:   Recent Labs   Lab 05/23/23  1511 05/24/23  0130 05/24/23  0358   *   < > 264*   *   < > 134*   K 4.0   < > 4.3   CL 96   < > 99   CO2 24   < > 25   BUN 32*    < > 29*   CREATININE 1.6*   < > 1.4   CALCIUM 9.5   < > 8.7   MG 1.6  --   --     < > = values in this interval not displayed.     CBC:   Recent Labs   Lab 05/23/23  1511 05/24/23  0130 05/24/23  0358   WBC 11.99 24.11* 21.56*   HGB 11.1* 10.0* 9.9*   HCT 34.8* 31.5* 31.3*    212 207     CBC:   Recent Labs   Lab 05/24/23  0358   WBC 21.56*   HGB 9.9*   HCT 31.3*   MCV 90        BMP:  Recent Labs   Lab 05/24/23  0358   *   *   K 4.3   CL 99   CO2 25   BUN 29*   CREATININE 1.4   CALCIUM 8.7     LFT:  Lab Results   Component Value Date    AST 35 05/24/2023    ALKPHOS 101 05/24/2023    BILITOT 1.3 (H) 05/24/2023     Albumin:   Albumin   Date Value Ref Range Status   05/24/2023 3.2 (L) 3.5 - 5.2 g/dL Final     Protein:   Total Protein   Date Value Ref Range Status   05/24/2023 7.4 6.0 - 8.4 g/dL Final     Lactic acid:   Lab Results   Component Value Date    LACTATE 1.7 05/23/2023    LACTATE 2.3 (HH) 05/23/2023       Significant Imaging: I have reviewed all pertinent imaging results/findings within the past 24 hours.

## 2023-05-25 NOTE — CONSULTS
[facility]  Palliative Medicine  Consult Note    Patient Name: James Jiang  MRN: [id number]  Admission Date: 5/23/2023  Hospital Length of Stay: 1 days  Code Status: DNR   Attending Provider: Cherie Hernandez MD  Consulting Provider: Jim Robles MD  Primary Care Physician: Primary Doctor No  Principal Problem:Bacteremia    Patient information was obtained from patient, past medical records and primary team.      Inpatient consult to Palliative Care  Consult performed by: Jim Robles MD  Consult ordered by: Tulio Neal MD        Assessment/Plan:     Palliative Care  Goals of care, counseling/discussion  Reviewed his chart and discussed his case with his team.      I examined Mr. Jiang.  He maintains capacity for complex medical decision-making.    I introduced myself and my role as palliative care physician.  He was agreeable to speaking.      We discussed his medical illness, prognosis, and values in detail.      Briefly, he understands that he was multiple underlying comorbidities including kidney cancer and chronic obstructive pulmonary disease.  I affirm this.  Also explained that he is currently admitted being treated for severe infection presumed secondary to UTI.  He understood and did not have any questions.    Discussed prognosis.  I explained my worry his time is best estimate in terms of weeks to months.  He understood and was not at all surprised.    We discussed his values.  He is hopeful to return home.  The adamant about not being discharged to a nursing home; I made my concern about him returning home they are clear.  Despite my protest, he was adamant about returning home to spend time with his girlfriend and his dog, Kelly.  He has not worries or fears.  He does not fear dying.  He was living a poor quality of life at baseline and does not desire to pursue meaning measures that may prolong suffering.  Desires to return home with hospice.  I spoke very clearly  about hospice philosophy of hospice care.  His goals are very much along hospice.    Also took a moment to discuss code status and clinical realities of code and his situation.  I made a recommendation of a DNR code status.  He understood and agreed with this recommendation.  He understands that I will place this order in the chart.    Took a moment to assess his symptoms.  Is most distressed with anxiety which is a chronic problem for which is on both Valium and Ativan home.  I explained my worry about using both these medications at same time.  He understands and agrees that is due medications who combination produced severely altered mentation does not wish to reproduce.  I suggested as needed Ativan 1 mg.  They agreed with this recommendation.  I have placed this order.    He is also concerned about his right hip pain.  He is on chronic opioids at home.  He was having difficulty tolerating the side effects of morphine.  He was used Dilaudid in the past with excellent results.  I recommended as needed p.o. Dilaudid 2 mg.  He understood and agreed with this recommendation and I will place this order.    He seems to have an accurate understanding of his current condition.  His goals are to be comfortable die peacefully at home.  Desires to return home with hospice.    I appreciate being involved.  Hope I have been helpful.    We will not plan to see him daily.  If I can be helpful, please do not hesitate to hospital visit. I will be happy to reassess.        Thank you for your consult. I will sign off. Please contact us if you have any additional questions.    Subjective:     HPI:   63-year-old male with multiple medical problems significant for tobacco use, atrial fibrillation, multiple significant infections including MRSA of hip and ESBL UTI, renal cell carcinoma, and urinary retention with chronic indwelling Donis.  He initially presented to the ED with altered mental status.  He is currently admitted and being  treated for septic shock, acute respiratory failure with hypoxemia, and encephalopathy.  Given his multiple comorbidities he carries a guarded potentially quite grim prognosis.  I have been asked to assist with goals of care.      Hospital Course:  No notes on file    Interval History: See HPI    No past medical history on file.    No past surgical history on file.    Review of patient's allergies indicates:  No Known Allergies    Medications:  Continuous Infusions:  Scheduled Meds:   chlorhexidine  15 mL Mouth/Throat BID    [START ON 5/25/2023] insulin aspart U-100  1-10 Units Subcutaneous TIDWM    linezolid  600 mg Intravenous Q12H    meropenem (MERREM) IVPB  2 g Intravenous Q8H    mupirocin   Nasal BID    oxymetazoline  2 spray Each Nostril BID     PRN Meds:dextrose 50%, dextrose 50%, glucagon (human recombinant), glucose, glucose, HYDROcodone-acetaminophen, HYDROmorphone, LORazepam, sodium chloride 0.9%    Family History    None       Tobacco Use    Smoking status: Not on file    Smokeless tobacco: Not on file   Substance and Sexual Activity    Alcohol use: Not on file    Drug use: Not on file    Sexual activity: Not on file       Review of Systems  Objective:     Vital Signs (Most Recent):  Temp: 98.1 °F (36.7 °C) (05/24/23 2000)  Pulse: 80 (05/24/23 2000)  Resp: 17 (05/24/23 2055)  BP: (!) 170/97 (05/24/23 2000)  SpO2: 96 % (05/24/23 2000) Vital Signs (24h Range):  Temp:  [98 °F (36.7 °C)-98.7 °F (37.1 °C)] 98.1 °F (36.7 °C)  Pulse:  [] 80  Resp:  [13-41] 17  SpO2:  [91 %-99 %] 96 %  BP: ()/(47-97) 170/97  Arterial Line BP: ()/(47-71) 109/47     Weight: 106.9 kg (235 lb 11.2 oz)  Body mass index is 35.84 kg/m².       Physical Exam  Vitals reviewed.   Constitutional:       General: He is not in acute distress.     Appearance: He is ill-appearing.   HENT:      Head: Normocephalic and atraumatic.      Right Ear: External ear normal.      Left Ear: External ear normal.      Nose: Nose  normal. No congestion.      Mouth/Throat:      Mouth: Mucous membranes are moist.      Pharynx: No oropharyngeal exudate.   Eyes:      General:         Right eye: No discharge.         Left eye: No discharge.      Extraocular Movements: Extraocular movements intact.   Cardiovascular:      Rate and Rhythm: Normal rate and regular rhythm.   Pulmonary:      Effort: Pulmonary effort is normal. No respiratory distress.   Abdominal:      General: There is no distension.      Palpations: Abdomen is soft.   Skin:     General: Skin is warm.   Neurological:      General: No focal deficit present.      Mental Status: He is alert and oriented to person, place, and time.   Psychiatric:         Mood and Affect: Mood normal.         Behavior: Behavior normal.         Thought Content: Thought content normal.         Judgment: Judgment normal.          Review of Symptoms      Symptom Assessment (ESAS 0-10 Scale)  Pain:  5  Dyspnea:  0  Anxiety:  7  Nausea:  0  Depression:  4  Anorexia:  0  Fatigue:  5  Insomnia:  0  Restlessness:  0  Agitation:  0         Pain Assessment:    Location(s): other      Performance Status:  40    Living Arrangements:  Lives with friend    Psychosocial/Cultural:   See Palliative Psychosocial Note: No  **Primary  to Follow**  Palliative Care  Consult: No  Other       Location (Other): right hip       Location: right       Quality: sharp        Quantity: 5/10 in intensity        Chronicity: Onset 6 month(s) ago, unchanged        Aggravating Factors: activity and recumbency        Alleviating Factors: opiates        Associated Symptoms: none    Advance Care Planning   Advance Directives:   Do Not Resuscitate Status: Yes      Decision Making:  Patient answered questions  Goals of Care: The patient endorses that what is most important right now is to focus on quality of life, even if it means sacrificing a little time and comfort and QOL     Accordingly, we have decided that the best  plan to meet the patient's goals includes enrolling in hospice care       Significant Labs: BMP:   Recent Labs   Lab 05/23/23  1511 05/24/23  0130 05/24/23  0358   *   < > 264*   *   < > 134*   K 4.0   < > 4.3   CL 96   < > 99   CO2 24   < > 25   BUN 32*   < > 29*   CREATININE 1.6*   < > 1.4   CALCIUM 9.5   < > 8.7   MG 1.6  --   --     < > = values in this interval not displayed.     CBC:   Recent Labs   Lab 05/23/23  1511 05/24/23  0130 05/24/23  0358   WBC 11.99 24.11* 21.56*   HGB 11.1* 10.0* 9.9*   HCT 34.8* 31.5* 31.3*    212 207     CBC:   Recent Labs   Lab 05/24/23  0358   WBC 21.56*   HGB 9.9*   HCT 31.3*   MCV 90        BMP:  Recent Labs   Lab 05/24/23  0358   *   *   K 4.3   CL 99   CO2 25   BUN 29*   CREATININE 1.4   CALCIUM 8.7     LFT:  Lab Results   Component Value Date    AST 35 05/24/2023    ALKPHOS 101 05/24/2023    BILITOT 1.3 (H) 05/24/2023     Albumin:   Albumin   Date Value Ref Range Status   05/24/2023 3.2 (L) 3.5 - 5.2 g/dL Final     Protein:   Total Protein   Date Value Ref Range Status   05/24/2023 7.4 6.0 - 8.4 g/dL Final     Lactic acid:   Lab Results   Component Value Date    LACTATE 1.7 05/23/2023    LACTATE 2.3 (HH) 05/23/2023       Significant Imaging: I have reviewed all pertinent imaging results/findings within the past 24 hours.        > 50% of 90 min visit spent in chart review, face to face discussion of goals of care,  symptom assessment, coordination of care and emotional support.    Jim Robles MD  Palliative Medicine  Formerly [facility]

## 2023-05-25 NOTE — ASSESSMENT & PLAN NOTE
Reviewed his chart and discussed his case with his team.      I examined Mr. Jiang.  He maintains capacity for complex medical decision-making.    I introduced myself and my role as palliative care physician.  He was agreeable to speaking.      We discussed his medical illness, prognosis, and values in detail.      Briefly, he understands that he was multiple underlying comorbidities including kidney cancer and chronic obstructive pulmonary disease.  I affirm this.  Also explained that he is currently admitted being treated for severe infection presumed secondary to UTI.  He understood and did not have any questions.    Discussed prognosis.  I explained my worry his time is best estimate in terms of weeks to months.  He understood and was not at all surprised.    We discussed his values.  He is hopeful to return home.  The adamant about not being discharged to a nursing home; I made my concern about him returning home they are clear.  Despite my protest, he was adamant about returning home to spend time with his girlfriend and his dog, Kelly.  He has not worries or fears.  He does not fear dying.  He was living a poor quality of life at baseline and does not desire to pursue meaning measures that may prolong suffering.  Desires to return home with hospice.  I spoke very clearly about hospice philosophy of hospice care.  His goals are very much along hospice.    Also took a moment to discuss code status and clinical realities of code and his situation.  I made a recommendation of a DNR code status.  He understood and agreed with this recommendation.  He understands that I will place this order in the chart.    Took a moment to assess his symptoms.  Is most distressed with anxiety which is a chronic problem for which is on both Valium and Ativan home.  I explained my worry about using both these medications at same time.  He understands and agrees that is due medications who combination produced severely altered  mentation does not wish to reproduce.  I suggested as needed Ativan 1 mg.  They agreed with this recommendation.  I have placed this order.    He is also concerned about his right hip pain.  He is on chronic opioids at home.  He was having difficulty tolerating the side effects of morphine.  He was used Dilaudid in the past with excellent results.  I recommended as needed p.o. Dilaudid 2 mg.  He understood and agreed with this recommendation and I will place this order.    He seems to have an accurate understanding of his current condition.  His goals are to be comfortable die peacefully at home.  Desires to return home with hospice.    I appreciate being involved.  Hope I have been helpful.    We will not plan to see him daily.  If I can be helpful, please do not hesitate to hospital visit. I will be happy to reassess.

## 2023-05-26 LAB
BACTERIA BLD CULT: ABNORMAL

## 2023-08-28 PROBLEM — N17.9 AKI (ACUTE KIDNEY INJURY): Status: RESOLVED | Noted: 2023-05-24 | Resolved: 2023-08-28

## 2023-08-28 PROBLEM — J96.10 CHRONIC RESPIRATORY FAILURE: Chronic | Status: RESOLVED | Noted: 2023-05-24 | Resolved: 2023-08-28

## 2023-08-28 PROBLEM — N39.0 UTI (URINARY TRACT INFECTION): Status: RESOLVED | Noted: 2023-05-24 | Resolved: 2023-08-28

## 2023-12-19 NOTE — ASSESSMENT & PLAN NOTE
Patient denies any SOB or breathing issues. Will see Dr. Rowe on 12/22. Pt was given information for Dr. Jeffries, will call to make appt.    Patient states her pulmonologist is aware of her symptoms.    Suprapubic area, abdominal wall area cellulitis and Possible Phlegmon vs Abscess  S/p I&D on 11/20/22  Awaiting HH infusion and Insurance approval  Medically stable

## 2024-02-05 NOTE — PLAN OF CARE
Problem: Infection  Goal: Absence of Infection Signs and Symptoms  Outcome: Ongoing, Progressing     Problem: Skin Injury Risk Increased  Goal: Skin Health and Integrity  Outcome: Ongoing, Progressing     Problem: Adult Inpatient Plan of Care  Goal: Plan of Care Review  Outcome: Ongoing, Progressing  Goal: Patient-Specific Goal (Individualized)  Outcome: Ongoing, Progressing  Goal: Absence of Hospital-Acquired Illness or Injury  Outcome: Ongoing, Progressing  Goal: Optimal Comfort and Wellbeing  Outcome: Ongoing, Progressing  Goal: Readiness for Transition of Care  Outcome: Ongoing, Progressing     Problem: Coping Ineffective  Goal: Effective Coping  Outcome: Ongoing, Progressing     Problem: Impaired Wound Healing  Goal: Optimal Wound Healing  Outcome: Ongoing, Progressing      2/5  Belkis Sierra RN case manager [phone]  Alerted by Kiara that  Rehab was questioning the diagnosis .   CM left a message with Ruthie  Rehab [phone] for call back .   12:30  pm Call back to Ruthie .  NANCY read some therapy notes to Ruthie as well as my updated her on my conversation with Fannie from patient's insurance, and neurologist regarding on going plasmapheresis.  Ruthie would like CM to fax therapy notes of today to her fax # [phone] and she will attempt to meet with patient and do an onsite this Tuesday or Wed .  She also states she does not have a Medicaid bed at this time .   Update to YANIV and clinical assistant.   [phone]  Updated patient .

## 2024-06-10 NOTE — OP NOTE
Anesthesia Evaluation     Patient summary reviewed and Nursing notes reviewed   no history of anesthetic complications:   NPO Solid Status: > 8 hours  NPO Liquid Status: > 8 hours           Airway   Mallampati: I  No difficulty expected  Dental      Pulmonary - negative pulmonary ROS   Cardiovascular - negative cardio ROS  Exercise tolerance: good (4-7 METS)        Neuro/Psych- negative ROS  (+) headaches  GI/Hepatic/Renal/Endo - negative ROS     Musculoskeletal (-) negative ROS    Abdominal    Substance History - negative use     OB/GYN negative ob/gyn ROS         Other      history of cancer (breast cancer) active                Anesthesia Plan    ASA 2     MAC     intravenous induction     Anesthetic plan, risks, benefits, and alternatives have been provided, discussed and informed consent has been obtained with: patient.    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     Sepsis due to Staphylococcus aureus    Atrial fibrillation with RVR    Cellulitis of great toe of left foot    Type 2 diabetes mellitus with hyperglycemia    MSSA bacteremia    Closed fracture of right hip with routine healing    COPD (chronic obstructive pulmonary disease)    Chronic respiratory failure    Paroxysmal atrial fibrillation    Bilateral pulmonary embolism    Shock    Physical debility    Community acquired pneumonia of left lower lobe of lung    Amputation of right great toe    Infected wound    Left renal mass    Urinary retention    Calculus of urinary tract in diseases classified elsewhere       Post-Op Diagnosis: same    Procedure(s) Performed:   1.  Left ureteroscopy  2.  Cystoscopy  3.  Laser lithotripsy  4.  Stone basket extraction   5.  Left retrograde pyelogram   6.  Left ureteral stent placement   7.  Fluoro < 1 h    Specimen(s): stone for analysis     Staff Surgeon:  Raegan Rodriguez MD    Anesthesia: General LMA anesthesia    Indications: James Jiang is a 62 y.o. male with a left ureteral stone, presenting for definitive stone management.  He currently does not have a JJ ureteral stent in place.      Findings:   - no purulent urine noted   - stone present in very distal ureter, fragmented with minimal irrigation and manipulation     Estimated Blood Loss: min    Drains:   - 6 Fr x 28 cm JJ ureteral stent with strings  - 20 Fr blanco catheter    Procedure in detail:  After informed consent was obtained, the patient was brought the the cystoscopy suite and placed in the  supine position.  SCDs were applied and working.  Anesthesia was administered.  The patient was then placed in the dorsal lithotomy position and prepped and draped in the usual sterile fashion.      A rigid cystoscope in a 22 Fr sheath was introduced into the patient's urethra.  This passed easily.  The entire urethra was visualized which showed no strictures or masses.  Formal cystoscopy was performed which revealed no masses or lesions suspicious for malignancy, no bladder stones, no bladder diverticuli, no trabeculations.  The ureteral orifices were visualized in the normal anatomic position bilaterally. There was irritation secondary to blanco catheter.     A motion wire was passed up the left ureteral orifice and up into the kidney.  This passed easily and placement was confirmed using fluoro.  The cystoscope was removed.      An 8 Fr rigid ureteroscope was passed into the patient's bladder alongside the wire under direct vision.  It was then passed through the left ureteral orifice alongside the wire.  A stone was encountered at the level of the distal ureter.  A 272 micron laser fiber was passed through the ureteroscope.  The stone was fragmented using the laser.  The laser fiber was removed and a Nitinol tipless basket was introduced through the ureteroscope.  Stone fragments were removed and placed in the bladder. A gentle RGP was performed to outline the renal pelvis. The ureteroscope was removed keeping the motion wire in place.  A cystoscope was reinserted and the bladder was irrigated to remove the stone fragments.  The bladder was drained the cystoscope removed keeping the wire in place.      A 6 Fr x 28 cm JJ ureteral stent with strings was passed over the wire and up into the renal pelvis using fluoro.  When the coil appeared to be in good position in the kidney and the radio-opaque marker of the pusher was at the inferior pubis, the wire was removed under continuous fluoro.  Good coils were seen in  the kidney and the bladder using fluoro.  A 20 fr blanco was placed. The stent was attached to the blanco.     The patient tolerated the procedure well and was transferred to the recovery room in stable condition.      Disposition:  The patient will follow up on 8/2 for stent removal.  Follow up with me in 3 weeks regarding his known renal mass.     Raegan Rodriguez MD

## (undated) DEVICE — BAG LINGEMAN DRAIN UROLOGY

## (undated) DEVICE — SOLUTION IRRI NS BOTTLE 1000ML R5200-01

## (undated) DEVICE — Device

## (undated) DEVICE — SUTURE PROLENE 4-0 PS-2 18 8682H

## (undated) DEVICE — DRAPE T CYSTOSCOPY STERILE

## (undated) DEVICE — BAG URINARY DRN 2000ML

## (undated) DEVICE — ALCOHOL 16OZ

## (undated) DEVICE — PAD BOVIE ADULT

## (undated) DEVICE — CUFF TOURNIQUET 18DUAL PRT 5921-218-235

## (undated) DEVICE — SUTURE ETHILON 3-0 PS-2 18 1669H

## (undated) DEVICE — GLOVE BIOGEL MICRO SURGEON PINK SZ 7.5

## (undated) DEVICE — HEMOSTAT SURGICEL 4X8

## (undated) DEVICE — GLOVE BIOGEL PI ORTHO PRO BROWN SZ 8.0

## (undated) DEVICE — DRILL BIT FLEX

## (undated) DEVICE — SYR 10CC LUER LOCK

## (undated) DEVICE — GLOVE BIOGEL PI   GOLD SIZE 8

## (undated) DEVICE — SCRUB DYNA-HEX LIQ 4% CHG 4OZ

## (undated) DEVICE — UNDERGLOVE BIOGEL PI MICRO BLUE SZ 8

## (undated) DEVICE — DRESSING POST OP MEPILEX AG 4X6  498300

## (undated) DEVICE — TUBE CULTURE AMIES 220117

## (undated) DEVICE — TRAY MINOR SLIDELL MEMORIAL HOSPITAL

## (undated) DEVICE — ADHESIVE MASTISOL VIAL 48/BX

## (undated) DEVICE — SUCTION YANKAUER BULB TIP W/O VENT

## (undated) DEVICE — EXTRACTOR STONE 4WR NIT 2.2F 1

## (undated) DEVICE — SOLUTION SCRUB IODINE 4OZ

## (undated) DEVICE — TRAY LOWER EXTREMITY  SMHS029-05

## (undated) DEVICE — SUTURE VICRYL 2-0 CT-1 36 VCP945H

## (undated) DEVICE — BOWL STERILE LARGE 32OZ

## (undated) DEVICE — PAD ABD 5X9   7196D

## (undated) DEVICE — BLADE SCALPEL #15 371115

## (undated) DEVICE — BANDAGE COBAN 6 CBN1106

## (undated) DEVICE — BANDAGE ACE STERILE 6 REB3116

## (undated) DEVICE — DRILL BIT

## (undated) DEVICE — CATH POLLACK OPEN-END FLEXI-TI

## (undated) DEVICE — SET IRR URLGY 2LINE UNIV SPIKE

## (undated) DEVICE — LEGGING CLEAR POLY 2/PACK

## (undated) DEVICE — GOWN POLY REINF BRTH SLV LG

## (undated) DEVICE — SUTURE VICRYL 3-0 RB-1 27 J215H

## (undated) DEVICE — GLOVE BIOGEL PI  GOLD SZ 7

## (undated) DEVICE — DRESSING TRANS 2X2 TEGADERM

## (undated) DEVICE — DRAPE C-ARMOR FLOUROSCAN IMAGING 5523

## (undated) DEVICE — GAUZE VASELINE XEROFORM 5X9 8884433605

## (undated) DEVICE — REAMING ROD

## (undated) DEVICE — SOLUTION PREP IODINE 4OZ

## (undated) DEVICE — JELLY SURGILUBE LUBE TUBE 2OZ

## (undated) DEVICE — CONTAINER SPECIMEN OR STER 4OZ

## (undated) DEVICE — BANDAGE ESMARK 4X9 55514

## (undated) DEVICE — SUTURE VICRYL 1 CT-1 27 J261H

## (undated) DEVICE — DRILL BIT STEPPED

## (undated) DEVICE — PACK BASIC V 88151

## (undated) DEVICE — SET BASIN O R 31451126

## (undated) DEVICE — SOL IRR NACL .9% 3000ML

## (undated) DEVICE — DRAPE IOBAN 23X17 6650EZ

## (undated) DEVICE — SPONGE GAUZE 10S 4X4  442214

## (undated) DEVICE — SLEEVE SCD EXPRESS KNEE MEDIUM

## (undated) DEVICE — SOL 9P NACL IRR PIC IL

## (undated) DEVICE — DEVICE ANC SW STAT FOLEY 6-24

## (undated) DEVICE — PADDING CAST 6 STERILE 30-322

## (undated) DEVICE — SYRINGE 10ML 302995

## (undated) DEVICE — DRAPE C-ARM (FITS NEW C-ARM) 07-CA108

## (undated) DEVICE — TRAY SKIN PREP DRY

## (undated) DEVICE — BLADE OSCILLATING KM3-111

## (undated) DEVICE — SYR ONLY LUER LOCK 20CC

## (undated) DEVICE — SPONGE KERLIX SUPER   NON25854

## (undated) DEVICE — GLOVE BIOGEL PI ULTRA TOUCH GRAY SZ7.5

## (undated) DEVICE — PACK BASIC

## (undated) DEVICE — GLOVE SURGEONS ULTRA TOUCH 6.5

## (undated) DEVICE — DRAPE 3/4

## (undated) DEVICE — CABLE LASER FIBER 200 MICRON

## (undated) DEVICE — SPONGE BULKEE II ABSRB 6X6.75

## (undated) DEVICE — NEEDLE SAFETY ECLIPSE 25G 1-1/2IN 305767

## (undated) DEVICE — GUIDE WIRE MOTION .035 X 150CM